# Patient Record
Sex: FEMALE | Race: BLACK OR AFRICAN AMERICAN | URBAN - METROPOLITAN AREA
[De-identification: names, ages, dates, MRNs, and addresses within clinical notes are randomized per-mention and may not be internally consistent; named-entity substitution may affect disease eponyms.]

---

## 2017-01-01 ENCOUNTER — TELEPHONE (OUTPATIENT)
Dept: CARE COORDINATION | Facility: CLINIC | Age: 22
End: 2017-01-01

## 2017-01-01 ENCOUNTER — OFFICE VISIT (OUTPATIENT)
Dept: PEDIATRIC HEMATOLOGY/ONCOLOGY | Facility: CLINIC | Age: 22
End: 2017-01-01

## 2017-01-01 ENCOUNTER — HOSPITAL ENCOUNTER (INPATIENT)
Facility: CLINIC | Age: 22
LOS: 2 days | Discharge: HOME | End: 2017-12-14
Attending: PEDIATRICS | Admitting: PEDIATRICS
Payer: MEDICAID

## 2017-01-01 ENCOUNTER — HOSPITAL ENCOUNTER (OUTPATIENT)
Dept: GENERAL RADIOLOGY | Facility: CLINIC | Age: 22
Discharge: HOME | End: 2017-12-21
Admitting: NURSE PRACTITIONER
Payer: MEDICAID

## 2017-01-01 ENCOUNTER — INFUSION THERAPY VISIT (OUTPATIENT)
Dept: INFUSION THERAPY | Facility: CLINIC | Age: 22
End: 2017-01-01
Attending: PEDIATRICS
Payer: MEDICAID

## 2017-01-01 ENCOUNTER — DOCUMENTATION ONLY (OUTPATIENT)
Dept: CARE COORDINATION | Facility: CLINIC | Age: 22
End: 2017-01-01

## 2017-01-01 ENCOUNTER — OFFICE VISIT (OUTPATIENT)
Dept: PEDIATRIC HEMATOLOGY/ONCOLOGY | Facility: CLINIC | Age: 22
End: 2017-01-01
Attending: PEDIATRICS
Payer: COMMERCIAL

## 2017-01-01 ENCOUNTER — TELEPHONE (OUTPATIENT)
Dept: PEDIATRIC HEMATOLOGY/ONCOLOGY | Facility: CLINIC | Age: 22
End: 2017-01-01

## 2017-01-01 ENCOUNTER — HEALTH MAINTENANCE LETTER (OUTPATIENT)
Age: 22
End: 2017-01-01

## 2017-01-01 ENCOUNTER — HOSPITAL ENCOUNTER (OUTPATIENT)
Dept: GENERAL RADIOLOGY | Facility: CLINIC | Age: 22
End: 2017-12-11
Attending: NURSE PRACTITIONER
Payer: MEDICAID

## 2017-01-01 ENCOUNTER — APPOINTMENT (OUTPATIENT)
Dept: GENERAL RADIOLOGY | Facility: CLINIC | Age: 22
End: 2017-01-01
Attending: PEDIATRICS
Payer: MEDICAID

## 2017-01-01 ENCOUNTER — OFFICE VISIT (OUTPATIENT)
Dept: PEDIATRIC HEMATOLOGY/ONCOLOGY | Facility: CLINIC | Age: 22
End: 2017-01-01
Attending: NURSE PRACTITIONER
Payer: COMMERCIAL

## 2017-01-01 ENCOUNTER — OFFICE VISIT (OUTPATIENT)
Dept: CARE COORDINATION | Facility: CLINIC | Age: 22
End: 2017-01-01

## 2017-01-01 ENCOUNTER — HOSPITAL ENCOUNTER (OUTPATIENT)
Dept: CT IMAGING | Facility: CLINIC | Age: 22
Discharge: HOME | End: 2017-08-14
Attending: PEDIATRICS | Admitting: PEDIATRICS
Payer: COMMERCIAL

## 2017-01-01 ENCOUNTER — OFFICE VISIT (OUTPATIENT)
Dept: PEDIATRIC HEMATOLOGY/ONCOLOGY | Facility: CLINIC | Age: 22
End: 2017-01-01
Attending: PEDIATRICS
Payer: MEDICAID

## 2017-01-01 ENCOUNTER — OFFICE VISIT (OUTPATIENT)
Dept: INTERPRETER SERVICES | Facility: CLINIC | Age: 22
End: 2017-01-01
Payer: MEDICAID

## 2017-01-01 ENCOUNTER — OFFICE VISIT (OUTPATIENT)
Dept: PEDIATRIC HEMATOLOGY/ONCOLOGY | Facility: CLINIC | Age: 22
End: 2017-01-01
Attending: NURSE PRACTITIONER
Payer: MEDICAID

## 2017-01-01 ENCOUNTER — INFUSION THERAPY VISIT (OUTPATIENT)
Dept: INFUSION THERAPY | Facility: CLINIC | Age: 22
End: 2017-01-01
Attending: PEDIATRICS
Payer: COMMERCIAL

## 2017-01-01 ENCOUNTER — HOSPITAL ENCOUNTER (OUTPATIENT)
Dept: GENERAL RADIOLOGY | Facility: CLINIC | Age: 22
Discharge: HOME | End: 2017-08-21
Attending: NURSE PRACTITIONER | Admitting: NURSE PRACTITIONER
Payer: COMMERCIAL

## 2017-01-01 ENCOUNTER — OFFICE VISIT (OUTPATIENT)
Dept: INTERPRETER SERVICES | Facility: CLINIC | Age: 22
End: 2017-01-01

## 2017-01-01 VITALS
HEIGHT: 64 IN | TEMPERATURE: 97.9 F | SYSTOLIC BLOOD PRESSURE: 109 MMHG | OXYGEN SATURATION: 96 % | HEART RATE: 68 BPM | BODY MASS INDEX: 22.51 KG/M2 | RESPIRATION RATE: 20 BRPM | DIASTOLIC BLOOD PRESSURE: 73 MMHG | WEIGHT: 131.84 LBS

## 2017-01-01 VITALS
OXYGEN SATURATION: 100 % | RESPIRATION RATE: 20 BRPM | HEART RATE: 90 BPM | SYSTOLIC BLOOD PRESSURE: 100 MMHG | WEIGHT: 135.8 LBS | TEMPERATURE: 97.5 F | DIASTOLIC BLOOD PRESSURE: 69 MMHG | BODY MASS INDEX: 23.5 KG/M2

## 2017-01-01 VITALS
WEIGHT: 132.72 LBS | RESPIRATION RATE: 18 BRPM | TEMPERATURE: 97.5 F | DIASTOLIC BLOOD PRESSURE: 79 MMHG | BODY MASS INDEX: 23.52 KG/M2 | HEART RATE: 60 BPM | SYSTOLIC BLOOD PRESSURE: 108 MMHG | HEIGHT: 63 IN | OXYGEN SATURATION: 97 %

## 2017-01-01 VITALS
HEIGHT: 64 IN | TEMPERATURE: 97.9 F | SYSTOLIC BLOOD PRESSURE: 103 MMHG | OXYGEN SATURATION: 100 % | RESPIRATION RATE: 12 BRPM | BODY MASS INDEX: 23.03 KG/M2 | DIASTOLIC BLOOD PRESSURE: 73 MMHG | HEART RATE: 62 BPM | WEIGHT: 134.92 LBS

## 2017-01-01 VITALS
TEMPERATURE: 97 F | SYSTOLIC BLOOD PRESSURE: 111 MMHG | OXYGEN SATURATION: 100 % | BODY MASS INDEX: 24.02 KG/M2 | HEIGHT: 63 IN | RESPIRATION RATE: 18 BRPM | WEIGHT: 135.58 LBS | HEART RATE: 64 BPM | DIASTOLIC BLOOD PRESSURE: 84 MMHG

## 2017-01-01 VITALS
BODY MASS INDEX: 24.18 KG/M2 | SYSTOLIC BLOOD PRESSURE: 104 MMHG | RESPIRATION RATE: 18 BRPM | TEMPERATURE: 97.4 F | HEIGHT: 63 IN | OXYGEN SATURATION: 100 % | WEIGHT: 136.47 LBS | HEART RATE: 74 BPM | DIASTOLIC BLOOD PRESSURE: 70 MMHG

## 2017-01-01 VITALS
RESPIRATION RATE: 26 BRPM | HEART RATE: 127 BPM | TEMPERATURE: 98.5 F | OXYGEN SATURATION: 94 % | SYSTOLIC BLOOD PRESSURE: 97 MMHG | DIASTOLIC BLOOD PRESSURE: 72 MMHG

## 2017-01-01 VITALS
OXYGEN SATURATION: 100 % | HEART RATE: 122 BPM | DIASTOLIC BLOOD PRESSURE: 83 MMHG | SYSTOLIC BLOOD PRESSURE: 109 MMHG | TEMPERATURE: 98.4 F | RESPIRATION RATE: 20 BRPM

## 2017-01-01 VITALS
SYSTOLIC BLOOD PRESSURE: 104 MMHG | HEART RATE: 70 BPM | DIASTOLIC BLOOD PRESSURE: 81 MMHG | RESPIRATION RATE: 23 BRPM | TEMPERATURE: 98.6 F | WEIGHT: 141.54 LBS | BODY MASS INDEX: 23.58 KG/M2 | OXYGEN SATURATION: 95 % | HEIGHT: 65 IN

## 2017-01-01 VITALS
HEART RATE: 143 BPM | TEMPERATURE: 99.6 F | OXYGEN SATURATION: 94 % | RESPIRATION RATE: 28 BRPM | DIASTOLIC BLOOD PRESSURE: 78 MMHG | SYSTOLIC BLOOD PRESSURE: 113 MMHG

## 2017-01-01 VITALS
WEIGHT: 134.48 LBS | BODY MASS INDEX: 23.83 KG/M2 | HEIGHT: 63 IN | DIASTOLIC BLOOD PRESSURE: 78 MMHG | TEMPERATURE: 97.5 F | RESPIRATION RATE: 20 BRPM | SYSTOLIC BLOOD PRESSURE: 109 MMHG | OXYGEN SATURATION: 100 % | HEART RATE: 70 BPM

## 2017-01-01 VITALS
TEMPERATURE: 97.9 F | DIASTOLIC BLOOD PRESSURE: 79 MMHG | OXYGEN SATURATION: 99 % | HEIGHT: 63 IN | BODY MASS INDEX: 24.02 KG/M2 | HEART RATE: 77 BPM | SYSTOLIC BLOOD PRESSURE: 104 MMHG | WEIGHT: 135.58 LBS | RESPIRATION RATE: 18 BRPM

## 2017-01-01 VITALS
DIASTOLIC BLOOD PRESSURE: 69 MMHG | SYSTOLIC BLOOD PRESSURE: 104 MMHG | HEIGHT: 63 IN | TEMPERATURE: 98.2 F | WEIGHT: 132.94 LBS | BODY MASS INDEX: 23.55 KG/M2 | HEART RATE: 96 BPM | OXYGEN SATURATION: 97 % | RESPIRATION RATE: 21 BRPM

## 2017-01-01 DIAGNOSIS — C49.9 SYNOVIAL SARCOMA (H): Primary | ICD-10-CM

## 2017-01-01 DIAGNOSIS — C49.9 SYNOVIAL SARCOMA (H): ICD-10-CM

## 2017-01-01 DIAGNOSIS — Z71.9 ENCOUNTER FOR COUNSELING: Primary | ICD-10-CM

## 2017-01-01 DIAGNOSIS — R07.9 ACUTE CHEST PAIN: Primary | ICD-10-CM

## 2017-01-01 DIAGNOSIS — C49.9 SARCOMA (H): ICD-10-CM

## 2017-01-01 DIAGNOSIS — L30.9 DERMATITIS: ICD-10-CM

## 2017-01-01 DIAGNOSIS — C49.9 RHABDOID TUMOR (H): ICD-10-CM

## 2017-01-01 DIAGNOSIS — L27.1 PALMAR PLANTAR DYSESTHESIA: Primary | ICD-10-CM

## 2017-01-01 DIAGNOSIS — J18.9 PNEUMONIA DUE TO INFECTIOUS ORGANISM, UNSPECIFIED LATERALITY, UNSPECIFIED PART OF LUNG: ICD-10-CM

## 2017-01-01 DIAGNOSIS — L27.1 PALMAR PLANTAR DYSESTHESIA: ICD-10-CM

## 2017-01-01 DIAGNOSIS — C51.9 SARCOMA OF VULVA (H): Primary | ICD-10-CM

## 2017-01-01 DIAGNOSIS — J93.9 BILATERAL PNEUMOTHORACES: Primary | ICD-10-CM

## 2017-01-01 DIAGNOSIS — J93.12 SECONDARY SPONTANEOUS PNEUMOTHORAX: Primary | ICD-10-CM

## 2017-01-01 DIAGNOSIS — C49.9 SARCOMA (H): Primary | ICD-10-CM

## 2017-01-01 DIAGNOSIS — C49.9 RHABDOID TUMOR (H): Primary | ICD-10-CM

## 2017-01-01 LAB
ALBUMIN SERPL-MCNC: 2.9 G/DL (ref 3.4–5)
ALBUMIN SERPL-MCNC: 3.1 G/DL (ref 3.4–5)
ALBUMIN SERPL-MCNC: 3.2 G/DL (ref 3.4–5)
ALBUMIN SERPL-MCNC: 3.3 G/DL (ref 3.4–5)
ALBUMIN SERPL-MCNC: 3.4 G/DL (ref 3.4–5)
ALBUMIN SERPL-MCNC: 3.5 G/DL (ref 3.4–5)
ALBUMIN UR-MCNC: 10 MG/DL
ALBUMIN UR-MCNC: 30 MG/DL
ALBUMIN UR-MCNC: NEGATIVE MG/DL
ALP SERPL-CCNC: 54 U/L (ref 40–150)
ALP SERPL-CCNC: 77 U/L (ref 40–150)
ALP SERPL-CCNC: 80 U/L (ref 40–150)
ALP SERPL-CCNC: 83 U/L (ref 40–150)
ALP SERPL-CCNC: 85 U/L (ref 40–150)
ALP SERPL-CCNC: 91 U/L (ref 40–150)
ALT SERPL W P-5'-P-CCNC: 25 U/L (ref 0–50)
ALT SERPL W P-5'-P-CCNC: 26 U/L (ref 0–50)
ALT SERPL W P-5'-P-CCNC: 31 U/L (ref 0–50)
ALT SERPL W P-5'-P-CCNC: 32 U/L (ref 0–50)
ALT SERPL W P-5'-P-CCNC: 36 U/L (ref 0–50)
ALT SERPL W P-5'-P-CCNC: 40 U/L (ref 0–50)
ANION GAP SERPL CALCULATED.3IONS-SCNC: 10 MMOL/L (ref 3–14)
ANION GAP SERPL CALCULATED.3IONS-SCNC: 10 MMOL/L (ref 3–14)
ANION GAP SERPL CALCULATED.3IONS-SCNC: 11 MMOL/L (ref 3–14)
ANION GAP SERPL CALCULATED.3IONS-SCNC: 6 MMOL/L (ref 3–14)
ANION GAP SERPL CALCULATED.3IONS-SCNC: 7 MMOL/L (ref 3–14)
ANION GAP SERPL CALCULATED.3IONS-SCNC: 7 MMOL/L (ref 3–14)
ANION GAP SERPL CALCULATED.3IONS-SCNC: 8 MMOL/L (ref 3–14)
APPEARANCE UR: ABNORMAL
APPEARANCE UR: CLEAR
AST SERPL W P-5'-P-CCNC: 24 U/L (ref 0–45)
AST SERPL W P-5'-P-CCNC: 28 U/L (ref 0–45)
AST SERPL W P-5'-P-CCNC: 28 U/L (ref 0–45)
AST SERPL W P-5'-P-CCNC: 31 U/L (ref 0–45)
AST SERPL W P-5'-P-CCNC: 32 U/L (ref 0–45)
AST SERPL W P-5'-P-CCNC: 33 U/L (ref 0–45)
BACTERIA #/AREA URNS HPF: ABNORMAL /HPF
BACTERIA SPEC CULT: NO GROWTH
BACTERIA SPEC CULT: NORMAL
BASOPHILS # BLD AUTO: 0 10E9/L (ref 0–0.2)
BASOPHILS NFR BLD AUTO: 0 %
BASOPHILS NFR BLD AUTO: 0.1 %
BASOPHILS NFR BLD AUTO: 0.2 %
BASOPHILS NFR BLD AUTO: 0.2 %
BASOPHILS NFR BLD AUTO: 0.3 %
BASOPHILS NFR BLD AUTO: 0.4 %
BILIRUB SERPL-MCNC: 0.4 MG/DL (ref 0.2–1.3)
BILIRUB SERPL-MCNC: 0.4 MG/DL (ref 0.2–1.3)
BILIRUB SERPL-MCNC: 0.5 MG/DL (ref 0.2–1.3)
BILIRUB SERPL-MCNC: 0.6 MG/DL (ref 0.2–1.3)
BILIRUB SERPL-MCNC: 0.6 MG/DL (ref 0.2–1.3)
BILIRUB SERPL-MCNC: 0.7 MG/DL (ref 0.2–1.3)
BILIRUB UR QL STRIP: NEGATIVE
BUN SERPL-MCNC: 10 MG/DL (ref 7–30)
BUN SERPL-MCNC: 11 MG/DL (ref 7–30)
BUN SERPL-MCNC: 12 MG/DL (ref 7–30)
BUN SERPL-MCNC: 12 MG/DL (ref 7–30)
BUN SERPL-MCNC: 16 MG/DL (ref 7–30)
BUN SERPL-MCNC: 8 MG/DL (ref 7–30)
BUN SERPL-MCNC: 9 MG/DL (ref 7–30)
CALCIUM SERPL-MCNC: 7.2 MG/DL (ref 8.5–10.1)
CALCIUM SERPL-MCNC: 8.4 MG/DL (ref 8.5–10.1)
CALCIUM SERPL-MCNC: 8.8 MG/DL (ref 8.5–10.1)
CALCIUM SERPL-MCNC: 8.8 MG/DL (ref 8.5–10.1)
CALCIUM SERPL-MCNC: 8.9 MG/DL (ref 8.5–10.1)
CALCIUM SERPL-MCNC: 9.1 MG/DL (ref 8.5–10.1)
CALCIUM SERPL-MCNC: 9.1 MG/DL (ref 8.5–10.1)
CHLORIDE SERPL-SCNC: 102 MMOL/L (ref 94–109)
CHLORIDE SERPL-SCNC: 103 MMOL/L (ref 94–109)
CHLORIDE SERPL-SCNC: 103 MMOL/L (ref 94–109)
CHLORIDE SERPL-SCNC: 105 MMOL/L (ref 94–109)
CHLORIDE SERPL-SCNC: 106 MMOL/L (ref 94–109)
CHLORIDE SERPL-SCNC: 113 MMOL/L (ref 94–109)
CHLORIDE SERPL-SCNC: 98 MMOL/L (ref 94–109)
CO2 SERPL-SCNC: 24 MMOL/L (ref 20–32)
CO2 SERPL-SCNC: 25 MMOL/L (ref 20–32)
CO2 SERPL-SCNC: 26 MMOL/L (ref 20–32)
CO2 SERPL-SCNC: 29 MMOL/L (ref 20–32)
COLOR UR AUTO: ABNORMAL
COLOR UR AUTO: ABNORMAL
COLOR UR AUTO: YELLOW
CREAT SERPL-MCNC: 0.5 MG/DL (ref 0.52–1.04)
CREAT SERPL-MCNC: 0.5 MG/DL (ref 0.52–1.04)
CREAT SERPL-MCNC: 0.56 MG/DL (ref 0.52–1.04)
CREAT SERPL-MCNC: 0.56 MG/DL (ref 0.52–1.04)
CREAT SERPL-MCNC: 0.57 MG/DL (ref 0.52–1.04)
CREAT SERPL-MCNC: 0.71 MG/DL (ref 0.52–1.04)
CREAT SERPL-MCNC: 0.77 MG/DL (ref 0.52–1.04)
CREAT SERPL-MCNC: 0.8 MG/DL (ref 0.52–1.04)
DIFFERENTIAL METHOD BLD: ABNORMAL
EOSINOPHIL # BLD AUTO: 0 10E9/L (ref 0–0.7)
EOSINOPHIL # BLD AUTO: 0.1 10E9/L (ref 0–0.7)
EOSINOPHIL NFR BLD AUTO: 0 %
EOSINOPHIL NFR BLD AUTO: 0.3 %
EOSINOPHIL NFR BLD AUTO: 0.4 %
EOSINOPHIL NFR BLD AUTO: 0.7 %
EOSINOPHIL NFR BLD AUTO: 0.7 %
EOSINOPHIL NFR BLD AUTO: 1.1 %
EOSINOPHIL NFR BLD AUTO: 1.3 %
EOSINOPHIL NFR BLD AUTO: 1.3 %
EOSINOPHIL NFR BLD AUTO: 1.6 %
ERYTHROCYTE [DISTWIDTH] IN BLOOD BY AUTOMATED COUNT: 10.9 % (ref 10–15)
ERYTHROCYTE [DISTWIDTH] IN BLOOD BY AUTOMATED COUNT: 11.3 % (ref 10–15)
ERYTHROCYTE [DISTWIDTH] IN BLOOD BY AUTOMATED COUNT: 11.9 % (ref 10–15)
ERYTHROCYTE [DISTWIDTH] IN BLOOD BY AUTOMATED COUNT: 11.9 % (ref 10–15)
ERYTHROCYTE [DISTWIDTH] IN BLOOD BY AUTOMATED COUNT: 12 % (ref 10–15)
ERYTHROCYTE [DISTWIDTH] IN BLOOD BY AUTOMATED COUNT: 12.7 % (ref 10–15)
ERYTHROCYTE [DISTWIDTH] IN BLOOD BY AUTOMATED COUNT: 12.7 % (ref 10–15)
ERYTHROCYTE [DISTWIDTH] IN BLOOD BY AUTOMATED COUNT: 12.9 % (ref 10–15)
ERYTHROCYTE [DISTWIDTH] IN BLOOD BY AUTOMATED COUNT: 15.3 % (ref 10–15)
ERYTHROCYTE [DISTWIDTH] IN BLOOD BY AUTOMATED COUNT: 15.4 % (ref 10–15)
ERYTHROCYTE [DISTWIDTH] IN BLOOD BY AUTOMATED COUNT: 16.3 % (ref 10–15)
FLUAV H1 2009 PAND RNA SPEC QL NAA+PROBE: NEGATIVE
FLUAV H1 RNA SPEC QL NAA+PROBE: NEGATIVE
FLUAV H3 RNA SPEC QL NAA+PROBE: NEGATIVE
FLUAV RNA SPEC QL NAA+PROBE: NEGATIVE
FLUBV RNA SPEC QL NAA+PROBE: NEGATIVE
GFR SERPL CREATININE-BSD FRML MDRD: 87 ML/MIN/1.7M2
GFR SERPL CREATININE-BSD FRML MDRD: >90 ML/MIN/1.7M2
GFR SERPL CREATININE-BSD FRML MDRD: ABNORMAL ML/MIN/1.7M2
GFR SERPL CREATININE-BSD FRML MDRD: NORMAL ML/MIN/1.7M2
GLUCOSE SERPL-MCNC: 112 MG/DL (ref 70–99)
GLUCOSE SERPL-MCNC: 113 MG/DL (ref 70–99)
GLUCOSE SERPL-MCNC: 118 MG/DL (ref 70–99)
GLUCOSE SERPL-MCNC: 128 MG/DL (ref 70–99)
GLUCOSE SERPL-MCNC: 159 MG/DL (ref 70–99)
GLUCOSE SERPL-MCNC: 81 MG/DL (ref 70–99)
GLUCOSE SERPL-MCNC: 83 MG/DL (ref 70–99)
GLUCOSE UR STRIP-MCNC: NEGATIVE MG/DL
HADV DNA SPEC QL NAA+PROBE: NEGATIVE
HADV DNA SPEC QL NAA+PROBE: NEGATIVE
HCT VFR BLD AUTO: 32.2 % (ref 35–47)
HCT VFR BLD AUTO: 34.6 % (ref 35–47)
HCT VFR BLD AUTO: 36.5 % (ref 35–47)
HCT VFR BLD AUTO: 37 % (ref 35–47)
HCT VFR BLD AUTO: 37.7 % (ref 35–47)
HCT VFR BLD AUTO: 37.8 % (ref 35–47)
HCT VFR BLD AUTO: 38.8 % (ref 35–47)
HCT VFR BLD AUTO: 39.1 % (ref 35–47)
HCT VFR BLD AUTO: 39.2 % (ref 35–47)
HCT VFR BLD AUTO: 40.3 % (ref 35–47)
HCT VFR BLD AUTO: 41.2 % (ref 35–47)
HGB BLD-MCNC: 11.1 G/DL (ref 11.7–15.7)
HGB BLD-MCNC: 12 G/DL (ref 11.7–15.7)
HGB BLD-MCNC: 12.2 G/DL (ref 11.7–15.7)
HGB BLD-MCNC: 13.2 G/DL (ref 11.7–15.7)
HGB BLD-MCNC: 13.3 G/DL (ref 11.7–15.7)
HGB BLD-MCNC: 13.5 G/DL (ref 11.7–15.7)
HGB BLD-MCNC: 13.7 G/DL (ref 11.7–15.7)
HGB BLD-MCNC: 14 G/DL (ref 11.7–15.7)
HGB BLD-MCNC: 14.1 G/DL (ref 11.7–15.7)
HGB BLD-MCNC: 14.3 G/DL (ref 11.7–15.7)
HGB BLD-MCNC: 14.7 G/DL (ref 11.7–15.7)
HGB UR QL STRIP: NEGATIVE
HMPV RNA SPEC QL NAA+PROBE: NEGATIVE
HPIV1 RNA SPEC QL NAA+PROBE: NEGATIVE
HPIV2 RNA SPEC QL NAA+PROBE: NEGATIVE
HPIV3 RNA SPEC QL NAA+PROBE: NEGATIVE
IMM GRANULOCYTES # BLD: 0 10E9/L (ref 0–0.4)
IMM GRANULOCYTES # BLD: 0.1 10E9/L (ref 0–0.4)
IMM GRANULOCYTES # BLD: 0.1 10E9/L (ref 0–0.4)
IMM GRANULOCYTES NFR BLD: 0 %
IMM GRANULOCYTES NFR BLD: 0.2 %
IMM GRANULOCYTES NFR BLD: 0.3 %
IMM GRANULOCYTES NFR BLD: 0.4 %
IMM GRANULOCYTES NFR BLD: 0.9 %
IMM GRANULOCYTES NFR BLD: 1 %
IMM GRANULOCYTES NFR BLD: 1 %
KETONES UR STRIP-MCNC: NEGATIVE MG/DL
LEUKOCYTE ESTERASE UR QL STRIP: ABNORMAL
LEUKOCYTE ESTERASE UR QL STRIP: NEGATIVE
LEUKOCYTE ESTERASE UR QL STRIP: NEGATIVE
LYMPHOCYTES # BLD AUTO: 0.6 10E9/L (ref 0.8–5.3)
LYMPHOCYTES # BLD AUTO: 0.9 10E9/L (ref 0.8–5.3)
LYMPHOCYTES # BLD AUTO: 0.9 10E9/L (ref 0.8–5.3)
LYMPHOCYTES # BLD AUTO: 1.1 10E9/L (ref 0.8–5.3)
LYMPHOCYTES # BLD AUTO: 1.4 10E9/L (ref 0.8–5.3)
LYMPHOCYTES # BLD AUTO: 1.5 10E9/L (ref 0.8–5.3)
LYMPHOCYTES # BLD AUTO: 1.7 10E9/L (ref 0.8–5.3)
LYMPHOCYTES NFR BLD AUTO: 17.6 %
LYMPHOCYTES NFR BLD AUTO: 27.6 %
LYMPHOCYTES NFR BLD AUTO: 32.7 %
LYMPHOCYTES NFR BLD AUTO: 38.6 %
LYMPHOCYTES NFR BLD AUTO: 39 %
LYMPHOCYTES NFR BLD AUTO: 40 %
LYMPHOCYTES NFR BLD AUTO: 41.7 %
LYMPHOCYTES NFR BLD AUTO: 44 %
LYMPHOCYTES NFR BLD AUTO: 44.3 %
LYMPHOCYTES NFR BLD AUTO: 46.7 %
LYMPHOCYTES NFR BLD AUTO: 7.2 %
MCH RBC QN AUTO: 32.4 PG (ref 26.5–33)
MCH RBC QN AUTO: 32.7 PG (ref 26.5–33)
MCH RBC QN AUTO: 33.4 PG (ref 26.5–33)
MCH RBC QN AUTO: 34.3 PG (ref 26.5–33)
MCH RBC QN AUTO: 35 PG (ref 26.5–33)
MCH RBC QN AUTO: 36.1 PG (ref 26.5–33)
MCH RBC QN AUTO: 36.2 PG (ref 26.5–33)
MCH RBC QN AUTO: 36.4 PG (ref 26.5–33)
MCH RBC QN AUTO: 36.4 PG (ref 26.5–33)
MCH RBC QN AUTO: 36.5 PG (ref 26.5–33)
MCH RBC QN AUTO: 37 PG (ref 26.5–33)
MCHC RBC AUTO-ENTMCNC: 33.4 G/DL (ref 31.5–36.5)
MCHC RBC AUTO-ENTMCNC: 34.5 G/DL (ref 31.5–36.5)
MCHC RBC AUTO-ENTMCNC: 34.7 G/DL (ref 31.5–36.5)
MCHC RBC AUTO-ENTMCNC: 34.8 G/DL (ref 31.5–36.5)
MCHC RBC AUTO-ENTMCNC: 34.9 G/DL (ref 31.5–36.5)
MCHC RBC AUTO-ENTMCNC: 35 G/DL (ref 31.5–36.5)
MCHC RBC AUTO-ENTMCNC: 35.3 G/DL (ref 31.5–36.5)
MCHC RBC AUTO-ENTMCNC: 35.7 G/DL (ref 31.5–36.5)
MCHC RBC AUTO-ENTMCNC: 35.7 G/DL (ref 31.5–36.5)
MCHC RBC AUTO-ENTMCNC: 36.6 G/DL (ref 31.5–36.5)
MCHC RBC AUTO-ENTMCNC: 37 G/DL (ref 31.5–36.5)
MCV RBC AUTO: 100 FL (ref 78–100)
MCV RBC AUTO: 101 FL (ref 78–100)
MCV RBC AUTO: 104 FL (ref 78–100)
MCV RBC AUTO: 105 FL (ref 78–100)
MCV RBC AUTO: 105 FL (ref 78–100)
MCV RBC AUTO: 106 FL (ref 78–100)
MCV RBC AUTO: 108 FL (ref 78–100)
MCV RBC AUTO: 89 FL (ref 78–100)
MCV RBC AUTO: 92 FL (ref 78–100)
MCV RBC AUTO: 93 FL (ref 78–100)
MCV RBC AUTO: 96 FL (ref 78–100)
MICROBIOLOGIST REVIEW: ABNORMAL
MONOCYTES # BLD AUTO: 0.3 10E9/L (ref 0–1.3)
MONOCYTES # BLD AUTO: 0.4 10E9/L (ref 0–1.3)
MONOCYTES # BLD AUTO: 0.5 10E9/L (ref 0–1.3)
MONOCYTES # BLD AUTO: 0.5 10E9/L (ref 0–1.3)
MONOCYTES # BLD AUTO: 0.6 10E9/L (ref 0–1.3)
MONOCYTES NFR BLD AUTO: 10.4 %
MONOCYTES NFR BLD AUTO: 11.7 %
MONOCYTES NFR BLD AUTO: 12 %
MONOCYTES NFR BLD AUTO: 12.3 %
MONOCYTES NFR BLD AUTO: 13.7 %
MONOCYTES NFR BLD AUTO: 14 %
MONOCYTES NFR BLD AUTO: 19.7 %
MONOCYTES NFR BLD AUTO: 6.6 %
MONOCYTES NFR BLD AUTO: 7.6 %
MONOCYTES NFR BLD AUTO: 9.2 %
MONOCYTES NFR BLD AUTO: 9.8 %
MUCOUS THREADS #/AREA URNS LPF: PRESENT /LPF
NEUTROPHILS # BLD AUTO: 0.9 10E9/L (ref 1.6–8.3)
NEUTROPHILS # BLD AUTO: 1 10E9/L (ref 1.6–8.3)
NEUTROPHILS # BLD AUTO: 1.1 10E9/L (ref 1.6–8.3)
NEUTROPHILS # BLD AUTO: 1.1 10E9/L (ref 1.6–8.3)
NEUTROPHILS # BLD AUTO: 1.4 10E9/L (ref 1.6–8.3)
NEUTROPHILS # BLD AUTO: 1.4 10E9/L (ref 1.6–8.3)
NEUTROPHILS # BLD AUTO: 2.1 10E9/L (ref 1.6–8.3)
NEUTROPHILS # BLD AUTO: 2.2 10E9/L (ref 1.6–8.3)
NEUTROPHILS # BLD AUTO: 2.5 10E9/L (ref 1.6–8.3)
NEUTROPHILS # BLD AUTO: 3.8 10E9/L (ref 1.6–8.3)
NEUTROPHILS # BLD AUTO: 7.3 10E9/L (ref 1.6–8.3)
NEUTROPHILS NFR BLD AUTO: 39.2 %
NEUTROPHILS NFR BLD AUTO: 40.9 %
NEUTROPHILS NFR BLD AUTO: 43.1 %
NEUTROPHILS NFR BLD AUTO: 44.7 %
NEUTROPHILS NFR BLD AUTO: 44.8 %
NEUTROPHILS NFR BLD AUTO: 46.1 %
NEUTROPHILS NFR BLD AUTO: 49.7 %
NEUTROPHILS NFR BLD AUTO: 54.8 %
NEUTROPHILS NFR BLD AUTO: 57.1 %
NEUTROPHILS NFR BLD AUTO: 72.3 %
NEUTROPHILS NFR BLD AUTO: 85.1 %
NITRATE UR QL: NEGATIVE
NRBC # BLD AUTO: 0 10*3/UL
NRBC BLD AUTO-RTO: 0 /100
PH UR STRIP: 5.5 PH (ref 5–7)
PH UR STRIP: 5.5 PH (ref 5–7)
PH UR STRIP: 6 PH (ref 5–7)
PH UR STRIP: 6.5 PH (ref 5–7)
PLATELET # BLD AUTO: 105 10E9/L (ref 150–450)
PLATELET # BLD AUTO: 112 10E9/L (ref 150–450)
PLATELET # BLD AUTO: 113 10E9/L (ref 150–450)
PLATELET # BLD AUTO: 120 10E9/L (ref 150–450)
PLATELET # BLD AUTO: 125 10E9/L (ref 150–450)
PLATELET # BLD AUTO: 128 10E9/L (ref 150–450)
PLATELET # BLD AUTO: 133 10E9/L (ref 150–450)
PLATELET # BLD AUTO: 158 10E9/L (ref 150–450)
PLATELET # BLD AUTO: 179 10E9/L (ref 150–450)
PLATELET # BLD AUTO: 57 10E9/L (ref 150–450)
PLATELET # BLD AUTO: 66 10E9/L (ref 150–450)
PLATELET # BLD EST: ABNORMAL 10*3/UL
PLATELET # BLD EST: ABNORMAL 10*3/UL
POTASSIUM SERPL-SCNC: 3.3 MMOL/L (ref 3.4–5.3)
POTASSIUM SERPL-SCNC: 3.5 MMOL/L (ref 3.4–5.3)
POTASSIUM SERPL-SCNC: 3.7 MMOL/L (ref 3.4–5.3)
POTASSIUM SERPL-SCNC: 3.8 MMOL/L (ref 3.4–5.3)
POTASSIUM SERPL-SCNC: 3.9 MMOL/L (ref 3.4–5.3)
POTASSIUM SERPL-SCNC: 4 MMOL/L (ref 3.4–5.3)
POTASSIUM SERPL-SCNC: 4.1 MMOL/L (ref 3.4–5.3)
PROT SERPL-MCNC: 7 G/DL (ref 6.8–8.8)
PROT SERPL-MCNC: 7.2 G/DL (ref 6.8–8.8)
PROT SERPL-MCNC: 7.2 G/DL (ref 6.8–8.8)
PROT SERPL-MCNC: 7.4 G/DL (ref 6.8–8.8)
PROT SERPL-MCNC: 7.5 G/DL (ref 6.8–8.8)
PROT SERPL-MCNC: 7.7 G/DL (ref 6.8–8.8)
RADIOLOGIST FLAGS: ABNORMAL
RBC # BLD AUTO: 3.04 10E12/L (ref 3.8–5.2)
RBC # BLD AUTO: 3.38 10E12/L (ref 3.8–5.2)
RBC # BLD AUTO: 3.59 10E12/L (ref 3.8–5.2)
RBC # BLD AUTO: 3.59 10E12/L (ref 3.8–5.2)
RBC # BLD AUTO: 3.65 10E12/L (ref 3.8–5.2)
RBC # BLD AUTO: 3.71 10E12/L (ref 3.8–5.2)
RBC # BLD AUTO: 3.87 10E12/L (ref 3.8–5.2)
RBC # BLD AUTO: 3.91 10E12/L (ref 3.8–5.2)
RBC # BLD AUTO: 4.08 10E12/L (ref 3.8–5.2)
RBC # BLD AUTO: 4.42 10E12/L (ref 3.8–5.2)
RBC # BLD AUTO: 4.49 10E12/L (ref 3.8–5.2)
RBC #/AREA URNS AUTO: 0 /HPF (ref 0–2)
RBC #/AREA URNS AUTO: 1 /HPF (ref 0–2)
RBC #/AREA URNS AUTO: 1 /HPF (ref 0–2)
RBC #/AREA URNS AUTO: <1 /HPF (ref 0–2)
RBC #/AREA URNS AUTO: <1 /HPF (ref 0–2)
RBC MORPH BLD: NORMAL
RBC MORPH BLD: NORMAL
RENAL EPI CELLS #/AREA URNS HPF: 1 /HPF
RENAL EPI CELLS #/AREA URNS HPF: 1 /HPF
RHINOVIRUS RNA SPEC QL NAA+PROBE: NEGATIVE
RSV RNA SPEC QL NAA+PROBE: NEGATIVE
RSV RNA SPEC QL NAA+PROBE: POSITIVE
SODIUM SERPL-SCNC: 133 MMOL/L (ref 133–144)
SODIUM SERPL-SCNC: 139 MMOL/L (ref 133–144)
SODIUM SERPL-SCNC: 139 MMOL/L (ref 133–144)
SODIUM SERPL-SCNC: 141 MMOL/L (ref 133–144)
SODIUM SERPL-SCNC: 142 MMOL/L (ref 133–144)
SODIUM SERPL-SCNC: 142 MMOL/L (ref 133–144)
SODIUM SERPL-SCNC: 144 MMOL/L (ref 133–144)
SOURCE: ABNORMAL
SP GR UR STRIP: 1 (ref 1–1.03)
SP GR UR STRIP: 1 (ref 1–1.03)
SP GR UR STRIP: 1.01 (ref 1–1.03)
SP GR UR STRIP: 1.02 (ref 1–1.03)
SP GR UR STRIP: 1.02 (ref 1–1.03)
SPECIMEN SOURCE: ABNORMAL
SPECIMEN SOURCE: NORMAL
SPECIMEN SOURCE: NORMAL
SQUAMOUS #/AREA URNS AUTO: 1 /HPF (ref 0–1)
SQUAMOUS #/AREA URNS AUTO: 1 /HPF (ref 0–1)
SQUAMOUS #/AREA URNS AUTO: 2 /HPF (ref 0–1)
SQUAMOUS #/AREA URNS AUTO: 4 /HPF (ref 0–1)
SQUAMOUS #/AREA URNS AUTO: 5 /HPF (ref 0–1)
SQUAMOUS #/AREA URNS AUTO: <1 /HPF (ref 0–1)
SQUAMOUS #/AREA URNS AUTO: <1 /HPF (ref 0–1)
TRANS CELLS #/AREA URNS HPF: 2 /HPF (ref 0–1)
URN SPEC COLLECT METH UR: ABNORMAL
UROBILINOGEN UR STRIP-MCNC: NORMAL MG/DL (ref 0–2)
VANCOMYCIN SERPL-MCNC: 14.5 MG/L
WBC # BLD AUTO: 2.3 10E9/L (ref 4–11)
WBC # BLD AUTO: 2.4 10E9/L (ref 4–11)
WBC # BLD AUTO: 2.5 10E9/L (ref 4–11)
WBC # BLD AUTO: 2.6 10E9/L (ref 4–11)
WBC # BLD AUTO: 2.9 10E9/L (ref 4–11)
WBC # BLD AUTO: 3.1 10E9/L (ref 4–11)
WBC # BLD AUTO: 3.9 10E9/L (ref 4–11)
WBC # BLD AUTO: 4.2 10E9/L (ref 4–11)
WBC # BLD AUTO: 4.5 10E9/L (ref 4–11)
WBC # BLD AUTO: 5.2 10E9/L (ref 4–11)
WBC # BLD AUTO: 8.6 10E9/L (ref 4–11)
WBC #/AREA URNS AUTO: 1 /HPF (ref 0–2)
WBC #/AREA URNS AUTO: 1 /HPF (ref 0–2)
WBC #/AREA URNS AUTO: 10 /HPF (ref 0–2)
WBC #/AREA URNS AUTO: 3 /HPF (ref 0–2)
WBC #/AREA URNS AUTO: 4 /HPF (ref 0–2)
WBC #/AREA URNS AUTO: 63 /HPF (ref 0–2)
WBC #/AREA URNS AUTO: 8 /HPF (ref 0–2)

## 2017-01-01 PROCEDURE — 96523 IRRIG DRUG DELIVERY DEVICE: CPT | Mod: ZF

## 2017-01-01 PROCEDURE — 87040 BLOOD CULTURE FOR BACTERIA: CPT | Performed by: PEDIATRICS

## 2017-01-01 PROCEDURE — 25000128 H RX IP 250 OP 636: Performed by: PEDIATRICS

## 2017-01-01 PROCEDURE — 80053 COMPREHEN METABOLIC PANEL: CPT | Performed by: PEDIATRICS

## 2017-01-01 PROCEDURE — 25000132 ZZH RX MED GY IP 250 OP 250 PS 637: Performed by: STUDENT IN AN ORGANIZED HEALTH CARE EDUCATION/TRAINING PROGRAM

## 2017-01-01 PROCEDURE — 99213 OFFICE O/P EST LOW 20 MIN: CPT | Mod: ZF

## 2017-01-01 PROCEDURE — 80053 COMPREHEN METABOLIC PANEL: CPT | Performed by: NURSE PRACTITIONER

## 2017-01-01 PROCEDURE — T1013 SIGN LANG/ORAL INTERPRETER: HCPCS | Mod: U3,ZF

## 2017-01-01 PROCEDURE — 99255 IP/OBS CONSLTJ NEW/EST HI 80: CPT | Performed by: SURGERY

## 2017-01-01 PROCEDURE — 81001 URINALYSIS AUTO W/SCOPE: CPT | Performed by: NURSE PRACTITIONER

## 2017-01-01 PROCEDURE — 71020 XR CHEST 2 VW: CPT

## 2017-01-01 PROCEDURE — 85025 COMPLETE CBC W/AUTO DIFF WBC: CPT | Performed by: NURSE PRACTITIONER

## 2017-01-01 PROCEDURE — 25000128 H RX IP 250 OP 636: Performed by: STUDENT IN AN ORGANIZED HEALTH CARE EDUCATION/TRAINING PROGRAM

## 2017-01-01 PROCEDURE — 36591 DRAW BLOOD OFF VENOUS DEVICE: CPT | Mod: ZF

## 2017-01-01 PROCEDURE — 85025 COMPLETE CBC W/AUTO DIFF WBC: CPT | Performed by: PEDIATRICS

## 2017-01-01 PROCEDURE — 80202 ASSAY OF VANCOMYCIN: CPT | Performed by: PEDIATRICS

## 2017-01-01 PROCEDURE — 36592 COLLECT BLOOD FROM PICC: CPT | Performed by: PEDIATRICS

## 2017-01-01 PROCEDURE — 71010 XR CHEST PORT 1 VW: CPT

## 2017-01-01 PROCEDURE — 96374 THER/PROPH/DIAG INJ IV PUSH: CPT

## 2017-01-01 PROCEDURE — 80048 BASIC METABOLIC PNL TOTAL CA: CPT | Performed by: PEDIATRICS

## 2017-01-01 PROCEDURE — 81001 URINALYSIS AUTO W/SCOPE: CPT | Performed by: PEDIATRICS

## 2017-01-01 PROCEDURE — 36416 COLLJ CAPILLARY BLOOD SPEC: CPT | Performed by: PEDIATRICS

## 2017-01-01 PROCEDURE — 25000128 H RX IP 250 OP 636

## 2017-01-01 PROCEDURE — 25000125 ZZHC RX 250: Mod: ZF

## 2017-01-01 PROCEDURE — 36415 COLL VENOUS BLD VENIPUNCTURE: CPT | Performed by: PEDIATRICS

## 2017-01-01 PROCEDURE — 71010 XR CHEST PORT 1 VW: CPT | Mod: 77

## 2017-01-01 PROCEDURE — 25000132 ZZH RX MED GY IP 250 OP 250 PS 637: Performed by: PEDIATRICS

## 2017-01-01 PROCEDURE — 25000125 ZZHC RX 250: Performed by: STUDENT IN AN ORGANIZED HEALTH CARE EDUCATION/TRAINING PROGRAM

## 2017-01-01 PROCEDURE — 27210251 ZZH NEEDLE POWER PORT: Mod: ZF

## 2017-01-01 PROCEDURE — 82565 ASSAY OF CREATININE: CPT | Performed by: PEDIATRICS

## 2017-01-01 PROCEDURE — T1013 SIGN LANG/ORAL INTERPRETER: HCPCS | Mod: U3

## 2017-01-01 PROCEDURE — 12000014 ZZH R&B PEDS UMMC

## 2017-01-01 PROCEDURE — 36415 COLL VENOUS BLD VENIPUNCTURE: CPT | Performed by: NURSE PRACTITIONER

## 2017-01-01 PROCEDURE — 71250 CT THORAX DX C-: CPT

## 2017-01-01 PROCEDURE — 25000125 ZZHC RX 250: Mod: ZF | Performed by: NURSE PRACTITIONER

## 2017-01-01 PROCEDURE — 25000128 H RX IP 250 OP 636: Mod: ZF

## 2017-01-01 PROCEDURE — 99212 OFFICE O/P EST SF 10 MIN: CPT | Mod: ZF

## 2017-01-01 PROCEDURE — 87086 URINE CULTURE/COLONY COUNT: CPT | Performed by: PEDIATRICS

## 2017-01-01 PROCEDURE — 25000132 ZZH RX MED GY IP 250 OP 250 PS 637: Mod: ZF | Performed by: PEDIATRICS

## 2017-01-01 PROCEDURE — 99231 SBSQ HOSP IP/OBS SF/LOW 25: CPT | Performed by: SURGERY

## 2017-01-01 PROCEDURE — 36592 COLLECT BLOOD FROM PICC: CPT | Performed by: NURSE PRACTITIONER

## 2017-01-01 PROCEDURE — 87633 RESP VIRUS 12-25 TARGETS: CPT | Performed by: PEDIATRICS

## 2017-01-01 PROCEDURE — 96375 TX/PRO/DX INJ NEW DRUG ADDON: CPT

## 2017-01-01 RX ORDER — ONDANSETRON 2 MG/ML
INJECTION INTRAMUSCULAR; INTRAVENOUS
Status: COMPLETED
Start: 2017-01-01 | End: 2017-01-01

## 2017-01-01 RX ORDER — NALOXONE HYDROCHLORIDE 0.4 MG/ML
.1-.4 INJECTION, SOLUTION INTRAMUSCULAR; INTRAVENOUS; SUBCUTANEOUS
Status: DISCONTINUED | OUTPATIENT
Start: 2017-01-01 | End: 2017-01-01 | Stop reason: HOSPADM

## 2017-01-01 RX ORDER — DIPHENHYDRAMINE HYDROCHLORIDE 50 MG/ML
25 INJECTION INTRAMUSCULAR; INTRAVENOUS
Status: COMPLETED | OUTPATIENT
Start: 2017-01-01 | End: 2017-01-01

## 2017-01-01 RX ORDER — DIPHENHYDRAMINE HCL 25 MG
25-50 CAPSULE ORAL EVERY 6 HOURS PRN
Status: DISCONTINUED | OUTPATIENT
Start: 2017-01-01 | End: 2017-01-01 | Stop reason: HOSPADM

## 2017-01-01 RX ORDER — ACYCLOVIR 50 MG/G
OINTMENT TOPICAL
Status: DISCONTINUED | OUTPATIENT
Start: 2017-01-01 | End: 2017-01-01 | Stop reason: HOSPADM

## 2017-01-01 RX ORDER — MORPHINE SULFATE 2 MG/ML
INJECTION, SOLUTION INTRAMUSCULAR; INTRAVENOUS
Status: COMPLETED
Start: 2017-01-01 | End: 2017-01-01

## 2017-01-01 RX ORDER — CEFTRIAXONE SODIUM 2 G
2000 VIAL (EA) INJECTION EVERY 24 HOURS
Status: DISCONTINUED | OUTPATIENT
Start: 2017-01-01 | End: 2017-01-01

## 2017-01-01 RX ORDER — CEFDINIR 300 MG/1
300 CAPSULE ORAL 2 TIMES DAILY
Qty: 14 CAPSULE | Refills: 0 | Status: SHIPPED | OUTPATIENT
Start: 2017-01-01 | End: 2017-01-01

## 2017-01-01 RX ORDER — SODIUM CHLORIDE 9 MG/ML
INJECTION, SOLUTION INTRAVENOUS
Status: DISPENSED
Start: 2017-01-01 | End: 2017-01-01

## 2017-01-01 RX ORDER — PAZOPANIB 200 MG/1
400 TABLET, FILM COATED ORAL DAILY
Status: DISCONTINUED | OUTPATIENT
Start: 2017-01-01 | End: 2017-01-01

## 2017-01-01 RX ORDER — DEXTROMETHORPHAN POLISTIREX 30 MG/5ML
30 SUSPENSION ORAL EVERY 12 HOURS SCHEDULED
Status: DISCONTINUED | OUTPATIENT
Start: 2017-01-01 | End: 2017-01-01 | Stop reason: HOSPADM

## 2017-01-01 RX ORDER — ACETAMINOPHEN 325 MG/1
650 TABLET ORAL EVERY 4 HOURS PRN
Qty: 100 TABLET | Refills: 0 | Status: SHIPPED | OUTPATIENT
Start: 2017-01-01 | End: 2017-01-01

## 2017-01-01 RX ORDER — AMOXICILLIN 250 MG
2 CAPSULE ORAL 2 TIMES DAILY PRN
Status: DISCONTINUED | OUTPATIENT
Start: 2017-01-01 | End: 2017-01-01 | Stop reason: HOSPADM

## 2017-01-01 RX ORDER — TRIAMCINOLONE ACETONIDE 1 MG/G
OINTMENT TOPICAL
Qty: 30 G | Refills: 0 | Status: SHIPPED | OUTPATIENT
Start: 2017-01-01 | End: 2017-01-01

## 2017-01-01 RX ORDER — POLYETHYLENE GLYCOL 3350 17 G/17G
17 POWDER, FOR SOLUTION ORAL DAILY
Status: DISCONTINUED | OUTPATIENT
Start: 2017-01-01 | End: 2017-01-01 | Stop reason: HOSPADM

## 2017-01-01 RX ORDER — ACETAMINOPHEN 325 MG/1
650 TABLET ORAL ONCE
Status: COMPLETED | OUTPATIENT
Start: 2017-01-01 | End: 2017-01-01

## 2017-01-01 RX ORDER — SODIUM CHLORIDE AND POTASSIUM CHLORIDE 150; 450 MG/100ML; MG/100ML
INJECTION, SOLUTION INTRAVENOUS
Status: DISPENSED
Start: 2017-01-01 | End: 2017-01-01

## 2017-01-01 RX ORDER — LIDOCAINE 40 MG/G
CREAM TOPICAL
Status: DISCONTINUED | OUTPATIENT
Start: 2017-01-01 | End: 2017-01-01 | Stop reason: HOSPADM

## 2017-01-01 RX ORDER — OXYCODONE HYDROCHLORIDE 5 MG/1
5-10 TABLET ORAL EVERY 4 HOURS PRN
Status: DISCONTINUED | OUTPATIENT
Start: 2017-01-01 | End: 2017-01-01

## 2017-01-01 RX ORDER — ONDANSETRON 2 MG/ML
8 INJECTION INTRAMUSCULAR; INTRAVENOUS EVERY 6 HOURS PRN
Status: DISCONTINUED | OUTPATIENT
Start: 2017-01-01 | End: 2017-01-01 | Stop reason: HOSPADM

## 2017-01-01 RX ORDER — ACETAMINOPHEN 325 MG/1
TABLET ORAL
Status: DISPENSED
Start: 2017-01-01 | End: 2017-01-01

## 2017-01-01 RX ORDER — PAZOPANIB 200 MG/1
400 TABLET, FILM COATED ORAL DAILY
Qty: 60 TABLET | Refills: 5 | Status: SHIPPED | OUTPATIENT
Start: 2017-01-01 | End: 2018-01-01

## 2017-01-01 RX ORDER — MORPHINE SULFATE 2 MG/ML
2 INJECTION, SOLUTION INTRAMUSCULAR; INTRAVENOUS
Status: DISCONTINUED | OUTPATIENT
Start: 2017-01-01 | End: 2017-01-01 | Stop reason: HOSPADM

## 2017-01-01 RX ORDER — MORPHINE SULFATE 2 MG/ML
2 INJECTION, SOLUTION INTRAMUSCULAR; INTRAVENOUS EVERY 4 HOURS PRN
Status: DISCONTINUED | OUTPATIENT
Start: 2017-01-01 | End: 2017-01-01

## 2017-01-01 RX ORDER — LIDOCAINE 40 MG/G
CREAM TOPICAL
Status: COMPLETED
Start: 2017-01-01 | End: 2017-01-01

## 2017-01-01 RX ORDER — MORPHINE SULFATE 2 MG/ML
2 INJECTION, SOLUTION INTRAMUSCULAR; INTRAVENOUS
Status: DISCONTINUED | OUTPATIENT
Start: 2017-01-01 | End: 2017-01-01

## 2017-01-01 RX ORDER — TRIAMCINOLONE ACETONIDE 1 MG/G
OINTMENT TOPICAL 3 TIMES DAILY PRN
Status: DISCONTINUED | OUTPATIENT
Start: 2017-01-01 | End: 2017-01-01 | Stop reason: HOSPADM

## 2017-01-01 RX ORDER — DIPHENHYDRAMINE HYDROCHLORIDE 50 MG/ML
INJECTION INTRAMUSCULAR; INTRAVENOUS
Status: COMPLETED
Start: 2017-01-01 | End: 2017-01-01

## 2017-01-01 RX ORDER — ONDANSETRON 2 MG/ML
6 INJECTION INTRAMUSCULAR; INTRAVENOUS EVERY 6 HOURS PRN
Status: DISCONTINUED | OUTPATIENT
Start: 2017-01-01 | End: 2017-01-01 | Stop reason: HOSPADM

## 2017-01-01 RX ORDER — IBUPROFEN 200 MG
600 TABLET ORAL ONCE
Status: COMPLETED | OUTPATIENT
Start: 2017-01-01 | End: 2017-01-01

## 2017-01-01 RX ORDER — LIDOCAINE 40 MG/G
CREAM TOPICAL ONCE
Status: COMPLETED | OUTPATIENT
Start: 2017-01-01 | End: 2017-01-01

## 2017-01-01 RX ORDER — ACETAMINOPHEN 325 MG/1
650 TABLET ORAL EVERY 4 HOURS PRN
Status: DISCONTINUED | OUTPATIENT
Start: 2017-01-01 | End: 2017-01-01 | Stop reason: HOSPADM

## 2017-01-01 RX ORDER — TRIAMCINOLONE ACETONIDE 1 MG/G
OINTMENT TOPICAL
Qty: 30 G | Refills: 0 | Status: ON HOLD | OUTPATIENT
Start: 2017-01-01 | End: 2018-01-01

## 2017-01-01 RX ORDER — HEPARIN SODIUM (PORCINE) LOCK FLUSH IV SOLN 100 UNIT/ML 100 UNIT/ML
SOLUTION INTRAVENOUS
Status: DISCONTINUED
Start: 2017-01-01 | End: 2017-01-01 | Stop reason: WASHOUT

## 2017-01-01 RX ORDER — VANCOMYCIN HYDROCHLORIDE 1 G/200ML
1000 INJECTION, SOLUTION INTRAVENOUS EVERY 8 HOURS
Status: DISCONTINUED | OUTPATIENT
Start: 2017-01-01 | End: 2017-01-01

## 2017-01-01 RX ORDER — ONDANSETRON 8 MG/1
8 TABLET, FILM COATED ORAL EVERY 6 HOURS PRN
Status: DISCONTINUED | OUTPATIENT
Start: 2017-01-01 | End: 2017-01-01

## 2017-01-01 RX ORDER — DEXTROMETHORPHAN POLISTIREX 30 MG/5ML
30 SUSPENSION ORAL EVERY 12 HOURS PRN
Qty: 89 ML | Refills: 0 | Status: SHIPPED | OUTPATIENT
Start: 2017-01-01 | End: 2017-01-01

## 2017-01-01 RX ORDER — VANCOMYCIN HYDROCHLORIDE 1 G/200ML
1000 INJECTION, SOLUTION INTRAVENOUS EVERY 12 HOURS
Status: DISCONTINUED | OUTPATIENT
Start: 2017-01-01 | End: 2017-01-01

## 2017-01-01 RX ADMIN — LIDOCAINE: 40 CREAM TOPICAL at 15:39

## 2017-01-01 RX ADMIN — ACYCLOVIR: 50 OINTMENT TOPICAL at 00:32

## 2017-01-01 RX ADMIN — SODIUM CHLORIDE 1000 ML: 9 INJECTION, SOLUTION INTRAVENOUS at 22:45

## 2017-01-01 RX ADMIN — ACETAMINOPHEN 650 MG: 325 TABLET, FILM COATED ORAL at 16:52

## 2017-01-01 RX ADMIN — LIDOCAINE: 40 CREAM TOPICAL at 13:35

## 2017-01-01 RX ADMIN — MORPHINE SULFATE 2 MG: 2 INJECTION, SOLUTION INTRAMUSCULAR; INTRAVENOUS at 11:18

## 2017-01-01 RX ADMIN — ACYCLOVIR: 50 OINTMENT TOPICAL at 21:42

## 2017-01-01 RX ADMIN — Medication 300 MG: at 14:03

## 2017-01-01 RX ADMIN — ONDANSETRON 8 MG: 2 INJECTION INTRAMUSCULAR; INTRAVENOUS at 10:44

## 2017-01-01 RX ADMIN — ONDANSETRON 6 MG: 2 INJECTION INTRAMUSCULAR; INTRAVENOUS at 12:45

## 2017-01-01 RX ADMIN — SODIUM CHLORIDE 1000 ML: 9 INJECTION, SOLUTION INTRAVENOUS at 07:01

## 2017-01-01 RX ADMIN — ANTICOAGULANT CITRATE DEXTROSE SOLUTION FORMULA A 10 ML: 12.25; 11; 3.65 SOLUTION INTRAVENOUS at 15:06

## 2017-01-01 RX ADMIN — VANCOMYCIN HYDROCHLORIDE 1250 MG: 500 INJECTION, POWDER, LYOPHILIZED, FOR SOLUTION INTRAVENOUS at 00:09

## 2017-01-01 RX ADMIN — Medication 300 MG: at 08:52

## 2017-01-01 RX ADMIN — ANTICOAGULANT CITRATE DEXTROSE SOLUTION FORMULA A 5 ML: 12.25; 11; 3.65 SOLUTION INTRAVENOUS at 13:07

## 2017-01-01 RX ADMIN — ANTICOAGULANT CITRATE DEXTROSE SOLUTION FORMULA A 5 ML: 12.25; 11; 3.65 SOLUTION INTRAVENOUS at 14:45

## 2017-01-01 RX ADMIN — ANTICOAGULANT CITRATE DEXTROSE SOLUTION FORMULA A 5 ML: 12.25; 11; 3.65 SOLUTION INTRAVENOUS at 14:06

## 2017-01-01 RX ADMIN — CALAMINE AND PRAMOXINE HYDROCHLORIDE 1 ML: 80; 10 LOTION TOPICAL at 20:22

## 2017-01-01 RX ADMIN — Medication 2000 MG: at 21:06

## 2017-01-01 RX ADMIN — Medication 2000 MG: at 21:59

## 2017-01-01 RX ADMIN — ACYCLOVIR: 50 OINTMENT TOPICAL at 13:19

## 2017-01-01 RX ADMIN — DEXTROSE AND SODIUM CHLORIDE: 5; 900 INJECTION, SOLUTION INTRAVENOUS at 02:33

## 2017-01-01 RX ADMIN — ONDANSETRON HYDROCHLORIDE 8 MG: 8 TABLET, FILM COATED ORAL at 20:21

## 2017-01-01 RX ADMIN — DEXTROSE AND SODIUM CHLORIDE: 5; 900 INJECTION, SOLUTION INTRAVENOUS at 19:30

## 2017-01-01 RX ADMIN — LIDOCAINE: 40 CREAM TOPICAL at 12:52

## 2017-01-01 RX ADMIN — Medication 300 MG: at 10:11

## 2017-01-01 RX ADMIN — DEXTROSE AND SODIUM CHLORIDE: 5; 900 INJECTION, SOLUTION INTRAVENOUS at 02:02

## 2017-01-01 RX ADMIN — ACETAMINOPHEN 650 MG: 325 TABLET, FILM COATED ORAL at 20:33

## 2017-01-01 RX ADMIN — ACETAMINOPHEN 650 MG: 325 TABLET, FILM COATED ORAL at 11:11

## 2017-01-01 RX ADMIN — DEXTROMETHORPHAN 30 MG: 30 SUSPENSION, EXTENDED RELEASE ORAL at 20:11

## 2017-01-01 RX ADMIN — ACETAMINOPHEN 650 MG: 325 TABLET, FILM COATED ORAL at 04:51

## 2017-01-01 RX ADMIN — DEXTROSE AND SODIUM CHLORIDE: 5; 900 INJECTION, SOLUTION INTRAVENOUS at 12:46

## 2017-01-01 RX ADMIN — ACETAMINOPHEN 650 MG: 325 TABLET, FILM COATED ORAL at 10:20

## 2017-01-01 RX ADMIN — DIPHENHYDRAMINE HYDROCHLORIDE 25 MG: 50 INJECTION INTRAMUSCULAR; INTRAVENOUS at 01:59

## 2017-01-01 RX ADMIN — IBUPROFEN 600 MG: 200 TABLET, FILM COATED ORAL at 22:43

## 2017-01-01 RX ADMIN — ANTICOAGULANT CITRATE DEXTROSE SOLUTION FORMULA A 5 ML: 12.25; 11; 3.65 SOLUTION INTRAVENOUS at 13:54

## 2017-01-01 RX ADMIN — ACETAMINOPHEN 650 MG: 325 TABLET, FILM COATED ORAL at 00:40

## 2017-01-01 RX ADMIN — LIDOCAINE: 40 CREAM TOPICAL at 17:55

## 2017-01-01 RX ADMIN — ANTICOAGULANT CITRATE DEXTROSE SOLUTION FORMULA A 5 ML: 12.25; 11; 3.65 SOLUTION INTRAVENOUS at 17:55

## 2017-01-01 RX ADMIN — Medication 2000 MG: at 21:01

## 2017-01-01 RX ADMIN — DEXTROSE AND SODIUM CHLORIDE: 5; 900 INJECTION, SOLUTION INTRAVENOUS at 14:53

## 2017-01-01 RX ADMIN — VANCOMYCIN HYDROCHLORIDE 1000 MG: 1 INJECTION, SOLUTION INTRAVENOUS at 20:55

## 2017-01-01 RX ADMIN — DEXTROMETHORPHAN 30 MG: 30 SUSPENSION, EXTENDED RELEASE ORAL at 13:17

## 2017-01-01 RX ADMIN — CALAMINE AND PRAMOXINE HYDROCHLORIDE 1 ML: 80; 10 LOTION TOPICAL at 14:05

## 2017-01-01 RX ADMIN — ONDANSETRON 8 MG: 2 INJECTION INTRAMUSCULAR; INTRAVENOUS at 20:22

## 2017-01-01 RX ADMIN — DEXTROMETHORPHAN 30 MG: 30 SUSPENSION, EXTENDED RELEASE ORAL at 14:03

## 2017-01-01 RX ADMIN — DIPHENHYDRAMINE HYDROCHLORIDE 25 MG: 50 INJECTION, SOLUTION INTRAMUSCULAR; INTRAVENOUS at 01:59

## 2017-01-01 RX ADMIN — VANCOMYCIN HYDROCHLORIDE 1000 MG: 1 INJECTION, SOLUTION INTRAVENOUS at 08:07

## 2017-01-01 RX ADMIN — ANTICOAGULANT CITRATE DEXTROSE SOLUTION FORMULA A 10 ML: 12.25; 11; 3.65 SOLUTION INTRAVENOUS at 12:45

## 2017-01-01 RX ADMIN — DEXTROSE AND SODIUM CHLORIDE: 5; 900 INJECTION, SOLUTION INTRAVENOUS at 02:38

## 2017-01-01 RX ADMIN — CALAMINE AND PRAMOXINE HYDROCHLORIDE 1 ML: 80; 10 LOTION TOPICAL at 20:55

## 2017-01-01 RX ADMIN — ACYCLOVIR: 50 OINTMENT TOPICAL at 18:05

## 2017-01-01 RX ADMIN — ACETAMINOPHEN 650 MG: 325 TABLET, FILM COATED ORAL at 00:35

## 2017-01-01 RX ADMIN — POLYETHYLENE GLYCOL 3350 17 G: 17 POWDER, FOR SOLUTION ORAL at 08:51

## 2017-01-01 RX ADMIN — CALAMINE AND PRAMOXINE HYDROCHLORIDE 1 ML: 80; 10 LOTION TOPICAL at 10:10

## 2017-01-01 RX ADMIN — VANCOMYCIN HYDROCHLORIDE 1000 MG: 1 INJECTION, SOLUTION INTRAVENOUS at 08:52

## 2017-01-01 ASSESSMENT — ACTIVITIES OF DAILY LIVING (ADL)
SWALLOWING: 0-->SWALLOWS FOODS/LIQUIDS WITHOUT DIFFICULTY
FALL_HISTORY_WITHIN_LAST_SIX_MONTHS: NO
WHICH_OF_THE_ABOVE_FUNCTIONAL_RISKS_HAD_A_RECENT_ONSET_OR_CHANGE?: AMBULATION
RETIRED_EATING: 0-->INDEPENDENT
RETIRED_COMMUNICATION: 0-->UNDERSTANDS/COMMUNICATES WITHOUT DIFFICULTY
BATHING: 0-->INDEPENDENT
DRESS: 0-->INDEPENDENT
TOILETING: 0-->INDEPENDENT
TRANSFERRING: 0-->INDEPENDENT
AMBULATION: 2-->ASSISTIVE PERSON
COGNITION: 0 - NO COGNITION ISSUES REPORTED

## 2017-01-01 ASSESSMENT — PAIN SCALES - GENERAL
PAINLEVEL: NO PAIN (0)
PAINLEVEL: MODERATE PAIN (4)
PAINLEVEL: NO PAIN (0)
PAINLEVEL: NO PAIN (0)
PAINLEVEL: EXTREME PAIN (8)

## 2017-01-03 ENCOUNTER — OFFICE VISIT (OUTPATIENT)
Dept: PLASTIC SURGERY | Facility: CLINIC | Age: 22
End: 2017-01-03

## 2017-01-03 DIAGNOSIS — K60.2 ANAL FISSURE: Primary | ICD-10-CM

## 2017-01-03 NOTE — Clinical Note
1/3/2017       RE: Olimpia Childress  [address]   [place] [address]     Dear Colleague,    Thank you for referring your patient, Olimpia Childress, to the PLASTIC AND RECONSTRUCTIVE SURGERY at [facility]. Please see a copy of my visit note below.      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    She was at a local SNF until rather recently to help with activity limitations and then some minor wound care.   She is now at home and comes today with her auntie and a [place] citizen .     She is sitting, albeit gingerly, and is afraid to shower due to scabs along her incisions. She is also afraid to look at her scars and her perineal wound.    She has all but healed the previous area of dehiscence along her right vulvar closure, and has just a few remaining scabs along her incisions. The remaining perineal sutures were removed and there is minimal depth to the small areas of separation.    She is not happy with the tightness of the scars, and was not happy with how many scars she has. We had a very long conversation about her original surgical defect, and the various options for closure that we had previously talked about. Unfortunately, due to her age and cultural background I suspect her understanding and expectations were probably limited despite the .    Overall, she actually has healed remarkably well for such a large resection. She may require scar work or even scar revisions in the future if things continue to contract.    For now, she can increase her sitting up to 2-3 hours as tolerated. She should continue her pericares. We will refer her to Asya Melvin at [facility] for scar work, and see her back here PRN.     She is due for a procedure with Dr Pierre in February for a pulmonary  lesion.    Total time=30 minutes, greater than half spent educating patient regarding care plan and future options.    Again, thank you for allowing me to participate in the care of your patient.      Sincerely,    Sidra Corona MD

## 2017-01-16 ENCOUNTER — OFFICE VISIT (OUTPATIENT)
Dept: PEDIATRIC HEMATOLOGY/ONCOLOGY | Facility: CLINIC | Age: 22
End: 2017-01-16
Attending: PEDIATRICS
Payer: COMMERCIAL

## 2017-01-16 VITALS
HEART RATE: 81 BPM | HEIGHT: 63 IN | RESPIRATION RATE: 16 BRPM | OXYGEN SATURATION: 98 % | SYSTOLIC BLOOD PRESSURE: 108 MMHG | TEMPERATURE: 98 F | DIASTOLIC BLOOD PRESSURE: 65 MMHG | WEIGHT: 132.5 LBS | BODY MASS INDEX: 23.48 KG/M2

## 2017-01-16 DIAGNOSIS — C49.9 SYNOVIAL SARCOMA (H): Primary | ICD-10-CM

## 2017-01-16 LAB
BASOPHILS # BLD AUTO: 0 10E9/L (ref 0–0.2)
BASOPHILS NFR BLD AUTO: 0 %
DIFFERENTIAL METHOD BLD: ABNORMAL
EOSINOPHIL # BLD AUTO: 0 10E9/L (ref 0–0.7)
EOSINOPHIL NFR BLD AUTO: 1 %
ERYTHROCYTE [DISTWIDTH] IN BLOOD BY AUTOMATED COUNT: 11.5 % (ref 10–15)
HCT VFR BLD AUTO: 35.2 % (ref 35–47)
HGB BLD-MCNC: 12.1 G/DL (ref 11.7–15.7)
IMM GRANULOCYTES # BLD: 0 10E9/L (ref 0–0.4)
IMM GRANULOCYTES NFR BLD: 0.3 %
LYMPHOCYTES # BLD AUTO: 0.7 10E9/L (ref 0.8–5.3)
LYMPHOCYTES NFR BLD AUTO: 23.9 %
MCH RBC QN AUTO: 33.7 PG (ref 26.5–33)
MCHC RBC AUTO-ENTMCNC: 34.4 G/DL (ref 31.5–36.5)
MCV RBC AUTO: 98 FL (ref 78–100)
MONOCYTES # BLD AUTO: 0.5 10E9/L (ref 0–1.3)
MONOCYTES NFR BLD AUTO: 17.6 %
NEUTROPHILS # BLD AUTO: 1.8 10E9/L (ref 1.6–8.3)
NEUTROPHILS NFR BLD AUTO: 57.2 %
NRBC # BLD AUTO: 0 10*3/UL
NRBC BLD AUTO-RTO: 0 /100
PLATELET # BLD AUTO: 136 10E9/L (ref 150–450)
RBC # BLD AUTO: 3.59 10E12/L (ref 3.8–5.2)
WBC # BLD AUTO: 3.1 10E9/L (ref 4–11)

## 2017-01-16 PROCEDURE — 25000125 ZZHC RX 250: Mod: ZF | Performed by: PEDIATRICS

## 2017-01-16 PROCEDURE — 99213 OFFICE O/P EST LOW 20 MIN: CPT | Mod: ZF

## 2017-01-16 PROCEDURE — T1013 SIGN LANG/ORAL INTERPRETER: HCPCS | Mod: U3,ZF

## 2017-01-16 PROCEDURE — 85025 COMPLETE CBC W/AUTO DIFF WBC: CPT | Performed by: NURSE PRACTITIONER

## 2017-01-16 RX ORDER — LIDOCAINE 40 MG/G
CREAM TOPICAL
Status: DISCONTINUED | OUTPATIENT
Start: 2017-01-16 | End: 2017-01-16 | Stop reason: HOSPADM

## 2017-01-16 RX ORDER — LIDOCAINE 40 MG/G
CREAM TOPICAL
Status: COMPLETED | OUTPATIENT
Start: 2017-01-16 | End: 2017-01-16

## 2017-01-16 RX ADMIN — LIDOCAINE: 40 CREAM TOPICAL at 15:00

## 2017-01-16 RX ADMIN — ANTICOAGULANT CITRATE DEXTROSE SOLUTION FORMULA A 5 ML: 12.25; 11; 3.65 SOLUTION INTRAVENOUS at 15:21

## 2017-01-16 ASSESSMENT — PAIN SCALES - GENERAL: PAINLEVEL: NO PAIN (0)

## 2017-01-16 NOTE — PROGRESS NOTES
Patient came to clinic today and needed a port draw. I accessed port and osorio labs. Blood return noted, 3-5ml blood wasted. Flushed port with 5ml saline and citrate locked. Documented in doc flowsheets.

## 2017-01-16 NOTE — Clinical Note
January 16, 2017      Olimpia Childress  [address]   [place] [address]          To Whom it may concern:    This letter is regarding Olimpia Childress, who has been under my care for the past 14 months.  For health reasons, Olimpia is unable to receive any immunizations aside from the influenza vaccine, until April 2017 at the very earliest.     Please contact me if you have any questions or concerns.    Sincerely,          Coretta Yanez MD    Pediatric Hematology/Oncology   [facility]

## 2017-01-16 NOTE — Clinical Note
1/16/2017      RE: Olimpia Childress  [address]   [place] [address]       Pediatric Hematology/Oncology Clinic Note    HPI- Olimpia initially presented with a growth on her right labia in 2013 when she was living in [place]. 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 She discontinued further treatment b/c her physicians were unsure of the exact diagnosis and she felt uncomfortable proceeding. Olimpia subsequently immigrated to the  in August of 2015 and in October she first noticed a recurrence of the mass in the area of previous excision. She was in [place] at that time, seen by oncology and had a port placed but then moved to [place] where she was seen by Dr. Mckeon at [facility] in November. There she underwent an MRI that showed a solid and cystic subcutaneous mass in the right labia measuring 1.7 x 1.6 x 1cm with question of nodular extension vs a second nodule measuring 0.7 cm. There was no invasion into the vagina. On November 16th 2015, Olimpia had a biopsy of the mass by a gynecologist, Dr. Terry, at [facility]. Cytology was reviewed at [facility] and read as a SMARCB1-deficient genital sarcoma, likely falling within the overall spectrum of malignant rhabdoid tumor. By immunohistochemistry, the cells shows a complete loss of SMARCb1. Wide spectrum cytokeratin, CD34, WT1, Desmin and CD99 were all negative. RT PCR for Wooten Sarcoma associated fusion transcripts were negative as well. Olimipa went on to have a PET/CT as well which showed multiple pulmonary lesions and biopsy confirmed a lesion to be metastatic disease. Olimpia was seen by Tomás White at [facility] who reviewed the diagnosis and potential treatment plans with her, however she prefered to be treated here at [facility]. 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    Olimpia first cycle was complicated by an ileus secondary to vincristine. Her second cycle was complicated by a left pneumothorax. She had a chest tube placed for a short period of time. Her respiratory status improved and she was discharged home.    Olimpia had imaging done following cycles 2 and 4 which showed perhaps improvement in her lung lesions. Her MRI no longer demonstrates a solid mass. She does have more extensive cystic involvement which may be lymphoceles but cannot rule out malignancy.     Olimpia underwent left sided thoracotomy and resection of two pulmonary nodules on 7/20/16. Pathology revealed that one lesion was benign lymphoid tissue and the other was consistent with synovial sarcoma.  This was confirmed with FISH  With 91% of cell examined having a signal pattern indictivate of a rearrangement of the SS18 locus.  Olimpia has now completed 3 cycles of chemotherapy per [id number] and started her radiation on 9/13/16 and completed on 10/17/16.  She then went on to have a resection of her labial mass on 11/16/16 by Jannet Pastrana and Chikis with reconstruction, including skin flaps by Dr. Corona from plastics.  Pathology showed no residual tumor.  She had a f/u chest CT today on 12/5/16 that showed persistance of her pulmonary nodules as well as few additonal pulmonary nodules.  She saw Dr. Pierre who was in agreement with surgical resection, however Olimpia wanted to wait until March to have more time to recover from her last surgery. She continues on voriconazole.      Olimpia has been doing really well.  She is recovering from her surgery and says that it feels tight in that area but denies any pain.  Her energy level and appetite are both improving.  She denies any cough, SOB, fevers, pain or any other concerns.      History was obtained from Olimpia via professional [place] .      Allergies as of 01/16/2017 - reviewed 01/16/2017   Allergen Reaction Noted     Heparin flush Other (See Comments) 01/27/2016     Pork derived products  02/09/2016     Tegaderm transparent dressing (informational only) Other (See Comments) and Rash 04/20/2016       Current Outpatient Prescriptions   Medication Sig Dispense Refill     diltiazem 2% in PLO cream, FV COMPOUNDED, 2% GEL To anal opening three times daily.  Use a pea-sized amount.  Store at room temperature. 60 g 0     HYDROmorphone (DILAUDID) 2 MG tablet Take 1-2 tablets (2-4 mg) by mouth every 3 hours as needed for moderate to severe pain 30 tablet 0     acetaminophen (TYLENOL) 325 MG tablet Take 2 tablets (650 mg) by mouth every 4 hours as needed for mild pain 100 tablet      ibuprofen (ADVIL,MOTRIN) 600 MG tablet Take 1 tablet (600 mg) by mouth every 6 hours as needed for moderate pain 120 tablet      polyethylene glycol (MIRALAX/GLYCOLAX) packet Take 17 g by mouth 2 times daily 7 packet      senna-docusate (SENOKOT-S;PERICOLACE) 8.6-50 MG per tablet Take 2 tablets by mouth 2 times daily 100 tablet      ondansetron (ZOFRAN) 8 MG tablet Take 1 tablet (8 mg) by mouth every 6 hours as needed for nausea 30 tablet 6     diphenhydrAMINE (BENADRYL) 25 MG tablet Take 1-2 tablets (25-50 mg) by mouth every 6 hours as needed for other (Nausea or vomiting) 60 tablet 3     lidocaine-prilocaine (EMLA) cream Apply to port 30 minutes prior to access 30 g 5     bisacodyl (DULCOLAX) 5 MG EC tablet Take 1 tablet (5 mg) by mouth 2 times daily 30 tablet 2     pantoprazole (PROTONIX) 40 MG enteric coated tablet Take 1 tablet (40 mg) by mouth daily 30 tablet 3     leuprolide (LUPRON DEPOT-PED) 11.25 MG KIT Inject 11.25 mg into the muscle every 3 months 1 each 1       Past Medical History   Diagnosis Date     Extrarenal rhabdoid neoplasm (H)      Latent tuberculosis      Constipation      Lung disease      Febrile neutropenia (H) 4/17/2016     Anemia 6/3/2016     On  antineoplastic chemotherapy      History of blood transfusion      Sarcoma of vulva (H)        Social History     Social History     Marital Status:      Spouse Name: N/A     Number of Children: N/A     Years of Education: N/A     Occupational History     Not on file.     Social History Main Topics     Smoking status: Never Smoker      Smokeless tobacco: Never Used     Alcohol Use: No     Drug Use: No     Sexual Activity: No     Other Topics Concern     Not on file     Social History Narrative       Family History   Problem Relation Age of Onset     Other Cancer No family hx of        ROS  See HPI. Complete ROS otherwise negative.    Physical Exam   Vital signs: temperature: 99, heart rate 120, respiratory rate 18, blood pressure 115/69, O2 sats 100% RA    GENERAL: Alert, well appearing young woman in no acute distress.  SKIN: No rashes, lesions, or abnormal pigmentation.  HEAD: Normocephalic, atraumatic     EYES: Normal lids, conjunctivae/cornea normal, mild icterus. PERRL. EOMI.  EARS: Pinna normal b/l, no pain on palpation.  External ears normal appearing.    NOSE: Clear, no discharge or congestion  MOUTH/THROAT: Prominent scattered hyperpigmented macules over tongue. Mucous membranes moist.  Posterior oropharynx normal, tonsils are not visible; no erythema or purulent exudate. Normal dentition for age, no mucosal lesions.  NECK: Supple, full range of motion, thyroid is normal, no masses  LYMPH NODES: No cervical lymphadenopathy.  LUNGS: CTAB, no wheezes, crackles.    HEART: Regular rhythm. S1 and S2 are normal. No murmurs, rubs, gallops. The radial pulses are 2+ bilaterally. Cap refill <3 sec in upper extremities.  ABDOMEN: Normal bowel sounds. Soft, non-tender, non-distended, no masses or hepatosplenomegaly.  NEUROLOGIC: Grossly intact, no focal neurologic deficits.    :  Deferred today.    Results for orders placed or performed in visit on 01/16/17   CBC with platelets differential   Result Value Ref  Range    WBC 3.1 (L) 4.0 - 11.0 10e9/L    RBC Count 3.59 (L) 3.8 - 5.2 10e12/L    Hemoglobin 12.1 11.7 - 15.7 g/dL    Hematocrit 35.2 35.0 - 47.0 %    MCV 98 78 - 100 fl    MCH 33.7 (H) 26.5 - 33.0 pg    MCHC 34.4 31.5 - 36.5 g/dL    RDW 11.5 10.0 - 15.0 %    Platelet Count 136 (L) 150 - 450 10e9/L    Diff Method Automated Method     % Neutrophils 57.2 %    % Lymphocytes 23.9 %    % Monocytes 17.6 %    % Eosinophils 1.0 %    % Basophils 0.0 %    % Immature Granulocytes 0.3 %    Nucleated RBCs 0 0 /100    Absolute Neutrophil 1.8 1.6 - 8.3 10e9/L    Absolute Lymphocytes 0.7 (L) 0.8 - 5.3 10e9/L    Absolute Monocytes 0.5 0.0 - 1.3 10e9/L    Absolute Eosinophils 0.0 0.0 - 0.7 10e9/L    Absolute Basophils 0.0 0.0 - 0.2 10e9/L    Abs Immature Granulocytes 0.0 0 - 0.4 10e9/L    Absolute Nucleated RBC 0.0        Impression:  Olimpia is a 21-year-old female with 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  Biopsy of labial mass reveals synovial sarcoma as well. Her recent PET CT does not show any new sites of disease (areas in buttocks correspond to Depo-Lupron injection sites).  She is s/p cycle 3 of chemotherapy per [id number]  (2nd and 3rd cycle 75% dosing), radiation and resection of the labial mass with reconstruction.  Pathology showed no residual tumor.  CT 12/5/16 shows no change in the right sided pulmonary nodules and a new LLL nodule. The plan is to proceed with thoractomy but Olimpia is reluctant to do that before March given that she is just now recovering from her local control surgery.  We discussed the risk of waiting would be progression of the lesions and the chance that Dr. Pierre would not  being able to resect the nodules.  Asma stated understanding of this.  So, will plan for:    Plan:  1) CT scan in next week or so to understand the status of her nodules, followed by appt to discuss results  2) Continue Voriconazole until thoracotomy  3) Plan for thoractomy in March, unless next CT scan changes that plan      Coretta Yanez MD        Patient came to clinic today and needed a port draw. I accessed port and osorio labs. Blood return noted, 3-5ml blood wasted. Flushed port with 5ml saline and citrate locked. Documented in doc flowsheets.       Coretta Yanez MD

## 2017-01-16 NOTE — PROGRESS NOTES
Pediatric Hematology/Oncology Clinic Note    HPI- Olimpia initially presented with a growth on her right labia in 2013 when she was living in [place]. 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 She discontinued further treatment b/c her physicians were unsure of the exact diagnosis and she felt uncomfortable proceeding. Olimpia subsequently immigrated to the  in August of 2015 and in October she first noticed a recurrence of the mass in the area of previous excision. She was in [place] at that time, seen by oncology and had a port placed but then moved to [place] where she was seen by Dr. Mckeon at [facility] in November. There she underwent an MRI that showed a solid and cystic subcutaneous mass in the right labia measuring 1.7 x 1.6 x 1cm with question of nodular extension vs a second nodule measuring 0.7 cm. There was no invasion into the vagina. On November 16th 2015, Olimpia had a biopsy of the mass by a gynecologist, Dr. Terry, at [facility]. Cytology was reviewed at [facility] and read as a SMARCB1-deficient genital sarcoma, likely falling within the overall spectrum of malignant rhabdoid tumor. By immunohistochemistry, the cells shows a complete loss of SMARCb1. Wide spectrum cytokeratin, CD34, WT1, Desmin and CD99 were all negative. RT PCR for Wooten Sarcoma associated fusion transcripts were negative as well. Olimpia went on to have a PET/CT as well which showed multiple pulmonary lesions and biopsy confirmed a lesion to be metastatic disease. Olimpia was seen by Tomás White at [facility] who reviewed the diagnosis and potential treatment plans with her, however she prefered to be treated here at [facility]. 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    Olimpia first cycle was complicated by an ileus secondary to vincristine. Her second cycle was complicated by a left pneumothorax. She had a chest tube placed for a short period of time. Her respiratory status improved and she was discharged home.    Olimpia had imaging done following cycles 2 and 4 which showed perhaps improvement in her lung lesions. Her MRI no longer demonstrates a solid mass. She does have more extensive cystic involvement which may be lymphoceles but cannot rule out malignancy.     Olimpia underwent left sided thoracotomy and resection of two pulmonary nodules on 7/20/16. Pathology revealed that one lesion was benign lymphoid tissue and the other was consistent with synovial sarcoma.  This was confirmed with FISH  With 91% of cell examined having a signal pattern indictivate of a rearrangement of the SS18 locus.  Olimpia has now completed 3 cycles of chemotherapy per [id number] and started her radiation on 9/13/16 and completed on 10/17/16.  She then went on to have a resection of her labial mass on 11/16/16 by Jannet Pastrana and Chikis with reconstruction, including skin flaps by Dr. Corona from plastics.  Pathology showed no residual tumor.  She had a f/u chest CT today on 12/5/16 that showed persistance of her pulmonary nodules as well as few additonal pulmonary nodules.  She saw Dr. Pierre who was in agreement with surgical resection, however Olimpia wanted to wait until March to have more time to recover from her last surgery. She continues on voriconazole.      Olimpia has been doing really well.  She is recovering from her surgery and says that it feels tight in that area but denies any pain.  Her energy level and appetite are both improving.  She denies any cough, SOB, fevers, pain or any other concerns.      History was obtained from Olimpia via professional [employer] .     Allergies as of 01/16/2017 - reviewed 01/16/2017   Allergen Reaction Noted     Heparin  flush Other (See Comments) 01/27/2016     Pork derived products  02/09/2016     Tegaderm transparent dressing (informational only) Other (See Comments) and Rash 04/20/2016       Current Outpatient Prescriptions   Medication Sig Dispense Refill     diltiazem 2% in PLO cream, FV COMPOUNDED, 2% GEL To anal opening three times daily.  Use a pea-sized amount.  Store at room temperature. 60 g 0     HYDROmorphone (DILAUDID) 2 MG tablet Take 1-2 tablets (2-4 mg) by mouth every 3 hours as needed for moderate to severe pain 30 tablet 0     acetaminophen (TYLENOL) 325 MG tablet Take 2 tablets (650 mg) by mouth every 4 hours as needed for mild pain 100 tablet      ibuprofen (ADVIL,MOTRIN) 600 MG tablet Take 1 tablet (600 mg) by mouth every 6 hours as needed for moderate pain 120 tablet      polyethylene glycol (MIRALAX/GLYCOLAX) packet Take 17 g by mouth 2 times daily 7 packet      senna-docusate (SENOKOT-S;PERICOLACE) 8.6-50 MG per tablet Take 2 tablets by mouth 2 times daily 100 tablet      ondansetron (ZOFRAN) 8 MG tablet Take 1 tablet (8 mg) by mouth every 6 hours as needed for nausea 30 tablet 6     diphenhydrAMINE (BENADRYL) 25 MG tablet Take 1-2 tablets (25-50 mg) by mouth every 6 hours as needed for other (Nausea or vomiting) 60 tablet 3     lidocaine-prilocaine (EMLA) cream Apply to port 30 minutes prior to access 30 g 5     bisacodyl (DULCOLAX) 5 MG EC tablet Take 1 tablet (5 mg) by mouth 2 times daily 30 tablet 2     pantoprazole (PROTONIX) 40 MG enteric coated tablet Take 1 tablet (40 mg) by mouth daily 30 tablet 3     leuprolide (LUPRON DEPOT-PED) 11.25 MG KIT Inject 11.25 mg into the muscle every 3 months 1 each 1       Past Medical History   Diagnosis Date     Extrarenal rhabdoid neoplasm (H)      Latent tuberculosis      Constipation      Lung disease      Febrile neutropenia (H) 4/17/2016     Anemia 6/3/2016     On antineoplastic chemotherapy      History of blood transfusion      Sarcoma of vulva (H)         Social History     Social History     Marital Status:      Spouse Name: N/A     Number of Children: N/A     Years of Education: N/A     Occupational History     Not on file.     Social History Main Topics     Smoking status: Never Smoker      Smokeless tobacco: Never Used     Alcohol Use: No     Drug Use: No     Sexual Activity: No     Other Topics Concern     Not on file     Social History Narrative       Family History   Problem Relation Age of Onset     Other Cancer No family hx of        ROS  See HPI. Complete ROS otherwise negative.    Physical Exam   Vital signs: temperature: 99, heart rate 120, respiratory rate 18, blood pressure 115/69, O2 sats 100% RA    GENERAL: Alert, well appearing young woman in no acute distress.  SKIN: No rashes, lesions, or abnormal pigmentation.  HEAD: Normocephalic, atraumatic     EYES: Normal lids, conjunctivae/cornea normal, mild icterus. PERRL. EOMI.  EARS: Pinna normal b/l, no pain on palpation.  External ears normal appearing.    NOSE: Clear, no discharge or congestion  MOUTH/THROAT: Prominent scattered hyperpigmented macules over tongue. Mucous membranes moist.  Posterior oropharynx normal, tonsils are not visible; no erythema or purulent exudate. Normal dentition for age, no mucosal lesions.  NECK: Supple, full range of motion, thyroid is normal, no masses  LYMPH NODES: No cervical lymphadenopathy.  LUNGS: CTAB, no wheezes, crackles.    HEART: Regular rhythm. S1 and S2 are normal. No murmurs, rubs, gallops. The radial pulses are 2+ bilaterally. Cap refill <3 sec in upper extremities.  ABDOMEN: Normal bowel sounds. Soft, non-tender, non-distended, no masses or hepatosplenomegaly.  NEUROLOGIC: Grossly intact, no focal neurologic deficits.    :  Deferred today.    Results for orders placed or performed in visit on 01/16/17   CBC with platelets differential   Result Value Ref Range    WBC 3.1 (L) 4.0 - 11.0 10e9/L    RBC Count 3.59 (L) 3.8 - 5.2 10e12/L     Hemoglobin 12.1 11.7 - 15.7 g/dL    Hematocrit 35.2 35.0 - 47.0 %    MCV 98 78 - 100 fl    MCH 33.7 (H) 26.5 - 33.0 pg    MCHC 34.4 31.5 - 36.5 g/dL    RDW 11.5 10.0 - 15.0 %    Platelet Count 136 (L) 150 - 450 10e9/L    Diff Method Automated Method     % Neutrophils 57.2 %    % Lymphocytes 23.9 %    % Monocytes 17.6 %    % Eosinophils 1.0 %    % Basophils 0.0 %    % Immature Granulocytes 0.3 %    Nucleated RBCs 0 0 /100    Absolute Neutrophil 1.8 1.6 - 8.3 10e9/L    Absolute Lymphocytes 0.7 (L) 0.8 - 5.3 10e9/L    Absolute Monocytes 0.5 0.0 - 1.3 10e9/L    Absolute Eosinophils 0.0 0.0 - 0.7 10e9/L    Absolute Basophils 0.0 0.0 - 0.2 10e9/L    Abs Immature Granulocytes 0.0 0 - 0.4 10e9/L    Absolute Nucleated RBC 0.0        Impression:  Olimpia is a 21-year-old female with 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  Biopsy of labial mass reveals synovial sarcoma as well. Her recent PET CT does not show any new sites of disease (areas in buttocks correspond to Depo-Lupron injection sites).  She is s/p cycle 3 of chemotherapy per [id number]  (2nd and 3rd cycle 75% dosing), radiation and resection of the labial mass with reconstruction.  Pathology showed no residual tumor.  CT 12/5/16 shows no change in the right sided pulmonary nodules and a new LLL nodule. The plan is to proceed with thoractomy but Olimpia is reluctant to do that before March given that she is just now recovering from her local control surgery.  We discussed the risk of waiting would be progression of the lesions and the chance that Dr. Pierre would not being able to resect the nodules.  Olimpia stated understanding of this.  So, will  plan for:    Plan:  1) CT scan in next week or so to understand the status of her nodules, followed by appt to discuss results  2) Continue Voriconazole until thoracotomy  3) Plan for thoractomy in March, unless next CT scan changes that plan      Coretta Yanez MD

## 2017-01-16 NOTE — NURSING NOTE
"Chief Complaint   Patient presents with     RECHECK     Patient here today for follow up on Synovial sarcoma (H)     /65 mmHg  Pulse 81  Temp(Src) 98  F (36.7  C) (Oral)  Resp 16  Ht 1.603 m (5' 3.11\")  Wt 60.1 kg (132 lb 7.9 oz)  BMI 23.39 kg/m2  SpO2 98%  Debbie Padilla MA    "

## 2017-01-23 ENCOUNTER — HOSPITAL ENCOUNTER (OUTPATIENT)
Dept: CT IMAGING | Facility: CLINIC | Age: 22
Discharge: HOME | End: 2017-01-23
Attending: PEDIATRICS | Admitting: PEDIATRICS
Payer: COMMERCIAL

## 2017-01-23 ENCOUNTER — OFFICE VISIT (OUTPATIENT)
Dept: PEDIATRIC HEMATOLOGY/ONCOLOGY | Facility: CLINIC | Age: 22
End: 2017-01-23
Attending: PEDIATRICS
Payer: COMMERCIAL

## 2017-01-23 VITALS
BODY MASS INDEX: 23.24 KG/M2 | WEIGHT: 131.17 LBS | RESPIRATION RATE: 18 BRPM | HEIGHT: 63 IN | TEMPERATURE: 97.8 F | SYSTOLIC BLOOD PRESSURE: 95 MMHG | OXYGEN SATURATION: 100 % | HEART RATE: 73 BPM | DIASTOLIC BLOOD PRESSURE: 65 MMHG

## 2017-01-23 DIAGNOSIS — C51.9 SARCOMA OF VULVA (H): Primary | ICD-10-CM

## 2017-01-23 DIAGNOSIS — C49.9 SYNOVIAL SARCOMA (H): ICD-10-CM

## 2017-01-23 LAB
BASOPHILS # BLD AUTO: 0 10E9/L (ref 0–0.2)
BASOPHILS NFR BLD AUTO: 0.5 %
DIFFERENTIAL METHOD BLD: ABNORMAL
EOSINOPHIL # BLD AUTO: 0 10E9/L (ref 0–0.7)
EOSINOPHIL NFR BLD AUTO: 1.4 %
ERYTHROCYTE [DISTWIDTH] IN BLOOD BY AUTOMATED COUNT: 11.3 % (ref 10–15)
HCT VFR BLD AUTO: 36.6 % (ref 35–47)
HGB BLD-MCNC: 12.6 G/DL (ref 11.7–15.7)
IMM GRANULOCYTES # BLD: 0 10E9/L (ref 0–0.4)
IMM GRANULOCYTES NFR BLD: 0.5 %
LYMPHOCYTES # BLD AUTO: 0.7 10E9/L (ref 0.8–5.3)
LYMPHOCYTES NFR BLD AUTO: 31.2 %
MCH RBC QN AUTO: 34.1 PG (ref 26.5–33)
MCHC RBC AUTO-ENTMCNC: 34.4 G/DL (ref 31.5–36.5)
MCV RBC AUTO: 99 FL (ref 78–100)
MONOCYTES # BLD AUTO: 0.4 10E9/L (ref 0–1.3)
MONOCYTES NFR BLD AUTO: 17.7 %
NEUTROPHILS # BLD AUTO: 1.1 10E9/L (ref 1.6–8.3)
NEUTROPHILS NFR BLD AUTO: 48.7 %
NRBC # BLD AUTO: 0 10*3/UL
NRBC BLD AUTO-RTO: 0 /100
PLATELET # BLD AUTO: 123 10E9/L (ref 150–450)
RBC # BLD AUTO: 3.7 10E12/L (ref 3.8–5.2)
WBC # BLD AUTO: 2.2 10E9/L (ref 4–11)

## 2017-01-23 PROCEDURE — 71260 CT THORAX DX C+: CPT

## 2017-01-23 PROCEDURE — 36592 COLLECT BLOOD FROM PICC: CPT | Performed by: NURSE PRACTITIONER

## 2017-01-23 PROCEDURE — 25000125 ZZHC RX 250: Performed by: PEDIATRICS

## 2017-01-23 PROCEDURE — 99213 OFFICE O/P EST LOW 20 MIN: CPT | Mod: 25,ZF

## 2017-01-23 PROCEDURE — 85025 COMPLETE CBC W/AUTO DIFF WBC: CPT | Performed by: NURSE PRACTITIONER

## 2017-01-23 PROCEDURE — 25500064 ZZH RX 255 OP 636: Performed by: PEDIATRICS

## 2017-01-23 RX ORDER — IOPAMIDOL 612 MG/ML
100 INJECTION, SOLUTION INTRAVASCULAR ONCE
Status: COMPLETED | OUTPATIENT
Start: 2017-01-23 | End: 2017-01-23

## 2017-01-23 RX ORDER — AMOXICILLIN 250 MG
2 CAPSULE ORAL 2 TIMES DAILY
Qty: 100 TABLET | Refills: 1 | Status: ON HOLD | OUTPATIENT
Start: 2017-01-23 | End: 2017-03-10

## 2017-01-23 RX ADMIN — SODIUM CHLORIDE 47 ML: 9 INJECTION, SOLUTION INTRAVENOUS at 10:16

## 2017-01-23 RX ADMIN — IOPAMIDOL 80 ML: 612 INJECTION, SOLUTION INTRAVENOUS at 10:16

## 2017-01-23 ASSESSMENT — PAIN SCALES - GENERAL: PAINLEVEL: NO PAIN (0)

## 2017-01-23 NOTE — Clinical Note
1/23/2017      RE: Olimpia Childress  [address]   [place] [address]       Pediatric Hematology/Oncology Clinic Note    HPI- Olimpia initially presented with a growth on her right labia in 2013 when she was living in [place]. 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 She discontinued further treatment b/c her physicians were unsure of the exact diagnosis and she felt uncomfortable proceeding. Olimpia subsequently immigrated to the  in August of 2015 and in October she first noticed a recurrence of the mass in the area of previous excision. She was in [place] at that time, seen by oncology and had a port placed but then moved to [place] where she was seen by Dr. Mckeon at [facility] in November. There she underwent an MRI that showed a solid and cystic subcutaneous mass in the right labia measuring 1.7 x 1.6 x 1cm with question of nodular extension vs a second nodule measuring 0.7 cm. There was no invasion into the vagina. On November 16th 2015, Olimpia had a biopsy of the mass by a gynecologist, Dr. Terry, at [facility]. Cytology was reviewed at [facility] and read as a SMARCB1-deficient genital sarcoma, likely falling within the overall spectrum of malignant rhabdoid tumor. By immunohistochemistry, the cells shows a complete loss of SMARCb1. Wide spectrum cytokeratin, CD34, WT1, Desmin and CD99 were all negative. RT PCR for Wooten Sarcoma associated fusion transcripts were negative as well. Olimpia went on to have a PET/CT as well which showed multiple pulmonary lesions and biopsy confirmed a lesion to be metastatic disease. Olimpia was seen by Tomás White at [facility] who reviewed the diagnosis and potential treatment plans with her, however she prefered to be treated here at [facility]. 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    Olimpia first cycle was complicated by an ileus secondary to vincristine. Her second cycle was complicated by a left pneumothorax. She had a chest tube placed for a short period of time. Her respiratory status improved and she was discharged home.    Olimpia had imaging done following cycles 2 and 4 which showed perhaps improvement in her lung lesions. Her MRI no longer demonstrates a solid mass. She does have more extensive cystic involvement which may be lymphoceles but cannot rule out malignancy.     Olimpia underwent left sided thoracotomy and resection of two pulmonary nodules on 7/20/16. Pathology revealed that one lesion was benign lymphoid tissue and the other was consistent with synovial sarcoma.  This was confirmed with FISH  With 91% of cell examined having a signal pattern indictivate of a rearrangement of the SS18 locus.  Olimpia has now completed 3 cycles of chemotherapy per [id number] and started her radiation on 9/13/16 and completed on 10/17/16.  She then went on to have a resection of her labial mass on 11/16/16 by Jannet Pastrana and Chikis with reconstruction, including skin flaps by Dr. Corona from plastics.  Pathology showed no residual tumor.  She had a f/u chest CT today on 12/5/16 that showed persistance of her pulmonary nodules as well as few additonal pulmonary nodules.  She saw Dr. Pierre who was in agreement with surgical resection, however Olimpia wanted to wait until March to have more time to recover from her last surgery. She continues on voriconazole.      Olimpia has been doing really well; her only concern today is that there may a non-dissolvable stitch remaining from her surgery that causes discomfort. She otherwise has been feeling well with no illnesses. She has a good appetite and has been stooling normally. Her energy level and appetite are both improving.  She denies  any cough, SOB, fevers, pain or any other concerns.      History was obtained from [facility] via [facility] .     Allergies as of 01/23/2017 - reviewed 01/16/2017   Allergen Reaction Noted     Heparin flush Other (See Comments) 01/27/2016     Pork derived products  02/09/2016     Tegaderm transparent dressing (informational only) Other (See Comments) and Rash 04/20/2016       Current Outpatient Prescriptions   Medication Sig Dispense Refill     diltiazem 2% in PLO cream, FV COMPOUNDED, 2% GEL To anal opening three times daily.  Use a pea-sized amount.  Store at room temperature. 60 g 0     HYDROmorphone (DILAUDID) 2 MG tablet Take 1-2 tablets (2-4 mg) by mouth every 3 hours as needed for moderate to severe pain 30 tablet 0     acetaminophen (TYLENOL) 325 MG tablet Take 2 tablets (650 mg) by mouth every 4 hours as needed for mild pain 100 tablet      ibuprofen (ADVIL,MOTRIN) 600 MG tablet Take 1 tablet (600 mg) by mouth every 6 hours as needed for moderate pain 120 tablet      polyethylene glycol (MIRALAX/GLYCOLAX) packet Take 17 g by mouth 2 times daily 7 packet      senna-docusate (SENOKOT-S;PERICOLACE) 8.6-50 MG per tablet Take 2 tablets by mouth 2 times daily 100 tablet      ondansetron (ZOFRAN) 8 MG tablet Take 1 tablet (8 mg) by mouth every 6 hours as needed for nausea 30 tablet 6     diphenhydrAMINE (BENADRYL) 25 MG tablet Take 1-2 tablets (25-50 mg) by mouth every 6 hours as needed for other (Nausea or vomiting) 60 tablet 3     lidocaine-prilocaine (EMLA) cream Apply to port 30 minutes prior to access 30 g 5     bisacodyl (DULCOLAX) 5 MG EC tablet Take 1 tablet (5 mg) by mouth 2 times daily 30 tablet 2     pantoprazole (PROTONIX) 40 MG enteric coated tablet Take 1 tablet (40 mg) by mouth daily 30 tablet 3     leuprolide (LUPRON DEPOT-PED) 11.25 MG KIT Inject 11.25 mg into the muscle every 3 months 1 each 1       Past Medical History   Diagnosis Date     Extrarenal rhabdoid neoplasm (H)       "Latent tuberculosis      Constipation      Lung disease      Febrile neutropenia (H) 4/17/2016     Anemia 6/3/2016     On antineoplastic chemotherapy      History of blood transfusion      Sarcoma of vulva (H)        Social History     Social History     Marital Status:      Spouse Name: N/A     Number of Children: N/A     Years of Education: N/A     Occupational History     Not on file.     Social History Main Topics     Smoking status: Never Smoker      Smokeless tobacco: Never Used     Alcohol Use: No     Drug Use: No     Sexual Activity: No     Other Topics Concern     Not on file     Social History Narrative       Family History   Problem Relation Age of Onset     Other Cancer No family hx of      ROS  See HPI. Complete ROS otherwise negative.    Physical Exam   VS: BP 95/65 mmHg  Pulse 73  Temp(Src) 97.8  F (36.6  C) (Oral)  Resp 18  Ht 1.607 m (5' 3.27\")  Wt 59.5 kg (131 lb 2.8 oz)  BMI 23.04 kg/m2  SpO2 100%    GENERAL: Alert, well appearing young woman in no acute distress.  SKIN: No rashes, lesions, or abnormal pigmentation.  HEAD: Normocephalic, atraumatic     EYES: Normal lids, conjunctivae/cornea normal, mild icterus. PERRL. EOMI.  EARS: Pinna normal b/l, no pain on palpation. External ears normal appearing. TMs without erythema or effusion.  NOSE: Clear, no discharge or congestion  MOUTH/THROAT: Prominent scattered hyperpigmented macules over tongue. Mucous membranes moist.  Posterior oropharynx normal, tonsils are not visible; no erythema or purulent exudate. Normal dentition for age, no mucosal lesions.  NECK: Supple, full range of motion, thyroid is normal, no masses  LYMPH NODES: No cervical lymphadenopathy.  LUNGS: CTAB, no wheezes, crackles.   HEART: Regular rhythm. S1 and S2 are normal. No murmurs, rubs, gallops. The radial pulses are 2+ bilaterally. Cap refill <3 sec in upper extremities.  ABDOMEN: Normal bowel sounds. Soft, non-tender, non-distended, no masses or " hepatosplenomegaly.  NEUROLOGIC: Grossly intact, no focal neurologic deficits.    :  Deferred today.    Results for orders placed or performed in visit on 01/16/17   CBC with platelets differential   Result Value Ref Range    WBC 3.1 (L) 4.0 - 11.0 10e9/L    RBC Count 3.59 (L) 3.8 - 5.2 10e12/L    Hemoglobin 12.1 11.7 - 15.7 g/dL    Hematocrit 35.2 35.0 - 47.0 %    MCV 98 78 - 100 fl    MCH 33.7 (H) 26.5 - 33.0 pg    MCHC 34.4 31.5 - 36.5 g/dL    RDW 11.5 10.0 - 15.0 %    Platelet Count 136 (L) 150 - 450 10e9/L    Diff Method Automated Method     % Neutrophils 57.2 %    % Lymphocytes 23.9 %    % Monocytes 17.6 %    % Eosinophils 1.0 %    % Basophils 0.0 %    % Immature Granulocytes 0.3 %    Nucleated RBCs 0 0 /100    Absolute Neutrophil 1.8 1.6 - 8.3 10e9/L    Absolute Lymphocytes 0.7 (L) 0.8 - 5.3 10e9/L    Absolute Monocytes 0.5 0.0 - 1.3 10e9/L    Absolute Eosinophils 0.0 0.0 - 0.7 10e9/L    Absolute Basophils 0.0 0.0 - 0.2 10e9/L    Abs Immature Granulocytes 0.0 0 - 0.4 10e9/L    Absolute Nucleated RBC 0.0      EXAMINATION: Chest CT  1/23/2017 10:32 AM     CLINICAL HISTORY: synovial sarcoma, Malignant neoplasm of connective  and soft tissue, unspecified     COMPARISON: Chest CT 12/5/2016.     TECHNIQUE: CT imaging obtained through the chest without contrast.  Coronal and axial MIP reformatted images obtained.       FINDINGS:  Left chest wall Port-A-Cath tip terminates in the right atrium.  Postsurgical changes of a left lung wedge resection.     Overall, there has been interval growth of most pulmonary nodules. No  new pulmonary nodules. Pulmonary nodule comparisons are made below,  noted on series 3, and compared with 12/5/2016:  Image 65:1 cm pulmonary nodule in the left lower lobe, previously 6  mm.  Image 71:5 mm pulmonary nodule in the lingula, previously 3 mm.  Image 37:6 mm pulmonary nodule in the right upper lobe, previously 6  mm. Less solid on today's exam.  Image 78:1 cm pleural-based nodularity,  previously 5 mm.     Heart size is normal. No pericardial effusion.  Normal thoracic  vasculature. No thoracic lymphadenopathy. Central tracheobronchial  tree is patent. No pleural effusion or pneumothorax.     Bones and soft tissues: No suspicious bone findings. Stable soft  tissue nodule in the upper inner right breast. Stable benign-appearing  sclerotic lesion in the posterior right fifth rib (series 4, image  36), likely enostosis.     Partially imaged upper abdomen: Limited.                                                                         IMPRESSION: Increased size of the pulmonary nodules throughout the  lungs, concerning for worsening metastatic disease. No new pulmonary  nodules.     I have personally reviewed the examination and initial interpretation  and I agree with the findings.     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  Biopsy of labial mass reveals synovial sarcoma as well. Her recent PET CT does not show any new sites of disease (areas in buttocks correspond to Depo-Lupron injection sites).  She is s/p cycle 3 of chemotherapy per [id number]  (2nd and 3rd cycle 75% dosing), radiation and resection of the labial mass with reconstruction.  Pathology showed no residual tumor.  CT 12/5/16 shows no change in the right sided pulmonary nodules and a new LLL nodule. The plan is to proceed with thoractomy but Olimpia is reluctant to do that before March given that she is just now recovering from her local control surgery.  CT today shows increasing size of left sided pulmonary nodules with stable right sided  pulmonary nodule.  This was discussed in detail with Asma.    Plan:  1) Will discuss with Dr. Tran for plan for possible thoracotomy  2) Will inform her surgeon that she feels as though there is a suture remaining that is bothersome  3) Continue Voriconazole until thoracotomy    Asma was evaluated and plan of care was discussed with Dr. Yanez.    Georgette Parkinson MD  [facility] Pediatrics Resident, PL2  Pager: [phone]    Physician Attestation  I, Coretta Yanez MD, saw this patient with the resident and agree with the resident s findings and plan of care as documented in the resident s note.      I personally reviewed vital signs, medications, labs and imaging.      Coretta Yanez MD  Date of Service (when I saw the patient): 01/23/2017

## 2017-01-23 NOTE — NURSING NOTE
"Chief Complaint   Patient presents with     RECHECK     Patient here today for follow up on Synovial sarcoma (H)     BP 95/65 mmHg  Pulse 73  Temp(Src) 97.8  F (36.6  C) (Oral)  Resp 18  Ht 1.607 m (5' 3.27\")  Wt 59.5 kg (131 lb 2.8 oz)  BMI 23.04 kg/m2  SpO2 100%  Debbie Padilla MA    "

## 2017-01-23 NOTE — PROGRESS NOTES
Pediatric Hematology/Oncology Clinic Note    HPI- Olimpia initially presented with a growth on her right labia in 2013 when she was living in [place]. 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 She discontinued further treatment b/c her physicians were unsure of the exact diagnosis and she felt uncomfortable proceeding. Olimpia subsequently immigrated to the  in August of 2015 and in October she first noticed a recurrence of the mass in the area of previous excision. She was in [place] at that time, seen by oncology and had a port placed but then moved to [place] where she was seen by Dr. Mckeon at [facility] in November. There she underwent an MRI that showed a solid and cystic subcutaneous mass in the right labia measuring 1.7 x 1.6 x 1cm with question of nodular extension vs a second nodule measuring 0.7 cm. There was no invasion into the vagina. On November 16th 2015, Olimpia had a biopsy of the mass by a gynecologist, Dr. Terry, at [facility]. 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 By immunohistochemistry, the cells shows a complete loss of SMARCb1. Wide spectrum cytokeratin, CD34, WT1, Desmin and CD99 were all negative. RT PCR for Wooten Sarcoma associated fusion transcripts were negative as well. Olimpia went on to have a PET/CT as well which showed multiple pulmonary lesions and biopsy confirmed a lesion to be metastatic disease. Olimpia was seen by Tomás White at [place] who reviewed the diagnosis and potential treatment plans with her, however she prefered to be treated here at [facility]. 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    Olimpia first cycle was complicated by an ileus secondary to vincristine. Her second cycle was complicated by a left pneumothorax. She had a chest tube placed for a short period of time. Her respiratory status improved and she was discharged home.    Olimpia had imaging done following cycles 2 and 4 which showed perhaps improvement in her lung lesions. Her MRI no longer demonstrates a solid mass. She does have more extensive cystic involvement which may be lymphoceles but cannot rule out malignancy.     Olimpia underwent left sided thoracotomy and resection of two pulmonary nodules on 7/20/16. Pathology revealed that one lesion was benign lymphoid tissue and the other was consistent with synovial sarcoma.  This was confirmed with FISH  With 91% of cell examined having a signal pattern indictivate of a rearrangement of the SS18 locus.  Olimpia has now completed 3 cycles of chemotherapy per [id number] and started her radiation on 9/13/16 and completed on 10/17/16.  She then went on to have a resection of her labial mass on 11/16/16 by Jannet Pastrana and Chikis with reconstruction, including skin flaps by Dr. Corona from plastics.  Pathology showed no residual tumor.  She had a f/u chest CT today on 12/5/16 that showed persistance of her pulmonary nodules as well as few additonal pulmonary nodules.  She saw Dr. Pierre who was in agreement with surgical resection, however Olimpia wanted to wait until March to have more time to recover from her last surgery. She continues on voriconazole.      Olimpia has been doing really well; her only concern today is that there may a non-dissolvable stitch remaining from her surgery that causes discomfort. She otherwise has been feeling well with no illnesses. She has a good appetite and has been stooling normally. Her energy level and appetite are both improving.  She denies any cough, SOB, fevers, pain or any other concerns.      History was obtained from Olimpia  via professional [facility] .     Allergies as of 01/23/2017 - reviewed 01/16/2017   Allergen Reaction Noted     Heparin flush Other (See Comments) 01/27/2016     Pork derived products  02/09/2016     Tegaderm transparent dressing (informational only) Other (See Comments) and Rash 04/20/2016       Current Outpatient Prescriptions   Medication Sig Dispense Refill     diltiazem 2% in PLO cream, FV COMPOUNDED, 2% GEL To anal opening three times daily.  Use a pea-sized amount.  Store at room temperature. 60 g 0     HYDROmorphone (DILAUDID) 2 MG tablet Take 1-2 tablets (2-4 mg) by mouth every 3 hours as needed for moderate to severe pain 30 tablet 0     acetaminophen (TYLENOL) 325 MG tablet Take 2 tablets (650 mg) by mouth every 4 hours as needed for mild pain 100 tablet      ibuprofen (ADVIL,MOTRIN) 600 MG tablet Take 1 tablet (600 mg) by mouth every 6 hours as needed for moderate pain 120 tablet      polyethylene glycol (MIRALAX/GLYCOLAX) packet Take 17 g by mouth 2 times daily 7 packet      senna-docusate (SENOKOT-S;PERICOLACE) 8.6-50 MG per tablet Take 2 tablets by mouth 2 times daily 100 tablet      ondansetron (ZOFRAN) 8 MG tablet Take 1 tablet (8 mg) by mouth every 6 hours as needed for nausea 30 tablet 6     diphenhydrAMINE (BENADRYL) 25 MG tablet Take 1-2 tablets (25-50 mg) by mouth every 6 hours as needed for other (Nausea or vomiting) 60 tablet 3     lidocaine-prilocaine (EMLA) cream Apply to port 30 minutes prior to access 30 g 5     bisacodyl (DULCOLAX) 5 MG EC tablet Take 1 tablet (5 mg) by mouth 2 times daily 30 tablet 2     pantoprazole (PROTONIX) 40 MG enteric coated tablet Take 1 tablet (40 mg) by mouth daily 30 tablet 3     leuprolide (LUPRON DEPOT-PED) 11.25 MG KIT Inject 11.25 mg into the muscle every 3 months 1 each 1       Past Medical History   Diagnosis Date     Extrarenal rhabdoid neoplasm (H)      Latent tuberculosis      Constipation      Lung disease      Febrile neutropenia (H)  "4/17/2016     Anemia 6/3/2016     On antineoplastic chemotherapy      History of blood transfusion      Sarcoma of vulva (H)        Social History     Social History     Marital Status:      Spouse Name: N/A     Number of Children: N/A     Years of Education: N/A     Occupational History     Not on file.     Social History Main Topics     Smoking status: Never Smoker      Smokeless tobacco: Never Used     Alcohol Use: No     Drug Use: No     Sexual Activity: No     Other Topics Concern     Not on file     Social History Narrative       Family History   Problem Relation Age of Onset     Other Cancer No family hx of      ROS  See HPI. Complete ROS otherwise negative.    Physical Exam   VS: BP 95/65 mmHg  Pulse 73  Temp(Src) 97.8  F (36.6  C) (Oral)  Resp 18  Ht 1.607 m (5' 3.27\")  Wt 59.5 kg (131 lb 2.8 oz)  BMI 23.04 kg/m2  SpO2 100%    GENERAL: Alert, well appearing young woman in no acute distress.  SKIN: No rashes, lesions, or abnormal pigmentation.  HEAD: Normocephalic, atraumatic     EYES: Normal lids, conjunctivae/cornea normal, mild icterus. PERRL. EOMI.  EARS: Pinna normal b/l, no pain on palpation. External ears normal appearing. TMs without erythema or effusion.  NOSE: Clear, no discharge or congestion  MOUTH/THROAT: Prominent scattered hyperpigmented macules over tongue. Mucous membranes moist.  Posterior oropharynx normal, tonsils are not visible; no erythema or purulent exudate. Normal dentition for age, no mucosal lesions.  NECK: Supple, full range of motion, thyroid is normal, no masses  LYMPH NODES: No cervical lymphadenopathy.  LUNGS: CTAB, no wheezes, crackles.   HEART: Regular rhythm. S1 and S2 are normal. No murmurs, rubs, gallops. The radial pulses are 2+ bilaterally. Cap refill <3 sec in upper extremities.  ABDOMEN: Normal bowel sounds. Soft, non-tender, non-distended, no masses or hepatosplenomegaly.  NEUROLOGIC: Grossly intact, no focal neurologic deficits.    :  Deferred " today.    Results for orders placed or performed in visit on 01/16/17   CBC with platelets differential   Result Value Ref Range    WBC 3.1 (L) 4.0 - 11.0 10e9/L    RBC Count 3.59 (L) 3.8 - 5.2 10e12/L    Hemoglobin 12.1 11.7 - 15.7 g/dL    Hematocrit 35.2 35.0 - 47.0 %    MCV 98 78 - 100 fl    MCH 33.7 (H) 26.5 - 33.0 pg    MCHC 34.4 31.5 - 36.5 g/dL    RDW 11.5 10.0 - 15.0 %    Platelet Count 136 (L) 150 - 450 10e9/L    Diff Method Automated Method     % Neutrophils 57.2 %    % Lymphocytes 23.9 %    % Monocytes 17.6 %    % Eosinophils 1.0 %    % Basophils 0.0 %    % Immature Granulocytes 0.3 %    Nucleated RBCs 0 0 /100    Absolute Neutrophil 1.8 1.6 - 8.3 10e9/L    Absolute Lymphocytes 0.7 (L) 0.8 - 5.3 10e9/L    Absolute Monocytes 0.5 0.0 - 1.3 10e9/L    Absolute Eosinophils 0.0 0.0 - 0.7 10e9/L    Absolute Basophils 0.0 0.0 - 0.2 10e9/L    Abs Immature Granulocytes 0.0 0 - 0.4 10e9/L    Absolute Nucleated RBC 0.0      EXAMINATION: Chest CT  1/23/2017 10:32 AM     CLINICAL HISTORY: synovial sarcoma, Malignant neoplasm of connective  and soft tissue, unspecified     COMPARISON: Chest CT 12/5/2016.     TECHNIQUE: CT imaging obtained through the chest without contrast.  Coronal and axial MIP reformatted images obtained.       FINDINGS:  Left chest wall Port-A-Cath tip terminates in the right atrium.  Postsurgical changes of a left lung wedge resection.     Overall, there has been interval growth of most pulmonary nodules. No  new pulmonary nodules. Pulmonary nodule comparisons are made below,  noted on series 3, and compared with 12/5/2016:  Image 65:1 cm pulmonary nodule in the left lower lobe, previously 6  mm.  Image 71:5 mm pulmonary nodule in the lingula, previously 3 mm.  Image 37:6 mm pulmonary nodule in the right upper lobe, previously 6  mm. Less solid on today's exam.  Image 78:1 cm pleural-based nodularity, previously 5 mm.     Heart size is normal. No pericardial effusion.  Normal thoracic  vasculature.  No thoracic lymphadenopathy. Central tracheobronchial  tree is patent. No pleural effusion or pneumothorax.     Bones and soft tissues: No suspicious bone findings. Stable soft  tissue nodule in the upper inner right breast. Stable benign-appearing  sclerotic lesion in the posterior right fifth rib (series 4, image  36), likely enostosis.     Partially imaged upper abdomen: Limited.                                                                         IMPRESSION: Increased size of the pulmonary nodules throughout the  lungs, concerning for worsening metastatic disease. No new pulmonary  nodules.     I have personally reviewed the examination and initial interpretation  and I agree with the findings.     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  Biopsy of labial mass reveals synovial sarcoma as well. Her recent PET CT does not show any new sites of disease (areas in buttocks correspond to Depo-Lupron injection sites).  She is s/p cycle 3 of chemotherapy per [id number]  (2nd and 3rd cycle 75% dosing), radiation and resection of the labial mass with reconstruction.  Pathology showed no residual tumor.  CT 12/5/16 shows no change in the right sided pulmonary nodules and a new LLL nodule. The plan is to proceed with thoractomy but Olimpia is reluctant to do that before March given that she is just now recovering from her local control surgery.  CT today shows increasing size of left sided pulmonary nodules with stable right sided pulmonary nodule.  This was discussed in detail with Olimpia.    Plan:  1) Will discuss with Dr. Tran for  plan for possible thoracotomy  2) Will inform her surgeon that she feels as though there is a suture remaining that is bothersome  3) Continue Voriconazole until thoracotomy    Asma was evaluated and plan of care was discussed with Dr. Yanez.    Georgette Parkinson MD  [facility] Pediatrics Resident, PL2  Pager: [phone]    Physician Attestation  I, Coretta Yanez MD, saw this patient with the resident and agree with the resident s findings and plan of care as documented in the resident s note.      I personally reviewed vital signs, medications, labs and imaging.      Coretta Yanez MD  Date of Service (when I saw the patient): 01/23/2017

## 2017-01-24 DIAGNOSIS — C78.02 METASTATIC SARCOMA TO LUNG, LEFT (H): Primary | ICD-10-CM

## 2017-02-07 ENCOUNTER — TELEPHONE (OUTPATIENT)
Dept: PEDIATRIC HEMATOLOGY/ONCOLOGY | Facility: CLINIC | Age: 22
End: 2017-02-07

## 2017-03-02 ENCOUNTER — ANESTHESIA EVENT (OUTPATIENT)
Dept: SURGERY | Facility: CLINIC | Age: 22
DRG: 164 | End: 2017-03-02
Payer: COMMERCIAL

## 2017-03-06 ENCOUNTER — OFFICE VISIT (OUTPATIENT)
Dept: PEDIATRIC HEMATOLOGY/ONCOLOGY | Facility: CLINIC | Age: 22
End: 2017-03-06
Attending: NURSE PRACTITIONER
Payer: COMMERCIAL

## 2017-03-06 VITALS
SYSTOLIC BLOOD PRESSURE: 108 MMHG | HEART RATE: 76 BPM | DIASTOLIC BLOOD PRESSURE: 60 MMHG | OXYGEN SATURATION: 100 % | WEIGHT: 137.57 LBS | TEMPERATURE: 97.5 F | BODY MASS INDEX: 24.38 KG/M2 | HEIGHT: 63 IN | RESPIRATION RATE: 16 BRPM

## 2017-03-06 DIAGNOSIS — C49.9 SYNOVIAL SARCOMA (H): Primary | ICD-10-CM

## 2017-03-06 DIAGNOSIS — C49.9 SYNOVIAL SARCOMA (H): ICD-10-CM

## 2017-03-06 LAB
BASOPHILS # BLD AUTO: 0 10E9/L (ref 0–0.2)
BASOPHILS NFR BLD AUTO: 0.2 %
DIFFERENTIAL METHOD BLD: ABNORMAL
EOSINOPHIL # BLD AUTO: 0.1 10E9/L (ref 0–0.7)
EOSINOPHIL NFR BLD AUTO: 1.1 %
ERYTHROCYTE [DISTWIDTH] IN BLOOD BY AUTOMATED COUNT: 11.4 % (ref 10–15)
HCT VFR BLD AUTO: 35.9 % (ref 35–47)
HGB BLD-MCNC: 12.4 G/DL (ref 11.7–15.7)
IMM GRANULOCYTES # BLD: 0 10E9/L (ref 0–0.4)
IMM GRANULOCYTES NFR BLD: 0.2 %
LYMPHOCYTES # BLD AUTO: 1.3 10E9/L (ref 0.8–5.3)
LYMPHOCYTES NFR BLD AUTO: 27.9 %
MCH RBC QN AUTO: 32.9 PG (ref 26.5–33)
MCHC RBC AUTO-ENTMCNC: 34.5 G/DL (ref 31.5–36.5)
MCV RBC AUTO: 95 FL (ref 78–100)
MONOCYTES # BLD AUTO: 0.6 10E9/L (ref 0–1.3)
MONOCYTES NFR BLD AUTO: 13.2 %
NEUTROPHILS # BLD AUTO: 2.6 10E9/L (ref 1.6–8.3)
NEUTROPHILS NFR BLD AUTO: 57.4 %
NRBC # BLD AUTO: 0 10*3/UL
NRBC BLD AUTO-RTO: 0 /100
PLATELET # BLD AUTO: 184 10E9/L (ref 150–450)
RBC # BLD AUTO: 3.77 10E12/L (ref 3.8–5.2)
WBC # BLD AUTO: 4.6 10E9/L (ref 4–11)

## 2017-03-06 PROCEDURE — 99213 OFFICE O/P EST LOW 20 MIN: CPT | Mod: ZF

## 2017-03-06 PROCEDURE — 85025 COMPLETE CBC W/AUTO DIFF WBC: CPT | Performed by: NURSE PRACTITIONER

## 2017-03-06 PROCEDURE — 36415 COLL VENOUS BLD VENIPUNCTURE: CPT | Performed by: NURSE PRACTITIONER

## 2017-03-06 ASSESSMENT — PAIN SCALES - GENERAL: PAINLEVEL: NO PAIN (0)

## 2017-03-06 NOTE — NURSING NOTE
"Chief Complaint   Patient presents with     RECHECK     Patient is here for Synovial sarcoma (H) follow up     /60 (BP Location: Right arm, Patient Position: Fowlers, Cuff Size: Adult Regular)  Pulse 76  Temp 97.5  F (36.4  C) (Oral)  Resp 16  Ht 1.59 m (5' 2.6\")  Wt 62.4 kg (137 lb 9.1 oz)  SpO2 100%  BMI 24.68 kg/m2   Radha Mason LPN  March 6, 2017    "

## 2017-03-06 NOTE — MR AVS SNAPSHOT
After Visit Summary   3/6/2017    Olimpia Childress    MRN: [id number]           Patient Information     Date Of Birth          1995        Visit Information        Provider Department      3/6/2017 3:15 PM Aliyah Paz APRN CNP; [place] [facility] Peds Hematology Oncology        Today's Diagnoses     Synovial sarcoma (H)              [facility], 9th floor  [address] [place] [address]  Phone: [phone]  Clinic Hours:   Monday-Friday:   7 am to 5:00 pm   closed weekends and major  holidays     If your fever is 100.5  or greater,   call the clinic during business hours.   After hours call [phone] and ask for the pediatric hematology / oncology physician to be paged for you.               Follow-ups after your visit        Your next 10 appointments already scheduled     Mar 07, 2017  3:00 PM CST   CT CHEST W/O CONTRAST with URCT2   [facility], [place], Radiology ([facility])    [address] [place] [id number] [phone]           Please bring any scans or X-rays taken at other hospitals, if similar tests were done. Also bring a list of your medicines, including vitamins, minerals and over-the-counter drugs. It is safest to leave personal items at home.  Be sure to tell your doctor:   If you have any allergies.   If there s any chance you are pregnant.   If you are breastfeeding.   If you have any special needs.  You do not need to do anything special to prepare.  Please wear loose clothing, such as a sweat suit or jogging clothes. Avoid snaps, zippers and other metal. We may ask you to undress and put on a hospital gown.            Mar 08, 2017  5:30 AM CST   [facility]    Services Department ([facility])    [facility] [facility] [address]                Mar 08, 2017   Procedure with Byron Pierre MD   [facility], [place], Same Day Surgery (--)    [address] [facility] [address] [phone]            Mar 08, 2017  9:00 AM CST   Test/Procedure with [place]    Services Department ([facility])    [address] [facility] [address]                 Future tests that were ordered for you today     Open Future Orders        Priority Expected Expires Ordered    CT Chest w/o contrast Routine 3/8/2017 3/6/2018 3/6/2017            Who to contact     Please call your clinic at [phone] to:    Ask questions about your health    Make or cancel appointments    Discuss your medicines    Learn about your test results    Speak to your doctor   If you have compliments or concerns about an experience at your clinic, or if you wish to file a complaint, please contact [facility] Patient Relations at [phone] or email us at [email or web address]         Additional Information About Your Visit        L[employer]ODEL[employer] Information     [employer]DELER is an electronic gateway that provides easy, online access to your medical records. With [employer]IF[employer]ODELER, you can request a clinic appointment, read your test results, renew a prescription or communicate with your care team.     To sign up for [employer] visit the website at [email or web address]   You will be asked to enter the access code listed below, as well as some personal information. Please follow the directions to create your username and password.     Your access code is: [email or web address]  Expires: 2017  9:41 AM     Your access code will  in 90 days. If you need help or a new code, please contact your [facility] or call [phone] for assistance.        Care EveryWhere ID     This is your Care EveryWhere ID. This could be used by other organizations to access your [place]  "medical records  [id number]        Your Vitals Were     Pulse Temperature Respirations Height Pulse Oximetry BMI (Body Mass Index)    76 97.5  F (36.4  C) (Oral) 16 1.59 m (5' 2.6\") 100% 24.68 kg/m2       Blood Pressure from Last 3 Encounters:   03/06/17 108/60   01/23/17 95/65   01/16/17 108/65    Weight from Last 3 Encounters:   03/06/17 62.4 kg (137 lb 9.1 oz)   01/23/17 59.5 kg (131 lb 2.8 oz)   01/16/17 60.1 kg (132 lb 7.9 oz)              We Performed the Following     CBC with platelets differential        Primary Care Provider Office Phone # Fax #    Coretta Yanez [phone]       [address] [address]        Thank you!     Thank you for choosing PEDS HEMATOLOGY ONCOLOGY  for your care. Our goal is always to provide you with excellent care. Hearing back from our patients is one way we can continue to improve our services. Please take a few minutes to complete the written survey that you may receive in the mail after your visit with us. Thank you!             Your Updated Medication List - Protect others around you: Learn how to safely use, store and throw away your medicines at www.disposemymeds.org.          This list is accurate as of: 3/6/17 11:59 PM.  Always use your most recent med list.                   Brand Name Dispense Instructions for use    acetaminophen 325 MG tablet    TYLENOL    100 tablet    Take 2 tablets (650 mg) by mouth every 4 hours as needed for mild pain       bisacodyl 5 MG EC tablet     30 tablet    Take 1 tablet (5 mg) by mouth 2 times daily       diltiazem 2% in PLO cream (FV COMPOUNDED) 2% Gel     60 g    To anal opening three times daily.  Use a pea-sized amount.  Store at room temperature.       diphenhydrAMINE 25 MG tablet    BENADRYL    60 tablet    Take 1-2 tablets (25-50 mg) by mouth every 6 hours as needed for other (Nausea or vomiting)       HYDROmorphone 2 MG tablet    DILAUDID    30 tablet    Take 1-2 " tablets (2-4 mg) by mouth every 3 hours as needed for moderate to severe pain       ibuprofen 600 MG tablet    ADVIL/MOTRIN    120 tablet    Take 1 tablet (600 mg) by mouth every 6 hours as needed for moderate pain       leuprolide 11.25 MG Kit kit    LUPRON DEPOT-PED    1 each    Inject 11.25 mg into the muscle every 3 months       lidocaine-prilocaine cream    EMLA    30 g    Apply to port 30 minutes prior to access       ondansetron 8 MG tablet    ZOFRAN    30 tablet    Take 1 tablet (8 mg) by mouth every 6 hours as needed for nausea       pantoprazole 40 MG EC tablet    PROTONIX    30 tablet    Take 1 tablet (40 mg) by mouth daily       polyethylene glycol Packet    MIRALAX/GLYCOLAX    7 packet    Take 17 g by mouth 2 times daily       senna-docusate 8.6-50 MG per tablet    SENOKOT-S;PERICOLACE    100 tablet    Take 2 tablets by mouth 2 times daily

## 2017-03-06 NOTE — LETTER
3/6/2017      RE: Olimpia Childress  [address]   [place] [address]       Pediatric Hematology/Oncology Clinic Note    Oncology History:   Olimpia initially presented with a growth on her right labia in 2013 when she was living in [place]. 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 She discontinued further treatment b/c her physicians were unsure of the exact diagnosis and she felt uncomfortable proceeding. Olimpia subsequently immigrated to the  in August of 2015 and in October she first noticed a recurrence of the mass in the area of previous excision. She was in [place] at that time, seen by oncology and had a port placed but then moved to [place] where she was seen by Dr. Mckeon at [facility] in November. There she underwent an MRI that showed a solid and cystic subcutaneous mass in the right labia measuring 1.7 x 1.6 x 1cm with question of nodular extension vs a second nodule measuring 0.7 cm. There was no invasion into the vagina. On November 16th 2015, Olimpia had a biopsy of the mass by a gynecologist, Dr. Terry, at [facility]. Cytology was reviewed at [place] and read as a SMARCB1-deficient genital sarcoma, likely falling within the overall spectrum of malignant rhabdoid tumor. By immunohistochemistry, the cells shows a complete loss of SMARCb1. Wide spectrum cytokeratin, CD34, WT1, Desmin and CD99 were all negative. RT PCR for Wooten Sarcoma associated fusion transcripts were negative as well. Olimpia went on to have a PET/CT as well which showed multiple pulmonary lesions and biopsy confirmed a lesion to be metastatic disease. Olimpia was seen by Tomás White at [place] who reviewed the diagnosis and potential treatment plans with her, however she prefered to be treated here at [facility]. 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    Olimpia first cycle was complicated by an ileus secondary to vincristine. Her second cycle was complicated by a left pneumothorax. She had a chest tube placed for a short period of time. Her respiratory status improved and she was discharged home.    Olimpia had imaging done following cycles 2 and 4 which showed perhaps improvement in her lung lesions. Her MRI no longer demonstrates a solid mass. She does have more extensive cystic involvement which may be lymphoceles but cannot rule out malignancy.     Olimpia underwent left sided thoracotomy and resection of two pulmonary nodules on 7/20/16. Pathology revealed that one lesion was benign lymphoid tissue and the other was consistent with synovial sarcoma.  This was confirmed with FISH  With 91% of cell examined having a signal pattern indictivate of a rearrangement of the SS18 locus.  Olimpia has now completed 3 cycles of chemotherapy per [id number] and started her radiation on 9/13/16 and completed on 10/17/16.  She then went on to have a resection of her labial mass on 11/16/16 by Jannet Pastrana and Chikis with reconstruction, including skin flaps by Dr. Corona from plastics.  Pathology showed no residual tumor.  She had a f/u chest CT today on 12/5/16 that showed persistance of her pulmonary nodules as well as few additonal pulmonary nodules.  She saw Dr. Pierre who was in agreement with surgical resection, however Olimpia wanted to wait until March to have more time to recover from her last surgery. She continues on voriconazole.      Interim History:  Olimpia was last in clinic at the end of January. She has really enjoyed her break from coming to the clinic and hospital. Olimpia did state that she came in February to have her port flushed. She has had a good appetite. She's passing stool daily. No complaints of any pain. She is voiding  without difficulty. No rashes, rhinorrhea, cough, shortness of breath, epistaxis, nor any other concerns. She's continuing to take voriconzole. Olimpia has had good energy through the day and sleeping well at night. She is concerned about what her next chest scan might look like. History was obtained from Olimpia via professional [place] .     Allergies as of 03/06/2017 - Sheldon as Reviewed 03/06/2017   Allergen Reaction Noted     Heparin flush Other (See Comments) 01/27/2016     Pork derived products  02/09/2016     Tegaderm transparent dressing (informational only) Other (See Comments) and Rash 04/20/2016       Current Outpatient Prescriptions   Medication Sig Dispense Refill     senna-docusate (SENOKOT-S;PERICOLACE) 8.6-50 MG per tablet Take 2 tablets by mouth 2 times daily 100 tablet 1     diltiazem 2% in PLO cream, FV COMPOUNDED, 2% GEL To anal opening three times daily.  Use a pea-sized amount.  Store at room temperature. 60 g 0     HYDROmorphone (DILAUDID) 2 MG tablet Take 1-2 tablets (2-4 mg) by mouth every 3 hours as needed for moderate to severe pain 30 tablet 0     acetaminophen (TYLENOL) 325 MG tablet Take 2 tablets (650 mg) by mouth every 4 hours as needed for mild pain 100 tablet      ibuprofen (ADVIL,MOTRIN) 600 MG tablet Take 1 tablet (600 mg) by mouth every 6 hours as needed for moderate pain 120 tablet      polyethylene glycol (MIRALAX/GLYCOLAX) packet Take 17 g by mouth 2 times daily 7 packet      ondansetron (ZOFRAN) 8 MG tablet Take 1 tablet (8 mg) by mouth every 6 hours as needed for nausea 30 tablet 6     diphenhydrAMINE (BENADRYL) 25 MG tablet Take 1-2 tablets (25-50 mg) by mouth every 6 hours as needed for other (Nausea or vomiting) 60 tablet 3     lidocaine-prilocaine (EMLA) cream Apply to port 30 minutes prior to access 30 g 5     bisacodyl (DULCOLAX) 5 MG EC tablet Take 1 tablet (5 mg) by mouth 2 times daily 30 tablet 2     pantoprazole (PROTONIX) 40 MG enteric coated tablet Take 1  "tablet (40 mg) by mouth daily 30 tablet 3     leuprolide (LUPRON DEPOT-PED) 11.25 MG KIT Inject 11.25 mg into the muscle every 3 months 1 each 1       Past Medical History   Diagnosis Date     Anemia 6/3/2016     Constipation      Extrarenal rhabdoid neoplasm (H)      Febrile neutropenia (H) 4/17/2016     History of blood transfusion      Latent tuberculosis      Lung disease      On antineoplastic chemotherapy      Sarcoma of vulva (H)        Social History     Social History     Marital status:      Spouse name: N/A     Number of children: N/A     Years of education: N/A     Occupational History     Not on file.     Social History Main Topics     Smoking status: Never Smoker     Smokeless tobacco: Never Used     Alcohol use No     Drug use: No     Sexual activity: No     Other Topics Concern     Not on file     Social History Narrative       Family History   Problem Relation Age of Onset     Other Cancer No family hx of      ROS  See HPI. Complete ROS otherwise negative.    Physical Exam   VS: /60 (BP Location: Right arm, Patient Position: Fowlers, Cuff Size: Adult Regular)  Pulse 76  Temp 97.5  F (36.4  C) (Oral)  Resp 16  Ht 1.59 m (5' 2.6\")  Wt 62.4 kg (137 lb 9.1 oz)  SpO2 100%  BMI 24.68 kg/m2    GENERAL: Alert, well appearing young woman in no acute distress.  SKIN: No rashes, lesions, or abnormal pigmentation.  HEAD: Normocephalic, atraumatic     EYES: Normal lids, conjunctivae/cornea normal, mild icterus. PERRL. EOMI.  EARS: Pinna normal b/l, no pain on palpation. External ears normal appearing. TMs without erythema or effusion.  NOSE: Clear, no discharge or congestion  MOUTH/THROAT: Prominent scattered hyperpigmented macules over tongue. Mucous membranes moist.  Posterior oropharynx normal, tonsils are not visible; no erythema or purulent exudate. Normal dentition for age, no mucosal lesions.  NECK: Supple, full range of motion, thyroid is normal, no masses  LYMPH NODES: No cervical " lymphadenopathy.  LUNGS: CTAB, no wheezes, crackles.   HEART: Regular rhythm. S1 and S2 are normal. No murmurs, rubs, gallops. The radial pulses are 2+ bilaterally. Cap refill <3 sec in upper extremities.  ABDOMEN: Normal bowel sounds. Soft, non-tender, non-distended, no masses or hepatosplenomegaly.  NEUROLOGIC: Grossly intact, no focal neurologic deficits.    :  Deferred today.    Results for orders placed or performed in visit on 03/06/17   CBC with platelets differential   Result Value Ref Range    WBC 4.6 4.0 - 11.0 10e9/L    RBC Count 3.77 (L) 3.8 - 5.2 10e12/L    Hemoglobin 12.4 11.7 - 15.7 g/dL    Hematocrit 35.9 35.0 - 47.0 %    MCV 95 78 - 100 fl    MCH 32.9 26.5 - 33.0 pg    MCHC 34.5 31.5 - 36.5 g/dL    RDW 11.4 10.0 - 15.0 %    Platelet Count 184 150 - 450 10e9/L    Diff Method Automated Method     % Neutrophils 57.4 %    % Lymphocytes 27.9 %    % Monocytes 13.2 %    % Eosinophils 1.1 %    % Basophils 0.2 %    % Immature Granulocytes 0.2 %    Nucleated RBCs 0 0 /100    Absolute Neutrophil 2.6 1.6 - 8.3 10e9/L    Absolute Lymphocytes 1.3 0.8 - 5.3 10e9/L    Absolute Monocytes 0.6 0.0 - 1.3 10e9/L    Absolute Eosinophils 0.1 0.0 - 0.7 10e9/L    Absolute Basophils 0.0 0.0 - 0.2 10e9/L    Abs Immature Granulocytes 0.0 0 - 0.4 10e9/L    Absolute Nucleated RBC 0.0        Impression:  Olimpia is a 21-year-old female with 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  Biopsy of labial mass reveals synovial sarcoma as well. Her recent PET CT does not show any new sites of disease (areas in buttocks correspond to Depo-Lupron injection sites).  She is s/p cycle 3 of chemotherapy per [id number]   (2nd and 3rd cycle 75% dosing), radiation and resection of the labial mass with reconstruction.  Pathology showed no residual tumor.  CT 12/5/16 shows no change in the right sided pulmonary nodules and a new LLL nodule. The plan is to proceed with thoractomy but Olimpia is reluctant to do that before March given that she is just now recovering from her local control surgery.  January 2017 CT demonstrated an increase in the size of left sided pulmonary nodules with stable right sided pulmonary nodule.      Olimpia is well-appearing today and in good health. No recent acute illness. Labs reviewed from today. Olimpia is a good candidate to undergo general anesthesia for her upcoming left-sided thoracotomy with Dr. Pierre.     Plan:  1) Olimpia will undergo a left-sided thoracotomy on Wednesday, March 8th.  2) Chest CT tomorrow afternoon in prep for surgery  3) Continue Voriconazole until thoracotomy.  4) RTC to be determined by primary team.    Aliyah Mckeon, CNP

## 2017-03-07 ENCOUNTER — HOSPITAL ENCOUNTER (OUTPATIENT)
Dept: CT IMAGING | Facility: CLINIC | Age: 22
Discharge: HOME | End: 2017-03-07
Attending: PEDIATRICS | Admitting: PEDIATRICS
Payer: COMMERCIAL

## 2017-03-07 DIAGNOSIS — C49.9 SYNOVIAL SARCOMA (H): ICD-10-CM

## 2017-03-07 PROCEDURE — T1013 SIGN LANG/ORAL INTERPRETER: HCPCS | Mod: U3

## 2017-03-07 PROCEDURE — 71250 CT THORAX DX C-: CPT

## 2017-03-07 NOTE — PROGRESS NOTES
Pediatric Hematology/Oncology Clinic Note    Oncology History:   Olimpia initially presented with a growth on her right labia in 2013 when she was living in [place]. 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 She discontinued further treatment b/c her physicians were unsure of the exact diagnosis and she felt uncomfortable proceeding. Olimpia subsequently immigrated to the  in August of 2015 and in October she first noticed a recurrence of the mass in the area of previous excision. She was in [place] at that time, seen by oncology and had a port placed but then moved to [place] where she was seen by Dr. Mckeon at [facility] in November. There she underwent an MRI that showed a solid and cystic subcutaneous mass in the right labia measuring 1.7 x 1.6 x 1cm with question of nodular extension vs a second nodule measuring 0.7 cm. There was no invasion into the vagina. On November 16th 2015, Olimpia had a biopsy of the mass by a gynecologist, Dr. Terry, at [facility]. Cytology was reviewed at [place] and read as a SMARCB1-deficient genital sarcoma, likely falling within the overall spectrum of malignant rhabdoid tumor. By immunohistochemistry, the cells shows a complete loss of SMARCb1. Wide spectrum cytokeratin, CD34, WT1, Desmin and CD99 were all negative. RT PCR for Wooten Sarcoma associated fusion transcripts were negative as well. Olimpia went on to have a PET/CT as well which showed multiple pulmonary lesions and biopsy confirmed a lesion to be metastatic disease. Olimpia was seen by Tomás White at [place] who reviewed the diagnosis and potential treatment plans with her, however she prefered to be treated here at [facility]. 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    Olimpia first cycle was complicated by an ileus secondary to vincristine. Her second cycle was complicated by a left pneumothorax. She had a chest tube placed for a short period of time. Her respiratory status improved and she was discharged home.    Olimpia had imaging done following cycles 2 and 4 which showed perhaps improvement in her lung lesions. Her MRI no longer demonstrates a solid mass. She does have more extensive cystic involvement which may be lymphoceles but cannot rule out malignancy.     Olimpia underwent left sided thoracotomy and resection of two pulmonary nodules on 7/20/16. Pathology revealed that one lesion was benign lymphoid tissue and the other was consistent with synovial sarcoma.  This was confirmed with FISH  With 91% of cell examined having a signal pattern indictivate of a rearrangement of the SS18 locus.  Olimpia has now completed 3 cycles of chemotherapy per [id number] and started her radiation on 9/13/16 and completed on 10/17/16.  She then went on to have a resection of her labial mass on 11/16/16 by Jannet Pastrana and Chikis with reconstruction, including skin flaps by Dr. Corona from plastics.  Pathology showed no residual tumor.  She had a f/u chest CT today on 12/5/16 that showed persistance of her pulmonary nodules as well as few additonal pulmonary nodules.  She saw Dr. Pierre who was in agreement with surgical resection, however Olimpia wanted to wait until March to have more time to recover from her last surgery. She continues on voriconazole.      Interim History:  Olimpia was last in clinic at the end of January. She has really enjoyed her break from coming to the clinic and hospital. Olimpia did state that she came in February to have her port flushed. She has had a good appetite. She's passing stool daily. No complaints of any pain. She is voiding without difficulty. No rashes, rhinorrhea, cough, shortness of breath, epistaxis, nor any other  concerns. She's continuing to take voriconzole. Olimpia has had good energy through the day and sleeping well at night. She is concerned about what her next chest scan might look like. History was obtained from Olimpia via professional [place] .     Allergies as of 03/06/2017 - Sheldon as Reviewed 03/06/2017   Allergen Reaction Noted     Heparin flush Other (See Comments) 01/27/2016     Pork derived products  02/09/2016     Tegaderm transparent dressing (informational only) Other (See Comments) and Rash 04/20/2016       Current Outpatient Prescriptions   Medication Sig Dispense Refill     senna-docusate (SENOKOT-S;PERICOLACE) 8.6-50 MG per tablet Take 2 tablets by mouth 2 times daily 100 tablet 1     diltiazem 2% in PLO cream, FV COMPOUNDED, 2% GEL To anal opening three times daily.  Use a pea-sized amount.  Store at room temperature. 60 g 0     HYDROmorphone (DILAUDID) 2 MG tablet Take 1-2 tablets (2-4 mg) by mouth every 3 hours as needed for moderate to severe pain 30 tablet 0     acetaminophen (TYLENOL) 325 MG tablet Take 2 tablets (650 mg) by mouth every 4 hours as needed for mild pain 100 tablet      ibuprofen (ADVIL,MOTRIN) 600 MG tablet Take 1 tablet (600 mg) by mouth every 6 hours as needed for moderate pain 120 tablet      polyethylene glycol (MIRALAX/GLYCOLAX) packet Take 17 g by mouth 2 times daily 7 packet      ondansetron (ZOFRAN) 8 MG tablet Take 1 tablet (8 mg) by mouth every 6 hours as needed for nausea 30 tablet 6     diphenhydrAMINE (BENADRYL) 25 MG tablet Take 1-2 tablets (25-50 mg) by mouth every 6 hours as needed for other (Nausea or vomiting) 60 tablet 3     lidocaine-prilocaine (EMLA) cream Apply to port 30 minutes prior to access 30 g 5     bisacodyl (DULCOLAX) 5 MG EC tablet Take 1 tablet (5 mg) by mouth 2 times daily 30 tablet 2     pantoprazole (PROTONIX) 40 MG enteric coated tablet Take 1 tablet (40 mg) by mouth daily 30 tablet 3     leuprolide (LUPRON DEPOT-PED) 11.25 MG KIT Inject  "11.25 mg into the muscle every 3 months 1 each 1       Past Medical History   Diagnosis Date     Anemia 6/3/2016     Constipation      Extrarenal rhabdoid neoplasm (H)      Febrile neutropenia (H) 4/17/2016     History of blood transfusion      Latent tuberculosis      Lung disease      On antineoplastic chemotherapy      Sarcoma of vulva (H)        Social History     Social History     Marital status:      Spouse name: N/A     Number of children: N/A     Years of education: N/A     Occupational History     Not on file.     Social History Main Topics     Smoking status: Never Smoker     Smokeless tobacco: Never Used     Alcohol use No     Drug use: No     Sexual activity: No     Other Topics Concern     Not on file     Social History Narrative       Family History   Problem Relation Age of Onset     Other Cancer No family hx of      ROS  See HPI. Complete ROS otherwise negative.    Physical Exam   VS: /60 (BP Location: Right arm, Patient Position: Fowlers, Cuff Size: Adult Regular)  Pulse 76  Temp 97.5  F (36.4  C) (Oral)  Resp 16  Ht 1.59 m (5' 2.6\")  Wt 62.4 kg (137 lb 9.1 oz)  SpO2 100%  BMI 24.68 kg/m2    GENERAL: Alert, well appearing young woman in no acute distress.  SKIN: No rashes, lesions, or abnormal pigmentation.  HEAD: Normocephalic, atraumatic     EYES: Normal lids, conjunctivae/cornea normal, mild icterus. PERRL. EOMI.  EARS: Pinna normal b/l, no pain on palpation. External ears normal appearing. TMs without erythema or effusion.  NOSE: Clear, no discharge or congestion  MOUTH/THROAT: Prominent scattered hyperpigmented macules over tongue. Mucous membranes moist.  Posterior oropharynx normal, tonsils are not visible; no erythema or purulent exudate. Normal dentition for age, no mucosal lesions.  NECK: Supple, full range of motion, thyroid is normal, no masses  LYMPH NODES: No cervical lymphadenopathy.  LUNGS: CTAB, no wheezes, crackles.   HEART: Regular rhythm. S1 and S2 are normal. No " murmurs, rubs, gallops. The radial pulses are 2+ bilaterally. Cap refill <3 sec in upper extremities.  ABDOMEN: Normal bowel sounds. Soft, non-tender, non-distended, no masses or hepatosplenomegaly.  NEUROLOGIC: Grossly intact, no focal neurologic deficits.    :  Deferred today.    Results for orders placed or performed in visit on 03/06/17   CBC with platelets differential   Result Value Ref Range    WBC 4.6 4.0 - 11.0 10e9/L    RBC Count 3.77 (L) 3.8 - 5.2 10e12/L    Hemoglobin 12.4 11.7 - 15.7 g/dL    Hematocrit 35.9 35.0 - 47.0 %    MCV 95 78 - 100 fl    MCH 32.9 26.5 - 33.0 pg    MCHC 34.5 31.5 - 36.5 g/dL    RDW 11.4 10.0 - 15.0 %    Platelet Count 184 150 - 450 10e9/L    Diff Method Automated Method     % Neutrophils 57.4 %    % Lymphocytes 27.9 %    % Monocytes 13.2 %    % Eosinophils 1.1 %    % Basophils 0.2 %    % Immature Granulocytes 0.2 %    Nucleated RBCs 0 0 /100    Absolute Neutrophil 2.6 1.6 - 8.3 10e9/L    Absolute Lymphocytes 1.3 0.8 - 5.3 10e9/L    Absolute Monocytes 0.6 0.0 - 1.3 10e9/L    Absolute Eosinophils 0.1 0.0 - 0.7 10e9/L    Absolute Basophils 0.0 0.0 - 0.2 10e9/L    Abs Immature Granulocytes 0.0 0 - 0.4 10e9/L    Absolute Nucleated RBC 0.0        Impression:  Olimpia is a 21-year-old female with 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  Biopsy of labial mass reveals synovial sarcoma as well. Her recent PET CT does not show any new sites of disease (areas in buttocks correspond to Depo-Lupron injection sites).  She is s/p cycle 3 of chemotherapy per [id number]  (2nd and 3rd cycle 75% dosing), radiation and resection of the labial mass with reconstruction.  Pathology  showed no residual tumor.  CT 12/5/16 shows no change in the right sided pulmonary nodules and a new LLL nodule. The plan is to proceed with thoractomy but Olimpia is reluctant to do that before March given that she is just now recovering from her local control surgery.  January 2017 CT demonstrated an increase in the size of left sided pulmonary nodules with stable right sided pulmonary nodule.      Olimpia is well-appearing today and in good health. No recent acute illness. Labs reviewed from today. Olimpia is a good candidate to undergo general anesthesia for her upcoming left-sided thoracotomy with Dr. Pierre.     Plan:  1) Olimpia will undergo a left-sided thoracotomy on Wednesday, March 8th.  2) Chest CT tomorrow afternoon in prep for surgery  3) Continue Voriconazole until thoracotomy.  4) RTC to be determined by primary team.    Aliyah Mckeon, CNP

## 2017-03-08 ENCOUNTER — HOSPITAL ENCOUNTER (INPATIENT)
Facility: CLINIC | Age: 22
LOS: 3 days | Discharge: HOME | DRG: 164 | End: 2017-03-11
Attending: SURGERY | Admitting: SURGERY
Payer: COMMERCIAL

## 2017-03-08 ENCOUNTER — APPOINTMENT (OUTPATIENT)
Dept: GENERAL RADIOLOGY | Facility: CLINIC | Age: 22
DRG: 164 | End: 2017-03-08
Attending: SURGERY
Payer: COMMERCIAL

## 2017-03-08 ENCOUNTER — ANESTHESIA (OUTPATIENT)
Dept: SURGERY | Facility: CLINIC | Age: 22
DRG: 164 | End: 2017-03-08
Payer: COMMERCIAL

## 2017-03-08 ENCOUNTER — SURGERY (OUTPATIENT)
Age: 22
End: 2017-03-08
Payer: COMMERCIAL

## 2017-03-08 DIAGNOSIS — G89.18 ACUTE POST-OPERATIVE PAIN: Primary | ICD-10-CM

## 2017-03-08 DIAGNOSIS — C51.9 SARCOMA OF VULVA (H): ICD-10-CM

## 2017-03-08 DIAGNOSIS — K59.01 SLOW TRANSIT CONSTIPATION: ICD-10-CM

## 2017-03-08 PROBLEM — C79.89: Status: ACTIVE | Noted: 2017-03-08

## 2017-03-08 PROBLEM — C80.1: Status: ACTIVE | Noted: 2017-03-08

## 2017-03-08 LAB
ABO + RH BLD: NORMAL
ABO + RH BLD: NORMAL
BASE EXCESS BLDA CALC-SCNC: 2.5 MMOL/L
BASE EXCESS BLDA CALC-SCNC: 2.9 MMOL/L
BLD GP AB SCN SERPL QL: NORMAL
BLOOD BANK CMNT PATIENT-IMP: NORMAL
CA-I BLD-MCNC: 5 MG/DL (ref 4.4–5.2)
CA-I BLD-MCNC: 5 MG/DL (ref 4.4–5.2)
GLUCOSE BLD-MCNC: 81 MG/DL (ref 70–99)
GLUCOSE BLD-MCNC: 84 MG/DL (ref 70–99)
HCG UR QL: NEGATIVE
HCO3 BLD-SCNC: 26 MMOL/L (ref 21–28)
HCO3 BLD-SCNC: 27 MMOL/L (ref 21–28)
HGB BLD-MCNC: 11.4 G/DL (ref 11.7–15.7)
HGB BLD-MCNC: 11.4 G/DL (ref 11.7–15.7)
O2/TOTAL GAS SETTING VFR VENT: 52 %
O2/TOTAL GAS SETTING VFR VENT: 55 %
PCO2 BLD: 35 MM HG (ref 35–45)
PCO2 BLD: 37 MM HG (ref 35–45)
PH BLD: 7.47 PH (ref 7.35–7.45)
PH BLD: 7.48 PH (ref 7.35–7.45)
PO2 BLD: 101 MM HG (ref 80–105)
PO2 BLD: 180 MM HG (ref 80–105)
POTASSIUM BLD-SCNC: 3 MMOL/L (ref 3.4–5.3)
POTASSIUM BLD-SCNC: 3.2 MMOL/L (ref 3.4–5.3)
SODIUM BLD-SCNC: 142 MMOL/L (ref 133–144)
SODIUM BLD-SCNC: 142 MMOL/L (ref 133–144)
SPECIMEN EXP DATE BLD: NORMAL

## 2017-03-08 PROCEDURE — 87176 TISSUE HOMOGENIZATION CULTR: CPT | Performed by: SURGERY

## 2017-03-08 PROCEDURE — 40000275 ZZH STATISTIC RCP TIME EA 10 MIN

## 2017-03-08 PROCEDURE — 12000014 ZZH R&B PEDS UMMC

## 2017-03-08 PROCEDURE — 36000064 ZZH SURGERY LEVEL 4 EA 15 ADDTL MIN - UMMC: Performed by: SURGERY

## 2017-03-08 PROCEDURE — 37000009 ZZH ANESTHESIA TECHNICAL FEE, EACH ADDTL 15 MIN: Performed by: SURGERY

## 2017-03-08 PROCEDURE — 25000125 ZZHC RX 250: Performed by: ANESTHESIOLOGY

## 2017-03-08 PROCEDURE — 25000128 H RX IP 250 OP 636: Performed by: NURSE PRACTITIONER

## 2017-03-08 PROCEDURE — 25000125 ZZHC RX 250: Performed by: NURSE ANESTHETIST, CERTIFIED REGISTERED

## 2017-03-08 PROCEDURE — 82330 ASSAY OF CALCIUM: CPT | Performed by: SURGERY

## 2017-03-08 PROCEDURE — 71000015 ZZH RECOVERY PHASE 1 LEVEL 2 EA ADDTL HR: Performed by: SURGERY

## 2017-03-08 PROCEDURE — 40000014 ZZH STATISTIC ARTERIAL MONITORING DAILY

## 2017-03-08 PROCEDURE — 81025 URINE PREGNANCY TEST: CPT | Performed by: ANESTHESIOLOGY

## 2017-03-08 PROCEDURE — 27110038 ZZH RX 271

## 2017-03-08 PROCEDURE — 40000940 XR CHEST PORT 1 VW

## 2017-03-08 PROCEDURE — 25000128 H RX IP 250 OP 636: Performed by: STUDENT IN AN ORGANIZED HEALTH CARE EDUCATION/TRAINING PROGRAM

## 2017-03-08 PROCEDURE — 25000128 H RX IP 250 OP 636: Performed by: NURSE ANESTHETIST, CERTIFIED REGISTERED

## 2017-03-08 PROCEDURE — 32505 WEDGE RESECT OF LUNG INITIAL: CPT | Mod: 51 | Performed by: SURGERY

## 2017-03-08 PROCEDURE — 87206 SMEAR FLUORESCENT/ACID STAI: CPT | Performed by: SURGERY

## 2017-03-08 PROCEDURE — 86850 RBC ANTIBODY SCREEN: CPT | Performed by: ANESTHESIOLOGY

## 2017-03-08 PROCEDURE — 0WB80ZZ EXCISION OF CHEST WALL, OPEN APPROACH: ICD-10-PCS | Performed by: SURGERY

## 2017-03-08 PROCEDURE — 32506 WEDGE RESECT OF LUNG ADD-ON: CPT | Performed by: SURGERY

## 2017-03-08 PROCEDURE — 19260: CPT | Mod: 52 | Performed by: SURGERY

## 2017-03-08 PROCEDURE — 25800025 ZZH RX 258: Performed by: ANESTHESIOLOGY

## 2017-03-08 PROCEDURE — 87015 SPECIMEN INFECT AGNT CONCNTJ: CPT | Performed by: SURGERY

## 2017-03-08 PROCEDURE — 82803 BLOOD GASES ANY COMBINATION: CPT | Performed by: SURGERY

## 2017-03-08 PROCEDURE — 21556 EXC NECK TUM DEEP < 5 CM: CPT | Mod: 51 | Performed by: SURGERY

## 2017-03-08 PROCEDURE — 27210794 ZZH OR GENERAL SUPPLY STERILE: Performed by: SURGERY

## 2017-03-08 PROCEDURE — 25000132 ZZH RX MED GY IP 250 OP 250 PS 637: Performed by: STUDENT IN AN ORGANIZED HEALTH CARE EDUCATION/TRAINING PROGRAM

## 2017-03-08 PROCEDURE — 84132 ASSAY OF SERUM POTASSIUM: CPT | Performed by: SURGERY

## 2017-03-08 PROCEDURE — 87102 FUNGUS ISOLATION CULTURE: CPT | Performed by: SURGERY

## 2017-03-08 PROCEDURE — 0BBJ0ZZ EXCISION OF LEFT LOWER LUNG LOBE, OPEN APPROACH: ICD-10-PCS | Performed by: SURGERY

## 2017-03-08 PROCEDURE — 88305 TISSUE EXAM BY PATHOLOGIST: CPT | Mod: 26 | Performed by: SURGERY

## 2017-03-08 PROCEDURE — 36000062 ZZH SURGERY LEVEL 4 1ST 30 MIN - UMMC: Performed by: SURGERY

## 2017-03-08 PROCEDURE — 40000171 ZZH STATISTIC PRE-PROCEDURE ASSESSMENT III: Performed by: SURGERY

## 2017-03-08 PROCEDURE — 02BN0ZZ EXCISION OF PERICARDIUM, OPEN APPROACH: ICD-10-PCS | Performed by: SURGERY

## 2017-03-08 PROCEDURE — C9399 UNCLASSIFIED DRUGS OR BIOLOG: HCPCS | Performed by: NURSE ANESTHETIST, CERTIFIED REGISTERED

## 2017-03-08 PROCEDURE — 87070 CULTURE OTHR SPECIMN AEROBIC: CPT | Performed by: SURGERY

## 2017-03-08 PROCEDURE — 88305 TISSUE EXAM BY PATHOLOGIST: CPT | Performed by: SURGERY

## 2017-03-08 PROCEDURE — 71000014 ZZH RECOVERY PHASE 1 LEVEL 2 FIRST HR: Performed by: SURGERY

## 2017-03-08 PROCEDURE — 86901 BLOOD TYPING SEROLOGIC RH(D): CPT | Performed by: ANESTHESIOLOGY

## 2017-03-08 PROCEDURE — 25000125 ZZHC RX 250

## 2017-03-08 PROCEDURE — 82947 ASSAY GLUCOSE BLOOD QUANT: CPT | Performed by: SURGERY

## 2017-03-08 PROCEDURE — 25000128 H RX IP 250 OP 636: Performed by: ANESTHESIOLOGY

## 2017-03-08 PROCEDURE — 86900 BLOOD TYPING SEROLOGIC ABO: CPT | Performed by: ANESTHESIOLOGY

## 2017-03-08 PROCEDURE — 37000008 ZZH ANESTHESIA TECHNICAL FEE, 1ST 30 MIN: Performed by: SURGERY

## 2017-03-08 PROCEDURE — 84295 ASSAY OF SERUM SODIUM: CPT | Performed by: SURGERY

## 2017-03-08 PROCEDURE — 25000566 ZZH SEVOFLURANE, EA 15 MIN: Performed by: SURGERY

## 2017-03-08 PROCEDURE — 87116 MYCOBACTERIA CULTURE: CPT | Performed by: SURGERY

## 2017-03-08 RX ORDER — PANTOPRAZOLE SODIUM 40 MG/1
40 TABLET, DELAYED RELEASE ORAL DAILY
Status: DISCONTINUED | OUTPATIENT
Start: 2017-03-08 | End: 2017-03-11 | Stop reason: HOSPADM

## 2017-03-08 RX ORDER — LIDOCAINE 40 MG/G
CREAM TOPICAL
Status: DISCONTINUED | OUTPATIENT
Start: 2017-03-08 | End: 2017-03-11 | Stop reason: HOSPADM

## 2017-03-08 RX ORDER — LIDOCAINE HYDROCHLORIDE AND EPINEPHRINE 15; 5 MG/ML; UG/ML
INJECTION, SOLUTION EPIDURAL PRN
Status: DISCONTINUED | OUTPATIENT
Start: 2017-03-08 | End: 2017-03-08

## 2017-03-08 RX ORDER — PROPOFOL 10 MG/ML
INJECTION, EMULSION INTRAVENOUS CONTINUOUS PRN
Status: DISCONTINUED | OUTPATIENT
Start: 2017-03-08 | End: 2017-03-08

## 2017-03-08 RX ORDER — ONDANSETRON 4 MG/1
4 TABLET, ORALLY DISINTEGRATING ORAL EVERY 30 MIN PRN
Status: DISCONTINUED | OUTPATIENT
Start: 2017-03-08 | End: 2017-03-10

## 2017-03-08 RX ORDER — DEXTROSE, SODIUM CHLORIDE, AND POTASSIUM CHLORIDE 5; .2; .15 G/100ML; G/100ML; G/100ML
INJECTION INTRAVENOUS CONTINUOUS
Status: DISCONTINUED | OUTPATIENT
Start: 2017-03-08 | End: 2017-03-08

## 2017-03-08 RX ORDER — NALOXONE HYDROCHLORIDE 0.4 MG/ML
.1-.4 INJECTION, SOLUTION INTRAMUSCULAR; INTRAVENOUS; SUBCUTANEOUS
Status: DISCONTINUED | OUTPATIENT
Start: 2017-03-08 | End: 2017-03-11 | Stop reason: HOSPADM

## 2017-03-08 RX ORDER — NALOXONE HYDROCHLORIDE 0.4 MG/ML
.1-.4 INJECTION, SOLUTION INTRAMUSCULAR; INTRAVENOUS; SUBCUTANEOUS
Status: DISCONTINUED | OUTPATIENT
Start: 2017-03-08 | End: 2017-03-08 | Stop reason: HOSPADM

## 2017-03-08 RX ORDER — SODIUM CHLORIDE 9 MG/ML
INJECTION, SOLUTION INTRAVENOUS
Status: DISPENSED
Start: 2017-03-08 | End: 2017-03-09

## 2017-03-08 RX ORDER — POLYETHYLENE GLYCOL 3350 17 G/17G
17 POWDER, FOR SOLUTION ORAL 2 TIMES DAILY
Status: DISCONTINUED | OUTPATIENT
Start: 2017-03-08 | End: 2017-03-11 | Stop reason: HOSPADM

## 2017-03-08 RX ORDER — LIDOCAINE 40 MG/G
CREAM TOPICAL
Status: COMPLETED | OUTPATIENT
Start: 2017-03-08 | End: 2017-03-08

## 2017-03-08 RX ORDER — CEFAZOLIN SODIUM 1 G/3ML
16.8 INJECTION, POWDER, FOR SOLUTION INTRAMUSCULAR; INTRAVENOUS SEE ADMIN INSTRUCTIONS
Status: DISCONTINUED | OUTPATIENT
Start: 2017-03-08 | End: 2017-03-08 | Stop reason: HOSPADM

## 2017-03-08 RX ORDER — NALOXONE HYDROCHLORIDE 0.4 MG/ML
.1-.4 INJECTION, SOLUTION INTRAMUSCULAR; INTRAVENOUS; SUBCUTANEOUS
Status: DISCONTINUED | OUTPATIENT
Start: 2017-03-08 | End: 2017-03-08

## 2017-03-08 RX ORDER — FENTANYL CITRATE 50 UG/ML
25-50 INJECTION, SOLUTION INTRAMUSCULAR; INTRAVENOUS
Status: DISCONTINUED | OUTPATIENT
Start: 2017-03-08 | End: 2017-03-08 | Stop reason: HOSPADM

## 2017-03-08 RX ORDER — AMOXICILLIN 250 MG
2 CAPSULE ORAL 2 TIMES DAILY
Status: DISCONTINUED | OUTPATIENT
Start: 2017-03-08 | End: 2017-03-11 | Stop reason: HOSPADM

## 2017-03-08 RX ORDER — FENTANYL CITRATE 50 UG/ML
25-50 INJECTION, SOLUTION INTRAMUSCULAR; INTRAVENOUS
Status: DISCONTINUED | OUTPATIENT
Start: 2017-03-08 | End: 2017-03-10

## 2017-03-08 RX ORDER — ONDANSETRON 4 MG/1
4 TABLET, ORALLY DISINTEGRATING ORAL EVERY 6 HOURS PRN
Status: DISCONTINUED | OUTPATIENT
Start: 2017-03-08 | End: 2017-03-11 | Stop reason: HOSPADM

## 2017-03-08 RX ORDER — SODIUM CHLORIDE, SODIUM LACTATE, POTASSIUM CHLORIDE, CALCIUM CHLORIDE 600; 310; 30; 20 MG/100ML; MG/100ML; MG/100ML; MG/100ML
INJECTION, SOLUTION INTRAVENOUS CONTINUOUS
Status: DISCONTINUED | OUTPATIENT
Start: 2017-03-08 | End: 2017-03-08 | Stop reason: HOSPADM

## 2017-03-08 RX ORDER — ONDANSETRON 2 MG/ML
4 INJECTION INTRAMUSCULAR; INTRAVENOUS EVERY 30 MIN PRN
Status: DISCONTINUED | OUTPATIENT
Start: 2017-03-08 | End: 2017-03-10

## 2017-03-08 RX ORDER — PROCHLORPERAZINE 25 MG
25 SUPPOSITORY, RECTAL RECTAL EVERY 12 HOURS PRN
Status: DISCONTINUED | OUTPATIENT
Start: 2017-03-08 | End: 2017-03-08

## 2017-03-08 RX ORDER — DIMENHYDRINATE 50 MG/ML
6.25 INJECTION, SOLUTION INTRAMUSCULAR; INTRAVENOUS
Status: DISCONTINUED | OUTPATIENT
Start: 2017-03-08 | End: 2017-03-11 | Stop reason: HOSPADM

## 2017-03-08 RX ORDER — HYDROMORPHONE HYDROCHLORIDE 1 MG/ML
.3-.5 INJECTION, SOLUTION INTRAMUSCULAR; INTRAVENOUS; SUBCUTANEOUS EVERY 5 MIN PRN
Status: DISCONTINUED | OUTPATIENT
Start: 2017-03-08 | End: 2017-03-10

## 2017-03-08 RX ORDER — PROPOFOL 10 MG/ML
INJECTION, EMULSION INTRAVENOUS PRN
Status: DISCONTINUED | OUTPATIENT
Start: 2017-03-08 | End: 2017-03-08

## 2017-03-08 RX ORDER — ONDANSETRON 2 MG/ML
4 INJECTION INTRAMUSCULAR; INTRAVENOUS EVERY 6 HOURS PRN
Status: DISCONTINUED | OUTPATIENT
Start: 2017-03-08 | End: 2017-03-11 | Stop reason: HOSPADM

## 2017-03-08 RX ORDER — CYCLOBENZAPRINE HCL 10 MG
10 TABLET ORAL 3 TIMES DAILY PRN
Status: DISCONTINUED | OUTPATIENT
Start: 2017-03-08 | End: 2017-03-10

## 2017-03-08 RX ORDER — ACETAMINOPHEN 325 MG/1
650 TABLET ORAL EVERY 8 HOURS
Status: DISCONTINUED | OUTPATIENT
Start: 2017-03-08 | End: 2017-03-09

## 2017-03-08 RX ORDER — ROPIVACAINE HYDROCHLORIDE 5 MG/ML
INJECTION, SOLUTION EPIDURAL; INFILTRATION; PERINEURAL PRN
Status: DISCONTINUED | OUTPATIENT
Start: 2017-03-08 | End: 2017-03-08

## 2017-03-08 RX ORDER — LIDOCAINE HYDROCHLORIDE 20 MG/ML
INJECTION, SOLUTION INFILTRATION; PERINEURAL PRN
Status: DISCONTINUED | OUTPATIENT
Start: 2017-03-08 | End: 2017-03-08

## 2017-03-08 RX ORDER — DIPHENHYDRAMINE HCL 25 MG
25-50 TABLET ORAL EVERY 6 HOURS PRN
Status: DISCONTINUED | OUTPATIENT
Start: 2017-03-08 | End: 2017-03-11 | Stop reason: HOSPADM

## 2017-03-08 RX ORDER — KETOROLAC TROMETHAMINE 15 MG/ML
15 INJECTION, SOLUTION INTRAMUSCULAR; INTRAVENOUS EVERY 6 HOURS
Status: COMPLETED | OUTPATIENT
Start: 2017-03-08 | End: 2017-03-10

## 2017-03-08 RX ORDER — ACETAMINOPHEN 325 MG/1
650 TABLET ORAL EVERY 4 HOURS PRN
Status: DISCONTINUED | OUTPATIENT
Start: 2017-03-08 | End: 2017-03-09

## 2017-03-08 RX ORDER — BISACODYL 5 MG
5 TABLET, DELAYED RELEASE (ENTERIC COATED) ORAL 2 TIMES DAILY
Status: DISCONTINUED | OUTPATIENT
Start: 2017-03-08 | End: 2017-03-11 | Stop reason: HOSPADM

## 2017-03-08 RX ORDER — MEPERIDINE HYDROCHLORIDE 25 MG/ML
12.5 INJECTION INTRAMUSCULAR; INTRAVENOUS; SUBCUTANEOUS EVERY 5 MIN PRN
Status: DISCONTINUED | OUTPATIENT
Start: 2017-03-08 | End: 2017-03-08 | Stop reason: HOSPADM

## 2017-03-08 RX ORDER — PROCHLORPERAZINE MALEATE 5 MG
5-10 TABLET ORAL EVERY 6 HOURS PRN
Status: DISCONTINUED | OUTPATIENT
Start: 2017-03-08 | End: 2017-03-11 | Stop reason: HOSPADM

## 2017-03-08 RX ORDER — KETAMINE HYDROCHLORIDE 100 MG/ML
INJECTION, SOLUTION INTRAMUSCULAR; INTRAVENOUS PRN
Status: DISCONTINUED | OUTPATIENT
Start: 2017-03-08 | End: 2017-03-08

## 2017-03-08 RX ORDER — KETOROLAC TROMETHAMINE 30 MG/ML
INJECTION, SOLUTION INTRAMUSCULAR; INTRAVENOUS PRN
Status: DISCONTINUED | OUTPATIENT
Start: 2017-03-08 | End: 2017-03-08

## 2017-03-08 RX ORDER — IBUPROFEN 600 MG/1
600 TABLET, FILM COATED ORAL EVERY 6 HOURS PRN
Status: DISCONTINUED | OUTPATIENT
Start: 2017-03-08 | End: 2017-03-08

## 2017-03-08 RX ORDER — FLUMAZENIL 0.1 MG/ML
0.2 INJECTION, SOLUTION INTRAVENOUS
Status: DISCONTINUED | OUTPATIENT
Start: 2017-03-08 | End: 2017-03-08 | Stop reason: HOSPADM

## 2017-03-08 RX ADMIN — FENTANYL CITRATE 50 MCG: 50 INJECTION, SOLUTION INTRAMUSCULAR; INTRAVENOUS at 07:26

## 2017-03-08 RX ADMIN — PROPOFOL 200 MG: 10 INJECTION, EMULSION INTRAVENOUS at 07:57

## 2017-03-08 RX ADMIN — PROPOFOL 125 MCG/KG/MIN: 10 INJECTION, EMULSION INTRAVENOUS at 08:02

## 2017-03-08 RX ADMIN — SUGAMMADEX 120 MG: 100 INJECTION, SOLUTION INTRAVENOUS at 10:40

## 2017-03-08 RX ADMIN — KETOROLAC TROMETHAMINE 15 MG: 15 INJECTION, SOLUTION INTRAMUSCULAR; INTRAVENOUS at 16:43

## 2017-03-08 RX ADMIN — ONDANSETRON 4 MG: 2 INJECTION INTRAMUSCULAR; INTRAVENOUS at 10:40

## 2017-03-08 RX ADMIN — HYDROMORPHONE HYDROCHLORIDE: 10 INJECTION, SOLUTION INTRAMUSCULAR; INTRAVENOUS; SUBCUTANEOUS at 11:54

## 2017-03-08 RX ADMIN — FENTANYL CITRATE 150 MCG: 50 INJECTION, SOLUTION INTRAMUSCULAR; INTRAVENOUS at 07:57

## 2017-03-08 RX ADMIN — LIDOCAINE: 40 CREAM TOPICAL at 06:30

## 2017-03-08 RX ADMIN — Medication 50 MG: at 07:57

## 2017-03-08 RX ADMIN — POLYETHYLENE GLYCOL 3350 17 G: 17 POWDER, FOR SOLUTION ORAL at 20:49

## 2017-03-08 RX ADMIN — KETOROLAC TROMETHAMINE 15 MG: 15 INJECTION, SOLUTION INTRAMUSCULAR; INTRAVENOUS at 22:33

## 2017-03-08 RX ADMIN — LIDOCAINE HYDROCHLORIDE 60 MG: 20 INJECTION, SOLUTION INFILTRATION; PERINEURAL at 07:57

## 2017-03-08 RX ADMIN — MIDAZOLAM 1 MG: 1 INJECTION INTRAMUSCULAR; INTRAVENOUS at 07:45

## 2017-03-08 RX ADMIN — KETAMINE HYDROCHLORIDE 600 MG/HR: 100 INJECTION, SOLUTION, CONCENTRATE INTRAMUSCULAR; INTRAVENOUS at 08:02

## 2017-03-08 RX ADMIN — LIDOCAINE HYDROCHLORIDE,EPINEPHRINE BITARTRATE 5 ML: 15; .005 INJECTION, SOLUTION EPIDURAL; INFILTRATION; INTRACAUDAL; PERINEURAL at 07:39

## 2017-03-08 RX ADMIN — SODIUM CHLORIDE 1500 MG: 9 INJECTION, SOLUTION INTRAVENOUS at 08:20

## 2017-03-08 RX ADMIN — Medication 10 MG: at 09:00

## 2017-03-08 RX ADMIN — KETOROLAC TROMETHAMINE 30 MG: 30 INJECTION, SOLUTION INTRAMUSCULAR at 10:30

## 2017-03-08 RX ADMIN — ROPIVACAINE HYDROCHLORIDE 5 ML: 5 INJECTION, SOLUTION EPIDURAL; INFILTRATION; PERINEURAL at 09:26

## 2017-03-08 RX ADMIN — CEFAZOLIN 1000 MG: 1 INJECTION, POWDER, FOR SOLUTION INTRAMUSCULAR; INTRAVENOUS at 10:19

## 2017-03-08 RX ADMIN — PHENYLEPHRINE HYDROCHLORIDE 100 MCG: 10 INJECTION, SOLUTION INTRAMUSCULAR; INTRAVENOUS; SUBCUTANEOUS at 09:09

## 2017-03-08 RX ADMIN — Medication 10 MG: at 09:10

## 2017-03-08 RX ADMIN — SODIUM CHLORIDE: 9 INJECTION, SOLUTION INTRAVENOUS at 15:35

## 2017-03-08 RX ADMIN — MIDAZOLAM 1 MG: 1 INJECTION INTRAMUSCULAR; INTRAVENOUS at 07:26

## 2017-03-08 RX ADMIN — SODIUM CHLORIDE, POTASSIUM CHLORIDE, SODIUM LACTATE AND CALCIUM CHLORIDE: 600; 310; 30; 20 INJECTION, SOLUTION INTRAVENOUS at 07:45

## 2017-03-08 NOTE — ANESTHESIA POSTPROCEDURE EVALUATION
Patient: Olimpia Childress    Procedure(s):  Redo Left Thoracotomy with Excision of Multiple Pleural Metasticies, excision of two chest wall masses  (General with Paravertebral Block) - Wound Class: I-Clean    Diagnosis:Pulmonary Nodules  Diagnosis Additional Information: No value filed.    Anesthesia Type:  General, Periph. Nerve Block for postop pain    Note:  Anesthesia Post Evaluation    Patient location during evaluation: PACU  Patient participation: Able to fully participate in evaluation  Level of consciousness: awake and alert  Pain management: adequate  Airway patency: patent  Cardiovascular status: hemodynamically stable  Respiratory status: spontaneous ventilation  Hydration status: euvolemic  PONV: none             Last vitals:  Vitals:    03/08/17 1245 03/08/17 1300 03/08/17 1315   BP: 102/67 108/69 102/72   Resp: (!) 31 29 27   Temp:  36  C (96.8  F)    SpO2: 100% 100% 100%         Electronically Signed By: Emilee Mitchell MD  March 8, 2017  1:27 PM

## 2017-03-08 NOTE — BRIEF OP NOTE
[facility], [place]    Brief Operative Note    Pre-operative diagnosis: Pulmonary Nodules  Post-operative diagnosis * No post-op diagnosis entered *  Procedure: Procedure(s):  Redo Left Thoracotomy with Excision of Multiple Pleural Metasticies, excision of two chest wall masses  (General with Paravertebral Block) - Wound Class: I-Clean  Surgeon: Surgeon(s) and Role:     * yBron Pierre MD - Primary     * Richard Ambriz MD - Resident - Assisting  Anesthesia: Combined General with Block   Estimated blood loss: 200 ml  Drains: 24fr chest tube to suction.    Specimens:   ID Type Source Tests Collected by Time Destination   A : LEFT CHEST WALL Tissue Chest SURGICAL PATHOLOGY EXAM Byron Pierre MD 3/8/2017  9:24 AM    B : Pericardial Mass Tissue Pericardium SURGICAL PATHOLOGY EXAM Byron Pierre MD 3/8/2017  9:30 AM    C : Left lower lobe wedge resection Tissue Lung, Left Lower Lobe SURGICAL PATHOLOGY EXAM Byron Pierre MD 3/8/2017  9:45 AM    D : Left chest wall (#2) Tissue Chest SURGICAL PATHOLOGY EXAM Byron Pierre MD 3/8/2017  9:52 AM    E : left lower lobe wedge (#2) Tissue Lung, Left Lower Lobe SURGICAL PATHOLOGY EXAM Byron Pierre MD 3/8/2017 10:10 AM      Findings:   Two larger chest wall mets and other smaller mets. Please see operative note for details.   Complications: None  Implants:  None    Richard Ambriz, PGY2  [phone]

## 2017-03-08 NOTE — IP AVS SNAPSHOT
"    [facility] PEDIATRIC MEDICAL SURGICAL UNIT 5: [phone]                                              INTERAGENCY TRANSFER FORM - PHYSICIAN ORDERS   3/8/2017                    Hospital Admission Date: 3/8/2017  DAGOBERTO RIOJAS   : 1995  Sex: Female        Attending Provider: Byron Pierre MD     Allergies:  Heparin Flush, Pork Derived Products, Tegaderm Transparent Dressing (Informational Only)    Infection:  None   Service:  SURGERY    Ht:  1.6 m (5' 3\")   Wt:  61.7 kg (136 lb 0.4 oz)   Admission Wt:  61.7 kg (136 lb 0.4 oz)    BMI:  24.1 kg/m 2   BSA:  1.66 m 2            Patient PCP Information     Provider PCP Type    Coretta Yanez MD, MD General      ED Clinical Impression     Diagnosis Description Comment Added By Time Added    Acute post-operative pain [G89.18] Acute post-operative pain [G89.18]  Marilynn Cat APRN CNP 3/10/2017  3:53 PM    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]  Marilynn Cat APRN CNP 3/10/2017  3:53 PM    Slow transit constipation [K59.01] Slow transit constipation [K59.01]  Marilynn Cat APRN CNP 3/10/2017  3:54 PM      Hospital Problems as of 3/11/2017              Priority Class Noted POA    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) Medium  3/8/2017 Yes      Non-Hospital Problems as of 3/11/2017              Priority Class Noted    Malignant tumor cells (H)   2016    Abdominal pain Medium  2016    Ileus second to Vincristine Medium  2016    Constipation Medium  2016    Pneumothorax on left Medium  2016    Pneumothorax, left Medium  2016    Sarcoma of vulva (H) Medium  3/6/2016    Febrile neutropenia (H) Medium  2016    Anemia Medium  6/3/2016    Chemotherapy-induced neutropenia (H) Medium  2016    Rhabdoid tumor (H) Medium  2016    Synovial sarcoma (H)   2016    Encounter for " antineoplastic chemotherapy Medium  8/9/2016    Emesis, persistent Medium  8/14/2016    Vulvar cancer (H) Medium  11/16/2016    Port-a-cath in place   11/28/2016      Code Status History     Date Active Date Inactive Code Status Order ID Comments User Context    11/22/2016 11:32 AM  Full Code [id number]  Axel Marianne RoseJULIO C serrano CNP Outpatient    11/16/2016  4:40 PM 11/22/2016 11:32 AM Full Code [id number]  Judie Henley MD Inpatient    9/16/2016 11:26 AM 11/16/2016  4:40 PM Full Code [id number]  Lars Manning MD Outpatient    8/27/2016  2:47 PM 9/16/2016 11:26 AM Full Code [id number]  Brandan Kinney MD Outpatient    7/22/2016  7:48 AM 8/27/2016  2:47 PM Full Code [id number]  Mauricio Nichols MD Outpatient    6/30/2016 11:54 AM 7/22/2016  7:48 AM Full Code [id number]  Carine Millan MD Outpatient    6/29/2016  9:28 PM 6/30/2016 11:54 AM Full Code [id number]  Jagdeep Mccloud MD Inpatient    6/6/2016  8:55 AM 6/29/2016  9:28 PM Full Code [id number]  Georgette Parkinson MD Outpatient    4/20/2016 11:29 AM 6/6/2016  8:55 AM Full Code [id number]  Marsha Bonds MD Outpatient    4/17/2016  5:56 PM 4/20/2016 11:29 AM Full Code [id number]  Marsha Bonds MD ED    2/27/2016 12:06 PM 4/17/2016  5:56 PM Full Code [id number]  Tory Hernandez MD Outpatient    2/12/2016 11:39 AM 2/27/2016 12:06 PM Full Code [id number]  Melida Albarado MD Outpatient         Medication Review      START taking        Dose / Directions Comments    oxyCODONE 5 MG IR tablet   Commonly known as:  ROXICODONE   Used for:  Acute post-operative pain        Dose:  5-10 mg   Take 1-2 tablets (5-10 mg) by mouth every 4 hours as needed for moderate to severe pain   Quantity:  20 tablet   Refills:  0          CONTINUE these medications which may have CHANGED, or have new prescriptions. If we are uncertain of the size of tablets/capsules you have at home, strength may be listed as something that might have changed.        Dose / Directions  Comments    * polyethylene glycol Packet   Commonly known as:  MIRALAX/GLYCOLAX   This may have changed:  Another medication with the same name was added. Make sure you understand how and when to take each.   Used for:  Sarcoma of vulva (H)        Dose:  17 g   Take 17 g by mouth 2 times daily   Quantity:  510 g   Refills:  3        * polyethylene glycol Packet   Commonly known as:  MIRALAX/GLYCOLAX   This may have changed:  You were already taking a medication with the same name, and this prescription was added. Make sure you understand how and when to take each.   Used for:  Acute post-operative pain        Dose:  17 g   Take 17 g by mouth daily   Quantity:  7 packet   Refills:  1        * senna-docusate 8.6-50 MG per tablet   Commonly known as:  SENOKOT-S;PERICOLACE   This may have changed:  Another medication with the same name was added. Make sure you understand how and when to take each.   Used for:  Sarcoma of vulva (H)        Dose:  2 tablet   Take 2 tablets by mouth 2 times daily   Quantity:  100 tablet   Refills:  1        * senna-docusate 8.6-50 MG per tablet   Commonly known as:  SENOKOT-S;PERICOLACE   This may have changed:  You were already taking a medication with the same name, and this prescription was added. Make sure you understand how and when to take each.   Used for:  Acute post-operative pain        Dose:  2 tablet   Take 2 tablets by mouth 2 times daily as needed for constipation   Quantity:  30 tablet   Refills:  1        * Notice:  This list has 4 medication(s) that are the same as other medications prescribed for you. Read the directions carefully, and ask your doctor or other care provider to review them with you.      CONTINUE these medications which have NOT CHANGED        Dose / Directions Comments    acetaminophen 325 MG tablet   Commonly known as:  TYLENOL   Used for:  Sarcoma of vulva (H)        Dose:  650 mg   Take 2 tablets (650 mg) by mouth every 4 hours as needed for mild pain    Quantity:  100 tablet   Refills:  1        bisacodyl 5 MG EC tablet   Used for:  Slow transit constipation        Dose:  5 mg   Take 1 tablet (5 mg) by mouth 2 times daily   Quantity:  30 tablet   Refills:  2        diltiazem 2% in PLO cream (FV COMPOUNDED) 2% Gel   Used for:  Anal fissure        To anal opening three times daily.  Use a pea-sized amount.  Store at room temperature.   Quantity:  60 g   Refills:  0        diphenhydrAMINE 25 MG tablet   Commonly known as:  BENADRYL   Used for:  Chemotherapy induced nausea and vomiting        Dose:  25-50 mg   Take 1-2 tablets (25-50 mg) by mouth every 6 hours as needed for other (Nausea or vomiting)   Quantity:  60 tablet   Refills:  3        ibuprofen 600 MG tablet   Commonly known as:  ADVIL/MOTRIN   Used for:  Sarcoma of vulva (H)        Dose:  600 mg   Take 1 tablet (600 mg) by mouth every 6 hours as needed for moderate pain   Quantity:  60 tablet   Refills:  1        leuprolide 11.25 MG Kit kit   Commonly known as:  LUPRON DEPOT-PED   Used for:  Visit for fertility preservation counseling prior to cancer therapy, Malignant tumor cells (H)        Dose:  11.25 mg   Inject 11.25 mg into the muscle every 3 months   Quantity:  1 each   Refills:  1        lidocaine-prilocaine cream   Commonly known as:  EMLA   Used for:  Malignant tumor cells (H)        Apply to port 30 minutes prior to access   Quantity:  30 g   Refills:  5        ondansetron 8 MG tablet   Commonly known as:  ZOFRAN   Used for:  Encounter for antineoplastic chemotherapy        Dose:  8 mg   Take 1 tablet (8 mg) by mouth every 6 hours as needed for nausea   Quantity:  30 tablet   Refills:  6        pantoprazole 40 MG EC tablet   Commonly known as:  PROTONIX   Used for:  Extrarenal rhabdoid neoplasm (H)        Dose:  40 mg   Take 1 tablet (40 mg) by mouth daily   Quantity:  30 tablet   Refills:  3          STOP taking     HYDROmorphone 2 MG tablet   Commonly known as:  DILAUDID                    Summary of Visit     Reason for your hospital stay       Olimpia was admitted for Redo Left Thoracotomy with Excision of Multiple Pleural Metasticies, excision of two chest wall masses.             After Care     Activity       Your activity upon discharge: return to activity gradually, no contact sports, heavy lifting, or strenuous exercise for 4 weeks or as directed at clinic follow up.       Diet       Follow this diet upon discharge: Regular       Wound care and dressings       Instructions to care for your wound at home: Your incision was closed with dissolvable sutures underneath the skin and dermabond (fibrin glue) over the surface. It is ok to shower, sponge bathe or take a shallow bath. Water may run over incision, but no scrubbing. If you would like to keep covered, change bandages every day and keep wound clean and dry.  Do not soak wound in water (pool, spa, sauna, lake, bathtub, etc.) for at least two weeks.  The dressing over the old chest tube site may be removed 48 hrs after the tube was removed. The site may be left open to air, or place a band aid if there is any continued drainage.             Your next 10 appointments already scheduled     Mar 11, 2017  6:00 PM [facility] Inpatient with Ronen Magallanes    Services Department ([facility])    [address] [place] [address]   [phone]            Mar 12, 2017  9:30 AM CDT   [facility] Inpatient with Bandar Montana    Services Department ([facility])    [address] [place] [address]   [phone]            Mar 12, 2017  6:00 PM CDT   [facility] Inpatient with Amrita Yousif    Services Department ([facility])    [address] [place] [address]   [phone]            Mar 13, 2017  9:30 AM CDT    [facility] Inpatient with Kerline Dubon    Services Department ([facility])    [address] [place] [address]   [phone]            Mar 14, 2017  9:30 AM CDT   [facility] Inpatient with Deep Montana    Services Department ([facility])    [address] [place] [address]   [phone]            Apr 04, 2017  1:15 PM CDT   Return Visit with Byron Pierre MD   Peds Surgery ([facility])    [facility]  [address]  [address] [facility] [address]   [phone]              Follow-Up Appointment Instructions     Future Labs/Procedures    Follow Up and recommended labs and tests     Comments:    Follow up with Dr. Pierre, within 2 weeks  to evaluate after surgery and for hospital follow- up.      Follow-Up Appointment Instructions     Follow Up and recommended labs and tests       Follow up with Dr. Pierre, within 2 weeks  to evaluate after surgery and for hospital follow- up.

## 2017-03-08 NOTE — IP AVS SNAPSHOT
[facility] 5    [address]    [place] [address]    Phone:  [phone]                                       After Visit Summary   3/8/2017    Olimpia Childress    MRN: [id number]           After Visit Summary Signature Page     I have received my discharge instructions, and my questions have been answered. I have discussed any challenges I see with this plan with the nurse or doctor.    ..........................................................................................................................................  Patient/Patient Representative Signature      ..........................................................................................................................................  Patient Representative Print Name and Relationship to Patient    ..................................................               ................................................  Date                                            Time    ..........................................................................................................................................  Reviewed by Signature/Title    ...................................................              ..............................................  Date                                                            Time

## 2017-03-08 NOTE — OR NURSING
Dr Mitchell here saw pt ordered to place IV rate to 75 cc hr   Pt remains very sedated, will open eyes on verbal stimulus   VSS.   DR Pineda also here ,  On -Q pump decrease to 10 ml /Hr as per Dr Pineda

## 2017-03-08 NOTE — ANESTHESIA PREPROCEDURE EVALUATION
Anesthesia Evaluation     .        ROS/MED HX    ENT/Pulmonary:     (+), . Other pulmonary disease Multiple metastatic neoplasm, hx/o lt ptx.    Neurologic:  - neg neurologic ROS     Cardiovascular:  - neg cardiovascular ROS       METS/Exercise Tolerance:  4 - Raking leaves, gardening   Hematologic:     (+) Anemia, History of Transfusion no previous transfusion reaction -      Musculoskeletal:  - neg musculoskeletal ROS       GI/Hepatic:         Renal/Genitourinary:  - ROS Renal section negative       Endo:  - neg endo ROS       Psychiatric:  - neg psychiatric ROS       Infectious Disease:  - neg infectious disease ROS       Malignancy:   (+) Malignancy History of Lung and Other  Lung CA Active status post Chemo. Other CA status post Chemo         Other:    (+) No chance of pregnancy              Physical Exam  Normal systems: cardiovascular and dental    Airway   Mallampati: I  TM distance: >3 FB  Neck ROM: full    Dental     Cardiovascular   Rhythm and rate: regular and normal      Pulmonary    breath sounds clear to auscultation               PAC Discussion and Assessment    ASA Classification:   Case is suitable for:   Anesthetic techniques and relevant risks discussed: GA with regional block for post-op pain control  Invasive monitoring and risk discussed:   Types:   Possibility and Risk of blood transfusion discussed:   NPO instructions given:   Additional anesthetic preparation and risks discussed:   Needs early admission to pre-op area:   Other:     PAC Resident/NP Anesthesia Assessment:        Mid-Level Provider/Resident:   Date:   Time:     Attending Anesthesiologist Anesthesia Assessment:        Anesthesiologist:   Date:   Time:   Pass/Fail:   Disposition:     PAC Pharmacist Assessment:        Pharmacist:   Date:   Time:      Anesthesia Plan      History & Physical Review  History and physical reviewed and following examination; no interval change.    ASA Status:  3 .    NPO Status:  > 8 hours    Plan  for General and Periph. Nerve Block for postop pain with Intravenous induction. Maintenance will be Balanced.    PONV prophylaxis:  Ondansetron (or other 5HT-3) and Dexamethasone or Solumedrol  Additional equipment: Double Lumen ETT, Arterial Line, 2nd IV and Fiberoptic bronchoscope GETA +paravertebral catheter per regional anesthesia team  Standard ASA monitors, induction via port, +A.line after induction, large bore PIV after induction  DOT, fiberoptic bronchoscope to verify and reassess appropriate tube position as needed  Possible need for blood product transfusion, postop ICU observation and monitoring d/w patient  Postoperative pain management per periopertative pain service  Potential risks, including, but not limited to airway damage, difficult vascular access, difficult ventilation and impaired oxygenations were discussed with patient with help of . All questions were answered.        Postoperative Care  Postoperative pain management:  Peripheral nerve block (Continuous) and Multi-modal analgesia.      Consents  Anesthetic plan, risks, benefits and alternatives discussed with:  Patient.  Use of blood products discussed: Yes.   Use of blood products discussed with Patient.  Consented to blood products.  .                          .

## 2017-03-08 NOTE — OP NOTE
DATE OF OPERATION:  03/08/2017       PREOPERATIVE DIAGNOSIS:  Metastatic synovial cell sarcoma.      POSTOPERATIVE DIAGNOSIS:  Metastatic synovial cell sarcoma.      PROCEDURE:  Redo left thoracotomy excision of multiple pulmonary metastases, left lower lobe via wedge resection and excision of large chest wall mass x2, left upper chest wall and left lower chest wall and excision of pericardial metastases.      STAFF SURGEON:  Byron Pierre MD.      ANESTHESIA:  General.      PREOPERATIVE NOTE:  Olimpia Childress is a 22-year-old female with metastatic synovial cell sarcoma who has been followed on chemotherapy and has had progression of disease in the left chest, who now presents for surgical excision.  She was apprised of risks, benefits and alternatives to the procedure.  She has appeared to understand and agreed to proceed.      DESCRIPTION OF PROCEDURE:  With the patient in the decubitus position with her left side up, she was prepped and draped in the usual sterile fashion.  Her previous left thoracotomy incision was opened with a scalpel, fascia and muscle layers divided with electrocautery and the chest was entered sharply.  Upon entering the chest, extremely dense adhesions were encountered and these were then taken down sequentially, exposing the entire left thoracic cavity.  There was a large pleural based met in the left upper chest wall that was contiguous with the pericardium and contiguous with the pericardium.  This was excised using blunt and sharp dissection with a reasonable tissue plane.  The communication with the left upper lobe was disinfected with an Endo-PARTH stapler with a vascular load to provide an adequate pulmonary margin.  Then the met on the chest wall was then taken off with blunt and sharp dissection down to the ribs themselves.  In addition, there was an approximately 1.5 cm met along the pericardium and this was excised in toto.  Then the attention was now turned to the left lower  chest wall where there was another large metastasis found that was contiguous with the lung and invading into the chest wall itself.  This had to be at least 5-6 cm in greatest dimension.  A margin again was taken with the Endo-PARTH stapler on the lower lobe lung and it was resected en bloc with electrocautery, again going down to the chest wall including the ribs.  There were obvious positive tumor margins and tumor spillage at the time that this was done.  The tumor was found to be extremely friable as were these metastases.  In addition in the lower lobe, there was another area of pulmonary mets that was identified that was excised with the Endo-PARTH stapler with a vascular load in a wedge fashion.  The thoracic cavity was then irrigated with sterile water to osmotically lyse any spilled tumor cells.  Through a separate stab incision, a 20-Guamanian chest tube was placed and this was secured to the chest wall with 3-0 Ethibond in interrupted fashion.  The chest wall was then closed in layers.  Ribs were then reapproximated with #1 PDS in a figure-of-eight fashion.  Muscle layers were reapproximated with 2-0 PDS in a running fashion.  Subcutaneous tissue was reapproximated with 3-0 PDS in a running fashion.  Skin closed with 4-0 Monocryl in subcuticular fashion and Dermabond as well as an occlusive dressing on the chest tube were then applied.  All sponge and needle counts were correct at the termination of the operative procedure.  Estimated blood loss was approximately 200 mL.  The patient appeared to tolerate the procedure well.         NUVIA STARK MD             D: 2017 12:22   T: 2017 12:44   MT: COOPER      Name:     DAGOBERTO RIOJAS   MRN:      [id number]        Account:        [id number]   :      1995           Procedure Date: 2017      Document: [id number]       cc: Coretta Yanez MD       [facility] Surgery Billing

## 2017-03-08 NOTE — ANESTHESIA PROCEDURE NOTES
Arterial Line Procedure Note  Staff:     Anesthesiologist:  JAYSON JOEL  Location: In OR After Induction  Line Placement:     Procedure:  Arterial Line    Insertion Site:  Radial    Insertion laterality:  Right    Skin Prep: Chloraprep      Patient Prep: patient draped, mask, sterile gloves, hat and hand hygiene      Ultrasound Guided?: No      Catheter size:  20 gauge, 12 cm    Cath secured with: suture      Dressing:  Tegaderm (Surgery to change dressing due to possible reaction.)    Complications:  None obvious    Arterial waveform: Yes      IBP within 10% of NIBP: Yes

## 2017-03-08 NOTE — ANESTHESIA CARE TRANSFER NOTE
"Patient: Olimpia Childress    Procedure(s):  Redo Left Thoracotomy with Excision of Multiple Pleural Metasticies, excision of two chest wall masses  (General with Paravertebral Block) - Wound Class: I-Clean    Diagnosis: Pulmonary Nodules  Diagnosis Additional Information: No value filed.    Anesthesia Type:   General, Periph. Nerve Block for postop pain     Note:  Airway :Face Mask  Patient transferred to:PACU  Comments: /69  Temp 36.4  C (97.5  F) (Oral)  Resp 22  Ht 1.6 m (5' 3\")  Wt 61.7 kg (136 lb 0.4 oz)  SpO2 100%  BMI 24.1 kg/m2    Report given to RN, all questions answered. VSS      Vitals: (Last set prior to Anesthesia Care Transfer)    CRNA VITALS  3/8/2017 1057 - 3/8/2017 1145      3/8/2017             Pulse: 85    SpO2: 100 %    Resp Rate (set): 10                Electronically Signed By: JULIO C Spence CRNA  March 8, 2017  11:45 AM  "

## 2017-03-08 NOTE — PLAN OF CARE
Problem: Goal Outcome Summary  Goal: Goal Outcome Summary  Outcome: No Change  Afebrile post-op vital signs stable.  Patient denied of pain and had small emesis after transferred her the bed.  Chest tube and hernandez are patent.   Paravertebral ( nerve block ) site dressing C/D/I.   and family at bedside.  Continue to monitor and notify MD of any change or concerns.

## 2017-03-08 NOTE — IP AVS SNAPSHOT
MRN:[id number]                      After Visit Summary   3/8/2017    Olimpia Childress    MRN: [id number]           Thank you!     Thank you for choosing [facility] for your care. Our goal is always to provide you with excellent care. Hearing back from our patients is one way we can continue to improve our services. Please take a few minutes to complete the written survey that you may receive in the mail after you visit with us. Thank you!        Patient Information     Date Of Birth          1995        About your hospital stay     You were admitted on:  March 8, 2017 You last received care in the:  [facility] Pediatric Medical Surgical Unit 5    You were discharged on:  March 11, 2017        Reason for your hospital stay       Olimpia was admitted for Redo Left Thoracotomy with Excision of Multiple Pleural Metasticies, excision of two chest wall masses.                  Who to Call     For medical emergencies, please call [phone].  For non-urgent questions about your medical care, please call your primary care provider or clinic, [phone]  For questions related to your surgery, please call your surgery clinic        Attending Provider     Provider Specialty    Byron Pierre MD Pediatric Surgery       Primary Care Provider Office Phone # Fax #    Coretta Yanez [phone]       [address] [address]         When to contact your care team       Call Pediatric Surgery if you have any of the following: temperature greater than 101, increased drainage, redness, swelling or increased pain at your incision.   Pediatric Surgery contact information:    [facility] Appointment scheduling: [place] [phone], [place] [phone], [place] [phone]  Urgent after hours: [phone] ask for pediatric surgeon on call  [facility] ER: [phone]   Pediatric  surgery office: [phone]  Pediatric surgery nurse line: [phone]  ______________________________________________________________________                  After Care Instructions     Activity       Your activity upon discharge: return to activity gradually, no contact sports, heavy lifting, or strenuous exercise for 4 weeks or as directed at clinic follow up.            Diet       Follow this diet upon discharge: Regular            Wound care and dressings       Instructions to care for your wound at home: Your incision was closed with dissolvable sutures underneath the skin and dermabond (fibrin glue) over the surface. It is ok to shower, sponge bathe or take a shallow bath. Water may run over incision, but no scrubbing. If you would like to keep covered, change bandages every day and keep wound clean and dry.  Do not soak wound in water (pool, spa, sauna, lake, bathtub, etc.) for at least two weeks.  The dressing over the old chest tube site may be removed 48 hrs after the tube was removed. The site may be left open to air, or place a band aid if there is any continued drainage.                  Follow-up Appointments     Follow Up and recommended labs and tests       Follow up with Dr. Pierre, within 2 weeks  to evaluate after surgery and for hospital follow- up.                  Your next 10 appointments already scheduled     Mar 11, 2017  6:00 PM [facility] Inpatient with Ronen Magallanes    Services Department ([facility])    [address] [place] [id number] [phone]            Mar 12, 2017  9:30 AM [facility] Inpatient with Bandar Montana    Services Department ([facility])    [address] [place] [id number] [phone]            Mar 12, 2017  6:00 PM [facility] Inpatient with Amrita Yousif     Services Department ([facility])    [address] [place] [id number]   [phone]            Mar 13, 2017  9:30 AM CDT   [facility] Inpatient with Kerline Dubon    Services Department ([facility])    [address] [place] [address]   [phone]            Mar 14, 2017  9:30 AM CDT   [facility] Inpatient with Deep Montana    Services Department ([facility])    [address] [place] [address]   [phone]            Apr 04, 2017  1:15 PM CDT   Return Visit with Byron Pierre MD   Peds Surgery ([facility])    [facility]  [address] [facility]  [address] [facility] [address]   [phone]              Additional Information     If you use hormonal birth control (such as the pill, patch, ring or implants): You'll need a second form of birth control for 7 days (condoms, a diaphragm or contraceptive foam). While in the hospital, you received a medicine called Bridion. Your normal birth control will not work as well for a week after taking this medicine.          Pending Results     Date and Time Order Name Status Description    3/8/2017 1010 Tissue Culture Aerobic Bacterial Preliminary     3/8/2017 1010 Fungus Culture, non-blood Preliminary     3/8/2017 1010 AFB Culture Non Blood Preliminary     3/8/2017 0952 Tissue Culture Aerobic Bacterial Preliminary     3/8/2017 0952 Fungus Culture, non-blood Preliminary     3/8/2017 0952 AFB Culture Non Blood Preliminary     3/8/2017 0945 Tissue Culture Aerobic Bacterial Preliminary     3/8/2017 0945 Fungus Culture, non-blood Preliminary     3/8/2017 0945 AFB Culture Non Blood Preliminary     3/8/2017 0930 Tissue Culture Aerobic Bacterial Preliminary     3/8/2017 0930 Fungus Culture, non-blood Preliminary      "3/8/2017 0930 AFB Culture Non Blood Preliminary     3/8/2017 0924 Tissue Culture Aerobic Bacterial Preliminary     3/8/2017 0924 Fungus Culture, non-blood Preliminary     3/8/2017 0924 AFB Culture Non Blood Preliminary             Statement of Approval     Ordered          17 1140  I have reviewed and agree with all the recommendations and orders detailed in this document.  EFFECTIVE NOW     Approved and electronically signed by:  Tdod Wakefield MD             Admission Information     Date & Time Provider Department Dept. Phone    3/8/2017 Byron Pierre MD [facility] Pediatric Medical Surgical Unit 5 [phone]      Your Vitals Were     Blood Pressure Pulse Temperature Respirations Height Weight    95/59 98 98.5  F (36.9  C) (Oral) 22 1.6 m (5' 3\") 61.7 kg (136 lb 0.4 oz)    Pulse Oximetry BMI (Body Mass Index)                100% 24.1 kg/m2          [email or web address] [email or web address] Information     [employer] lets you send messages to your doctor, view your test results, renew your prescriptions, schedule appointments and more. To sign up, go to [email or web address] . Click on \"Log in\" on the left side of the screen, which will take you to the Welcome page. Then click on \"Sign up Now\" on the right side of the page.     You will be asked to enter the access code listed below, as well as some personal information. Please follow the directions to create your username and password.     Your access code is: [email or web address]  Expires: 2017  9:41 AM     Your access code will  in 90 days. If you need help or a new code, please call your [facility] clinic or [phone].        Care EveryWhere ID     This is your Care EveryWhere ID. This could be used by other organizations to access your [facility] medical records  [id number]           Review of your medicines      START taking        Dose / Directions    oxyCODONE 5 MG IR tablet   Commonly known as:  ROXICODONE   Used for:  Acute " post-operative pain        Dose:  5-10 mg   Take 1-2 tablets (5-10 mg) by mouth every 4 hours as needed for moderate to severe pain   Quantity:  20 tablet   Refills:  0         CONTINUE these medicines which may have CHANGED, or have new prescriptions. If we are uncertain of the size of tablets/capsules you have at home, strength may be listed as something that might have changed.        Dose / Directions    * polyethylene glycol Packet   Commonly known as:  MIRALAX/GLYCOLAX   This may have changed:  Another medication with the same name was added. Make sure you understand how and when to take each.   Used for:  Sarcoma of vulva (H)        Dose:  17 g   Take 17 g by mouth 2 times daily   Quantity:  510 g   Refills:  3       * polyethylene glycol Packet   Commonly known as:  MIRALAX/GLYCOLAX   This may have changed:  You were already taking a medication with the same name, and this prescription was added. Make sure you understand how and when to take each.   Used for:  Acute post-operative pain        Dose:  17 g   Take 17 g by mouth daily   Quantity:  7 packet   Refills:  1       * senna-docusate 8.6-50 MG per tablet   Commonly known as:  SENOKOT-S;PERICOLACE   This may have changed:  Another medication with the same name was added. Make sure you understand how and when to take each.   Used for:  Sarcoma of vulva (H)        Dose:  2 tablet   Take 2 tablets by mouth 2 times daily   Quantity:  100 tablet   Refills:  1       * senna-docusate 8.6-50 MG per tablet   Commonly known as:  SENOKOT-S;PERICOLACE   This may have changed:  You were already taking a medication with the same name, and this prescription was added. Make sure you understand how and when to take each.   Used for:  Acute post-operative pain        Dose:  2 tablet   Take 2 tablets by mouth 2 times daily as needed for constipation   Quantity:  30 tablet   Refills:  1       * Notice:  This list has 4 medication(s) that are the same as other medications  prescribed for you. Read the directions carefully, and ask your doctor or other care provider to review them with you.      CONTINUE these medicines which have NOT CHANGED        Dose / Directions    acetaminophen 325 MG tablet   Commonly known as:  TYLENOL   Used for:  Sarcoma of vulva (H)        Dose:  650 mg   Take 2 tablets (650 mg) by mouth every 4 hours as needed for mild pain   Quantity:  100 tablet   Refills:  1       bisacodyl 5 MG EC tablet   Used for:  Slow transit constipation        Dose:  5 mg   Take 1 tablet (5 mg) by mouth 2 times daily   Quantity:  30 tablet   Refills:  2       diltiazem 2% in PLO cream (FV COMPOUNDED) 2% Gel   Used for:  Anal fissure        To anal opening three times daily.  Use a pea-sized amount.  Store at room temperature.   Quantity:  60 g   Refills:  0       diphenhydrAMINE 25 MG tablet   Commonly known as:  BENADRYL   Used for:  Chemotherapy induced nausea and vomiting        Dose:  25-50 mg   Take 1-2 tablets (25-50 mg) by mouth every 6 hours as needed for other (Nausea or vomiting)   Quantity:  60 tablet   Refills:  3       ibuprofen 600 MG tablet   Commonly known as:  ADVIL/MOTRIN   Used for:  Sarcoma of vulva (H)        Dose:  600 mg   Take 1 tablet (600 mg) by mouth every 6 hours as needed for moderate pain   Quantity:  60 tablet   Refills:  1       leuprolide 11.25 MG Kit kit   Commonly known as:  LUPRON DEPOT-PED   Used for:  Visit for fertility preservation counseling prior to cancer therapy, Malignant tumor cells (H)        Dose:  11.25 mg   Inject 11.25 mg into the muscle every 3 months   Quantity:  1 each   Refills:  1       lidocaine-prilocaine cream   Commonly known as:  EMLA   Used for:  Malignant tumor cells (H)        Apply to port 30 minutes prior to access   Quantity:  30 g   Refills:  5       ondansetron 8 MG tablet   Commonly known as:  ZOFRAN   Used for:  Encounter for antineoplastic chemotherapy        Dose:  8 mg   Take 1 tablet (8 mg) by mouth every 6  hours as needed for nausea   Quantity:  30 tablet   Refills:  6       pantoprazole 40 MG EC tablet   Commonly known as:  PROTONIX   Used for:  Extrarenal rhabdoid neoplasm (H)        Dose:  40 mg   Take 1 tablet (40 mg) by mouth daily   Quantity:  30 tablet   Refills:  3         STOP taking     HYDROmorphone 2 MG tablet   Commonly known as:  DILAUDID                Where to get your medicines      These medications were sent to [facility] [place], [place] - [address]  [address] [address]     Phone:  [phone]     acetaminophen 325 MG tablet    bisacodyl 5 MG EC tablet    ibuprofen 600 MG tablet    polyethylene glycol Packet    polyethylene glycol Packet    senna-docusate 8.6-50 MG per tablet    senna-docusate 8.6-50 MG per tablet         Some of these will need a paper prescription and others can be bought over the counter. Ask your nurse if you have questions.     Bring a paper prescription for each of these medications     oxyCODONE 5 MG IR tablet                Protect others around you: Learn how to safely use, store and throw away your medicines at www.disposemymeds.org.             Medication List: This is a list of all your medications and when to take them. Check marks below indicate your daily home schedule. Keep this list as a reference.      Medications           Morning Afternoon Evening Bedtime As Needed    acetaminophen 325 MG tablet   Commonly known as:  TYLENOL   Take 2 tablets (650 mg) by mouth every 4 hours as needed for mild pain   Last time this was given:  650 mg on 3/11/2017  4:02 AM                                bisacodyl 5 MG EC tablet   Take 1 tablet (5 mg) by mouth 2 times daily   Last time this was given:  5 mg on 3/11/2017  8:25 AM                                diltiazem 2% in PLO cream (FV COMPOUNDED) 2% Gel   To anal opening three times daily.  Use a pea-sized amount.  Store at room temperature.                                 diphenhydrAMINE 25 MG tablet   Commonly known as:  BENADRYL   Take 1-2 tablets (25-50 mg) by mouth every 6 hours as needed for other (Nausea or vomiting)                                ibuprofen 600 MG tablet   Commonly known as:  ADVIL/MOTRIN   Take 1 tablet (600 mg) by mouth every 6 hours as needed for moderate pain                                leuprolide 11.25 MG Kit kit   Commonly known as:  LUPRON DEPOT-PED   Inject 11.25 mg into the muscle every 3 months                                lidocaine-prilocaine cream   Commonly known as:  EMLA   Apply to port 30 minutes prior to access                                ondansetron 8 MG tablet   Commonly known as:  ZOFRAN   Take 1 tablet (8 mg) by mouth every 6 hours as needed for nausea                                oxyCODONE 5 MG IR tablet   Commonly known as:  ROXICODONE   Take 1-2 tablets (5-10 mg) by mouth every 4 hours as needed for moderate to severe pain   Last time this was given:  5 mg on 3/11/2017  8:25 AM                                pantoprazole 40 MG EC tablet   Commonly known as:  PROTONIX   Take 1 tablet (40 mg) by mouth daily   Last time this was given:  40 mg on 3/11/2017  8:25 AM                                * polyethylene glycol Packet   Commonly known as:  MIRALAX/GLYCOLAX   Take 17 g by mouth 2 times daily   Last time this was given:  17 g on 3/11/2017  8:25 AM                                * polyethylene glycol Packet   Commonly known as:  MIRALAX/GLYCOLAX   Take 17 g by mouth daily   Last time this was given:  17 g on 3/11/2017  8:25 AM                                * senna-docusate 8.6-50 MG per tablet   Commonly known as:  SENOKOT-S;PERICOLACE   Take 2 tablets by mouth 2 times daily   Last time this was given:  2 tablets on 3/11/2017  8:25 AM                                * senna-docusate 8.6-50 MG per tablet   Commonly known as:  SENOKOT-S;PERICOLACE   Take 2 tablets by mouth 2 times daily as needed for constipation   Last time this  was given:  2 tablets on 3/11/2017  8:25 AM                                * Notice:  This list has 4 medication(s) that are the same as other medications prescribed for you. Read the directions carefully, and ask your doctor or other care provider to review them with you.

## 2017-03-09 ENCOUNTER — APPOINTMENT (OUTPATIENT)
Dept: PHYSICAL THERAPY | Facility: CLINIC | Age: 22
DRG: 164 | End: 2017-03-09
Attending: SURGERY
Payer: COMMERCIAL

## 2017-03-09 LAB
ANION GAP SERPL CALCULATED.3IONS-SCNC: 7 MMOL/L (ref 3–14)
BASOPHILS # BLD AUTO: 0 10E9/L (ref 0–0.2)
BASOPHILS NFR BLD AUTO: 0.1 %
BUN SERPL-MCNC: 7 MG/DL (ref 7–30)
CALCIUM SERPL-MCNC: 8.1 MG/DL (ref 8.5–10.1)
CHLORIDE SERPL-SCNC: 107 MMOL/L (ref 94–109)
CO2 SERPL-SCNC: 28 MMOL/L (ref 20–32)
COPATH REPORT: NORMAL
CREAT SERPL-MCNC: 0.53 MG/DL (ref 0.52–1.04)
DIFFERENTIAL METHOD BLD: ABNORMAL
EOSINOPHIL # BLD AUTO: 0 10E9/L (ref 0–0.7)
EOSINOPHIL NFR BLD AUTO: 0.1 %
ERYTHROCYTE [DISTWIDTH] IN BLOOD BY AUTOMATED COUNT: 12 % (ref 10–15)
GFR SERPL CREATININE-BSD FRML MDRD: ABNORMAL ML/MIN/1.7M2
GLUCOSE SERPL-MCNC: 123 MG/DL (ref 70–99)
HCT VFR BLD AUTO: 30.4 % (ref 35–47)
HGB BLD-MCNC: 10.4 G/DL (ref 11.7–15.7)
IMM GRANULOCYTES # BLD: 0 10E9/L (ref 0–0.4)
IMM GRANULOCYTES NFR BLD: 0.2 %
LYMPHOCYTES # BLD AUTO: 0.9 10E9/L (ref 0.8–5.3)
LYMPHOCYTES NFR BLD AUTO: 9.7 %
MCH RBC QN AUTO: 33.1 PG (ref 26.5–33)
MCHC RBC AUTO-ENTMCNC: 34.2 G/DL (ref 31.5–36.5)
MCV RBC AUTO: 97 FL (ref 78–100)
MONOCYTES # BLD AUTO: 1.2 10E9/L (ref 0–1.3)
MONOCYTES NFR BLD AUTO: 12.5 %
NEUTROPHILS # BLD AUTO: 7.2 10E9/L (ref 1.6–8.3)
NEUTROPHILS NFR BLD AUTO: 77.4 %
NRBC # BLD AUTO: 0 10*3/UL
NRBC BLD AUTO-RTO: 0 /100
PLATELET # BLD AUTO: 147 10E9/L (ref 150–450)
POTASSIUM SERPL-SCNC: 3.5 MMOL/L (ref 3.4–5.3)
RBC # BLD AUTO: 3.14 10E12/L (ref 3.8–5.2)
SODIUM SERPL-SCNC: 142 MMOL/L (ref 133–144)
WBC # BLD AUTO: 9.3 10E9/L (ref 4–11)

## 2017-03-09 PROCEDURE — 12000014 ZZH R&B PEDS UMMC

## 2017-03-09 PROCEDURE — 40000918 ZZH STATISTIC PT IP PEDS VISIT: Performed by: PHYSICAL THERAPIST

## 2017-03-09 PROCEDURE — 85025 COMPLETE CBC W/AUTO DIFF WBC: CPT | Performed by: STUDENT IN AN ORGANIZED HEALTH CARE EDUCATION/TRAINING PROGRAM

## 2017-03-09 PROCEDURE — 25000132 ZZH RX MED GY IP 250 OP 250 PS 637: Performed by: SURGERY

## 2017-03-09 PROCEDURE — 25000128 H RX IP 250 OP 636: Performed by: STUDENT IN AN ORGANIZED HEALTH CARE EDUCATION/TRAINING PROGRAM

## 2017-03-09 PROCEDURE — 97530 THERAPEUTIC ACTIVITIES: CPT | Mod: GP | Performed by: PHYSICAL THERAPIST

## 2017-03-09 PROCEDURE — 25000132 ZZH RX MED GY IP 250 OP 250 PS 637: Performed by: STUDENT IN AN ORGANIZED HEALTH CARE EDUCATION/TRAINING PROGRAM

## 2017-03-09 PROCEDURE — 97161 PT EVAL LOW COMPLEX 20 MIN: CPT | Mod: GP | Performed by: PHYSICAL THERAPIST

## 2017-03-09 PROCEDURE — 40000894 ZZH STATISTIC OT IP EVAL DEFER: Performed by: OCCUPATIONAL THERAPIST

## 2017-03-09 PROCEDURE — 25000125 ZZHC RX 250: Performed by: SURGERY

## 2017-03-09 PROCEDURE — 25000125 ZZHC RX 250: Performed by: STUDENT IN AN ORGANIZED HEALTH CARE EDUCATION/TRAINING PROGRAM

## 2017-03-09 PROCEDURE — 80048 BASIC METABOLIC PNL TOTAL CA: CPT | Performed by: STUDENT IN AN ORGANIZED HEALTH CARE EDUCATION/TRAINING PROGRAM

## 2017-03-09 PROCEDURE — 36592 COLLECT BLOOD FROM PICC: CPT | Performed by: STUDENT IN AN ORGANIZED HEALTH CARE EDUCATION/TRAINING PROGRAM

## 2017-03-09 RX ORDER — SCOLOPAMINE TRANSDERMAL SYSTEM 1 MG/1
1 PATCH, EXTENDED RELEASE TRANSDERMAL
Status: DISCONTINUED | OUTPATIENT
Start: 2017-03-09 | End: 2017-03-10

## 2017-03-09 RX ORDER — ACETAMINOPHEN 160 MG/5ML
650 LIQUID ORAL EVERY 8 HOURS
Status: DISCONTINUED | OUTPATIENT
Start: 2017-03-09 | End: 2017-03-10 | Stop reason: CLARIF

## 2017-03-09 RX ORDER — ACETAMINOPHEN 160 MG/5ML
650 LIQUID ORAL EVERY 4 HOURS PRN
Status: DISCONTINUED | OUTPATIENT
Start: 2017-03-09 | End: 2017-03-10 | Stop reason: CLARIF

## 2017-03-09 RX ADMIN — SENNOSIDES AND DOCUSATE SODIUM 2 TABLET: 8.6; 5 TABLET ORAL at 08:46

## 2017-03-09 RX ADMIN — POTASSIUM CHLORIDE: 2 INJECTION, SOLUTION, CONCENTRATE INTRAVENOUS at 10:30

## 2017-03-09 RX ADMIN — PANTOPRAZOLE SODIUM 40 MG: 40 TABLET, DELAYED RELEASE ORAL at 08:45

## 2017-03-09 RX ADMIN — RANITIDINE 75 MG: 75 TABLET, FILM COATED ORAL at 19:43

## 2017-03-09 RX ADMIN — BISACODYL 5 MG: 5 TABLET, COATED ORAL at 19:43

## 2017-03-09 RX ADMIN — KETOROLAC TROMETHAMINE 15 MG: 15 INJECTION, SOLUTION INTRAMUSCULAR; INTRAVENOUS at 21:48

## 2017-03-09 RX ADMIN — ONDANSETRON 4 MG: 2 INJECTION INTRAMUSCULAR; INTRAVENOUS at 20:16

## 2017-03-09 RX ADMIN — KETOROLAC TROMETHAMINE 15 MG: 15 INJECTION, SOLUTION INTRAMUSCULAR; INTRAVENOUS at 15:52

## 2017-03-09 RX ADMIN — RANITIDINE 75 MG: 75 TABLET, FILM COATED ORAL at 08:45

## 2017-03-09 RX ADMIN — SCOPALAMINE 1 PATCH: 1 PATCH, EXTENDED RELEASE TRANSDERMAL at 12:31

## 2017-03-09 RX ADMIN — SENNOSIDES AND DOCUSATE SODIUM 2 TABLET: 8.6; 5 TABLET ORAL at 19:43

## 2017-03-09 RX ADMIN — ONDANSETRON 4 MG: 2 INJECTION INTRAMUSCULAR; INTRAVENOUS at 07:09

## 2017-03-09 RX ADMIN — ACETAMINOPHEN 650 MG: 160 SUSPENSION ORAL at 01:32

## 2017-03-09 RX ADMIN — POLYETHYLENE GLYCOL 3350 17 G: 17 POWDER, FOR SOLUTION ORAL at 08:46

## 2017-03-09 RX ADMIN — SODIUM CHLORIDE: 9 INJECTION, SOLUTION INTRAVENOUS at 05:36

## 2017-03-09 RX ADMIN — POTASSIUM CHLORIDE: 2 INJECTION, SOLUTION, CONCENTRATE INTRAVENOUS at 00:10

## 2017-03-09 RX ADMIN — KETOROLAC TROMETHAMINE 15 MG: 15 INJECTION, SOLUTION INTRAMUSCULAR; INTRAVENOUS at 04:36

## 2017-03-09 RX ADMIN — ACETAMINOPHEN 650 MG: 160 SUSPENSION ORAL at 12:33

## 2017-03-09 RX ADMIN — ACETAMINOPHEN 650 MG: 160 SUSPENSION ORAL at 19:43

## 2017-03-09 RX ADMIN — SODIUM CHLORIDE 500 ML: 9 INJECTION, SOLUTION INTRAVENOUS at 07:01

## 2017-03-09 RX ADMIN — POTASSIUM CHLORIDE: 2 INJECTION, SOLUTION, CONCENTRATE INTRAVENOUS at 19:41

## 2017-03-09 RX ADMIN — BISACODYL 5 MG: 5 TABLET, COATED ORAL at 08:54

## 2017-03-09 RX ADMIN — PROCHLORPERAZINE EDISYLATE 5 MG: 5 INJECTION INTRAMUSCULAR; INTRAVENOUS at 10:43

## 2017-03-09 RX ADMIN — KETOROLAC TROMETHAMINE 15 MG: 15 INJECTION, SOLUTION INTRAMUSCULAR; INTRAVENOUS at 10:25

## 2017-03-09 RX ADMIN — POLYETHYLENE GLYCOL 3350 17 G: 17 POWDER, FOR SOLUTION ORAL at 19:42

## 2017-03-09 NOTE — PLAN OF CARE
Problem: Individualization  Goal: Patient Preferences  Outcome: No Change  D/I:  Sleeping between cares.  Diminished lung sounds on left side.  CT and hernandez draining without problems.  Emesis x 2.  Declined antiemetic mid shift but did take at the end of the shift after 2nd emesis.  Giving thumbs up when asked how pain control was.  Using PCA bumps.  Took tylenol when temp 100.9.  Tachycardic first part of the shift.  Down into the 110's after fever down. Pressure slightly low mid shift. MD contacted and NS bolus given and pressure slightly better afterwards.  295 urine out for the night shift. Turned q 2 hours. No other complaints at this time. Drinking clear liquids.   A:  Good pain control.    P:  Encourage more activity this morning and start using the IS during the day.  Encourage up to the chair today per MD recommendation.

## 2017-03-09 NOTE — PLAN OF CARE
Problem: Goal Outcome Summary  Goal: Goal Outcome Summary  Outcome: No Change  Afebrile vital signs stable and neuro intact.  Patient used 6 bumps of her dilaudid PCA.  Patient is also on nerve blocked, scheduled tylenol, and IV Toradol.  Patient resting comfortably. Pain is well controlled per patient.  Lung sounds diminished on her left side.  Chest tube side patent and had 20 cc out put for day shift.  Patient C/O of nausea no emesis noted.  Patient received IV PRN compazine  X 1 with some relief.  Good urine out put and her hernandez and hernandez removed today at 1400.  PT is walking with her right now. Continue to monitor and notify MD of any changes or concerns.

## 2017-03-09 NOTE — PLAN OF CARE
Problem: Goal Outcome Summary  Goal: Goal Outcome Summary  PT Unit 5: Evaluation complete and treatment initiated. Pt currently requiring B UE support for transfers and ambulation to/from bathroom and second person to assist with equipment. Pt reporting pain 0/10 with mobility, but sleepy. PT will follow daily to progress pt's independence with functional mobility to prepare for safe discharge to home.  Rupali Lopez, PT, [phone]

## 2017-03-09 NOTE — PROGRESS NOTES
Surgery Progress Note    Subjective/Interval History:  No respiratory distress. On room air. Some episodes of tachycardia with associated low BPs down to high 80s/40s. Received half a liter bolus of crystalloids in addition to mIVF and responded well. Pain is under control. Using PCA. Adequate UOP.      Objective:  Temp:  [96.8  F (36  C)-100.9  F (38.3  C)] 99.6  F (37.6  C)  Heart Rate:  [] 120  Resp:  [16-31] 23  BP: ()/(44-79) 82/44  MAP:  [85 mmHg-102 mmHg] 91 mmHg  Arterial Line BP: ()/(65-86) 97/86  SpO2:  [96 %-100 %] 99 %    CT output: 365ml serosanguinous post op.     General appearance: in NAD  Pulm: Non-labored breathing; saturating well on RA  Chest: Left CT in place to suction, no air leak, incision c/d/i.  Abd: Soft, NT, ND.  Skin: warm and well-perfused.     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 Now she is s/p repeat left thoracotomy and resection of lung/chestwall nodules 3/9/17.     - Hydromorphone PCA, scheduled acetaminophen and PRN Flexeril.   - No respiratory issues so far. Satting well on RA. Encourage IS use and sitting in chair.   - Left CT to suction. No air leaks. 365ml serosanguinous output.  - Regular diet and PRN antiemetics.   - Cartwright in place   - Path pending.    Staff: Dr. Pierre.    Richard Ambriz, PGY2  Pediatric Surgery  [phone]      Pt seen and examined - Plan as above

## 2017-03-09 NOTE — ANESTHESIA POSTPROCEDURE EVALUATION
Patient: Olimpia Childress    Procedure(s):  Redo Left Thoracotomy with Excision of Multiple Pleural Metasticies, excision of two chest wall masses  (General with Paravertebral Block) - Wound Class: I-Clean    Diagnosis:Pulmonary Nodules  Diagnosis Additional Information: No value filed.    Anesthesia Type:  General, Periph. Nerve Block for postop pain    Note:  Anesthesia Post Evaluation    Last vitals:  Vitals:    03/09/17 0638 03/09/17 0839 03/09/17 1242   BP: 92/47 96/53 105/69   Resp: 17 14 18   Temp:  37  C (98.6  F) 36.9  C (98.5  F)   SpO2: 98% 98% 98%       I visited patient just now in [place]. She has alber doing very well, stable cardiorespiratory status, on RA, OOB, walking. Pain well controlled, no N/V, denies surgical recall.    Electronically Signed By: Emilee Mitchell MD  March 9, 2017  3:30 PM

## 2017-03-09 NOTE — PROGRESS NOTES
Paged by RN to assess patient for blurred vision and feeling dizzy. Patient reports blurred vision unchanged from surgery but feels weak. Remained tachycardic during entire encounter. Denies pain. Denied oral numbness, ringing in ears, feeling lethargic. Did not appear to have other signs of local anesthesia toxicity. Discussed findings with RN with suggestion for fluid bolus.   Leroy Goldberg MD CA1  March 9, 2017 12:38 AM

## 2017-03-09 NOTE — PROGRESS NOTES
03/09/17 1500       Present Yes   Language Mongolian   Living Environment   Number of Stairs to Enter Home 0   Number of Stairs Within Home 0   Functional Level Prior   Usual Activity Tolerance good   Current Activity Tolerance moderate   Age appropriate Yes   Ambulation 0-->independent   Transferring 0-->independent   Toileting 0-->independent   Bathing 0-->independent   Dressing 0-->independent   Cognition 0 - no cognition issues reported   Fall history within last six months no   Which of the above functional risks had a recent onset or change? 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 Now she is s/p repeat left thoracotomy and resection of lung/chestwall nodules 3/9/17.    Parent/Caregiver Involvement Attentive to pt needs   Precautions/Limitations fall precautions  (thoracotomy, chest tube)   Pain Assessment   Patient Currently in Pain No  (reports 0/10 with current pain control)   Cognitive Status Examination   Orientation orientation to person, place and time   Level of Consciousness alert   Follows Commands and Answers Questions 100% of the time;able to follow multistep instructions   Personal Safety and Judgment intact   Behavior   Behavior cooperative   Posture    Posture Comments Gaurded secondary to pain   Range of Motion (ROM)   ROM Comment Pt self limiting trunk ROM s/p surgical intervention   Strength   Strength Comments Observed to be WFLs    Transfer Skills and Mobility   Transfer Sit to Stand/Stand to Sit Transfers   Sit to Stand/Stand to Sit Transfers B UE support and VCs to complete   Bed Mobility Comments VCs for log roll and transition, see treatment note for further details   Gait   Gait Comments B UE support required, slow, small steps.    Balance   Balance Comments SBA for sitting and standing balance secondary to pt's sleepiness and pain medication   General Therapy Interventions   Planned Therapy Interventions Therapeutic Activities   Clinical Impression   Criteria for Skilled Therapeutic Interventions Met yes;treatment indicated   PT Diagnosis Decreased independence with functional mobility   Functional limitations due to impairments impaired mobility   Clinical Presentation Stable/Uncomplicated   Clinical Presentation Rationale Pt presents with impaired mobiltiy s/p surgical intervention with L chest tube in place. Pt reports 0/10 pain with mobility with current pain regimine, but pt is sleepy.   Clinical Decision Making (Complexity) Low complexity   Therapy Frequency daily   Predicted Duration of Therapy Intervention (days/wks) 3 days   Anticipated Discharge Disposition home w/ assist   Risk & Benefits of therapy have been explained Yes   Patient, Family & other staff in agreement with plan of care Yes   Total Evaluation Time   Total Evaluation Time (Minutes) 5

## 2017-03-09 NOTE — PLAN OF CARE
Problem: Goal Outcome Summary  Goal: Goal Outcome Summary  OT_5: Orders for OT eval received. PT following pt to progress activity tolerance and mobility. No acute OT needs at this time, will defer to PT. Thank you for the consult.      Heather Blanca OTR/L  Pager: [phone]

## 2017-03-09 NOTE — ADDENDUM NOTE
Addendum  created 03/09/17 1532 by Emilee Mitchell MD    Procedure Event Log accessed, Sign clinical note

## 2017-03-09 NOTE — PROVIDER NOTIFICATION
Contacted on call surgery MD Richard Ambriz.  Asma remained -130 until after fever of 100.9 went down to 99.6 at 0430. Then was in the 110's.  Blood pressure dropped to 87/46 and 82/44 with the 0430 vitals. Urine out 220 this shift and 400 last shift.  Is drinking fluids in addition to IVF at 100/hour. 500 ml NS bolus ordered.

## 2017-03-09 NOTE — PLAN OF CARE
Problem: Goal Outcome Summary  Goal: Goal Outcome Summary  Outcome: No Change  Pt has been sleepy this shift, neuros intact. Tmax 99.4. HRs elevated 120-130s, surgery team notified, will increase fluids and continue to monitor. OVSS. Complained of 8/10 at beginning of shift, educated on PCA use, has since been denying pain. Six bumps of Diluadid PCA taken, 4 denied. C/o of blurred and double vision, RAPS team notified, came and assessed patient. Cartwright in place, good urine output. Chest tube draining serosanguinous fluid. Refusing to take pill medications, surgery resident aware. Taking sips of water. Port site CDI. Hourly rounding completed. Will continue to monitor and update with changes.

## 2017-03-10 ENCOUNTER — APPOINTMENT (OUTPATIENT)
Dept: PHYSICAL THERAPY | Facility: CLINIC | Age: 22
DRG: 164 | End: 2017-03-10
Attending: SURGERY
Payer: COMMERCIAL

## 2017-03-10 LAB
ACID FAST STN SPEC QL: NORMAL
MICRO REPORT STATUS: NORMAL
SPECIMEN SOURCE: NORMAL

## 2017-03-10 PROCEDURE — 97530 THERAPEUTIC ACTIVITIES: CPT | Mod: GP

## 2017-03-10 PROCEDURE — 25000128 H RX IP 250 OP 636: Performed by: STUDENT IN AN ORGANIZED HEALTH CARE EDUCATION/TRAINING PROGRAM

## 2017-03-10 PROCEDURE — 25800025 ZZH RX 258: Performed by: SURGERY

## 2017-03-10 PROCEDURE — 25000132 ZZH RX MED GY IP 250 OP 250 PS 637: Performed by: STUDENT IN AN ORGANIZED HEALTH CARE EDUCATION/TRAINING PROGRAM

## 2017-03-10 PROCEDURE — 25000132 ZZH RX MED GY IP 250 OP 250 PS 637: Performed by: SURGERY

## 2017-03-10 PROCEDURE — 25000125 ZZHC RX 250: Performed by: SURGERY

## 2017-03-10 PROCEDURE — 40000918 ZZH STATISTIC PT IP PEDS VISIT

## 2017-03-10 PROCEDURE — 25000128 H RX IP 250 OP 636: Performed by: SURGERY

## 2017-03-10 PROCEDURE — 12000014 ZZH R&B PEDS UMMC

## 2017-03-10 RX ORDER — AMOXICILLIN 250 MG
2 CAPSULE ORAL 2 TIMES DAILY
Qty: 100 TABLET | Refills: 1 | Status: SHIPPED | OUTPATIENT
Start: 2017-03-10 | End: 2017-01-01

## 2017-03-10 RX ORDER — HYDROMORPHONE HYDROCHLORIDE 1 MG/ML
.3-.5 INJECTION, SOLUTION INTRAMUSCULAR; INTRAVENOUS; SUBCUTANEOUS
Status: DISCONTINUED | OUTPATIENT
Start: 2017-03-10 | End: 2017-03-10

## 2017-03-10 RX ORDER — ACETAMINOPHEN 325 MG/1
650 TABLET ORAL EVERY 4 HOURS PRN
Qty: 100 TABLET | Refills: 1 | Status: SHIPPED | OUTPATIENT
Start: 2017-03-10 | End: 2018-01-01

## 2017-03-10 RX ORDER — OXYCODONE HYDROCHLORIDE 5 MG/1
5-10 TABLET ORAL EVERY 4 HOURS PRN
Status: DISCONTINUED | OUTPATIENT
Start: 2017-03-10 | End: 2017-03-11 | Stop reason: HOSPADM

## 2017-03-10 RX ORDER — ACETAMINOPHEN 325 MG/1
650 TABLET ORAL EVERY 8 HOURS
Status: DISCONTINUED | OUTPATIENT
Start: 2017-03-10 | End: 2017-03-11 | Stop reason: HOSPADM

## 2017-03-10 RX ORDER — CYCLOBENZAPRINE HCL 5 MG
5-10 TABLET ORAL 3 TIMES DAILY PRN
Status: DISCONTINUED | OUTPATIENT
Start: 2017-03-10 | End: 2017-03-11 | Stop reason: HOSPADM

## 2017-03-10 RX ORDER — BISACODYL 5 MG
5 TABLET, DELAYED RELEASE (ENTERIC COATED) ORAL 2 TIMES DAILY
Qty: 30 TABLET | Refills: 2 | Status: SHIPPED | OUTPATIENT
Start: 2017-03-10 | End: 2017-03-27

## 2017-03-10 RX ORDER — POLYETHYLENE GLYCOL 3350 17 G/17G
17 POWDER, FOR SOLUTION ORAL 2 TIMES DAILY
Qty: 510 G | Refills: 3 | Status: SHIPPED | OUTPATIENT
Start: 2017-03-10 | End: 2017-01-01

## 2017-03-10 RX ORDER — DEXTROSE, SODIUM CHLORIDE, AND POTASSIUM CHLORIDE 5; .2; .15 G/100ML; G/100ML; G/100ML
INJECTION INTRAVENOUS CONTINUOUS
Status: DISCONTINUED | OUTPATIENT
Start: 2017-03-10 | End: 2017-03-11 | Stop reason: HOSPADM

## 2017-03-10 RX ORDER — HYDROMORPHONE HYDROCHLORIDE 1 MG/ML
.3-.5 INJECTION, SOLUTION INTRAMUSCULAR; INTRAVENOUS; SUBCUTANEOUS
Status: DISCONTINUED | OUTPATIENT
Start: 2017-03-10 | End: 2017-03-11

## 2017-03-10 RX ORDER — OXYCODONE HYDROCHLORIDE 5 MG/1
5-10 TABLET ORAL EVERY 4 HOURS PRN
Qty: 20 TABLET | Refills: 0 | Status: SHIPPED | OUTPATIENT
Start: 2017-03-10 | End: 2017-03-17

## 2017-03-10 RX ORDER — IBUPROFEN 600 MG/1
600 TABLET, FILM COATED ORAL EVERY 6 HOURS PRN
Qty: 60 TABLET | Refills: 1 | Status: SHIPPED | OUTPATIENT
Start: 2017-03-10 | End: 2018-01-01

## 2017-03-10 RX ORDER — ACETAMINOPHEN 325 MG/1
650 TABLET ORAL EVERY 4 HOURS PRN
Status: DISCONTINUED | OUTPATIENT
Start: 2017-03-10 | End: 2017-03-10

## 2017-03-10 RX ORDER — IBUPROFEN 200 MG
600 TABLET ORAL EVERY 6 HOURS PRN
Status: DISCONTINUED | OUTPATIENT
Start: 2017-03-10 | End: 2017-03-11 | Stop reason: HOSPADM

## 2017-03-10 RX ADMIN — POLYETHYLENE GLYCOL 3350 17 G: 17 POWDER, FOR SOLUTION ORAL at 08:14

## 2017-03-10 RX ADMIN — SENNOSIDES AND DOCUSATE SODIUM 2 TABLET: 8.6; 5 TABLET ORAL at 20:15

## 2017-03-10 RX ADMIN — KETOROLAC TROMETHAMINE 15 MG: 15 INJECTION, SOLUTION INTRAMUSCULAR; INTRAVENOUS at 04:06

## 2017-03-10 RX ADMIN — BISACODYL 5 MG: 5 TABLET, COATED ORAL at 20:15

## 2017-03-10 RX ADMIN — POTASSIUM CHLORIDE, DEXTROSE MONOHYDRATE AND SODIUM CHLORIDE: 150; 5; 200 INJECTION, SOLUTION INTRAVENOUS at 04:34

## 2017-03-10 RX ADMIN — HYDROMORPHONE HYDROCHLORIDE: 10 INJECTION, SOLUTION INTRAMUSCULAR; INTRAVENOUS; SUBCUTANEOUS at 06:06

## 2017-03-10 RX ADMIN — ACETAMINOPHEN 650 MG: 325 TABLET, FILM COATED ORAL at 12:51

## 2017-03-10 RX ADMIN — ANTICOAGULANT CITRATE DEXTROSE SOLUTION FORMULA A 3 ML: 12.25; 11; 3.65 SOLUTION INTRAVENOUS at 20:20

## 2017-03-10 RX ADMIN — ACETAMINOPHEN 650 MG: 325 TABLET, FILM COATED ORAL at 04:39

## 2017-03-10 RX ADMIN — HYDROMORPHONE HYDROCHLORIDE 0.5 MG: 10 INJECTION, SOLUTION INTRAMUSCULAR; INTRAVENOUS; SUBCUTANEOUS at 17:51

## 2017-03-10 RX ADMIN — RANITIDINE 75 MG: 75 TABLET, FILM COATED ORAL at 08:14

## 2017-03-10 RX ADMIN — KETOROLAC TROMETHAMINE 15 MG: 15 INJECTION, SOLUTION INTRAMUSCULAR; INTRAVENOUS at 10:26

## 2017-03-10 RX ADMIN — PANTOPRAZOLE SODIUM 40 MG: 40 TABLET, DELAYED RELEASE ORAL at 08:14

## 2017-03-10 RX ADMIN — SENNOSIDES AND DOCUSATE SODIUM 2 TABLET: 8.6; 5 TABLET ORAL at 08:14

## 2017-03-10 RX ADMIN — BISACODYL 5 MG: 5 TABLET, COATED ORAL at 08:14

## 2017-03-10 RX ADMIN — ACETAMINOPHEN 650 MG: 325 TABLET, FILM COATED ORAL at 20:14

## 2017-03-10 NOTE — PROGRESS NOTES
Doing better  CT output has decreased significantly    Wound: Dressing dry    Will remove CT  Anticipate discharge this weekend  Wean PCA - Oral pain meds

## 2017-03-10 NOTE — PROVIDER NOTIFICATION
03/09/17 2029   Vitals   Temp 101.6  F (38.7  C)   MD Ramos notified about elevated temperature and tachycardic to 120-130s. Scheduled tylenol given. Plan to continue to monitor urine output, chest tube output, and any status changes. Notified MD with any changes or concerns.

## 2017-03-10 NOTE — PLAN OF CARE
Problem: Individualization  Goal: Patient Preferences  Outcome: Improving  D/I:  Sleeping between cares. Up to the bathroom. Complaining of pain mid shift and received block medication continuing, Tylenol, toradol and took bump on PCA. Sleeping afterwards.  Chest tube serous drainage. Cooperative with movement. Awakens easily.  Tolerating fluids.    A:  Pain controlled.  Not liking some of the sedating effects of the pain medication.   P:  Encourage activity and independence.  Encourage nutrition. Ask about starting oral medications for pain.

## 2017-03-10 NOTE — PLAN OF CARE
Problem: Goal Outcome Summary  Goal: Goal Outcome Summary  Outcome: No Change  Febrile. Tmax= 101.6. Tachycardic 120-130s (see MD note). Lungs clear on right; diminished on left. Pain well controlled with PCA and tylenol. No bumps taken this evening. Voided x 3 post hernandez removal. Ambulating to bathroom without issues. Using IS with encouragement. Nerve block infusing at 10 ml/hr. Zofran x 1 for nausea; no emesis. Able to eat a few bites of oatmeal. No stool. Chest tube had 30 mls of output. Continue to monitor and notify MD of any changes or concerns.      Hourly Rounding Completed.

## 2017-03-10 NOTE — PROGRESS NOTES
Brief supportive check in with Olimpia's cousin Dunia today, as Olimpia was sleeping. Per cousin, Olimpia is happy with the surgery and is hopeful to be discharged this weekend. No immediate SW needs identified at this time. Requested Dunia notify Olimpia that SW stopped by and to contact SW if any needs arise. Social work will continue to assess needs and provide ongoing psychosocial support and access to resources.       BELKIS Salinas, [facility]  Pediatric Hem/Onc   Phone: [phone]  Pager: [phone]

## 2017-03-10 NOTE — ANESTHESIA POST-OP FOLLOW-UP NOTE
PERIOPERATIVE AND INTERVENTIONAL PAIN SERVICE CONTINUOUS NERVE INFUSION NOTE     SUBJECTIVE:  Interval History: Pt reports good pain control via continuous peripheral nerve block (CPNB) infusion. Denies any weakness, paresthesias, circumoral numbness, metallic taste or tinnitus. Pt is ambulating with assistance. Patient is currently without nausea or vomiting. Patient is tolerating a regular diet. Chest tube to be likely removed today. Potential discharge this weekend.        Clinically Aligned Pain Assessment (CAPA):   Comfort (How is your pain?): No pain  Change in Pain (Since your last medication/intervention?): Getting better  Pain Control (How are your pain treatments working?): Fully effective pain control  Functioning (Are you able to do activities to get better?) : Can do everythingI need to   Sleep (Does your pain management allow you to sleep or rest?): Normal sleep        Medications     Anticoagulation: None        OBJECTIVE:     Diagnostic:        Lab Results   Component Value Date     WBC 9.3 03/09/2017            Lab Results   Component Value Date     RBC 3.14 03/09/2017            Lab Results   Component Value Date     HGB 10.4 03/09/2017            Lab Results   Component Value Date     HCT 30.4 03/09/2017            Lab Results   Component Value Date      03/09/2017         Vitals:    Temp: [36.8  C (98.3  F)-38.7  C (101.6  F)] 36.8  C (98.3  F)  Heart Rate: [] 99  Resp: [16-18] 18  BP: ()/(60-72) 97/66  SpO2: [98 %-100 %] 100 %     Exam:   Strength 5/5 and symmetric grossly in bilateral LE  L paravertebral (PV) catheter site with dressing c/d/i, no tenderness, erythema, heme, edema        ASSESSMENT/PLAN:   Olimpia Childress is a 22 year old female POD #1 s/p THORACOTOMY and placement of Left PV catheter for analgesia. Pt is receiving adequate analgesia with ropivacaine 0.2%, total infusion 10mL/hour. Pt is   ambulating without difficulty. No weakness or paresthesias, adequate sensory  block. No evidence of adverse side effects associated with local anesthetic.      - continue current total infusion of 10mL/hour  - will continue to follow and adjust as needed  - expected change of next On-Q pump is this evening/overnight. Patients RN contacted.     Giovani Reid MD  Perioperative and Interventional Pain Service  3/9/2017 3:07 PM  PIPS 24-hour Job Code Pagers (for in-house use only, may text page using [employer])  [place]:  [phone]  [place]:  [phone]  Peds PIPS: [phone]

## 2017-03-10 NOTE — PLAN OF CARE
Problem: Goal Outcome Summary  Goal: Goal Outcome Summary  PT Unit 5: Asma was seen by PT with session focused on progressing general mobility through ambulation 1 lap around the unit, pt tolerating well with CGA graded down to SBA and assist at lines toward end of lap. Pt tolerated all activity well reporting no pain and seated in bedside chair at end of session with board updated to ambulate 3x and get up to sitting in bedside chair. Will continue to follow pt daily.     Jessica Salgado, PT, [phone]

## 2017-03-10 NOTE — PLAN OF CARE
Problem: Goal Outcome Summary  Goal: Goal Outcome Summary  Outcome: Improving  Afebrile, VSS. Rating pain 5/10 after ambulating, controlled with scheduled tordol and tylenol. Nerve block infusing at 10 ml/hr. PCA discontinued. Having small amounts of blood streaked sputum, surgery team aware. No chest tube output this shift. Lung sounds diminished on left side. Using IS with encouragement. Up and walking in halls this shift. Tolerating PO intake. Voiding well, loose stool x1. Hourly rounding completed. Will continue to monitor and update with changes.

## 2017-03-10 NOTE — ANESTHESIA POST-OP FOLLOW-UP NOTE
PERIOPERATIVE AND INTERVENTIONAL PAIN SERVICE CONTINUOUS NERVE INFUSION NOTE    SUBJECTIVE:  Interval History: Pt reports good pain control via continuous peripheral nerve block (CPNB) infusion.  Denies any weakness, paresthesias, circumoral numbness, metallic taste or tinnitus.  Pt is ambulating with assistance.  Patient is currently without nausea or vomiting. Patient is tolerating a regular diet. Chest tube to be likely removed today.  Potential discharge this weekend.       Clinically Aligned Pain Assessment (CAPA):   Comfort (How is your pain?): Negligible pain  Change in Pain (Since your last medication/intervention?): Getting better  Pain Control (How are your pain treatments working?):  Fully effective pain control  Functioning (Are you able to do activities to get better?) : Can do everythingI need to   Sleep (Does your pain management allow you to sleep or rest?): Normal sleep      Medications    Anticoagulation:  None      OBJECTIVE:    Diagnostic:  Lab Results   Component Value Date    WBC 9.3 03/09/2017     Lab Results   Component Value Date    RBC 3.14 03/09/2017     Lab Results   Component Value Date    HGB 10.4 03/09/2017     Lab Results   Component Value Date    HCT 30.4 03/09/2017     Lab Results   Component Value Date     03/09/2017       Vitals:    Temp:  [36.8  C (98.3  F)-38.7  C (101.6  F)] 36.8  C (98.3  F)  Heart Rate:  [] 99  Resp:  [16-18] 18  BP: ()/(60-72) 97/66  SpO2:  [98 %-100 %] 100 %    Exam:    Strength 5/5 and symmetric grossly in bilateral LE   L paravertebral (PV) catheter site with dressing c/d/i, no tenderness, erythema, heme, edema      ASSESSMENT/PLAN:    Olimpia Childress is a 22 year old female POD #2 s/p THORACOTOMY and placement of Left PV catheter for analgesia.  Pt is receiving adequate analgesia with ropivacaine 0.2%, total infusion 10mL/hour.  Pt is   ambulating without difficulty.  No weakness or paresthesias, adequate sensory block.  No evidence of  adverse side effects associated with local anesthetic.     - continue current total infusion of 10mL/hour  - will continue to follow and adjust as needed  - expected change of next On-Q pump is this evening/overnight. Patients RN contacted.       Galen Fields MD  3/10/2017 1:41 PM  24-hour Job Code Pager: [phone] (for in-house use only, do not text page)

## 2017-03-11 ENCOUNTER — APPOINTMENT (OUTPATIENT)
Dept: PHYSICAL THERAPY | Facility: CLINIC | Age: 22
DRG: 164 | End: 2017-03-11
Attending: SURGERY
Payer: COMMERCIAL

## 2017-03-11 VITALS
DIASTOLIC BLOOD PRESSURE: 59 MMHG | HEIGHT: 63 IN | SYSTOLIC BLOOD PRESSURE: 95 MMHG | WEIGHT: 136.02 LBS | BODY MASS INDEX: 24.1 KG/M2 | RESPIRATION RATE: 22 BRPM | OXYGEN SATURATION: 100 % | TEMPERATURE: 98.5 F | HEART RATE: 98 BPM

## 2017-03-11 LAB
ACID FAST STN SPEC QL: NORMAL
MICRO REPORT STATUS: NORMAL
SPECIMEN SOURCE: NORMAL

## 2017-03-11 PROCEDURE — 25000125 ZZHC RX 250: Performed by: STUDENT IN AN ORGANIZED HEALTH CARE EDUCATION/TRAINING PROGRAM

## 2017-03-11 PROCEDURE — 25000132 ZZH RX MED GY IP 250 OP 250 PS 637: Performed by: NURSE PRACTITIONER

## 2017-03-11 PROCEDURE — 40000257 ZZH STATISTIC CONSULT NO CHARGE VASC ACCESS

## 2017-03-11 PROCEDURE — 25000132 ZZH RX MED GY IP 250 OP 250 PS 637: Performed by: STUDENT IN AN ORGANIZED HEALTH CARE EDUCATION/TRAINING PROGRAM

## 2017-03-11 PROCEDURE — 97530 THERAPEUTIC ACTIVITIES: CPT | Mod: GP | Performed by: PHYSICAL THERAPIST

## 2017-03-11 PROCEDURE — 25000132 ZZH RX MED GY IP 250 OP 250 PS 637: Performed by: SURGERY

## 2017-03-11 PROCEDURE — 40000918 ZZH STATISTIC PT IP PEDS VISIT: Performed by: PHYSICAL THERAPIST

## 2017-03-11 PROCEDURE — 40000141 ZZH STATISTIC PERIPHERAL IV START W/O US GUIDANCE

## 2017-03-11 RX ORDER — POLYETHYLENE GLYCOL 3350 17 G/17G
17 POWDER, FOR SOLUTION ORAL DAILY
Qty: 7 PACKET | Refills: 1 | Status: ON HOLD | OUTPATIENT
Start: 2017-03-11 | End: 2018-01-01

## 2017-03-11 RX ORDER — AMOXICILLIN 250 MG
2 CAPSULE ORAL 2 TIMES DAILY PRN
Qty: 30 TABLET | Refills: 1 | Status: ON HOLD | OUTPATIENT
Start: 2017-03-11 | End: 2018-01-01

## 2017-03-11 RX ADMIN — ONDANSETRON 4 MG: 4 TABLET, ORALLY DISINTEGRATING ORAL at 11:21

## 2017-03-11 RX ADMIN — OXYCODONE HYDROCHLORIDE 5 MG: 5 TABLET ORAL at 08:25

## 2017-03-11 RX ADMIN — OXYCODONE HYDROCHLORIDE 5 MG: 5 TABLET ORAL at 00:47

## 2017-03-11 RX ADMIN — BISACODYL 5 MG: 5 TABLET, COATED ORAL at 08:25

## 2017-03-11 RX ADMIN — ACETAMINOPHEN 650 MG: 325 TABLET, FILM COATED ORAL at 12:42

## 2017-03-11 RX ADMIN — ACETAMINOPHEN 650 MG: 325 TABLET, FILM COATED ORAL at 04:02

## 2017-03-11 RX ADMIN — OXYCODONE HYDROCHLORIDE 10 MG: 5 TABLET ORAL at 04:03

## 2017-03-11 RX ADMIN — PANTOPRAZOLE SODIUM 40 MG: 40 TABLET, DELAYED RELEASE ORAL at 08:25

## 2017-03-11 RX ADMIN — POLYETHYLENE GLYCOL 3350 17 G: 17 POWDER, FOR SOLUTION ORAL at 08:25

## 2017-03-11 RX ADMIN — SENNOSIDES AND DOCUSATE SODIUM 2 TABLET: 8.6; 5 TABLET ORAL at 08:25

## 2017-03-11 NOTE — PLAN OF CARE
Problem: Goal Outcome Summary  Goal: Goal Outcome Summary  Outcome: Improving  VS stable.  PRN dilaudid given x1 for pain.  Continues on scheduled tylenol.  Chest tube removed, dressing clean, dry, and intact.  Voiding and stooling well, stool continues to be loose.  Nerve block in place, running at 10 mL/hr.  Port citrate locked.  Good PO intake.  Ambulated around unit x2.  Sister at bedside, attentive to patient.  Continue with plan of care.

## 2017-03-11 NOTE — DISCHARGE SUMMARY
[facility], [place]    Discharge Summary  Surgery    Date of Admission:  3/8/2017  Date of Discharge:  3/11/2017  2:24 PM  Discharging Provider: Richard Ambriz    Discharge Diagnoses   Patient Active Problem List   Diagnosis     Malignant tumor cells (H)     Abdominal pain     Ileus second to Vincristine     Constipation     Pneumothorax on left     Pneumothorax, left     Sarcoma of vulva (H)     Febrile neutropenia (H)     Anemia     Chemotherapy-induced neutropenia (H)     Rhabdoid tumor (H)     Synovial sarcoma (H)     Encounter for antineoplastic chemotherapy     Emesis, persistent     Vulvar cancer (H)     Port-a-cath in place     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    Procedure:  Brief Operative Note     Pre-operative diagnosis: Pulmonary Nodules  Post-operative diagnosis * No post-op diagnosis entered *  Procedure: Procedure(s):  Redo Left Thoracotomy with Excision of Multiple Pleural Metasticies, excision of two chest wall masses  (General with Paravertebral Block) - Wound Class: I-Clean  Surgeon: Surgeon(s) and Role:  * Byron Pierre MD - Primary  * Richard Ambriz MD - Resident - Assisting  Anesthesia: Combined General with Block   Estimated blood loss: 200 ml  Drains: 24fr chest tube to suction.     Specimens:   ID Type Source Tests Collected by Time Destination   A : LEFT CHEST WALL Tissue Chest SURGICAL PATHOLOGY EXAM Byron Pierre MD 3/8/2017 9:24 AM     B : Pericardial Mass Tissue Pericardium SURGICAL PATHOLOGY EXAM Byron Pierre MD 3/8/2017 9:30 AM     C : Left lower lobe wedge resection Tissue Lung, Left  Lower Lobe SURGICAL PATHOLOGY EXAM Byron Pierre MD 3/8/2017 9:45 AM     D : Left chest wall (#2) Tissue Chest SURGICAL PATHOLOGY EXAM Byron Pierre MD 3/8/2017 9:52 AM     E : left lower lobe wedge (#2) Tissue Lung, Left Lower Lobe SURGICAL PATHOLOGY EXAM Byron Pierre MD 3/8/2017 10:10 AM        Findings:   Two larger chest wall mets and other smaller mets. Please see operative note for details.   Complications: None  Implants:  None    Hospital Course   Olimpia Childress underwent the above operation and was admitted post operatively. Her chest tube was kept to suction, and she did not have any respiratory issues post op. Cartwright was removed on POD1. Her pain was managed with a PCA and an OnQ pump. PCA was weaned off and her pain was under control on PO pain meds. The OnQ pump had migrated, so it was removed by anesthesia on the day of discharge. She tolerated a regular diet, and was able to ambulated independently. She was discharged home in a stable condition with plans for return to clinic in 2-4 weeks to see Dr. Pierre.     Primary Care Physician   Coretta Yanez MD    Physical Exam   Vital Signs with Ranges  Temp:  [98.5  F (36.9  C)-99  F (37.2  C)] 98.5  F (36.9  C)  Pulse:  [98] 98  Heart Rate:  [102] 102  Resp:  [20-22] 22  BP: ()/(59-67) 95/59  SpO2:  [100 %] 100 %  I/O last 3 completed shifts:  In: 893.5 [P.O.:840; I.V.:53.5]  Out: 1410 [Urine:1400; Chest Tube:10]    General appearance: in NAD  Pulm: Non-labored breathing; saturating well on RA  Chest: incision c/d/i.  Abd: Soft, NT, ND.  Skin: warm and well-perfused.   Consultations This Hospital Stay   PHYSICAL THERAPY PEDS IP CONSULT  OCCUPATIONAL THERAPY PEDS IP CONSULT    Discharge Orders     Reason for your hospital stay   Olimpia was admitted for Redo Left Thoracotomy with Excision of Multiple Pleural Metasticies, excision of two chest wall masses.     Follow Up and recommended labs and tests   Follow up with   Gabby within 2 weeks  to evaluate after surgery and for hospital follow- up.     Activity   Your activity upon discharge: return to activity gradually, no contact sports, heavy lifting, or strenuous exercise for 4 weeks or as directed at clinic follow up.     When to contact your care team   Call Pediatric Surgery if you have any of the following: temperature greater than 101, increased drainage, redness, swelling or increased pain at your incision.   Pediatric Surgery contact information:    [facility] Appointment scheduling: [place] [phone], [place] [phone], [place] [phone]  Urgent after hours: [phone] ask for pediatric surgeon on call  [facility] ER: [phone]   Pediatric surgery office: [phone]  Pediatric surgery nurse line: [phone]  ______________________________________________________________________     Wound care and dressings   Instructions to care for your wound at home: Your incision was closed with dissolvable sutures underneath the skin and dermabond (fibrin glue) over the surface. It is ok to shower, sponge bathe or take a shallow bath. Water may run over incision, but no scrubbing. If you would like to keep covered, change bandages every day and keep wound clean and dry.  Do not soak wound in water (pool, spa, sauna, lake, bathtub, etc.) for at least two weeks.  The dressing over the old chest tube site may be removed 48 hrs after the tube was removed. The site may be left open to air, or place a band aid if there is any continued drainage.     Diet   Follow this diet upon discharge: Regular       Discharge Medications   Discharge Medication List as of 3/11/2017 11:56 AM      START taking these medications    Details   oxyCODONE (ROXICODONE) 5 MG IR tablet Take 1-2 tablets (5-10 mg) by mouth every 4 hours as needed for moderate to severe pain, Disp-20 tablet, R-0, Local Print         CONTINUE these  medications which have CHANGED    Details   !! polyethylene glycol (MIRALAX/GLYCOLAX) Packet Take 17 g by mouth daily, Disp-7 packet, R-1, E-Prescribe      !! senna-docusate (SENOKOT-S;PERICOLACE) 8.6-50 MG per tablet Take 2 tablets by mouth 2 times daily as needed for constipation, Disp-30 tablet, R-1, E-Prescribe      acetaminophen (TYLENOL) 325 MG tablet Take 2 tablets (650 mg) by mouth every 4 hours as needed for mild pain, Disp-100 tablet, R-1, E-Prescribe      ibuprofen (ADVIL/MOTRIN) 600 MG tablet Take 1 tablet (600 mg) by mouth every 6 hours as needed for moderate pain, Disp-60 tablet, R-1, E-Prescribe      bisacodyl (DULCOLAX) 5 MG EC tablet Take 1 tablet (5 mg) by mouth 2 times daily, Disp-30 tablet, R-2, E-Prescribe      !! polyethylene glycol (MIRALAX/GLYCOLAX) Packet Take 17 g by mouth 2 times daily, Disp-510 g, R-3, E-Prescribe      !! senna-docusate (SENOKOT-S;PERICOLACE) 8.6-50 MG per tablet Take 2 tablets by mouth 2 times daily, Disp-100 tablet, R-1, E-Prescribe       !! - Potential duplicate medications found. Please discuss with provider.      CONTINUE these medications which have NOT CHANGED    Details   diltiazem 2% in PLO cream, FV COMPOUNDED, 2% GEL To anal opening three times daily.  Use a pea-sized amount.  Store at room temperature., Disp-60 g, R-0, Local Print      lidocaine-prilocaine (EMLA) cream Apply to port 30 minutes prior to accessDisp-30 g, [id number]      pantoprazole (PROTONIX) 40 MG enteric coated tablet Take 1 tablet (40 mg) by mouth daily, Disp-30 tablet, R-3, E-Prescribe      ondansetron (ZOFRAN) 8 MG tablet Take 1 tablet (8 mg) by mouth every 6 hours as needed for nausea, Disp-30 tablet, R-6, Fax      diphenhydrAMINE (BENADRYL) 25 MG tablet Take 1-2 tablets (25-50 mg) by mouth every 6 hours as needed for other (Nausea or vomiting), Disp-60 tablet, R-3, Fax      leuprolide (LUPRON DEPOT-PED) 11.25 MG KIT Inject 11.25 mg into the muscle every 3 months, Disp-1 each, R-1, No  Print Out         STOP taking these medications       HYDROmorphone (DILAUDID) 2 MG tablet Comments:   Reason for Stopping:             Allergies   Allergies   Allergen Reactions     Heparin Flush Other (See Comments)     Due to Muslim reasons     Pork Derived Products      Gnosticist reasons     Tegaderm Transparent Dressing (Informational Only) Other (See Comments) and Rash     PLEASE USE IV 3000 FOR DRESSING(S)     Richard Dalalsusanna, PGY2  [phone]     -----    Attending Attestation:  March 11, 2017    Olimpia Childress was seen and examined with team. I agree with note and plan as discussed.    Doing well, making steady progress, eager for discharge; family updated and comfortable with plan; will follow with Dr. Pierre and Oncology as arranged.      Todd Wakefield MD, PhD  Division of Pediatric Surgery, [facility] [phone]

## 2017-03-11 NOTE — PHARMACY-MEDICATION TEACHING
Pharmacy discharge medication teaching offered but the nurse had already completed teaching.      The following medications were reviewed for discharge:  Current Discharge Medication List      START taking these medications    Details   oxyCODONE (ROXICODONE) 5 MG IR tablet Take 1-2 tablets (5-10 mg) by mouth every 4 hours as needed for moderate to severe pain  Qty: 20 tablet, Refills: 0    Associated Diagnoses: Acute post-operative pain         CONTINUE these medications which have CHANGED    Details   !! polyethylene glycol (MIRALAX/GLYCOLAX) Packet Take 17 g by mouth daily  Qty: 7 packet, Refills: 1    Associated Diagnoses: Acute post-operative pain      !! senna-docusate (SENOKOT-S;PERICOLACE) 8.6-50 MG per tablet Take 2 tablets by mouth 2 times daily as needed for constipation  Qty: 30 tablet, Refills: 1    Associated Diagnoses: Acute post-operative pain      acetaminophen (TYLENOL) 325 MG tablet Take 2 tablets (650 mg) by mouth every 4 hours as needed for mild pain  Qty: 100 tablet, Refills: 1    Associated Diagnoses: Sarcoma of vulva (H)      ibuprofen (ADVIL/MOTRIN) 600 MG tablet Take 1 tablet (600 mg) by mouth every 6 hours as needed for moderate pain  Qty: 60 tablet, Refills: 1    Associated Diagnoses: Sarcoma of vulva (H)      bisacodyl (DULCOLAX) 5 MG EC tablet Take 1 tablet (5 mg) by mouth 2 times daily  Qty: 30 tablet, Refills: 2    Associated Diagnoses: Slow transit constipation      !! polyethylene glycol (MIRALAX/GLYCOLAX) Packet Take 17 g by mouth 2 times daily  Qty: 510 g, Refills: 3    Associated Diagnoses: Sarcoma of vulva (H)      !! senna-docusate (SENOKOT-S;PERICOLACE) 8.6-50 MG per tablet Take 2 tablets by mouth 2 times daily  Qty: 100 tablet, Refills: 1    Associated Diagnoses: Sarcoma of vulva (H)       !! - Potential duplicate medications found. Please discuss with provider.      CONTINUE these medications which have NOT CHANGED    Details   diltiazem 2% in PLO cream, FV COMPOUNDED, 2%  GEL To anal opening three times daily.  Use a pea-sized amount.  Store at room temperature.  Qty: 60 g, Refills: 0    Associated Diagnoses: Anal fissure      lidocaine-prilocaine (EMLA) cream Apply to port 30 minutes prior to access  Qty: 30 g, Refills: 5    Associated Diagnoses: Malignant tumor cells (H)      pantoprazole (PROTONIX) 40 MG enteric coated tablet Take 1 tablet (40 mg) by mouth daily  Qty: 30 tablet, Refills: 3    Associated Diagnoses: Extrarenal rhabdoid neoplasm (H)      ondansetron (ZOFRAN) 8 MG tablet Take 1 tablet (8 mg) by mouth every 6 hours as needed for nausea  Qty: 30 tablet, Refills: 6    Associated Diagnoses: Encounter for antineoplastic chemotherapy      diphenhydrAMINE (BENADRYL) 25 MG tablet Take 1-2 tablets (25-50 mg) by mouth every 6 hours as needed for other (Nausea or vomiting)  Qty: 60 tablet, Refills: 3    Associated Diagnoses: Chemotherapy induced nausea and vomiting      leuprolide (LUPRON DEPOT-PED) 11.25 MG KIT Inject 11.25 mg into the muscle every 3 months  Qty: 1 each, Refills: 1    Associated Diagnoses: Visit for fertility preservation counseling prior to cancer therapy; Malignant tumor cells (H)         STOP taking these medications       HYDROmorphone (DILAUDID) 2 MG tablet Comments:   Reason for Stopping:             Jessy Quiros, Pharm.D.  Medication Discharge Teaching Pharmacist  [facility]  Pager:  [phone]  Phone: [phone]

## 2017-03-11 NOTE — PLAN OF CARE
Problem: Goal Outcome Summary  Goal: Goal Outcome Summary  Outcome: Adequate for Discharge Date Met:  03/11/17  Pt OK to D/C. VSS. Pt's pain managed with PRN Oxycodone and scheduled Tylenol. Pt ambulated in hallway x1 with PT. Zofran x1 for nausea. Otherwise tolerating PO intake. Discharge instructions reviewed with patient and sister. Questions were answered at this time. Patient and sister verbalized understanding. Discharge medications and follow-up appointment reviewed. Patient accompanied by sister in wheelchair. Port citrate locked per order and de-accessed without issues.

## 2017-03-11 NOTE — PLAN OF CARE
Problem: Goal Outcome Summary  Goal: Goal Outcome Summary  PT Unit 5: Patient seen for PT session to progress functional mobility.  Required significant encouragement to participate in session.  Able to ambulate x 2 laps in hallway with SBA progressing to independent.  Required direction to carry own catheter, and to lift own legs into bed (wanted PT to do these things).  Patient safe to dc home from mobility standpoint when medically ready.  Will have 24 hour assist and is safe with ambulation.

## 2017-03-11 NOTE — PLAN OF CARE
Problem: Goal Outcome Summary  Goal: Goal Outcome Summary  Outcome: Improving  VSS. Pt appeared to sleep between cares. Pt c/o pain 1-8/10. Oxycodone X2 with good result. Pt everardo PO well and had good u/o. Continue to monitor.

## 2017-03-11 NOTE — ANESTHESIA POST-OP FOLLOW-UP NOTE
PERIOPERATIVE AND INTERVENTIONAL PAIN SERVICE CONTINUOUS NERVE INFUSION NOTE     SUBJECTIVE:  Interval History: Pt reports good pain control via continuous peripheral nerve block (CPNB) infusion. Denies any weakness, paresthesias, circumoral numbness, metallic taste or tinnitus. Pt is ambulating with assistance. Patient is currently without nausea or vomiting. Patient is tolerating a regular diet. Chest tube to be likely removed today. Potential discharge this weekend.        Clinically Aligned Pain Assessment (CAPA):   Comfort (How is your pain?): Negligible pain  Change in Pain (Since your last medication/intervention?): Getting better  Pain Control (How are your pain treatments working?): Fully effective pain control  Functioning (Are you able to do activities to get better?) : Can do everythingI need to   Sleep (Does your pain management allow you to sleep or rest?): Normal sleep     Anticoagulation: None       Vitals:    Temp: [36.8  C (98.3  F)-38.7  C (101.6  F)] 36.8  C (98.3  F)  Heart Rate: [] 99  Resp: [16-18] 18  BP: ()/(60-72) 97/66  SpO2: [98 %-100 %] 100 %     Exam:   Strength 5/5 and symmetric grossly in bilateral LE  L paravertebral (PV) catheter site with dressing c/d/i, no tenderness, erythema, heme, edema        ASSESSMENT/PLAN:   Olimpia Childress is a 22 year old female POD #3 s/p THORACOTOMY and placement of Left PV catheter for analgesia. Pt is receiving adequate analgesia with ropivacaine 0.2%, total infusion 10mL/hour. Pt is   ambulating without difficulty. No weakness or paresthesias, adequate sensory block. No evidence of adverse side effects associated with local anesthetic.      - continue current total infusion of 10mL/hour  - [facility]'ed the [facility].  - Primary can control her pain with pain medications.    Luis Martinez MD  [facility] pain service

## 2017-03-11 NOTE — ADDENDUM NOTE
Addendum  created 03/11/17 1134 by Luis Martinez MD    Anesthesia Event edited, Pend clinical note, Procedure Event Log accessed, Sign clinical note

## 2017-03-11 NOTE — PROGRESS NOTES
03/11/17 1406   Visit Information   Visit Made By Staff    Type of Visit Initial   Visited Patient;Family   Interventions   Basic Spiritual Interventions    introduction/orientation to Spiritual Health Services;Assessment of spiritual needs/resources;Reflective conversation;Life Review;Prayer;Devotional reading   Provision of Spiritual Resources  Hoahaoism article(s)/object(s) of devotion   Advanced Assessments/Interventions   Presenting Concerns/Issues Spiritual/Sabianism/emotional support   SPIRITUAL HEALTH SERVICES Progress Note  [facility] ( [facility] UR5         DATA:    Visited with pt/family on the basis of  requested for spiritual support of pt/family.  Reflected with pt/family around their hospital experience, sources of spiritual and emotional support and current spiritual health needs. Pt talked about her current illness experience and what it means for her. Pt was in her room, sitting down on her bed. One of her relative was with her in the room. She expressed important of her anant and she said  Allah is with me and support me my journey. We discussed her illness circumstances and what she hopes for. Pt expressed that she is in God s hands. She understands the serious nature of her illness but is very hopeful.      INTERVENTION:    Emotional support. Reflective conversation integrating illness elements and family spiritual narratives. Provision of Qu'ran. Pt was encouraged to remain active in her relationship to God, prayer and gratitude of Gods will.       OUTCOME:    Pt/family received spiritual support and reflective conversation in the context of this hospitalization. Pt expressed for an appreciation for the visit and the encouragement she felt that she has God s support in her struggle.      PLAN:    Will continue to provide support to pt/family during their hospitalization at least 1x/wk.                                                                                                                                              Edi Laird  Staff   Pager [phone]

## 2017-03-13 LAB
BACTERIA SPEC CULT: NO GROWTH
MICRO REPORT STATUS: NORMAL
SPECIMEN SOURCE: NORMAL

## 2017-03-15 ENCOUNTER — TELEPHONE (OUTPATIENT)
Dept: PEDIATRIC HEMATOLOGY/ONCOLOGY | Facility: CLINIC | Age: 22
End: 2017-03-15

## 2017-03-15 NOTE — TELEPHONE ENCOUNTER
SW received call from [facility]  stating that Olimpia called to cancel her appointment tomorrow, though sounded tearful and distressed on the phone. SW attempted to contact Olimpia (phone  not available at the time) to discuss this further and offer her support. No answer, left VM. Will f/u again in AM. Notified Dr. Yanez of potential cancellation. Social work will continue to assess needs and provide ongoing psychosocial support and access to resources.       BELKIS Salinas, [facility]  Pediatric Hem/Onc   Phone: [phone]  Pager: [phone]

## 2017-03-17 DIAGNOSIS — G89.18 ACUTE POST-OPERATIVE PAIN: ICD-10-CM

## 2017-03-17 RX ORDER — OXYCODONE HYDROCHLORIDE 5 MG/1
5-10 TABLET ORAL EVERY 4 HOURS PRN
Qty: 20 TABLET | Refills: 0 | Status: SHIPPED | OUTPATIENT
Start: 2017-03-17 | End: 2017-03-23

## 2017-03-23 ENCOUNTER — OFFICE VISIT (OUTPATIENT)
Dept: PEDIATRIC HEMATOLOGY/ONCOLOGY | Facility: CLINIC | Age: 22
End: 2017-03-23
Attending: NURSE PRACTITIONER
Payer: COMMERCIAL

## 2017-03-23 ENCOUNTER — HOSPITAL ENCOUNTER (OUTPATIENT)
Dept: GENERAL RADIOLOGY | Facility: CLINIC | Age: 22
Discharge: HOME | End: 2017-03-23
Attending: NURSE PRACTITIONER | Admitting: NURSE PRACTITIONER
Payer: COMMERCIAL

## 2017-03-23 VITALS
WEIGHT: 130.73 LBS | SYSTOLIC BLOOD PRESSURE: 112 MMHG | RESPIRATION RATE: 22 BRPM | OXYGEN SATURATION: 99 % | DIASTOLIC BLOOD PRESSURE: 72 MMHG | HEIGHT: 63 IN | HEART RATE: 95 BPM | BODY MASS INDEX: 23.16 KG/M2 | TEMPERATURE: 97.7 F

## 2017-03-23 DIAGNOSIS — C49.9 SYNOVIAL SARCOMA (H): ICD-10-CM

## 2017-03-23 DIAGNOSIS — G89.18 ACUTE POST-OPERATIVE PAIN: ICD-10-CM

## 2017-03-23 LAB
BASOPHILS # BLD AUTO: 0 10E9/L (ref 0–0.2)
BASOPHILS NFR BLD AUTO: 0.4 %
DIFFERENTIAL METHOD BLD: ABNORMAL
EOSINOPHIL # BLD AUTO: 0.1 10E9/L (ref 0–0.7)
EOSINOPHIL NFR BLD AUTO: 1.5 %
ERYTHROCYTE [DISTWIDTH] IN BLOOD BY AUTOMATED COUNT: 12.2 % (ref 10–15)
HCT VFR BLD AUTO: 32.8 % (ref 35–47)
HGB BLD-MCNC: 11.6 G/DL (ref 11.7–15.7)
IMM GRANULOCYTES # BLD: 0.1 10E9/L (ref 0–0.4)
IMM GRANULOCYTES NFR BLD: 1.3 %
LYMPHOCYTES # BLD AUTO: 0.9 10E9/L (ref 0.8–5.3)
LYMPHOCYTES NFR BLD AUTO: 19 %
MCH RBC QN AUTO: 33.2 PG (ref 26.5–33)
MCHC RBC AUTO-ENTMCNC: 35.4 G/DL (ref 31.5–36.5)
MCV RBC AUTO: 94 FL (ref 78–100)
MONOCYTES # BLD AUTO: 0.6 10E9/L (ref 0–1.3)
MONOCYTES NFR BLD AUTO: 12.6 %
NEUTROPHILS # BLD AUTO: 3 10E9/L (ref 1.6–8.3)
NEUTROPHILS NFR BLD AUTO: 65.2 %
NRBC # BLD AUTO: 0 10*3/UL
NRBC BLD AUTO-RTO: 0 /100
PLATELET # BLD AUTO: 252 10E9/L (ref 150–450)
RADIOLOGIST FLAGS: ABNORMAL
RBC # BLD AUTO: 3.49 10E12/L (ref 3.8–5.2)
WBC # BLD AUTO: 4.5 10E9/L (ref 4–11)

## 2017-03-23 PROCEDURE — 85025 COMPLETE CBC W/AUTO DIFF WBC: CPT | Performed by: NURSE PRACTITIONER

## 2017-03-23 PROCEDURE — 99213 OFFICE O/P EST LOW 20 MIN: CPT | Mod: ZF

## 2017-03-23 PROCEDURE — 71020 XR CHEST 2 VW: CPT

## 2017-03-23 PROCEDURE — 36416 COLLJ CAPILLARY BLOOD SPEC: CPT | Performed by: NURSE PRACTITIONER

## 2017-03-23 RX ORDER — OXYCODONE HYDROCHLORIDE 5 MG/1
5-10 TABLET ORAL EVERY 4 HOURS PRN
Qty: 20 TABLET | Refills: 0 | Status: ON HOLD | OUTPATIENT
Start: 2017-03-23 | End: 2018-01-01

## 2017-03-23 ASSESSMENT — PAIN SCALES - GENERAL: PAINLEVEL: MODERATE PAIN (4)

## 2017-03-23 NOTE — MR AVS SNAPSHOT
After Visit Summary   3/23/2017    Olimpia Childress    MRN: [id number]           Patient Information     Date Of Birth          1995        Visit Information        Provider Department      3/23/2017 10:45 AM Arie Dubon Alexis Rupp, JULIO C POSADA Peds Hematology Oncology        Today's Diagnoses     Synovial sarcoma (H)        Acute post-operative pain              [facility]   [facility], 9th floor  [address] [place] [address]  Phone: [phone]  Clinic Hours:   Monday-Friday:   7 am to 5:00 pm   closed weekends and major  holidays     If your fever is 100.5  or greater,   call the clinic during business hours.   After hours call [phone] and ask for the pediatric hematology / oncology physician to be paged for you.               Follow-ups after your visit        Your next 10 appointments already scheduled     Apr 04, 2017  1:15 PM CDT   Return Visit with Byron Pierre MD   Peds Surgery ([facility])    [address], 3rd [address]  [facility] [address] [id number] [phone]              Future tests that were ordered for you today     Open Future Orders        Priority Expected Expires Ordered    X-ray Chest 2 vws* Routine 3/27/2017 3/23/2018 3/23/2017    X-ray Chest 2 vws* Routine 3/23/2017 3/23/2018 3/23/2017            Who to contact     Please call your clinic at [phone] to:    Ask questions about your health    Make or cancel appointments    Discuss your medicines    Learn about your test results    Speak to your doctor   If you have compliments or concerns about an experience at your clinic, or if you wish to file a complaint, please contact [facility] Patient Relations at [phone] or email us at [email or web address]         Additional Information About Your Visit        MyChart Information     [employer] is an electronic gateway that provides easy, online  "access to your medical records. With [employer], you can request a clinic appointment, read your test results, renew a prescription or communicate with your care team.     To sign up for [employer] visit the website at [email or web address]   You will be asked to enter the access code listed below, as well as some personal information. Please follow the directions to create your username and password.     Your access code is: [email or web address]  Expires: 2017 10:41 AM     Your access code will  in 90 days. If you need help or a new code, please contact your [facility] or call [phone] for assistance.        Care EveryWhere ID     This is your Care EveryWhere ID. This could be used by other organizations to access your [facility] medical records  [id number]        Your Vitals Were     Pulse Temperature Respirations Height Pulse Oximetry BMI (Body Mass Index)    95 97.7  F (36.5  C) (Oral) 22 1.607 m (5' 3.27\") 99% 22.96 kg/m2       Blood Pressure from Last 3 Encounters:   17 112/72   17 95/59   17 108/60    Weight from Last 3 Encounters:   17 59.3 kg (130 lb 11.7 oz)   17 61.7 kg (136 lb 0.4 oz)   17 62.4 kg (137 lb 9.1 oz)              We Performed the Following     CBC with platelets differential          Where to get your medicines      Some of these will need a paper prescription and others can be bought over the counter.  Ask your nurse if you have questions.     Bring a paper prescription for each of these medications     oxyCODONE 5 MG IR tablet          Primary Care Provider Office Phone # Fax #    Coretta Yanez [phone]       [address] [address]        Thank you!     Thank you for choosing PEDS HEMATOLOGY ONCOLOGY  for your care. Our goal is always to provide you with excellent care. Hearing back from our patients is one way we can continue to improve our services. " Please take a few minutes to complete the written survey that you may receive in the mail after your visit with us. Thank you!             Your Updated Medication List - Protect others around you: Learn how to safely use, store and throw away your medicines at www.disposemymeds.org.          This list is accurate as of: 3/23/17  3:12 PM.  Always use your most recent med list.                   Brand Name Dispense Instructions for use    acetaminophen 325 MG tablet    TYLENOL    100 tablet    Take 2 tablets (650 mg) by mouth every 4 hours as needed for mild pain       bisacodyl 5 MG EC tablet     30 tablet    Take 1 tablet (5 mg) by mouth 2 times daily       diltiazem 2% in PLO cream (FV COMPOUNDED) 2% Gel     60 g    To anal opening three times daily.  Use a pea-sized amount.  Store at room temperature.       diphenhydrAMINE 25 MG tablet    BENADRYL    60 tablet    Take 1-2 tablets (25-50 mg) by mouth every 6 hours as needed for other (Nausea or vomiting)       ibuprofen 600 MG tablet    ADVIL/MOTRIN    60 tablet    Take 1 tablet (600 mg) by mouth every 6 hours as needed for moderate pain       leuprolide 11.25 MG Kit kit    LUPRON DEPOT-PED    1 each    Inject 11.25 mg into the muscle every 3 months       lidocaine-prilocaine cream    EMLA    30 g    Apply to port 30 minutes prior to access       ondansetron 8 MG tablet    ZOFRAN    30 tablet    Take 1 tablet (8 mg) by mouth every 6 hours as needed for nausea       oxyCODONE 5 MG IR tablet    ROXICODONE    20 tablet    Take 1-2 tablets (5-10 mg) by mouth every 4 hours as needed for moderate to severe pain       pantoprazole 40 MG EC tablet    PROTONIX    30 tablet    Take 1 tablet (40 mg) by mouth daily       * polyethylene glycol Packet    MIRALAX/GLYCOLAX    510 g    Take 17 g by mouth 2 times daily       * polyethylene glycol Packet    MIRALAX/GLYCOLAX    7 packet    Take 17 g by mouth daily       * senna-docusate 8.6-50 MG per tablet    SENOKOT-S;PERICOLACE     100 tablet    Take 2 tablets by mouth 2 times daily       * senna-docusate 8.6-50 MG per tablet    SENOKOT-S;PERICOLACE    30 tablet    Take 2 tablets by mouth 2 times daily as needed for constipation       * Notice:  This list has 4 medication(s) that are the same as other medications prescribed for you. Read the directions carefully, and ask your doctor or other care provider to review them with you.

## 2017-03-23 NOTE — NURSING NOTE
"Chief Complaint   Patient presents with     RECHECK     Patient here today for follow up on Synovial sarcoma (H)     /72 (BP Location: Right arm, Patient Position: Fowlers, Cuff Size: Adult Regular)  Pulse 95  Temp 97.7  F (36.5  C) (Oral)  Resp 22  Ht 1.607 m (5' 3.27\")  Wt 59.3 kg (130 lb 11.7 oz)  SpO2 99%  BMI 22.96 kg/m2  Debbie Padilla MA    "

## 2017-03-23 NOTE — PROGRESS NOTES
Pediatric Hematology/Oncology Clinic Note    HPI- Olimpia initially presented with a growth on her right labia in 2013 when she was living in [place]. 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 She discontinued further treatment b/c her physicians were unsure of the exact diagnosis and she felt uncomfortable proceeding. Olimpia subsequently immigrated to the  in August of 2015 and in October she first noticed a recurrence of the mass in the area of previous excision. She was in [place] at that time, seen by oncology and had a port placed but then moved to [place] where she was seen by Dr. Mckeon at [facility] in November. There she underwent an MRI that showed a solid and cystic subcutaneous mass in the right labia measuring 1.7 x 1.6 x 1cm with question of nodular extension vs a second nodule measuring 0.7 cm. There was no invasion into the vagina. On November 16th 2015, Olimpia had a biopsy of the mass by a gynecologist, Dr. Teryr, at [facility]. 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 By immunohistochemistry, the cells shows a complete loss of SMARCb1. Wide spectrum cytokeratin, CD34, WT1, Desmin and CD99 were all negative. RT PCR for Wooten Sarcoma associated fusion transcripts were negative as well. Olimpia went on to have a PET/CT as well which showed multiple pulmonary lesions and biopsy confirmed a lesion to be metastatic disease. Olimpia was seen by Tomás White at [facility] who reviewed the diagnosis and potential treatment plans with her, however she prefered to be treated here at [facility]. 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    Olimpia underwent left sided thoracotomy and resection of two pulmonary nodules on 7/20/16. Pathology revealed that one lesion was benign lymphoid tissue and the other was consistent with synovial sarcoma.  This was confirmed with FISH  With 91% of cell examined having a signal pattern indictivate of a rearrangement of the SS18 locus.  Olimpia has now completed 3 cycles of chemotherapy per [id number] and started her radiation on 9/13/16 and completed on 10/17/16.  She then went on to have a resection of her labial mass on 11/16/16 by Jannet Pastrana and Chikis with reconstruction, including skin flaps by Dr. Corona from plastics.  Pathology showed no residual tumor.  She had a f/u chest CT today on 12/5/16 that showed persistance of her pulmonary nodules as well as few additonal pulmonary nodules.  She saw Dr. Pierre who was in agreement with surgical resection, however Olimpia wanted to wait until March to have more time to recover from her last surgery. She underwent left sided thoracotomy on 3/8/17.    Olimpia comes to clinic today for follow up.  She report this surgery has been much more difficult to recover from compared to her previous lung surgeries.  Her pain is finally starting to improve but she continues to need oxycodone.  She reports feeling of a racing heart when she gets up or is active, also has significant fatigue.  She had some difficulty breathing especially when she lays down at night.  Olimpia is eating OK, denies constipation.    Olimpia wonders what her pathology showed, she does not wish to receive any more chemotherapy, she states she believes God will take care of her and can accept her fate.    History was obtained from Olimpia via professional [employer] .     Allergies as of 03/23/2017 - [facility] as Reviewed 03/23/2017   Allergen Reaction Noted     Heparin flush Other (See Comments) 01/27/2016     Pork derived products  02/09/2016     Tegaderm transparent dressing  (informational only) Other (See Comments) and Rash 04/20/2016       Current Outpatient Prescriptions   Medication Sig Dispense Refill     oxyCODONE (ROXICODONE) 5 MG IR tablet Take 1-2 tablets (5-10 mg) by mouth every 4 hours as needed for moderate to severe pain 20 tablet 0     polyethylene glycol (MIRALAX/GLYCOLAX) Packet Take 17 g by mouth daily 7 packet 1     senna-docusate (SENOKOT-S;PERICOLACE) 8.6-50 MG per tablet Take 2 tablets by mouth 2 times daily as needed for constipation 30 tablet 1     acetaminophen (TYLENOL) 325 MG tablet Take 2 tablets (650 mg) by mouth every 4 hours as needed for mild pain 100 tablet 1     ibuprofen (ADVIL/MOTRIN) 600 MG tablet Take 1 tablet (600 mg) by mouth every 6 hours as needed for moderate pain 60 tablet 1     bisacodyl (DULCOLAX) 5 MG EC tablet Take 1 tablet (5 mg) by mouth 2 times daily 30 tablet 2     polyethylene glycol (MIRALAX/GLYCOLAX) Packet Take 17 g by mouth 2 times daily 510 g 3     senna-docusate (SENOKOT-S;PERICOLACE) 8.6-50 MG per tablet Take 2 tablets by mouth 2 times daily 100 tablet 1     diltiazem 2% in PLO cream, FV COMPOUNDED, 2% GEL To anal opening three times daily.  Use a pea-sized amount.  Store at room temperature. 60 g 0     ondansetron (ZOFRAN) 8 MG tablet Take 1 tablet (8 mg) by mouth every 6 hours as needed for nausea 30 tablet 6     diphenhydrAMINE (BENADRYL) 25 MG tablet Take 1-2 tablets (25-50 mg) by mouth every 6 hours as needed for other (Nausea or vomiting) 60 tablet 3     lidocaine-prilocaine (EMLA) cream Apply to port 30 minutes prior to access 30 g 5     pantoprazole (PROTONIX) 40 MG enteric coated tablet Take 1 tablet (40 mg) by mouth daily 30 tablet 3     leuprolide (LUPRON DEPOT-PED) 11.25 MG KIT Inject 11.25 mg into the muscle every 3 months 1 each 1       Past Medical History:   Diagnosis Date     Anemia 6/3/2016     Constipation      Extrarenal rhabdoid neoplasm (H)      Febrile neutropenia (H) 4/17/2016     History of blood  "transfusion      Latent tuberculosis      Lung disease      On antineoplastic chemotherapy      Sarcoma of vulva (H)        Social History     Social History     Marital status:      Spouse name: N/A     Number of children: N/A     Years of education: N/A     Occupational History     Not on file.     Social History Main Topics     Smoking status: Never Smoker     Smokeless tobacco: Never Used     Alcohol use No     Drug use: No     Sexual activity: No     Other Topics Concern     Not on file     Social History Narrative       Family History   Problem Relation Age of Onset     Other Cancer No family hx of      ROS  See HPI. Complete ROS otherwise negative.    Physical Exam   VS: /72 (BP Location: Right arm, Patient Position: Fowlers, Cuff Size: Adult Regular)  Pulse 95  Temp 97.7  F (36.5  C) (Oral)  Resp 22  Ht 1.607 m (5' 3.27\")  Wt 59.3 kg (130 lb 11.7 oz)  SpO2 99%  BMI 22.96 kg/m2    GENERAL: Alert, well appearing young woman in no acute distress.  SKIN: No rashes, lesions, or abnormal pigmentation.  HEAD: Normocephalic, atraumatic     EYES: Normal lids, conjunctivae/cornea normal, mild icterus. PERRL. EOMI.  EARS: Pinna normal b/l, no pain on palpation. External ears normal appearing.   NOSE: Clear, no discharge or congestion  MOUTH/THROAT: Oropharynx is clear. Normal dentition for age, no mucosal lesions.  NECK: Supple, full range of motion, no masses  LYMPH NODES: No cervical lymphadenopathy.  LUNGS: No increased WOB.  Lungs clear to auscultation through out however air movement diminished in LLL.  No crackles or wheezes.  Left chest with large incision, healing well.  Old chest tube site is C/D/I.  HEART: HR mildly tachycardic. S1 and S2 are normal. No murmurs, rubs, gallops. The radial pulses are 2+ bilaterally. Cap refill <3 sec in upper extremities.  ABDOMEN: Normal bowel sounds. Soft, non-tender, non-distended, no masses or hepatosplenomegaly.  NEUROLOGIC: Grossly intact, no focal " "neurologic deficits.    :  Deferred today.    Results for orders placed or performed in visit on 03/23/17   CBC with platelets differential   Result Value Ref Range    WBC 4.5 4.0 - 11.0 10e9/L    RBC Count 3.49 (L) 3.8 - 5.2 10e12/L    Hemoglobin 11.6 (L) 11.7 - 15.7 g/dL    Hematocrit 32.8 (L) 35.0 - 47.0 %    MCV 94 78 - 100 fl    MCH 33.2 (H) 26.5 - 33.0 pg    MCHC 35.4 31.5 - 36.5 g/dL    RDW 12.2 10.0 - 15.0 %    Platelet Count 252 150 - 450 10e9/L    Diff Method Automated Method     % Neutrophils 65.2 %    % Lymphocytes 19.0 %    % Monocytes 12.6 %    % Eosinophils 1.5 %    % Basophils 0.4 %    % Immature Granulocytes 1.3 %    Nucleated RBCs 0 0 /100    Absolute Neutrophil 3.0 1.6 - 8.3 10e9/L    Absolute Lymphocytes 0.9 0.8 - 5.3 10e9/L    Absolute Monocytes 0.6 0.0 - 1.3 10e9/L    Absolute Eosinophils 0.1 0.0 - 0.7 10e9/L    Absolute Basophils 0.0 0.0 - 0.2 10e9/L    Abs Immature Granulocytes 0.1 0 - 0.4 10e9/L    Absolute Nucleated RBC 0.0      SPECIMEN(S):   A: Tissue, left chest wall   B: Pericardium mass   C: Left lower lobe wedge resection   D: Tissue, left chest wall #2   E: Left lower lobe #2 wedge     FINAL DIAGNOSIS:        A.     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     Soft tissue and lung, \"left chest wall #2,\" excision:        - metastatic synovial sarcoma with treatment effects and   approximately 20% tumor necrosis        - no tumor identified at inked margins     E.     Lung, \"left lower lobe #2,\" wedge resection:        - metastatic viable synovial sarcoma with treatment " effects        - no tumor identified at inked margins   All cultures are negative with the exception of AFB and fungal studies which are negative but still preliminary.  Recent Results (from the past 24 hour(s))   X-ray Chest 2 vws*   Result Value    Radiologist flags Pneumothorax (Urgent)    Narrative    XR CHEST 2 VW  3/23/2017 12:32 PM      HISTORY: post thoracotomy, Malignant neoplasm of connective and soft  tissue, unspecified    COMPARISON: 3/8/2017    FINDINGS: PA and lateral views of the chest. Central venous catheter  tip projects over the high right atrium. The cardiac silhouette size  is normal. There is a stable small left pleural effusion. Slight  increase in opacities at the left lung base. There is a small right  pneumothorax, new from the comparison examination. There is a small  amount of pleural fluid on the right. There are no new focal pulmonary  opacities on the right.      Impression    IMPRESSION:   1. New small right pneumothorax.  2. Stable small left pleural effusion with slightly increased left  basilar opacities.    [Urgent Result: Pneumothorax]    Finding was identified on 3/23/2017 1:05 PM.     Omar Trammell CNP was contacted by Dr. Gamez at 3/23/2017 1:10 PM and  verbalized understanding of the urgent finding.      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  Biopsy of labial mass reveals synovial sarcoma as well. Her recent PET CT does not show any new sites of disease (areas in buttocks correspond to Depo-Lupron injection sites).  She is s/p 3 cycles of chemotherapy per [id number]  (2nd and 3rd  cycle 75% dosing), radiation and resection of the labial mass with reconstruction.  Pathology showed no residual tumor.  She delayed her thoracotomy and unfortunately had an increased tumor burden at time of her lung surgery, she had numerous masses removed from her left chest, all consistent with synovial sarcoma, once with positive margins.  She has had a difficult recovery.  Pain is now improving.  She reports feelings of a racing heart with any activity and some difficulty breathing when lying flat.  She states she is not interested in any additional therapy.    Plan:  1) Discussed pathology at length with Olimpia.  With positive margins her cancer will likely not be cured.  However, they are options for palliative therapy that could potentially provide some benefit while minimizing toxicity and side effects.  Discussed oral targeted agents, specifically Pazopanib, and provided written information for Olimpia.  She stated she may be open to this but wanted to read about it.  She wants to remain hopeful and feels it is in God's hands.  2) Refilled oxycodone, would expect her pain will continue to improve.    3) Follow up with Dr Pierre 4/4.  4) Chest xray obtained to rule out any complications contributing to her current symptoms. New right pneumothorax.  Discussed with Dr Robbie Hauser and he reviewed with Dr Elena and came to see Olimpia.  They initially recommended admission but Olimpia declined, she is aware to seek care emergently with any increase in symptoms.  She will follow up in our clinic Monday for a repeat chest xray and visit with Aliyah Mckeon NP.  Pediatric surgery would like us to call the on call resident with results to discuss plan.  5) Will then RTC on 4/6 for follow up visit with Dr Yanez to discuss potential treatment options.

## 2017-03-27 ENCOUNTER — OFFICE VISIT (OUTPATIENT)
Dept: PEDIATRIC HEMATOLOGY/ONCOLOGY | Facility: CLINIC | Age: 22
End: 2017-03-27
Attending: NURSE PRACTITIONER
Payer: COMMERCIAL

## 2017-03-27 ENCOUNTER — HOSPITAL ENCOUNTER (OUTPATIENT)
Dept: GENERAL RADIOLOGY | Facility: CLINIC | Age: 22
Discharge: HOME | End: 2017-03-27
Attending: NURSE PRACTITIONER | Admitting: NURSE PRACTITIONER
Payer: COMMERCIAL

## 2017-03-27 VITALS
HEART RATE: 102 BPM | WEIGHT: 130.51 LBS | SYSTOLIC BLOOD PRESSURE: 99 MMHG | DIASTOLIC BLOOD PRESSURE: 60 MMHG | OXYGEN SATURATION: 100 % | TEMPERATURE: 98.2 F | HEIGHT: 63 IN | BODY MASS INDEX: 23.12 KG/M2 | RESPIRATION RATE: 16 BRPM

## 2017-03-27 DIAGNOSIS — C49.9 SYNOVIAL SARCOMA (H): ICD-10-CM

## 2017-03-27 DIAGNOSIS — K59.01 SLOW TRANSIT CONSTIPATION: ICD-10-CM

## 2017-03-27 LAB
BASOPHILS # BLD AUTO: 0 10E9/L (ref 0–0.2)
BASOPHILS NFR BLD AUTO: 0.5 %
DIFFERENTIAL METHOD BLD: ABNORMAL
EOSINOPHIL # BLD AUTO: 0.1 10E9/L (ref 0–0.7)
EOSINOPHIL NFR BLD AUTO: 2.2 %
ERYTHROCYTE [DISTWIDTH] IN BLOOD BY AUTOMATED COUNT: 12.3 % (ref 10–15)
HCT VFR BLD AUTO: 34.8 % (ref 35–47)
HGB BLD-MCNC: 11.8 G/DL (ref 11.7–15.7)
IMM GRANULOCYTES # BLD: 0 10E9/L (ref 0–0.4)
IMM GRANULOCYTES NFR BLD: 0.3 %
LYMPHOCYTES # BLD AUTO: 0.9 10E9/L (ref 0.8–5.3)
LYMPHOCYTES NFR BLD AUTO: 23.1 %
MCH RBC QN AUTO: 32.5 PG (ref 26.5–33)
MCHC RBC AUTO-ENTMCNC: 33.9 G/DL (ref 31.5–36.5)
MCV RBC AUTO: 96 FL (ref 78–100)
MONOCYTES # BLD AUTO: 0.4 10E9/L (ref 0–1.3)
MONOCYTES NFR BLD AUTO: 11.8 %
NEUTROPHILS # BLD AUTO: 2.3 10E9/L (ref 1.6–8.3)
NEUTROPHILS NFR BLD AUTO: 62.1 %
NRBC # BLD AUTO: 0 10*3/UL
NRBC BLD AUTO-RTO: 0 /100
PLATELET # BLD AUTO: 249 10E9/L (ref 150–450)
RBC # BLD AUTO: 3.63 10E12/L (ref 3.8–5.2)
WBC # BLD AUTO: 3.7 10E9/L (ref 4–11)

## 2017-03-27 PROCEDURE — 71020 XR CHEST 2 VW: CPT

## 2017-03-27 PROCEDURE — 85025 COMPLETE CBC W/AUTO DIFF WBC: CPT | Performed by: NURSE PRACTITIONER

## 2017-03-27 PROCEDURE — 36415 COLL VENOUS BLD VENIPUNCTURE: CPT | Performed by: NURSE PRACTITIONER

## 2017-03-27 PROCEDURE — 99213 OFFICE O/P EST LOW 20 MIN: CPT | Mod: ZF

## 2017-03-27 RX ORDER — BISACODYL 5 MG
5 TABLET, DELAYED RELEASE (ENTERIC COATED) ORAL 2 TIMES DAILY
Qty: 30 TABLET | Refills: 2 | Status: SHIPPED | OUTPATIENT
Start: 2017-03-27 | End: 2017-01-01

## 2017-03-27 ASSESSMENT — PAIN SCALES - GENERAL: PAINLEVEL: NO PAIN (0)

## 2017-03-27 NOTE — PROGRESS NOTES
Pediatric Hematology/Oncology Clinic Note    History- Olimpia initially presented with a growth on her right labia in 2013 when she was living in [place]. 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 She discontinued further treatment b/c her physicians were unsure of the exact diagnosis and she felt uncomfortable proceeding. Olimpia subsequently immigrated to the  in August of 2015 and in October she first noticed a recurrence of the mass in the area of previous excision. She was in [place] at that time, seen by oncology and had a port placed but then moved to [place] where she was seen by Dr. Mckeon at [facility] in November. There she underwent an MRI that showed a solid and cystic subcutaneous mass in the right labia measuring 1.7 x 1.6 x 1cm with question of nodular extension vs a second nodule measuring 0.7 cm. There was no invasion into the vagina. On November 16th 2015, Olimpia had a biopsy of the mass by a gynecologist, Dr. Terry, at [facility]. Cytology was reviewed at [place] and read as a SMARCB1-deficient genital sarcoma, likely falling within the overall spectrum of malignant rhabdoid tumor. By immunohistochemistry, the cells shows a complete loss of SMARCb1. Wide spectrum cytokeratin, CD34, WT1, Desmin and CD99 were all negative. RT PCR for Wooten Sarcoma associated fusion transcripts were negative as well. Olimpia went on to have a PET/CT as well which showed multiple pulmonary lesions and biopsy confirmed a lesion to be metastatic disease. Olimpia was seen by Tomás White at [facility] who reviewed the diagnosis and potential treatment plans with her, however she prefered to be treated here at [facility]. 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    Olimpia underwent left sided thoracotomy and resection of two pulmonary nodules on 7/20/16. Pathology revealed that one lesion was benign lymphoid tissue and the other was consistent with synovial sarcoma.  This was confirmed with FISH  With 91% of cell examined having a signal pattern indictivate of a rearrangement of the SS18 locus.  Olimpia has now completed 3 cycles of chemotherapy per [id number] and started her radiation on 9/13/16 and completed on 10/17/16.  She then went on to have a resection of her labial mass on 11/16/16 by Jannet Pastrana and [facility] with reconstruction, including skin flaps by Dr. Corona from plastics.  Pathology showed no residual tumor.  She had a f/u chest CT today on 12/5/16 that showed persistance of her pulmonary nodules as well as few additonal pulmonary nodules.  She saw Dr. Pierre who was in agreement with surgical resection, however Olimpia wanted to wait until March to have more time to recover from her last surgery. She underwent left sided thoracotomy on 3/8/17.    HPI:  Olimpia had a good weekend and describes feeling really good. After being diagnosed in clinic last week with a pneumothorax, she says that she paid close attention to her breathing but never felt anything different. Olimpia has not had shortness of breath nor difficulty breathing. Denies chest pain and palpitations. She has had a good appetite. No nausea/vomiting. She has some intermittent discomfort at her incision site, but isn't needing to take any medication for it. She does still feel fatigued. No changes in stooling pattern. Voiding without difficulty. Olimpia is requesting a prescription refill on Dulcolax.       History was obtained from [facility] via professional [employer] .     Allergies as of 03/27/2017 - [facility] as Reviewed 03/27/2017   Allergen Reaction Noted     Heparin flush Other (See Comments) 01/27/2016     Pork derived products  02/09/2016     Tegaderm transparent dressing  (informational only) Other (See Comments) and Rash 04/20/2016       Current Outpatient Prescriptions   Medication Sig Dispense Refill     bisacodyl (DULCOLAX) 5 MG EC tablet Take 1 tablet (5 mg) by mouth 2 times daily 30 tablet 2     oxyCODONE (ROXICODONE) 5 MG IR tablet Take 1-2 tablets (5-10 mg) by mouth every 4 hours as needed for moderate to severe pain 20 tablet 0     polyethylene glycol (MIRALAX/GLYCOLAX) Packet Take 17 g by mouth daily 7 packet 1     senna-docusate (SENOKOT-S;PERICOLACE) 8.6-50 MG per tablet Take 2 tablets by mouth 2 times daily as needed for constipation 30 tablet 1     acetaminophen (TYLENOL) 325 MG tablet Take 2 tablets (650 mg) by mouth every 4 hours as needed for mild pain 100 tablet 1     ibuprofen (ADVIL/MOTRIN) 600 MG tablet Take 1 tablet (600 mg) by mouth every 6 hours as needed for moderate pain 60 tablet 1     polyethylene glycol (MIRALAX/GLYCOLAX) Packet Take 17 g by mouth 2 times daily 510 g 3     senna-docusate (SENOKOT-S;PERICOLACE) 8.6-50 MG per tablet Take 2 tablets by mouth 2 times daily 100 tablet 1     diltiazem 2% in PLO cream, FV COMPOUNDED, 2% GEL To anal opening three times daily.  Use a pea-sized amount.  Store at room temperature. 60 g 0     ondansetron (ZOFRAN) 8 MG tablet Take 1 tablet (8 mg) by mouth every 6 hours as needed for nausea 30 tablet 6     diphenhydrAMINE (BENADRYL) 25 MG tablet Take 1-2 tablets (25-50 mg) by mouth every 6 hours as needed for other (Nausea or vomiting) 60 tablet 3     lidocaine-prilocaine (EMLA) cream Apply to port 30 minutes prior to access 30 g 5     pantoprazole (PROTONIX) 40 MG enteric coated tablet Take 1 tablet (40 mg) by mouth daily 30 tablet 3     leuprolide (LUPRON DEPOT-PED) 11.25 MG KIT Inject 11.25 mg into the muscle every 3 months 1 each 1       Past Medical History:   Diagnosis Date     Anemia 6/3/2016     Constipation      Extrarenal rhabdoid neoplasm (H)      Febrile neutropenia (H) 4/17/2016     History of blood  transfusion      Latent tuberculosis      Lung disease      On antineoplastic chemotherapy      Sarcoma of vulva (H)        Social History     Social History     Marital status:      Spouse name: N/A     Number of children: N/A     Years of education: N/A     Occupational History     Not on file.     Social History Main Topics     Smoking status: Never Smoker     Smokeless tobacco: Never Used     Alcohol use No     Drug use: No     Sexual activity: No     Other Topics Concern     Not on file     Social History Narrative       Family History   Problem Relation Age of Onset     Other Cancer No family hx of      ROS  See HPI. Complete ROS otherwise negative.    Physical Exam   Wt Readings from Last 4 Encounters:   03/27/17 59.2 kg (130 lb 8.2 oz)   03/23/17 59.3 kg (130 lb 11.7 oz)   03/08/17 61.7 kg (136 lb 0.4 oz)   03/06/17 62.4 kg (137 lb 9.1 oz)       Temp:  [98.2  F (36.8  C)] 98.2  F (36.8  C)  Pulse:  [102] 102  Resp:  [16] 16  BP: (99)/(60) 99/60  SpO2:  [100 %] 100 %    GENERAL: Alert, well appearing young woman in no acute distress.  SKIN: No rashes, lesions, or abnormal pigmentation.  HEAD: Normocephalic, atraumatic     EYES: Normal lids, conjunctivae/cornea normal, mild icterus. PERRL. EOMI.  EARS: Pinna normal b/l, no pain on palpation. External ears normal appearing.   NOSE: Clear, no discharge or congestion  MOUTH/THROAT: Oropharynx is clear. Normal dentition for age, no mucosal lesions.  NECK: Supple, full range of motion, no masses  LYMPH NODES: No cervical lymphadenopathy.  LUNGS: No increased WOB.  Lungs clear to auscultation throughout; slightly diminished in LLL.  No crackles or wheezes.  Left chest with large incision, healing well. Edges nicely approximated and without erythema nor drainage. Old chest tube site is C/D/I.  HEART: HR mildly tachycardic. S1 and S2 are normal. No murmurs, rubs, gallops. The radial pulses are 2+ bilaterally. Cap refill <3 sec in upper extremities.  ABDOMEN:  Normal bowel sounds. Soft, non-tender, non-distended, no masses or hepatosplenomegaly.  NEUROLOGIC: Grossly intact, no focal neurologic deficits.    :  Deferred today.    Labs:  Results for orders placed or performed in visit on 03/27/17   CBC with platelets differential   Result Value Ref Range    WBC 3.7 (L) 4.0 - 11.0 10e9/L    RBC Count 3.63 (L) 3.8 - 5.2 10e12/L    Hemoglobin 11.8 11.7 - 15.7 g/dL    Hematocrit 34.8 (L) 35.0 - 47.0 %    MCV 96 78 - 100 fl    MCH 32.5 26.5 - 33.0 pg    MCHC 33.9 31.5 - 36.5 g/dL    RDW 12.3 10.0 - 15.0 %    Platelet Count 249 150 - 450 10e9/L    Diff Method Automated Method     % Neutrophils 62.1 %    % Lymphocytes 23.1 %    % Monocytes 11.8 %    % Eosinophils 2.2 %    % Basophils 0.5 %    % Immature Granulocytes 0.3 %    Nucleated RBCs 0 0 /100    Absolute Neutrophil 2.3 1.6 - 8.3 10e9/L    Absolute Lymphocytes 0.9 0.8 - 5.3 10e9/L    Absolute Monocytes 0.4 0.0 - 1.3 10e9/L    Absolute Eosinophils 0.1 0.0 - 0.7 10e9/L    Absolute Basophils 0.0 0.0 - 0.2 10e9/L    Abs Immature Granulocytes 0.0 0 - 0.4 10e9/L    Absolute Nucleated RBC 0.0        Radiology:  Recent Results (from the past 24 hour(s))   X-ray Chest 2 vws*    Narrative    XR CHEST 2 VW  3/27/2017 11:20 AM      HISTORY: follow up pneumothorax, Malignant neoplasm of connective and  soft tissue, unspecified    COMPARISON: 3/23/2017    FINDINGS: PA and lateral views of the chest. Central venous catheter  tip projecting over the right atrium is stable in position. Slight  decrease in small right pneumothorax. Small left pleural effusion is  stable, with unchanged pulmonary opacities at the left lung base. The  cardiac silhouette size is normal. The visualized upper abdomen is  unremarkable.      Impression    IMPRESSION:   1. Slight decrease in small right pneumothorax.  2. Stable small left pleural effusion with unchanged left basilar  opacities.    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  Biopsy of labial mass reveals synovial sarcoma as well. Her recent PET CT does not show any new sites of disease (areas in buttocks correspond to Depo-Lupron injection sites).  She is s/p 3 cycles of chemotherapy per [id number]  (2nd and 3rd cycle 75% dosing), radiation and resection of the labial mass with reconstruction.  Pathology showed no residual tumor.  She delayed her thoracotomy and unfortunately had an increased tumor burden at time of her lung surgery, she had numerous masses removed from her left chest, all consistent with synovial sarcoma, once with positive margins.  She has had a difficult recovery.      Olimpia is still having incisional pain with little use of pain medication. Wound appears to be healing well. Bowel regimen is well-managed with current meds; requesting dulcolax refill. Continued fatigue. Chest X-ray was obtained today as part of follow up for pneumothorax discovered last week. X-ray was reviewed by surgical resident, Dr. Pierre, and myself - pneumothorax is smaller but still present. No new concerns. Reassuring respiratory assessment today. Labs look good.       Plan:  1) Advised to take oxycodone as previously prescribed if in pain.  2) Refilled Dulcolax per request.   3) Advised to report any changes in breathing, including shortness of breath, labored breathing, chest pain, or any other concerns.   4) [facility] is scheduled to f/u with Dr. Pierre in clinic on 4/4; will get another CXR prior to that appt.   5) RTC on 4/6 with Dr. Yanez; [facility] is requesting and earlier appt time.    Aliyah Mckeon, CNP

## 2017-03-27 NOTE — MR AVS SNAPSHOT
After Visit Summary   3/27/2017    Olimpia Childress    MRN: [id number]           Patient Information     Date Of Birth          1995        Visit Information        Provider Department      3/27/2017 8:45 AM Aliyah Paz APRN CNP Peds Hematology Oncology        Today's Diagnoses     Synovial sarcoma (H)        Slow transit constipation              [facility], 9th floor  [address] [place] [address]  Phone: [phone]  Clinic Hours:   Monday-Friday:   7 am to 5:00 pm   closed weekends and major  holidays     If your fever is 100.5  or greater,   call the clinic during business hours.   After hours call [phone] and ask for the pediatric hematology / oncology physician to be paged for you.               Follow-ups after your visit        Follow-up notes from your care team     Return in about 8 days (around 4/4/2017).      Your next 10 appointments already scheduled     Apr 04, 2017  1:15 PM CDT   Return Visit with Byron Pierre MD   Peds Surgery ([facility])    [address], [facility] [place]  [address] [id number] [phone]            Apr 06, 2017  3:00 PM CDT   Return Visit with Coretta Yanez MD   Peds Hematology Oncology ([facility])    [facility]  9th Floor  [address] [place] [id number] [phone]              Future tests that were ordered for you today     Open Future Orders        Priority Expected Expires Ordered    X-ray Chest 2 vws* Routine 4/4/2017 3/27/2018 3/27/2017            Who to contact     Please call your clinic at [phone] to:    Ask questions about your health    Make or cancel appointments    Discuss your medicines    Learn about your test results    Speak to your doctor   If you have compliments or concerns about an experience at your clinic, or if you wish to file a complaint, please contact  "[facility] Patient Relations at [phone] or email us at [email or web address]         Additional Information About Your Visit        MyChart Information     [facility] is an electronic gateway that provides easy, online access to your medical records. With [facility], you can request a clinic appointment, read your test results, renew a prescription or communicate with your care team.     To sign up for [facility] visit the website at [email or web address]   You will be asked to enter the access code listed below, as well as some personal information. Please follow the directions to create your username and password.     Your access code is: [email or web address]  Expires: 2017 10:41 AM     Your access code will  in 90 days. If you need help or a new code, please contact your [facility] or call [phone] for assistance.        Care EveryWhere ID     This is your Care EveryWhere ID. This could be used by other organizations to access your [place] medical records  [id number]        Your Vitals Were     Pulse Temperature Respirations Height Pulse Oximetry BMI (Body Mass Index)    102 98.2  F (36.8  C) (Oral) 16 1.599 m (5' 2.95\") 100% 23.15 kg/m2       Blood Pressure from Last 3 Encounters:   17 99/60   17 112/72   17 95/59    Weight from Last 3 Encounters:   17 59.2 kg (130 lb 8.2 oz)   17 59.3 kg (130 lb 11.7 oz)   17 61.7 kg (136 lb 0.4 oz)              We Performed the Following     CBC with platelets differential          Where to get your medicines      These medications were sent to [facility] #[id number] - [place], [place] - [address], [place] [address]     Phone:  [phone]     bisacodyl 5 MG EC tablet          Primary Care Provider Office Phone # Fax #    Coretta Yanez [phone]       [address] " [address]        Thank you!     Thank you for choosing PEDS HEMATOLOGY ONCOLOGY  for your care. Our goal is always to provide you with excellent care. Hearing back from our patients is one way we can continue to improve our services. Please take a few minutes to complete the written survey that you may receive in the mail after your visit with us. Thank you!             Your Updated Medication List - Protect others around you: Learn how to safely use, store and throw away your medicines at www.disposemymeds.org.          This list is accurate as of: 3/27/17  2:21 PM.  Always use your most recent med list.                   Brand Name Dispense Instructions for use    acetaminophen 325 MG tablet    TYLENOL    100 tablet    Take 2 tablets (650 mg) by mouth every 4 hours as needed for mild pain       bisacodyl 5 MG EC tablet     30 tablet    Take 1 tablet (5 mg) by mouth 2 times daily       diltiazem 2% in PLO cream (FV COMPOUNDED) 2% Gel     60 g    To anal opening three times daily.  Use a pea-sized amount.  Store at room temperature.       diphenhydrAMINE 25 MG tablet    BENADRYL    60 tablet    Take 1-2 tablets (25-50 mg) by mouth every 6 hours as needed for other (Nausea or vomiting)       ibuprofen 600 MG tablet    ADVIL/MOTRIN    60 tablet    Take 1 tablet (600 mg) by mouth every 6 hours as needed for moderate pain       leuprolide 11.25 MG Kit kit    LUPRON DEPOT-PED    1 each    Inject 11.25 mg into the muscle every 3 months       lidocaine-prilocaine cream    EMLA    30 g    Apply to port 30 minutes prior to access       ondansetron 8 MG tablet    ZOFRAN    30 tablet    Take 1 tablet (8 mg) by mouth every 6 hours as needed for nausea       oxyCODONE 5 MG IR tablet    ROXICODONE    20 tablet    Take 1-2 tablets (5-10 mg) by mouth every 4 hours as needed for moderate to severe pain       pantoprazole 40 MG EC tablet    PROTONIX    30 tablet    Take 1 tablet (40 mg) by mouth daily       * polyethylene glycol Packet     MIRALAX/GLYCOLAX    510 g    Take 17 g by mouth 2 times daily       * polyethylene glycol Packet    MIRALAX/GLYCOLAX    7 packet    Take 17 g by mouth daily       * senna-docusate 8.6-50 MG per tablet    SENOKOT-S;PERICOLACE    100 tablet    Take 2 tablets by mouth 2 times daily       * senna-docusate 8.6-50 MG per tablet    SENOKOT-S;PERICOLACE    30 tablet    Take 2 tablets by mouth 2 times daily as needed for constipation       * Notice:  This list has 4 medication(s) that are the same as other medications prescribed for you. Read the directions carefully, and ask your doctor or other care provider to review them with you.

## 2017-03-27 NOTE — LETTER
3/27/2017    RE: Olimpia Childress  [address]   [facility] [address]     Pediatric Hematology/Oncology Clinic Note    History- Olimpia initially presented with a growth on her right labia in 2013 when she was living in [place]. 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 She discontinued further treatment b/c her physicians were unsure of the exact diagnosis and she felt uncomfortable proceeding. Olimpia subsequently immigrated to the  in August of 2015 and in October she first noticed a recurrence of the mass in the area of previous excision. She was in [place] at that time, seen by oncology and had a port placed but then moved to [place] where she was seen by Dr. Mckeon at [facility] in November. There she underwent an MRI that showed a solid and cystic subcutaneous mass in the right labia measuring 1.7 x 1.6 x 1cm with question of nodular extension vs a second nodule measuring 0.7 cm. There was no invasion into the vagina. On November 16th 2015, Olimpia had a biopsy of the mass by a gynecologist, Dr. Terry, at [facility]. Cytology was reviewed at [place] and read as a SMARCB1-deficient genital sarcoma, likely falling within the overall spectrum of malignant rhabdoid tumor. By immunohistochemistry, the cells shows a complete loss of SMARCb1. Wide spectrum cytokeratin, CD34, WT1, Desmin and CD99 were all negative. RT PCR for Wooten Sarcoma associated fusion transcripts were negative as well. Olimpia went on to have a PET/CT as well which showed multiple pulmonary lesions and biopsy confirmed a lesion to be metastatic disease. Olimpia was seen by Tomás White at [facility] who reviewed the diagnosis and potential treatment plans with her, however she prefered to be treated here at [facility]. 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    Olimpia underwent left sided thoracotomy and resection of two pulmonary nodules on 7/20/16. Pathology revealed that one lesion was benign lymphoid tissue and the other was consistent with synovial sarcoma.  This was confirmed with FISH  With 91% of cell examined having a signal pattern indictivate of a rearrangement of the SS18 locus.  Olimpia has now completed 3 cycles of chemotherapy per [id number] and started her radiation on 9/13/16 and completed on 10/17/16.  She then went on to have a resection of her labial mass on 11/16/16 by Jannet Pastrana and Chikis with reconstruction, including skin flaps by Dr. Corona from plastics.  Pathology showed no residual tumor.  She had a f/u chest CT today on 12/5/16 that showed persistance of her pulmonary nodules as well as few additonal pulmonary nodules.  She saw Dr. Pierre who was in agreement with surgical resection, however Olimpia wanted to wait until March to have more time to recover from her last surgery. She underwent left sided thoracotomy on 3/8/17.    HPI:  Olimpia had a good weekend and describes feeling really good. After being diagnosed in clinic last week with a pneumothorax, she says that she paid close attention to her breathing but never felt anything different. Olimpia has not had shortness of breath nor difficulty breathing. Denies chest pain and palpitations. She has had a good appetite. No nausea/vomiting. She has some intermittent discomfort at her incision site, but isn't needing to take any medication for it. She does still feel fatigued. No changes in stooling pattern. Voiding without difficulty. Olimpia is requesting a prescription refill on Dulcolax.       History was obtained from [facility] via professional [employer] .     Allergies as of 03/27/2017 - Sheldon as Reviewed 03/27/2017   Allergen Reaction Noted     Heparin flush Other (See Comments)  01/27/2016     Pork derived products  02/09/2016     Tegaderm transparent dressing (informational only) Other (See Comments) and Rash 04/20/2016       Current Outpatient Prescriptions   Medication Sig Dispense Refill     bisacodyl (DULCOLAX) 5 MG EC tablet Take 1 tablet (5 mg) by mouth 2 times daily 30 tablet 2     oxyCODONE (ROXICODONE) 5 MG IR tablet Take 1-2 tablets (5-10 mg) by mouth every 4 hours as needed for moderate to severe pain 20 tablet 0     polyethylene glycol (MIRALAX/GLYCOLAX) Packet Take 17 g by mouth daily 7 packet 1     senna-docusate (SENOKOT-S;PERICOLACE) 8.6-50 MG per tablet Take 2 tablets by mouth 2 times daily as needed for constipation 30 tablet 1     acetaminophen (TYLENOL) 325 MG tablet Take 2 tablets (650 mg) by mouth every 4 hours as needed for mild pain 100 tablet 1     ibuprofen (ADVIL/MOTRIN) 600 MG tablet Take 1 tablet (600 mg) by mouth every 6 hours as needed for moderate pain 60 tablet 1     polyethylene glycol (MIRALAX/GLYCOLAX) Packet Take 17 g by mouth 2 times daily 510 g 3     senna-docusate (SENOKOT-S;PERICOLACE) 8.6-50 MG per tablet Take 2 tablets by mouth 2 times daily 100 tablet 1     diltiazem 2% in PLO cream, FV COMPOUNDED, 2% GEL To anal opening three times daily.  Use a pea-sized amount.  Store at room temperature. 60 g 0     ondansetron (ZOFRAN) 8 MG tablet Take 1 tablet (8 mg) by mouth every 6 hours as needed for nausea 30 tablet 6     diphenhydrAMINE (BENADRYL) 25 MG tablet Take 1-2 tablets (25-50 mg) by mouth every 6 hours as needed for other (Nausea or vomiting) 60 tablet 3     lidocaine-prilocaine (EMLA) cream Apply to port 30 minutes prior to access 30 g 5     pantoprazole (PROTONIX) 40 MG enteric coated tablet Take 1 tablet (40 mg) by mouth daily 30 tablet 3     leuprolide (LUPRON DEPOT-PED) 11.25 MG KIT Inject 11.25 mg into the muscle every 3 months 1 each 1       Past Medical History:   Diagnosis Date     Anemia 6/3/2016     Constipation      Extrarenal  rhabdoid neoplasm (H)      Febrile neutropenia (H) 4/17/2016     History of blood transfusion      Latent tuberculosis      Lung disease      On antineoplastic chemotherapy      Sarcoma of vulva (H)        Social History     Social History     Marital status:      Spouse name: N/A     Number of children: N/A     Years of education: N/A     Occupational History     Not on file.     Social History Main Topics     Smoking status: Never Smoker     Smokeless tobacco: Never Used     Alcohol use No     Drug use: No     Sexual activity: No     Other Topics Concern     Not on file     Social History Narrative       Family History   Problem Relation Age of Onset     Other Cancer No family hx of      ROS  See HPI. Complete ROS otherwise negative.    Physical Exam   Wt Readings from Last 4 Encounters:   03/27/17 59.2 kg (130 lb 8.2 oz)   03/23/17 59.3 kg (130 lb 11.7 oz)   03/08/17 61.7 kg (136 lb 0.4 oz)   03/06/17 62.4 kg (137 lb 9.1 oz)       Temp:  [98.2  F (36.8  C)] 98.2  F (36.8  C)  Pulse:  [102] 102  Resp:  [16] 16  BP: (99)/(60) 99/60  SpO2:  [100 %] 100 %    GENERAL: Alert, well appearing young woman in no acute distress.  SKIN: No rashes, lesions, or abnormal pigmentation.  HEAD: Normocephalic, atraumatic     EYES: Normal lids, conjunctivae/cornea normal, mild icterus. PERRL. EOMI.  EARS: Pinna normal b/l, no pain on palpation. External ears normal appearing.   NOSE: Clear, no discharge or congestion  MOUTH/THROAT: Oropharynx is clear. Normal dentition for age, no mucosal lesions.  NECK: Supple, full range of motion, no masses  LYMPH NODES: No cervical lymphadenopathy.  LUNGS: No increased WOB.  Lungs clear to auscultation throughout; slightly diminished in LLL.  No crackles or wheezes.  Left chest with large incision, healing well. Edges nicely approximated and without erythema nor drainage. Old chest tube site is C/D/I.  HEART: HR mildly tachycardic. S1 and S2 are normal. No murmurs, rubs, gallops. The  radial pulses are 2+ bilaterally. Cap refill <3 sec in upper extremities.  ABDOMEN: Normal bowel sounds. Soft, non-tender, non-distended, no masses or hepatosplenomegaly.  NEUROLOGIC: Grossly intact, no focal neurologic deficits.    :  Deferred today.    Labs:  Results for orders placed or performed in visit on 03/27/17   CBC with platelets differential   Result Value Ref Range    WBC 3.7 (L) 4.0 - 11.0 10e9/L    RBC Count 3.63 (L) 3.8 - 5.2 10e12/L    Hemoglobin 11.8 11.7 - 15.7 g/dL    Hematocrit 34.8 (L) 35.0 - 47.0 %    MCV 96 78 - 100 fl    MCH 32.5 26.5 - 33.0 pg    MCHC 33.9 31.5 - 36.5 g/dL    RDW 12.3 10.0 - 15.0 %    Platelet Count 249 150 - 450 10e9/L    Diff Method Automated Method     % Neutrophils 62.1 %    % Lymphocytes 23.1 %    % Monocytes 11.8 %    % Eosinophils 2.2 %    % Basophils 0.5 %    % Immature Granulocytes 0.3 %    Nucleated RBCs 0 0 /100    Absolute Neutrophil 2.3 1.6 - 8.3 10e9/L    Absolute Lymphocytes 0.9 0.8 - 5.3 10e9/L    Absolute Monocytes 0.4 0.0 - 1.3 10e9/L    Absolute Eosinophils 0.1 0.0 - 0.7 10e9/L    Absolute Basophils 0.0 0.0 - 0.2 10e9/L    Abs Immature Granulocytes 0.0 0 - 0.4 10e9/L    Absolute Nucleated RBC 0.0        Radiology:  Recent Results (from the past 24 hour(s))   X-ray Chest 2 vws*    Narrative    XR CHEST 2 VW  3/27/2017 11:20 AM      HISTORY: follow up pneumothorax, Malignant neoplasm of connective and  soft tissue, unspecified    COMPARISON: 3/23/2017    FINDINGS: PA and lateral views of the chest. Central venous catheter  tip projecting over the right atrium is stable in position. Slight  decrease in small right pneumothorax. Small left pleural effusion is  stable, with unchanged pulmonary opacities at the left lung base. The  cardiac silhouette size is normal. The visualized upper abdomen is  unremarkable.      Impression    IMPRESSION:   1. Slight decrease in small right pneumothorax.  2. Stable small left pleural effusion with unchanged left  basilar  opacities.    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  Biopsy of labial mass reveals synovial sarcoma as well. Her recent PET CT does not show any new sites of disease (areas in buttocks correspond to Depo-Lupron injection sites).  She is s/p 3 cycles of chemotherapy per [id number]  (2nd and 3rd cycle 75% dosing), radiation and resection of the labial mass with reconstruction.  Pathology showed no residual tumor.  She delayed her thoracotomy and unfortunately had an increased tumor burden at time of her lung surgery, she had numerous masses removed from her left chest, all consistent with synovial sarcoma, once with positive margins.  She has had a difficult recovery.      Olimpia is still having incisional pain with little use of pain medication. Wound appears to be healing well. Bowel regimen is well-managed with current meds; requesting dulcolax refill. Continued fatigue. Chest X-ray was obtained today as part of follow up for pneumothorax discovered last week. X-ray was reviewed by surgical resident, Dr. Pierre, and myself - pneumothorax is smaller but still present. No new concerns. Reassuring respiratory assessment today. Labs look good.       Plan:  1) Advised to take oxycodone as previously prescribed if in pain.  2) Refilled Dulcolax per request.   3) Advised to report any changes in breathing, including shortness of breath, labored breathing, chest pain, or any other concerns.   4) Olimpia is scheduled to f/u with Dr. Pierre in clinic on 4/4; will get another CXR prior to that appt.   5) RTC on 4/6 with  Dr. Yanez; [facility] is requesting and earlier appt time.    Aliyah Mckeon, CNP

## 2017-03-27 NOTE — NURSING NOTE
"Chief Complaint   Patient presents with     RECHECK     Patient is here for Synovial sarcoma (H) follow up     BP 99/60 (BP Location: Right arm, Patient Position: Fowlers, Cuff Size: Adult Regular)  Pulse 102  Temp 98.2  F (36.8  C) (Oral)  Resp 16  Ht 1.599 m (5' 2.95\")  Wt 59.2 kg (130 lb 8.2 oz)  SpO2 100%  BMI 23.15 kg/m2  Radha Mason LPN  March 27, 2017    "

## 2017-04-05 LAB
FUNGUS SPEC CULT: NORMAL
MICRO REPORT STATUS: NORMAL
SPECIMEN SOURCE: NORMAL

## 2017-04-10 ENCOUNTER — OFFICE VISIT (OUTPATIENT)
Dept: PEDIATRIC HEMATOLOGY/ONCOLOGY | Facility: CLINIC | Age: 22
End: 2017-04-10
Attending: PEDIATRICS
Payer: COMMERCIAL

## 2017-04-10 VITALS
OXYGEN SATURATION: 95 % | BODY MASS INDEX: 23.52 KG/M2 | HEIGHT: 63 IN | SYSTOLIC BLOOD PRESSURE: 106 MMHG | TEMPERATURE: 97.6 F | RESPIRATION RATE: 24 BRPM | DIASTOLIC BLOOD PRESSURE: 67 MMHG | HEART RATE: 79 BPM | WEIGHT: 132.72 LBS

## 2017-04-10 DIAGNOSIS — C49.9 SYNOVIAL SARCOMA (H): ICD-10-CM

## 2017-04-10 LAB
BASOPHILS # BLD AUTO: 0 10E9/L (ref 0–0.2)
BASOPHILS NFR BLD AUTO: 0.4 %
DIFFERENTIAL METHOD BLD: ABNORMAL
EOSINOPHIL # BLD AUTO: 0 10E9/L (ref 0–0.7)
EOSINOPHIL NFR BLD AUTO: 1.5 %
ERYTHROCYTE [DISTWIDTH] IN BLOOD BY AUTOMATED COUNT: 11.8 % (ref 10–15)
HCT VFR BLD AUTO: 37.5 % (ref 35–47)
HGB BLD-MCNC: 12.7 G/DL (ref 11.7–15.7)
IMM GRANULOCYTES # BLD: 0 10E9/L (ref 0–0.4)
IMM GRANULOCYTES NFR BLD: 0.8 %
LYMPHOCYTES # BLD AUTO: 1 10E9/L (ref 0.8–5.3)
LYMPHOCYTES NFR BLD AUTO: 38.3 %
MCH RBC QN AUTO: 32.8 PG (ref 26.5–33)
MCHC RBC AUTO-ENTMCNC: 33.9 G/DL (ref 31.5–36.5)
MCV RBC AUTO: 97 FL (ref 78–100)
MONOCYTES # BLD AUTO: 0.4 10E9/L (ref 0–1.3)
MONOCYTES NFR BLD AUTO: 16.9 %
NEUTROPHILS # BLD AUTO: 1.1 10E9/L (ref 1.6–8.3)
NEUTROPHILS NFR BLD AUTO: 42.1 %
NRBC # BLD AUTO: 0 10*3/UL
NRBC BLD AUTO-RTO: 0 /100
PLATELET # BLD AUTO: 180 10E9/L (ref 150–450)
RBC # BLD AUTO: 3.87 10E12/L (ref 3.8–5.2)
WBC # BLD AUTO: 2.6 10E9/L (ref 4–11)

## 2017-04-10 PROCEDURE — 36415 COLL VENOUS BLD VENIPUNCTURE: CPT | Performed by: NURSE PRACTITIONER

## 2017-04-10 PROCEDURE — T1013 SIGN LANG/ORAL INTERPRETER: HCPCS | Mod: U3,ZF

## 2017-04-10 PROCEDURE — 99212 OFFICE O/P EST SF 10 MIN: CPT | Mod: ZF

## 2017-04-10 PROCEDURE — 85025 COMPLETE CBC W/AUTO DIFF WBC: CPT | Performed by: NURSE PRACTITIONER

## 2017-04-10 RX ORDER — PAZOPANIB 200 MG/1
800 TABLET, FILM COATED ORAL DAILY
Qty: 120 TABLET | Refills: 5 | Status: SHIPPED | OUTPATIENT
Start: 2017-04-10 | End: 2017-01-01

## 2017-04-10 ASSESSMENT — PAIN SCALES - GENERAL: PAINLEVEL: NO PAIN (0)

## 2017-04-10 NOTE — LETTER
4/10/2017      RE: Olimpia Childress  [address]   [facility] [address]       No notes on file    Coretta Yanez MD, MD

## 2017-04-10 NOTE — LETTER
4/10/2017       RE: Olimpia Childress  [address]   [facility] [address]     Dear Colleague,    Thank you for referring your patient, Olimpia Childress, to the PEDS HEMATOLOGY ONCOLOGY at [facility]. Please see a copy of my visit note below.    Pediatric Hematology/Oncology Clinic Note    History- Olimpia initially presented with a growth on her right labia in 2013 when she was living in [facility]. 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 She discontinued further treatment b/c her physicians were unsure of the exact diagnosis and she felt uncomfortable proceeding. Olimpia subsequently immigrated to the  in August of 2015 and in October she first noticed a recurrence of the mass in the area of previous excision. She was in [place] at that time, seen by oncology and had a port placed but then moved to [place] where she was seen by Dr. Mckeon at [facility] in November. There she underwent an MRI that showed a solid and cystic subcutaneous mass in the right labia measuring 1.7 x 1.6 x 1cm with question of nodular extension vs a second nodule measuring 0.7 cm. There was no invasion into the vagina. On November 16th 2015, Olimpia had a biopsy of the mass by a gynecologist, Dr. Terry, at [facility]. Cytology was reviewed at [place] and read as a SMARCB1-deficient genital sarcoma, likely falling within the overall spectrum of malignant rhabdoid tumor. By immunohistochemistry, the cells shows a complete loss of SMARCb1. Wide spectrum cytokeratin, CD34, WT1, Desmin and CD99 were all negative. RT PCR for Wooten Sarcoma associated fusion transcripts were negative as well. Olimpia went on to have a PET/CT as well which showed multiple pulmonary lesions and biopsy confirmed a lesion to be metastatic disease. Olimpia was seen by Tomás White at [facility] who reviewed the  diagnosis and potential treatment plans with her, however she prefered to be treated here at [facility]. 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    Olimpia underwent left sided thoracotomy and resection of two pulmonary nodules on 7/20/16. Pathology revealed that one lesion was benign lymphoid tissue and the other was consistent with synovial sarcoma.  This was confirmed with FISH  With 91% of cell examined having a signal pattern indictivate of a rearrangement of the SS18 locus.  Olimpia has now completed 3 cycles of chemotherapy per [id number] and started her radiation on 9/13/16 and completed on 10/17/16.  She then went on to have a resection of her labial mass on 11/16/16 by Jannet Pastrana and Chikis with reconstruction, including skin flaps by Dr. Corona from plastics.  Pathology showed no residual tumor.  She had a f/u chest CT today on 12/5/16 that showed persistance of her pulmonary nodules as well as few additonal pulmonary nodules.  She saw Dr. Pierre who was in agreement with surgical resection, however Olimpia wanted to wait until March to have more time to recover from her last surgery. She underwent left sided thoracotomy on 3/8/17.    HPI:  Olimpia reports that she has been doing really well.  Her appetite and energy level are good.  She denies any pain.  No cough or SOB.  She does still feel at times as though her heart is racing.  No new lumps or bumps.         History was obtained from Olimpia via professional "[employer]" .     Allergies as of 04/10/2017 - Sheldon as Reviewed 04/10/2017   Allergen Reaction Noted     Heparin flush Other (See Comments) 01/27/2016     Pork derived products  02/09/2016     Tegaderm transparent dressing (informational only) Other (See Comments) and Rash 04/20/2016       Current Outpatient Prescriptions    Medication Sig Dispense Refill     pazopanib (VOTRIENT) 200 MG tablet CHEMOTHERAPY Take 4 tablets (800 mg) by mouth daily (Patient not taking: Reported on 5/1/2017) 120 tablet 5     bisacodyl (DULCOLAX) 5 MG EC tablet Take 1 tablet (5 mg) by mouth 2 times daily 30 tablet 2     oxyCODONE (ROXICODONE) 5 MG IR tablet Take 1-2 tablets (5-10 mg) by mouth every 4 hours as needed for moderate to severe pain 20 tablet 0     polyethylene glycol (MIRALAX/GLYCOLAX) Packet Take 17 g by mouth daily 7 packet 1     senna-docusate (SENOKOT-S;PERICOLACE) 8.6-50 MG per tablet Take 2 tablets by mouth 2 times daily as needed for constipation 30 tablet 1     acetaminophen (TYLENOL) 325 MG tablet Take 2 tablets (650 mg) by mouth every 4 hours as needed for mild pain 100 tablet 1     ibuprofen (ADVIL/MOTRIN) 600 MG tablet Take 1 tablet (600 mg) by mouth every 6 hours as needed for moderate pain 60 tablet 1     polyethylene glycol (MIRALAX/GLYCOLAX) Packet Take 17 g by mouth 2 times daily 510 g 3     senna-docusate (SENOKOT-S;PERICOLACE) 8.6-50 MG per tablet Take 2 tablets by mouth 2 times daily 100 tablet 1     diltiazem 2% in PLO cream, FV COMPOUNDED, 2% GEL To anal opening three times daily.  Use a pea-sized amount.  Store at room temperature. 60 g 0     ondansetron (ZOFRAN) 8 MG tablet Take 1 tablet (8 mg) by mouth every 6 hours as needed for nausea 30 tablet 6     diphenhydrAMINE (BENADRYL) 25 MG tablet Take 1-2 tablets (25-50 mg) by mouth every 6 hours as needed for other (Nausea or vomiting) 60 tablet 3     lidocaine-prilocaine (EMLA) cream Apply to port 30 minutes prior to access 30 g 5     pantoprazole (PROTONIX) 40 MG enteric coated tablet Take 1 tablet (40 mg) by mouth daily 30 tablet 3     leuprolide (LUPRON DEPOT-PED) 11.25 MG KIT Inject 11.25 mg into the muscle every 3 months 1 each 1       Past Medical History:   Diagnosis Date     Anemia 6/3/2016     Constipation      Extrarenal rhabdoid neoplasm (H)      Febrile  neutropenia (H) 4/17/2016     History of blood transfusion      Latent tuberculosis      Lung disease      On antineoplastic chemotherapy      Sarcoma of vulva (H)        Social History     Social History     Marital status:      Spouse name: N/A     Number of children: N/A     Years of education: N/A     Occupational History     Not on file.     Social History Main Topics     Smoking status: Never Smoker     Smokeless tobacco: Never Used     Alcohol use No     Drug use: No     Sexual activity: No     Other Topics Concern     Not on file     Social History Narrative       Family History   Problem Relation Age of Onset     Other Cancer No family hx of      ROS  See HPI. Complete ROS otherwise negative.    Physical Exam   Wt Readings from Last 4 Encounters:   05/01/17 61 kg (134 lb 7.7 oz)   04/18/17 61.1 kg (134 lb 11.2 oz)   04/10/17 60.2 kg (132 lb 11.5 oz)   03/27/17 59.2 kg (130 lb 8.2 oz)            GENERAL: Alert, well appearing young woman in no acute distress.  SKIN: No rashes, lesions, or abnormal pigmentation.  HEAD: Normocephalic, atraumatic     EYES: Normal lids, conjunctivae/cornea normal, mild icterus. PERRL. EOMI.  EARS: Pinna normal b/l, no pain on palpation. External ears normal appearing.   NOSE: Clear, no discharge or congestion  MOUTH/THROAT: Oropharynx is clear. Normal dentition for age, no mucosal lesions.  NECK: Supple, full range of motion, no masses  LYMPH NODES: No cervical lymphadenopathy.  LUNGS: No increased WOB.  Lungs clear to auscultation throughout; slightly diminished in LLL.  No crackles or wheezes.  Left chest with large incision, healing well. Edges nicely approximated and without erythema nor drainage. Old chest tube site is C/D/I.  HEART: HR mildly tachycardic. S1 and S2 are normal. No murmurs, rubs, gallops. The radial pulses are 2+ bilaterally. Cap refill <3 sec in upper extremities.  ABDOMEN: Normal bowel sounds. Soft, non-tender, non-distended, no masses or  hepatosplenomegaly.  NEUROLOGIC: Grossly intact, no focal neurologic deficits.    :  Deferred today.    Labs:  Results for orders placed or performed in visit on 04/10/17   CBC with platelets differential   Result Value Ref Range    WBC 2.6 (L) 4.0 - 11.0 10e9/L    RBC Count 3.87 3.8 - 5.2 10e12/L    Hemoglobin 12.7 11.7 - 15.7 g/dL    Hematocrit 37.5 35.0 - 47.0 %    MCV 97 78 - 100 fl    MCH 32.8 26.5 - 33.0 pg    MCHC 33.9 31.5 - 36.5 g/dL    RDW 11.8 10.0 - 15.0 %    Platelet Count 180 150 - 450 10e9/L    Diff Method Automated Method     % Neutrophils 42.1 %    % Lymphocytes 38.3 %    % Monocytes 16.9 %    % Eosinophils 1.5 %    % Basophils 0.4 %    % Immature Granulocytes 0.8 %    Nucleated RBCs 0 0 /100    Absolute Neutrophil 1.1 (L) 1.6 - 8.3 10e9/L    Absolute Lymphocytes 1.0 0.8 - 5.3 10e9/L    Absolute Monocytes 0.4 0.0 - 1.3 10e9/L    Absolute Eosinophils 0.0 0.0 - 0.7 10e9/L    Absolute Basophils 0.0 0.0 - 0.2 10e9/L    Abs Immature Granulocytes 0.0 0 - 0.4 10e9/L    Absolute Nucleated RBC 0.0        Radiology:  Recent Results (from the past 24 hour(s))   X-ray Chest 2 vws*    Narrative    XR CHEST 2 VW  3/27/2017 11:20 AM      HISTORY: follow up pneumothorax, Malignant neoplasm of connective and  soft tissue, unspecified    COMPARISON: 3/23/2017    FINDINGS: PA and lateral views of the chest. Central venous catheter  tip projecting over the right atrium is stable in position. Slight  decrease in small right pneumothorax. Small left pleural effusion is  stable, with unchanged pulmonary opacities at the left lung base. The  cardiac silhouette size is normal. The visualized upper abdomen is  unremarkable.      Impression    IMPRESSION:   1. Slight decrease in small right pneumothorax.  2. Stable small left pleural effusion with unchanged left basilar  opacities.    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  Biopsy of labial mass reveals synovial sarcoma as well. Her recent PET CT does not show any new sites of disease (areas in buttocks correspond to Depo-Lupron injection sites).  She is s/p 3 cycles of chemotherapy per [id number]  (2nd and 3rd cycle 75% dosing), radiation and resection of the labial mass with reconstruction.  Pathology showed no residual tumor.  She delayed her thoracotomy and unfortunately had an increased tumor burden at time of her lung surgery, she had numerous masses removed from her left chest, all consistent with synovial sarcoma, once with positive margins.  She has had a difficult recovery but now is doing very well.        Plan:  1) Will plan for PET/CT to assess current disease burden to help Olimpia with treatment decisions  2) Discussed my recommendation to proceed with systemic therapy;  Olimpia does not want to pursue cytotoxic chemotherapy;  We discussed Pazopanib risks/potential benenfits;  She may be interested in starting this after the PET/CT  3)  F/U after PET/CT      I spent 45 minutes face to face with Olimpia, >50% of which was spent on counseling.    Again, thank you for allowing me to participate in the care of your patient.      Sincerely,    Coretta Yanez MD

## 2017-04-10 NOTE — MR AVS SNAPSHOT
After Visit Summary   4/10/2017    Olimpia Childress    MRN: [id number]           Patient Information     Date Of Birth          1995        Visit Information        Provider Department      4/10/2017 11:30 AM Yue Nichols Emily Gustava, MD Peds Hematology Oncology        Today's Diagnoses     Synovial sarcoma (H)              [facility], 9th floor  [address] [place] [address]  Phone: [phone]  Clinic Hours:   Monday-Friday:   7 am to 5:00 pm   closed weekends and major  holidays     If your fever is 100.5  or greater,   call the clinic during business hours.   After hours call [phone] and ask for the pediatric hematology / oncology physician to be paged for you.               Follow-ups after your visit        Your next 10 appointments already scheduled     Apr 17, 2017  8:00 AM CDT   PET ONCOLOGY WHOLE BODY with ADAIR MorrisonET1   [facility] [place] PET CT ([facility])    [address] [facility] [address] [phone]           Tell your doctor:   If there is any chance you may be pregnant or if you are breastfeeding.   If you have problems lying in small spaces (claustrophobia). If you do, your doctor may give you medicine to help you relax. If you have diabetes:   Have your exam early in the morning. Your blood glucose will go up as the day goes by.   Your glucose level must be 180 or less at the start of the exam. Please take any medicines you need to ensure this blood glucose level. 24 hours before your scan: Don t do any heavy exercise. (No jogging, aerobics or other workouts.) Exercise will make your pictures less accurate. 6 hours before your scan:   Stop all food and liquids (except water).   Do not chew gum or suck on mints.   If you need to take medicine with food, you may take it with a few crackers.  Please call your Imaging Department at your exam  site with any questions.            2017 11:15 AM CDT   Return Visit with Coretta Yanez MD, Mirna Espinoza Hematology Oncology ([facility])    [facility]  9th Floor  [address] [facility] [id number] [phone]            2017  1:15 PM CDT   Return Visit with MD Casi Owenss Surgery ([facility])    [facility]  [address]  [address] [facility] [id number] [phone]              Who to contact     Please call your clinic at [phone] to:    Ask questions about your health    Make or cancel appointments    Discuss your medicines    Learn about your test results    Speak to your doctor   If you have compliments or concerns about an experience at your clinic, or if you wish to file a complaint, please contact [facility] Patient Relations at [phone] or email us at [email or web address]         Additional Information About Your Visit        [employer] Information     [employer] is an electronic gateway that provides easy, online access to your medical records. With [employer] you can request a clinic appointment, read your test results, renew a prescription or communicate with your care team.     To sign up for [employer] visit the website at [email or web address]   You will be asked to enter the access code listed below, as well as some personal information. Please follow the directions to create your username and password.     Your access code is: [email or web address]  Expires: 2017 10:41 AM     Your access code will  in 90 days. If you need help or a new code, please contact your [facility] or call [phone] for assistance.        Care EveryWhere ID     This is your Care EveryWhere ID. This could be used by other organizations to access your [place] medical records  [id number]        Your Vitals Were     Pulse Temperature  "Respirations Height Pulse Oximetry BMI (Body Mass Index)    79 97.6  F (36.4  C) (Oral) 24 5' 3.19\" (1.605 m) 95% 23.37 kg/m2       Blood Pressure from Last 3 Encounters:   04/10/17 106/67   03/27/17 99/60   03/23/17 112/72    Weight from Last 3 Encounters:   04/10/17 132 lb 11.5 oz (60.2 kg)   03/27/17 130 lb 8.2 oz (59.2 kg)   03/23/17 130 lb 11.7 oz (59.3 kg)              We Performed the Following     CBC with platelets differential          Today's Medication Changes          These changes are accurate as of: 4/10/17 11:59 PM.  If you have any questions, ask your nurse or doctor.               Start taking these medicines.        Dose/Directions    pazopanib 200 MG tablet CHEMOTHERAPY   Commonly known as:  VOTRIENT   Used for:  Synovial sarcoma (H)   Started by:  Coretta Yanez MD        Dose:  800 mg   Take 4 tablets (800 mg) by mouth daily   Quantity:  120 tablet   Refills:  5            Where to get your medicines      These medications were sent to [facility] [place], [place] - [address]  [address] [address]     Phone:  [phone]     pazopanib 200 MG tablet CHEMOTHERAPY                Primary Care Provider Office Phone # Fax #    Coretta Yanez [phone]       [place] [address] [facility] [address]        Thank you!     Thank you for choosing PEDS HEMATOLOGY ONCOLOGY  for your care. Our goal is always to provide you with excellent care. Hearing back from our patients is one way we can continue to improve our services. Please take a few minutes to complete the written survey that you may receive in the mail after your visit with us. Thank you!             Your Updated Medication List - Protect others around you: Learn how to safely use, store and throw away your medicines at www.disposemymeds.org.          This list is accurate as of: 4/10/17 11:59 PM.  Always use your most recent med list.             "       Brand Name Dispense Instructions for use    acetaminophen 325 MG tablet    TYLENOL    100 tablet    Take 2 tablets (650 mg) by mouth every 4 hours as needed for mild pain       bisacodyl 5 MG EC tablet     30 tablet    Take 1 tablet (5 mg) by mouth 2 times daily       diltiazem 2% in PLO cream (FV COMPOUNDED) 2% Gel     60 g    To anal opening three times daily.  Use a pea-sized amount.  Store at room temperature.       diphenhydrAMINE 25 MG tablet    BENADRYL    60 tablet    Take 1-2 tablets (25-50 mg) by mouth every 6 hours as needed for other (Nausea or vomiting)       ibuprofen 600 MG tablet    ADVIL/MOTRIN    60 tablet    Take 1 tablet (600 mg) by mouth every 6 hours as needed for moderate pain       leuprolide 11.25 MG Kit kit    LUPRON DEPOT-PED    1 each    Inject 11.25 mg into the muscle every 3 months       lidocaine-prilocaine cream    EMLA    30 g    Apply to port 30 minutes prior to access       ondansetron 8 MG tablet    ZOFRAN    30 tablet    Take 1 tablet (8 mg) by mouth every 6 hours as needed for nausea       oxyCODONE 5 MG IR tablet    ROXICODONE    20 tablet    Take 1-2 tablets (5-10 mg) by mouth every 4 hours as needed for moderate to severe pain       pantoprazole 40 MG EC tablet    PROTONIX    30 tablet    Take 1 tablet (40 mg) by mouth daily       pazopanib 200 MG tablet CHEMOTHERAPY    VOTRIENT    120 tablet    Take 4 tablets (800 mg) by mouth daily       * polyethylene glycol Packet    MIRALAX/GLYCOLAX    510 g    Take 17 g by mouth 2 times daily       * polyethylene glycol Packet    MIRALAX/GLYCOLAX    7 packet    Take 17 g by mouth daily       * senna-docusate 8.6-50 MG per tablet    SENOKOT-S;PERICOLACE    100 tablet    Take 2 tablets by mouth 2 times daily       * senna-docusate 8.6-50 MG per tablet    SENOKOT-S;PERICOLACE    30 tablet    Take 2 tablets by mouth 2 times daily as needed for constipation       * Notice:  This list has 4 medication(s) that are the same as other  medications prescribed for you. Read the directions carefully, and ask your doctor or other care provider to review them with you.

## 2017-04-10 NOTE — NURSING NOTE
"Chief Complaint   Patient presents with     RECHECK     labs and exam        Initial /67 (BP Location: Right arm, Patient Position: Chair, Cuff Size: Adult Regular)  Pulse 79  Temp 97.6  F (36.4  C) (Oral)  Resp 24  Ht 5' 3.19\" (160.5 cm)  Wt 132 lb 11.5 oz (60.2 kg)  SpO2 95%  BMI 23.37 kg/m2 Estimated body mass index is 23.37 kg/(m^2) as calculated from the following:    Height as of this encounter: 5' 3.19\" (160.5 cm).    Weight as of this encounter: 132 lb 11.5 oz (60.2 kg).  Medication Reconciliation: complete    "

## 2017-04-10 NOTE — LETTER
4/10/2017      RE: Olimpia Childress  [address]   [facility] [address]       No notes on file    Coretta Yanez MD, MD

## 2017-04-10 NOTE — LETTER
4/10/2017      RE: Olimpia Childress  [address]   [facility] [address]       No notes on file    Coretta Yanez MD, MD

## 2017-04-17 ENCOUNTER — TELEPHONE (OUTPATIENT)
Dept: CARE COORDINATION | Facility: CLINIC | Age: 22
End: 2017-04-17

## 2017-04-17 NOTE — TELEPHONE ENCOUNTER
Received page from [facility] stating Olimpia didn't have transportation to appointments today. Called Olimpia with [facility]  to see if she needed SW assistance getting transportation for today. Olimpia reported that they weren't able to reschedule her for later today, so her appointments will need to be rescheduled for another day and she is waiting to hear back from the clinic as to when she can get in again. Discussed that THI Burnett was out and that this writer is covering in her absence. Provided this writer's direct number but also explained that I can be reached at [employer]'s number, as well. Olimpia expressed understand and thanked THI for the info; denied any additional questions or concerns for SW at this time.     Jessie Looney, BELKIS, [facility]  [place]  - Pediatric Hematology/Oncology  Phone: [phone]  Pager: [phone]  [email or web address]     NO LETTER

## 2017-04-18 ENCOUNTER — OFFICE VISIT (OUTPATIENT)
Dept: SURGERY | Facility: CLINIC | Age: 22
End: 2017-04-18
Attending: SURGERY
Payer: COMMERCIAL

## 2017-04-18 VITALS
BODY MASS INDEX: 23.87 KG/M2 | HEIGHT: 63 IN | HEART RATE: 89 BPM | DIASTOLIC BLOOD PRESSURE: 65 MMHG | SYSTOLIC BLOOD PRESSURE: 103 MMHG | WEIGHT: 134.7 LBS

## 2017-04-18 DIAGNOSIS — C78.02 METASTATIC SARCOMA TO LUNG, LEFT (H): Primary | ICD-10-CM

## 2017-04-18 PROCEDURE — 99024 POSTOP FOLLOW-UP VISIT: CPT | Mod: ZP | Performed by: SURGERY

## 2017-04-18 PROCEDURE — 99212 OFFICE O/P EST SF 10 MIN: CPT | Mod: ZF

## 2017-04-18 NOTE — PROGRESS NOTES
2017         Coretta Yanez MD    [facility] Pediatric Oncology      RE: Olimpia Childress   MRN: [id number]   : 1995      Dear Dr. Yanez:      It was a pleasure to see your patient, Olimpia Childress, here at the Pediatric Surgery Clinic.  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  Currently she is doing well and recovering nicely.      On exam, her incisions have healed up very nicely and I would like to follow her now on an as-needed basis.      I appreciate the opportunity to participate in the care of your patient.  If there are any questions or concerns, please do not hesitate to contact me.      Sincerely,         Byron Pierre MD   Pediatric Surgery

## 2017-04-18 NOTE — LETTER
2017      RE: Olimpia Childress  [address]   [facility] [address]       2017         Coretta Yanez MD    [facility] Pediatric Oncology      RE: Olimpia Childress   MRN: [id number]   : 1995      Dear Dr. Yanez:      It was a pleasure to see your patient, Olimpia Childress, here at the Pediatric Surgery Clinic.  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  Currently she is doing well and recovering nicely.      On exam, her incisions have healed up very nicely and I would like to follow her now on an as-needed basis.      I appreciate the opportunity to participate in the care of your patient.  If there are any questions or concerns, please do not hesitate to contact me.      Sincerely,      Byron Pierre MD   Pediatric Surgery

## 2017-04-18 NOTE — MR AVS SNAPSHOT
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      Your next 10 appointments already scheduled     May 01, 2017 12:00 PM CDT   PET ONCOLOGY WHOLE BODY with UUPET1   [facility] [place] PET CT ([facility])    [address] [facility] [address] [phone]           Tell your doctor:   If there is any chance you may be pregnant or if you are breastfeeding.   If you have problems lying in small spaces (claustrophobia). If you do, your doctor may give you medicine to help you relax. If you have diabetes:   Have your exam early in the morning. Your blood glucose will go up as the day goes by.   Your glucose level must be 180 or less at the start of the exam. Please take any medicines you need to ensure this blood glucose level. 24 hours before your scan: Don t do any heavy exercise. (No jogging, aerobics or other workouts.) Exercise will make your pictures less accurate. 6 hours before your scan:   Stop all food and liquids (except water).   Do not chew gum or suck on mints.   If you need to take medicine with food, you may take it with a few crackers.  Please call your Imaging Department at your exam site with any questions.            May 01, 2017  2:30 PM CDT   Return Visit with Coretta Yanez MD   Peds Hematology Oncology ([facility])    [facility]  9th Floor  [address] [facility] [address] [phone]              Who to contact     Please call your clinic at  "[phone] to:    Ask questions about your health    Make or cancel appointments    Discuss your medicines    Learn about your test results    Speak to your doctor   If you have compliments or concerns about an experience at your clinic, or if you wish to file a complaint, please contact [facility] Patient Relations at [phone] or email us at [email or web address]         Additional Information About Your Visit        [employer] Information     [employer] is an electronic gateway that provides easy, online access to your medical records. With [employer], you can request a clinic appointment, read your test results, renew a prescription or communicate with your care team.     To sign up for [employer] visit the website at [email or web address]   You will be asked to enter the access code listed below, as well as some personal information. Please follow the directions to create your username and password.     Your access code is: [email or web address]  Expires: 2017 10:41 AM     Your access code will  in 90 days. If you need help or a new code, please contact your [facility] or call [phone] for assistance.        Care EveryWhere ID     This is your Care EveryWhere ID. This could be used by other organizations to access your [place] medical records  [id number]        Your Vitals Were     Pulse Height BMI (Body Mass Index)             89 1.605 m (5' 3.19\") 23.72 kg/m2          Blood Pressure from Last 3 Encounters:   17 103/65   04/10/17 106/67   17 99/60    Weight from Last 3 Encounters:   17 61.1 kg (134 lb 11.2 oz)   04/10/17 60.2 kg (132 lb 11.5 oz)   17 59.2 kg (130 lb 8.2 oz)              Today, you had the following     No orders found for display       Primary Care Provider Office Phone # Fax #    Coretta Yanez [phone]       [address] [address]   "      Thank you!     Thank you for choosing PEDS SURGERY  for your care. Our goal is always to provide you with excellent care. Hearing back from our patients is one way we can continue to improve our services. Please take a few minutes to complete the written survey that you may receive in the mail after your visit with us. Thank you!             Your Updated Medication List - Protect others around you: Learn how to safely use, store and throw away your medicines at www.disposemymeds.org.          This list is accurate as of: 4/18/17 11:59 PM.  Always use your most recent med list.                   Brand Name Dispense Instructions for use    acetaminophen 325 MG tablet    TYLENOL    100 tablet    Take 2 tablets (650 mg) by mouth every 4 hours as needed for mild pain       bisacodyl 5 MG EC tablet     30 tablet    Take 1 tablet (5 mg) by mouth 2 times daily       diltiazem 2% in PLO cream (FV COMPOUNDED) 2% Gel     60 g    To anal opening three times daily.  Use a pea-sized amount.  Store at room temperature.       diphenhydrAMINE 25 MG tablet    BENADRYL    60 tablet    Take 1-2 tablets (25-50 mg) by mouth every 6 hours as needed for other (Nausea or vomiting)       ibuprofen 600 MG tablet    ADVIL/MOTRIN    60 tablet    Take 1 tablet (600 mg) by mouth every 6 hours as needed for moderate pain       leuprolide 11.25 MG Kit kit    LUPRON DEPOT-PED    1 each    Inject 11.25 mg into the muscle every 3 months       lidocaine-prilocaine cream    EMLA    30 g    Apply to port 30 minutes prior to access       ondansetron 8 MG tablet    ZOFRAN    30 tablet    Take 1 tablet (8 mg) by mouth every 6 hours as needed for nausea       oxyCODONE 5 MG IR tablet    ROXICODONE    20 tablet    Take 1-2 tablets (5-10 mg) by mouth every 4 hours as needed for moderate to severe pain       pantoprazole 40 MG EC tablet    PROTONIX    30 tablet    Take 1 tablet (40 mg) by mouth daily       pazopanib 200 MG tablet CHEMOTHERAPY    VOTRIENT     120 tablet    Take 4 tablets (800 mg) by mouth daily       * polyethylene glycol Packet    MIRALAX/GLYCOLAX    510 g    Take 17 g by mouth 2 times daily       * polyethylene glycol Packet    MIRALAX/GLYCOLAX    7 packet    Take 17 g by mouth daily       * senna-docusate 8.6-50 MG per tablet    SENOKOT-S;PERICOLACE    100 tablet    Take 2 tablets by mouth 2 times daily       * senna-docusate 8.6-50 MG per tablet    SENOKOT-S;PERICOLACE    30 tablet    Take 2 tablets by mouth 2 times daily as needed for constipation       * Notice:  This list has 4 medication(s) that are the same as other medications prescribed for you. Read the directions carefully, and ask your doctor or other care provider to review them with you.

## 2017-05-01 ENCOUNTER — HOSPITAL ENCOUNTER (OUTPATIENT)
Dept: PET IMAGING | Facility: CLINIC | Age: 22
Discharge: HOME | End: 2017-05-01
Attending: PEDIATRICS | Admitting: PEDIATRICS
Payer: COMMERCIAL

## 2017-05-01 ENCOUNTER — OFFICE VISIT (OUTPATIENT)
Dept: PEDIATRIC HEMATOLOGY/ONCOLOGY | Facility: CLINIC | Age: 22
End: 2017-05-01
Attending: PEDIATRICS
Payer: COMMERCIAL

## 2017-05-01 VITALS
RESPIRATION RATE: 16 BRPM | WEIGHT: 134.48 LBS | HEART RATE: 83 BPM | TEMPERATURE: 97.9 F | HEIGHT: 63 IN | SYSTOLIC BLOOD PRESSURE: 113 MMHG | BODY MASS INDEX: 23.83 KG/M2 | DIASTOLIC BLOOD PRESSURE: 64 MMHG | OXYGEN SATURATION: 97 %

## 2017-05-01 DIAGNOSIS — C49.9 SYNOVIAL SARCOMA (H): Primary | ICD-10-CM

## 2017-05-01 DIAGNOSIS — C49.9 SYNOVIAL SARCOMA (H): ICD-10-CM

## 2017-05-01 LAB
BASOPHILS # BLD AUTO: 0 10E9/L (ref 0–0.2)
BASOPHILS NFR BLD AUTO: 0.2 %
DIFFERENTIAL METHOD BLD: NORMAL
EOSINOPHIL # BLD AUTO: 0 10E9/L (ref 0–0.7)
EOSINOPHIL NFR BLD AUTO: 0.6 %
ERYTHROCYTE [DISTWIDTH] IN BLOOD BY AUTOMATED COUNT: 11.1 % (ref 10–15)
GLUCOSE BLDC GLUCOMTR-MCNC: 88 MG/DL (ref 70–99)
HCT VFR BLD AUTO: 37.7 % (ref 35–47)
HGB BLD-MCNC: 13.2 G/DL (ref 11.7–15.7)
IMM GRANULOCYTES # BLD: 0 10E9/L (ref 0–0.4)
IMM GRANULOCYTES NFR BLD: 0.2 %
LYMPHOCYTES # BLD AUTO: 1.5 10E9/L (ref 0.8–5.3)
LYMPHOCYTES NFR BLD AUTO: 30.4 %
MCH RBC QN AUTO: 32.9 PG (ref 26.5–33)
MCHC RBC AUTO-ENTMCNC: 35 G/DL (ref 31.5–36.5)
MCV RBC AUTO: 94 FL (ref 78–100)
MONOCYTES # BLD AUTO: 0.7 10E9/L (ref 0–1.3)
MONOCYTES NFR BLD AUTO: 13.4 %
NEUTROPHILS # BLD AUTO: 2.7 10E9/L (ref 1.6–8.3)
NEUTROPHILS NFR BLD AUTO: 55.2 %
NRBC # BLD AUTO: 0 10*3/UL
NRBC BLD AUTO-RTO: 0 /100
PLATELET # BLD AUTO: 159 10E9/L (ref 150–450)
RBC # BLD AUTO: 4.01 10E12/L (ref 3.8–5.2)
WBC # BLD AUTO: 4.9 10E9/L (ref 4–11)

## 2017-05-01 PROCEDURE — 85025 COMPLETE CBC W/AUTO DIFF WBC: CPT | Performed by: NURSE PRACTITIONER

## 2017-05-01 PROCEDURE — 71260 CT THORAX DX C+: CPT

## 2017-05-01 PROCEDURE — 25500064 ZZH RX 255 OP 636: Performed by: PEDIATRICS

## 2017-05-01 PROCEDURE — T1013 SIGN LANG/ORAL INTERPRETER: HCPCS | Mod: U3

## 2017-05-01 PROCEDURE — A9552 F18 FDG: HCPCS | Performed by: PEDIATRICS

## 2017-05-01 PROCEDURE — 99213 OFFICE O/P EST LOW 20 MIN: CPT | Mod: 25,ZF

## 2017-05-01 PROCEDURE — 82962 GLUCOSE BLOOD TEST: CPT

## 2017-05-01 PROCEDURE — T1013 SIGN LANG/ORAL INTERPRETER: HCPCS | Mod: U3,ZF

## 2017-05-01 PROCEDURE — 74177 CT ABD & PELVIS W/CONTRAST: CPT

## 2017-05-01 PROCEDURE — 36415 COLL VENOUS BLD VENIPUNCTURE: CPT | Performed by: NURSE PRACTITIONER

## 2017-05-01 PROCEDURE — 34300033 ZZH RX 343: Performed by: PEDIATRICS

## 2017-05-01 RX ORDER — IOPAMIDOL 755 MG/ML
69 INJECTION, SOLUTION INTRAVASCULAR ONCE
Status: COMPLETED | OUTPATIENT
Start: 2017-05-01 | End: 2017-05-01

## 2017-05-01 RX ADMIN — IOPAMIDOL 69 ML: 755 INJECTION, SOLUTION INTRAVENOUS at 12:46

## 2017-05-01 RX ADMIN — FLUDEOXYGLUCOSE F-18 10.12 MCI.: 500 INJECTION, SOLUTION INTRAVENOUS at 12:00

## 2017-05-01 ASSESSMENT — PAIN SCALES - GENERAL: PAINLEVEL: MODERATE PAIN (5)

## 2017-05-01 NOTE — LETTER
5/1/2017      RE: Olimpia Childress  [address]   [facility] [address]       Pediatric Hematology/Oncology Clinic Note    History- Olimpia initially presented with a growth on her right labia in 2013 when she was living in [place]. 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 She discontinued further treatment b/c her physicians were unsure of the exact diagnosis and she felt uncomfortable proceeding. Olimpia subsequently immigrated to the  in August of 2015 and in October she first noticed a recurrence of the mass in the area of previous excision. She was in [place] at that time, seen by oncology and had a port placed but then moved to [place] where she was seen by Dr. Mckeon at [facility] in November. There she underwent an MRI that showed a solid and cystic subcutaneous mass in the right labia measuring 1.7 x 1.6 x 1cm with question of nodular extension vs a second nodule measuring 0.7 cm. There was no invasion into the vagina. On November 16th 2015, Olimpia had a biopsy of the mass by a gynecologist, Dr. Terry, at [facility]. 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 By immunohistochemistry, the cells shows a complete loss of SMARCb1. Wide spectrum cytokeratin, CD34, WT1, Desmin and CD99 were all negative. RT PCR for Wooten Sarcoma associated fusion transcripts were negative as well. Olimpia went on to have a PET/CT as well which showed multiple pulmonary lesions and biopsy confirmed a lesion to be metastatic disease. Olimpia was seen by Tomás White at [place] who reviewed the diagnosis and potential treatment plans with her, however she prefered to be treated here at [facility]. 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    Olimpia underwent left sided thoracotomy and resection of two pulmonary nodules on 7/20/16. Pathology revealed that one lesion was benign lymphoid tissue and the other was consistent with synovial sarcoma.  This was confirmed with FISH  With 91% of cell examined having a signal pattern indictivate of a rearrangement of the SS18 locus.  Olimpia has now completed 3 cycles of chemotherapy per [id number] and started her radiation on 9/13/16 and completed on 10/17/16.  She then went on to have a resection of her labial mass on 11/16/16 by Jannet Pastrana and [facility] with reconstruction, including skin flaps by Dr. Corona from plastics.  Pathology showed no residual tumor.  She had a f/u chest CT today on 12/5/16 that showed persistance of her pulmonary nodules as well as few additonal pulmonary nodules.  She saw Dr. Pierre who was in agreement with surgical resection, however Olimpia wanted to wait until March to have more time to recover from her last surgery. She underwent left sided thoracotomy on 3/8/17.    HPI:  Olimpia reports that she is feeling well.  She denies pain, cough, SOB, palpitations or any other concerns.  She reports that her appetite and energy level as good.  No physical complaints today.      History was obtained from Olimpia via professional [employer] .     Allergies as of 05/01/2017 - [facility] as Reviewed 05/01/2017   Allergen Reaction Noted     Heparin flush Other (See Comments) 01/27/2016     Pork derived products  02/09/2016     Tegaderm transparent dressing (informational only) Other (See Comments) and Rash 04/20/2016       Current Outpatient Prescriptions   Medication Sig Dispense Refill     bisacodyl (DULCOLAX) 5 MG EC tablet Take 1 tablet (5 mg) by mouth 2 times daily 30 tablet 2     oxyCODONE (ROXICODONE) 5 MG IR tablet Take 1-2 tablets (5-10 mg) by mouth every 4 hours as needed for  moderate to severe pain 20 tablet 0     polyethylene glycol (MIRALAX/GLYCOLAX) Packet Take 17 g by mouth daily 7 packet 1     senna-docusate (SENOKOT-S;PERICOLACE) 8.6-50 MG per tablet Take 2 tablets by mouth 2 times daily as needed for constipation 30 tablet 1     acetaminophen (TYLENOL) 325 MG tablet Take 2 tablets (650 mg) by mouth every 4 hours as needed for mild pain 100 tablet 1     ibuprofen (ADVIL/MOTRIN) 600 MG tablet Take 1 tablet (600 mg) by mouth every 6 hours as needed for moderate pain 60 tablet 1     polyethylene glycol (MIRALAX/GLYCOLAX) Packet Take 17 g by mouth 2 times daily 510 g 3     senna-docusate (SENOKOT-S;PERICOLACE) 8.6-50 MG per tablet Take 2 tablets by mouth 2 times daily 100 tablet 1     diltiazem 2% in PLO cream, FV COMPOUNDED, 2% GEL To anal opening three times daily.  Use a pea-sized amount.  Store at room temperature. 60 g 0     ondansetron (ZOFRAN) 8 MG tablet Take 1 tablet (8 mg) by mouth every 6 hours as needed for nausea 30 tablet 6     diphenhydrAMINE (BENADRYL) 25 MG tablet Take 1-2 tablets (25-50 mg) by mouth every 6 hours as needed for other (Nausea or vomiting) 60 tablet 3     lidocaine-prilocaine (EMLA) cream Apply to port 30 minutes prior to access 30 g 5     pantoprazole (PROTONIX) 40 MG enteric coated tablet Take 1 tablet (40 mg) by mouth daily 30 tablet 3     pazopanib (VOTRIENT) 200 MG tablet CHEMOTHERAPY Take 4 tablets (800 mg) by mouth daily (Patient not taking: Reported on 5/1/2017) 120 tablet 5     leuprolide (LUPRON DEPOT-PED) 11.25 MG KIT Inject 11.25 mg into the muscle every 3 months 1 each 1       Past Medical History:   Diagnosis Date     Anemia 6/3/2016     Constipation      Extrarenal rhabdoid neoplasm (H)      Febrile neutropenia (H) 4/17/2016     History of blood transfusion      Latent tuberculosis      Lung disease      On antineoplastic chemotherapy      Sarcoma of vulva (H)        Social History     Social History     Marital status:      Spouse  name: N/A     Number of children: N/A     Years of education: N/A     Occupational History     Not on file.     Social History Main Topics     Smoking status: Never Smoker     Smokeless tobacco: Never Used     Alcohol use No     Drug use: No     Sexual activity: No     Other Topics Concern     Not on file     Social History Narrative       Family History   Problem Relation Age of Onset     Other Cancer No family hx of      ROS  See HPI. Complete ROS otherwise negative.    Physical Exam   Wt Readings from Last 4 Encounters:   05/01/17 61 kg (134 lb 7.7 oz)   04/18/17 61.1 kg (134 lb 11.2 oz)   04/10/17 60.2 kg (132 lb 11.5 oz)   03/27/17 59.2 kg (130 lb 8.2 oz)       Temp:  [97.9  F (36.6  C)] 97.9  F (36.6  C)  Pulse:  [83] 83  Resp:  [16] 16  BP: (113)/(64) 113/64  SpO2:  [97 %] 97 %    GENERAL: Alert, well appearing young woman in no acute distress.  SKIN: No rashes, lesions, or abnormal pigmentation.  HEAD: Normocephalic, atraumatic     EYES: Normal lids, conjunctivae/cornea normal, mild icterus. PERRL. EOMI.  EARS: Pinna normal b/l, no pain on palpation. External ears normal appearing.   NOSE: Clear, no discharge or congestion  MOUTH/THROAT: Oropharynx is clear. Normal dentition for age, no mucosal lesions.  NECK: Supple, full range of motion, no masses  LYMPH NODES: No cervical lymphadenopathy.  LUNGS: No increased WOB.  Lungs clear to auscultation throughout; slightly diminished in LLL.  No crackles or wheezes.  Left chest with large incision, healing well. Edges nicely approximated and without erythema nor drainage. Old chest tube site is C/D/I.  HEART: HR mildly tachycardic. S1 and S2 are normal. No murmurs, rubs, gallops. The radial pulses are 2+ bilaterally. Cap refill <3 sec in upper extremities.  ABDOMEN: Normal bowel sounds. Soft, non-tender, non-distended, no masses or hepatosplenomegaly.  NEUROLOGIC: Grossly intact, no focal neurologic deficits.    :  Deferred today.    Labs:  Results for orders  placed or performed in visit on 05/01/17   CBC with platelets differential   Result Value Ref Range    WBC 4.9 4.0 - 11.0 10e9/L    RBC Count 4.01 3.8 - 5.2 10e12/L    Hemoglobin 13.2 11.7 - 15.7 g/dL    Hematocrit 37.7 35.0 - 47.0 %    MCV 94 78 - 100 fl    MCH 32.9 26.5 - 33.0 pg    MCHC 35.0 31.5 - 36.5 g/dL    RDW 11.1 10.0 - 15.0 %    Platelet Count 159 150 - 450 10e9/L    Diff Method Automated Method     % Neutrophils 55.2 %    % Lymphocytes 30.4 %    % Monocytes 13.4 %    % Eosinophils 0.6 %    % Basophils 0.2 %    % Immature Granulocytes 0.2 %    Nucleated RBCs 0 0 /100    Absolute Neutrophil 2.7 1.6 - 8.3 10e9/L    Absolute Lymphocytes 1.5 0.8 - 5.3 10e9/L    Absolute Monocytes 0.7 0.0 - 1.3 10e9/L    Absolute Eosinophils 0.0 0.0 - 0.7 10e9/L    Absolute Basophils 0.0 0.0 - 0.2 10e9/L    Abs Immature Granulocytes 0.0 0 - 0.4 10e9/L    Absolute Nucleated RBC 0.0        Radiology:  PET/CT reviewed by myself with radiology      Impression:  Olimpia is a 22-year-old female with 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  Biopsy of labial mass reveals synovial sarcoma as well. Her recent PET CT does not show any new sites of disease (areas in buttocks correspond to Depo-Lupron injection sites).  She is s/p 3 cycles of chemotherapy per [id number]  (2nd and 3rd cycle 75% dosing), radiation and resection of the labial mass with reconstruction.  Pathology showed no residual tumor.  She delayed her thoracotomy and unfortunately had an increased tumor burden at time of her lung surgery, she had numerous masses removed from her left chest, all consistent with synovial sarcoma, once with positive  margins.  She has had a difficult recovery.      Olimpia had a PET/CT today that demonstrates significant progression of her pulmonary metastatic disease, with an anterior mediastinal mass compression her right ventricle and potentially right outflow tract.  The results were discussed in length with Olimpia as well as my concern that there are no therapies available that will cure Olimpia of this disease.  I stated that the goal is to keep her feeling as good as possible for as long as possible.  I also stated my concern regarding the compression of her right ventricle and rightsided outflow tract.    I discussed options with Olimpia including outpatient chemotherapy, more targeted therapy with Pazopanib or potentially immunotherapy.  I also discussed with her that I would like to talk to Dr. Man regarding the role of palliative radiotherapy to the mediastinal mass.       Plan:  1) Will start Pazopanib once medication arrives in pharmacy;  Will need to get a UA and CMP as well  2) Will discuss the role of palliative radiotherapy with Dr. Man  3)  Will do repeat chest CT 4-8 weeks after initiaiting pazopanib.  4) I will f/u over the phone with Olimpia after I speak with Dr. Man and we will determine her next f/u appt at that time.    I spent 45 minutes face to face with Olimpia, >50% of which was spent on counseling.      May 1, 2017      Re:  Olimpia Childress  : [birth year]    To Whom it may concern:    Due to medical reasons, Olimpia is unable to receive any live vaccines until further notice.    Sincerely,    Coretta Yanez M.D

## 2017-05-01 NOTE — MR AVS SNAPSHOT
After Visit Summary   5/1/2017    Olimpia Childress    MRN: [id number]           Patient Information     Date Of Birth          1995        Visit Information        Provider Department      5/1/2017 2:30 PM Coretta Yanez MD Peds Hematology Oncology            [facility], 9th floor  [address] [place] [address]  Phone: [phone]  Clinic Hours:   Monday-Friday:   7 am to 5:00 pm   closed weekends and major  holidays     If your fever is 100.5  or greater,   call the clinic during business hours.   After hours call [phone] and ask for the pediatric hematology / oncology physician to be paged for you.               Follow-ups after your visit        Your next 10 appointments already scheduled     May 01, 2017 12:00 PM CDT   PET ONCOLOGY WHOLE BODY with UUPET1   [facility] [place] PET CT ([facility] [place])    [address] [address] [phone]           Tell your doctor:   If there is any chance you may be pregnant or if you are breastfeeding.   If you have problems lying in small spaces (claustrophobia). If you do, your doctor may give you medicine to help you relax. If you have diabetes:   Have your exam early in the morning. Your blood glucose will go up as the day goes by.   Your glucose level must be 180 or less at the start of the exam. Please take any medicines you need to ensure this blood glucose level. 24 hours before your scan: Don t do any heavy exercise. (No jogging, aerobics or other workouts.) Exercise will make your pictures less accurate. 6 hours before your scan:   Stop all food and liquids (except water).   Do not chew gum or suck on mints.   If you need to take medicine with food, you may take it with a few crackers.  Please call your Imaging Department at your exam site with any questions.            May 01, 2017  2:30 PM CDT   Return Visit with Coretta  MD Alexis Keating Hematology Oncology ([facility])    [facility]  9th Floor  [address] [address] [phone]              Who to contact     Please call your clinic at [phone] to:    Ask questions about your health    Make or cancel appointments    Discuss your medicines    Learn about your test results    Speak to your doctor   If you have compliments or concerns about an experience at your clinic, or if you wish to file a complaint, please contact [facility] Patient Relations at [phone] or email us at [email or web address]         Additional Information About Your Visit        [employer] Information     [employer] is an electronic gateway that provides easy, online access to your medical records. With [employer], you can request a clinic appointment, read your test results, renew a prescription or communicate with your care team.     To sign up for [employer] visit the website at [email or web address]   You will be asked to enter the access code listed below, as well as some personal information. Please follow the directions to create your username and password.     Your access code is: [email or web address]  Expires: 2017 10:41 AM     Your access code will  in 90 days. If you need help or a new code, please contact your [facility] or call [phone] for assistance.        Care EveryWhere ID     This is your Care EveryWhere ID. This could be used by other organizations to access your [place] medical records  [id number]         Blood Pressure from Last 3 Encounters:   17 103/65   04/10/17 106/67   17 99/60    Weight from Last 3 Encounters:   17 61.1 kg (134 lb 11.2 oz)   04/10/17 60.2 kg (132 lb 11.5 oz)   17 59.2 kg (130 lb 8.2 oz)              Today, you had the following     No orders found for display       Primary Care Provider Office Phone # Pln  #    Coretta Yanez [phone]       [address] [address]        Thank you!     Thank you for choosing PEDS HEMATOLOGY ONCOLOGY  for your care. Our goal is always to provide you with excellent care. Hearing back from our patients is one way we can continue to improve our services. Please take a few minutes to complete the written survey that you may receive in the mail after your visit with us. Thank you!             Your Updated Medication List - Protect others around you: Learn how to safely use, store and throw away your medicines at www.disposemymeds.org.          This list is accurate as of: 4/19/17  8:23 AM.  Always use your most recent med list.                   Brand Name Dispense Instructions for use    acetaminophen 325 MG tablet    TYLENOL    100 tablet    Take 2 tablets (650 mg) by mouth every 4 hours as needed for mild pain       bisacodyl 5 MG EC tablet     30 tablet    Take 1 tablet (5 mg) by mouth 2 times daily       diltiazem 2% in PLO cream (FV COMPOUNDED) 2% Gel     60 g    To anal opening three times daily.  Use a pea-sized amount.  Store at room temperature.       diphenhydrAMINE 25 MG tablet    BENADRYL    60 tablet    Take 1-2 tablets (25-50 mg) by mouth every 6 hours as needed for other (Nausea or vomiting)       ibuprofen 600 MG tablet    ADVIL/MOTRIN    60 tablet    Take 1 tablet (600 mg) by mouth every 6 hours as needed for moderate pain       leuprolide 11.25 MG Kit kit    LUPRON DEPOT-PED    1 each    Inject 11.25 mg into the muscle every 3 months       lidocaine-prilocaine cream    EMLA    30 g    Apply to port 30 minutes prior to access       ondansetron 8 MG tablet    ZOFRAN    30 tablet    Take 1 tablet (8 mg) by mouth every 6 hours as needed for nausea       oxyCODONE 5 MG IR tablet    ROXICODONE    20 tablet    Take 1-2 tablets (5-10 mg) by mouth every 4 hours as needed for moderate to severe pain       pantoprazole 40  MG EC tablet    PROTONIX    30 tablet    Take 1 tablet (40 mg) by mouth daily       pazopanib 200 MG tablet CHEMOTHERAPY    VOTRIENT    120 tablet    Take 4 tablets (800 mg) by mouth daily       * polyethylene glycol Packet    MIRALAX/GLYCOLAX    510 g    Take 17 g by mouth 2 times daily       * polyethylene glycol Packet    MIRALAX/GLYCOLAX    7 packet    Take 17 g by mouth daily       * senna-docusate 8.6-50 MG per tablet    SENOKOT-S;PERICOLACE    100 tablet    Take 2 tablets by mouth 2 times daily       * senna-docusate 8.6-50 MG per tablet    SENOKOT-S;PERICOLACE    30 tablet    Take 2 tablets by mouth 2 times daily as needed for constipation       * Notice:  This list has 4 medication(s) that are the same as other medications prescribed for you. Read the directions carefully, and ask your doctor or other care provider to review them with you.

## 2017-05-01 NOTE — PROGRESS NOTES
Pediatric Hematology/Oncology Clinic Note    History- Olimpia initially presented with a growth on her right labia in 2013 when she was living in [place]. 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 She discontinued further treatment b/c her physicians were unsure of the exact diagnosis and she felt uncomfortable proceeding. Olimpia subsequently immigrated to the  in August of 2015 and in October she first noticed a recurrence of the mass in the area of previous excision. She was in [place] at that time, seen by oncology and had a port placed but then moved to [place] where she was seen by Dr. Mckeon at [facility] in November. There she underwent an MRI that showed a solid and cystic subcutaneous mass in the right labia measuring 1.7 x 1.6 x 1cm with question of nodular extension vs a second nodule measuring 0.7 cm. There was no invasion into the vagina. On November 16th 2015, Olimpia had a biopsy of the mass by a gynecologist, Dr. Terry, at [facility]. Cytology was reviewed at [place] and read as a SMARCB1-deficient genital sarcoma, likely falling within the overall spectrum of malignant rhabdoid tumor. By immunohistochemistry, the cells shows a complete loss of SMARCb1. Wide spectrum cytokeratin, CD34, WT1, Desmin and CD99 were all negative. RT PCR for Wooten Sarcoma associated fusion transcripts were negative as well. Olimpia went on to have a PET/CT as well which showed multiple pulmonary lesions and biopsy confirmed a lesion to be metastatic disease. Olimpia was seen by Tomás White at [place] who reviewed the diagnosis and potential treatment plans with her, however she prefered to be treated here at [facility]. 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    Olimpia underwent left sided thoracotomy and resection of two pulmonary nodules on 7/20/16. Pathology revealed that one lesion was benign lymphoid tissue and the other was consistent with synovial sarcoma.  This was confirmed with FISH  With 91% of cell examined having a signal pattern indictivate of a rearrangement of the SS18 locus.  Olimpia has now completed 3 cycles of chemotherapy per [id number] and started her radiation on 9/13/16 and completed on 10/17/16.  She then went on to have a resection of her labial mass on 11/16/16 by Jannet Pastrana and Chikis with reconstruction, including skin flaps by Dr. Corona from plastics.  Pathology showed no residual tumor.  She had a f/u chest CT today on 12/5/16 that showed persistance of her pulmonary nodules as well as few additonal pulmonary nodules.  She saw Dr. Pierre who was in agreement with surgical resection, however Olimpia wanted to wait until March to have more time to recover from her last surgery. She underwent left sided thoracotomy on 3/8/17.    HPI:  Olimpia reports that she is feeling well.  She denies pain, cough, SOB, palpitations or any other concerns.  She reports that her appetite and energy level as good.  No physical complaints today.      History was obtained from Olimpia via professional [employer] .     Allergies as of 05/01/2017 - Sheldon as Reviewed 05/01/2017   Allergen Reaction Noted     Heparin flush Other (See Comments) 01/27/2016     Pork derived products  02/09/2016     Tegaderm transparent dressing (informational only) Other (See Comments) and Rash 04/20/2016       Current Outpatient Prescriptions   Medication Sig Dispense Refill     bisacodyl (DULCOLAX) 5 MG EC tablet Take 1 tablet (5 mg) by mouth 2 times daily 30 tablet 2     oxyCODONE (ROXICODONE) 5 MG IR tablet Take 1-2 tablets (5-10 mg) by mouth every 4 hours as needed for moderate to severe pain 20 tablet 0     polyethylene glycol (MIRALAX/GLYCOLAX)  Packet Take 17 g by mouth daily 7 packet 1     senna-docusate (SENOKOT-S;PERICOLACE) 8.6-50 MG per tablet Take 2 tablets by mouth 2 times daily as needed for constipation 30 tablet 1     acetaminophen (TYLENOL) 325 MG tablet Take 2 tablets (650 mg) by mouth every 4 hours as needed for mild pain 100 tablet 1     ibuprofen (ADVIL/MOTRIN) 600 MG tablet Take 1 tablet (600 mg) by mouth every 6 hours as needed for moderate pain 60 tablet 1     polyethylene glycol (MIRALAX/GLYCOLAX) Packet Take 17 g by mouth 2 times daily 510 g 3     senna-docusate (SENOKOT-S;PERICOLACE) 8.6-50 MG per tablet Take 2 tablets by mouth 2 times daily 100 tablet 1     diltiazem 2% in PLO cream, FV COMPOUNDED, 2% GEL To anal opening three times daily.  Use a pea-sized amount.  Store at room temperature. 60 g 0     ondansetron (ZOFRAN) 8 MG tablet Take 1 tablet (8 mg) by mouth every 6 hours as needed for nausea 30 tablet 6     diphenhydrAMINE (BENADRYL) 25 MG tablet Take 1-2 tablets (25-50 mg) by mouth every 6 hours as needed for other (Nausea or vomiting) 60 tablet 3     lidocaine-prilocaine (EMLA) cream Apply to port 30 minutes prior to access 30 g 5     pantoprazole (PROTONIX) 40 MG enteric coated tablet Take 1 tablet (40 mg) by mouth daily 30 tablet 3     pazopanib (VOTRIENT) 200 MG tablet CHEMOTHERAPY Take 4 tablets (800 mg) by mouth daily (Patient not taking: Reported on 5/1/2017) 120 tablet 5     leuprolide (LUPRON DEPOT-PED) 11.25 MG KIT Inject 11.25 mg into the muscle every 3 months 1 each 1       Past Medical History:   Diagnosis Date     Anemia 6/3/2016     Constipation      Extrarenal rhabdoid neoplasm (H)      Febrile neutropenia (H) 4/17/2016     History of blood transfusion      Latent tuberculosis      Lung disease      On antineoplastic chemotherapy      Sarcoma of vulva (H)        Social History     Social History     Marital status:      Spouse name: N/A     Number of children: N/A     Years of education: N/A      Occupational History     Not on file.     Social History Main Topics     Smoking status: Never Smoker     Smokeless tobacco: Never Used     Alcohol use No     Drug use: No     Sexual activity: No     Other Topics Concern     Not on file     Social History Narrative       Family History   Problem Relation Age of Onset     Other Cancer No family hx of      ROS  See HPI. Complete ROS otherwise negative.    Physical Exam   Wt Readings from Last 4 Encounters:   05/01/17 61 kg (134 lb 7.7 oz)   04/18/17 61.1 kg (134 lb 11.2 oz)   04/10/17 60.2 kg (132 lb 11.5 oz)   03/27/17 59.2 kg (130 lb 8.2 oz)       Temp:  [97.9  F (36.6  C)] 97.9  F (36.6  C)  Pulse:  [83] 83  Resp:  [16] 16  BP: (113)/(64) 113/64  SpO2:  [97 %] 97 %    GENERAL: Alert, well appearing young woman in no acute distress.  SKIN: No rashes, lesions, or abnormal pigmentation.  HEAD: Normocephalic, atraumatic     EYES: Normal lids, conjunctivae/cornea normal, mild icterus. PERRL. EOMI.  EARS: Pinna normal b/l, no pain on palpation. External ears normal appearing.   NOSE: Clear, no discharge or congestion  MOUTH/THROAT: Oropharynx is clear. Normal dentition for age, no mucosal lesions.  NECK: Supple, full range of motion, no masses  LYMPH NODES: No cervical lymphadenopathy.  LUNGS: No increased WOB.  Lungs clear to auscultation throughout; slightly diminished in LLL.  No crackles or wheezes.  Left chest with large incision, healing well. Edges nicely approximated and without erythema nor drainage. Old chest tube site is C/D/I.  HEART: HR mildly tachycardic. S1 and S2 are normal. No murmurs, rubs, gallops. The radial pulses are 2+ bilaterally. Cap refill <3 sec in upper extremities.  ABDOMEN: Normal bowel sounds. Soft, non-tender, non-distended, no masses or hepatosplenomegaly.  NEUROLOGIC: Grossly intact, no focal neurologic deficits.    :  Deferred today.    Labs:  Results for orders placed or performed in visit on 05/01/17   CBC with platelets  differential   Result Value Ref Range    WBC 4.9 4.0 - 11.0 10e9/L    RBC Count 4.01 3.8 - 5.2 10e12/L    Hemoglobin 13.2 11.7 - 15.7 g/dL    Hematocrit 37.7 35.0 - 47.0 %    MCV 94 78 - 100 fl    MCH 32.9 26.5 - 33.0 pg    MCHC 35.0 31.5 - 36.5 g/dL    RDW 11.1 10.0 - 15.0 %    Platelet Count 159 150 - 450 10e9/L    Diff Method Automated Method     % Neutrophils 55.2 %    % Lymphocytes 30.4 %    % Monocytes 13.4 %    % Eosinophils 0.6 %    % Basophils 0.2 %    % Immature Granulocytes 0.2 %    Nucleated RBCs 0 0 /100    Absolute Neutrophil 2.7 1.6 - 8.3 10e9/L    Absolute Lymphocytes 1.5 0.8 - 5.3 10e9/L    Absolute Monocytes 0.7 0.0 - 1.3 10e9/L    Absolute Eosinophils 0.0 0.0 - 0.7 10e9/L    Absolute Basophils 0.0 0.0 - 0.2 10e9/L    Abs Immature Granulocytes 0.0 0 - 0.4 10e9/L    Absolute Nucleated RBC 0.0        Radiology:  PET/CT reviewed by myself with radiology      Impression:  Olimpia is a 22-year-old female with 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  Biopsy of labial mass reveals synovial sarcoma as well. Her recent PET CT does not show any new sites of disease (areas in buttocks correspond to Depo-Lupron injection sites).  She is s/p 3 cycles of chemotherapy per [id number]  (2nd and 3rd cycle 75% dosing), radiation and resection of the labial mass with reconstruction.  Pathology showed no residual tumor.  She delayed her thoracotomy and unfortunately had an increased tumor burden at time of her lung surgery, she had numerous masses removed from her left chest, all consistent with synovial sarcoma, once with positive margins.  She has had a difficult recovery.      Olimpia had a PET/CT  today that demonstrates significant progression of her pulmonary metastatic disease, with an anterior mediastinal mass compression her right ventricle and potentially right outflow tract.  The results were discussed in length with Olimpia as well as my concern that there are no therapies available that will cure Asma of this disease.  I stated that the goal is to keep her feeling as good as possible for as long as possible.  I also stated my concern regarding the compression of her right ventricle and rightsided outflow tract.    I discussed options with Olimpia including outpatient chemotherapy, more targeted therapy with Pazopanib or potentially immunotherapy.  I also discussed with her that I would like to talk to Dr. Man regarding the role of palliative radiotherapy to the mediastinal mass.       Plan:  1) Will start Pazopanib once medication arrives in pharmacy;  Will need to get a UA and CMP as well  2) Will discuss the role of palliative radiotherapy with Dr. Man  3)  Will do repeat chest CT 4-8 weeks after initiaiting pazopanib.  4) I will f/u over the phone with Olimpia after I speak with Dr. Man and we will determine her next f/u appt at that time.    I spent 45 minutes face to face with Olimpia, >50% of which was spent on counseling.

## 2017-05-01 NOTE — NURSING NOTE
"Chief Complaint   Patient presents with     RECHECK     Patient here today for follow up on Synovial sarcoma (H)     /64 (BP Location: Right arm, Patient Position: Fowlers, Cuff Size: Adult Regular)  Pulse 83  Temp 97.9  F (36.6  C) (Oral)  Resp 16  Ht 1.608 m (5' 3.31\")  Wt 61 kg (134 lb 7.7 oz)  SpO2 97%  BMI 23.59 kg/m2  Debbie Padilla MA  May 1, 2017    "

## 2017-05-01 NOTE — PROGRESS NOTES
May 1, 2017      Re:  Olimpia Childress  : 89    To Whom it may concern:    Due to medical reasons, Olimpia is unable to receive any live vaccines until further notice.    Sincerely,        Coretta Yanez M.D

## 2017-05-02 DIAGNOSIS — C49.9 SYNOVIAL SARCOMA (H): Primary | ICD-10-CM

## 2017-05-03 LAB
MICRO REPORT STATUS: NORMAL
MYCOBACTERIUM SPEC CULT: NORMAL
SPECIMEN SOURCE: NORMAL

## 2017-05-04 ENCOUNTER — HOSPITAL ENCOUNTER (OUTPATIENT)
Dept: CARDIOLOGY | Facility: CLINIC | Age: 22
Discharge: HOME | End: 2017-05-04
Attending: PEDIATRICS | Admitting: PEDIATRICS
Payer: COMMERCIAL

## 2017-05-04 DIAGNOSIS — C49.9 SYNOVIAL SARCOMA (H): ICD-10-CM

## 2017-05-04 PROCEDURE — 93306 TTE W/DOPPLER COMPLETE: CPT

## 2017-05-19 NOTE — PROGRESS NOTES
Pediatric Hematology/Oncology Clinic Note    History- Olimpia initially presented with a growth on her right labia in 2013 when she was living in [place]. 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 She discontinued further treatment b/c her physicians were unsure of the exact diagnosis and she felt uncomfortable proceeding. Olimpia subsequently immigrated to the  in August of 2015 and in October she first noticed a recurrence of the mass in the area of previous excision. She was in [place] at that time, seen by oncology and had a port placed but then moved to [place] where she was seen by Dr. Mckeon at [facility] in November. There she underwent an MRI that showed a solid and cystic subcutaneous mass in the right labia measuring 1.7 x 1.6 x 1cm with question of nodular extension vs a second nodule measuring 0.7 cm. There was no invasion into the vagina. On November 16th 2015, Olimpia had a biopsy of the mass by a gynecologist, Dr. Terry, at [facility]. Cytology was reviewed at [place] and read as a SMARCB1-deficient genital sarcoma, likely falling within the overall spectrum of malignant rhabdoid tumor. By immunohistochemistry, the cells shows a complete loss of SMARCb1. Wide spectrum cytokeratin, CD34, WT1, Desmin and CD99 were all negative. RT PCR for Wooten Sarcoma associated fusion transcripts were negative as well. Olimpia went on to have a PET/CT as well which showed multiple pulmonary lesions and biopsy confirmed a lesion to be metastatic disease. Olimpia was seen by Tomás White at [facility] who reviewed the diagnosis and potential treatment plans with her, however she prefered to be treated here at [facility]. 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    Olimpia underwent left sided thoracotomy and resection of two pulmonary nodules on 7/20/16. Pathology revealed that one lesion was benign lymphoid tissue and the other was consistent with synovial sarcoma.  This was confirmed with FISH  With 91% of cell examined having a signal pattern indictivate of a rearrangement of the SS18 locus.  Olimpia has now completed 3 cycles of chemotherapy per [id number] and started her radiation on 9/13/16 and completed on 10/17/16.  She then went on to have a resection of her labial mass on 11/16/16 by Jannet Pastrana and Chikis with reconstruction, including skin flaps by Dr. Corona from plastics.  Pathology showed no residual tumor.  She had a f/u chest CT today on 12/5/16 that showed persistance of her pulmonary nodules as well as few additonal pulmonary nodules.  She saw Dr. Pierre who was in agreement with surgical resection, however Olimpia wanted to wait until March to have more time to recover from her last surgery. She underwent left sided thoracotomy on 3/8/17.    In May, Olimpia's follow up PET/CT demonstrated significant progression of her pulmonary metastatic disease, with an anterior mediastinal mass compression her right ventricle and potentially right outflow tract.  Subsequent echocardiogram did not show altered cardiac function, Dr Man felt radiation therapy was not in her best interest at this time but would consider if she developed cardiac dysfunction.  Olimpia was started on oral Pazopanib two weeks ago for palliative therapy.    HPI:  Olimpia reports that she is feeling well.  Initially had some nausea with one episdoe of vomiting after starting Pazopanib.  Now feels mildly nauseous about one hour after taking it.  Feels a mild decrease in energy but overall is quite well.  Pain in left chest is improving.  Denies any new pain, no SOB, cough or palpitations.  No fevers.    Olimpia reports her sleep schedule is disrupted.  She tries to fall  asleep at 11am but often can't sleep until 2-3am and then will sleep the entire next day.  Feels rested when she is awake.  Olimpia wonders if she can keep her port in or if it has to be removed.    History was obtained from Olimpia via professional [employer] .     Allergies as of 05/19/2017 - Sheldon as Reviewed 05/19/2017   Allergen Reaction Noted     Heparin flush Other (See Comments) 01/27/2016     Pork derived products  02/09/2016     Tegaderm transparent dressing (informational only) Other (See Comments) and Rash 04/20/2016       Current Outpatient Prescriptions   Medication Sig Dispense Refill     pazopanib (VOTRIENT) 200 MG tablet CHEMOTHERAPY Take 4 tablets (800 mg) by mouth daily 120 tablet 5     bisacodyl (DULCOLAX) 5 MG EC tablet Take 1 tablet (5 mg) by mouth 2 times daily 30 tablet 2     oxyCODONE (ROXICODONE) 5 MG IR tablet Take 1-2 tablets (5-10 mg) by mouth every 4 hours as needed for moderate to severe pain 20 tablet 0     polyethylene glycol (MIRALAX/GLYCOLAX) Packet Take 17 g by mouth daily 7 packet 1     senna-docusate (SENOKOT-S;PERICOLACE) 8.6-50 MG per tablet Take 2 tablets by mouth 2 times daily as needed for constipation 30 tablet 1     acetaminophen (TYLENOL) 325 MG tablet Take 2 tablets (650 mg) by mouth every 4 hours as needed for mild pain 100 tablet 1     ibuprofen (ADVIL/MOTRIN) 600 MG tablet Take 1 tablet (600 mg) by mouth every 6 hours as needed for moderate pain 60 tablet 1     diltiazem 2% in PLO cream, FV COMPOUNDED, 2% GEL To anal opening three times daily.  Use a pea-sized amount.  Store at room temperature. 60 g 0     ondansetron (ZOFRAN) 8 MG tablet Take 1 tablet (8 mg) by mouth every 6 hours as needed for nausea 30 tablet 6     diphenhydrAMINE (BENADRYL) 25 MG tablet Take 1-2 tablets (25-50 mg) by mouth every 6 hours as needed for other (Nausea or vomiting) 60 tablet 3     lidocaine-prilocaine (EMLA) cream Apply to port 30 minutes prior to access 30 g 5     pantoprazole  (PROTONIX) 40 MG enteric coated tablet Take 1 tablet (40 mg) by mouth daily 30 tablet 3     leuprolide (LUPRON DEPOT-PED) 11.25 MG KIT Inject 11.25 mg into the muscle every 3 months 1 each 1       Past Medical History:   Diagnosis Date     Anemia 6/3/2016     Constipation      Extrarenal rhabdoid neoplasm (H)      Febrile neutropenia (H) 4/17/2016     History of blood transfusion      Latent tuberculosis      Lung disease      On antineoplastic chemotherapy      Sarcoma of vulva (H)        Social History     Social History     Marital status:      Spouse name: N/A     Number of children: N/A     Years of education: N/A     Occupational History     Not on file.     Social History Main Topics     Smoking status: Never Smoker     Smokeless tobacco: Never Used     Alcohol use No     Drug use: No     Sexual activity: No     Other Topics Concern     Not on file     Social History Narrative       Family History   Problem Relation Age of Onset     Other Cancer No family hx of      ROS  See HPI. Complete ROS otherwise negative.    Physical Exam   Wt Readings from Last 4 Encounters:   05/19/17 60.2 kg (132 lb 11.5 oz)   05/01/17 61 kg (134 lb 7.7 oz)   04/18/17 61.1 kg (134 lb 11.2 oz)   04/10/17 60.2 kg (132 lb 11.5 oz)       Temp:  [97.5  F (36.4  C)] 97.5  F (36.4  C)  Pulse:  [60] 60  Resp:  [18] 18  BP: (108)/(79) 108/79  SpO2:  [97 %] 97 %    GENERAL: Alert, well appearing young woman in no acute distress.  SKIN: No rashes, lesions, or abnormal pigmentation.  HEAD: Normocephalic, atraumatic     EYES: Normal lids, conjunctivae/cornea normal, mild icterus. PERRL. EOMI.  EARS: Pinna normal b/l, no pain on palpation. External ears normal appearing.   NOSE: Clear, no discharge or congestion  MOUTH/THROAT: Oropharynx is clear. Normal dentition for age, no mucosal lesions.  NECK: Supple, full range of motion, no masses  LYMPH NODES: No cervical lymphadenopathy.  LUNGS: No increased WOB.  Lungs clear to auscultation  throughout; slightly diminished in LLL.  No crackles or wheezes.  HEART: HRR. S1 and S2 are normal. No murmurs, rubs, gallops. The radial pulses are 2+ bilaterally. Cap refill <3 sec in upper extremities.  ABDOMEN: Normal bowel sounds. Soft, non-tender, non-distended, no masses or hepatosplenomegaly.  NEUROLOGIC: Grossly intact, no focal neurologic deficits.    :  Deferred today.    Labs:  Results for orders placed or performed in visit on 05/19/17   CBC with platelets differential   Result Value Ref Range    WBC 2.9 (L) 4.0 - 11.0 10e9/L    RBC Count 4.42 3.8 - 5.2 10e12/L    Hemoglobin 14.3 11.7 - 15.7 g/dL    Hematocrit 39.1 35.0 - 47.0 %    MCV 89 78 - 100 fl    MCH 32.4 26.5 - 33.0 pg    MCHC 36.6 (H) 31.5 - 36.5 g/dL    RDW 10.9 10.0 - 15.0 %    Platelet Count 158 150 - 450 10e9/L    Diff Method Automated Method     % Neutrophils 46.1 %    % Lymphocytes 38.6 %    % Monocytes 14.0 %    % Eosinophils 0.7 %    % Basophils 0.3 %    % Immature Granulocytes 0.3 %    Nucleated RBCs 0 0 /100    Absolute Neutrophil 1.4 (L) 1.6 - 8.3 10e9/L    Absolute Lymphocytes 1.1 0.8 - 5.3 10e9/L    Absolute Monocytes 0.4 0.0 - 1.3 10e9/L    Absolute Eosinophils 0.0 0.0 - 0.7 10e9/L    Absolute Basophils 0.0 0.0 - 0.2 10e9/L    Abs Immature Granulocytes 0.0 0 - 0.4 10e9/L    Absolute Nucleated RBC 0.0    Comprehensive metabolic panel   Result Value Ref Range    Sodium 142 133 - 144 mmol/L    Potassium 3.8 3.4 - 5.3 mmol/L    Chloride 103 94 - 109 mmol/L    Carbon Dioxide 29 20 - 32 mmol/L    Anion Gap 10 3 - 14 mmol/L    Glucose 128 (H) 70 - 99 mg/dL    Urea Nitrogen 11 7 - 30 mg/dL    Creatinine 0.50 (L) 0.52 - 1.04 mg/dL    GFR Estimate >90  Non  GFR Calc   >60 mL/min/1.7m2    GFR Estimate If Black >90   GFR Calc   >60 mL/min/1.7m2    Calcium 9.1 8.5 - 10.1 mg/dL    Bilirubin Total 0.5 0.2 - 1.3 mg/dL    Albumin 3.2 (L) 3.4 - 5.0 g/dL    Protein Total 7.5 6.8 - 8.8 g/dL    Alkaline Phosphatase 85  40 - 150 U/L    ALT 26 0 - 50 U/L    AST 28 0 - 45 U/L     Impression:  Olimpia is a 22-year-old female with recurrent metastatic synovial sarcoma, recent scans show progression of her pulmonary disease.  Clinically she is doing very well.  She is tolerating oral Pazopanib well, some mild nausea.  Labs look good, urine pending.  Her port remains in place.  Her sleep pattern is disrupted.    Plan:  1) Continue Pazopanib.  Recommend she change her schedule to take it before bedtime as her nausea is isolated to about one hour after taking her medication and hopefully she could sleep through that time.  2) Discussed sleep habits, recommend she set an alarm to wake at a certain time and slowly move that time up.  If sleep onset remains a problem despite slowly moving up her schedule will consider trial of melatonin.  3) Will keep Olimpia's port in place at this time, she is comfortable with this plan.  Port flush today and continue monthly.  4) RTC in 2 weeks for repeat labs (CBC, CMP, UA).  5) Plan to repeat chest CT 2-3 months from initiation of pazopanib (started 5/1/17), sooner with progressive symptoms.

## 2017-05-19 NOTE — MR AVS SNAPSHOT
After Visit Summary   2017    Olimpia Childress    MRN: [id number]           Patient Information     Date Of Birth          1995        Visit Information        Provider Department      2017 12:15 PM Sue Yousif; Omar Trammell, JULIO C CNP Peds Hematology Oncology        Today's Diagnoses     Synovial sarcoma (H)              [facility]note 3[facility] 9th floor  [address] [place] [address]  Phone: [phone]  Clinic Hours:   Monday-Friday:   7 am to 5:00 pm   closed weekends and major  holidays     If your fever is 100.5  or greater,   call the clinic during business hours.   After hours call [phone] and ask for the pediatric hematology / oncology physician to be paged for you.               Follow-ups after your visit        Who to contact     Please call your clinic at [phone] to:    Ask questions about your health    Make or cancel appointments    Discuss your medicines    Learn about your test results    Speak to your doctor   If you have compliments or concerns about an experience at your clinic, or if you wish to file a complaint, please contact [facility] Patient Relations at [phone] or email us at [email or web address]         Additional Information About Your Visit        MyChart Information     Group[employer] is an electronic gateway that provides easy, online access to your medical records. With [employer], you can request a clinic appointment, read your test results, renew a prescription or communicate with your care team.     To sign up for Group[employer]ac[employer] visit the website at [email or web address]   You will be asked to enter the access code listed below, as well as some personal information. Please follow the directions to create your username and password.     Your access code is: [email or web address]  Expires: 2017 10:41 AM     Your access code will  in 90 days. If you need help or a  "new code, please contact your [facility] or call [phone] for assistance.        Care EveryWhere ID     This is your Care EveryWhere ID. This could be used by other organizations to access your [place] medical records  [id number]        Your Vitals Were     Pulse Temperature Respirations Height Pulse Oximetry BMI (Body Mass Index)    60 97.5  F (36.4  C) (Axillary) 18 1.599 m (5' 2.95\") 97% 23.54 kg/m2       Blood Pressure from Last 3 Encounters:   05/19/17 108/79   05/01/17 113/64   04/18/17 103/65    Weight from Last 3 Encounters:   05/19/17 60.2 kg (132 lb 11.5 oz)   05/01/17 61 kg (134 lb 7.7 oz)   04/18/17 61.1 kg (134 lb 11.2 oz)              We Performed the Following     CBC with platelets differential     Comprehensive metabolic panel     Routine UA with microscopic - No culture          Today's Medication Changes          These changes are accurate as of: 5/19/17 11:59 PM.  If you have any questions, ask your nurse or doctor.               These medicines have changed or have updated prescriptions.        Dose/Directions    polyethylene glycol Packet   Commonly known as:  MIRALAX/GLYCOLAX   This may have changed:  Another medication with the same name was removed. Continue taking this medication, and follow the directions you see here.   Used for:  Acute post-operative pain        Dose:  17 g   Take 17 g by mouth daily   Quantity:  7 packet   Refills:  1       senna-docusate 8.6-50 MG per tablet   Commonly known as:  SENOKOT-S;PERICOLACE   This may have changed:  Another medication with the same name was removed. Continue taking this medication, and follow the directions you see here.   Used for:  Acute post-operative pain        Dose:  2 tablet   Take 2 tablets by mouth 2 times daily as needed for constipation   Quantity:  30 tablet   Refills:  1                Primary Care Provider Office Phone # Fax #    Coretta Yanez [phone]       " [facility] [address] [address]        Thank you!     Thank you for choosing PEDS HEMATOLOGY ONCOLOGY  for your care. Our goal is always to provide you with excellent care. Hearing back from our patients is one way we can continue to improve our services. Please take a few minutes to complete the written survey that you may receive in the mail after your visit with us. Thank you!             Your Updated Medication List - Protect others around you: Learn how to safely use, store and throw away your medicines at www.disposemymeds.org.          This list is accurate as of: 5/19/17 11:59 PM.  Always use your most recent med list.                   Brand Name Dispense Instructions for use    acetaminophen 325 MG tablet    TYLENOL    100 tablet    Take 2 tablets (650 mg) by mouth every 4 hours as needed for mild pain       bisacodyl 5 MG EC tablet     30 tablet    Take 1 tablet (5 mg) by mouth 2 times daily       diltiazem 2% in PLO cream (FV COMPOUNDED) 2% Gel     60 g    To anal opening three times daily.  Use a pea-sized amount.  Store at room temperature.       diphenhydrAMINE 25 MG tablet    BENADRYL    60 tablet    Take 1-2 tablets (25-50 mg) by mouth every 6 hours as needed for other (Nausea or vomiting)       ibuprofen 600 MG tablet    ADVIL/MOTRIN    60 tablet    Take 1 tablet (600 mg) by mouth every 6 hours as needed for moderate pain       leuprolide 11.25 MG Kit kit    LUPRON DEPOT-PED    1 each    Inject 11.25 mg into the muscle every 3 months       lidocaine-prilocaine cream    EMLA    30 g    Apply to port 30 minutes prior to access       ondansetron 8 MG tablet    ZOFRAN    30 tablet    Take 1 tablet (8 mg) by mouth every 6 hours as needed for nausea       oxyCODONE 5 MG IR tablet    ROXICODONE    20 tablet    Take 1-2 tablets (5-10 mg) by mouth every 4 hours as needed for moderate to severe pain       pantoprazole 40 MG EC tablet    PROTONIX    30 tablet    Take 1 tablet (40 mg)  by mouth daily       pazopanib 200 MG tablet CHEMOTHERAPY    VOTRIENT    120 tablet    Take 4 tablets (800 mg) by mouth daily       polyethylene glycol Packet    MIRALAX/GLYCOLAX    7 packet    Take 17 g by mouth daily       senna-docusate 8.6-50 MG per tablet    SENOKOT-S;PERICOLACE    30 tablet    Take 2 tablets by mouth 2 times daily as needed for constipation

## 2017-05-19 NOTE — PROGRESS NOTES
[facility]  PEDIATRIC SOCIAL WORK PROGRESS NOTE      DATA:     Met with Olimpia and Shady  during clinic visit to check in and provide support. Introduced self, as we'd only spoken over the phone, and role as covering SW for THI Burnett. Discussed email THI had received from the  she's working with (Ximena) to get her mom to the [place]. Olimpia stated that she wants TIH to give any information needed to support her case for bringing her mom here to her , especially given the results of her recent scan, and gave verbal permission to do so. Olimpia stated that she had been working towards getting her mom to be able to come to the [place] for 6 months, but now thinks it would be more helpful if she could stay permanently. She wondered if THI could assist with this. Encouraged her to continue working with her  and that THI would be happy to help her in providing medical documentation/letters of support. THI agreed to respond to Ximena to provide an update and relay Olimpia's request, as she has only met with Ximena once. Olimpia was thankful for any support for getting her mom here. Inquired about how she has been feeling and Olimpia reported that she has been feeling very well. Inquired about work/school. 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 At this time, though, her medical conditions have taken precedent over school and she's not sure when she will return. Confirmed with Olimpia that the [place] address that is listed in her chart should remain and that she is temporarily staying with her cousin in [place] who is supporting her right now. Olimpia denied any other needs and thanked THI for stopping by.     Responded to her 's email to relay Olimpia's request to bring her mom to the [place] permanently.     INTERVENTION:     1. Provided ongoing assessment of patient and family's level of coping.   2. Provided psychosocial  supportive counseling and crisis intervention as needed.   3. Facilitate service linkage with hospital and community resources as needed.   4. Collaborate with healthcare team and professional in community to meet patient and family's needs as needed.    ASSESSMENT:     Olimpia was pleasant and receptive to SW. Though it seems that her disease has progressed per chart review, she seems to be feeling well and remains focused on bringing her mother to the [place].    PLAN:     SW will continue to monitor, support and assist with ongoing social service needs.     Jessie Looney, BELKIS, THI  [facility]  - Pediatric Hematology/Oncology  Phone: [phone]  Pager: [phone]  [email or web address]     NO LETTER

## 2017-05-19 NOTE — LETTER
5/19/2017      RE: Olimpia Childress  [address]   [facility] [address]       Pediatric Hematology/Oncology Clinic Note    History- Olimpia initially presented with a growth on her right labia in 2013 when she was living in [place]. 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 She discontinued further treatment b/c her physicians were unsure of the exact diagnosis and she felt uncomfortable proceeding. Olimpia subsequently immigrated to the  in August of 2015 and in October she first noticed a recurrence of the mass in the area of previous excision. She was in [place] at that time, seen by oncology and had a port placed but then moved to [place] where she was seen by Dr. Mckeon at [facility] in November. There she underwent an MRI that showed a solid and cystic subcutaneous mass in the right labia measuring 1.7 x 1.6 x 1cm with question of nodular extension vs a second nodule measuring 0.7 cm. There was no invasion into the vagina. On November 16th 2015, Olimpia had a biopsy of the mass by a gynecologist, Dr. Terry, at [facility]. Cytology was reviewed at [place] and read as a SMARCB1-deficient genital sarcoma, likely falling within the overall spectrum of malignant rhabdoid tumor. By immunohistochemistry, the cells shows a complete loss of SMARCb1. Wide spectrum cytokeratin, CD34, WT1, Desmin and CD99 were all negative. RT PCR for Wooten Sarcoma associated fusion transcripts were negative as well. Olimpia went on to have a PET/CT as well which showed multiple pulmonary lesions and biopsy confirmed a lesion to be metastatic disease. Olimpia was seen by Tomás White at [facility] who reviewed the diagnosis and potential treatment plans with her, however she prefered to be treated here at [facility]. 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    Olimpia underwent left sided thoracotomy and resection of two pulmonary nodules on 7/20/16. Pathology revealed that one lesion was benign lymphoid tissue and the other was consistent with synovial sarcoma.  This was confirmed with FISH  With 91% of cell examined having a signal pattern indictivate of a rearrangement of the SS18 locus.  Olimpia has now completed 3 cycles of chemotherapy per [id number] and started her radiation on 9/13/16 and completed on 10/17/16.  She then went on to have a resection of her labial mass on 11/16/16 by Jannet Pastrana and Chikis with reconstruction, including skin flaps by Dr. Corona from plastics.  Pathology showed no residual tumor.  She had a f/u chest CT today on 12/5/16 that showed persistance of her pulmonary nodules as well as few additonal pulmonary nodules.  She saw Dr. Pierre who was in agreement with surgical resection, however Olimpia wanted to wait until March to have more time to recover from her last surgery. She underwent left sided thoracotomy on 3/8/17.    In May, Olimpia's follow up PET/CT demonstrated significant progression of her pulmonary metastatic disease, with an anterior mediastinal mass compression her right ventricle and potentially right outflow tract.  Subsequent echocardiogram did not show altered cardiac function, Dr Man felt radiation therapy was not in her best interest at this time but would consider if she developed cardiac dysfunction.  Olimpia was started on oral Pazopanib two weeks ago for palliative therapy.    HPI:  Olimpia reports that she is feeling well.  Initially had some nausea with one episdoe of vomiting after starting Pazopanib.  Now feels mildly nauseous about one hour after taking it.  Feels a mild decrease in energy but overall is quite well.  Pain in left chest is improving.  Denies any new pain, no SOB, cough or  palpitations.  No fevers.    Olimpia reports her sleep schedule is disrupted.  She tries to fall asleep at 11am but often can't sleep until 2-3am and then will sleep the entire next day.  Feels rested when she is awake.  Olimpia wonders if she can keep her port in or if it has to be removed.    History was obtained from Olimpia via professional [place] .     Allergies as of 05/19/2017 - [place] as Reviewed 05/19/2017   Allergen Reaction Noted     Heparin flush Other (See Comments) 01/27/2016     Pork derived products  02/09/2016     Tegaderm transparent dressing (informational only) Other (See Comments) and Rash 04/20/2016       Current Outpatient Prescriptions   Medication Sig Dispense Refill     pazopanib (VOTRIENT) 200 MG tablet CHEMOTHERAPY Take 4 tablets (800 mg) by mouth daily 120 tablet 5     bisacodyl (DULCOLAX) 5 MG EC tablet Take 1 tablet (5 mg) by mouth 2 times daily 30 tablet 2     oxyCODONE (ROXICODONE) 5 MG IR tablet Take 1-2 tablets (5-10 mg) by mouth every 4 hours as needed for moderate to severe pain 20 tablet 0     polyethylene glycol (MIRALAX/GLYCOLAX) Packet Take 17 g by mouth daily 7 packet 1     senna-docusate (SENOKOT-S;PERICOLACE) 8.6-50 MG per tablet Take 2 tablets by mouth 2 times daily as needed for constipation 30 tablet 1     acetaminophen (TYLENOL) 325 MG tablet Take 2 tablets (650 mg) by mouth every 4 hours as needed for mild pain 100 tablet 1     ibuprofen (ADVIL/MOTRIN) 600 MG tablet Take 1 tablet (600 mg) by mouth every 6 hours as needed for moderate pain 60 tablet 1     diltiazem 2% in PLO cream, FV COMPOUNDED, 2% GEL To anal opening three times daily.  Use a pea-sized amount.  Store at room temperature. 60 g 0     ondansetron (ZOFRAN) 8 MG tablet Take 1 tablet (8 mg) by mouth every 6 hours as needed for nausea 30 tablet 6     diphenhydrAMINE (BENADRYL) 25 MG tablet Take 1-2 tablets (25-50 mg) by mouth every 6 hours as needed for other (Nausea or vomiting) 60 tablet 3      lidocaine-prilocaine (EMLA) cream Apply to port 30 minutes prior to access 30 g 5     pantoprazole (PROTONIX) 40 MG enteric coated tablet Take 1 tablet (40 mg) by mouth daily 30 tablet 3     leuprolide (LUPRON DEPOT-PED) 11.25 MG KIT Inject 11.25 mg into the muscle every 3 months 1 each 1       Past Medical History:   Diagnosis Date     Anemia 6/3/2016     Constipation      Extrarenal rhabdoid neoplasm (H)      Febrile neutropenia (H) 4/17/2016     History of blood transfusion      Latent tuberculosis      Lung disease      On antineoplastic chemotherapy      Sarcoma of vulva (H)        Social History     Social History     Marital status:      Spouse name: N/A     Number of children: N/A     Years of education: N/A     Occupational History     Not on file.     Social History Main Topics     Smoking status: Never Smoker     Smokeless tobacco: Never Used     Alcohol use No     Drug use: No     Sexual activity: No     Other Topics Concern     Not on file     Social History Narrative       Family History   Problem Relation Age of Onset     Other Cancer No family hx of      ROS  See HPI. Complete ROS otherwise negative.    Physical Exam   Wt Readings from Last 4 Encounters:   05/19/17 60.2 kg (132 lb 11.5 oz)   05/01/17 61 kg (134 lb 7.7 oz)   04/18/17 61.1 kg (134 lb 11.2 oz)   04/10/17 60.2 kg (132 lb 11.5 oz)       Temp:  [97.5  F (36.4  C)] 97.5  F (36.4  C)  Pulse:  [60] 60  Resp:  [18] 18  BP: (108)/(79) 108/79  SpO2:  [97 %] 97 %    GENERAL: Alert, well appearing young woman in no acute distress.  SKIN: No rashes, lesions, or abnormal pigmentation.  HEAD: Normocephalic, atraumatic     EYES: Normal lids, conjunctivae/cornea normal, mild icterus. PERRL. EOMI.  EARS: Pinna normal b/l, no pain on palpation. External ears normal appearing.   NOSE: Clear, no discharge or congestion  MOUTH/THROAT: Oropharynx is clear. Normal dentition for age, no mucosal lesions.  NECK: Supple, full range of motion, no  masses  LYMPH NODES: No cervical lymphadenopathy.  LUNGS: No increased WOB.  Lungs clear to auscultation throughout; slightly diminished in LLL.  No crackles or wheezes.  HEART: HRR. S1 and S2 are normal. No murmurs, rubs, gallops. The radial pulses are 2+ bilaterally. Cap refill <3 sec in upper extremities.  ABDOMEN: Normal bowel sounds. Soft, non-tender, non-distended, no masses or hepatosplenomegaly.  NEUROLOGIC: Grossly intact, no focal neurologic deficits.    :  Deferred today.    Labs:  Results for orders placed or performed in visit on 05/19/17   CBC with platelets differential   Result Value Ref Range    WBC 2.9 (L) 4.0 - 11.0 10e9/L    RBC Count 4.42 3.8 - 5.2 10e12/L    Hemoglobin 14.3 11.7 - 15.7 g/dL    Hematocrit 39.1 35.0 - 47.0 %    MCV 89 78 - 100 fl    MCH 32.4 26.5 - 33.0 pg    MCHC 36.6 (H) 31.5 - 36.5 g/dL    RDW 10.9 10.0 - 15.0 %    Platelet Count 158 150 - 450 10e9/L    Diff Method Automated Method     % Neutrophils 46.1 %    % Lymphocytes 38.6 %    % Monocytes 14.0 %    % Eosinophils 0.7 %    % Basophils 0.3 %    % Immature Granulocytes 0.3 %    Nucleated RBCs 0 0 /100    Absolute Neutrophil 1.4 (L) 1.6 - 8.3 10e9/L    Absolute Lymphocytes 1.1 0.8 - 5.3 10e9/L    Absolute Monocytes 0.4 0.0 - 1.3 10e9/L    Absolute Eosinophils 0.0 0.0 - 0.7 10e9/L    Absolute Basophils 0.0 0.0 - 0.2 10e9/L    Abs Immature Granulocytes 0.0 0 - 0.4 10e9/L    Absolute Nucleated RBC 0.0    Comprehensive metabolic panel   Result Value Ref Range    Sodium 142 133 - 144 mmol/L    Potassium 3.8 3.4 - 5.3 mmol/L    Chloride 103 94 - 109 mmol/L    Carbon Dioxide 29 20 - 32 mmol/L    Anion Gap 10 3 - 14 mmol/L    Glucose 128 (H) 70 - 99 mg/dL    Urea Nitrogen 11 7 - 30 mg/dL    Creatinine 0.50 (L) 0.52 - 1.04 mg/dL    GFR Estimate >90  Non  GFR Calc   >60 mL/min/1.7m2    GFR Estimate If Black >90   GFR Calc   >60 mL/min/1.7m2    Calcium 9.1 8.5 - 10.1 mg/dL    Bilirubin Total 0.5 0.2 -  1.3 mg/dL    Albumin 3.2 (L) 3.4 - 5.0 g/dL    Protein Total 7.5 6.8 - 8.8 g/dL    Alkaline Phosphatase 85 40 - 150 U/L    ALT 26 0 - 50 U/L    AST 28 0 - 45 U/L     Impression:  Olimpia is a 22-year-old female with recurrent metastatic synovial sarcoma, recent scans show progression of her pulmonary disease.  Clinically she is doing very well.  She is tolerating oral Pazopanib well, some mild nausea.  Labs look good, urine pending.  Her port remains in place.  Her sleep pattern is disrupted.    Plan:  1) Continue Pazopanib.  Recommend she change her schedule to take it before bedtime as her nausea is isolated to about one hour after taking her medication and hopefully she could sleep through that time.  2) Discussed sleep habits, recommend she set an alarm to wake at a certain time and slowly move that time up.  If sleep onset remains a problem despite slowly moving up her schedule will consider trial of melatonin.  3) Will keep Olimpia's port in place at this time, she is comfortable with this plan.  Port flush today and continue monthly.  4) RTC in 2 weeks for repeat labs (CBC, CMP, UA).  5) Plan to repeat chest CT 2-3 months from initiation of pazopanib (started 5/1/17), sooner with progressive symptoms.      JULIO C Pittman CNP

## 2017-05-19 NOTE — TELEPHONE ENCOUNTER
"Received call from Olimpia stating she does not have transportation to her appt today. She thought her cousin was going to be able to transport her but she is not able to today. Requested assistance from THI. Agreed to call her insurance transportation ([employer]) to see if I could get last minute transportation for her, if not will use a cab voucher. Noted I would call her back once determined. Olimpia was agreeable and thanked THI for the asistance.     Called [employer] (Ph. [phone], F. [phone]) to request ride but was told by staff that they cannot schedule rides with less than 4 hours notice unless it was to the emergency room/urgent care, behavioral issues or cancer treatment (not labs/exam).     Called [employer] (Ph. [phone], F. [phone]) and arranged round trip cab ride via voucher instead. Faxed vouchers to [employer]. Called Olimpia back to update and confirmed pick-up address ([address] [place] [address]--which was different than what's listed in her chart). Called [employer] to update on address.     Jessie Looney, BELKIS, [facility]  - Pediatric Hematology/Oncology  Phone: [phone]  Pager: [phone]  [email or web address]     NO LETTER        "

## 2017-05-19 NOTE — NURSING NOTE
Port accessed without difficulty. Port flushed with 10 ml of NS then citrate locked per protocol. Port de-accessed and site covered with band-aid.

## 2017-05-19 NOTE — MR AVS SNAPSHOT
"              After Visit Summary   5/19/2017    Olimpia Childress    MRN: [id number]           Patient Information     Date Of Birth          1995        Visit Information        Provider Department      5/19/2017 1:57 PM Jessie Looney MSW [facility]        Today's Diagnoses     Encounter for counseling    -  1       Follow-ups after your visit        Future tests that were ordered for you today     Open Future Orders        Priority Expected Expires Ordered    CBC with platelets differential Routine 6/6/2017 6/27/2017 5/19/2017    Comprehensive metabolic panel Routine 6/6/2017 6/27/2017 5/19/2017    Routine UA with microscopic - No culture Routine 6/6/2017 6/27/2017 5/19/2017            Who to contact     If you have questions or need follow up information about today's clinic visit or your schedule please contact [facility] directly at No information on file..  Normal or non-critical lab and imaging results will be communicated to you by Last [employer], letter or phone within 4 business days after the clinic has received the results. If you do not hear from us within 7 days, please contact the clinic through Last [employer] or phone. If you have a critical or abnormal lab result, we will notify you by phone as soon as possible.  Submit refill requests through [employer] or call your pharmacy and they will forward the refill request to us. Please allow 3 business days for your refill to be completed.          Additional Information About Your Visit        MyChart Information     [employer] lets you send messages to your doctor, view your test results, renew your prescriptions, schedule appointments and more. To sign up, go to [email or web address] . Click on \"Log in\" on the left side of the screen, which will take you to the Welcome page. Then click on \"Sign up Now\" on the right side of the page.     You will be asked to enter the access code listed below, as well as some personal information. Please follow the " directions to create your username and password.     Your access code is: [email or web address]  Expires: 2017 10:41 AM     Your access code will  in 90 days. If you need help or a new code, please call your [facility] or [phone].        Care EveryWhere ID     This is your Care EveryWhere ID. This could be used by other organizations to access your [facility] medical records  [id number]         Blood Pressure from Last 3 Encounters:   17 108/79   17 113/64   17 103/65    Weight from Last 3 Encounters:   17 60.2 kg (132 lb 11.5 oz)   17 61 kg (134 lb 7.7 oz)   17 61.1 kg (134 lb 11.2 oz)              Today, you had the following     No orders found for display         Today's Medication Changes          These changes are accurate as of: 17  3:39 PM.  If you have any questions, ask your nurse or doctor.               These medicines have changed or have updated prescriptions.        Dose/Directions    polyethylene glycol Packet   Commonly known as:  MIRALAX/GLYCOLAX   This may have changed:  Another medication with the same name was removed. Continue taking this medication, and follow the directions you see here.   Used for:  Acute post-operative pain        Dose:  17 g   Take 17 g by mouth daily   Quantity:  7 packet   Refills:  1       senna-docusate 8.6-50 MG per tablet   Commonly known as:  SENOKOT-S;PERICOLACE   This may have changed:  Another medication with the same name was removed. Continue taking this medication, and follow the directions you see here.   Used for:  Acute post-operative pain        Dose:  2 tablet   Take 2 tablets by mouth 2 times daily as needed for constipation   Quantity:  30 tablet   Refills:  1                Primary Care Provider Office Phone # Fax #    Coretta Yanez [phone]       [address] [address]        Thank you!     Thank you for choosing [facility]  for  your care. Our goal is always to provide you with excellent care. Hearing back from our patients is one way we can continue to improve our services. Please take a few minutes to complete the written survey that you may receive in the mail after your visit with us. Thank you!             Your Updated Medication List - Protect others around you: Learn how to safely use, store and throw away your medicines at www.disposemymeds.org.          This list is accurate as of: 5/19/17  3:39 PM.  Always use your most recent med list.                   Brand Name Dispense Instructions for use    acetaminophen 325 MG tablet    TYLENOL    100 tablet    Take 2 tablets (650 mg) by mouth every 4 hours as needed for mild pain       bisacodyl 5 MG EC tablet     30 tablet    Take 1 tablet (5 mg) by mouth 2 times daily       diltiazem 2% in PLO cream (FV COMPOUNDED) 2% Gel     60 g    To anal opening three times daily.  Use a pea-sized amount.  Store at room temperature.       diphenhydrAMINE 25 MG tablet    BENADRYL    60 tablet    Take 1-2 tablets (25-50 mg) by mouth every 6 hours as needed for other (Nausea or vomiting)       ibuprofen 600 MG tablet    ADVIL/MOTRIN    60 tablet    Take 1 tablet (600 mg) by mouth every 6 hours as needed for moderate pain       leuprolide 11.25 MG Kit kit    LUPRON DEPOT-PED    1 each    Inject 11.25 mg into the muscle every 3 months       lidocaine-prilocaine cream    EMLA    30 g    Apply to port 30 minutes prior to access       ondansetron 8 MG tablet    ZOFRAN    30 tablet    Take 1 tablet (8 mg) by mouth every 6 hours as needed for nausea       oxyCODONE 5 MG IR tablet    ROXICODONE    20 tablet    Take 1-2 tablets (5-10 mg) by mouth every 4 hours as needed for moderate to severe pain       pantoprazole 40 MG EC tablet    PROTONIX    30 tablet    Take 1 tablet (40 mg) by mouth daily       pazopanib 200 MG tablet CHEMOTHERAPY    VOTRIENT    120 tablet    Take 4 tablets (800 mg) by mouth daily        polyethylene glycol Packet    MIRALAX/GLYCOLAX    7 packet    Take 17 g by mouth daily       senna-docusate 8.6-50 MG per tablet    SENOKOT-S;PERICOLACE    30 tablet    Take 2 tablets by mouth 2 times daily as needed for constipation

## 2017-05-19 NOTE — NURSING NOTE
"Chief Complaint   Patient presents with     RECHECK     Patient here today for follow up with Rhabdoid tumor (H)     /79 (BP Location: Left arm, Patient Position: Fowlers, Cuff Size: Adult Regular)  Pulse 60  Temp 97.5  F (36.4  C) (Axillary)  Resp 18  Ht 1.599 m (5' 2.95\")  Wt 60.2 kg (132 lb 11.5 oz)  SpO2 97%  BMI 23.54 kg/m2  Tasia Narvaez M.A  May 19, 2017    "

## 2017-05-19 NOTE — PROGRESS NOTES
Pediatric Hematology/Oncology Clinic Note    History- Olimpia initially presented with a growth on her right labia in 2013 when she was living in [place]. 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 She discontinued further treatment b/c her physicians were unsure of the exact diagnosis and she felt uncomfortable proceeding. Olimpia subsequently immigrated to the  in August of 2015 and in October she first noticed a recurrence of the mass in the area of previous excision. She was in [place] at that time, seen by oncology and had a port placed but then moved to [place] where she was seen by Dr. Mckeon at [facility] in November. There she underwent an MRI that showed a solid and cystic subcutaneous mass in the right labia measuring 1.7 x 1.6 x 1cm with question of nodular extension vs a second nodule measuring 0.7 cm. There was no invasion into the vagina. On November 16th 2015, Olimpia had a biopsy of the mass by a gynecologist, Dr. Terry, at [facility]. Cytology was reviewed at [facility] and read as a SMARCB1-deficient genital sarcoma, likely falling within the overall spectrum of malignant rhabdoid tumor. By immunohistochemistry, the cells shows a complete loss of SMARCb1. Wide spectrum cytokeratin, CD34, WT1, Desmin and CD99 were all negative. RT PCR for Wooten Sarcoma associated fusion transcripts were negative as well. Olimpia went on to have a PET/CT as well which showed multiple pulmonary lesions and biopsy confirmed a lesion to be metastatic disease. Olimpia was seen by Tomás White at [facility] who reviewed the diagnosis and potential treatment plans with her, however she prefered to be treated here at [facility]. 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    Olimpia underwent left sided thoracotomy and resection of two pulmonary nodules on 7/20/16. Pathology revealed that one lesion was benign lymphoid tissue and the other was consistent with synovial sarcoma.  This was confirmed with FISH  With 91% of cell examined having a signal pattern indictivate of a rearrangement of the SS18 locus.  Olimpia has now completed 3 cycles of chemotherapy per [id number] and started her radiation on 9/13/16 and completed on 10/17/16.  She then went on to have a resection of her labial mass on 11/16/16 by Jannet Pastrana and [facility] with reconstruction, including skin flaps by Dr. Corona from plastics.  Pathology showed no residual tumor.  She had a f/u chest CT today on 12/5/16 that showed persistance of her pulmonary nodules as well as few additonal pulmonary nodules.  She saw Dr. Peirre who was in agreement with surgical resection, however Olimpia wanted to wait until March to have more time to recover from her last surgery. She underwent left sided thoracotomy on 3/8/17.    HPI:  Olimpia reports that she has been doing really well.  Her appetite and energy level are good.  She denies any pain.  No cough or SOB.  She does still feel at times as though her heart is racing.  No new lumps or bumps.         History was obtained from Olimpia via professional [employer] .     Allergies as of 04/10/2017 - [facility] as Reviewed 04/10/2017   Allergen Reaction Noted     Heparin flush Other (See Comments) 01/27/2016     Pork derived products  02/09/2016     Tegaderm transparent dressing (informational only) Other (See Comments) and Rash 04/20/2016       Current Outpatient Prescriptions   Medication Sig Dispense Refill     pazopanib (VOTRIENT) 200 MG tablet CHEMOTHERAPY Take 4 tablets (800 mg) by mouth daily (Patient not taking: Reported on 5/1/2017) 120 tablet 5     bisacodyl (DULCOLAX) 5 MG EC tablet Take 1 tablet (5 mg) by mouth 2 times daily 30 tablet 2     oxyCODONE  (ROXICODONE) 5 MG IR tablet Take 1-2 tablets (5-10 mg) by mouth every 4 hours as needed for moderate to severe pain 20 tablet 0     polyethylene glycol (MIRALAX/GLYCOLAX) Packet Take 17 g by mouth daily 7 packet 1     senna-docusate (SENOKOT-S;PERICOLACE) 8.6-50 MG per tablet Take 2 tablets by mouth 2 times daily as needed for constipation 30 tablet 1     acetaminophen (TYLENOL) 325 MG tablet Take 2 tablets (650 mg) by mouth every 4 hours as needed for mild pain 100 tablet 1     ibuprofen (ADVIL/MOTRIN) 600 MG tablet Take 1 tablet (600 mg) by mouth every 6 hours as needed for moderate pain 60 tablet 1     polyethylene glycol (MIRALAX/GLYCOLAX) Packet Take 17 g by mouth 2 times daily 510 g 3     senna-docusate (SENOKOT-S;PERICOLACE) 8.6-50 MG per tablet Take 2 tablets by mouth 2 times daily 100 tablet 1     diltiazem 2% in PLO cream, FV COMPOUNDED, 2% GEL To anal opening three times daily.  Use a pea-sized amount.  Store at room temperature. 60 g 0     ondansetron (ZOFRAN) 8 MG tablet Take 1 tablet (8 mg) by mouth every 6 hours as needed for nausea 30 tablet 6     diphenhydrAMINE (BENADRYL) 25 MG tablet Take 1-2 tablets (25-50 mg) by mouth every 6 hours as needed for other (Nausea or vomiting) 60 tablet 3     lidocaine-prilocaine (EMLA) cream Apply to port 30 minutes prior to access 30 g 5     pantoprazole (PROTONIX) 40 MG enteric coated tablet Take 1 tablet (40 mg) by mouth daily 30 tablet 3     leuprolide (LUPRON DEPOT-PED) 11.25 MG KIT Inject 11.25 mg into the muscle every 3 months 1 each 1       Past Medical History:   Diagnosis Date     Anemia 6/3/2016     Constipation      Extrarenal rhabdoid neoplasm (H)      Febrile neutropenia (H) 4/17/2016     History of blood transfusion      Latent tuberculosis      Lung disease      On antineoplastic chemotherapy      Sarcoma of vulva (H)        Social History     Social History     Marital status:      Spouse name: N/A     Number of children: N/A     Years of  education: N/A     Occupational History     Not on file.     Social History Main Topics     Smoking status: Never Smoker     Smokeless tobacco: Never Used     Alcohol use No     Drug use: No     Sexual activity: No     Other Topics Concern     Not on file     Social History Narrative       Family History   Problem Relation Age of Onset     Other Cancer No family hx of      ROS  See HPI. Complete ROS otherwise negative.    Physical Exam   Wt Readings from Last 4 Encounters:   05/01/17 61 kg (134 lb 7.7 oz)   04/18/17 61.1 kg (134 lb 11.2 oz)   04/10/17 60.2 kg (132 lb 11.5 oz)   03/27/17 59.2 kg (130 lb 8.2 oz)            GENERAL: Alert, well appearing young woman in no acute distress.  SKIN: No rashes, lesions, or abnormal pigmentation.  HEAD: Normocephalic, atraumatic     EYES: Normal lids, conjunctivae/cornea normal, mild icterus. PERRL. EOMI.  EARS: Pinna normal b/l, no pain on palpation. External ears normal appearing.   NOSE: Clear, no discharge or congestion  MOUTH/THROAT: Oropharynx is clear. Normal dentition for age, no mucosal lesions.  NECK: Supple, full range of motion, no masses  LYMPH NODES: No cervical lymphadenopathy.  LUNGS: No increased WOB.  Lungs clear to auscultation throughout; slightly diminished in LLL.  No crackles or wheezes.  Left chest with large incision, healing well. Edges nicely approximated and without erythema nor drainage. Old chest tube site is C/D/I.  HEART: HR mildly tachycardic. S1 and S2 are normal. No murmurs, rubs, gallops. The radial pulses are 2+ bilaterally. Cap refill <3 sec in upper extremities.  ABDOMEN: Normal bowel sounds. Soft, non-tender, non-distended, no masses or hepatosplenomegaly.  NEUROLOGIC: Grossly intact, no focal neurologic deficits.    :  Deferred today.    Labs:  Results for orders placed or performed in visit on 04/10/17   CBC with platelets differential   Result Value Ref Range    WBC 2.6 (L) 4.0 - 11.0 10e9/L    RBC Count 3.87 3.8 - 5.2 10e12/L     Hemoglobin 12.7 11.7 - 15.7 g/dL    Hematocrit 37.5 35.0 - 47.0 %    MCV 97 78 - 100 fl    MCH 32.8 26.5 - 33.0 pg    MCHC 33.9 31.5 - 36.5 g/dL    RDW 11.8 10.0 - 15.0 %    Platelet Count 180 150 - 450 10e9/L    Diff Method Automated Method     % Neutrophils 42.1 %    % Lymphocytes 38.3 %    % Monocytes 16.9 %    % Eosinophils 1.5 %    % Basophils 0.4 %    % Immature Granulocytes 0.8 %    Nucleated RBCs 0 0 /100    Absolute Neutrophil 1.1 (L) 1.6 - 8.3 10e9/L    Absolute Lymphocytes 1.0 0.8 - 5.3 10e9/L    Absolute Monocytes 0.4 0.0 - 1.3 10e9/L    Absolute Eosinophils 0.0 0.0 - 0.7 10e9/L    Absolute Basophils 0.0 0.0 - 0.2 10e9/L    Abs Immature Granulocytes 0.0 0 - 0.4 10e9/L    Absolute Nucleated RBC 0.0        Radiology:  Recent Results (from the past 24 hour(s))   X-ray Chest 2 vws*    Narrative    XR CHEST 2 VW  3/27/2017 11:20 AM      HISTORY: follow up pneumothorax, Malignant neoplasm of connective and  soft tissue, unspecified    COMPARISON: 3/23/2017    FINDINGS: PA and lateral views of the chest. Central venous catheter  tip projecting over the right atrium is stable in position. Slight  decrease in small right pneumothorax. Small left pleural effusion is  stable, with unchanged pulmonary opacities at the left lung base. The  cardiac silhouette size is normal. The visualized upper abdomen is  unremarkable.      Impression    IMPRESSION:   1. Slight decrease in small right pneumothorax.  2. Stable small left pleural effusion with unchanged left basilar  opacities.    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   Biopsy of labial mass reveals synovial sarcoma as well. Her recent PET CT does not show any new sites of disease (areas in buttocks correspond to Depo-Lupron injection sites).  She is s/p 3 cycles of chemotherapy per [id number]  (2nd and 3rd cycle 75% dosing), radiation and resection of the labial mass with reconstruction.  Pathology showed no residual tumor.  She delayed her thoracotomy and unfortunately had an increased tumor burden at time of her lung surgery, she had numerous masses removed from her left chest, all consistent with synovial sarcoma, once with positive margins.  She has had a difficult recovery but now is doing very well.        Plan:  1) Will plan for PET/CT to assess current disease burden to help Olimpia with treatment decisions  2) Discussed my recommendation to proceed with systemic therapy;  Olimpia does not want to pursue cytotoxic chemotherapy;  We discussed Pazopanib risks/potential benenfits;  She may be interested in starting this after the PET/CT  3)  F/U after PET/CT      I spent 45 minutes face to face with Olimpia, >50% of which was spent on counseling.

## 2017-06-06 NOTE — PROGRESS NOTES
[facility]  PEDIATRIC SOCIAL WORK PROGRESS NOTE      DATA:     Met with Olimpia and cousin during clinic visit, along with [facility]  to check in and offer support. Olimpia reported that she had been doing pretty well except for having headaches recently. She was wondering about increasing her PCA hours. She feels that her last assessment for services was not an accurate picture and that her needs have changed since her last scan. Discussed SW checking into this further, as she reported that she did not know if she had a  or  who she could contact. Inquired as to whether she had spoken with the  she had spoken with previously regarding her mom coming to the [place]. Olimpia said she has not had any additional contact with her and explained that the one time she met with the , it was during an infusion visit with a hospital . Olimpia is hoping the  could help expedite getting her mom to the [place] given the progression of Olimpia's disease. She was agreeable to SW reaching back out to her on her behalf to check in. Also explained that THI Burnett will be returning in next few weeks and that this writer would update her when she returns. Olimpia expressed understanding, denied other needs at this time and thanked SW for the assistance.     INTERVENTION:     1. Provided ongoing assessment of patient and family's level of coping.   2. Provided psychosocial supportive counseling and crisis intervention as needed.   3. Facilitate service linkage with hospital and community resources as needed.   4. Collaborate with healthcare team and professional in community to meet patient and family's needs as needed.  ASSESSMENT:     Olimpia and her cousin were both pleasant and receptive to SW.     PLAN:     THI will continue to monitor, support and assist with ongoing social service needs.     Jessie Looney, BELKIS, [facility]  -  Pediatric Hematology/Oncology  Phone: [phone]  Pager: [phone]  [email or web address]     NO LETTER

## 2017-06-06 NOTE — MR AVS SNAPSHOT
"              After Visit Summary   6/6/2017    Olimpia Childress    MRN: [id number]           Patient Information     Date Of Birth          1995        Visit Information        Provider Department      6/6/2017 2:16 PM Jessie Looney MSW [facility]        Today's Diagnoses     Encounter for counseling    -  1       Follow-ups after your visit        Future tests that were ordered for you today     Open Future Orders        Priority Expected Expires Ordered    CBC with platelets differential Routine 6/19/2017 7/3/2017 6/6/2017    Comprehensive metabolic panel Routine 6/19/2017 7/3/2017 6/6/2017    Routine UA with microscopic - No culture Routine 6/19/2017 7/3/2017 6/6/2017            Who to contact     If you have questions or need follow up information about today's clinic visit or your schedule please contact [facility] directly at No information on file..  Normal or non-critical lab and imaging results will be communicated to you by [employer], letter or phone within 4 business days after the clinic has received the results. If you do not hear from us within 7 days, please contact the clinic through [employer] or phone. If you have a critical or abnormal lab result, we will notify you by phone as soon as possible.  Submit refill requests through [employer] or call your pharmacy and they will forward the refill request to us. Please allow 3 business days for your refill to be completed.          Additional Information About Your Visit        MyChart Information     [employer] lets you send messages to your doctor, view your test results, renew your prescriptions, schedule appointments and more. To sign up, go to [email or web address] . Click on \"Log in\" on the left side of the screen, which will take you to the Welcome page. Then click on \"Sign up Now\" on the right side of the page.     You will be asked to enter the access code listed below, as well as some personal information. Please follow the directions " to create your username and password.     Your access code is: [email or web address]  Expires: 2017  2:20 PM     Your access code will  in 90 days. If you need help or a new code, please call your [facility] clinic or [phone].        Care EveryWhere ID     This is your Care EveryWhere ID. This could be used by other organizations to access your [facility] medical records  [id number]         Blood Pressure from Last 3 Encounters:   17 102/77   17 108/79   17 113/64    Weight from Last 3 Encounters:   17 60.8 kg (134 lb 0.6 oz)   17 60.2 kg (132 lb 11.5 oz)   17 61 kg (134 lb 7.7 oz)              Today, you had the following     No orders found for display         Today's Medication Changes          These changes are accurate as of: 17  3:54 PM.  If you have any questions, ask your nurse or doctor.               Start taking these medicines.        Dose/Directions    lidocaine visc 2% 2.5mL/5mL & maalox/mylanta w/ simeth 2.5mL/5mL & diphenhydrAMINE 5mg/5mL Susp suspension   Commonly known as:  [facility]   Used for:  Stomatitis   Started by:  Omar Trammell APRN CNP        Dose:  10 mL   Swish and spit 10 mLs in mouth every 6 hours as needed for mouth sores   Quantity:  240 mL   Refills:  3            Where to get your medicines      These medications were sent to [facility] [place], [place] - [address]  [address] [address]     Phone:  [phone]     lidocaine visc 2% 2.5mL/5mL & maalox/mylanta w/ simeth 2.5mL/5mL & diphenhydrAMINE 5mg/5mL Susp suspension                Primary Care Provider Office Phone # Fax #    Coretta Yanez [phone]       [place] [address] [facility] [address]        Thank you!     Thank you for choosing [facility]  for your care. Our goal is always to provide you with excellent care. Hearing back from our patients is one way we  can continue to improve our services. Please take a few minutes to complete the written survey that you may receive in the mail after your visit with us. Thank you!             Your Updated Medication List - Protect others around you: Learn how to safely use, store and throw away your medicines at www.disposemymeds.org.          This list is accurate as of: 6/6/17  3:54 PM.  Always use your most recent med list.                   Brand Name Dispense Instructions for use    acetaminophen 325 MG tablet    TYLENOL    100 tablet    Take 2 tablets (650 mg) by mouth every 4 hours as needed for mild pain       bisacodyl 5 MG EC tablet     30 tablet    Take 1 tablet (5 mg) by mouth 2 times daily       diltiazem 2% in PLO cream (FV COMPOUNDED) 2% Gel     60 g    To anal opening three times daily.  Use a pea-sized amount.  Store at room temperature.       diphenhydrAMINE 25 MG tablet    BENADRYL    60 tablet    Take 1-2 tablets (25-50 mg) by mouth every 6 hours as needed for other (Nausea or vomiting)       ibuprofen 600 MG tablet    ADVIL/MOTRIN    60 tablet    Take 1 tablet (600 mg) by mouth every 6 hours as needed for moderate pain       leuprolide 11.25 MG Kit kit    LUPRON DEPOT-PED    1 each    Inject 11.25 mg into the muscle every 3 months       lidocaine visc 2% 2.5mL/5mL & maalox/mylanta w/ simeth 2.5mL/5mL & diphenhydrAMINE 5mg/5mL Susp suspension    [facility] M[facility]    240 mL    Swish and spit 10 mLs in mouth every 6 hours as needed for mouth sores       lidocaine-prilocaine cream    EMLA    30 g    Apply to port 30 minutes prior to access       ondansetron 8 MG tablet    ZOFRAN    30 tablet    Take 1 tablet (8 mg) by mouth every 6 hours as needed for nausea       oxyCODONE 5 MG IR tablet    ROXICODONE    20 tablet    Take 1-2 tablets (5-10 mg) by mouth every 4 hours as needed for moderate to severe pain       pantoprazole 40 MG EC tablet    PROTONIX    30 tablet    Take 1 tablet (40 mg) by mouth daily        pazopanib 200 MG tablet CHEMOTHERAPY    VOTRIENT    120 tablet    Take 4 tablets (800 mg) by mouth daily       polyethylene glycol Packet    MIRALAX/GLYCOLAX    7 packet    Take 17 g by mouth daily       senna-docusate 8.6-50 MG per tablet    SENOKOT-S;PERICOLACE    30 tablet    Take 2 tablets by mouth 2 times daily as needed for constipation

## 2017-06-09 NOTE — TELEPHONE ENCOUNTER
Received call from Olimpia stating that she had something in her eye and has an appointment to see the eye doctor at 11:30a today but doesn't have transportation. She said she called her insurance transportation, but was unable to get a ride. Inquired about family or friends and she said that everyone is busy today. Agreed to arrange for cab voucher to get her to her appt. She then explained that she also ran out of her medication last night and needs it for today and wondered if she could get a ride to the pharmacy, as well. She stated that she tried to fill it earlier this week, but that it wasn't ready. Confirmed that she had gotten update that the prescriptions were ready today at the  pharmacy. Agreed to arrange trip from eye doctor to pharmacy and then back home. Olimpia was appreciative of the assistance. Provided her with [employer] phone number for her to call in between trips.     Arranged three-leg trip via [employer] (Ph. [phone], F. [phone]). Faxed cab vouchers.     BELKIS Sheppard, [facility]  [place]  - Pediatric Hematology/Oncology  Phone: [phone]  Pager: [phone]  [email or web address]     NO LETTER

## 2017-06-12 NOTE — LETTER
6/12/2017      RE: Olimpia Childress  [address]   [facility] [address]       Pediatric Hematology/Oncology Clinic Note    History- Olimpia initially presented with a growth on her right labia in 2013 when she was living in [place]. 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 She discontinued further treatment b/c her physicians were unsure of the exact diagnosis and she felt uncomfortable proceeding. Olimpia subsequently immigrated to the  in August of 2015 and in October she first noticed a recurrence of the mass in the area of previous excision. She was in [place] at that time, seen by oncology and had a port placed but then moved to [place] where she was seen by Dr. Mckeon at [facility] in November. There she underwent an MRI that showed a solid and cystic subcutaneous mass in the right labia measuring 1.7 x 1.6 x 1cm with question of nodular extension vs a second nodule measuring 0.7 cm. There was no invasion into the vagina. On November 16th 2015, Olimpia had a biopsy of the mass by a gynecologist, Dr. Terry, at [facility]. Cytology was reviewed at [facility] and read as a SMARCB1-deficient genital sarcoma, likely falling within the overall spectrum of malignant rhabdoid tumor. By immunohistochemistry, the cells shows a complete loss of SMARCb1. Wide spectrum cytokeratin, CD34, WT1, Desmin and CD99 were all negative. RT PCR for Wooten Sarcoma associated fusion transcripts were negative as well. Olimpia went on to have a PET/CT as well which showed multiple pulmonary lesions and biopsy confirmed a lesion to be metastatic disease. Olimpia was seen by Tomás White at [facility] who reviewed the diagnosis and potential treatment plans with her, however she prefered to be treated here at [facility]. 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    Olimpia underwent left sided thoracotomy and resection of two pulmonary nodules on 7/20/16. Pathology revealed that one lesion was benign lymphoid tissue and the other was consistent with synovial sarcoma.  This was confirmed with FISH  With 91% of cell examined having a signal pattern indictivate of a rearrangement of the SS18 locus.  Olimpia has now completed 3 cycles of chemotherapy per [id number] and started her radiation on 9/13/16 and completed on 10/17/16.  She then went on to have a resection of her labial mass on 11/16/16 by Jannet Pastrana and Chikis with reconstruction, including skin flaps by Dr. Corona from plastics.  Pathology showed no residual tumor.  She had a f/u chest CT today on 12/5/16 that showed persistance of her pulmonary nodules as well as few additonal pulmonary nodules.  She saw Dr. Pierre who was in agreement with surgical resection, however Olimpia wanted to wait until March to have more time to recover from her last surgery. She underwent left sided thoracotomy on 3/8/17.    In May, Olimpia's follow up PET/CT demonstrated significant progression of her pulmonary metastatic disease, with an anterior mediastinal mass compression her right ventricle and potentially right outflow tract.  Subsequent echocardiogram did not show altered cardiac function, Dr Man felt radiation therapy was not in her best interest at this time but would consider if she developed cardiac dysfunction.  Olimpia was started on oral Pazopanib 4 weeks ago for palliative therapy.    HPI:  Olimpia comes in today with foot pain that has worsened over the last few days to where it is difficult to walk.  Her feet are red with some discoloration throughout.  No issues with her hands.  She also reports continued mouth pain, particularly her gums and states that her mouth was bleeding last night but stopped.  No other  bleeding.  Energy level remains low.  So cough or difficulties breathing.  She denies any pain other than her feet.      History was obtained from [facility] via professional [employer] .     Allergies as of 06/12/2017 - [facility] as Reviewed 06/12/2017   Allergen Reaction Noted     Heparin flush Other (See Comments) 01/27/2016     Pork derived products  02/09/2016     Tegaderm transparent dressing (informational only) Other (See Comments) and Rash 04/20/2016       Current Outpatient Prescriptions   Medication Sig Dispense Refill     magic mouthwash suspension (diphenhydrAMINE, lidocaine, aluminum-magnesium & simethicone) Swish and spit 10 mLs in mouth every 6 hours as needed for mouth sores 240 mL 3     pazopanib (VOTRIENT) 200 MG tablet CHEMOTHERAPY Take 4 tablets (800 mg) by mouth daily 120 tablet 5     bisacodyl (DULCOLAX) 5 MG EC tablet Take 1 tablet (5 mg) by mouth 2 times daily 30 tablet 2     oxyCODONE (ROXICODONE) 5 MG IR tablet Take 1-2 tablets (5-10 mg) by mouth every 4 hours as needed for moderate to severe pain 20 tablet 0     polyethylene glycol (MIRALAX/GLYCOLAX) Packet Take 17 g by mouth daily 7 packet 1     senna-docusate (SENOKOT-S;PERICOLACE) 8.6-50 MG per tablet Take 2 tablets by mouth 2 times daily as needed for constipation 30 tablet 1     acetaminophen (TYLENOL) 325 MG tablet Take 2 tablets (650 mg) by mouth every 4 hours as needed for mild pain 100 tablet 1     ibuprofen (ADVIL/MOTRIN) 600 MG tablet Take 1 tablet (600 mg) by mouth every 6 hours as needed for moderate pain 60 tablet 1     diltiazem 2% in PLO cream, FV COMPOUNDED, 2% GEL To anal opening three times daily.  Use a pea-sized amount.  Store at room temperature. 60 g 0     ondansetron (ZOFRAN) 8 MG tablet Take 1 tablet (8 mg) by mouth every 6 hours as needed for nausea 30 tablet 6     diphenhydrAMINE (BENADRYL) 25 MG tablet Take 1-2 tablets (25-50 mg) by mouth every 6 hours as needed for other (Nausea or vomiting) 60 tablet 3      lidocaine-prilocaine (EMLA) cream Apply to port 30 minutes prior to access 30 g 5     pantoprazole (PROTONIX) 40 MG enteric coated tablet Take 1 tablet (40 mg) by mouth daily 30 tablet 3     leuprolide (LUPRON DEPOT-PED) 11.25 MG KIT Inject 11.25 mg into the muscle every 3 months 1 each 1       Past Medical History:   Diagnosis Date     Anemia 6/3/2016     Constipation      Extrarenal rhabdoid neoplasm (H)      Febrile neutropenia (H) 4/17/2016     History of blood transfusion      Latent tuberculosis      Lung disease      On antineoplastic chemotherapy      Sarcoma of vulva (H)        Social History     Social History     Marital status:      Spouse name: N/A     Number of children: N/A     Years of education: N/A     Occupational History     Not on file.     Social History Main Topics     Smoking status: Never Smoker     Smokeless tobacco: Never Used     Alcohol use No     Drug use: No     Sexual activity: No     Other Topics Concern     Not on file     Social History Narrative       Family History   Problem Relation Age of Onset     Other Cancer No family hx of      ROS  See HPI. Complete ROS otherwise negative.    Physical Exam   Wt Readings from Last 4 Encounters:   06/12/17 59.8 kg (131 lb 13.4 oz)   06/06/17 60.8 kg (134 lb 0.6 oz)   05/19/17 60.2 kg (132 lb 11.5 oz)   05/01/17 61 kg (134 lb 7.7 oz)       Temp:  [97.9  F (36.6  C)] 97.9  F (36.6  C)  Pulse:  [68] 68  Resp:  [20] 20  BP: (109)/(73) 109/73  SpO2:  [96 %] 96 %    GENERAL: Alert, well appearing young woman in no acute distress.  SKIN: bilateral plantar surface of feet red, some hyperpigmented flat lesions bilateral;  Increased warmth   HEAD: Normocephalic, atraumatic     EYES: Normal lids, conjunctivae/cornea normal, mild icterus. PERRL. EOMI.  EARS: Pinna normal b/l, no pain on palpation. External ears normal appearing.   NOSE: Clear, no discharge or congestion  MOUTH/THROAT: Oropharynx is clear. Tongue without sores, possible mild  diffuse erythema. Normal dentition for age, no mucosal lesions.  NECK: Supple, full range of motion, no masses  LYMPH NODES: No cervical lymphadenopathy.  LUNGS: No increased WOB.  Lungs clear to auscultation throughout; diminished in LLL.  No crackles or wheezes.  HEART: HRR. S1 and S2 are normal. No murmurs, rubs, gallops. The radial pulses are 2+ bilaterally. Cap refill <3 sec in upper extremities.  ABDOMEN: Normal bowel sounds. Soft, non-tender, non-distended, no masses or hepatosplenomegaly.  NEUROLOGIC: Grossly intact, no focal neurologic deficits.    :  Deferred today.    Labs:  Results for orders placed or performed in visit on 06/12/17   CBC with platelets differential   Result Value Ref Range    WBC 2.4 (L) 4.0 - 11.0 10e9/L    RBC Count 4.49 3.8 - 5.2 10e12/L    Hemoglobin 14.7 11.7 - 15.7 g/dL    Hematocrit 41.2 35.0 - 47.0 %    MCV 92 78 - 100 fl    MCH 32.7 26.5 - 33.0 pg    MCHC 35.7 31.5 - 36.5 g/dL    RDW 11.9 10.0 - 15.0 %    Platelet Count 57 (L) 150 - 450 10e9/L    Diff Method Automated Method     % Neutrophils 40.9 %    % Lymphocytes 46.7 %    % Monocytes 12.0 %    % Eosinophils 0.4 %    % Basophils 0.0 %    % Immature Granulocytes 0.0 %    Nucleated RBCs 0 0 /100    Absolute Neutrophil 1.0 (L) 1.6 - 8.3 10e9/L    Absolute Lymphocytes 1.1 0.8 - 5.3 10e9/L    Absolute Monocytes 0.3 0.0 - 1.3 10e9/L    Absolute Eosinophils 0.0 0.0 - 0.7 10e9/L    Absolute Basophils 0.0 0.0 - 0.2 10e9/L    Abs Immature Granulocytes 0.0 0 - 0.4 10e9/L    Absolute Nucleated RBC 0.0     RBC Morphology Normal     Platelet Estimate Confirming automated cell count      Impression:  Olimpia is a 22-year-old female with recurrent metastatic synovial sarcoma, recent scans show progression of her pulmonary disease.  She is currently having trouble with both stomatitis and plantar dysesthesia.  WBC and PLTS remain low but stable.    Plan:  1) Grade 2 plantar dysesthesia and grade 2 stomatitis-currently unacceptable side  effects for Asma.  Will hold Pazpopanib for 1 week and reassess.  If symptoms have improved will restart pazopanib at 50% dosing.  2) Morphine gel for foot pain;  Also recommended tylenol or oxycodone as needed  3) Plts stable and bleeding stopped;  No need for plts today;  Asma will call if there is additional bleeding  4) Will keep Asma's port in place at this time, she is comfortable with this plan.  Port flush next week  5) RTC in 2 weeks for repeat labs next week (CBC, CMP, UA).  6) Plan to repeat chest CT 2-3 months from initiation of pazopanib (started 5/1/17), sooner with progressive symptoms.      Coretta Yanez MD

## 2017-06-12 NOTE — NURSING NOTE
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

## 2017-06-12 NOTE — MR AVS SNAPSHOT
After Visit Summary   2017    Olimpia Childress    MRN: [id number]           Patient Information     Date Of Birth          1995        Visit Information        Provider Department      2017 12:00 PM Open, Assignments; Coretta Yanez MD Peds Hematology Oncology        Today's Diagnoses     Synovial sarcoma (H)    -  1          [facility], 9th floor  [address] [place] [address]  Phone: [phone]  Clinic Hours:   Monday-Friday:   7 am to 5:00 pm   closed weekends and major  holidays     If your fever is 100.5  or greater,   call the clinic during business hours.   After hours call [phone] and ask for the pediatric hematology / oncology physician to be paged for you.               Follow-ups after your visit        Who to contact     Please call your clinic at [phone] to:    Ask questions about your health    Make or cancel appointments    Discuss your medicines    Learn about your test results    Speak to your doctor   If you have compliments or concerns about an experience at your clinic, or if you wish to file a complaint, please contact [facility] Patient Relations at [phone] or email us at [email or web address]         Additional Information About Your Visit        MyChart Information     [employer] is an electronic gateway that provides easy, online access to your medical records. With [employer], you can request a clinic appointment, read your test results, renew a prescription or communicate with your care team.     To sign up for [employer] visit the website at [email or web address]   You will be asked to enter the access code listed below, as well as some personal information. Please follow the directions to create your username and password.     Your access code is: [email or web address]  Expires: 2017  2:20 PM     Your access code will  in 90 days. If you need help  "or a new code, please contact your [facility] or call [phone] for assistance.        Care EveryWhere ID     This is your Care EveryWhere ID. This could be used by other organizations to access your [facility] medical records  [id number]        Your Vitals Were     Pulse Temperature Respirations Height Pulse Oximetry BMI (Body Mass Index)    68 97.9  F (36.6  C) (Oral) 20 1.619 m (5' 3.74\") 96% 22.81 kg/m2       Blood Pressure from Last 3 Encounters:   06/12/17 109/73   06/06/17 102/77   05/19/17 108/79    Weight from Last 3 Encounters:   06/12/17 59.8 kg (131 lb 13.4 oz)   06/06/17 60.8 kg (134 lb 0.6 oz)   05/19/17 60.2 kg (132 lb 11.5 oz)              We Performed the Following     CBC with platelets differential        Primary Care Provider Office Phone # Fax #    Coretta Yanez [phone]       [address] [address]        Thank you!     Thank you for choosing PEDS HEMATOLOGY ONCOLOGY  for your care. Our goal is always to provide you with excellent care. Hearing back from our patients is one way we can continue to improve our services. Please take a few minutes to complete the written survey that you may receive in the mail after your visit with us. Thank you!             Your Updated Medication List - Protect others around you: Learn how to safely use, store and throw away your medicines at www.disposemymeds.org.          This list is accurate as of: 6/12/17 11:59 PM.  Always use your most recent med list.                   Brand Name Dispense Instructions for use    acetaminophen 325 MG tablet    TYLENOL    100 tablet    Take 2 tablets (650 mg) by mouth every 4 hours as needed for mild pain       bisacodyl 5 MG EC tablet     30 tablet    Take 1 tablet (5 mg) by mouth 2 times daily       diltiazem 2% in PLO cream (FV COMPOUNDED) 2% Gel     60 g    To anal opening three times daily.  Use a pea-sized amount.  Store " at room temperature.       diphenhydrAMINE 25 MG tablet    BENADRYL    60 tablet    Take 1-2 tablets (25-50 mg) by mouth every 6 hours as needed for other (Nausea or vomiting)       ibuprofen 600 MG tablet    ADVIL/MOTRIN    60 tablet    Take 1 tablet (600 mg) by mouth every 6 hours as needed for moderate pain       leuprolide 11.25 MG Kit kit    LUPRON DEPOT-PED    1 each    Inject 11.25 mg into the muscle every 3 months       lidocaine visc 2% 2.5mL/5mL & maalox/mylanta w/ simeth 2.5mL/5mL & diphenhydrAMINE 5mg/5mL Susp suspension    [facility]    240 mL    Swish and spit 10 mLs in mouth every 6 hours as needed for mouth sores       lidocaine-prilocaine cream    EMLA    30 g    Apply to port 30 minutes prior to access       ondansetron 8 MG tablet    ZOFRAN    30 tablet    Take 1 tablet (8 mg) by mouth every 6 hours as needed for nausea       oxyCODONE 5 MG IR tablet    ROXICODONE    20 tablet    Take 1-2 tablets (5-10 mg) by mouth every 4 hours as needed for moderate to severe pain       pantoprazole 40 MG EC tablet    PROTONIX    30 tablet    Take 1 tablet (40 mg) by mouth daily       pazopanib 200 MG tablet CHEMOTHERAPY    VOTRIENT    120 tablet    Take 4 tablets (800 mg) by mouth daily       polyethylene glycol Packet    MIRALAX/GLYCOLAX    7 packet    Take 17 g by mouth daily       senna-docusate 8.6-50 MG per tablet    SENOKOT-S;PERICOLACE    30 tablet    Take 2 tablets by mouth 2 times daily as needed for constipation

## 2017-06-13 NOTE — PROGRESS NOTES
Pediatric Hematology/Oncology Clinic Note    History- Olimpia initially presented with a growth on her right labia in 2013 when she was living in [place]. 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 She discontinued further treatment b/c her physicians were unsure of the exact diagnosis and she felt uncomfortable proceeding. Olimpia subsequently immigrated to the  in August of 2015 and in October she first noticed a recurrence of the mass in the area of previous excision. She was in [place] at that time, seen by oncology and had a port placed but then moved to [place] where she was seen by Dr. Mckeon at [facility] in November. There she underwent an MRI that showed a solid and cystic subcutaneous mass in the right labia measuring 1.7 x 1.6 x 1cm with question of nodular extension vs a second nodule measuring 0.7 cm. There was no invasion into the vagina. On November 16th 2015, Olimpia had a biopsy of the mass by a gynecologist, Dr. Terry, at [facility]. Cytology was reviewed at [place] and read as a SMARCB1-deficient genital sarcoma, likely falling within the overall spectrum of malignant rhabdoid tumor. By immunohistochemistry, the cells shows a complete loss of SMARCb1. Wide spectrum cytokeratin, CD34, WT1, Desmin and CD99 were all negative. RT PCR for Wooten Sarcoma associated fusion transcripts were negative as well. Olimpia went on to have a PET/CT as well which showed multiple pulmonary lesions and biopsy confirmed a lesion to be metastatic disease. Olimpia was seen by Tomás White at [facility] who reviewed the diagnosis and potential treatment plans with her, however she prefered to be treated here at [facility]. 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    Olimpia underwent left sided thoracotomy and resection of two pulmonary nodules on 7/20/16. Pathology revealed that one lesion was benign lymphoid tissue and the other was consistent with synovial sarcoma.  This was confirmed with FISH  With 91% of cell examined having a signal pattern indictivate of a rearrangement of the SS18 locus.  Olimpia has now completed 3 cycles of chemotherapy per [id number] and started her radiation on 9/13/16 and completed on 10/17/16.  She then went on to have a resection of her labial mass on 11/16/16 by Jannet Pastrana and [facility] with reconstruction, including skin flaps by Dr. Corona from plastics.  Pathology showed no residual tumor.  She had a f/u chest CT today on 12/5/16 that showed persistance of her pulmonary nodules as well as few additonal pulmonary nodules.  She saw Dr. Pierre who was in agreement with surgical resection, however Olimpia wanted to wait until March to have more time to recover from her last surgery. She underwent left sided thoracotomy on 3/8/17.    In May, Olimpia's follow up PET/CT demonstrated significant progression of her pulmonary metastatic disease, with an anterior mediastinal mass compression her right ventricle and potentially right outflow tract.  Subsequent echocardiogram did not show altered cardiac function, Dr Man felt radiation therapy was not in her best interest at this time but would consider if she developed cardiac dysfunction.  Olimpia was started on oral Pazopanib 4 weeks ago for palliative therapy.    HPI:  Olimpia comes in today with foot pain that has worsened over the last few days to where it is difficult to walk.  Her feet are red with some discoloration throughout.  No issues with her hands.  She also reports continued mouth pain, particularly her gums and states that her mouth was bleeding last night but stopped.  No other bleeding.  Energy level remains low.  So cough or difficulties breathing.  She  denies any pain other than her feet.      History was obtained from [facility] via professional [facility] .     Allergies as of 06/12/2017 - [place] as Reviewed 06/12/2017   Allergen Reaction Noted     Heparin flush Other (See Comments) 01/27/2016     Pork derived products  02/09/2016     Tegaderm transparent dressing (informational only) Other (See Comments) and Rash 04/20/2016       Current Outpatient Prescriptions   Medication Sig Dispense Refill     magic mouthwash suspension (diphenhydrAMINE, lidocaine, aluminum-magnesium & simethicone) Swish and spit 10 mLs in mouth every 6 hours as needed for mouth sores 240 mL 3     pazopanib (VOTRIENT) 200 MG tablet CHEMOTHERAPY Take 4 tablets (800 mg) by mouth daily 120 tablet 5     bisacodyl (DULCOLAX) 5 MG EC tablet Take 1 tablet (5 mg) by mouth 2 times daily 30 tablet 2     oxyCODONE (ROXICODONE) 5 MG IR tablet Take 1-2 tablets (5-10 mg) by mouth every 4 hours as needed for moderate to severe pain 20 tablet 0     polyethylene glycol (MIRALAX/GLYCOLAX) Packet Take 17 g by mouth daily 7 packet 1     senna-docusate (SENOKOT-S;PERICOLACE) 8.6-50 MG per tablet Take 2 tablets by mouth 2 times daily as needed for constipation 30 tablet 1     acetaminophen (TYLENOL) 325 MG tablet Take 2 tablets (650 mg) by mouth every 4 hours as needed for mild pain 100 tablet 1     ibuprofen (ADVIL/MOTRIN) 600 MG tablet Take 1 tablet (600 mg) by mouth every 6 hours as needed for moderate pain 60 tablet 1     diltiazem 2% in PLO cream, FV COMPOUNDED, 2% GEL To anal opening three times daily.  Use a pea-sized amount.  Store at room temperature. 60 g 0     ondansetron (ZOFRAN) 8 MG tablet Take 1 tablet (8 mg) by mouth every 6 hours as needed for nausea 30 tablet 6     diphenhydrAMINE (BENADRYL) 25 MG tablet Take 1-2 tablets (25-50 mg) by mouth every 6 hours as needed for other (Nausea or vomiting) 60 tablet 3     lidocaine-prilocaine (EMLA) cream Apply to port 30 minutes prior to access 30 g 5      pantoprazole (PROTONIX) 40 MG enteric coated tablet Take 1 tablet (40 mg) by mouth daily 30 tablet 3     leuprolide (LUPRON DEPOT-PED) 11.25 MG KIT Inject 11.25 mg into the muscle every 3 months 1 each 1       Past Medical History:   Diagnosis Date     Anemia 6/3/2016     Constipation      Extrarenal rhabdoid neoplasm (H)      Febrile neutropenia (H) 4/17/2016     History of blood transfusion      Latent tuberculosis      Lung disease      On antineoplastic chemotherapy      Sarcoma of vulva (H)        Social History     Social History     Marital status:      Spouse name: N/A     Number of children: N/A     Years of education: N/A     Occupational History     Not on file.     Social History Main Topics     Smoking status: Never Smoker     Smokeless tobacco: Never Used     Alcohol use No     Drug use: No     Sexual activity: No     Other Topics Concern     Not on file     Social History Narrative       Family History   Problem Relation Age of Onset     Other Cancer No family hx of      ROS  See HPI. Complete ROS otherwise negative.    Physical Exam   Wt Readings from Last 4 Encounters:   06/12/17 59.8 kg (131 lb 13.4 oz)   06/06/17 60.8 kg (134 lb 0.6 oz)   05/19/17 60.2 kg (132 lb 11.5 oz)   05/01/17 61 kg (134 lb 7.7 oz)       Temp:  [97.9  F (36.6  C)] 97.9  F (36.6  C)  Pulse:  [68] 68  Resp:  [20] 20  BP: (109)/(73) 109/73  SpO2:  [96 %] 96 %    GENERAL: Alert, well appearing young woman in no acute distress.  SKIN: bilateral plantar surface of feet red, some hyperpigmented flat lesions bilateral;  Increased warmth   HEAD: Normocephalic, atraumatic     EYES: Normal lids, conjunctivae/cornea normal, mild icterus. PERRL. EOMI.  EARS: Pinna normal b/l, no pain on palpation. External ears normal appearing.   NOSE: Clear, no discharge or congestion  MOUTH/THROAT: Oropharynx is clear. Tongue without sores, possible mild diffuse erythema. Normal dentition for age, no mucosal lesions.  NECK: Supple, full range  of motion, no masses  LYMPH NODES: No cervical lymphadenopathy.  LUNGS: No increased WOB.  Lungs clear to auscultation throughout; diminished in LLL.  No crackles or wheezes.  HEART: HRR. S1 and S2 are normal. No murmurs, rubs, gallops. The radial pulses are 2+ bilaterally. Cap refill <3 sec in upper extremities.  ABDOMEN: Normal bowel sounds. Soft, non-tender, non-distended, no masses or hepatosplenomegaly.  NEUROLOGIC: Grossly intact, no focal neurologic deficits.    :  Deferred today.    Labs:  Results for orders placed or performed in visit on 06/12/17   CBC with platelets differential   Result Value Ref Range    WBC 2.4 (L) 4.0 - 11.0 10e9/L    RBC Count 4.49 3.8 - 5.2 10e12/L    Hemoglobin 14.7 11.7 - 15.7 g/dL    Hematocrit 41.2 35.0 - 47.0 %    MCV 92 78 - 100 fl    MCH 32.7 26.5 - 33.0 pg    MCHC 35.7 31.5 - 36.5 g/dL    RDW 11.9 10.0 - 15.0 %    Platelet Count 57 (L) 150 - 450 10e9/L    Diff Method Automated Method     % Neutrophils 40.9 %    % Lymphocytes 46.7 %    % Monocytes 12.0 %    % Eosinophils 0.4 %    % Basophils 0.0 %    % Immature Granulocytes 0.0 %    Nucleated RBCs 0 0 /100    Absolute Neutrophil 1.0 (L) 1.6 - 8.3 10e9/L    Absolute Lymphocytes 1.1 0.8 - 5.3 10e9/L    Absolute Monocytes 0.3 0.0 - 1.3 10e9/L    Absolute Eosinophils 0.0 0.0 - 0.7 10e9/L    Absolute Basophils 0.0 0.0 - 0.2 10e9/L    Abs Immature Granulocytes 0.0 0 - 0.4 10e9/L    Absolute Nucleated RBC 0.0     RBC Morphology Normal     Platelet Estimate Confirming automated cell count      Impression:  Olimpia is a 22-year-old female with recurrent metastatic synovial sarcoma, recent scans show progression of her pulmonary disease.  She is currently having trouble with both stomatitis and plantar dysesthesia.  WBC and PLTS remain low but stable.    Plan:  1) Grade 2 plantar dysesthesia and grade 2 stomatitis-currently unacceptable side effects for Olimpia.  Will hold Pazpopanib for 1 week and reassess.  If symptoms have improved will  restart pazopanib at 50% dosing.  2) Morphine gel for foot pain;  Also recommended tylenol or oxycodone as needed  3) Plts stable and bleeding stopped;  No need for plts today;  Asma will call if there is additional bleeding  4) Will keep Asma's port in place at this time, she is comfortable with this plan.  Port flush next week  5) RTC in 2 weeks for repeat labs next week (CBC, CMP, UA).  6) Plan to repeat chest CT 2-3 months from initiation of pazopanib (started 5/1/17), sooner with progressive symptoms.

## 2017-06-19 NOTE — MR AVS SNAPSHOT
"              After Visit Summary   6/19/2017    Olimpia Childress    MRN: [id number]           Patient Information     Date Of Birth          1995        Visit Information        Provider Department      6/19/2017 10:36 AM Jesise Looney MSW [facility]        Today's Diagnoses     Encounter for counseling    -  1       Follow-ups after your visit        Your next 10 appointments already scheduled     Jul 03, 2017 12:30 PM CDT   Return Visit with Coretta Yanez MD   Peds Hematology Oncology ([facility])    [facility]  9th Floor  [address] [id number] [phone]              Who to contact     If you have questions or need follow up information about today's clinic visit or your schedule please contact [facility] directly at No information on file..  Normal or non-critical lab and imaging results will be communicated to you by MyChart, letter or phone within 4 business days after the clinic has received the results. If you do not hear from us within 7 days, please contact the clinic through [employer] or phone. If you have a critical or abnormal lab result, we will notify you by phone as soon as possible.  Submit refill requests through [employer] or call your pharmacy and they will forward the refill request to us. Please allow 3 business days for your refill to be completed.          Additional Information About Your Visit        [employer] Information     [employer] lets you send messages to your doctor, view your test results, renew your prescriptions, schedule appointments and more. To sign up, go to [email or web address] . Click on \"Log in\" on the left side of the screen, which will take you to the Welcome page. Then click on \"Sign up Now\" on the right side of the page.     You will be asked to enter the access code listed below, as well as some personal information. Please follow the directions to create your username and password.     Your " access code is: [email or web address]  Expires: 2017  2:20 PM     Your access code will  in 90 days. If you need help or a new code, please call your [facility] or [phone].        Care EveryWhere ID     This is your Care EveryWhere ID. This could be used by other organizations to access your [facility] medical records  [id number]         Blood Pressure from Last 3 Encounters:   17 100/69   17 109/73   17 102/77    Weight from Last 3 Encounters:   17 61.6 kg (135 lb 12.9 oz)   17 59.8 kg (131 lb 13.4 oz)   17 60.8 kg (134 lb 0.6 oz)              Today, you had the following     No orders found for display       Primary Care Provider Office Phone # Fax #    Coretta Yanez [phone]       [address] [address]        Equal Access to Services     [facility] : Hadii leonid ku hadasho Soomaali, waaxda luqadaha, qaybta kaalmada adeegyalaney, wilner blue . So [facility] [phone].    ATENCIÓN: Si habla español, tiene a ordoñez disposición servicios gratuitos de asistencia lingüística. Llame al [phone].    We comply with applicable federal civil rights laws and [place] laws. We do not discriminate on the basis of race, color, national origin, age, disability sex, sexual orientation or gender identity.            Thank you!     Thank you for choosing [facility]  for your care. Our goal is always to provide you with excellent care. Hearing back from our patients is one way we can continue to improve our services. Please take a few minutes to complete the written survey that you may receive in the mail after your visit with us. Thank you!             Your Updated Medication List - Protect others around you: Learn how to safely use, store and throw away your medicines at www.disposemymeds.org.          This list is accurate as of: 17 11:59 PM.  Always use your most recent med list.                    Brand Name Dispense Instructions for use Diagnosis    acetaminophen 325 MG tablet    TYLENOL    100 tablet    Take 2 tablets (650 mg) by mouth every 4 hours as needed for mild pain    Sarcoma of vulva (H)       bisacodyl 5 MG EC tablet     30 tablet    Take 1 tablet (5 mg) by mouth 2 times daily    Slow transit constipation       diltiazem 2% in PLO cream (FV COMPOUNDED) 2% Gel     60 g    To anal opening three times daily.  Use a pea-sized amount.  Store at room temperature.    Anal fissure       diphenhydrAMINE 25 MG tablet    BENADRYL    60 tablet    Take 1-2 tablets (25-50 mg) by mouth every 6 hours as needed for other (Nausea or vomiting)    Chemotherapy induced nausea and vomiting       ibuprofen 600 MG tablet    ADVIL/MOTRIN    60 tablet    Take 1 tablet (600 mg) by mouth every 6 hours as needed for moderate pain    Sarcoma of vulva (H)       leuprolide 11.25 MG Kit kit    LUPRON DEPOT-PED    1 each    Inject 11.25 mg into the muscle every 3 months    Visit for fertility preservation counseling prior to cancer therapy, Malignant tumor cells (H)       lidocaine visc 2% 2.5mL/5mL & maalox/mylanta w/ simeth 2.5mL/5mL & diphenhydrAMINE 5mg/5mL Susp suspension    [facility]    240 mL    Swish and spit 10 mLs in mouth every 6 hours as needed for mouth sores    Stomatitis       lidocaine-prilocaine cream    EMLA    30 g    Apply to port 30 minutes prior to access    Malignant tumor cells (H)       ondansetron 8 MG tablet    ZOFRAN    30 tablet    Take 1 tablet (8 mg) by mouth every 6 hours as needed for nausea    Encounter for antineoplastic chemotherapy       oxyCODONE 5 MG IR tablet    ROXICODONE    20 tablet    Take 1-2 tablets (5-10 mg) by mouth every 4 hours as needed for moderate to severe pain    Acute post-operative pain       pantoprazole 40 MG EC tablet    PROTONIX    30 tablet    Take 1 tablet (40 mg) by mouth daily    Extrarenal rhabdoid neoplasm (H)       pazopanib 200 MG  tablet CHEMOTHERAPY    VOTRIENT    120 tablet    Take 4 tablets (800 mg) by mouth daily    Synovial sarcoma (H)       polyethylene glycol Packet    MIRALAX/GLYCOLAX    7 packet    Take 17 g by mouth daily    Acute post-operative pain       senna-docusate 8.6-50 MG per tablet    SENOKOT-S;PERICOLACE    30 tablet    Take 2 tablets by mouth 2 times daily as needed for constipation    Acute post-operative pain

## 2017-06-19 NOTE — PROGRESS NOTES
[facility]  PEDIATRIC SOCIAL WORK PROGRESS NOTE      DATA:     Met with Olimpia and her cousin, along with [facility] [employer]  during clinic visit. Two of Olimpia's cousin's children were also present in the room. Relayed questions from the  she has worked with regarding the status of her mom. Olimpia again explained that she would like for her mom to come to the [place] permanently to be with her in light of her illness and thinks it would be more difficult for her mom to come temporarily. Explained that the  noted that she would need to work with someone with a different area of expertise for this. Olimpia was agreeable to THI responding to her email to discuss this further. Also discussed process for requesting re-assessment for PCA hours. Olimpia was agreeable to THI collaborating with MD and submitting letter to her insurance provider requesting this and providing update on her medical status since last assessment. Discussed THI Hortencia's upcoming return and wished Olimpia well. She thanked THI for the assistance.     INTERVENTION:     1. Provided ongoing assessment of patient and family's level of coping.   2. Provided psychosocial supportive counseling and crisis intervention as needed.   3. Facilitate service linkage with hospital and community resources as needed.   4. Collaborate with healthcare team and professional in community to meet patient and family's needs as needed.  ASSESSMENT:     Olimpia was pleasant and receptive to THI. Her cousin remains a supportive advocate for her.     PLAN:     THI will continue to monitor, support and assist with ongoing social service needs.     Jessie Looney, BELKIS, [facility]  [place]  - Pediatric Hematology/Oncology  Phone: [phone]  Pager: [phone]  [email or web address]     NO LETTER

## 2017-06-19 NOTE — MR AVS SNAPSHOT
After Visit Summary   6/19/2017    Olimpia Childress    MRN: [id number]           Patient Information     Date Of Birth          1995        Visit Information        Provider Department      6/19/2017 9:45 AM [place]; Coretta Yanez MD Peds Hematology Oncology        Today's Diagnoses     Synovial sarcoma (H)    -  1          [facility], 9th floor  [address] [place] [address]  Phone: [phone]  Clinic Hours:   Monday-Friday:   7 am to 5:00 pm   closed weekends and major  holidays     If your fever is 100.5  or greater,   call the clinic during business hours.   After hours call [phone] and ask for the pediatric hematology / oncology physician to be paged for you.               Follow-ups after your visit        Your next 10 appointments already scheduled     Jul 03, 2017 12:30 PM CDT   Return Visit with Coretta Yanez MD   Peds Hematology Oncology ([facility])    [facility]  9th Floor  [address] [place] [id number] [phone]              Future tests that were ordered for you today     Open Future Orders        Priority Expected Expires Ordered    CBC with platelets differential Routine 7/3/2017 6/19/2018 6/19/2017    Comprehensive metabolic panel Routine 7/3/2017 6/19/2018 6/19/2017    Routine UA with microscopic - No culture Routine 7/3/2017 6/19/2018 6/19/2017            Who to contact     Please call your clinic at [phone] to:    Ask questions about your health    Make or cancel appointments    Discuss your medicines    Learn about your test results    Speak to your doctor   If you have compliments or concerns about an experience at your clinic, or if you wish to file a complaint, please contact [facility] Patient Relations at [phone] or email us at [email or web address]         Additional Information About Your  Visit        [employer] Information     [employer] is an electronic gateway that provides easy, online access to your medical records. With [employer], you can request a clinic appointment, read your test results, renew a prescription or communicate with your care team.     To sign up for [employer] visit the website at [email or web address]   You will be asked to enter the access code listed below, as well as some personal information. Please follow the directions to create your username and password.     Your access code is: [email or web address]  Expires: 2017  2:20 PM     Your access code will  in 90 days. If you need help or a new code, please contact your [facility] or call [phone] for assistance.        Care EveryWhere ID     This is your Care EveryWhere ID. This could be used by other organizations to access your [facility] medical records  [id number]        Your Vitals Were     Pulse Temperature Respirations Pulse Oximetry BMI (Body Mass Index)       90 97.5  F (36.4  C) (Oral) 20 100% 23.5 kg/m2        Blood Pressure from Last 3 Encounters:   17 100/69   17 109/73   17 102/77    Weight from Last 3 Encounters:   17 61.6 kg (135 lb 12.9 oz)   17 59.8 kg (131 lb 13.4 oz)   17 60.8 kg (134 lb 0.6 oz)              We Performed the Following     CBC with platelets differential     Comprehensive metabolic panel     Routine UA with microscopic - No culture        Primary Care Provider Office Phone # Fax #    Coretta Yanez [phone]       [facility] [address] [facility] [address]        Thank you!     Thank you for choosing PEDS HEMATOLOGY ONCOLOGY  for your care. Our goal is always to provide you with excellent care. Hearing back from our patients is one way we can continue to improve our services. Please take a few minutes to complete the written survey that you may receive in the mail after your visit  with us. Thank you!             Your Updated Medication List - Protect others around you: Learn how to safely use, store and throw away your medicines at www.disposemymeds.org.          This list is accurate as of: 6/19/17  3:39 PM.  Always use your most recent med list.                   Brand Name Dispense Instructions for use    acetaminophen 325 MG tablet    TYLENOL    100 tablet    Take 2 tablets (650 mg) by mouth every 4 hours as needed for mild pain       bisacodyl 5 MG EC tablet     30 tablet    Take 1 tablet (5 mg) by mouth 2 times daily       diltiazem 2% in PLO cream (FV COMPOUNDED) 2% Gel     60 g    To anal opening three times daily.  Use a pea-sized amount.  Store at room temperature.       diphenhydrAMINE 25 MG tablet    BENADRYL    60 tablet    Take 1-2 tablets (25-50 mg) by mouth every 6 hours as needed for other (Nausea or vomiting)       ibuprofen 600 MG tablet    ADVIL/MOTRIN    60 tablet    Take 1 tablet (600 mg) by mouth every 6 hours as needed for moderate pain       leuprolide 11.25 MG Kit kit    LUPRON DEPOT-PED    1 each    Inject 11.25 mg into the muscle every 3 months       lidocaine visc 2% 2.5mL/5mL & maalox/mylanta w/ simeth 2.5mL/5mL & diphenhydrAMINE 5mg/5mL Susp suspension    [facility] M[facility]    240 mL    Swish and spit 10 mLs in mouth every 6 hours as needed for mouth sores       lidocaine-prilocaine cream    EMLA    30 g    Apply to port 30 minutes prior to access       ondansetron 8 MG tablet    ZOFRAN    30 tablet    Take 1 tablet (8 mg) by mouth every 6 hours as needed for nausea       oxyCODONE 5 MG IR tablet    ROXICODONE    20 tablet    Take 1-2 tablets (5-10 mg) by mouth every 4 hours as needed for moderate to severe pain       pantoprazole 40 MG EC tablet    PROTONIX    30 tablet    Take 1 tablet (40 mg) by mouth daily       pazopanib 200 MG tablet CHEMOTHERAPY    VOTRIENT    120 tablet    Take 4 tablets (800 mg) by mouth daily       polyethylene glycol Packet     MIRALAX/GLYCOLAX    7 packet    Take 17 g by mouth daily       senna-docusate 8.6-50 MG per tablet    SENOKOT-S;PERICOLACE    30 tablet    Take 2 tablets by mouth 2 times daily as needed for constipation

## 2017-06-19 NOTE — NURSING NOTE
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

## 2017-06-19 NOTE — PROGRESS NOTES
Pediatric Hematology/Oncology Clinic Note    History- Olimpia initially presented with a growth on her right labia in 2013 when she was living in [place]. 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 She discontinued further treatment b/c her physicians were unsure of the exact diagnosis and she felt uncomfortable proceeding. Olimpia subsequently immigrated to the  in August of 2015 and in October she first noticed a recurrence of the mass in the area of previous excision. She was in [place] at that time, seen by oncology and had a port placed but then moved to [place] where she was seen by Dr. Mckeon at [facility] in November. There she underwent an MRI that showed a solid and cystic subcutaneous mass in the right labia measuring 1.7 x 1.6 x 1cm with question of nodular extension vs a second nodule measuring 0.7 cm. There was no invasion into the vagina. On November 16th 2015, Olimpia had a biopsy of the mass by a gynecologist, Dr. Terry, at [facility]. Cytology was reviewed at [facility] and read as a SMARCB1-deficient genital sarcoma, likely falling within the overall spectrum of malignant rhabdoid tumor. By immunohistochemistry, the cells shows a complete loss of SMARCb1. Wide spectrum cytokeratin, CD34, WT1, Desmin and CD99 were all negative. RT PCR for Wooten Sarcoma associated fusion transcripts were negative as well. Olimpia went on to have a PET/CT as well which showed multiple pulmonary lesions and biopsy confirmed a lesion to be metastatic disease. Olimpia was seen by Tomás White at [facility] who reviewed the diagnosis and potential treatment plans with her, however she prefered to be treated here at [facility]. 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    Olimpia underwent left sided thoracotomy and resection of two pulmonary nodules on 7/20/16. Pathology revealed that one lesion was benign lymphoid tissue and the other was consistent with synovial sarcoma.  This was confirmed with FISH  With 91% of cell examined having a signal pattern indictivate of a rearrangement of the SS18 locus.  Olimpia has now completed 3 cycles of chemotherapy per [id number] and started her radiation on 9/13/16 and completed on 10/17/16.  She then went on to have a resection of her labial mass on 11/16/16 by Jannet Pastrana and [facility] with reconstruction, including skin flaps by Dr. Corona from plastics.  Pathology showed no residual tumor.  She had a f/u chest CT today on 12/5/16 that showed persistance of her pulmonary nodules as well as few additonal pulmonary nodules.  She saw Dr. Pierre who was in agreement with surgical resection, however Olimpia wanted to wait until March to have more time to recover from her last surgery. She underwent left sided thoracotomy on 3/8/17.    In May, Olimpia's follow up PET/CT demonstrated significant progression of her pulmonary metastatic disease, with an anterior mediastinal mass compression her right ventricle and potentially right outflow tract.  Subsequent echocardiogram did not show altered cardiac function, Dr Man felt radiation therapy was not in her best interest at this time but would consider if she developed cardiac dysfunction.  Olimpia was started on oral Pazopanib 4 weeks ago for palliative therapy.    HPI:  Olimpia reports that she feels much better today.  Her foot pain has completely resolved over the past week, as well as her mouth pain.  Her gums are still bleeding when she brushes her teeth , but at no other time.  Her energy level is improving.  No significant cough, SOB or other concerns.      History was obtained from Olimpia via professional [employer] .     Allergies as of 06/19/2017 - [place] as  Reviewed 06/19/2017   Allergen Reaction Noted     Heparin flush Other (See Comments) 01/27/2016     Pork derived products  02/09/2016     Tegaderm transparent dressing (informational only) Other (See Comments) and Rash 04/20/2016       Current Outpatient Prescriptions   Medication Sig Dispense Refill     magic mouthwash suspension (diphenhydrAMINE, lidocaine, aluminum-magnesium & simethicone) Swish and spit 10 mLs in mouth every 6 hours as needed for mouth sores 240 mL 3     pazopanib (VOTRIENT) 200 MG tablet CHEMOTHERAPY Take 4 tablets (800 mg) by mouth daily 120 tablet 5     bisacodyl (DULCOLAX) 5 MG EC tablet Take 1 tablet (5 mg) by mouth 2 times daily 30 tablet 2     oxyCODONE (ROXICODONE) 5 MG IR tablet Take 1-2 tablets (5-10 mg) by mouth every 4 hours as needed for moderate to severe pain 20 tablet 0     polyethylene glycol (MIRALAX/GLYCOLAX) Packet Take 17 g by mouth daily 7 packet 1     senna-docusate (SENOKOT-S;PERICOLACE) 8.6-50 MG per tablet Take 2 tablets by mouth 2 times daily as needed for constipation 30 tablet 1     acetaminophen (TYLENOL) 325 MG tablet Take 2 tablets (650 mg) by mouth every 4 hours as needed for mild pain 100 tablet 1     ibuprofen (ADVIL/MOTRIN) 600 MG tablet Take 1 tablet (600 mg) by mouth every 6 hours as needed for moderate pain 60 tablet 1     diltiazem 2% in PLO cream, FV COMPOUNDED, 2% GEL To anal opening three times daily.  Use a pea-sized amount.  Store at room temperature. 60 g 0     ondansetron (ZOFRAN) 8 MG tablet Take 1 tablet (8 mg) by mouth every 6 hours as needed for nausea 30 tablet 6     diphenhydrAMINE (BENADRYL) 25 MG tablet Take 1-2 tablets (25-50 mg) by mouth every 6 hours as needed for other (Nausea or vomiting) 60 tablet 3     lidocaine-prilocaine (EMLA) cream Apply to port 30 minutes prior to access 30 g 5     pantoprazole (PROTONIX) 40 MG enteric coated tablet Take 1 tablet (40 mg) by mouth daily 30 tablet 3     leuprolide (LUPRON DEPOT-PED) 11.25 MG KIT  Inject 11.25 mg into the muscle every 3 months 1 each 1       Past Medical History:   Diagnosis Date     Anemia 6/3/2016     Constipation      Extrarenal rhabdoid neoplasm (H)      Febrile neutropenia (H) 4/17/2016     History of blood transfusion      Latent tuberculosis      Lung disease      On antineoplastic chemotherapy      Sarcoma of vulva (H)        Social History     Social History     Marital status:      Spouse name: N/A     Number of children: N/A     Years of education: N/A     Occupational History     Not on file.     Social History Main Topics     Smoking status: Never Smoker     Smokeless tobacco: Never Used     Alcohol use No     Drug use: No     Sexual activity: No     Other Topics Concern     Not on file     Social History Narrative       Family History   Problem Relation Age of Onset     Other Cancer No family hx of      ROS  See HPI. Complete ROS otherwise negative.    Physical Exam   Wt Readings from Last 4 Encounters:   06/19/17 61.6 kg (135 lb 12.9 oz)   06/12/17 59.8 kg (131 lb 13.4 oz)   06/06/17 60.8 kg (134 lb 0.6 oz)   05/19/17 60.2 kg (132 lb 11.5 oz)       Temp:  [97.5  F (36.4  C)] 97.5  F (36.4  C)  Pulse:  [90] 90  Resp:  [20] 20  BP: (100)/(69) 100/69  SpO2:  [100 %] 100 %    GENERAL: Alert, well appearing young woman in no acute distress.  SKIN: bilateral plantar surface of feet red, some hyperpigmented flat lesions bilateral;  Increased warmth   HEAD: Normocephalic, atraumatic     EYES: Normal lids, conjunctivae/cornea normal, mild icterus. PERRL. EOMI.  EARS: Pinna normal b/l, no pain on palpation. External ears normal appearing.   NOSE: Clear, no discharge or congestion  MOUTH/THROAT: Oropharynx is clear. Tongue without sores, possible mild diffuse erythema. Normal dentition for age, no mucosal lesions.  NECK: Supple, full range of motion, no masses  LYMPH NODES: No cervical lymphadenopathy.  LUNGS: No increased WOB.  Lungs clear to auscultation throughout; diminished in  LLL.  No crackles or wheezes.  HEART: HRR. S1 and S2 are normal. No murmurs, rubs, gallops. The radial pulses are 2+ bilaterally. Cap refill <3 sec in upper extremities.  ABDOMEN: Normal bowel sounds. Soft, non-tender, non-distended, no masses or hepatosplenomegaly.  NEUROLOGIC: Grossly intact, no focal neurologic deficits.    :  Deferred today.    Labs:  Results for orders placed or performed in visit on 06/19/17   CBC with platelets differential   Result Value Ref Range    WBC 2.3 (L) 4.0 - 11.0 10e9/L    RBC Count 3.59 (L) 3.8 - 5.2 10e12/L    Hemoglobin 12.0 11.7 - 15.7 g/dL    Hematocrit 34.6 (L) 35.0 - 47.0 %    MCV 96 78 - 100 fl    MCH 33.4 (H) 26.5 - 33.0 pg    MCHC 34.7 31.5 - 36.5 g/dL    RDW 12.9 10.0 - 15.0 %    Platelet Count 112 (L) 150 - 450 10e9/L    Diff Method Automated Method     % Neutrophils 39.2 %    % Lymphocytes 39.0 %    % Monocytes 19.7 %    % Eosinophils 1.3 %    % Basophils 0.4 %    % Immature Granulocytes 0.4 %    Nucleated RBCs 0 0 /100    Absolute Neutrophil 0.9 (L) 1.6 - 8.3 10e9/L    Absolute Lymphocytes 0.9 0.8 - 5.3 10e9/L    Absolute Monocytes 0.5 0.0 - 1.3 10e9/L    Absolute Eosinophils 0.0 0.0 - 0.7 10e9/L    Absolute Basophils 0.0 0.0 - 0.2 10e9/L    Abs Immature Granulocytes 0.0 0 - 0.4 10e9/L    Absolute Nucleated RBC 0.0     RBC Morphology Normal     Platelet Estimate Confirming automated cell count    Comprehensive metabolic panel   Result Value Ref Range    Sodium 141 133 - 144 mmol/L    Potassium 4.1 3.4 - 5.3 mmol/L    Chloride 106 94 - 109 mmol/L    Carbon Dioxide 29 20 - 32 mmol/L    Anion Gap 6 3 - 14 mmol/L    Glucose 81 70 - 99 mg/dL    Urea Nitrogen 9 7 - 30 mg/dL    Creatinine 0.56 0.52 - 1.04 mg/dL    GFR Estimate >90  Non  GFR Calc   >60 mL/min/1.7m2    GFR Estimate If Black >90   GFR Calc   >60 mL/min/1.7m2    Calcium 8.8 8.5 - 10.1 mg/dL    Bilirubin Total 0.4 0.2 - 1.3 mg/dL    Albumin 3.1 (L) 3.4 - 5.0 g/dL    Protein Total  7.0 6.8 - 8.8 g/dL    Alkaline Phosphatase 91 40 - 150 U/L    ALT 25 0 - 50 U/L    AST 24 0 - 45 U/L   Routine UA with microscopic - No culture   Result Value Ref Range    Color Urine Yellow     Appearance Urine Clear     Glucose Urine Negative NEG mg/dL    Bilirubin Urine Negative NEG    Ketones Urine Negative NEG mg/dL    Specific Gravity Urine 1.016 1.003 - 1.035    Blood Urine Negative NEG    pH Urine 6.5 5.0 - 7.0 pH    Protein Albumin Urine 10 (A) NEG mg/dL    Urobilinogen mg/dL Normal 0.0 - 2.0 mg/dL    Nitrite Urine Negative NEG    Leukocyte Esterase Urine Moderate (A) NEG    Source Midstream Urine     WBC Urine 8 (H) 0 - 2 /HPF    RBC Urine 0 0 - 2 /HPF    Squamous Epithelial /HPF Urine 5 (H) 0 - 1 /HPF    Transitional Epi 2 (H) 0 - 1 /HPF    Mucous Urine Present (A) NEG /LPF     Impression:  Olimpia is a 22-year-old female with recurrent metastatic synovial sarcoma, recent scans show progression of her pulmonary disease. She has been on Pazopanib but we held it one week ago due to palmar plantar dystesthesia and stomatitis.  She is now doing well.  Both her foot and mouth pain have resolved.  Her platelet count is improving    Plan:  1)Restart pazopanib at 50% dosing (400 mg daily)  2) Will keep Olimpia's port in place at this time, she is comfortable with this plan.  Port flush in 4 weeks  3) RTC in 2 weeks for repeat labs (CBC, CMP, UA).  4) Plan to repeat chest CT 2-3 months from initiation of pazopanib (started 5/1/17), sooner with progressive symptoms.

## 2017-06-19 NOTE — LETTER
6/19/2017      RE: Olimpia Childress  [address]   [facility] [id number]       Pediatric Hematology/Oncology Clinic Note    History- Olimpia initially presented with a growth on her right labia in 2013 when she was living in [place]. 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 She discontinued further treatment b/c her physicians were unsure of the exact diagnosis and she felt uncomfortable proceeding. Olimpia subsequently immigrated to the  in August of 2015 and in October she first noticed a recurrence of the mass in the area of previous excision. She was in [place] at that time, seen by oncology and had a port placed but then moved to [place] where she was seen by Dr. Mckeon at [facility] in November. There she underwent an MRI that showed a solid and cystic subcutaneous mass in the right labia measuring 1.7 x 1.6 x 1cm with question of nodular extension vs a second nodule measuring 0.7 cm. There was no invasion into the vagina. On November 16th 2015, Olimpia had a biopsy of the mass by a gynecologist, Dr. Terry, at [facility]. Cytology was reviewed at [facility] and read as a SMARCB1-deficient genital sarcoma, likely falling within the overall spectrum of malignant rhabdoid tumor. By immunohistochemistry, the cells shows a complete loss of SMARCb1. Wide spectrum cytokeratin, CD34, WT1, Desmin and CD99 were all negative. RT PCR for Wooten Sarcoma associated fusion transcripts were negative as well. Olimpia went on to have a PET/CT as well which showed multiple pulmonary lesions and biopsy confirmed a lesion to be metastatic disease. Olimpia was seen by Tomás White at [facility] who reviewed the diagnosis and potential treatment plans with her, however she prefered to be treated here at [facility]. 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    Olimpia underwent left sided thoracotomy and resection of two pulmonary nodules on 7/20/16. Pathology revealed that one lesion was benign lymphoid tissue and the other was consistent with synovial sarcoma.  This was confirmed with FISH  With 91% of cell examined having a signal pattern indictivate of a rearrangement of the SS18 locus.  Olimpia has now completed 3 cycles of chemotherapy per [id number] and started her radiation on 9/13/16 and completed on 10/17/16.  She then went on to have a resection of her labial mass on 11/16/16 by Jannet Pastrana and Ch[facility] with reconstruction, including skin flaps by Dr. Corona from plastics.  Pathology showed no residual tumor.  She had a f/u chest CT today on 12/5/16 that showed persistance of her pulmonary nodules as well as few additonal pulmonary nodules.  She saw Dr. Pierre who was in agreement with surgical resection, however Olimpia wanted to wait until March to have more time to recover from her last surgery. She underwent left sided thoracotomy on 3/8/17.    In May, Olimpia's follow up PET/CT demonstrated significant progression of her pulmonary metastatic disease, with an anterior mediastinal mass compression her right ventricle and potentially right outflow tract.  Subsequent echocardiogram did not show altered cardiac function, Dr Man felt radiation therapy was not in her best interest at this time but would consider if she developed cardiac dysfunction.  Olimpia was started on oral Pazopanib 4 weeks ago for palliative therapy.    HPI:  Olimpia reports that she feels much better today.  Her foot pain has completely resolved over the past week, as well as her mouth pain.  Her gums are still bleeding when she brushes her teeth , but at no other time.  Her energy level is improving.  No significant cough, SOB or other concerns.      History was obtained from Olimpia  via professional [facility] .     Allergies as of 06/19/2017 - [facility] as Reviewed 06/19/2017   Allergen Reaction Noted     Heparin flush Other (See Comments) 01/27/2016     Pork derived products  02/09/2016     Tegaderm transparent dressing (informational only) Other (See Comments) and Rash 04/20/2016       Current Outpatient Prescriptions   Medication Sig Dispense Refill     magic mouthwash suspension (diphenhydrAMINE, lidocaine, aluminum-magnesium & simethicone) Swish and spit 10 mLs in mouth every 6 hours as needed for mouth sores 240 mL 3     pazopanib (VOTRIENT) 200 MG tablet CHEMOTHERAPY Take 4 tablets (800 mg) by mouth daily 120 tablet 5     bisacodyl (DULCOLAX) 5 MG EC tablet Take 1 tablet (5 mg) by mouth 2 times daily 30 tablet 2     oxyCODONE (ROXICODONE) 5 MG IR tablet Take 1-2 tablets (5-10 mg) by mouth every 4 hours as needed for moderate to severe pain 20 tablet 0     polyethylene glycol (MIRALAX/GLYCOLAX) Packet Take 17 g by mouth daily 7 packet 1     senna-docusate (SENOKOT-S;PERICOLACE) 8.6-50 MG per tablet Take 2 tablets by mouth 2 times daily as needed for constipation 30 tablet 1     acetaminophen (TYLENOL) 325 MG tablet Take 2 tablets (650 mg) by mouth every 4 hours as needed for mild pain 100 tablet 1     ibuprofen (ADVIL/MOTRIN) 600 MG tablet Take 1 tablet (600 mg) by mouth every 6 hours as needed for moderate pain 60 tablet 1     diltiazem 2% in PLO cream, FV COMPOUNDED, 2% GEL To anal opening three times daily.  Use a pea-sized amount.  Store at room temperature. 60 g 0     ondansetron (ZOFRAN) 8 MG tablet Take 1 tablet (8 mg) by mouth every 6 hours as needed for nausea 30 tablet 6     diphenhydrAMINE (BENADRYL) 25 MG tablet Take 1-2 tablets (25-50 mg) by mouth every 6 hours as needed for other (Nausea or vomiting) 60 tablet 3     lidocaine-prilocaine (EMLA) cream Apply to port 30 minutes prior to access 30 g 5     pantoprazole (PROTONIX) 40 MG enteric coated tablet Take 1 tablet (40  mg) by mouth daily 30 tablet 3     leuprolide (LUPRON DEPOT-PED) 11.25 MG KIT Inject 11.25 mg into the muscle every 3 months 1 each 1       Past Medical History:   Diagnosis Date     Anemia 6/3/2016     Constipation      Extrarenal rhabdoid neoplasm (H)      Febrile neutropenia (H) 4/17/2016     History of blood transfusion      Latent tuberculosis      Lung disease      On antineoplastic chemotherapy      Sarcoma of vulva (H)        Social History     Social History     Marital status:      Spouse name: N/A     Number of children: N/A     Years of education: N/A     Occupational History     Not on file.     Social History Main Topics     Smoking status: Never Smoker     Smokeless tobacco: Never Used     Alcohol use No     Drug use: No     Sexual activity: No     Other Topics Concern     Not on file     Social History Narrative       Family History   Problem Relation Age of Onset     Other Cancer No family hx of      ROS  See HPI. Complete ROS otherwise negative.    Physical Exam   Wt Readings from Last 4 Encounters:   06/19/17 61.6 kg (135 lb 12.9 oz)   06/12/17 59.8 kg (131 lb 13.4 oz)   06/06/17 60.8 kg (134 lb 0.6 oz)   05/19/17 60.2 kg (132 lb 11.5 oz)       Temp:  [97.5  F (36.4  C)] 97.5  F (36.4  C)  Pulse:  [90] 90  Resp:  [20] 20  BP: (100)/(69) 100/69  SpO2:  [100 %] 100 %    GENERAL: Alert, well appearing young woman in no acute distress.  SKIN: bilateral plantar surface of feet red, some hyperpigmented flat lesions bilateral;  Increased warmth   HEAD: Normocephalic, atraumatic     EYES: Normal lids, conjunctivae/cornea normal, mild icterus. PERRL. EOMI.  EARS: Pinna normal b/l, no pain on palpation. External ears normal appearing.   NOSE: Clear, no discharge or congestion  MOUTH/THROAT: Oropharynx is clear. Tongue without sores, possible mild diffuse erythema. Normal dentition for age, no mucosal lesions.  NECK: Supple, full range of motion, no masses  LYMPH NODES: No cervical  lymphadenopathy.  LUNGS: No increased WOB.  Lungs clear to auscultation throughout; diminished in LLL.  No crackles or wheezes.  HEART: HRR. S1 and S2 are normal. No murmurs, rubs, gallops. The radial pulses are 2+ bilaterally. Cap refill <3 sec in upper extremities.  ABDOMEN: Normal bowel sounds. Soft, non-tender, non-distended, no masses or hepatosplenomegaly.  NEUROLOGIC: Grossly intact, no focal neurologic deficits.    :  Deferred today.    Labs:  Results for orders placed or performed in visit on 06/19/17   CBC with platelets differential   Result Value Ref Range    WBC 2.3 (L) 4.0 - 11.0 10e9/L    RBC Count 3.59 (L) 3.8 - 5.2 10e12/L    Hemoglobin 12.0 11.7 - 15.7 g/dL    Hematocrit 34.6 (L) 35.0 - 47.0 %    MCV 96 78 - 100 fl    MCH 33.4 (H) 26.5 - 33.0 pg    MCHC 34.7 31.5 - 36.5 g/dL    RDW 12.9 10.0 - 15.0 %    Platelet Count 112 (L) 150 - 450 10e9/L    Diff Method Automated Method     % Neutrophils 39.2 %    % Lymphocytes 39.0 %    % Monocytes 19.7 %    % Eosinophils 1.3 %    % Basophils 0.4 %    % Immature Granulocytes 0.4 %    Nucleated RBCs 0 0 /100    Absolute Neutrophil 0.9 (L) 1.6 - 8.3 10e9/L    Absolute Lymphocytes 0.9 0.8 - 5.3 10e9/L    Absolute Monocytes 0.5 0.0 - 1.3 10e9/L    Absolute Eosinophils 0.0 0.0 - 0.7 10e9/L    Absolute Basophils 0.0 0.0 - 0.2 10e9/L    Abs Immature Granulocytes 0.0 0 - 0.4 10e9/L    Absolute Nucleated RBC 0.0     RBC Morphology Normal     Platelet Estimate Confirming automated cell count    Comprehensive metabolic panel   Result Value Ref Range    Sodium 141 133 - 144 mmol/L    Potassium 4.1 3.4 - 5.3 mmol/L    Chloride 106 94 - 109 mmol/L    Carbon Dioxide 29 20 - 32 mmol/L    Anion Gap 6 3 - 14 mmol/L    Glucose 81 70 - 99 mg/dL    Urea Nitrogen 9 7 - 30 mg/dL    Creatinine 0.56 0.52 - 1.04 mg/dL    GFR Estimate >90  Non  GFR Calc   >60 mL/min/1.7m2    GFR Estimate If Black >90   GFR Calc   >60 mL/min/1.7m2    Calcium 8.8 8.5 -  10.1 mg/dL    Bilirubin Total 0.4 0.2 - 1.3 mg/dL    Albumin 3.1 (L) 3.4 - 5.0 g/dL    Protein Total 7.0 6.8 - 8.8 g/dL    Alkaline Phosphatase 91 40 - 150 U/L    ALT 25 0 - 50 U/L    AST 24 0 - 45 U/L   Routine UA with microscopic - No culture   Result Value Ref Range    Color Urine Yellow     Appearance Urine Clear     Glucose Urine Negative NEG mg/dL    Bilirubin Urine Negative NEG    Ketones Urine Negative NEG mg/dL    Specific Gravity Urine 1.016 1.003 - 1.035    Blood Urine Negative NEG    pH Urine 6.5 5.0 - 7.0 pH    Protein Albumin Urine 10 (A) NEG mg/dL    Urobilinogen mg/dL Normal 0.0 - 2.0 mg/dL    Nitrite Urine Negative NEG    Leukocyte Esterase Urine Moderate (A) NEG    Source Midstream Urine     WBC Urine 8 (H) 0 - 2 /HPF    RBC Urine 0 0 - 2 /HPF    Squamous Epithelial /HPF Urine 5 (H) 0 - 1 /HPF    Transitional Epi 2 (H) 0 - 1 /HPF    Mucous Urine Present (A) NEG /LPF     Impression:  Olimpia is a 22-year-old female with recurrent metastatic synovial sarcoma, recent scans show progression of her pulmonary disease. She has been on Pazopanib but we held it one week ago due to palmar plantar dystesthesia and stomatitis.  She is now doing well.  Both her foot and mouth pain have resolved.  Her platelet count is improving    Plan:  1)Restart pazopanib at 50% dosing (400 mg daily)  2) Will keep Olimpia's port in place at this time, she is comfortable with this plan.  Port flush in 4 weeks  3) RTC in 2 weeks for repeat labs (CBC, CMP, UA).  4) Plan to repeat chest CT 2-3 months from initiation of pazopanib (started 5/1/17), sooner with progressive symptoms.      Coretta Yanez MD

## 2017-06-23 NOTE — PROGRESS NOTES
Faxed letter to Jovimoriah's insurance ([employer] [place], PCA Dept: Ph. [phone], F. [phone]) describing her medical history and current status to request re-assessment for PCA hours, as discussed with Olimpia during last appt.     BELKIS Sheppard, [facility]  - Pediatric Hematology/Oncology  Phone: [phone]  Pager: [phone]  [email or web address]     NO LETTER

## 2017-06-27 NOTE — PROGRESS NOTES
THI recently returned from extended leave and was notified by MD that Olimpia is continuing to pursue getting her mother to permanently move to the [place] from [place]. The  that Olimpia has been working with does not specialize in immigration and therefore is unable to provide further  unless Olimpia decides to instead to help her mother apply for a Visitors Visa. THI emailed  asking for immigration /support recommendations in addition to any documentation that has been collected over the past several months that may help Olimpia's mother through the application process. THI and MD wrote letter indicating Olimpia's diagnosis and treatment and the importance of her mother being present with [facility] in [place]. Mailed to Olimpia's cousins home per her request.    THI covering for this THI while on leave wrote an additional letter to [facility]'s insurance requesting a reassessment of PCA hours given Olimpia's current medical condition.     Social work will continue to assess needs and provide ongoing psychosocial support and access to resources.       BELKIS Salinas, [facility]  Pediatric Hem/Onc   Phone: [phone]  Pager: [phone]

## 2017-07-17 NOTE — NURSING NOTE
"Chief Complaint   Patient presents with     RECHECK     Patient is here today for Synovial sarcoma (H) follow up     /78 (BP Location: Right arm, Patient Position: Fowlers, Cuff Size: Adult Regular)  Pulse 70  Temp 97.5  F (36.4  C) (Oral)  Resp 20  Ht 1.611 m (5' 3.43\")  Wt 61 kg (134 lb 7.7 oz)  SpO2 100%  BMI 23.5 kg/m2  Tasia Narvaez M.A  July 17, 2017    "

## 2017-07-17 NOTE — PROGRESS NOTES
Olimpia came to clinic today and needed her port accessed and flushed. Port accessed with 20g 1in power needle, blood return noted, and port citrate locked. Port de accessed.

## 2017-07-17 NOTE — PROGRESS NOTES
Pediatric Hematology/Oncology Clinic Note    History- Olimpia initially presented with a growth on her right labia in 2013 when she was living in [place]. 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 She discontinued further treatment b/c her physicians were unsure of the exact diagnosis and she felt uncomfortable proceeding. Olimpia subsequently immigrated to the  in August of 2015 and in October she first noticed a recurrence of the mass in the area of previous excision. She was in [place] at that time, seen by oncology and had a port placed but then moved to [place] where she was seen by Dr. Mckeon at [facility] in November. There she underwent an MRI that showed a solid and cystic subcutaneous mass in the right labia measuring 1.7 x 1.6 x 1cm with question of nodular extension vs a second nodule measuring 0.7 cm. There was no invasion into the vagina. On November 16th 2015, Olimpia had a biopsy of the mass by a gynecologist, Dr. Terry, at [facility]. Cytology was reviewed at [facility] and read as a SMARCB1-deficient genital sarcoma, likely falling within the overall spectrum of malignant rhabdoid tumor. By immunohistochemistry, the cells shows a complete loss of SMARCb1. Wide spectrum cytokeratin, CD34, WT1, Desmin and CD99 were all negative. RT PCR for Wooten Sarcoma associated fusion transcripts were negative as well. Olimpia went on to have a PET/CT as well which showed multiple pulmonary lesions and biopsy confirmed a lesion to be metastatic disease. Olimpia was seen by Tomás White at [place] who reviewed the diagnosis and potential treatment plans with her, however she prefered to be treated here at [facility]. 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    Olimpia underwent left sided thoracotomy and resection of two pulmonary nodules on 7/20/16. Pathology revealed that one lesion was benign lymphoid tissue and the other was consistent with synovial sarcoma.  This was confirmed with FISH  With 91% of cell examined having a signal pattern indictivate of a rearrangement of the SS18 locus.  Olimpia has now completed 3 cycles of chemotherapy per [id number] and started her radiation on 9/13/16 and completed on 10/17/16.  She then went on to have a resection of her labial mass on 11/16/16 by Jannet Pastrana and Chikis with reconstruction, including skin flaps by Dr. Corona from plastics.  Pathology showed no residual tumor.  She had a f/u chest CT today on 12/5/16 that showed persistance of her pulmonary nodules as well as few additonal pulmonary nodules.  She saw Dr. Pierre who was in agreement with surgical resection, however Olimpia wanted to wait until March to have more time to recover from her last surgery. She underwent left sided thoracotomy on 3/8/17.    In May, Olimpia's follow up PET/CT demonstrated significant progression of her pulmonary metastatic disease, with an anterior mediastinal mass compression her right ventricle and potentially right outflow tract.  Subsequent echocardiogram did not show altered cardiac function, Dr Man felt radiation therapy was not in her best interest at this time but would consider if she developed cardiac dysfunction.  Olimpia was started on oral Pazopanib in the beginning of May for palliative therapy.  About 1 month into it we held her doses due to palmar plantar dysesthesia and stomatitis.  Her symptoms resolved quickly within 1 week and she restarted at 50% dosing on 6/19/17.    HPI:  Olimpia reports that she feels quite well.  Her feet are very mildly peeling in some areas but she feels that is improving and she denies any pain.  No mouth sores.  Her appetite is good, she feels she is eating well.   Her energy level is ok.  No cough, SOB or any other concerns..    History was obtained from [facility] via professional [facility] .     Allergies as of 07/17/2017 - Sheldon as Reviewed 06/19/2017   Allergen Reaction Noted     Heparin flush Other (See Comments) 01/27/2016     Pork derived products  02/09/2016     Tegaderm transparent dressing (informational only) Other (See Comments) and Rash 04/20/2016       Current Outpatient Prescriptions   Medication Sig Dispense Refill     magic mouthwash suspension (diphenhydrAMINE, lidocaine, aluminum-magnesium & simethicone) Swish and spit 10 mLs in mouth every 6 hours as needed for mouth sores 240 mL 3     pazopanib (VOTRIENT) 200 MG tablet CHEMOTHERAPY Take 4 tablets (800 mg) by mouth daily 120 tablet 5     bisacodyl (DULCOLAX) 5 MG EC tablet Take 1 tablet (5 mg) by mouth 2 times daily 30 tablet 2     oxyCODONE (ROXICODONE) 5 MG IR tablet Take 1-2 tablets (5-10 mg) by mouth every 4 hours as needed for moderate to severe pain 20 tablet 0     polyethylene glycol (MIRALAX/GLYCOLAX) Packet Take 17 g by mouth daily 7 packet 1     senna-docusate (SENOKOT-S;PERICOLACE) 8.6-50 MG per tablet Take 2 tablets by mouth 2 times daily as needed for constipation 30 tablet 1     acetaminophen (TYLENOL) 325 MG tablet Take 2 tablets (650 mg) by mouth every 4 hours as needed for mild pain 100 tablet 1     ibuprofen (ADVIL/MOTRIN) 600 MG tablet Take 1 tablet (600 mg) by mouth every 6 hours as needed for moderate pain 60 tablet 1     diltiazem 2% in PLO cream, FV COMPOUNDED, 2% GEL To anal opening three times daily.  Use a pea-sized amount.  Store at room temperature. 60 g 0     ondansetron (ZOFRAN) 8 MG tablet Take 1 tablet (8 mg) by mouth every 6 hours as needed for nausea 30 tablet 6     diphenhydrAMINE (BENADRYL) 25 MG tablet Take 1-2 tablets (25-50 mg) by mouth every 6 hours as needed for other (Nausea or vomiting) 60 tablet 3     lidocaine-prilocaine (EMLA) cream Apply to port 30 minutes  prior to access 30 g 5     pantoprazole (PROTONIX) 40 MG enteric coated tablet Take 1 tablet (40 mg) by mouth daily 30 tablet 3     leuprolide (LUPRON DEPOT-PED) 11.25 MG KIT Inject 11.25 mg into the muscle every 3 months 1 each 1       Past Medical History:   Diagnosis Date     Anemia 6/3/2016     Constipation      Extrarenal rhabdoid neoplasm (H)      Febrile neutropenia (H) 4/17/2016     History of blood transfusion      Latent tuberculosis      Lung disease      On antineoplastic chemotherapy      Sarcoma of vulva (H)        Social History     Social History     Marital status:      Spouse name: N/A     Number of children: N/A     Years of education: N/A     Occupational History     Not on file.     Social History Main Topics     Smoking status: Never Smoker     Smokeless tobacco: Never Used     Alcohol use No     Drug use: No     Sexual activity: No     Other Topics Concern     Not on file     Social History Narrative       Family History   Problem Relation Age of Onset     Other Cancer No family hx of      ROS  See HPI. Complete ROS otherwise negative.    Physical Exam   Wt Readings from Last 4 Encounters:   07/17/17 61 kg (134 lb 7.7 oz)   06/19/17 61.6 kg (135 lb 12.9 oz)   06/12/17 59.8 kg (131 lb 13.4 oz)   06/06/17 60.8 kg (134 lb 0.6 oz)       Temp:  [97.5  F (36.4  C)] 97.5  F (36.4  C)  Pulse:  [70] 70  Resp:  [20] 20  BP: (109)/(78) 109/78  SpO2:  [100 %] 100 %    GENERAL: Alert, well appearing young woman in no acute distress.  SKIN: mild peeling of left and right big toes;  No erythema  HEAD: Normocephalic, atraumatic     EYES: Normal lids, conjunctivae/cornea normal, mild icterus. PERRL. EOMI.  EARS: Pinna normal b/l, no pain on palpation. External ears normal appearing.   NOSE: Clear, no discharge or congestion  MOUTH/THROAT: Oropharynx is clear. Tongue without sores. Normal dentition for age, no mucosal lesions.  NECK: Supple, full range of motion, no masses  LYMPH NODES: No cervical  lymphadenopathy.  LUNGS: No increased WOB.  Lungs clear to auscultation throughout; diminished in LLL.  No crackles or wheezes.  HEART: HRR. S1 and S2 are normal. No murmurs, rubs, gallops. The radial pulses are 2+ bilaterally. Cap refill <3 sec in upper extremities.  ABDOMEN: Normal bowel sounds. Soft, non-tender, non-distended, no masses or hepatosplenomegaly.  NEUROLOGIC: Grossly intact, no focal neurologic deficits.    :  Deferred today.      Labs:  Results for orders placed or performed in visit on 07/17/17   CBC with platelets differential   Result Value Ref Range    WBC 2.5 (L) 4.0 - 11.0 10e9/L    RBC Count 4.08 3.8 - 5.2 10e12/L    Hemoglobin 14.0 11.7 - 15.7 g/dL    Hematocrit 37.8 35.0 - 47.0 %    MCV 93 78 - 100 fl    MCH 34.3 (H) 26.5 - 33.0 pg    MCHC 37.0 (H) 31.5 - 36.5 g/dL    RDW 15.3 (H) 10.0 - 15.0 %    Platelet Count 113 (L) 150 - 450 10e9/L    Diff Method Automated Method     % Neutrophils 44.8 %    % Lymphocytes 41.7 %    % Monocytes 12.3 %    % Eosinophils 0.4 %    % Basophils 0.4 %    % Immature Granulocytes 0.4 %    Nucleated RBCs 0 0 /100    Absolute Neutrophil 1.1 (L) 1.6 - 8.3 10e9/L    Absolute Lymphocytes 1.1 0.8 - 5.3 10e9/L    Absolute Monocytes 0.3 0.0 - 1.3 10e9/L    Absolute Eosinophils 0.0 0.0 - 0.7 10e9/L    Absolute Basophils 0.0 0.0 - 0.2 10e9/L    Abs Immature Granulocytes 0.0 0 - 0.4 10e9/L    Absolute Nucleated RBC 0.0    Comprehensive metabolic panel   Result Value Ref Range    Sodium 142 133 - 144 mmol/L    Potassium 4.0 3.4 - 5.3 mmol/L    Chloride 105 94 - 109 mmol/L    Carbon Dioxide 26 20 - 32 mmol/L    Anion Gap 11 3 - 14 mmol/L    Glucose 118 (H) 70 - 99 mg/dL    Urea Nitrogen 12 7 - 30 mg/dL    Creatinine 0.56 0.52 - 1.04 mg/dL    GFR Estimate >90  Non  GFR Calc   >60 mL/min/1.7m2    GFR Estimate If Black >90   GFR Calc   >60 mL/min/1.7m2    Calcium 9.1 8.5 - 10.1 mg/dL    Bilirubin Total 0.6 0.2 - 1.3 mg/dL    Albumin 3.3 (L) 3.4  - 5.0 g/dL    Protein Total 7.4 6.8 - 8.8 g/dL    Alkaline Phosphatase 83 40 - 150 U/L    ALT 40 0 - 50 U/L    AST 31 0 - 45 U/L   UA with Microscopic   Result Value Ref Range    Color Urine Yellow     Appearance Urine Clear     Glucose Urine Negative NEG mg/dL    Bilirubin Urine Negative NEG    Ketones Urine Negative NEG mg/dL    Specific Gravity Urine 1.015 1.003 - 1.035    Blood Urine Negative NEG    pH Urine 6.5 5.0 - 7.0 pH    Protein Albumin Urine Negative NEG mg/dL    Urobilinogen mg/dL Normal 0.0 - 2.0 mg/dL    Nitrite Urine Negative NEG    Leukocyte Esterase Urine Moderate (A) NEG    Source Midstream Urine     WBC Urine 4 (H) 0 - 2 /HPF    RBC Urine 1 0 - 2 /HPF    Squamous Epithelial /HPF Urine 1 0 - 1 /HPF    Mucous Urine Present (A) NEG /LPF     Impression:  Olimpia is a 22-year-old female with recurrent metastatic synovial sarcoma, recent scans show progression of her pulmonary disease. She is on 400 mg Pazopanib daily and overall doing well.    Plan:  1)Continue pazopanib at 50% dosing (400 mg daily);  D/w Olimpia increasing to 600 mg daily but she would like to wait 2 weeks and reassess  2) Port flushed today;  Next due in 4 weeks  3) RTC in 2 weeks for repeat labs (CBC, CMP, UA) and visit with Shayla  4) Will plan for chest CT in 4 weeks

## 2017-07-17 NOTE — MR AVS SNAPSHOT
After Visit Summary   7/17/2017    Olimpia Childress    MRN: [id number]           Patient Information     Date Of Birth          1995        Visit Information        Provider Department      7/17/2017 1:15 PM Sofi Childress Emily Gustava, MD Peds Hematology Oncology        Today's Diagnoses     Synovial sarcoma (H)              [facility], 9th floor  [address] [place] [address]  Phone: [phone]  Clinic Hours:   Monday-Friday:   7 am to 5:00 pm   closed weekends and major  holidays     If your fever is 100.5  or greater,   call the clinic during business hours.   After hours call [phone] and ask for the pediatric hematology / oncology physician to be paged for you.               Follow-ups after your visit        Future tests that were ordered for you today     Open Future Orders        Priority Expected Expires Ordered    Routine UA with microscopic - No culture Routine 7/31/2017 7/17/2018 7/17/2017    CT Chest w/o contrast Routine 8/17/2017 7/17/2018 7/17/2017    CBC with platelets differential Routine 7/31/2017 7/17/2018 7/17/2017    Comprehensive metabolic panel Routine 7/31/2017 7/17/2018 7/17/2017            Who to contact     Please call your clinic at [phone] to:    Ask questions about your health    Make or cancel appointments    Discuss your medicines    Learn about your test results    Speak to your doctor   If you have compliments or concerns about an experience at your clinic, or if you wish to file a complaint, please contact [facility] Patient Relations at [phone] or email us at [email or web address]         Additional Information About Your Visit        MyChart Information     Settle is an electronic gateway that provides easy, online access to your medical records. With Settle, you can request a clinic appointment, read your test results, renew a prescription or  "communicate with your care team.     To sign up for [facility] visit the website at [email or web address]   You will be asked to enter the access code listed below, as well as some personal information. Please follow the directions to create your username and password.     Your access code is: [email or web address]  Expires: 2017  2:20 PM     Your access code will  in 90 days. If you need help or a new code, please contact your [facility] or call [phone] for assistance.        Care EveryWhere ID     This is your Care EveryWhere ID. This could be used by other organizations to access your [facility] medical records  [id number]        Your Vitals Were     Pulse Temperature Respirations Height Pulse Oximetry BMI (Body Mass Index)    70 97.5  F (36.4  C) (Oral) 20 1.611 m (5' 3.43\") 100% 23.5 kg/m2       Blood Pressure from Last 3 Encounters:   17 109/78   17 100/69   17 109/73    Weight from Last 3 Encounters:   17 61 kg (134 lb 7.7 oz)   17 61.6 kg (135 lb 12.9 oz)   17 59.8 kg (131 lb 13.4 oz)              We Performed the Following     CBC with platelets differential     Comprehensive metabolic panel     Routine UA with microscopic - No culture     UA with Microscopic        Primary Care Provider Office Phone # Fax #    Coretta Yanez [phone]       [address] [address]        Equal Access to Services     [facility]: Hadii aad ku hadasho Soomaali, waaxda luqadaha, qaybta kaalmada anne-marie, wilner blue . So [facility] [phone].    ATENCIÓN: Si habla español, tiene a ordoñez disposición servicios gratuitos de asistencia lingüística. Llame al [phone].    We comply with applicable federal civil rights laws and [place] laws. We do not discriminate on the basis of race, color, national origin, age, disability sex, sexual orientation or gender " identity.            Thank you!     [facility]  [facility] your care. Our goal is always to provide you with excellent care. Hearing back from our patients is one way we can continue to improve our services. Please take a few minutes to complete the written survey that you may receive in the mail after your visit with us. Thank you!             Your Updated Medication List - Protect others around you: Learn how to safely use, store and throw away your medicines at www.disposemymeds.org.          This list is accurate as of: 7/17/17  4:58 PM.  Always use your most recent med list.                   Brand Name Dispense Instructions for use Diagnosis    acetaminophen 325 MG tablet    TYLENOL    100 tablet    Take 2 tablets (650 mg) by mouth every 4 hours as needed for mild pain    Sarcoma of vulva (H)       bisacodyl 5 MG EC tablet     30 tablet    Take 1 tablet (5 mg) by mouth 2 times daily    Slow transit constipation       diltiazem 2% in PLO cream (FV COMPOUNDED) 2% Gel     60 g    To anal opening three times daily.  Use a pea-sized amount.  Store at room temperature.    Anal fissure       diphenhydrAMINE 25 MG tablet    BENADRYL    60 tablet    Take 1-2 tablets (25-50 mg) by mouth every 6 hours as needed for other (Nausea or vomiting)    Chemotherapy induced nausea and vomiting       ibuprofen 600 MG tablet    ADVIL/MOTRIN    60 tablet    Take 1 tablet (600 mg) by mouth every 6 hours as needed for moderate pain    Sarcoma of vulva (H)       leuprolide 11.25 MG Kit kit    LUPRON DEPOT-PED    1 each    Inject 11.25 mg into the muscle every 3 months    Visit for fertility preservation counseling prior to cancer therapy, Malignant tumor cells (H)       lidocaine visc 2% 2.5mL/5mL & maalox/mylanta w/ simeth 2.5mL/5mL & diphenhydrAMINE 5mg/5mL Susp suspension    [facility] Mouthwash [facility]    240 mL    Swish and spit 10 mLs in mouth every 6 hours as needed for mouth sores    Stomatitis        lidocaine-prilocaine cream    EMLA    30 g    Apply to port 30 minutes prior to access    Malignant tumor cells (H)       ondansetron 8 MG tablet    ZOFRAN    30 tablet    Take 1 tablet (8 mg) by mouth every 6 hours as needed for nausea    Encounter for antineoplastic chemotherapy       oxyCODONE 5 MG IR tablet    ROXICODONE    20 tablet    Take 1-2 tablets (5-10 mg) by mouth every 4 hours as needed for moderate to severe pain    Acute post-operative pain       pantoprazole 40 MG EC tablet    PROTONIX    30 tablet    Take 1 tablet (40 mg) by mouth daily    Extrarenal rhabdoid neoplasm (H)       pazopanib 200 MG tablet CHEMOTHERAPY    VOTRIENT    120 tablet    Take 4 tablets (800 mg) by mouth daily    Synovial sarcoma (H)       polyethylene glycol Packet    MIRALAX/GLYCOLAX    7 packet    Take 17 g by mouth daily    Acute post-operative pain       senna-docusate 8.6-50 MG per tablet    SENOKOT-S;PERICOLACE    30 tablet    Take 2 tablets by mouth 2 times daily as needed for constipation    Acute post-operative pain

## 2017-07-17 NOTE — LETTER
"  7/17/2017      RE: Olimpia Childress  [address]   [place] [facility] [address]       [facility]  PEDIATRIC HEMATOLOGY/ONCOLOGY   SOCIAL WORK PROGRESS NOTE      DATA:     Olimpia is a 22-year-old female with recurrent metastatic synovial sarcoma. THI met with Olimpia and [place]  per her request.     Olimpia reported that she has been feeling fairly well, good enough to continue the treatment she is currently on. She reported that she does spend a lot of her time sleeping and being in the apartment, as it is uncomfortable to walk too far. She is hoping to enroll in school the fall semester, even if it is just one English class \"I have to try\". Her mother was able to get her passport, however Olimpia decided against trying for the visitors Visa and instead attempting permanent immigration. THI discussed difficulties of this and the low chances of getting her mother here permanently, however this is the decision she has made at this time. SW and medical team willing to provide letters re: Olimpia's dx and treatment if helpful.     INTERVENTION:     Supportive check in. Utilization of [employer] cab voucher to transport Olimpia to and from her appointment today. Olimpia decided against working with  for visitors visa and will be completing immigration paperwork on behalf of her mother on her own.     ASSESSMENT:     Olimpia in good spirits today, easily engaged. She continues to state she is doing well, though does endorse sleeping the majority of her days and is not leaving her apartment often due to pain. She expressed a desire to work though does not feel this is realistic in her current state, though does want to try to take an English course this coming fall.     PLAN:     Social work will continue to assess needs and provide ongoing psychosocial support and access to resources.       BELKIS Salinas, [facility]  Pediatric Hem/Onc   Phone: [phone]  Pager: " [phone]       Signature              BELKIS Salinas

## 2017-07-17 NOTE — LETTER
7/17/2017      RE: Olimpia Childress  [address]   [facility] [address]       Pediatric Hematology/Oncology Clinic Note    History- Olimpia initially presented with a growth on her right labia in 2013 when she was living in [place]. 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 She discontinued further treatment b/c her physicians were unsure of the exact diagnosis and she felt uncomfortable proceeding. Olimpia subsequently immigrated to the  in August of 2015 and in October she first noticed a recurrence of the mass in the area of previous excision. She was in [place] at that time, seen by oncology and had a port placed but then moved to [place] where she was seen by Dr. Mckeon at [facility] in November. There she underwent an MRI that showed a solid and cystic subcutaneous mass in the right labia measuring 1.7 x 1.6 x 1cm with question of nodular extension vs a second nodule measuring 0.7 cm. There was no invasion into the vagina. On November 16th 2015, Olimpia had a biopsy of the mass by a gynecologist, Dr. Terry, at [facility]. Cytology was reviewed at [facility] and read as a SMARCB1-deficient genital sarcoma, likely falling within the overall spectrum of malignant rhabdoid tumor. By immunohistochemistry, the cells shows a complete loss of SMARCb1. Wide spectrum cytokeratin, CD34, WT1, Desmin and CD99 were all negative. RT PCR for Wooten Sarcoma associated fusion transcripts were negative as well. Olimpia went on to have a PET/CT as well which showed multiple pulmonary lesions and biopsy confirmed a lesion to be metastatic disease. Olimpia was seen by Tomás White at [facility] who reviewed the diagnosis and potential treatment plans with her, however she prefered to be treated here at [facility]. 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    Olimpia underwent left sided thoracotomy and resection of two pulmonary nodules on 7/20/16. Pathology revealed that one lesion was benign lymphoid tissue and the other was consistent with synovial sarcoma.  This was confirmed with FISH  With 91% of cell examined having a signal pattern indictivate of a rearrangement of the SS18 locus.  Olimpia has now completed 3 cycles of chemotherapy per [id number] and started her radiation on 9/13/16 and completed on 10/17/16.  She then went on to have a resection of her labial mass on 11/16/16 by Jannet Pastrana and [facility] with reconstruction, including skin flaps by Dr. Corona from plastics.  Pathology showed no residual tumor.  She had a f/u chest CT today on 12/5/16 that showed persistance of her pulmonary nodules as well as few additonal pulmonary nodules.  She saw Dr. Pierre who was in agreement with surgical resection, however Olimpia wanted to wait until March to have more time to recover from her last surgery. She underwent left sided thoracotomy on 3/8/17.    In May, Olimpia's follow up PET/CT demonstrated significant progression of her pulmonary metastatic disease, with an anterior mediastinal mass compression her right ventricle and potentially right outflow tract.  Subsequent echocardiogram did not show altered cardiac function, Dr Man felt radiation therapy was not in her best interest at this time but would consider if she developed cardiac dysfunction.  Olimpia was started on oral Pazopanib in the beginning of May for palliative therapy.  About 1 month into it we held her doses due to palmar plantar dysesthesia and stomatitis.  Her symptoms resolved quickly within 1 week and she restarted at 50% dosing on 6/19/17.    HPI:  Olimpia reports that she feels quite well.  Her feet are very mildly peeling in some areas but she feels that is improving and she denies  any pain.  No mouth sores.  Her appetite is good, she feels she is eating well.  Her energy level is ok.  No cough, SOB or any other concerns..    History was obtained from [facility] via professional [facility] .     Allergies as of 07/17/2017 - Sheldon as Reviewed 06/19/2017   Allergen Reaction Noted     Heparin flush Other (See Comments) 01/27/2016     Pork derived products  02/09/2016     Tegaderm transparent dressing (informational only) Other (See Comments) and Rash 04/20/2016       Current Outpatient Prescriptions   Medication Sig Dispense Refill     magic mouthwash suspension (diphenhydrAMINE, lidocaine, aluminum-magnesium & simethicone) Swish and spit 10 mLs in mouth every 6 hours as needed for mouth sores 240 mL 3     pazopanib (VOTRIENT) 200 MG tablet CHEMOTHERAPY Take 4 tablets (800 mg) by mouth daily 120 tablet 5     bisacodyl (DULCOLAX) 5 MG EC tablet Take 1 tablet (5 mg) by mouth 2 times daily 30 tablet 2     oxyCODONE (ROXICODONE) 5 MG IR tablet Take 1-2 tablets (5-10 mg) by mouth every 4 hours as needed for moderate to severe pain 20 tablet 0     polyethylene glycol (MIRALAX/GLYCOLAX) Packet Take 17 g by mouth daily 7 packet 1     senna-docusate (SENOKOT-S;PERICOLACE) 8.6-50 MG per tablet Take 2 tablets by mouth 2 times daily as needed for constipation 30 tablet 1     acetaminophen (TYLENOL) 325 MG tablet Take 2 tablets (650 mg) by mouth every 4 hours as needed for mild pain 100 tablet 1     ibuprofen (ADVIL/MOTRIN) 600 MG tablet Take 1 tablet (600 mg) by mouth every 6 hours as needed for moderate pain 60 tablet 1     diltiazem 2% in PLO cream, FV COMPOUNDED, 2% GEL To anal opening three times daily.  Use a pea-sized amount.  Store at room temperature. 60 g 0     ondansetron (ZOFRAN) 8 MG tablet Take 1 tablet (8 mg) by mouth every 6 hours as needed for nausea 30 tablet 6     diphenhydrAMINE (BENADRYL) 25 MG tablet Take 1-2 tablets (25-50 mg) by mouth every 6 hours as needed for other (Nausea or  vomiting) 60 tablet 3     lidocaine-prilocaine (EMLA) cream Apply to port 30 minutes prior to access 30 g 5     pantoprazole (PROTONIX) 40 MG enteric coated tablet Take 1 tablet (40 mg) by mouth daily 30 tablet 3     leuprolide (LUPRON DEPOT-PED) 11.25 MG KIT Inject 11.25 mg into the muscle every 3 months 1 each 1       Past Medical History:   Diagnosis Date     Anemia 6/3/2016     Constipation      Extrarenal rhabdoid neoplasm (H)      Febrile neutropenia (H) 4/17/2016     History of blood transfusion      Latent tuberculosis      Lung disease      On antineoplastic chemotherapy      Sarcoma of vulva (H)        Social History     Social History     Marital status:      Spouse name: N/A     Number of children: N/A     Years of education: N/A     Occupational History     Not on file.     Social History Main Topics     Smoking status: Never Smoker     Smokeless tobacco: Never Used     Alcohol use No     Drug use: No     Sexual activity: No     Other Topics Concern     Not on file     Social History Narrative       Family History   Problem Relation Age of Onset     Other Cancer No family hx of      ROS  See HPI. Complete ROS otherwise negative.    Physical Exam   Wt Readings from Last 4 Encounters:   07/17/17 61 kg (134 lb 7.7 oz)   06/19/17 61.6 kg (135 lb 12.9 oz)   06/12/17 59.8 kg (131 lb 13.4 oz)   06/06/17 60.8 kg (134 lb 0.6 oz)       Temp:  [97.5  F (36.4  C)] 97.5  F (36.4  C)  Pulse:  [70] 70  Resp:  [20] 20  BP: (109)/(78) 109/78  SpO2:  [100 %] 100 %    GENERAL: Alert, well appearing young woman in no acute distress.  SKIN: mild peeling of left and right big toes;  No erythema  HEAD: Normocephalic, atraumatic     EYES: Normal lids, conjunctivae/cornea normal, mild icterus. PERRL. EOMI.  EARS: Pinna normal b/l, no pain on palpation. External ears normal appearing.   NOSE: Clear, no discharge or congestion  MOUTH/THROAT: Oropharynx is clear. Tongue without sores. Normal dentition for age, no mucosal  lesions.  NECK: Supple, full range of motion, no masses  LYMPH NODES: No cervical lymphadenopathy.  LUNGS: No increased WOB.  Lungs clear to auscultation throughout; diminished in LLL.  No crackles or wheezes.  HEART: HRR. S1 and S2 are normal. No murmurs, rubs, gallops. The radial pulses are 2+ bilaterally. Cap refill <3 sec in upper extremities.  ABDOMEN: Normal bowel sounds. Soft, non-tender, non-distended, no masses or hepatosplenomegaly.  NEUROLOGIC: Grossly intact, no focal neurologic deficits.    :  Deferred today.      Labs:  Results for orders placed or performed in visit on 07/17/17   CBC with platelets differential   Result Value Ref Range    WBC 2.5 (L) 4.0 - 11.0 10e9/L    RBC Count 4.08 3.8 - 5.2 10e12/L    Hemoglobin 14.0 11.7 - 15.7 g/dL    Hematocrit 37.8 35.0 - 47.0 %    MCV 93 78 - 100 fl    MCH 34.3 (H) 26.5 - 33.0 pg    MCHC 37.0 (H) 31.5 - 36.5 g/dL    RDW 15.3 (H) 10.0 - 15.0 %    Platelet Count 113 (L) 150 - 450 10e9/L    Diff Method Automated Method     % Neutrophils 44.8 %    % Lymphocytes 41.7 %    % Monocytes 12.3 %    % Eosinophils 0.4 %    % Basophils 0.4 %    % Immature Granulocytes 0.4 %    Nucleated RBCs 0 0 /100    Absolute Neutrophil 1.1 (L) 1.6 - 8.3 10e9/L    Absolute Lymphocytes 1.1 0.8 - 5.3 10e9/L    Absolute Monocytes 0.3 0.0 - 1.3 10e9/L    Absolute Eosinophils 0.0 0.0 - 0.7 10e9/L    Absolute Basophils 0.0 0.0 - 0.2 10e9/L    Abs Immature Granulocytes 0.0 0 - 0.4 10e9/L    Absolute Nucleated RBC 0.0    Comprehensive metabolic panel   Result Value Ref Range    Sodium 142 133 - 144 mmol/L    Potassium 4.0 3.4 - 5.3 mmol/L    Chloride 105 94 - 109 mmol/L    Carbon Dioxide 26 20 - 32 mmol/L    Anion Gap 11 3 - 14 mmol/L    Glucose 118 (H) 70 - 99 mg/dL    Urea Nitrogen 12 7 - 30 mg/dL    Creatinine 0.56 0.52 - 1.04 mg/dL    GFR Estimate >90  Non  GFR Calc   >60 mL/min/1.7m2    GFR Estimate If Black >90   GFR Calc   >60 mL/min/1.7m2    Calcium  9.1 8.5 - 10.1 mg/dL    Bilirubin Total 0.6 0.2 - 1.3 mg/dL    Albumin 3.3 (L) 3.4 - 5.0 g/dL    Protein Total 7.4 6.8 - 8.8 g/dL    Alkaline Phosphatase 83 40 - 150 U/L    ALT 40 0 - 50 U/L    AST 31 0 - 45 U/L   UA with Microscopic   Result Value Ref Range    Color Urine Yellow     Appearance Urine Clear     Glucose Urine Negative NEG mg/dL    Bilirubin Urine Negative NEG    Ketones Urine Negative NEG mg/dL    Specific Gravity Urine 1.015 1.003 - 1.035    Blood Urine Negative NEG    pH Urine 6.5 5.0 - 7.0 pH    Protein Albumin Urine Negative NEG mg/dL    Urobilinogen mg/dL Normal 0.0 - 2.0 mg/dL    Nitrite Urine Negative NEG    Leukocyte Esterase Urine Moderate (A) NEG    Source Midstream Urine     WBC Urine 4 (H) 0 - 2 /HPF    RBC Urine 1 0 - 2 /HPF    Squamous Epithelial /HPF Urine 1 0 - 1 /HPF    Mucous Urine Present (A) NEG /LPF     Impression:  Olimpia is a 22-year-old female with recurrent metastatic synovial sarcoma, recent scans show progression of her pulmonary disease. She is on 400 mg Pazopanib daily and overall doing well.    Plan:  1)Continue pazopanib at 50% dosing (400 mg daily);  D/w Olimpia increasing to 600 mg daily but she would like to wait 2 weeks and reassess  2) Port flushed today;  Next due in 4 weeks  3) RTC in 2 weeks for repeat labs (CBC, CMP, UA) and visit with Shayla  4) Will plan for chest CT in 4 weeks      Coretta Yanez MD

## 2017-07-17 NOTE — MR AVS SNAPSHOT
After Visit Summary   2017    Olimpia Childress    MRN: [id number]           Patient Information     Date Of Birth          1995        Visit Information        Provider Department      2017 2:30 PM [facility] PEDS INFUSION CHAIR 6 Peds IV Infusion        Today's Diagnoses     Sarcoma of vulva (H)    -  1       Follow-ups after your visit        Future tests that were ordered for you today     Open Future Orders        Priority Expected Expires Ordered    CT Chest w/o contrast Routine 2017    CBC with platelets differential Routine 2017    Comprehensive metabolic panel Routine 2017            Who to contact     Please call your clinic at [phone] to:    Ask questions about your health    Make or cancel appointments    Discuss your medicines    Learn about your test results    Speak to your doctor   If you have compliments or concerns about an experience at your clinic, or if you wish to file a complaint, please contact [facility] Patient Relations at [phone] or email us at [email or web address]         Additional Information About Your Visit        amcurehart Information     [employer] is an electronic gateway that provides easy, online access to your medical records. With [employer], you can request a clinic appointment, read your test results, renew a prescription or communicate with your care team.     To sign up for [employer] visit the website at [email or web address]   You will be asked to enter the access code listed below, as well as some personal information. Please follow the directions to create your username and password.     Your access code is: [email or web address]  Expires: 2017  2:20 PM     Your access code will  in 90 days. If you need help or a new code, please contact your [facility] or call [phone] for assistance.        Care  EveryWhere ID     This is your Care EveryWhere ID. This could be used by other organizations to access your [place] medical records  [id number]         Blood Pressure from Last 3 Encounters:   07/17/17 109/78   06/19/17 100/69   06/12/17 109/73    Weight from Last 3 Encounters:   07/17/17 61 kg (134 lb 7.7 oz)   06/19/17 61.6 kg (135 lb 12.9 oz)   06/12/17 59.8 kg (131 lb 13.4 oz)              Today, you had the following     No orders found for display       Primary Care Provider Office Phone # Fax #    Coretta Yanez [phone]       [address] [address]        Equal Access to Services     RONIT BAEZ : Rupa salgado Sodolores, waaxda luqadaha, qaybta kaalmada adeegyada, wilner blue . So [facility] [phone].    ATENCIÓN: Si habla español, tiene a ordoñez disposición servicios gratuitos de asistencia lingüística. Darius al [phone].    We comply with applicable federal civil rights laws and [place] laws. We do not discriminate on the basis of race, color, national origin, age, disability sex, sexual orientation or gender identity.            Thank you!     Thank you for choosing PEDS IV INFUSION  for your care. Our goal is always to provide you with excellent care. Hearing back from our patients is one way we can continue to improve our services. Please take a few minutes to complete the written survey that you may receive in the mail after your visit with us. Thank you!             Your Updated Medication List - Protect others around you: Learn how to safely use, store and throw away your medicines at www.disposemymeds.org.          This list is accurate as of: 7/17/17  3:48 PM.  Always use your most recent med list.                   Brand Name Dispense Instructions for use Diagnosis    acetaminophen 325 MG tablet    TYLENOL    100 tablet    Take 2 tablets (650 mg) by mouth every 4 hours as needed for mild pain     Sarcoma of vulva (H)       bisacodyl 5 MG EC tablet     30 tablet    Take 1 tablet (5 mg) by mouth 2 times daily    Slow transit constipation       diltiazem 2% in PLO cream (FV COMPOUNDED) 2% Gel     60 g    To anal opening three times daily.  Use a pea-sized amount.  Store at room temperature.    Anal fissure       diphenhydrAMINE 25 MG tablet    BENADRYL    60 tablet    Take 1-2 tablets (25-50 mg) by mouth every 6 hours as needed for other (Nausea or vomiting)    Chemotherapy induced nausea and vomiting       ibuprofen 600 MG tablet    ADVIL/MOTRIN    60 tablet    Take 1 tablet (600 mg) by mouth every 6 hours as needed for moderate pain    Sarcoma of vulva (H)       leuprolide 11.25 MG Kit kit    LUPRON DEPOT-PED    1 each    Inject 11.25 mg into the muscle every 3 months    Visit for fertility preservation counseling prior to cancer therapy, Malignant tumor cells (H)       lidocaine visc 2% 2.5mL/5mL & maalox/mylanta w/ simeth 2.5mL/5mL & diphenhydrAMINE 5mg/5mL Susp suspension    [facility]    240 mL    Swish and spit 10 mLs in mouth every 6 hours as needed for mouth sores    Stomatitis       lidocaine-prilocaine cream    EMLA    30 g    Apply to port 30 minutes prior to access    Malignant tumor cells (H)       ondansetron 8 MG tablet    ZOFRAN    30 tablet    Take 1 tablet (8 mg) by mouth every 6 hours as needed for nausea    Encounter for antineoplastic chemotherapy       oxyCODONE 5 MG IR tablet    ROXICODONE    20 tablet    Take 1-2 tablets (5-10 mg) by mouth every 4 hours as needed for moderate to severe pain    Acute post-operative pain       pantoprazole 40 MG EC tablet    PROTONIX    30 tablet    Take 1 tablet (40 mg) by mouth daily    Extrarenal rhabdoid neoplasm (H)       pazopanib 200 MG tablet CHEMOTHERAPY    VOTRIENT    120 tablet    Take 4 tablets (800 mg) by mouth daily    Synovial sarcoma (H)       polyethylene glycol Packet    MIRALAX/GLYCOLAX    7 packet    Take 17 g by mouth  daily    Acute post-operative pain       senna-docusate 8.6-50 MG per tablet    SENOKOT-S;PERICOLACE    30 tablet    Take 2 tablets by mouth 2 times daily as needed for constipation    Acute post-operative pain

## 2017-07-17 NOTE — MR AVS SNAPSHOT
After Visit Summary   7/17/2017    Olimpia Childress    MRN: [id number]           Patient Information     Date Of Birth          1995        Visit Information        Provider Department      7/17/2017 10:12 AM Hortencia Cuevas MSW Peds Hematology Oncology        Today's Diagnoses     Encounter for counseling    -  1          [facility], 9th floor  [address] [place] [address]  Phone: [phone]  Clinic Hours:   Monday-Friday:   7 am to 5:00 pm   closed weekends and major  holidays     If your fever is 100.5  or greater,   call the clinic during business hours.   After hours call [phone] and ask for the pediatric hematology / oncology physician to be paged for you.               Follow-ups after your visit        Your next 10 appointments already scheduled     Aug 01, 2017  1:45 PM CDT   Return Visit with JULIO C Pittman CNP Hematology Oncology ([facility])    [facility]  9th Floor  [address] [place] [id number] [phone]            Aug 14, 2017  1:00 PM CDT   CT CHEST W/O CONTRAST with URCT1   [facility] [place], Radiology ([facility])    [address] [place] [id number] [phone]           Please bring any scans or X-rays taken at other hospitals, if similar tests were done. Also bring a list of your medicines, including vitamins, minerals and over-the-counter drugs. It is safest to leave personal items at home.  Be sure to tell your doctor:   If you have any allergies.   If there s any chance you are pregnant.   If you are breastfeeding.   If you have any special needs.  You do not need to do anything special to prepare.  Please wear loose clothing, such as a sweat suit or jogging clothes. Avoid snaps, zippers and other metal. We may ask you to undress and put on a hospital gown.            Aug 14, 2017  1:30 PM CDT    Return Visit with MD Alexis Mary Hematology Oncology ([facility])    [facility]  9th Floor  [address] [facility] [address] [phone]              Future tests that were ordered for you today     Open Future Orders        Priority Expected Expires Ordered    Routine UA with microscopic - No culture Routine 2017    CT Chest w/o contrast Routine 2017    CBC with platelets differential Routine 2017    Comprehensive metabolic panel Routine 2017            Who to contact     Please call your clinic at [phone] to:    Ask questions about your health    Make or cancel appointments    Discuss your medicines    Learn about your test results    Speak to your doctor   If you have compliments or concerns about an experience at your clinic, or if you wish to file a complaint, please contact [facility] Patient Relations at [phone] or email us at [email or web address]         Additional Information About Your Visit        RiverWiredhart Information     Finicityt is an electronic gateway that provides easy, online access to your medical records. With Ezeecube, you can request a clinic appointment, read your test results, renew a prescription or communicate with your care team.     To sign up for Finicityt visit the website at [email or web address]   You will be asked to enter the access code listed below, as well as some personal information. Please follow the directions to create your username and password.     Your access code is: [email or web address]  Expires: 2017  2:20 PM     Your access code will  in 90 days. If you need help or a new code, please contact your [facility] Clinic or call [phone] for assistance.        Care EveryWhere ID     This is your Care EveryWhere ID. This could be used by  other organizations to access your [facility] medical records  [id number]         Blood Pressure from Last 3 Encounters:   07/17/17 109/78   06/19/17 100/69   06/12/17 109/73    Weight from Last 3 Encounters:   07/17/17 61 kg (134 lb 7.7 oz)   06/19/17 61.6 kg (135 lb 12.9 oz)   06/12/17 59.8 kg (131 lb 13.4 oz)              Today, you had the following     No orders found for display       Primary Care Provider Office Phone # Fax #    Corettalila Yanez [phone]       [address] [address]        Equal Access to Services     RONIT BAEZ : Rupa Cheek, jenni flores, eva kaalmalaney xie, wilner singleton. So [facility] [phone].    ATENCIÓN: Si habla español, tiene a ordoñez disposición servicios gratuitos de asistencia lingüística. Llame al [phone].    We comply with applicable federal civil rights laws and [place] laws. We do not discriminate on the basis of race, color, national origin, age, disability sex, sexual orientation or gender identity.            Thank you!     Thank you for choosing [facility] HEMATOLOGY ONCOLOGY  for your care. Our goal is always to provide you with excellent care. Hearing back from our patients is one way we can continue to improve our services. Please take a few minutes to complete the written survey that you may receive in the mail after your visit with us. Thank you!             Your Updated Medication List - Protect others around you: Learn how to safely use, store and throw away your medicines at www.disposemymeds.org.          This list is accurate as of: 7/17/17 11:59 PM.  Always use your most recent med list.                   Brand Name Dispense Instructions for use Diagnosis    acetaminophen 325 MG tablet    TYLENOL    100 tablet    Take 2 tablets (650 mg) by mouth every 4 hours as needed for mild pain    Sarcoma of vulva (H)       bisacodyl 5 MG EC tablet     30 tablet     Take 1 tablet (5 mg) by mouth 2 times daily    Slow transit constipation       diltiazem 2% in PLO cream (FV COMPOUNDED) 2% Gel     60 g    To anal opening three times daily.  Use a pea-sized amount.  Store at room temperature.    Anal fissure       diphenhydrAMINE 25 MG tablet    BENADRYL    60 tablet    Take 1-2 tablets (25-50 mg) by mouth every 6 hours as needed for other (Nausea or vomiting)    Chemotherapy induced nausea and vomiting       ibuprofen 600 MG tablet    ADVIL/MOTRIN    60 tablet    Take 1 tablet (600 mg) by mouth every 6 hours as needed for moderate pain    Sarcoma of vulva (H)       leuprolide 11.25 MG Kit kit    LUPRON DEPOT-PED    1 each    Inject 11.25 mg into the muscle every 3 months    Visit for fertility preservation counseling prior to cancer therapy, Malignant tumor cells (H)       lidocaine visc 2% 2.5mL/5mL & maalox/mylanta w/ simeth 2.5mL/5mL & diphenhydrAMINE 5mg/5mL Susp suspension    [facility]    240 mL    Swish and spit 10 mLs in mouth every 6 hours as needed for mouth sores    Stomatitis       lidocaine-prilocaine cream    EMLA    30 g    Apply to port 30 minutes prior to access    Malignant tumor cells (H)       ondansetron 8 MG tablet    ZOFRAN    30 tablet    Take 1 tablet (8 mg) by mouth every 6 hours as needed for nausea    Encounter for antineoplastic chemotherapy       oxyCODONE 5 MG IR tablet    ROXICODONE    20 tablet    Take 1-2 tablets (5-10 mg) by mouth every 4 hours as needed for moderate to severe pain    Acute post-operative pain       pantoprazole 40 MG EC tablet    PROTONIX    30 tablet    Take 1 tablet (40 mg) by mouth daily    Extrarenal rhabdoid neoplasm (H)       pazopanib 200 MG tablet CHEMOTHERAPY    VOTRIENT    120 tablet    Take 4 tablets (800 mg) by mouth daily    Synovial sarcoma (H)       polyethylene glycol Packet    MIRALAX/GLYCOLAX    7 packet    Take 17 g by mouth daily    Acute post-operative pain       senna-docusate 8.6-50 MG  per tablet    SENOKOT-S;PERICOLACE    30 tablet    Take 2 tablets by mouth 2 times daily as needed for constipation    Acute post-operative pain

## 2017-07-18 NOTE — PROGRESS NOTES
"[facility]  PEDIATRIC HEMATOLOGY/ONCOLOGY   SOCIAL WORK PROGRESS NOTE      DATA:     Olimpia is a 22-year-old female with recurrent metastatic synovial sarcoma. THI met with Olimpia and Filipino  per her request.     Olimpia reported that she has been feeling fairly well, good enough to continue the treatment she is currently on. She reported that she does spend a lot of her time sleeping and being in the apartment, as it is uncomfortable to walk too far. She is hoping to enroll in school the fall semester, even if it is just one English class \"I have to try\". Her mother was able to get her passport, however Olimpia decided against trying for the visitors Visa and instead attempting permanent immigration. THI discussed difficulties of this and the low chances of getting her mother here permanently, however this is the decision she has made at this time. THI and medical team willing to provide letters re: Olimpia's dx and treatment if helpful.     INTERVENTION:     Supportive check in. Utilization of [employer] cab voucher to transport Olimpia to and from her appointment today. Olimpia decided against working with  for visitors visa and will be completing immigration paperwork on behalf of her mother on her own.     ASSESSMENT:     Olimpia in good spirits today, easily engaged. She continues to state she is doing well, though does endorse sleeping the majority of her days and is not leaving her apartment often due to pain. She expressed a desire to work though does not feel this is realistic in her current state, though does want to try to take an English course this coming fall.     PLAN:     Social work will continue to assess needs and provide ongoing psychosocial support and access to resources.       BELKIS Salinas, [facility]  Pediatric Hem/Onc   Phone: [phone]  Pager: [phone]      THI Signature            "

## 2017-08-03 NOTE — LETTER
8/3/2017      RE: Olimpia Childress  [address]   [facility] [address]       Pediatric Hematology/Oncology Clinic Note    History- Olimpia initially presented with a growth on her right labia in 2013 when she was living in [place]. 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 She discontinued further treatment b/c her physicians were unsure of the exact diagnosis and she felt uncomfortable proceeding. Olimpia subsequently immigrated to the  in August of 2015 and in October she first noticed a recurrence of the mass in the area of previous excision. She was in [place] at that time, seen by oncology and had a port placed but then moved to [place] where she was seen by Dr. Mckeon at [facility] in November. There she underwent an MRI that showed a solid and cystic subcutaneous mass in the right labia measuring 1.7 x 1.6 x 1cm with question of nodular extension vs a second nodule measuring 0.7 cm. There was no invasion into the vagina. On November 16th 2015, Olimpia had a biopsy of the mass by a gynecologist, Dr. Terry, at [facility]. Cytology was reviewed at [facility] and read as a SMARCB1-deficient genital sarcoma, likely falling within the overall spectrum of malignant rhabdoid tumor. By immunohistochemistry, the cells shows a complete loss of SMARCb1. Wide spectrum cytokeratin, CD34, WT1, Desmin and CD99 were all negative. RT PCR for Wooten Sarcoma associated fusion transcripts were negative as well. Olimpia went on to have a PET/CT as well which showed multiple pulmonary lesions and biopsy confirmed a lesion to be metastatic disease. Olimpia was seen by Tomás White at [facility] who reviewed the diagnosis and potential treatment plans with her, however she prefered to be treated here at [facility]. Olimpia received therapy for metastatic extrarenal rhabdoid tumor per [id number] regimen I until  the diagnosis was questioned at the time of resection of her pulmonary mets and was determined to be synovial sarcoma.    Olimpia underwent left sided thoracotomy and resection of two pulmonary nodules on 7/20/16. Pathology revealed that one lesion was benign lymphoid tissue and the other was consistent with synovial sarcoma.  This was confirmed with FISH  With 91% of cell examined having a signal pattern indictivate of a rearrangement of the SS18 locus.  Olimpia has now completed 3 cycles of chemotherapy per [id number] and started her radiation on 9/13/16 and completed on 10/17/16.  She then went on to have a resection of her labial mass on 11/16/16 by Jannet Pastrana and Ch[facility]is with reconstruction, including skin flaps by Dr. Corona from plastics.  Pathology showed no residual tumor.  She had a f/u chest CT today on 12/5/16 that showed persistance of her pulmonary nodules as well as few additonal pulmonary nodules.  She saw Dr. Pierre who was in agreement with surgical resection, however Olimpia wanted to wait until March to have more time to recover from her last surgery. She underwent left sided thoracotomy on 3/8/17.    In May, Olimpia's follow up PET/CT demonstrated significant progression of her pulmonary metastatic disease, with an anterior mediastinal mass compression her right ventricle and potentially right outflow tract.  Subsequent echocardiogram did not show altered cardiac function, Dr Man felt radiation therapy was not in her best interest at this time but would consider if she developed cardiac dysfunction.  Olimpia was started on oral Pazopanib in the beginning of May for palliative therapy.  About 1 month into it we held her doses due to palmar plantar dysesthesia and stomatitis.  Her symptoms resolved quickly within 1 week and she restarted at 50% dosing on 6/19/17.    HPI:  Olimpia has been doing well overall since her last visit.  She continues to note fatigue when walking any significant distance.  No SOB  at rest.  No pain in her chest.  Her feet continue to peel, she notes some tenderness associated with this, overall she feels it is stable.  No other skin concerns.  Eating well, occasional constipation managed with miralax.    History was obtained from [facility] via professional [facility] .     Allergies as of 08/03/2017 - [place] as Reviewed 08/03/2017   Allergen Reaction Noted     Heparin flush Other (See Comments) 01/27/2016     Pork derived products  02/09/2016     Tegaderm transparent dressing (informational only) Other (See Comments) and Rash 04/20/2016       Current Outpatient Prescriptions   Medication Sig Dispense Refill     pazopanib (VOTRIENT) 200 MG tablet CHEMOTHERAPY Take 4 tablets (800 mg) by mouth daily 120 tablet 5     oxyCODONE (ROXICODONE) 5 MG IR tablet Take 1-2 tablets (5-10 mg) by mouth every 4 hours as needed for moderate to severe pain 20 tablet 0     polyethylene glycol (MIRALAX/GLYCOLAX) Packet Take 17 g by mouth daily 7 packet 1     senna-docusate (SENOKOT-S;PERICOLACE) 8.6-50 MG per tablet Take 2 tablets by mouth 2 times daily as needed for constipation 30 tablet 1     acetaminophen (TYLENOL) 325 MG tablet Take 2 tablets (650 mg) by mouth every 4 hours as needed for mild pain 100 tablet 1     ibuprofen (ADVIL/MOTRIN) 600 MG tablet Take 1 tablet (600 mg) by mouth every 6 hours as needed for moderate pain 60 tablet 1     diltiazem 2% in PLO cream, FV COMPOUNDED, 2% GEL To anal opening three times daily.  Use a pea-sized amount.  Store at room temperature. 60 g 0     ondansetron (ZOFRAN) 8 MG tablet Take 1 tablet (8 mg) by mouth every 6 hours as needed for nausea 30 tablet 6     diphenhydrAMINE (BENADRYL) 25 MG tablet Take 1-2 tablets (25-50 mg) by mouth every 6 hours as needed for other (Nausea or vomiting) 60 tablet 3     lidocaine-prilocaine (EMLA) cream Apply to port 30 minutes prior to access 30 g 5       Past Medical History:   Diagnosis Date     Anemia 6/3/2016     Constipation       Extrarenal rhabdoid neoplasm (H)      Febrile neutropenia (H) 4/17/2016     History of blood transfusion      Latent tuberculosis      Lung disease      On antineoplastic chemotherapy      Sarcoma of vulva (H)        Social History     Social History     Marital status:      Spouse name: N/A     Number of children: N/A     Years of education: N/A     Occupational History     Not on file.     Social History Main Topics     Smoking status: Never Smoker     Smokeless tobacco: Never Used     Alcohol use No     Drug use: No     Sexual activity: No     Other Topics Concern     Not on file     Social History Narrative       Family History   Problem Relation Age of Onset     Other Cancer No family hx of      ROS  See HPI. Complete ROS otherwise negative.    Physical Exam   Wt Readings from Last 4 Encounters:   08/03/17 61.5 kg (135 lb 9.3 oz)   07/17/17 61 kg (134 lb 7.7 oz)   06/19/17 61.6 kg (135 lb 12.9 oz)   06/12/17 59.8 kg (131 lb 13.4 oz)       Temp:  [97  F (36.1  C)] 97  F (36.1  C)  Pulse:  [64] 64  Resp:  [18] 18  BP: (111)/(84) 111/84  SpO2:  [100 %] 100 %    GENERAL: Alert, interactive, appears a bit tired today.  SKIN: Multiple layers of skin peeling on left great toe, more mild on left heel.  No erythema or drainage.  Right great toe with mild peeling.  HEAD: Normocephalic, atraumatic     EYES: Normal lids, conjunctivae/cornea normal, mild icterus. PERRL. EOMI.  EARS: Pinna normal b/l, no pain on palpation. External ears normal appearing.   NOSE: Clear, no discharge or congestion  MOUTH/THROAT: Oropharynx is clear. Tongue without sores. Normal dentition for age, no mucosal lesions.  NECK: Supple, full range of motion, no masses  LYMPH NODES: No cervical lymphadenopathy.  LUNGS: No increased WOB.  Lungs clear to auscultation throughout; diminished in LLL.  No crackles or wheezes.  HEART: HRR. S1 and S2 are normal. No murmurs, rubs, gallops. The radial pulses are 2+ bilaterally. Cap refill <3 sec in  upper extremities.  ABDOMEN: Normal bowel sounds. Soft, non-tender, non-distended, no masses or hepatosplenomegaly.  NEUROLOGIC: Grossly intact, no focal neurologic deficits.    :  Deferred today.      Labs:  Results for orders placed or performed in visit on 08/03/17 (from the past 24 hour(s))   Comprehensive metabolic panel   Result Value Ref Range    Sodium 139 133 - 144 mmol/L    Potassium 3.9 3.4 - 5.3 mmol/L    Chloride 103 94 - 109 mmol/L    Carbon Dioxide 29 20 - 32 mmol/L    Anion Gap 7 3 - 14 mmol/L    Glucose 83 70 - 99 mg/dL    Urea Nitrogen 12 7 - 30 mg/dL    Creatinine 0.57 0.52 - 1.04 mg/dL    GFR Estimate >90  Non  GFR Calc   >60 mL/min/1.7m2    GFR Estimate If Black >90   GFR Calc   >60 mL/min/1.7m2    Calcium 8.8 8.5 - 10.1 mg/dL    Bilirubin Total 0.7 0.2 - 1.3 mg/dL    Albumin 3.5 3.4 - 5.0 g/dL    Protein Total 7.7 6.8 - 8.8 g/dL    Alkaline Phosphatase 80 40 - 150 U/L    ALT 36 0 - 50 U/L    AST 33 0 - 45 U/L   CBC with platelets differential   Result Value Ref Range    WBC 2.6 (L) 4.0 - 11.0 10e9/L    RBC Count 3.91 3.8 - 5.2 10e12/L    Hemoglobin 13.7 11.7 - 15.7 g/dL    Hematocrit 39.2 35.0 - 47.0 %     78 - 100 fl    MCH 35.0 (H) 26.5 - 33.0 pg    MCHC 34.9 31.5 - 36.5 g/dL    RDW 16.3 (H) 10.0 - 15.0 %    Platelet Count 125 (L) 150 - 450 10e9/L    Diff Method Automated Method     % Neutrophils 43.1 %    % Lymphocytes 44.0 %    % Monocytes 11.7 %    % Eosinophils 0.4 %    % Basophils 0.4 %    % Immature Granulocytes 0.4 %    Nucleated RBCs 0 0 /100    Absolute Neutrophil 1.1 (L) 1.6 - 8.3 10e9/L    Absolute Lymphocytes 1.1 0.8 - 5.3 10e9/L    Absolute Monocytes 0.3 0.0 - 1.3 10e9/L    Absolute Eosinophils 0.0 0.0 - 0.7 10e9/L    Absolute Basophils 0.0 0.0 - 0.2 10e9/L    Abs Immature Granulocytes 0.0 0 - 0.4 10e9/L    Absolute Nucleated RBC 0.0    UA with Microscopic   Result Value Ref Range    Color Urine Yellow     Appearance Urine Clear      Glucose Urine Negative NEG mg/dL    Bilirubin Urine Negative NEG    Ketones Urine Negative NEG mg/dL    Specific Gravity Urine 1.011 1.003 - 1.035    Blood Urine Negative NEG    pH Urine 6.5 5.0 - 7.0 pH    Protein Albumin Urine Negative NEG mg/dL    Urobilinogen mg/dL Normal 0.0 - 2.0 mg/dL    Nitrite Urine Negative NEG    Leukocyte Esterase Urine Moderate (A) NEG    Source Midstream Urine     WBC Urine 10 (H) 0 - 2 /HPF    RBC Urine <1 0 - 2 /HPF    Squamous Epithelial /HPF Urine 2 (H) 0 - 1 /HPF    Renal Tub Epi 1 (A) NEG /HPF         Impression:  Olimpia is a 22-year-old female with recurrent metastatic synovial sarcoma, recent scans show progression of her pulmonary disease. She is on 400 mg Pazopanib daily and overall doing well.  Persistent peeling of her feet bilaterally, mild tenderness associated with this.  Labs are stable.    Plan:  1) Continue pazopanib at 50% dosing (400 mg daily), given skin changes will not increase at this time.  2) Reviewed labs with Olimpia.  3) Port flushed due at next visit  4) RTC in 2 weeks for repeat labs and chest CT.      JULIO C Pittman CNP

## 2017-08-03 NOTE — PROGRESS NOTES
Pediatric Hematology/Oncology Clinic Note    History- Olimpia initially presented with a growth on her right labia in 2013 when she was living in [place]. 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 She discontinued further treatment b/c her physicians were unsure of the exact diagnosis and she felt uncomfortable proceeding. Olimpia subsequently immigrated to the  in August of 2015 and in October she first noticed a recurrence of the mass in the area of previous excision. She was in [place] at that time, seen by oncology and had a port placed but then moved to [place] where she was seen by Dr. Mckeon at [facility] in November. There she underwent an MRI that showed a solid and cystic subcutaneous mass in the right labia measuring 1.7 x 1.6 x 1cm with question of nodular extension vs a second nodule measuring 0.7 cm. There was no invasion into the vagina. On November 16th 2015, Olimpia had a biopsy of the mass by a gynecologist, Dr. Terry, at [facility]. Cytology was reviewed at [facility] and read as a SMARCB1-deficient genital sarcoma, likely falling within the overall spectrum of malignant rhabdoid tumor. By immunohistochemistry, the cells shows a complete loss of SMARCb1. Wide spectrum cytokeratin, CD34, WT1, Desmin and CD99 were all negative. RT PCR for Wooten Sarcoma associated fusion transcripts were negative as well. Olimpia went on to have a PET/CT as well which showed multiple pulmonary lesions and biopsy confirmed a lesion to be metastatic disease. Olimpia was seen by Tomás White at [facility] who reviewed the diagnosis and potential treatment plans with her, however she prefered to be treated here at [facility]. 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    Olimpia underwent left sided thoracotomy and resection of two pulmonary nodules on 7/20/16. Pathology revealed that one lesion was benign lymphoid tissue and the other was consistent with synovial sarcoma.  This was confirmed with FISH  With 91% of cell examined having a signal pattern indictivate of a rearrangement of the SS18 locus.  lOimpia has now completed 3 cycles of chemotherapy per [id number] and started her radiation on 9/13/16 and completed on 10/17/16.  She then went on to have a resection of her labial mass on 11/16/16 by Jannet Pastrana and Chikis with reconstruction, including skin flaps by Dr. Corona from plastics.  Pathology showed no residual tumor.  She had a f/u chest CT today on 12/5/16 that showed persistance of her pulmonary nodules as well as few additonal pulmonary nodules.  She saw Dr. Pierre who was in agreement with surgical resection, however Olimpia wanted to wait until March to have more time to recover from her last surgery. She underwent left sided thoracotomy on 3/8/17.    In May, Olimpia's follow up PET/CT demonstrated significant progression of her pulmonary metastatic disease, with an anterior mediastinal mass compression her right ventricle and potentially right outflow tract.  Subsequent echocardiogram did not show altered cardiac function, Dr Man felt radiation therapy was not in her best interest at this time but would consider if she developed cardiac dysfunction.  Olimpia was started on oral Pazopanib in the beginning of May for palliative therapy.  About 1 month into it we held her doses due to palmar plantar dysesthesia and stomatitis.  Her symptoms resolved quickly within 1 week and she restarted at 50% dosing on 6/19/17.    HPI:  Olimpia has been doing well overall since her last visit.  She continues to note fatigue when walking any significant distance.  No SOB at rest.  No pain in her chest.  Her feet continue to peel, she notes some tenderness  associated with this, overall she feels it is stable.  No other skin concerns.  Eating well, occasional constipation managed with miralax.    History was obtained from [facility] via professional [facility] .     Allergies as of 08/03/2017 - [facility] as Reviewed 08/03/2017   Allergen Reaction Noted     Heparin flush Other (See Comments) 01/27/2016     Pork derived products  02/09/2016     Tegaderm transparent dressing (informational only) Other (See Comments) and Rash 04/20/2016       Current Outpatient Prescriptions   Medication Sig Dispense Refill     pazopanib (VOTRIENT) 200 MG tablet CHEMOTHERAPY Take 4 tablets (800 mg) by mouth daily 120 tablet 5     oxyCODONE (ROXICODONE) 5 MG IR tablet Take 1-2 tablets (5-10 mg) by mouth every 4 hours as needed for moderate to severe pain 20 tablet 0     polyethylene glycol (MIRALAX/GLYCOLAX) Packet Take 17 g by mouth daily 7 packet 1     senna-docusate (SENOKOT-S;PERICOLACE) 8.6-50 MG per tablet Take 2 tablets by mouth 2 times daily as needed for constipation 30 tablet 1     acetaminophen (TYLENOL) 325 MG tablet Take 2 tablets (650 mg) by mouth every 4 hours as needed for mild pain 100 tablet 1     ibuprofen (ADVIL/MOTRIN) 600 MG tablet Take 1 tablet (600 mg) by mouth every 6 hours as needed for moderate pain 60 tablet 1     diltiazem 2% in PLO cream, FV COMPOUNDED, 2% GEL To anal opening three times daily.  Use a pea-sized amount.  Store at room temperature. 60 g 0     ondansetron (ZOFRAN) 8 MG tablet Take 1 tablet (8 mg) by mouth every 6 hours as needed for nausea 30 tablet 6     diphenhydrAMINE (BENADRYL) 25 MG tablet Take 1-2 tablets (25-50 mg) by mouth every 6 hours as needed for other (Nausea or vomiting) 60 tablet 3     lidocaine-prilocaine (EMLA) cream Apply to port 30 minutes prior to access 30 g 5       Past Medical History:   Diagnosis Date     Anemia 6/3/2016     Constipation      Extrarenal rhabdoid neoplasm (H)      Febrile neutropenia (H) 4/17/2016     History of  blood transfusion      Latent tuberculosis      Lung disease      On antineoplastic chemotherapy      Sarcoma of vulva (H)        Social History     Social History     Marital status:      Spouse name: N/A     Number of children: N/A     Years of education: N/A     Occupational History     Not on file.     Social History Main Topics     Smoking status: Never Smoker     Smokeless tobacco: Never Used     Alcohol use No     Drug use: No     Sexual activity: No     Other Topics Concern     Not on file     Social History Narrative       Family History   Problem Relation Age of Onset     Other Cancer No family hx of      ROS  See HPI. Complete ROS otherwise negative.    Physical Exam   Wt Readings from Last 4 Encounters:   08/03/17 61.5 kg (135 lb 9.3 oz)   07/17/17 61 kg (134 lb 7.7 oz)   06/19/17 61.6 kg (135 lb 12.9 oz)   06/12/17 59.8 kg (131 lb 13.4 oz)       Temp:  [97  F (36.1  C)] 97  F (36.1  C)  Pulse:  [64] 64  Resp:  [18] 18  BP: (111)/(84) 111/84  SpO2:  [100 %] 100 %    GENERAL: Alert, interactive, appears a bit tired today.  SKIN: Multiple layers of skin peeling on left great toe, more mild on left heel.  No erythema or drainage.  Right great toe with mild peeling.  HEAD: Normocephalic, atraumatic     EYES: Normal lids, conjunctivae/cornea normal, mild icterus. PERRL. EOMI.  EARS: Pinna normal b/l, no pain on palpation. External ears normal appearing.   NOSE: Clear, no discharge or congestion  MOUTH/THROAT: Oropharynx is clear. Tongue without sores. Normal dentition for age, no mucosal lesions.  NECK: Supple, full range of motion, no masses  LYMPH NODES: No cervical lymphadenopathy.  LUNGS: No increased WOB.  Lungs clear to auscultation throughout; diminished in LLL.  No crackles or wheezes.  HEART: HRR. S1 and S2 are normal. No murmurs, rubs, gallops. The radial pulses are 2+ bilaterally. Cap refill <3 sec in upper extremities.  ABDOMEN: Normal bowel sounds. Soft, non-tender, non-distended, no  masses or hepatosplenomegaly.  NEUROLOGIC: Grossly intact, no focal neurologic deficits.    :  Deferred today.      Labs:  Results for orders placed or performed in visit on 08/03/17 (from the past 24 hour(s))   Comprehensive metabolic panel   Result Value Ref Range    Sodium 139 133 - 144 mmol/L    Potassium 3.9 3.4 - 5.3 mmol/L    Chloride 103 94 - 109 mmol/L    Carbon Dioxide 29 20 - 32 mmol/L    Anion Gap 7 3 - 14 mmol/L    Glucose 83 70 - 99 mg/dL    Urea Nitrogen 12 7 - 30 mg/dL    Creatinine 0.57 0.52 - 1.04 mg/dL    GFR Estimate >90  Non  GFR Calc   >60 mL/min/1.7m2    GFR Estimate If Black >90   GFR Calc   >60 mL/min/1.7m2    Calcium 8.8 8.5 - 10.1 mg/dL    Bilirubin Total 0.7 0.2 - 1.3 mg/dL    Albumin 3.5 3.4 - 5.0 g/dL    Protein Total 7.7 6.8 - 8.8 g/dL    Alkaline Phosphatase 80 40 - 150 U/L    ALT 36 0 - 50 U/L    AST 33 0 - 45 U/L   CBC with platelets differential   Result Value Ref Range    WBC 2.6 (L) 4.0 - 11.0 10e9/L    RBC Count 3.91 3.8 - 5.2 10e12/L    Hemoglobin 13.7 11.7 - 15.7 g/dL    Hematocrit 39.2 35.0 - 47.0 %     78 - 100 fl    MCH 35.0 (H) 26.5 - 33.0 pg    MCHC 34.9 31.5 - 36.5 g/dL    RDW 16.3 (H) 10.0 - 15.0 %    Platelet Count 125 (L) 150 - 450 10e9/L    Diff Method Automated Method     % Neutrophils 43.1 %    % Lymphocytes 44.0 %    % Monocytes 11.7 %    % Eosinophils 0.4 %    % Basophils 0.4 %    % Immature Granulocytes 0.4 %    Nucleated RBCs 0 0 /100    Absolute Neutrophil 1.1 (L) 1.6 - 8.3 10e9/L    Absolute Lymphocytes 1.1 0.8 - 5.3 10e9/L    Absolute Monocytes 0.3 0.0 - 1.3 10e9/L    Absolute Eosinophils 0.0 0.0 - 0.7 10e9/L    Absolute Basophils 0.0 0.0 - 0.2 10e9/L    Abs Immature Granulocytes 0.0 0 - 0.4 10e9/L    Absolute Nucleated RBC 0.0    UA with Microscopic   Result Value Ref Range    Color Urine Yellow     Appearance Urine Clear     Glucose Urine Negative NEG mg/dL    Bilirubin Urine Negative NEG    Ketones Urine Negative  NEG mg/dL    Specific Gravity Urine 1.011 1.003 - 1.035    Blood Urine Negative NEG    pH Urine 6.5 5.0 - 7.0 pH    Protein Albumin Urine Negative NEG mg/dL    Urobilinogen mg/dL Normal 0.0 - 2.0 mg/dL    Nitrite Urine Negative NEG    Leukocyte Esterase Urine Moderate (A) NEG    Source Midstream Urine     WBC Urine 10 (H) 0 - 2 /HPF    RBC Urine <1 0 - 2 /HPF    Squamous Epithelial /HPF Urine 2 (H) 0 - 1 /HPF    Renal Tub Epi 1 (A) NEG /HPF         Impression:  Olimpia is a 22-year-old female with recurrent metastatic synovial sarcoma, recent scans show progression of her pulmonary disease. She is on 400 mg Pazopanib daily and overall doing well.  Persistent peeling of her feet bilaterally, mild tenderness associated with this.  Labs are stable.    Plan:  1) Continue pazopanib at 50% dosing (400 mg daily), given skin changes will not increase at this time.  2) Reviewed labs with Olimpia.  3) Port flushed due at next visit  4) RTC in 2 weeks for repeat labs and chest CT.

## 2017-08-03 NOTE — TELEPHONE ENCOUNTER
THI contacted [facility] to ensure she had transportation to her appointment today, as she missed her appt on Monday due to lack of transportation despite SW utilizing [employer] cab voucher. Asma identified that she has not yet secured a ride to her appointment today and requested a cab voucher. SW agreed and set up ride with [employer] utilizing [facility] funds. Encouraged [facility] to notify SW in advance of appointment in hopes to utilize [employer] for her appointments. Social work will continue to assess needs and provide ongoing psychosocial support and access to resources.       BELKIS Salinas, [facility]  Pediatric Hem/Onc   Phone: [phone]  Pager: [phone]

## 2017-08-03 NOTE — NURSING NOTE
"Chief Complaint   Patient presents with     RECHECK     Patient is here today for Synovial sarcoma (H) follow up     /84 (BP Location: Right arm, Patient Position: Fowlers, Cuff Size: Adult Regular)  Pulse 64  Temp 97  F (36.1  C) (Oral)  Resp 18  Ht 1.61 m (5' 3.39\")  Wt 61.5 kg (135 lb 9.3 oz)  SpO2 100%  BMI 23.73 kg/m2  I spent 8 minutes reviewing medications, allergies, and obtaining vitals.    Radha Mason LPN  August 3, 2017    "

## 2017-08-14 NOTE — LETTER
8/14/2017      RE: Olimpia Childress  [address]   [place] [facility] [id number]       Pediatric Hematology/Oncology Clinic Note    History- Olimpia initially presented with a growth on her right labia in 2013 when she was living in [place]. 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 She discontinued further treatment b/c her physicians were unsure of the exact diagnosis and she felt uncomfortable proceeding. Olimpia subsequently immigrated to the  in August of 2015 and in October she first noticed a recurrence of the mass in the area of previous excision. She was in [place] at that time, seen by oncology and had a port placed but then moved to [place] where she was seen by Dr. Mckoen at [facility] in November. There she underwent an MRI that showed a solid and cystic subcutaneous mass in the right labia measuring 1.7 x 1.6 x 1cm with question of nodular extension vs a second nodule measuring 0.7 cm. There was no invasion into the vagina. On November 16th 2015, Olimpia had a biopsy of the mass by a gynecologist, Dr. Terry, at [facility]. Cytology was reviewed at [place] and read as a SMARCB1-deficient genital sarcoma, likely falling within the overall spectrum of malignant rhabdoid tumor. By immunohistochemistry, the cells shows a complete loss of SMARCb1. Wide spectrum cytokeratin, CD34, WT1, Desmin and CD99 were all negative. RT PCR for Wooten Sarcoma associated fusion transcripts were negative as well. Olimpia went on to have a PET/CT as well which showed multiple pulmonary lesions and biopsy confirmed a lesion to be metastatic disease. Olimpia was seen by Tomás White at [place] who reviewed the diagnosis and potential treatment plans with her, however she prefered to be treated here at [facility]. 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    Olimpia underwent left sided thoracotomy and resection of two pulmonary nodules on 7/20/16. Pathology revealed that one lesion was benign lymphoid tissue and the other was consistent with synovial sarcoma.  This was confirmed with FISH  With 91% of cell examined having a signal pattern indictivate of a rearrangement of the SS18 locus.  Olimpia has now completed 3 cycles of chemotherapy per [id number] and started her radiation on 9/13/16 and completed on 10/17/16.  She then went on to have a resection of her labial mass on 11/16/16 by Jannet Pastrana and Chikis with reconstruction, including skin flaps by Dr. Corona from plastics.  Pathology showed no residual tumor.  She had a f/u chest CT today on 12/5/16 that showed persistance of her pulmonary nodules as well as few additonal pulmonary nodules.  She saw Dr. Pierre who was in agreement with surgical resection, however Olimpia wanted to wait until March to have more time to recover from her last surgery. She underwent left sided thoracotomy on 3/8/17.    In May, Olimpia's follow up PET/CT demonstrated significant progression of her pulmonary metastatic disease, with an anterior mediastinal mass compression her right ventricle and potentially right outflow tract.  Subsequent echocardiogram did not show altered cardiac function, Dr Man felt radiation therapy was not in her best interest at this time but would consider if she developed cardiac dysfunction.  Olimpia was started on oral Pazopanib in the beginning of May for palliative therapy.  About 1 month into it we held her doses due to palmar plantar dysesthesia and stomatitis.  Her symptoms resolved quickly within 1 week and she restarted at 50% dosing on 6/19/17.    HPI:  Olimpia has been doing well overall since her last visit. She continues to have fatigue when walking any significant distance but  believes this has improved. No SOB at rest and denies any chest pain. Her feet continue to peel, but she believes they have been improving and denies any pain. No other rashes, peeling, or lesions. Her appetite has been stable and she continues to have a BM every 1-2 days with miralax. She also has periodic nausea that has been well controlled with zofran. She has no new complaints today, is excited to get her CT scan results.     History was obtained from [facility] via professional [employer] .     Allergies as of 08/14/2017 - [facility] as Reviewed 08/14/2017   Allergen Reaction Noted     Heparin flush Other (See Comments) 01/27/2016     Pork derived products  02/09/2016     Tegaderm transparent dressing (informational only) Other (See Comments) and Rash 04/20/2016       Current Outpatient Prescriptions   Medication Sig Dispense Refill     pazopanib (VOTRIENT) 200 MG tablet CHEMOTHERAPY Take 4 tablets (800 mg) by mouth daily 120 tablet 5     oxyCODONE (ROXICODONE) 5 MG IR tablet Take 1-2 tablets (5-10 mg) by mouth every 4 hours as needed for moderate to severe pain 20 tablet 0     polyethylene glycol (MIRALAX/GLYCOLAX) Packet Take 17 g by mouth daily 7 packet 1     senna-docusate (SENOKOT-S;PERICOLACE) 8.6-50 MG per tablet Take 2 tablets by mouth 2 times daily as needed for constipation 30 tablet 1     acetaminophen (TYLENOL) 325 MG tablet Take 2 tablets (650 mg) by mouth every 4 hours as needed for mild pain 100 tablet 1     ibuprofen (ADVIL/MOTRIN) 600 MG tablet Take 1 tablet (600 mg) by mouth every 6 hours as needed for moderate pain 60 tablet 1     diltiazem 2% in PLO cream, FV COMPOUNDED, 2% GEL To anal opening three times daily.  Use a pea-sized amount.  Store at room temperature. 60 g 0     ondansetron (ZOFRAN) 8 MG tablet Take 1 tablet (8 mg) by mouth every 6 hours as needed for nausea 30 tablet 6     diphenhydrAMINE (BENADRYL) 25 MG tablet Take 1-2 tablets (25-50 mg) by mouth every 6 hours as needed for other  (Nausea or vomiting) 60 tablet 3     lidocaine-prilocaine (EMLA) cream Apply to port 30 minutes prior to access 30 g 5       Past Medical History:   Diagnosis Date     Anemia 6/3/2016     Constipation      Extrarenal rhabdoid neoplasm (H)      Febrile neutropenia (H) 4/17/2016     History of blood transfusion      Latent tuberculosis      Lung disease      On antineoplastic chemotherapy      Sarcoma of vulva (H)        Social History     Social History     Marital status:      Spouse name: N/A     Number of children: N/A     Years of education: N/A     Occupational History     Not on file.     Social History Main Topics     Smoking status: Never Smoker     Smokeless tobacco: Never Used     Alcohol use No     Drug use: No     Sexual activity: No     Other Topics Concern     Not on file     Social History Narrative       Family History   Problem Relation Age of Onset     Other Cancer No family hx of      ROS  See HPI. Complete ROS otherwise negative.    Physical Exam   Wt Readings from Last 4 Encounters:   08/14/17 61.5 kg (135 lb 9.3 oz)   08/03/17 61.5 kg (135 lb 9.3 oz)   07/17/17 61 kg (134 lb 7.7 oz)   06/19/17 61.6 kg (135 lb 12.9 oz)       Temp:  [97.9  F (36.6  C)] 97.9  F (36.6  C)  Pulse:  [77] 77  Resp:  [18] 18  BP: (104)/(79) 104/79  SpO2:  [99 %] 99 %    GENERAL: Alert, interactive, excited about CT results.  SKIN: Multiple layers of skin peeling on left great toe, more mild on left heel.  No erythema or drainage.  Right great toe with mild peeling.  HEAD: Normocephalic, atraumatic     EYES: Normal lids, conjunctivae/cornea normal, mild scleral icterus. PERRL. EOMI.  EARS: Pinna normal b/l, no pain on palpation. External ears normal appearing.   NOSE: Clear, no discharge or congestion  MOUTH/THROAT: Oropharynx is clear. Tongue without sores. Normal dentition for age, no mucosal lesions.  NECK: Supple, full range of motion, no masses  LYMPH NODES: No cervical lymphadenopathy.  LUNGS: No increased  WOB.  Lungs clear to auscultation throughout; diminished in LLL.  No crackles or wheezes.  HEART: HRR. S1 and S2 are normal. No murmurs, rubs, gallops. The radial pulses are 2+ bilaterally. Cap refill <3 sec in upper extremities.  ABDOMEN: Normal bowel sounds. Soft, non-tender, non-distended, no masses or hepatosplenomegaly.  NEUROLOGIC: Grossly intact, no focal neurologic deficits.    :  Deferred today.      Labs:  Results for orders placed or performed in visit on 08/14/17 (from the past 24 hour(s))   CBC with platelets differential   Result Value Ref Range    WBC 3.1 (L) 4.0 - 11.0 10e9/L    RBC Count 3.65 (L) 3.8 - 5.2 10e12/L    Hemoglobin 13.2 11.7 - 15.7 g/dL    Hematocrit 37.0 35.0 - 47.0 %     (H) 78 - 100 fl    MCH 36.2 (H) 26.5 - 33.0 pg    MCHC 35.7 31.5 - 36.5 g/dL    RDW 15.4 (H) 10.0 - 15.0 %    Platelet Count 128 (L) 150 - 450 10e9/L    Diff Method Automated Method     % Neutrophils 44.7 %    % Lymphocytes 44.3 %    % Monocytes 10.4 %    % Eosinophils 0.3 %    % Basophils 0.0 %    % Immature Granulocytes 0.3 %    Nucleated RBCs 0 0 /100    Absolute Neutrophil 1.4 (L) 1.6 - 8.3 10e9/L    Absolute Lymphocytes 1.4 0.8 - 5.3 10e9/L    Absolute Monocytes 0.3 0.0 - 1.3 10e9/L    Absolute Eosinophils 0.0 0.0 - 0.7 10e9/L    Absolute Basophils 0.0 0.0 - 0.2 10e9/L    Abs Immature Granulocytes 0.0 0 - 0.4 10e9/L    Absolute Nucleated RBC 0.0      CT Scan 8/14/17  IMPRESSION:    Substantial decrease in metastatic disease within the chest. Small  bilateral pneumothoraces, left greater than right.       Impression:  Olimpia is a 22-year-old female with recurrent metastatic synovial sarcoma. CT scan today showing substantial decrease in metastatic disease within the chest with small bilateral pneumothoraces. No increased SOB or chest pain at this time. She is on 400 mg Pazopanib daily and overall doing well.  Persistent peeling of her feet bilaterally. Labs are stable.    Plan:  1) Continue pazopanib at 50%  dosing (400 mg daily), given decrease in metastatic disease and previous skin changes will not increase at this time.  2) Reviewed labs and CT scan results with Olimpia.  3) Advised Olimpia to call if she experiences any increased SOB, chest pain, palpitations or other concerns.   4) Return to clinic on Monday for CXR to evaluate small bilateral pneumothoraces. Then return again in 1 month from now for repeat labs and follow-up.    Patient was seen and discussed with Dr Yanez.   Arely Marcos MD  [facility] Pediatric Resident, [facility]    Physician Attestation   I, Coretta Yanez MD, saw this patient with the resident and agree with the resident s findings and plan of care as documented in the resident s note.      I personally reviewed vital signs, medications, labs and imaging.    Coretta Yanez MD  Date of Service (when I saw the patient): Aug 14, 2017      Olimpia came to clinic today and needed her port flushed and labs drawn. Cream applied upon arrival to clinic. Port accessed using sterile technique with 20g 1 in power needle. + bld return noted, labs drawn, port citrate locked and de accessed. patient tolerated procedure well.     Coretta Yanez MD

## 2017-08-14 NOTE — MR AVS SNAPSHOT
After Visit Summary   8/14/2017    Olimpia Childress    MRN: [id number]           Patient Information     Date Of Birth          1995        Visit Information        Provider Department      8/14/2017 1:30 PM Candy Laird Emily Gustava, MD Peds Hematology Oncology        Today's Diagnoses     Synovial sarcoma (H)              [facility], 9th floor  [address] [place] [address]  Phone: [phone]  Clinic Hours:   Monday-Friday:   7 am to 5:00 pm   closed weekends and major  holidays     If your fever is 100.5  or greater,   call the clinic during business hours.   After hours call [phone] and ask for the pediatric hematology / oncology physician to be paged for you.               Follow-ups after your visit        Follow-up notes from your care team     Return in about 7 days (around 8/21/2017) for chest x-ray and visit with Funmilayo Almanza.      Your next 10 appointments already scheduled     Aug 21, 2017  1:30 PM CDT   Return Visit with JULIO C Benedict CNP   Peds Hematology Oncology ([facility])    [facility]  [address] [address] [phone]              Who to contact     Please call your clinic at [phone] to:    Ask questions about your health    Make or cancel appointments    Discuss your medicines    Learn about your test results    Speak to your doctor   If you have compliments or concerns about an experience at your clinic, or if you wish to file a complaint, please contact [facility] Patient Relations at [phone] or email us at [email or web address]         Additional Information About Your Visit        MyChart Information     PingMe is an electronic gateway that provides easy, online access to your medical records. With PingMe, you can request a clinic appointment, read your test results, renew a prescription or  "communicate with your care team.     To sign up for [employer] visit the website at [email or web address]   You will be asked to enter the access code listed below, as well as some personal information. Please follow the directions to create your username and password.     Your access code is: [email or web address]  Expires: 2017  2:20 PM     Your access code will  in 90 days. If you need help or a new code, please contact your [facility] or call [phone] for assistance.        Care EveryWhere ID     This is your Care EveryWhere ID. This could be used by other organizations to access your [place] medical records  [id number]        Your Vitals Were     Pulse Temperature Respirations Height Pulse Oximetry BMI (Body Mass Index)    77 97.9  F (36.6  C) (Oral) 18 1.61 m (5' 3.39\") 99% 23.73 kg/m2       Blood Pressure from Last 3 Encounters:   17 104/79   17 111/84   17 109/78    Weight from Last 3 Encounters:   17 61.5 kg (135 lb 9.3 oz)   17 61.5 kg (135 lb 9.3 oz)   17 61 kg (134 lb 7.7 oz)              We Performed the Following     CBC with platelets differential        Primary Care Provider Office Phone # Fax #    Coretta Yanez [phone] [address] [facility] [address]        Equal Access to Services     [facility]: Hadii aad ku hadasho Soomaali, waaxda luqadaha, qaybta kaalmada adeegyada, wilner singleton. So [facility] [phone].    ATENCIÓN: Si habla español, tiene a ordoñez disposición servicios gratuitos de asistencia lingüística. Llame al [phone].    We comply with applicable federal civil rights laws and [place] laws. We do not discriminate on the basis of race, color, national origin, age, disability sex, sexual orientation or gender identity.            Thank you!     Thank you for choosing PEDS HEMATOLOGY ONCOLOGY  for your care. Our goal is always to provide " you with excellent care. Hearing back from our patients is one way we can continue to improve our services. Please take a few minutes to complete the written survey that you may receive in the mail after your visit with us. Thank you!             Your Updated Medication List - Protect others around you: Learn how to safely use, store and throw away your medicines at www.disposemymeds.org.          This list is accurate as of: 8/14/17 11:59 PM.  Always use your most recent med list.                   Brand Name Dispense Instructions for use Diagnosis    acetaminophen 325 MG tablet    TYLENOL    100 tablet    Take 2 tablets (650 mg) by mouth every 4 hours as needed for mild pain    Sarcoma of vulva (H)       diltiazem 2% in PLO cream (FV COMPOUNDED) 2% Gel     60 g    To anal opening three times daily.  Use a pea-sized amount.  Store at room temperature.    Anal fissure       diphenhydrAMINE 25 MG tablet    BENADRYL    60 tablet    Take 1-2 tablets (25-50 mg) by mouth every 6 hours as needed for other (Nausea or vomiting)    Chemotherapy induced nausea and vomiting       ibuprofen 600 MG tablet    ADVIL/MOTRIN    60 tablet    Take 1 tablet (600 mg) by mouth every 6 hours as needed for moderate pain    Sarcoma of vulva (H)       lidocaine-prilocaine cream    EMLA    30 g    Apply to port 30 minutes prior to access    Malignant tumor cells (H)       ondansetron 8 MG tablet    ZOFRAN    30 tablet    Take 1 tablet (8 mg) by mouth every 6 hours as needed for nausea    Encounter for antineoplastic chemotherapy       oxyCODONE 5 MG IR tablet    ROXICODONE    20 tablet    Take 1-2 tablets (5-10 mg) by mouth every 4 hours as needed for moderate to severe pain    Acute post-operative pain       pazopanib 200 MG tablet CHEMOTHERAPY    VOTRIENT    120 tablet    Take 4 tablets (800 mg) by mouth daily    Synovial sarcoma (H)       polyethylene glycol Packet    MIRALAX/GLYCOLAX    7 packet    Take 17 g by mouth daily    Acute  post-operative pain       senna-docusate 8.6-50 MG per tablet    SENOKOT-S;PERICOLACE    30 tablet    Take 2 tablets by mouth 2 times daily as needed for constipation    Acute post-operative pain

## 2017-08-14 NOTE — PROGRESS NOTES
Olimpia came to clinic today and needed her port flushed and labs drawn. Cream applied upon arrival to clinic. Port accessed using sterile technique with 20g 1 in power needle. + bld return noted, labs drawn, port citrate locked and de accessed. patient tolerated procedure well.

## 2017-08-14 NOTE — NURSING NOTE
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  "

## 2017-08-14 NOTE — MR AVS SNAPSHOT
After Visit Summary   8/14/2017    Olimpia Childress    MRN: [id number]           Patient Information     Date Of Birth          1995        Visit Information        Provider Department      8/14/2017 2:05 PM Hortencia Cuevas MSW Peds Hematology Oncology        Today's Diagnoses     Encounter for counseling    -  1          [facility], 9th floor  [address] [place] [address]  Phone: [phone]  Clinic Hours:   Monday-Friday:   7 am to 5:00 pm   closed weekends and major  holidays     If your fever is 100.5  or greater,   call the clinic during business hours.   After hours call [phone] and ask for the pediatric hematology / oncology physician to be paged for you.               Follow-ups after your visit        Your next 10 appointments already scheduled     Aug 21, 2017  1:30 PM CDT   Return Visit with JULIO C Benedict CNP Hematology Oncology ([facility])    [facility]  9th Floor  [address] [place] [address] [phone]              Who to contact     Please call your clinic at [phone] to:    Ask questions about your health    Make or cancel appointments    Discuss your medicines    Learn about your test results    Speak to your doctor   If you have compliments or concerns about an experience at your clinic, or if you wish to file a complaint, please contact [facility] Physicians Patient Relations at [phone] or email us at [email or web address]         Additional Information About Your Visit        MyChart Information     Sonetert is an electronic gateway that provides easy, online access to your medical records. With SaveUp, you can request a clinic appointment, read your test results, renew a prescription or communicate with your care team.     To sign up for Sonetert visit the website at [email or web address]   You will be asked to enter the  access code listed below, as well as some personal information. Please follow the directions to create your username and password.     Your access code is: [email or web address]  Expires: 2017  2:20 PM     Your access code will  in 90 days. If you need help or a new code, please contact your [facility] or call [phone] for assistance.        Care EveryWhere ID     This is your Care EveryWhere ID. This could be used by other organizations to access your [place] medical records  [id number]         Blood Pressure from Last 3 Encounters:   17 104/79   17 111/84   17 109/78    Weight from Last 3 Encounters:   17 61.5 kg (135 lb 9.3 oz)   17 61.5 kg (135 lb 9.3 oz)   17 61 kg (134 lb 7.7 oz)              Today, you had the following     No orders found for display       Primary Care Provider Office Phone # Fax #    Coretta Yanez [phone] [address] [facility] [address]        Equal Access to Services     [facility]: Hadii aad ku hadasho Sodolores, waaxda luqadaha, qaybta kaalmada adeegyada, wilner blue . So [facility] [phone].    ATENCIÓN: Si habla español, tiene a ordoñez disposición servicios gratuitos de asistencia lingüística. Llame al [phone].    We comply with applicable federal civil rights laws and [place] laws. We do not discriminate on the basis of race, color, national origin, age, disability sex, sexual orientation or gender identity.            Thank you!     Thank you for choosing PEDS HEMATOLOGY ONCOLOGY  for your care. Our goal is always to provide you with excellent care. Hearing back from our patients is one way we can continue to improve our services. Please take a few minutes to complete the written survey that you may receive in the mail after your visit with us. Thank you!             Your Updated Medication List - Protect others around you: Learn how  to safely use, store and throw away your medicines at www.disposemymeds.org.          This list is accurate as of: 8/14/17 11:59 PM.  Always use your most recent med list.                   Brand Name Dispense Instructions for use Diagnosis    acetaminophen 325 MG tablet    TYLENOL    100 tablet    Take 2 tablets (650 mg) by mouth every 4 hours as needed for mild pain    Sarcoma of vulva (H)       diltiazem 2% in PLO cream (FV COMPOUNDED) 2% Gel     60 g    To anal opening three times daily.  Use a pea-sized amount.  Store at room temperature.    Anal fissure       diphenhydrAMINE 25 MG tablet    BENADRYL    60 tablet    Take 1-2 tablets (25-50 mg) by mouth every 6 hours as needed for other (Nausea or vomiting)    Chemotherapy induced nausea and vomiting       ibuprofen 600 MG tablet    ADVIL/MOTRIN    60 tablet    Take 1 tablet (600 mg) by mouth every 6 hours as needed for moderate pain    Sarcoma of vulva (H)       lidocaine-prilocaine cream    EMLA    30 g    Apply to port 30 minutes prior to access    Malignant tumor cells (H)       ondansetron 8 MG tablet    ZOFRAN    30 tablet    Take 1 tablet (8 mg) by mouth every 6 hours as needed for nausea    Encounter for antineoplastic chemotherapy       oxyCODONE 5 MG IR tablet    ROXICODONE    20 tablet    Take 1-2 tablets (5-10 mg) by mouth every 4 hours as needed for moderate to severe pain    Acute post-operative pain       pazopanib 200 MG tablet CHEMOTHERAPY    VOTRIENT    120 tablet    Take 4 tablets (800 mg) by mouth daily    Synovial sarcoma (H)       polyethylene glycol Packet    MIRALAX/GLYCOLAX    7 packet    Take 17 g by mouth daily    Acute post-operative pain       senna-docusate 8.6-50 MG per tablet    SENOKOT-S;PERICOLACE    30 tablet    Take 2 tablets by mouth 2 times daily as needed for constipation    Acute post-operative pain

## 2017-08-14 NOTE — LETTER
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    SW met with Olimpia prior to her provider visit. Discussed transportation, as she has been utilizing cab rides through [employer]. Encouraged her to schedule transportation in advance through her insurance, as the [employer] are not meant for ongoing usage if patients have other transportation benefits. Provided contact info for [employer] and instructions on how to do this, though Olimpia has utilized them historically and is familiar with the process.     INTERVENTION:     Supportive check in. Provided education about utilizing insurance benefits to schedule rides in advance for appointments when able. Olimpia has utilized [employer] in the past and is familiar with the process.    Olimpia continues to decline assistance through [employer]  for her mother to obtain a visitors visa due to her wanting her mother to come and stay in the  permanently. THI provided following resources that may be able to assist with this: [employer] [place] ([phone]), [employer] ([phone]),  of [facility] ([phone]), and [employer] ([phone]).     ASSESSMENT:     Olimpia in good spirits today and is feeling well. Appreciative of  services. Continues to stay with her cousin who lives in [place]. Cousin has a 5 month old daughter whom Olimpia enjoys being around.     PLAN:     Social work will continue to assess needs and provide ongoing psychosocial support and access to resources.       BELKIS Salinas, [facility]  Pediatric Hem/Onc   Phone: [phone]  Pager: [phone]                  BELKIS Salinas

## 2017-08-14 NOTE — PROGRESS NOTES
[facility]  PEDIATRIC HEMATOLOGY/ONCOLOGY   SOCIAL WORK PROGRESS NOTE      DATA:     Olimpia is a 22-year-old female with recurrent metastatic synovial sarcoma.    SW met with Olimpia prior to her provider visit. Discussed transportation, as she has been utilizing cab rides through [employer]. Encouraged her to schedule transportation in advance through her insurance, as the [employer] are not meant for ongoing usage if patients have other transportation benefits. Provided contact info for [employer] and instructions on how to do this, though Olimpia has utilized them historically and is familiar with the process.     INTERVENTION:     Supportive check in. Provided education about utilizing insurance benefits to schedule rides in advance for appointments when able. Olimpia has utilized [employer] in the past and is familiar with the process.    Olimpia continues to decline assistance through [employer]  for her mother to obtain a visitors visa due to her wanting her mother to come and stay in the  permanently. THI provided following resources that may be able to assist with this: [employer] [place] ([phone]), [employer] ([phone]),  of [facility] ([phone]), and [employer] ([phone]).     ASSESSMENT:     Olimpia in good spirits today and is feeling well. Appreciative of  services. Continues to stay with her cousin who lives in [place]. Cousin has a 5 month old daughter whom Olimpia enjoys being around.     PLAN:     Social work will continue to assess needs and provide ongoing psychosocial support and access to resources.       BELKIS Salinas, [facility]  Pediatric Hem/Onc   Phone: [phone]  Pager: [phone]

## 2017-08-14 NOTE — PROGRESS NOTES
Pediatric Hematology/Oncology Clinic Note    History- Olimpia initially presented with a growth on her right labia in 2013 when she was living in [place]. 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 She discontinued further treatment b/c her physicians were unsure of the exact diagnosis and she felt uncomfortable proceeding. Olimpia subsequently immigrated to the  in August of 2015 and in October she first noticed a recurrence of the mass in the area of previous excision. She was in [place] at that time, seen by oncology and had a port placed but then moved to [place] where she was seen by Dr. Mckeon at [facility] in November. There she underwent an MRI that showed a solid and cystic subcutaneous mass in the right labia measuring 1.7 x 1.6 x 1cm with question of nodular extension vs a second nodule measuring 0.7 cm. There was no invasion into the vagina. On November 16th 2015, Olimpia had a biopsy of the mass by a gynecologist, Dr. Terry, at [facility]. Cytology was reviewed at [place] and read as a SMARCB1-deficient genital sarcoma, likely falling within the overall spectrum of malignant rhabdoid tumor. By immunohistochemistry, the cells shows a complete loss of SMARCb1. Wide spectrum cytokeratin, CD34, WT1, Desmin and CD99 were all negative. RT PCR for Wooten Sarcoma associated fusion transcripts were negative as well. Olimpia went on to have a PET/CT as well which showed multiple pulmonary lesions and biopsy confirmed a lesion to be metastatic disease. Olimpia was seen by Tomás White at [facility] who reviewed the diagnosis and potential treatment plans with her, however she prefered to be treated here at [facility]. 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    Olimpia underwent left sided thoracotomy and resection of two pulmonary nodules on 7/20/16. Pathology revealed that one lesion was benign lymphoid tissue and the other was consistent with synovial sarcoma.  This was confirmed with FISH  With 91% of cell examined having a signal pattern indictivate of a rearrangement of the SS18 locus.  Olimpia has now completed 3 cycles of chemotherapy per [id number] and started her radiation on 9/13/16 and completed on 10/17/16.  She then went on to have a resection of her labial mass on 11/16/16 by Jannet Pastrana and Chikis with reconstruction, including skin flaps by Dr. Corona from plastics.  Pathology showed no residual tumor.  She had a f/u chest CT today on 12/5/16 that showed persistance of her pulmonary nodules as well as few additonal pulmonary nodules.  She saw Dr. Pierre who was in agreement with surgical resection, however Olimpia wanted to wait until March to have more time to recover from her last surgery. She underwent left sided thoracotomy on 3/8/17.    In May, Olimpia's follow up PET/CT demonstrated significant progression of her pulmonary metastatic disease, with an anterior mediastinal mass compression her right ventricle and potentially right outflow tract.  Subsequent echocardiogram did not show altered cardiac function, Dr Man felt radiation therapy was not in her best interest at this time but would consider if she developed cardiac dysfunction.  Olimpia was started on oral Pazopanib in the beginning of May for palliative therapy.  About 1 month into it we held her doses due to palmar plantar dysesthesia and stomatitis.  Her symptoms resolved quickly within 1 week and she restarted at 50% dosing on 6/19/17.    HPI:  Olimpia has been doing well overall since her last visit. She continues to have fatigue when walking any significant distance but believes this has improved. No SOB at rest and denies any chest pain. Her feet continue to  peel, but she believes they have been improving and denies any pain. No other rashes, peeling, or lesions. Her appetite has been stable and she continues to have a BM every 1-2 days with miralax. She also has periodic nausea that has been well controlled with zofran. She has no new complaints today, is excited to get her CT scan results.     History was obtained from [facility] via professional [employer] .     Allergies as of 08/14/2017 - [place] as Reviewed 08/14/2017   Allergen Reaction Noted     Heparin flush Other (See Comments) 01/27/2016     Pork derived products  02/09/2016     Tegaderm transparent dressing (informational only) Other (See Comments) and Rash 04/20/2016       Current Outpatient Prescriptions   Medication Sig Dispense Refill     pazopanib (VOTRIENT) 200 MG tablet CHEMOTHERAPY Take 4 tablets (800 mg) by mouth daily 120 tablet 5     oxyCODONE (ROXICODONE) 5 MG IR tablet Take 1-2 tablets (5-10 mg) by mouth every 4 hours as needed for moderate to severe pain 20 tablet 0     polyethylene glycol (MIRALAX/GLYCOLAX) Packet Take 17 g by mouth daily 7 packet 1     senna-docusate (SENOKOT-S;PERICOLACE) 8.6-50 MG per tablet Take 2 tablets by mouth 2 times daily as needed for constipation 30 tablet 1     acetaminophen (TYLENOL) 325 MG tablet Take 2 tablets (650 mg) by mouth every 4 hours as needed for mild pain 100 tablet 1     ibuprofen (ADVIL/MOTRIN) 600 MG tablet Take 1 tablet (600 mg) by mouth every 6 hours as needed for moderate pain 60 tablet 1     diltiazem 2% in PLO cream, FV COMPOUNDED, 2% GEL To anal opening three times daily.  Use a pea-sized amount.  Store at room temperature. 60 g 0     ondansetron (ZOFRAN) 8 MG tablet Take 1 tablet (8 mg) by mouth every 6 hours as needed for nausea 30 tablet 6     diphenhydrAMINE (BENADRYL) 25 MG tablet Take 1-2 tablets (25-50 mg) by mouth every 6 hours as needed for other (Nausea or vomiting) 60 tablet 3     lidocaine-prilocaine (EMLA) cream Apply to port 30  minutes prior to access 30 g 5       Past Medical History:   Diagnosis Date     Anemia 6/3/2016     Constipation      Extrarenal rhabdoid neoplasm (H)      Febrile neutropenia (H) 4/17/2016     History of blood transfusion      Latent tuberculosis      Lung disease      On antineoplastic chemotherapy      Sarcoma of vulva (H)        Social History     Social History     Marital status:      Spouse name: N/A     Number of children: N/A     Years of education: N/A     Occupational History     Not on file.     Social History Main Topics     Smoking status: Never Smoker     Smokeless tobacco: Never Used     Alcohol use No     Drug use: No     Sexual activity: No     Other Topics Concern     Not on file     Social History Narrative       Family History   Problem Relation Age of Onset     Other Cancer No family hx of      ROS  See HPI. Complete ROS otherwise negative.    Physical Exam   Wt Readings from Last 4 Encounters:   08/14/17 61.5 kg (135 lb 9.3 oz)   08/03/17 61.5 kg (135 lb 9.3 oz)   07/17/17 61 kg (134 lb 7.7 oz)   06/19/17 61.6 kg (135 lb 12.9 oz)       Temp:  [97.9  F (36.6  C)] 97.9  F (36.6  C)  Pulse:  [77] 77  Resp:  [18] 18  BP: (104)/(79) 104/79  SpO2:  [99 %] 99 %    GENERAL: Alert, interactive, excited about CT results.  SKIN: Multiple layers of skin peeling on left great toe, more mild on left heel.  No erythema or drainage.  Right great toe with mild peeling.  HEAD: Normocephalic, atraumatic     EYES: Normal lids, conjunctivae/cornea normal, mild scleral icterus. PERRL. EOMI.  EARS: Pinna normal b/l, no pain on palpation. External ears normal appearing.   NOSE: Clear, no discharge or congestion  MOUTH/THROAT: Oropharynx is clear. Tongue without sores. Normal dentition for age, no mucosal lesions.  NECK: Supple, full range of motion, no masses  LYMPH NODES: No cervical lymphadenopathy.  LUNGS: No increased WOB.  Lungs clear to auscultation throughout; diminished in LLL.  No crackles or  wheezes.  HEART: HRR. S1 and S2 are normal. No murmurs, rubs, gallops. The radial pulses are 2+ bilaterally. Cap refill <3 sec in upper extremities.  ABDOMEN: Normal bowel sounds. Soft, non-tender, non-distended, no masses or hepatosplenomegaly.  NEUROLOGIC: Grossly intact, no focal neurologic deficits.    :  Deferred today.      Labs:  Results for orders placed or performed in visit on 08/14/17 (from the past 24 hour(s))   CBC with platelets differential   Result Value Ref Range    WBC 3.1 (L) 4.0 - 11.0 10e9/L    RBC Count 3.65 (L) 3.8 - 5.2 10e12/L    Hemoglobin 13.2 11.7 - 15.7 g/dL    Hematocrit 37.0 35.0 - 47.0 %     (H) 78 - 100 fl    MCH 36.2 (H) 26.5 - 33.0 pg    MCHC 35.7 31.5 - 36.5 g/dL    RDW 15.4 (H) 10.0 - 15.0 %    Platelet Count 128 (L) 150 - 450 10e9/L    Diff Method Automated Method     % Neutrophils 44.7 %    % Lymphocytes 44.3 %    % Monocytes 10.4 %    % Eosinophils 0.3 %    % Basophils 0.0 %    % Immature Granulocytes 0.3 %    Nucleated RBCs 0 0 /100    Absolute Neutrophil 1.4 (L) 1.6 - 8.3 10e9/L    Absolute Lymphocytes 1.4 0.8 - 5.3 10e9/L    Absolute Monocytes 0.3 0.0 - 1.3 10e9/L    Absolute Eosinophils 0.0 0.0 - 0.7 10e9/L    Absolute Basophils 0.0 0.0 - 0.2 10e9/L    Abs Immature Granulocytes 0.0 0 - 0.4 10e9/L    Absolute Nucleated RBC 0.0      CT Scan 8/14/17  IMPRESSION:    Substantial decrease in metastatic disease within the chest. Small  bilateral pneumothoraces, left greater than right.       Impression:  Olimpia is a 22-year-old female with recurrent metastatic synovial sarcoma. CT scan today showing substantial decrease in metastatic disease within the chest with small bilateral pneumothoraces. No increased SOB or chest pain at this time. She is on 400 mg Pazopanib daily and overall doing well.  Persistent peeling of her feet bilaterally. Labs are stable.    Plan:  1) Continue pazopanib at 50% dosing (400 mg daily), given decrease in metastatic disease and previous skin  changes will not increase at this time.  2) Reviewed labs and CT scan results with Asma.  3) Advised Asma to call if she experiences any increased SOB, chest pain, palpitations or other concerns.   4) Return to clinic on Monday for CXR to evaluate small bilateral pneumothoraces. Then return again in 1 month from now for repeat labs and follow-up.    Patient was seen and discussed with Dr Yanez.   Arely Marcos MD  [facility] Pediatric Resident, PL1    Physician Attestation   I, Coretta Yanez MD, saw this patient with the resident and agree with the resident s findings and plan of care as documented in the resident s note.      I personally reviewed vital signs, medications, labs and imaging.    Coretta Yanez MD  Date of Service (when I saw the patient): Aug 14, 2017

## 2017-08-21 NOTE — NURSING NOTE
"Chief Complaint   Patient presents with     RECHECK     Patient is here today for Synovial sarcoma (H) follow up     /73 (BP Location: Right arm, Patient Position: Fowlers, Cuff Size: Adult Regular)  Pulse 62  Temp 97.9  F (36.6  C) (Oral)  Resp 12  Ht 1.619 m (5' 3.74\")  Wt 61.2 kg (134 lb 14.7 oz)  SpO2 100%  BMI 23.35 kg/m2  I spent 9 minutes reviewing medications, allergies, and obtaining vitals.    Radha Mason LPN  August 21, 2017    "

## 2017-08-21 NOTE — MR AVS SNAPSHOT
After Visit Summary   8/21/2017    Olimpia Childress    MRN: [id number]           Patient Information     Date Of Birth          1995        Visit Information        Provider Department      8/21/2017 1:30 PM Yue Nichols Jill L, APRN CNP Peds Hematology Oncology        Today's Diagnoses     Bilateral pneumothoraces    -  1    Dermatitis              [facility], 9th floor  [address] [place] [address]  Phone: [phone]  Clinic Hours:   Monday-Friday:   7 am to 5:00 pm   closed weekends and major  holidays     If your fever is 100.5  or greater,   call the clinic during business hours.   After hours call [phone] and ask for the pediatric hematology / oncology physician to be paged for you.               Follow-ups after your visit        Follow-up notes from your care team     Return in about 5 weeks (around 9/22/2017).      Your next 10 appointments already scheduled     Sep 22, 2017  1:45 PM CDT   Return Visit with JULIO C Pittman CNP   Peds Hematology Oncology ([facility])    [facility]  [address] [address] [phone]              Who to contact     Please call your clinic at [phone] to:    Ask questions about your health    Make or cancel appointments    Discuss your medicines    Learn about your test results    Speak to your doctor   If you have compliments or concerns about an experience at your clinic, or if you wish to file a complaint, please contact [facility] Patient Relations at [phone] or email us at [email or web address]         Additional Information About Your Visit        MyChart Information     Tapjoy is an electronic gateway that provides easy, online access to your medical records. With Tapjoy, you can request a clinic appointment, read your test results, renew a prescription or communicate  "with your care team.     To sign up for [employer]art visit the website at [email or web address]   You will be asked to enter the access code listed below, as well as some personal information. Please follow the directions to create your username and password.     Your access code is: [email or web address]  Expires: 2017  2:20 PM     Your access code will  in 90 days. If you need help or a new code, please contact your [facility] or call [phone] for assistance.        Care EveryWhere ID     This is your Care EveryWhere ID. This could be used by other organizations to access your [facility] medical records  [id number]        Your Vitals Were     Pulse Temperature Respirations Height Pulse Oximetry BMI (Body Mass Index)    62 97.9  F (36.6  C) (Oral) 12 1.619 m (5' 3.74\") 100% 23.35 kg/m2       Blood Pressure from Last 3 Encounters:   17 103/73   17 104/79   17 111/84    Weight from Last 3 Encounters:   17 61.2 kg (134 lb 14.7 oz)   17 61.5 kg (135 lb 9.3 oz)   17 61.5 kg (135 lb 9.3 oz)              Today, you had the following     No orders found for display         Today's Medication Changes          These changes are accurate as of: 17  3:00 PM.  If you have any questions, ask your nurse or doctor.               Start taking these medicines.        Dose/Directions    triamcinolone 0.1 % ointment   Commonly known as:  KENALOG   Used for:  Dermatitis   Started by:  Funmilayo Almanza APRN CNP        Apply sparingly to affected area two to three times daily for 7 days.   Quantity:  30 g   Refills:  0            Where to get your medicines      These medications were sent to [facility] [address] [facility]  [facility] [address] [facility] [address]   [facility] [address], [facility] [address]     Phone:  [phone]     triamcinolone 0.1 % ointment                Primary Care Provider Office Phone # Fax #    Coretta Yanez, " [phone] [address] [facility] [address]        Equal Access to Services     RONIT BAEZ : Hadii aad ku hadbertjorge Sharonali, warichardda adelamohanha, hsubhamta kanancyda tressasusannah, wilner stewin hayaamaggie bustillosbetsy garnett su singleton. [facility] [phone].    ATENCIÓN: Si habla español, tiene a ordoñez disposición servicios gratuitos de asistencia lingüística. Darius al [phone].    We comply with applicable federal civil rights laws and [place] laws. We do not discriminate on the basis of race, color, national origin, age, disability sex, sexual orientation or gender identity.            Thank you!     Thank you for choosing PEDS HEMATOLOGY ONCOLOGY  for your care. Our goal is always to provide you with excellent care. Hearing back from our patients is one way we can continue to improve our services. Please take a few minutes to complete the written survey that you may receive in the mail after your visit with us. Thank you!             Your Updated Medication List - Protect others around you: Learn how to safely use, store and throw away your medicines at www.disposemymeds.org.          This list is accurate as of: 8/21/17  3:00 PM.  Always use your most recent med list.                   Brand Name Dispense Instructions for use Diagnosis    acetaminophen 325 MG tablet    TYLENOL    100 tablet    Take 2 tablets (650 mg) by mouth every 4 hours as needed for mild pain    Sarcoma of vulva (H)       diltiazem 2% in PLO cream (FV COMPOUNDED) 2% Gel     60 g    To anal opening three times daily.  Use a pea-sized amount.  Store at room temperature.    Anal fissure       diphenhydrAMINE 25 MG tablet    BENADRYL    60 tablet    Take 1-2 tablets (25-50 mg) by mouth every 6 hours as needed for other (Nausea or vomiting)    Chemotherapy induced nausea and vomiting       ibuprofen 600 MG tablet    ADVIL/MOTRIN    60 tablet    Take 1 tablet (600 mg) by mouth every 6 hours as needed for moderate pain    Sarcoma of  vulva (H)       lidocaine-prilocaine cream    EMLA    30 g    Apply to port 30 minutes prior to access    Malignant tumor cells (H)       ondansetron 8 MG tablet    ZOFRAN    30 tablet    Take 1 tablet (8 mg) by mouth every 6 hours as needed for nausea    Encounter for antineoplastic chemotherapy       oxyCODONE 5 MG IR tablet    ROXICODONE    20 tablet    Take 1-2 tablets (5-10 mg) by mouth every 4 hours as needed for moderate to severe pain    Acute post-operative pain       pazopanib 200 MG tablet CHEMOTHERAPY    VOTRIENT    120 tablet    Take 4 tablets (800 mg) by mouth daily    Synovial sarcoma (H)       polyethylene glycol Packet    MIRALAX/GLYCOLAX    7 packet    Take 17 g by mouth daily    Acute post-operative pain       senna-docusate 8.6-50 MG per tablet    SENOKOT-S;PERICOLACE    30 tablet    Take 2 tablets by mouth 2 times daily as needed for constipation    Acute post-operative pain       triamcinolone 0.1 % ointment    KENALOG    30 g    Apply sparingly to affected area two to three times daily for 7 days.    Dermatitis

## 2017-08-21 NOTE — LETTER
8/21/2017      RE: Olimpia Childress  [address]   [facility] [id number]       Pediatric Hematology/Oncology Clinic Note    History- Olimpia initially presented with a growth on her right labia in 2013 when she was living in [place]. 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 She discontinued further treatment b/c her physicians were unsure of the exact diagnosis and she felt uncomfortable proceeding. Olimpia subsequently immigrated to the  in August of 2015 and in October she first noticed a recurrence of the mass in the area of previous excision. She was in [place] at that time, seen by oncology and had a port placed but then moved to [place] where she was seen by Dr. Mckeon at [facility] in November. There she underwent an MRI that showed a solid and cystic subcutaneous mass in the right labia measuring 1.7 x 1.6 x 1cm with question of nodular extension vs a second nodule measuring 0.7 cm. There was no invasion into the vagina. On November 16th 2015, Olimpia had a biopsy of the mass by a gynecologist, Dr. Terry, at [facility]. Cytology was reviewed at [place] and read as a SMARCB1-deficient genital sarcoma, likely falling within the overall spectrum of malignant rhabdoid tumor. By immunohistochemistry, the cells shows a complete loss of SMARCb1. Wide spectrum cytokeratin, CD34, WT1, Desmin and CD99 were all negative. RT PCR for Wooten Sarcoma associated fusion transcripts were negative as well. Olimpia went on to have a PET/CT as well which showed multiple pulmonary lesions and biopsy confirmed a lesion to be metastatic disease. Olimpia was seen by Tomás White at [place] who reviewed the diagnosis and potential treatment plans with her, however she prefered to be treated here at [facility]. 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    Olimpia underwent left sided thoracotomy and resection of two pulmonary nodules on 7/20/16. Pathology revealed that one lesion was benign lymphoid tissue and the other was consistent with synovial sarcoma.  This was confirmed with FISH  With 91% of cell examined having a signal pattern indictivate of a rearrangement of the SS18 locus.  Olimpia has now completed 3 cycles of chemotherapy per [id number] and started her radiation on 9/13/16 and completed on 10/17/16.  She then went on to have a resection of her labial mass on 11/16/16 by Jannet Pastrana and Chikis with reconstruction, including skin flaps by Dr. Corona from plastics.  Pathology showed no residual tumor.  She had a f/u chest CT today on 12/5/16 that showed persistance of her pulmonary nodules as well as few additonal pulmonary nodules.  She saw Dr. Pierre who was in agreement with surgical resection, however Olimpia wanted to wait until March to have more time to recover from her last surgery. She underwent left sided thoracotomy on 3/8/17.    In May, Olimpia's follow up PET/CT demonstrated significant progression of her pulmonary metastatic disease, with an anterior mediastinal mass compression her right ventricle and potentially right outflow tract.  Subsequent echocardiogram did not show altered cardiac function, Dr Man felt radiation therapy was not in her best interest at this time but would consider if she developed cardiac dysfunction.  Olimpia was started on oral Pazopanib in the beginning of May for palliative therapy.  About 1 month into it we held her doses due to palmar plantar dysesthesia and stomatitis.  Her symptoms resolved quickly within 1 week and she restarted at 50% dosing on 6/19/17.    HPI:  Olimpia is here today to follow up s/p her bilateral small pneumothoraces seen on Chest CT last week.   Olimpia has been doing well overall  since her last visit.   She has noticed a little rash to her right elbow with some itching but no other new areas.  No changes in energy level.  No SOB at rest and denies any chest pain. Her feet continue to peel, but she believes they have been improving and denies any pain. . Her appetite has been stable and she continues to have a BM every 1-2 days with miralax. She also has periodic nausea that has been well controlled with zofran.     History was obtained from [facility] via professional [facility] .     Allergies as of 08/21/2017 - Sheldon as Reviewed 08/21/2017   Allergen Reaction Noted     Heparin flush Other (See Comments) 01/27/2016     Pork derived products  02/09/2016     Tegaderm transparent dressing (informational only) Other (See Comments) and Rash 04/20/2016       Current Outpatient Prescriptions   Medication Sig Dispense Refill     triamcinolone (KENALOG) 0.1 % ointment Apply sparingly to affected area two to three times daily for 7 days. 30 g 0     pazopanib (VOTRIENT) 200 MG tablet CHEMOTHERAPY Take 4 tablets (800 mg) by mouth daily 120 tablet 5     oxyCODONE (ROXICODONE) 5 MG IR tablet Take 1-2 tablets (5-10 mg) by mouth every 4 hours as needed for moderate to severe pain 20 tablet 0     polyethylene glycol (MIRALAX/GLYCOLAX) Packet Take 17 g by mouth daily 7 packet 1     senna-docusate (SENOKOT-S;PERICOLACE) 8.6-50 MG per tablet Take 2 tablets by mouth 2 times daily as needed for constipation 30 tablet 1     acetaminophen (TYLENOL) 325 MG tablet Take 2 tablets (650 mg) by mouth every 4 hours as needed for mild pain 100 tablet 1     ibuprofen (ADVIL/MOTRIN) 600 MG tablet Take 1 tablet (600 mg) by mouth every 6 hours as needed for moderate pain 60 tablet 1     diltiazem 2% in PLO cream, FV COMPOUNDED, 2% GEL To anal opening three times daily.  Use a pea-sized amount.  Store at room temperature. 60 g 0     ondansetron (ZOFRAN) 8 MG tablet Take 1 tablet (8 mg) by mouth every 6 hours as needed for  nausea 30 tablet 6     diphenhydrAMINE (BENADRYL) 25 MG tablet Take 1-2 tablets (25-50 mg) by mouth every 6 hours as needed for other (Nausea or vomiting) 60 tablet 3     lidocaine-prilocaine (EMLA) cream Apply to port 30 minutes prior to access 30 g 5       Past Medical History:   Diagnosis Date     Anemia 6/3/2016     Constipation      Extrarenal rhabdoid neoplasm (H)      Febrile neutropenia (H) 4/17/2016     History of blood transfusion      Latent tuberculosis      Lung disease      On antineoplastic chemotherapy      Sarcoma of vulva (H)        Social History     Social History     Marital status:      Spouse name: N/A     Number of children: N/A     Years of education: N/A     Occupational History     Not on file.     Social History Main Topics     Smoking status: Never Smoker     Smokeless tobacco: Never Used     Alcohol use No     Drug use: No     Sexual activity: No     Other Topics Concern     Not on file     Social History Narrative       Family History   Problem Relation Age of Onset     Other Cancer No family hx of      ROS  See HPI. Complete ROS otherwise negative.    Physical Exam   Wt Readings from Last 4 Encounters:   08/21/17 61.2 kg (134 lb 14.7 oz)   08/14/17 61.5 kg (135 lb 9.3 oz)   08/03/17 61.5 kg (135 lb 9.3 oz)   07/17/17 61 kg (134 lb 7.7 oz)       Temp:  [97.9  F (36.6  C)] 97.9  F (36.6  C)  Pulse:  [62] 62  Resp:  [12] 12  BP: (103)/(73) 103/73  SpO2:  [100 %] 100 %    GENERAL: Alert, interactive, excited about CT results.  SKIN: Multiple layers of skin peeling on left great toe, more mild on left heel.  No erythema or drainage.  Right great toe with mild peeling.  Mild erythema and nummular area of excoriation on right elbow.  HEAD: Normocephalic, atraumatic     EYES: Normal lids, conjunctivae/cornea normal, mild scleral icterus. PERRL. EOMI.  EARS: Pinna normal b/l, no pain on palpation. External ears normal appearing.   NOSE: Clear, no discharge or congestion  MOUTH/THROAT:  Oropharynx is clear. Tongue without sores. Normal dentition for age, no mucosal lesions.  NECK: Supple, full range of motion, no masses  LYMPH NODES: No cervical lymphadenopathy.  LUNGS: No increased WOB.  Lungs clear to auscultation throughout.  No crackles or wheezes.  HEART: HRR. S1 and S2 are normal. No murmurs, rubs, gallops. The radial pulses are 2+ bilaterally. Cap refill <3 sec in upper extremities.  ABDOMEN: Normal bowel sounds. Soft, non-tender, non-distended, no masses or hepatosplenomegaly.  NEUROLOGIC: Grossly intact, no focal neurologic deficits.    :  Deferred today.      Labs:  Results for orders placed or performed during the hospital encounter of 08/21/17 (from the past 24 hour(s))   X-ray Chest 2 vws*    Narrative    XR CHEST 2 VW  8/21/2017 1:36 PM      HISTORY: f/u pneumothorax diagnosed 3/23, Malignant neoplasm of  connective and soft tissue, unspecified    COMPARISON: CT 8/14/2017    FINDINGS: PA and lateral views of the chest. Port-A-Cath tip  projecting over the right atrium is stable in position. There are  persistent tiny biapical pneumothoraces. Pleural thickening and fluid  at the left lung base is unchanged from comparison examinations.  Linear densities at the left lung base are also unchanged. Metastatic  nodules are better visualized on the comparison CT.      Impression    IMPRESSION: Persistent tiny biapical pneumothoraces.    YUSRA GAMEZ MD     CT Scan 8/14/17  IMPRESSION:    Substantial decrease in metastatic disease within the chest. Small  bilateral pneumothoraces, left greater than right.       Impression:  Olimpia is a 22-year-old female with recurrent metastatic synovial sarcoma. CT scan last week showing substantial decrease in metastatic disease within the chest and CXR today with persistent tiny bilateral pneumothoraces. No increased SOB or chest pain at this time. She is on 400 mg Pazopanib daily and overall doing well.  Persistent peeling of her feet bilaterally.  Small  new rash (dermatitis) to right elbow.      Plan:  1) Continue pazopanib at 50% dosing (400 mg daily), given decrease in metastatic disease and previous skin changes will not increase at this time.  2) Reviewed labs and CXR results with Asma.  3) Advised Asma to call if she experiences any increased SOB, chest pain, palpitations or other concerns.   4) Return to clinic  in 1 month from now for repeat labs and follow-up.  Will be seen sooner if she has any above symptoms or concerns.    5)  Rash to right elbow:  Triamcinolone 0.1% ointment to right elbow 2-3 times a day for 7 days.    Funmilayo Almanza MSN, APRN, CPNP-AC, CPON  Department of Pediatrics  Division of Hematology/Oncology

## 2017-08-21 NOTE — PROGRESS NOTES
Pediatric Hematology/Oncology Clinic Note    History- Olimpia initially presented with a growth on her right labia in 2013 when she was living in [place]. 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 She discontinued further treatment b/c her physicians were unsure of the exact diagnosis and she felt uncomfortable proceeding. Olimpia subsequently immigrated to the  in August of 2015 and in October she first noticed a recurrence of the mass in the area of previous excision. She was in [place] at that time, seen by oncology and had a port placed but then moved to [place] where she was seen by Dr. Mckeon at [facility] in November. There she underwent an MRI that showed a solid and cystic subcutaneous mass in the right labia measuring 1.7 x 1.6 x 1cm with question of nodular extension vs a second nodule measuring 0.7 cm. There was no invasion into the vagina. On November 16th 2015, Olimpia had a biopsy of the mass by a gynecologist, Dr. Terry, at [facility]. Cytology was reviewed at [place] and read as a SMARCB1-deficient genital sarcoma, likely falling within the overall spectrum of malignant rhabdoid tumor. By immunohistochemistry, the cells shows a complete loss of SMARCb1. Wide spectrum cytokeratin, CD34, WT1, Desmin and CD99 were all negative. RT PCR for Wooten Sarcoma associated fusion transcripts were negative as well. Olimpia went on to have a PET/CT as well which showed multiple pulmonary lesions and biopsy confirmed a lesion to be metastatic disease. Olimpia was seen by Tomás White at [place] who reviewed the diagnosis and potential treatment plans with her, however she prefered to be treated here at [facility]. 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    Olimpia underwent left sided thoracotomy and resection of two pulmonary nodules on 7/20/16. Pathology revealed that one lesion was benign lymphoid tissue and the other was consistent with synovial sarcoma.  This was confirmed with FISH  With 91% of cell examined having a signal pattern indictivate of a rearrangement of the SS18 locus.  Olimpia has now completed 3 cycles of chemotherapy per [id number] and started her radiation on 9/13/16 and completed on 10/17/16.  She then went on to have a resection of her labial mass on 11/16/16 by Jannet Pastrana and Chikis with reconstruction, including skin flaps by Dr. Corona from plastics.  Pathology showed no residual tumor.  She had a f/u chest CT today on 12/5/16 that showed persistance of her pulmonary nodules as well as few additonal pulmonary nodules.  She saw Dr. Pierre who was in agreement with surgical resection, however Olimpia wanted to wait until March to have more time to recover from her last surgery. She underwent left sided thoracotomy on 3/8/17.    In May, Olimpia's follow up PET/CT demonstrated significant progression of her pulmonary metastatic disease, with an anterior mediastinal mass compression her right ventricle and potentially right outflow tract.  Subsequent echocardiogram did not show altered cardiac function, Dr Man felt radiation therapy was not in her best interest at this time but would consider if she developed cardiac dysfunction.  Olimpia was started on oral Pazopanib in the beginning of May for palliative therapy.  About 1 month into it we held her doses due to palmar plantar dysesthesia and stomatitis.  Her symptoms resolved quickly within 1 week and she restarted at 50% dosing on 6/19/17.    HPI:  Olimpia is here today to follow up s/p her bilateral small pneumothoraces seen on Chest CT last week.   Olimpia has been doing well overall since her last visit.   She has noticed a little rash to her right elbow with some  itching but no other new areas.  No changes in energy level.  No SOB at rest and denies any chest pain. Her feet continue to peel, but she believes they have been improving and denies any pain. . Her appetite has been stable and she continues to have a BM every 1-2 days with miralax. She also has periodic nausea that has been well controlled with zofran.     History was obtained from [facility] via professional [employer] .     Allergies as of 08/21/2017 - Sheldon as Reviewed 08/21/2017   Allergen Reaction Noted     Heparin flush Other (See Comments) 01/27/2016     Pork derived products  02/09/2016     Tegaderm transparent dressing (informational only) Other (See Comments) and Rash 04/20/2016       Current Outpatient Prescriptions   Medication Sig Dispense Refill     triamcinolone (KENALOG) 0.1 % ointment Apply sparingly to affected area two to three times daily for 7 days. 30 g 0     pazopanib (VOTRIENT) 200 MG tablet CHEMOTHERAPY Take 4 tablets (800 mg) by mouth daily 120 tablet 5     oxyCODONE (ROXICODONE) 5 MG IR tablet Take 1-2 tablets (5-10 mg) by mouth every 4 hours as needed for moderate to severe pain 20 tablet 0     polyethylene glycol (MIRALAX/GLYCOLAX) Packet Take 17 g by mouth daily 7 packet 1     senna-docusate (SENOKOT-S;PERICOLACE) 8.6-50 MG per tablet Take 2 tablets by mouth 2 times daily as needed for constipation 30 tablet 1     acetaminophen (TYLENOL) 325 MG tablet Take 2 tablets (650 mg) by mouth every 4 hours as needed for mild pain 100 tablet 1     ibuprofen (ADVIL/MOTRIN) 600 MG tablet Take 1 tablet (600 mg) by mouth every 6 hours as needed for moderate pain 60 tablet 1     diltiazem 2% in PLO cream, FV COMPOUNDED, 2% GEL To anal opening three times daily.  Use a pea-sized amount.  Store at room temperature. 60 g 0     ondansetron (ZOFRAN) 8 MG tablet Take 1 tablet (8 mg) by mouth every 6 hours as needed for nausea 30 tablet 6     diphenhydrAMINE (BENADRYL) 25 MG tablet Take 1-2 tablets (25-50  mg) by mouth every 6 hours as needed for other (Nausea or vomiting) 60 tablet 3     lidocaine-prilocaine (EMLA) cream Apply to port 30 minutes prior to access 30 g 5       Past Medical History:   Diagnosis Date     Anemia 6/3/2016     Constipation      Extrarenal rhabdoid neoplasm (H)      Febrile neutropenia (H) 4/17/2016     History of blood transfusion      Latent tuberculosis      Lung disease      On antineoplastic chemotherapy      Sarcoma of vulva (H)        Social History     Social History     Marital status:      Spouse name: N/A     Number of children: N/A     Years of education: N/A     Occupational History     Not on file.     Social History Main Topics     Smoking status: Never Smoker     Smokeless tobacco: Never Used     Alcohol use No     Drug use: No     Sexual activity: No     Other Topics Concern     Not on file     Social History Narrative       Family History   Problem Relation Age of Onset     Other Cancer No family hx of      ROS  See HPI. Complete ROS otherwise negative.    Physical Exam   Wt Readings from Last 4 Encounters:   08/21/17 61.2 kg (134 lb 14.7 oz)   08/14/17 61.5 kg (135 lb 9.3 oz)   08/03/17 61.5 kg (135 lb 9.3 oz)   07/17/17 61 kg (134 lb 7.7 oz)       Temp:  [97.9  F (36.6  C)] 97.9  F (36.6  C)  Pulse:  [62] 62  Resp:  [12] 12  BP: (103)/(73) 103/73  SpO2:  [100 %] 100 %    GENERAL: Alert, interactive, excited about CT results.  SKIN: Multiple layers of skin peeling on left great toe, more mild on left heel.  No erythema or drainage.  Right great toe with mild peeling.  Mild erythema and nummular area of excoriation on right elbow.  HEAD: Normocephalic, atraumatic     EYES: Normal lids, conjunctivae/cornea normal, mild scleral icterus. PERRL. EOMI.  EARS: Pinna normal b/l, no pain on palpation. External ears normal appearing.   NOSE: Clear, no discharge or congestion  MOUTH/THROAT: Oropharynx is clear. Tongue without sores. Normal dentition for age, no mucosal  lesions.  NECK: Supple, full range of motion, no masses  LYMPH NODES: No cervical lymphadenopathy.  LUNGS: No increased WOB.  Lungs clear to auscultation throughout.  No crackles or wheezes.  HEART: HRR. S1 and S2 are normal. No murmurs, rubs, gallops. The radial pulses are 2+ bilaterally. Cap refill <3 sec in upper extremities.  ABDOMEN: Normal bowel sounds. Soft, non-tender, non-distended, no masses or hepatosplenomegaly.  NEUROLOGIC: Grossly intact, no focal neurologic deficits.    :  Deferred today.      Labs:  Results for orders placed or performed during the hospital encounter of 08/21/17 (from the past 24 hour(s))   X-ray Chest 2 vws*    Narrative    XR CHEST 2 VW  8/21/2017 1:36 PM      HISTORY: f/u pneumothorax diagnosed 3/23, Malignant neoplasm of  connective and soft tissue, unspecified    COMPARISON: CT 8/14/2017    FINDINGS: PA and lateral views of the chest. Port-A-Cath tip  projecting over the right atrium is stable in position. There are  persistent tiny biapical pneumothoraces. Pleural thickening and fluid  at the left lung base is unchanged from comparison examinations.  Linear densities at the left lung base are also unchanged. Metastatic  nodules are better visualized on the comparison CT.      Impression    IMPRESSION: Persistent tiny biapical pneumothoraces.    YUSRA GAMEZ MD     CT Scan 8/14/17  IMPRESSION:    Substantial decrease in metastatic disease within the chest. Small  bilateral pneumothoraces, left greater than right.       Impression:  Olimpia is a 22-year-old female with recurrent metastatic synovial sarcoma. CT scan last week showing substantial decrease in metastatic disease within the chest and CXR today with persistent tiny bilateral pneumothoraces. No increased SOB or chest pain at this time. She is on 400 mg Pazopanib daily and overall doing well.  Persistent peeling of her feet bilaterally.  Small new rash (dermatitis) to right elbow.      Plan:  1) Continue pazopanib at 50%  dosing (400 mg daily), given decrease in metastatic disease and previous skin changes will not increase at this time.  2) Reviewed labs and CXR results with Asma.  3) Advised Asma to call if she experiences any increased SOB, chest pain, palpitations or other concerns.   4) Return to clinic  in 1 month from now for repeat labs and follow-up.  Will be seen sooner if she has any above symptoms or concerns.    5)  Rash to right elbow:  Triamcinolone 0.1% ointment to right elbow 2-3 times a day for 7 days.    Funmilayo Almanza MSN, APRN, CPNP-AC, CPON  Department of Pediatrics  Division of Hematology/Oncology

## 2017-09-22 NOTE — LETTER
9/22/2017      RE: Olimpia Childress  [address]   [facility] [address]       Pediatric Hematology/Oncology Clinic Note    History- Olimpia initially presented with a growth on her right labia in 2013 when she was living in [place]. 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 She discontinued further treatment b/c her physicians were unsure of the exact diagnosis and she felt uncomfortable proceeding. Olimpia subsequently immigrated to the  in August of 2015 and in October she first noticed a recurrence of the mass in the area of previous excision. She was in [place] at that time, seen by oncology and had a port placed but then moved to [place] where she was seen by Dr. Mckeon at [facility] in November. There she underwent an MRI that showed a solid and cystic subcutaneous mass in the right labia measuring 1.7 x 1.6 x 1cm with question of nodular extension vs a second nodule measuring 0.7 cm. There was no invasion into the vagina. On November 16th 2015, Olimpia had a biopsy of the mass by a gynecologist, Dr. Terry, at [facility]. Cytology was reviewed at [facility] and read as a SMARCB1-deficient genital sarcoma, likely falling within the overall spectrum of malignant rhabdoid tumor. By immunohistochemistry, the cells shows a complete loss of SMARCb1. Wide spectrum cytokeratin, CD34, WT1, Desmin and CD99 were all negative. RT PCR for Wooten Sarcoma associated fusion transcripts were negative as well. Olimpia went on to have a PET/CT as well which showed multiple pulmonary lesions and biopsy confirmed a lesion to be metastatic disease. Olimpia was seen by Tomás White at [place] who reviewed the diagnosis and potential treatment plans with her, however she prefered to be treated here at [facility]. 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    Olimpia underwent left sided thoracotomy and resection of two pulmonary nodules on 7/20/16. Pathology revealed that one lesion was benign lymphoid tissue and the other was consistent with synovial sarcoma.  This was confirmed with FISH  With 91% of cell examined having a signal pattern indictivate of a rearrangement of the SS18 locus.  Olimpia has now completed 3 cycles of chemotherapy per [id number] and started her radiation on 9/13/16 and completed on 10/17/16.  She then went on to have a resection of her labial mass on 11/16/16 by Jannet Pastrana and Chikis with reconstruction, including skin flaps by Dr. Corona from plastics.  Pathology showed no residual tumor.  She had a f/u chest CT today on 12/5/16 that showed persistance of her pulmonary nodules as well as few additonal pulmonary nodules.  She saw Dr. Pierre who was in agreement with surgical resection, however Olimpia wanted to wait until March to have more time to recover from her last surgery. She underwent left sided thoracotomy on 3/8/17.    In May, Olimpia's follow up PET/CT demonstrated significant progression of her pulmonary metastatic disease, with an anterior mediastinal mass compression her right ventricle and potentially right outflow tract.  Subsequent echocardiogram did not show altered cardiac function, Dr Man felt radiation therapy was not in her best interest at this time but would consider if she developed cardiac dysfunction.  Olimpia was started on oral Pazopanib in the beginning of May for palliative therapy.  About 1 month into it we held her doses due to palmar plantar dysesthesia and stomatitis.  Her symptoms resolved quickly within 1 week and she restarted at 50% dosing on 6/19/17.    HPI:  Olimpia had been doing well since her last visit up until yesterday. She reports she developed nasal congestion at that time.  She denies  sore throat, cough, fever, SOB, body aches.  Energy level has been good.  She is eating well.  Occasional constipation which she manages with miralax.  Occasional pruritic rash that comes on her ankles and elbows intermittently.  Stable peeling of her feet, does not cause her any pain.    History was obtained from [facility] via professional [facility] .     Allergies as of 09/22/2017 - Sheldon as Reviewed 08/21/2017   Allergen Reaction Noted     Heparin flush Other (See Comments) 01/27/2016     Pork derived products  02/09/2016     Tegaderm transparent dressing (informational only) Other (See Comments) and Rash 04/20/2016       Current Outpatient Prescriptions   Medication Sig Dispense Refill     triamcinolone (KENALOG) 0.1 % ointment Apply sparingly to affected area two to three times daily for 7 days. 30 g 0     pazopanib (VOTRIENT) 200 MG tablet CHEMOTHERAPY Take 4 tablets (800 mg) by mouth daily 120 tablet 5     oxyCODONE (ROXICODONE) 5 MG IR tablet Take 1-2 tablets (5-10 mg) by mouth every 4 hours as needed for moderate to severe pain 20 tablet 0     polyethylene glycol (MIRALAX/GLYCOLAX) Packet Take 17 g by mouth daily 7 packet 1     senna-docusate (SENOKOT-S;PERICOLACE) 8.6-50 MG per tablet Take 2 tablets by mouth 2 times daily as needed for constipation 30 tablet 1     acetaminophen (TYLENOL) 325 MG tablet Take 2 tablets (650 mg) by mouth every 4 hours as needed for mild pain 100 tablet 1     ibuprofen (ADVIL/MOTRIN) 600 MG tablet Take 1 tablet (600 mg) by mouth every 6 hours as needed for moderate pain 60 tablet 1     diltiazem 2% in PLO cream, FV COMPOUNDED, 2% GEL To anal opening three times daily.  Use a pea-sized amount.  Store at room temperature. 60 g 0     ondansetron (ZOFRAN) 8 MG tablet Take 1 tablet (8 mg) by mouth every 6 hours as needed for nausea 30 tablet 6     diphenhydrAMINE (BENADRYL) 25 MG tablet Take 1-2 tablets (25-50 mg) by mouth every 6 hours as needed for other (Nausea or vomiting) 60  tablet 3     lidocaine-prilocaine (EMLA) cream Apply to port 30 minutes prior to access 30 g 5       Past Medical History:   Diagnosis Date     Anemia 6/3/2016     Constipation      Extrarenal rhabdoid neoplasm (H)      Febrile neutropenia (H) 4/17/2016     History of blood transfusion      Latent tuberculosis      Lung disease      On antineoplastic chemotherapy      Sarcoma of vulva (H)      Social History:  Olimpia is attending school with the goal to get her diploma, she goes Mon-Thursday and is really enjoying it.    Family History   Problem Relation Age of Onset     Other Cancer No family hx of      ROS  See HPI. Complete ROS otherwise negative.    Physical Exam   Wt Readings from Last 4 Encounters:   08/21/17 61.2 kg (134 lb 14.7 oz)   08/14/17 61.5 kg (135 lb 9.3 oz)   08/03/17 61.5 kg (135 lb 9.3 oz)   07/17/17 61 kg (134 lb 7.7 oz)       Temp:  [98.4  F (36.9  C)] 98.4  F (36.9  C)  Pulse:  [122] 122  Resp:  [20] 20  BP: (109)/(83) 109/83  SpO2:  [100 %] 100 %    GENERAL: Alert, interactive.  SKIN: Multiple layers of skin peeling on left great toe, more mild on left heel.  Right foot with mild peeling as well.  Excoriated rash on right and left ankles overlying the lateral malleolus. No drainage.  HEAD: Normocephalic, atraumatic     EYES: Normal lids, conjunctivae/cornea normal, mild scleral icterus. PERRL. EOMI.  EARS: Pinna normal b/l, no pain on palpation. External ears normal appearing.  TMs opaque bilaterally  NOSE: Congested, no nasal drainage noted.  No facial pain.  MOUTH/THROAT: Oropharynx is clear. Tongue without sores. Normal dentition for age, no mucosal lesions.  NECK: Supple, full range of motion, no masses  LYMPH NODES: No cervical lymphadenopathy.  LUNGS: No increased WOB.  Lungs clear to auscultation throughout; diminished in LLL.  No crackles or wheezes.  HEART: HRR. S1 and S2 are normal. No murmurs, rubs, gallops. The radial pulses are 2+ bilaterally. Cap refill <3 sec in upper  extremities.  ABDOMEN: Normal bowel sounds. Soft, non-tender, non-distended, no masses or hepatosplenomegaly.  NEUROLOGIC: Grossly intact, no focal neurologic deficits.    :  Deferred today.      Labs:  Results for orders placed or performed in visit on 09/22/17 (from the past 24 hour(s))   CBC with platelets differential   Result Value Ref Range    WBC 3.9 (L) 4.0 - 11.0 10e9/L    RBC Count 3.59 (L) 3.8 - 5.2 10e12/L    Hemoglobin 13.3 11.7 - 15.7 g/dL    Hematocrit 37.7 35.0 - 47.0 %     (H) 78 - 100 fl    MCH 37.0 (H) 26.5 - 33.0 pg    MCHC 35.3 31.5 - 36.5 g/dL    RDW 11.9 10.0 - 15.0 %    Platelet Count 105 (L) 150 - 450 10e9/L    Diff Method Automated Method     % Neutrophils 57.1 %    % Lymphocytes 27.6 %    % Monocytes 13.7 %    % Eosinophils 1.3 %    % Basophils 0.0 %    % Immature Granulocytes 0.3 %    Nucleated RBCs 0 0 /100    Absolute Neutrophil 2.2 1.6 - 8.3 10e9/L    Absolute Lymphocytes 1.1 0.8 - 5.3 10e9/L    Absolute Monocytes 0.5 0.0 - 1.3 10e9/L    Absolute Eosinophils 0.1 0.0 - 0.7 10e9/L    Absolute Basophils 0.0 0.0 - 0.2 10e9/L    Abs Immature Granulocytes 0.0 0 - 0.4 10e9/L    Absolute Nucleated RBC 0.0          Impression:  Olimpia is a 22-year-old female with recurrent metastatic synovial sarcoma. She is doing well on Pazopanib, most recent CT scan showed substantial decrease in metastatic disease.  Peeling of her feet is stable.  Ongoing intermittent rash on ankles and elbows consistent with dermatitis.  New nasal congestion today, no fever or other symptoms. CBC is stable, urine pending.  Unfortunately CMP inadvertently not draw by nursing.  Will not make Olimpia come back for this draw, recheck next month.    Plan:  1) Continue pazopanib at 50% dosing (400 mg daily), given decrease in metastatic disease and previous skin changes will not increase at this time.  2) Triamcinolone cream refilled, apply to areas of dermatitis as needed.  If not improving with triamcinolone or spreading  to other areas will consider derm consult.  3) Advised Asma to call if she experiences any increased SOB, chest pain, palpitations given her small bilateral pneumothoraces.    4) Supportive care for congestion, she will call if she develops significant respiratory symptoms or any fever.   5) Asma deferred influenza vaccine today, she will consider next month.  6) RTC in one month for port access, labs and follow up visit.      Port accessed using sterile technique without issue. Blood return noted. Labs drawn as ordered. Port citrate locked and deaccessed without issue.    JULIO C Pittman CNP

## 2017-09-22 NOTE — PROGRESS NOTES
Port accessed using sterile technique without issue. Blood return noted. Labs drawn as ordered. Port citrate locked and deaccessed without issue.

## 2017-09-22 NOTE — PROGRESS NOTES
Pediatric Hematology/Oncology Clinic Note    History- Olimpia initially presented with a growth on her right labia in 2013 when she was living in [place]. 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 She discontinued further treatment b/c her physicians were unsure of the exact diagnosis and she felt uncomfortable proceeding. Olimpia subsequently immigrated to the  in August of 2015 and in October she first noticed a recurrence of the mass in the area of previous excision. She was in [place] at that time, seen by oncology and had a port placed but then moved to [place] where she was seen by Dr. Mckeon at [facility] in November. There she underwent an MRI that showed a solid and cystic subcutaneous mass in the right labia measuring 1.7 x 1.6 x 1cm with question of nodular extension vs a second nodule measuring 0.7 cm. There was no invasion into the vagina. On November 16th 2015, Olimpia had a biopsy of the mass by a gynecologist, Dr. Terry, at [facility]. Cytology was reviewed at [facility] and read as a SMARCB1-deficient genital sarcoma, likely falling within the overall spectrum of malignant rhabdoid tumor. By immunohistochemistry, the cells shows a complete loss of SMARCb1. Wide spectrum cytokeratin, CD34, WT1, Desmin and CD99 were all negative. RT PCR for Wooten Sarcoma associated fusion transcripts were negative as well. Olimpia went on to have a PET/CT as well which showed multiple pulmonary lesions and biopsy confirmed a lesion to be metastatic disease. Olimpia was seen by Tomás White at [place] who reviewed the diagnosis and potential treatment plans with her, however she prefered to be treated here at [facility]. 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    Olimpia underwent left sided thoracotomy and resection of two pulmonary nodules on 7/20/16. Pathology revealed that one lesion was benign lymphoid tissue and the other was consistent with synovial sarcoma.  This was confirmed with FISH  With 91% of cell examined having a signal pattern indictivate of a rearrangement of the SS18 locus.  Olimpia has now completed 3 cycles of chemotherapy per [id number] and started her radiation on 9/13/16 and completed on 10/17/16.  She then went on to have a resection of her labial mass on 11/16/16 by Jannet Pastrana and Chikis with reconstruction, including skin flaps by Dr. Corona from plastics.  Pathology showed no residual tumor.  She had a f/u chest CT today on 12/5/16 that showed persistance of her pulmonary nodules as well as few additonal pulmonary nodules.  She saw Dr. Pierre who was in agreement with surgical resection, however Olimpia wanted to wait until March to have more time to recover from her last surgery. She underwent left sided thoracotomy on 3/8/17.    In May, Olimpia's follow up PET/CT demonstrated significant progression of her pulmonary metastatic disease, with an anterior mediastinal mass compression her right ventricle and potentially right outflow tract.  Subsequent echocardiogram did not show altered cardiac function, Dr Man felt radiation therapy was not in her best interest at this time but would consider if she developed cardiac dysfunction.  Olimpia was started on oral Pazopanib in the beginning of May for palliative therapy.  About 1 month into it we held her doses due to palmar plantar dysesthesia and stomatitis.  Her symptoms resolved quickly within 1 week and she restarted at 50% dosing on 6/19/17.    HPI:  Olimpia had been doing well since her last visit up until yesterday. She reports she developed nasal congestion at that time.  She denies sore throat, cough, fever, SOB, body aches.  Energy level has been good.  She is  eating well.  Occasional constipation which she manages with miralax.  Occasional pruritic rash that comes on her ankles and elbows intermittently.  Stable peeling of her feet, does not cause her any pain.    History was obtained from [facility] via professional [facility] .     Allergies as of 09/22/2017 - Sheldon as Reviewed 08/21/2017   Allergen Reaction Noted     Heparin flush Other (See Comments) 01/27/2016     Pork derived products  02/09/2016     Tegaderm transparent dressing (informational only) Other (See Comments) and Rash 04/20/2016       Current Outpatient Prescriptions   Medication Sig Dispense Refill     triamcinolone (KENALOG) 0.1 % ointment Apply sparingly to affected area two to three times daily for 7 days. 30 g 0     pazopanib (VOTRIENT) 200 MG tablet CHEMOTHERAPY Take 4 tablets (800 mg) by mouth daily 120 tablet 5     oxyCODONE (ROXICODONE) 5 MG IR tablet Take 1-2 tablets (5-10 mg) by mouth every 4 hours as needed for moderate to severe pain 20 tablet 0     polyethylene glycol (MIRALAX/GLYCOLAX) Packet Take 17 g by mouth daily 7 packet 1     senna-docusate (SENOKOT-S;PERICOLACE) 8.6-50 MG per tablet Take 2 tablets by mouth 2 times daily as needed for constipation 30 tablet 1     acetaminophen (TYLENOL) 325 MG tablet Take 2 tablets (650 mg) by mouth every 4 hours as needed for mild pain 100 tablet 1     ibuprofen (ADVIL/MOTRIN) 600 MG tablet Take 1 tablet (600 mg) by mouth every 6 hours as needed for moderate pain 60 tablet 1     diltiazem 2% in PLO cream, FV COMPOUNDED, 2% GEL To anal opening three times daily.  Use a pea-sized amount.  Store at room temperature. 60 g 0     ondansetron (ZOFRAN) 8 MG tablet Take 1 tablet (8 mg) by mouth every 6 hours as needed for nausea 30 tablet 6     diphenhydrAMINE (BENADRYL) 25 MG tablet Take 1-2 tablets (25-50 mg) by mouth every 6 hours as needed for other (Nausea or vomiting) 60 tablet 3     lidocaine-prilocaine (EMLA) cream Apply to port 30 minutes prior to  access 30 g 5       Past Medical History:   Diagnosis Date     Anemia 6/3/2016     Constipation      Extrarenal rhabdoid neoplasm (H)      Febrile neutropenia (H) 4/17/2016     History of blood transfusion      Latent tuberculosis      Lung disease      On antineoplastic chemotherapy      Sarcoma of vulva (H)      Social History:  Olimpia is attending school with the goal to get her diploma, she goes Mon-Thursday and is really enjoying it.    Family History   Problem Relation Age of Onset     Other Cancer No family hx of      ROS  See HPI. Complete ROS otherwise negative.    Physical Exam   Wt Readings from Last 4 Encounters:   08/21/17 61.2 kg (134 lb 14.7 oz)   08/14/17 61.5 kg (135 lb 9.3 oz)   08/03/17 61.5 kg (135 lb 9.3 oz)   07/17/17 61 kg (134 lb 7.7 oz)       Temp:  [98.4  F (36.9  C)] 98.4  F (36.9  C)  Pulse:  [122] 122  Resp:  [20] 20  BP: (109)/(83) 109/83  SpO2:  [100 %] 100 %    GENERAL: Alert, interactive.  SKIN: Multiple layers of skin peeling on left great toe, more mild on left heel.  Right foot with mild peeling as well.  Excoriated rash on right and left ankles overlying the lateral malleolus. No drainage.  HEAD: Normocephalic, atraumatic     EYES: Normal lids, conjunctivae/cornea normal, mild scleral icterus. PERRL. EOMI.  EARS: Pinna normal b/l, no pain on palpation. External ears normal appearing.  TMs opaque bilaterally  NOSE: Congested, no nasal drainage noted.  No facial pain.  MOUTH/THROAT: Oropharynx is clear. Tongue without sores. Normal dentition for age, no mucosal lesions.  NECK: Supple, full range of motion, no masses  LYMPH NODES: No cervical lymphadenopathy.  LUNGS: No increased WOB.  Lungs clear to auscultation throughout; diminished in LLL.  No crackles or wheezes.  HEART: HRR. S1 and S2 are normal. No murmurs, rubs, gallops. The radial pulses are 2+ bilaterally. Cap refill <3 sec in upper extremities.  ABDOMEN: Normal bowel sounds. Soft, non-tender, non-distended, no masses or  hepatosplenomegaly.  NEUROLOGIC: Grossly intact, no focal neurologic deficits.    :  Deferred today.      Labs:  Results for orders placed or performed in visit on 09/22/17 (from the past 24 hour(s))   CBC with platelets differential   Result Value Ref Range    WBC 3.9 (L) 4.0 - 11.0 10e9/L    RBC Count 3.59 (L) 3.8 - 5.2 10e12/L    Hemoglobin 13.3 11.7 - 15.7 g/dL    Hematocrit 37.7 35.0 - 47.0 %     (H) 78 - 100 fl    MCH 37.0 (H) 26.5 - 33.0 pg    MCHC 35.3 31.5 - 36.5 g/dL    RDW 11.9 10.0 - 15.0 %    Platelet Count 105 (L) 150 - 450 10e9/L    Diff Method Automated Method     % Neutrophils 57.1 %    % Lymphocytes 27.6 %    % Monocytes 13.7 %    % Eosinophils 1.3 %    % Basophils 0.0 %    % Immature Granulocytes 0.3 %    Nucleated RBCs 0 0 /100    Absolute Neutrophil 2.2 1.6 - 8.3 10e9/L    Absolute Lymphocytes 1.1 0.8 - 5.3 10e9/L    Absolute Monocytes 0.5 0.0 - 1.3 10e9/L    Absolute Eosinophils 0.1 0.0 - 0.7 10e9/L    Absolute Basophils 0.0 0.0 - 0.2 10e9/L    Abs Immature Granulocytes 0.0 0 - 0.4 10e9/L    Absolute Nucleated RBC 0.0          Impression:  Olimpia is a 22-year-old female with recurrent metastatic synovial sarcoma. She is doing well on Pazopanib, most recent CT scan showed substantial decrease in metastatic disease.  Peeling of her feet is stable.  Ongoing intermittent rash on ankles and elbows consistent with dermatitis.  New nasal congestion today, no fever or other symptoms. CBC is stable, urine pending.  Unfortunately CMP inadvertently not draw by nursing.  Will not make Olimpia come back for this draw, recheck next month.    Plan:  1) Continue pazopanib at 50% dosing (400 mg daily), given decrease in metastatic disease and previous skin changes will not increase at this time.  2) Triamcinolone cream refilled, apply to areas of dermatitis as needed.  If not improving with triamcinolone or spreading to other areas will consider derm consult.  3) Advised Asma to call if she experiences any  increased SOB, chest pain, palpitations given her small bilateral pneumothoraces.    4) Supportive care for congestion, she will call if she develops significant respiratory symptoms or any fever.   5) Asma deferred influenza vaccine today, she will consider next month.  6) RTC in one month for port access, labs and follow up visit.

## 2017-09-22 NOTE — MR AVS SNAPSHOT
After Visit Summary   9/22/2017    Olimpia Childress    MRN: [id number]           Patient Information     Date Of Birth          1995        Visit Information        Provider Department      9/22/2017 1:30 PM Yue Nichols Alexis Rupp, JULIO C CNP Peds Hematology Oncology        Today's Diagnoses     Rhabdoid tumor (H)    -  1    Dermatitis              [facility], 9th floor  [address] [place] [address]  Phone: [phone]  Clinic Hours:   Monday-Friday:   7 am to 5:00 pm   closed weekends and major  holidays     If your fever is 100.5  or greater,   call the clinic during business hours.   After hours call [phone] and ask for the pediatric hematology / oncology physician to be paged for you.               Follow-ups after your visit        Future tests that were ordered for you today     Open Standing Orders        Priority Remaining Interval Expires Ordered    CBC with platelets differential Routine 20/20 9/21/2018 9/22/2017    Comprehensive metabolic panel Routine 20/20 9/21/2018 9/22/2017    Routine UA with microscopic - No culture Routine 20/20 9/21/2018 9/22/2017            Who to contact     Please call your clinic at [phone] to:    Ask questions about your health    Make or cancel appointments    Discuss your medicines    Learn about your test results    Speak to your doctor   If you have compliments or concerns about an experience at your clinic, or if you wish to file a complaint, please contact [facility] Patient Relations at [phone] or email us at [email or web address]         Additional Information About Your Visit        MyChart Information     [employer] is an electronic gateway that provides easy, online access to your medical records. With [employer], you can request a clinic appointment, read your test results, renew a prescription or communicate with your care team.     To  sign up for NEHPt visit the website at [email or web address]   You will be asked to enter the access code listed below, as well as some personal information. Please follow the directions to create your username and password.     Your access code is: [email or web address]  Expires: 2017  4:05 PM     Your access code will  in 90 days. If you need help or a new code, please contact your [facility] or call [phone] for assistance.        Care EveryWhere ID     This is your Care EveryWhere ID. This could be used by other organizations to access your [facility] medical records  [id number]        Your Vitals Were     Pulse Temperature Respirations Pulse Oximetry          122 98.4  F (36.9  C) (Oral) 20 100%         Blood Pressure from Last 3 Encounters:   17 109/83   17 103/73   17 104/79    Weight from Last 3 Encounters:   17 61.2 kg (134 lb 14.7 oz)   17 61.5 kg (135 lb 9.3 oz)   17 61.5 kg (135 lb 9.3 oz)              We Performed the Following     CBC with platelets differential     Comprehensive metabolic panel     Routine UA with microscopic - No culture          Today's Medication Changes          These changes are accurate as of: 17  4:05 PM.  If you have any questions, ask your nurse or doctor.               Start taking these medicines.        Dose/Directions    triamcinolone 0.1 % ointment   Commonly known as:  KENALOG   Used for:  Dermatitis   Started by:  Omar Trammell APRN CNP        Apply sparingly to affected area two to three times daily for 7 days.   Quantity:  30 g   Refills:  0            Where to get your medicines      These medications were sent to [facility] [place], [place] - [address]  [address] [address]     Phone:  [phone]     triamcinolone 0.1 % ointment                Primary Care Provider Office Phone # Fax #    Coretta Yanez MD  [phone]       [address] [facility] [address]        Equal Access to Services     RONIT BAEZ : Hadshaquille leonid finley naomi Cheek, warichardda luqadaha, qaybta kaalmada anne-marie, wilner garcia tressabetsy garnett laFelicianojoleen singleton. So [facility] [phone].    ATENCIÓN: Si habla español, tiene a ordoñez disposición servicios gratuitos de asistencia lingüística. Darius al [phone].    We comply with applicable federal civil rights laws and [place] laws. We do not discriminate on the basis of race, color, national origin, age, disability sex, sexual orientation or gender identity.            Thank you!     Thank you for choosing PEDS HEMATOLOGY ONCOLOGY  for your care. Our goal is always to provide you with excellent care. Hearing back from our patients is one way we can continue to improve our services. Please take a few minutes to complete the written survey that you may receive in the mail after your visit with us. Thank you!             Your Updated Medication List - Protect others around you: Learn how to safely use, store and throw away your medicines at www.disposemymeds.org.          This list is accurate as of: 9/22/17  4:05 PM.  Always use your most recent med list.                   Brand Name Dispense Instructions for use Diagnosis    acetaminophen 325 MG tablet    TYLENOL    100 tablet    Take 2 tablets (650 mg) by mouth every 4 hours as needed for mild pain    Sarcoma of vulva (H)       diltiazem 2% in PLO cream (FV COMPOUNDED) 2% Gel     60 g    To anal opening three times daily.  Use a pea-sized amount.  Store at room temperature.    Anal fissure       diphenhydrAMINE 25 MG tablet    BENADRYL    60 tablet    Take 1-2 tablets (25-50 mg) by mouth every 6 hours as needed for other (Nausea or vomiting)    Chemotherapy induced nausea and vomiting       ibuprofen 600 MG tablet    ADVIL/MOTRIN    60 tablet    Take 1 tablet (600 mg) by mouth every 6 hours as needed for moderate pain    Sarcoma of vulva  (H)       lidocaine-prilocaine cream    EMLA    30 g    Apply to port 30 minutes prior to access    Malignant tumor cells (H)       ondansetron 8 MG tablet    ZOFRAN    30 tablet    Take 1 tablet (8 mg) by mouth every 6 hours as needed for nausea    Encounter for antineoplastic chemotherapy       oxyCODONE 5 MG IR tablet    ROXICODONE    20 tablet    Take 1-2 tablets (5-10 mg) by mouth every 4 hours as needed for moderate to severe pain    Acute post-operative pain       pazopanib 200 MG tablet CHEMOTHERAPY    VOTRIENT    120 tablet    Take 4 tablets (800 mg) by mouth daily    Synovial sarcoma (H)       polyethylene glycol Packet    MIRALAX/GLYCOLAX    7 packet    Take 17 g by mouth daily    Acute post-operative pain       senna-docusate 8.6-50 MG per tablet    SENOKOT-S;PERICOLACE    30 tablet    Take 2 tablets by mouth 2 times daily as needed for constipation    Acute post-operative pain       triamcinolone 0.1 % ointment    KENALOG    30 g    Apply sparingly to affected area two to three times daily for 7 days.    Dermatitis

## 2017-09-22 NOTE — NURSING NOTE
Chief Complaint   Patient presents with     RECHECK     Patient is here today for Rhabdoid tumor (H) follow up     /83 (BP Location: Right arm, Patient Position: Fowlers, Cuff Size: Adult Regular)  Pulse 122  Temp 98.4  F (36.9  C) (Oral)  Resp 20  SpO2 100%  I spent 8 minutes reviewing medications, allergies, and obtaining vitals.    Radha Mason LPN  September 22, 2017

## 2017-10-04 NOTE — TELEPHONE ENCOUNTER
THI received call from [facility] stating that she received a call from [employer] stating that her insurance has termed. Olimpia has denied getting any renewal paperwork in the mail. She is curious about next steps. THI left  for [facility] financial counselor. Awaiting return call. Social work will continue to assess needs and provide ongoing psychosocial support and access to resources.       BELKIS Salinas, [facility]  Pediatric Hem/Onc   Phone: [phone]  Pager: [phone]

## 2017-11-02 NOTE — NURSING NOTE
"Chief Complaint   Patient presents with     RECHECK     Patient here today for Rhabdoid tumor (H)     /70 (BP Location: Right arm, Patient Position: Chair, Cuff Size: Adult Regular)  Pulse 74  Temp 97.4  F (36.3  C) (Oral)  Resp 18  Ht 1.59 m (5' 2.6\")  Wt 61.9 kg (136 lb 7.4 oz)  SpO2 100%  BMI 24.49 kg/m2  I spent 10 minutes reviewing medications, allergies, and obtaining vitals.    Polly Salas MA  November 2, 2017    "

## 2017-11-02 NOTE — LETTER
11/2/2017      RE: Olimpia Childress  [address]   [facility] [address]       Patient came to clinic today and needed a port draw. I accessed port and osorio labs. Blood return noted, 3-5ml blood wasted. Flushed port with 5ml saline and heparin locked 3ml. Documented in doc flowsheets.      Pediatric Hematology/Oncology Clinic Note    History- Olimpia initially presented with a growth on her right labia in 2013 when she was living in [facility]. 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 She discontinued further treatment b/c her physicians were unsure of the exact diagnosis and she felt uncomfortable proceeding. Olimpia subsequently immigrated to the  in August of 2015 and in October she first noticed a recurrence of the mass in the area of previous excision. She was in [place] at that time, seen by oncology and had a port placed but then moved to [place] where she was seen by Dr. Mckeon at [facility] in November. There she underwent an MRI that showed a solid and cystic subcutaneous mass in the right labia measuring 1.7 x 1.6 x 1cm with question of nodular extension vs a second nodule measuring 0.7 cm. There was no invasion into the vagina. On November 16th 2015, Olimpia had a biopsy of the mass by a gynecologist, Dr. Terry, at [facility]. 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 By immunohistochemistry, the cells shows a complete loss of SMARCb1. Wide spectrum cytokeratin, CD34, WT1, Desmin and CD99 were all negative. RT PCR for Wooten Sarcoma associated fusion transcripts were negative as well. Olimpia went on to have a PET/CT as well which showed multiple pulmonary lesions and biopsy confirmed a lesion to be metastatic disease. Olimpia was seen by Tomás White at [facility] who  reviewed the diagnosis and potential treatment plans with her, however she prefered to be treated here at [facility]. 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    Olimpia underwent left sided thoracotomy and resection of two pulmonary nodules on 7/20/16. Pathology revealed that one lesion was benign lymphoid tissue and the other was consistent with synovial sarcoma.  This was confirmed with FISH  With 91% of cell examined having a signal pattern indictivate of a rearrangement of the SS18 locus.  Olimpia has now completed 3 cycles of chemotherapy per [id number] and started her radiation on 9/13/16 and completed on 10/17/16.  She then went on to have a resection of her labial mass on 11/16/16 by Jannet Pastrana and Chikis with reconstruction, including skin flaps by Dr. Corona from plastics.  Pathology showed no residual tumor.  She had a f/u chest CT today on 12/5/16 that showed persistance of her pulmonary nodules as well as few additonal pulmonary nodules.  She saw Dr. Pierre who was in agreement with surgical resection, however Olimpia wanted to wait until March to have more time to recover from her last surgery. She underwent left sided thoracotomy on 3/8/17.    In May, Olimpia's follow up PET/CT demonstrated significant progression of her pulmonary metastatic disease, with an anterior mediastinal mass compression her right ventricle and potentially right outflow tract.  Subsequent echocardiogram did not show altered cardiac function, Dr Man felt radiation therapy was not in her best interest at this time but would consider if she developed cardiac dysfunction.  Olimpia was started on oral Pazopanib in the beginning of May for palliative therapy.  About 1 month into it we held her doses due to palmar plantar dysesthesia and stomatitis.  Her symptoms  resolved quickly within 1 week and she restarted at 50% dosing on 6/19/17.    HPI:  Since her last visit Olimpia has been doing really well.  Her appetite is great.  She is still a little fatigued but sleeping well and able to particpate in what she wants to do.  She did develop a painful rash on her hands earlier this week and in general feels her skin is more fragile  She denies pain elswewhere.  No new lumps or bumps.  No cough or SOB.  No n/v/d/constipation.  Urinating without issue.  She is going to school, taking ESL courses.    History was obtained from [facility] via professional [facility] .     Allergies as of 11/02/2017 - [place] as Reviewed 11/02/2017   Allergen Reaction Noted     Heparin flush Other (See Comments) 01/27/2016     Pork derived products  02/09/2016     Tegaderm transparent dressing (informational only) Other (See Comments) and Rash 04/20/2016       Current Outpatient Prescriptions   Medication Sig Dispense Refill     pyridOXINE (VITAMIN B6) 100 MG TABS Take 3 tablets (300 mg) by mouth daily 90 tablet 3     Colloidal Oatmeal 1 % CREA Externally apply topically 2 times daily 1 Tube 3     triamcinolone (KENALOG) 0.1 % ointment Apply sparingly to affected area two to three times daily for 7 days. 30 g 0     pazopanib (VOTRIENT) 200 MG tablet CHEMOTHERAPY Take 4 tablets (800 mg) by mouth daily 120 tablet 5     oxyCODONE (ROXICODONE) 5 MG IR tablet Take 1-2 tablets (5-10 mg) by mouth every 4 hours as needed for moderate to severe pain 20 tablet 0     polyethylene glycol (MIRALAX/GLYCOLAX) Packet Take 17 g by mouth daily 7 packet 1     senna-docusate (SENOKOT-S;PERICOLACE) 8.6-50 MG per tablet Take 2 tablets by mouth 2 times daily as needed for constipation 30 tablet 1     acetaminophen (TYLENOL) 325 MG tablet Take 2 tablets (650 mg) by mouth every 4 hours as needed for mild pain 100 tablet 1     ibuprofen (ADVIL/MOTRIN) 600 MG tablet Take 1 tablet (600 mg) by mouth every 6 hours as needed for  moderate pain 60 tablet 1     diltiazem 2% in PLO cream, FV COMPOUNDED, 2% GEL To anal opening three times daily.  Use a pea-sized amount.  Store at room temperature. 60 g 0     ondansetron (ZOFRAN) 8 MG tablet Take 1 tablet (8 mg) by mouth every 6 hours as needed for nausea 30 tablet 6     diphenhydrAMINE (BENADRYL) 25 MG tablet Take 1-2 tablets (25-50 mg) by mouth every 6 hours as needed for other (Nausea or vomiting) 60 tablet 3     lidocaine-prilocaine (EMLA) cream Apply to port 30 minutes prior to access 30 g 5       Past Medical History:   Diagnosis Date     Anemia 6/3/2016     Constipation      Extrarenal rhabdoid neoplasm (H)      Febrile neutropenia (H) 4/17/2016     History of blood transfusion      Latent tuberculosis      Lung disease      On antineoplastic chemotherapy      Sarcoma of vulva (H)      Social History:  Olimpia is attending school with the goal to get her diploma, she goes Mon-Thursday and is really enjoying it.    Family History   Problem Relation Age of Onset     Other Cancer No family hx of      ROS  See HPI. Complete ROS otherwise negative.    Physical Exam   Wt Readings from Last 4 Encounters:   11/02/17 61.9 kg (136 lb 7.4 oz)   08/21/17 61.2 kg (134 lb 14.7 oz)   08/14/17 61.5 kg (135 lb 9.3 oz)   08/03/17 61.5 kg (135 lb 9.3 oz)       Temp:  [97.4  F (36.3  C)] 97.4  F (36.3  C)  Pulse:  [74] 74  Resp:  [18] 18  BP: (104)/(70) 104/70  SpO2:  [100 %] 100 %    GENERAL: Alert, interactive.  SKIN: dorsum of hands with erythematous scaly plaques;  peeling.  Abrasion on left foot healing. No drainage.  HEAD: Normocephalic, atraumatic     EYES: Normal lids, conjunctivae/cornea normal, mild scleral icterus. PERRL. EOMI.  EARS: Pinna normal b/l, no pain on palpation. External ears normal appearing.  TMs opaque bilaterally  NOSE: Congested, no nasal drainage noted.  No facial pain.  MOUTH/THROAT: Oropharynx is clear. Tongue without sores. Normal dentition for age, no mucosal lesions.  NECK:  Supple, full range of motion, no masses  LYMPH NODES: No cervical lymphadenopathy.  LUNGS: No increased WOB.  Lungs clear to auscultation throughout; diminished in LLL.  No crackles or wheezes.  HEART: HRR. S1 and S2 are normal. No murmurs, rubs, gallops. The radial pulses are 2+ bilaterally. Cap refill <3 sec in upper extremities.  ABDOMEN: Normal bowel sounds. Soft, non-tender, non-distended, no masses or hepatosplenomegaly.  NEUROLOGIC: Grossly intact, no focal neurologic deficits.    :  Deferred today.      Labs:  Results for orders placed or performed in visit on 11/02/17 (from the past 24 hour(s))   CBC with platelets differential   Result Value Ref Range    WBC 4.2 4.0 - 11.0 10e9/L    RBC Count 3.87 3.8 - 5.2 10e12/L    Hemoglobin 14.1 11.7 - 15.7 g/dL    Hematocrit 40.3 35.0 - 47.0 %     (H) 78 - 100 fl    MCH 36.4 (H) 26.5 - 33.0 pg    MCHC 35.0 31.5 - 36.5 g/dL    RDW 11.3 10.0 - 15.0 %    Platelet Count 133 (L) 150 - 450 10e9/L    Diff Method Automated Method     % Neutrophils 49.7 %    % Lymphocytes 40.0 %    % Monocytes 9.2 %    % Eosinophils 0.7 %    % Basophils 0.2 %    % Immature Granulocytes 0.2 %    Nucleated RBCs 0 0 /100    Absolute Neutrophil 2.1 1.6 - 8.3 10e9/L    Absolute Lymphocytes 1.7 0.8 - 5.3 10e9/L    Absolute Monocytes 0.4 0.0 - 1.3 10e9/L    Absolute Eosinophils 0.0 0.0 - 0.7 10e9/L    Absolute Basophils 0.0 0.0 - 0.2 10e9/L    Abs Immature Granulocytes 0.0 0 - 0.4 10e9/L    Absolute Nucleated RBC 0.0    Comprehensive metabolic panel   Result Value Ref Range    Sodium 139 133 - 144 mmol/L    Potassium 3.7 3.4 - 5.3 mmol/L    Chloride 102 94 - 109 mmol/L    Carbon Dioxide 29 20 - 32 mmol/L    Anion Gap 8 3 - 14 mmol/L    Glucose 113 (H) 70 - 99 mg/dL    Urea Nitrogen 10 7 - 30 mg/dL    Creatinine 0.50 (L) 0.52 - 1.04 mg/dL    GFR Estimate >90 >60 mL/min/1.7m2    GFR Estimate If Black >90 >60 mL/min/1.7m2    Calcium 8.9 8.5 - 10.1 mg/dL    Bilirubin Total 0.4 0.2 - 1.3 mg/dL     Albumin 3.4 3.4 - 5.0 g/dL    Protein Total 7.2 6.8 - 8.8 g/dL    Alkaline Phosphatase 77 40 - 150 U/L    ALT 32 0 - 50 U/L    AST 28 0 - 45 U/L   Routine UA with microscopic - No culture   Result Value Ref Range    Color Urine Light Yellow     Appearance Urine Clear     Glucose Urine Negative NEG^Negative mg/dL    Bilirubin Urine Negative NEG^Negative    Ketones Urine Negative NEG^Negative mg/dL    Specific Gravity Urine 1.003 1.003 - 1.035    Blood Urine Negative NEG^Negative    pH Urine 6.0 5.0 - 7.0 pH    Protein Albumin Urine Negative NEG^Negative mg/dL    Urobilinogen mg/dL Normal 0.0 - 2.0 mg/dL    Nitrite Urine Negative NEG^Negative    Leukocyte Esterase Urine Small (A) NEG^Negative    Source Midstream Urine     WBC Urine 3 (H) 0 - 2 /HPF    RBC Urine 0 0 - 2 /HPF    Squamous Epithelial /HPF Urine 1 0 - 1 /HPF         Impression:  Olimpia is a 22-year-old female with recurrent metastatic synovial sarcoma. She is doing well on Pazopanib, most recent CT scan showed substantial decrease in metastatic disease.  She has a new eryhtematous rash on the dorsum of her hands, otherwise doing great.  Plan:  1) Continue pazopanib at 50% dosing (400 mg daily), given decrease in metastatic disease and skin changes will not increase at this time.  2) Will try oral vitamin B6 and topical eucerin;  If hands get worse will consider holding Pazopanib;  Other skin rashes habe resolved for now  3) Advised Olimpia to call if she experiences any increased SOB, chest pain, palpitations given her small bilateral pneumothoraces.    4) Deferred influenza vaccine today  5) F/U in 1 months for visit and labs      Coretta Yanez MD

## 2017-11-02 NOTE — MR AVS SNAPSHOT
After Visit Summary   11/2/2017    Olimpia Childress    MRN: [id number]           Patient Information     Date Of Birth          1995        Visit Information        Provider Department      11/2/2017 3:15 PM Yue Nichols Emily Gustava, MD Peds Hematology Oncology        Today's Diagnoses     Palmar plantar dysesthesia    -  1    Rhabdoid tumor (H)              [facility], 9th floor  [address] [place] [id number]  Phone: [phone]  Clinic Hours:   Monday-Friday:   7 am to 5:00 pm   closed weekends and major  holidays     If your fever is 100.5  or greater,   call the clinic during business hours.   After hours call [phone] and ask for the pediatric hematology / oncology physician to be paged for you.               Follow-ups after your visit        Your next 10 appointments already scheduled     Dec 11, 2017  2:30 PM CST   Return Visit with Coretta Yanez MD   Peds Hematology Oncology ([facility])    [facility]  [address] [address] [phone]              Who to contact     Please call your clinic at [phone] to:    Ask questions about your health    Make or cancel appointments    Discuss your medicines    Learn about your test results    Speak to your doctor   If you have compliments or concerns about an experience at your clinic, or if you wish to file a complaint, please contact [facility] Patient Relations at [phone] or email us at [email or web address]         Additional Information About Your Visit        MyChart Information     Edsby is an electronic gateway that provides easy, online access to your medical records. With Edsby, you can request a clinic appointment, read your test results, renew a prescription or communicate with your care team.     To sign up for Edsby visit the website at  [email or web address]   You will be asked to enter the access code listed below, as well as some personal information. Please follow the directions to create your username and password.     Your access code is: [email or web address]  Expires: 2017  4:05 PM     Your access code will  in 90 days. If you need help or a new code, please contact your [facility] or call [phone] for assistance.        Care EveryWhere ID     This is your Care EveryWhere ID. This could be used by other organizations to access your [facility] medical records  [id number]        Your Vitals Were     Pulse Temperature Respirations Height Pulse Oximetry BMI (Body Mass Index)    74 97.4  F (36.3  C) (Oral) 18 5' 2.6\" (159 cm) 100% 24.49 kg/m2       Blood Pressure from Last 3 Encounters:   17 104/70   17 109/83   17 103/73    Weight from Last 3 Encounters:   17 136 lb 7.4 oz (61.9 kg)   17 134 lb 14.7 oz (61.2 kg)   17 135 lb 9.3 oz (61.5 kg)              We Performed the Following     CBC with platelets differential     Comprehensive metabolic panel     Routine UA with microscopic - No culture          Today's Medication Changes          These changes are accurate as of: 17  4:42 PM.  If you have any questions, ask your nurse or doctor.               Start taking these medicines.        Dose/Directions    Colloidal Oatmeal 1 % Crea   Used for:  Palmar plantar dysesthesia   Started by:  Coretta Yanez MD        Externally apply topically 2 times daily   Quantity:  1 Tube   Refills:  3       pyridOXINE 100 MG Tabs   Commonly known as:  VITAMIN B6   Used for:  Palmar plantar dysesthesia   Started by:  Coretta Yanez MD        Dose:  300 mg   Take 3 tablets (300 mg) by mouth daily   Quantity:  90 tablet   Refills:  3            Where to get your medicines      These medications were sent to [facility] [id number] [place] [address] [address] AT [facility] [address]  [address], [facility] [address]     Phone:  [phone]     Colloidal Oatmeal 1 % Crea    pyridOXINE 100 MG Tabs                Primary Care Provider Office Phone # Fax #    Coretta Yanez [phone]       [facility] [address]        Equal Access to Services     RONIT BAEZ AH: Hadii aad ku hadasho Soomaali, waaxda luqadaha, qaybta kaalmada adeegyada, waxay idiin hayaan adeeg kharash la'joleen ah. So [facility] [phone].    ATENCIÓN: Si habla espjaime, tiene a ordoñez disposición servicios gratuitos de asistencia lingüística. [facility] [phone].    We comply with applicable federal civil rights laws and [place] laws. We do not discriminate on the basis of race, color, national origin, age, disability, sex, sexual orientation, or gender identity.            Thank you!     [facility]  [facility] your care. Our goal is always to provide you with excellent care. Hearing back from our patients is one way we can continue to improve our services. Please take a few minutes to complete the written survey that you may receive in the mail after your visit with us. Thank you!             Your Updated Medication List - Protect others around you: Learn how to safely use, store and throw away your medicines at www.disposemymeds.org.          This list is accurate as of: 11/2/17  4:42 PM.  Always use your most recent med list.                   Brand Name Dispense Instructions for use Diagnosis    acetaminophen 325 MG tablet    TYLENOL    100 tablet    Take 2 tablets (650 mg) by mouth every 4 hours as needed for mild pain    Sarcoma of vulva (H)       Colloidal Oatmeal 1 % Crea     1 Tube    Externally apply topically 2 times daily    Palmar plantar dysesthesia       diltiazem 2% in PLO cream (FV COMPOUNDED) 2% Gel     60 g    To anal opening three times daily.  Use a pea-sized amount.  Store at room  temperature.    Anal fissure       diphenhydrAMINE 25 MG tablet    BENADRYL    60 tablet    Take 1-2 tablets (25-50 mg) by mouth every 6 hours as needed for other (Nausea or vomiting)    Chemotherapy induced nausea and vomiting       ibuprofen 600 MG tablet    ADVIL/MOTRIN    60 tablet    Take 1 tablet (600 mg) by mouth every 6 hours as needed for moderate pain    Sarcoma of vulva (H)       lidocaine-prilocaine cream    EMLA    30 g    Apply to port 30 minutes prior to access    Malignant tumor cells (H)       ondansetron 8 MG tablet    ZOFRAN    30 tablet    Take 1 tablet (8 mg) by mouth every 6 hours as needed for nausea    Encounter for antineoplastic chemotherapy       oxyCODONE IR 5 MG tablet    ROXICODONE    20 tablet    Take 1-2 tablets (5-10 mg) by mouth every 4 hours as needed for moderate to severe pain    Acute post-operative pain       pazopanib 200 MG tablet CHEMOTHERAPY    VOTRIENT    120 tablet    Take 4 tablets (800 mg) by mouth daily    Synovial sarcoma (H)       polyethylene glycol Packet    MIRALAX/GLYCOLAX    7 packet    Take 17 g by mouth daily    Acute post-operative pain       pyridOXINE 100 MG Tabs    VITAMIN B6    90 tablet    Take 3 tablets (300 mg) by mouth daily    Palmar plantar dysesthesia       senna-docusate 8.6-50 MG per tablet    SENOKOT-S;PERICOLACE    30 tablet    Take 2 tablets by mouth 2 times daily as needed for constipation    Acute post-operative pain       triamcinolone 0.1 % ointment    KENALOG    30 g    Apply sparingly to affected area two to three times daily for 7 days.    Dermatitis

## 2017-11-02 NOTE — PROGRESS NOTES
Patient came to clinic today and needed a port draw. I accessed port and osorio labs. Blood return noted, 3-5ml blood wasted. Flushed port with 5ml saline and heparin locked 3ml. Documented in doc flowsheets.

## 2017-11-03 NOTE — PROGRESS NOTES
Pediatric Hematology/Oncology Clinic Note    History- Olimpia initially presented with a growth on her right labia in 2013 when she was living in [place]. 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 She discontinued further treatment b/c her physicians were unsure of the exact diagnosis and she felt uncomfortable proceeding. Olimpia subsequently immigrated to the  in August of 2015 and in October she first noticed a recurrence of the mass in the area of previous excision. She was in [place] at that time, seen by oncology and had a port placed but then moved to [place] where she was seen by Dr. Mckeon at [facility] in November. There she underwent an MRI that showed a solid and cystic subcutaneous mass in the right labia measuring 1.7 x 1.6 x 1cm with question of nodular extension vs a second nodule measuring 0.7 cm. There was no invasion into the vagina. On November 16th 2015, Olimpia had a biopsy of the mass by a gynecologist, Dr. Terry, at [facility]. 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 By immunohistochemistry, the cells shows a complete loss of SMARCb1. Wide spectrum cytokeratin, CD34, WT1, Desmin and CD99 were all negative. RT PCR for Wooten Sarcoma associated fusion transcripts were negative as well. Olimpia went on to have a PET/CT as well which showed multiple pulmonary lesions and biopsy confirmed a lesion to be metastatic disease. Olimpia was seen by Tomás White at [facility] who reviewed the diagnosis and potential treatment plans with her, however she prefered to be treated here at [facility]. 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    Olimpia underwent left sided thoracotomy and resection of two pulmonary nodules on 7/20/16. Pathology revealed that one lesion was benign lymphoid tissue and the other was consistent with synovial sarcoma.  This was confirmed with FISH  With 91% of cell examined having a signal pattern indictivate of a rearrangement of the SS18 locus.  Olimpia has now completed 3 cycles of chemotherapy per [id number] and started her radiation on 9/13/16 and completed on 10/17/16.  She then went on to have a resection of her labial mass on 11/16/16 by Jannet Pastrana and Ch[facility]is with reconstruction, including skin flaps by Dr. Corona from plastics.  Pathology showed no residual tumor.  She had a f/u chest CT today on 12/5/16 that showed persistance of her pulmonary nodules as well as few additonal pulmonary nodules.  She saw Dr. Pierre who was in agreement with surgical resection, however Olimpia wanted to wait until March to have more time to recover from her last surgery. She underwent left sided thoracotomy on 3/8/17.    In May, Olimpia's follow up PET/CT demonstrated significant progression of her pulmonary metastatic disease, with an anterior mediastinal mass compression her right ventricle and potentially right outflow tract.  Subsequent echocardiogram did not show altered cardiac function, Dr Man felt radiation therapy was not in her best interest at this time but would consider if she developed cardiac dysfunction.  Olimpia was started on oral Pazopanib in the beginning of May for palliative therapy.  About 1 month into it we held her doses due to palmar plantar dysesthesia and stomatitis.  Her symptoms resolved quickly within 1 week and she restarted at 50% dosing on 6/19/17.    HPI:  Since her last visit Olimpia has been doing really well.  Her appetite is great.  She is still a little fatigued but sleeping well and able to particpate in what she wants to do.  She did develop a painful rash on her  hands earlier this week and in general feels her skin is more fragile  She denies pain el[place]where.  No new lumps or bumps.  No cough or SOB.  No n/v/d/constipation.  Urinating without issue.  She is going to school, taking ESL courses.    History was obtained from [facility] via professional [facility] .     Allergies as of 11/02/2017 - [place] as Reviewed 11/02/2017   Allergen Reaction Noted     Heparin flush Other (See Comments) 01/27/2016     Pork derived products  02/09/2016     Tegaderm transparent dressing (informational only) Other (See Comments) and Rash 04/20/2016       Current Outpatient Prescriptions   Medication Sig Dispense Refill     pyridOXINE (VITAMIN B6) 100 MG TABS Take 3 tablets (300 mg) by mouth daily 90 tablet 3     Colloidal Oatmeal 1 % CREA Externally apply topically 2 times daily 1 Tube 3     triamcinolone (KENALOG) 0.1 % ointment Apply sparingly to affected area two to three times daily for 7 days. 30 g 0     pazopanib (VOTRIENT) 200 MG tablet CHEMOTHERAPY Take 4 tablets (800 mg) by mouth daily 120 tablet 5     oxyCODONE (ROXICODONE) 5 MG IR tablet Take 1-2 tablets (5-10 mg) by mouth every 4 hours as needed for moderate to severe pain 20 tablet 0     polyethylene glycol (MIRALAX/GLYCOLAX) Packet Take 17 g by mouth daily 7 packet 1     senna-docusate (SENOKOT-S;PERICOLACE) 8.6-50 MG per tablet Take 2 tablets by mouth 2 times daily as needed for constipation 30 tablet 1     acetaminophen (TYLENOL) 325 MG tablet Take 2 tablets (650 mg) by mouth every 4 hours as needed for mild pain 100 tablet 1     ibuprofen (ADVIL/MOTRIN) 600 MG tablet Take 1 tablet (600 mg) by mouth every 6 hours as needed for moderate pain 60 tablet 1     diltiazem 2% in PLO cream, FV COMPOUNDED, 2% GEL To anal opening three times daily.  Use a pea-sized amount.  Store at room temperature. 60 g 0     ondansetron (ZOFRAN) 8 MG tablet Take 1 tablet (8 mg) by mouth every 6 hours as needed for nausea 30 tablet 6      diphenhydrAMINE (BENADRYL) 25 MG tablet Take 1-2 tablets (25-50 mg) by mouth every 6 hours as needed for other (Nausea or vomiting) 60 tablet 3     lidocaine-prilocaine (EMLA) cream Apply to port 30 minutes prior to access 30 g 5       Past Medical History:   Diagnosis Date     Anemia 6/3/2016     Constipation      Extrarenal rhabdoid neoplasm (H)      Febrile neutropenia (H) 4/17/2016     History of blood transfusion      Latent tuberculosis      Lung disease      On antineoplastic chemotherapy      Sarcoma of vulva (H)      Social History:  Olimpia is attending school with the goal to get her diploma, she goes Mon-Thursday and is really enjoying it.    Family History   Problem Relation Age of Onset     Other Cancer No family hx of      ROS  See HPI. Complete ROS otherwise negative.    Physical Exam   Wt Readings from Last 4 Encounters:   11/02/17 61.9 kg (136 lb 7.4 oz)   08/21/17 61.2 kg (134 lb 14.7 oz)   08/14/17 61.5 kg (135 lb 9.3 oz)   08/03/17 61.5 kg (135 lb 9.3 oz)       Temp:  [97.4  F (36.3  C)] 97.4  F (36.3  C)  Pulse:  [74] 74  Resp:  [18] 18  BP: (104)/(70) 104/70  SpO2:  [100 %] 100 %    GENERAL: Alert, interactive.  SKIN: dorsum of hands with erythematous scaly plaques;  peeling.  Abrasion on left foot healing. No drainage.  HEAD: Normocephalic, atraumatic     EYES: Normal lids, conjunctivae/cornea normal, mild scleral icterus. PERRL. EOMI.  EARS: Pinna normal b/l, no pain on palpation. External ears normal appearing.  TMs opaque bilaterally  NOSE: Congested, no nasal drainage noted.  No facial pain.  MOUTH/THROAT: Oropharynx is clear. Tongue without sores. Normal dentition for age, no mucosal lesions.  NECK: Supple, full range of motion, no masses  LYMPH NODES: No cervical lymphadenopathy.  LUNGS: No increased WOB.  Lungs clear to auscultation throughout; diminished in LLL.  No crackles or wheezes.  HEART: HRR. S1 and S2 are normal. No murmurs, rubs, gallops. The radial pulses are 2+ bilaterally.  Cap refill <3 sec in upper extremities.  ABDOMEN: Normal bowel sounds. Soft, non-tender, non-distended, no masses or hepatosplenomegaly.  NEUROLOGIC: Grossly intact, no focal neurologic deficits.    :  Deferred today.      Labs:  Results for orders placed or performed in visit on 11/02/17 (from the past 24 hour(s))   CBC with platelets differential   Result Value Ref Range    WBC 4.2 4.0 - 11.0 10e9/L    RBC Count 3.87 3.8 - 5.2 10e12/L    Hemoglobin 14.1 11.7 - 15.7 g/dL    Hematocrit 40.3 35.0 - 47.0 %     (H) 78 - 100 fl    MCH 36.4 (H) 26.5 - 33.0 pg    MCHC 35.0 31.5 - 36.5 g/dL    RDW 11.3 10.0 - 15.0 %    Platelet Count 133 (L) 150 - 450 10e9/L    Diff Method Automated Method     % Neutrophils 49.7 %    % Lymphocytes 40.0 %    % Monocytes 9.2 %    % Eosinophils 0.7 %    % Basophils 0.2 %    % Immature Granulocytes 0.2 %    Nucleated RBCs 0 0 /100    Absolute Neutrophil 2.1 1.6 - 8.3 10e9/L    Absolute Lymphocytes 1.7 0.8 - 5.3 10e9/L    Absolute Monocytes 0.4 0.0 - 1.3 10e9/L    Absolute Eosinophils 0.0 0.0 - 0.7 10e9/L    Absolute Basophils 0.0 0.0 - 0.2 10e9/L    Abs Immature Granulocytes 0.0 0 - 0.4 10e9/L    Absolute Nucleated RBC 0.0    Comprehensive metabolic panel   Result Value Ref Range    Sodium 139 133 - 144 mmol/L    Potassium 3.7 3.4 - 5.3 mmol/L    Chloride 102 94 - 109 mmol/L    Carbon Dioxide 29 20 - 32 mmol/L    Anion Gap 8 3 - 14 mmol/L    Glucose 113 (H) 70 - 99 mg/dL    Urea Nitrogen 10 7 - 30 mg/dL    Creatinine 0.50 (L) 0.52 - 1.04 mg/dL    GFR Estimate >90 >60 mL/min/1.7m2    GFR Estimate If Black >90 >60 mL/min/1.7m2    Calcium 8.9 8.5 - 10.1 mg/dL    Bilirubin Total 0.4 0.2 - 1.3 mg/dL    Albumin 3.4 3.4 - 5.0 g/dL    Protein Total 7.2 6.8 - 8.8 g/dL    Alkaline Phosphatase 77 40 - 150 U/L    ALT 32 0 - 50 U/L    AST 28 0 - 45 U/L   Routine UA with microscopic - No culture   Result Value Ref Range    Color Urine Light Yellow     Appearance Urine Clear     Glucose Urine  Negative NEG^Negative mg/dL    Bilirubin Urine Negative NEG^Negative    Ketones Urine Negative NEG^Negative mg/dL    Specific Gravity Urine 1.003 1.003 - 1.035    Blood Urine Negative NEG^Negative    pH Urine 6.0 5.0 - 7.0 pH    Protein Albumin Urine Negative NEG^Negative mg/dL    Urobilinogen mg/dL Normal 0.0 - 2.0 mg/dL    Nitrite Urine Negative NEG^Negative    Leukocyte Esterase Urine Small (A) NEG^Negative    Source Midstream Urine     WBC Urine 3 (H) 0 - 2 /HPF    RBC Urine 0 0 - 2 /HPF    Squamous Epithelial /HPF Urine 1 0 - 1 /HPF         Impression:  Olimpia is a 22-year-old female with recurrent metastatic synovial sarcoma. She is doing well on Pazopanib, most recent CT scan showed substantial decrease in metastatic disease.  She has a new eryhtematous rash on the dorsum of her hands, otherwise doing great.  Plan:  1) Continue pazopanib at 50% dosing (400 mg daily), given decrease in metastatic disease and skin changes will not increase at this time.  2) Will try oral vitamin B6 and topical eucerin;  If hands get worse will consider holding Pazopanib;  Other skin rashes habe resolved for now  3) Advised Olimpia to call if she experiences any increased SOB, chest pain, palpitations given her small bilateral pneumothoraces.    4) Deferred influenza vaccine today  5) F/U in 1 months for visit and labs

## 2017-12-11 PROBLEM — J93.9 PNEUMOTHORAX: Status: ACTIVE | Noted: 2017-01-01

## 2017-12-11 NOTE — MR AVS SNAPSHOT
After Visit Summary   12/11/2017    Olimpia Childress    MRN: [id number]           Patient Information     Date Of Birth          1995        Visit Information        Provider Department      12/11/2017 10:00 AM Coretta Yanez MD Peds Hematology Oncology        Today's Diagnoses     Acute chest pain    -  1    Palmar plantar dysesthesia        Synovial sarcoma (H)              [facility], 9th floor  [address] [place] [address]  Phone: [phone]  Clinic Hours:   Monday-Friday:   7 am to 5:00 pm   closed weekends and major  holidays     If your fever is 100.5  or greater,   call the clinic during business hours.   After hours call [phone] and ask for the pediatric hematology / oncology physician to be paged for you.               Follow-ups after your visit        Your next 10 appointments already scheduled     Dec 18, 2017  1:30 PM CST   Return Visit with Coretta Yanez MD   Peds Hematology Oncology ([facility])    [facility]  9th Floor  [address] [place] [address] [phone]              Who to contact     Please call your clinic at [phone] to:    Ask questions about your health    Make or cancel appointments    Discuss your medicines    Learn about your test results    Speak to your doctor   If you have compliments or concerns about an experience at your clinic, or if you wish to file a complaint, please contact [facility] Patient Relations at [phone] or email us at [email or web address]         Additional Information About Your Visit        MyChart Information     SMX is an electronic gateway that provides easy, online access to your medical records. With SMX, you can request a clinic appointment, read your test results, renew a prescription or communicate with your care team.     To sign up for SMX visit the  website at [email or web address]   You will be asked to enter the access code listed below, as well as some personal information. Please follow the directions to create your username and password.     Your access code is: [email or web address]  Expires: 2017  3:05 PM     Your access code will  in 90 days. If you need help or a new code, please contact your [facility] or call [phone] for assistance.        Care EveryWhere ID     This is your Care EveryWhere ID. This could be used by other organizations to access your [facility] medical records  [id number]        Your Vitals Were     Pulse Temperature Respirations Pulse Oximetry          143 99.6  F (37.6  C) (Oral) 28 94%         Blood Pressure from Last 3 Encounters:   17 98/76   17 97/72   17 113/78    Weight from Last 3 Encounters:   17 64.2 kg (141 lb 8.6 oz)   17 61.9 kg (136 lb 7.4 oz)   17 61.2 kg (134 lb 14.7 oz)              We Performed the Following     CBC with platelets differential     Comprehensive metabolic panel     XR Chest Port 1 View        Primary Care Provider Office Phone # Fax #    Coretta Reyes Yanez [phone] [address] [facility] [address]        Equal Access to Services     [facility]: Hadii aad ku hadasho Sodolores, waaxda luqadaha, qaybta kaalmada anne-marie, wilner blue . So [facility] [phone].    ATENCIÓN: Si habla español, tiene a ordoñez disposición servicios gratuitos de asistencia lingüística. Darius al [phone].    We comply with applicable federal civil rights laws and [place] laws. We do not discriminate on the basis of race, color, national origin, age, disability, sex, sexual orientation, or gender identity.            Thank you!     Thank you for choosing PEDS HEMATOLOGY ONCOLOGY  for your care. Our goal is always to provide you with excellent care. Hearing back from our patients  is one way we can continue to improve our services. Please take a few minutes to complete the written survey that you may receive in the mail after your visit with us. Thank you!             Your Updated Medication List - Protect others around you: Learn how to safely use, store and throw away your medicines at www.disposemymeds.org.          This list is accurate as of: 12/11/17  1:29 PM.  Always use your most recent med list.                   Brand Name Dispense Instructions for use Diagnosis    acetaminophen 325 MG tablet    TYLENOL    100 tablet    Take 2 tablets (650 mg) by mouth every 4 hours as needed for mild pain    Sarcoma of vulva (H)       cefdinir 300 MG capsule    OMNICEF    14 capsule    Take 1 capsule (300 mg) by mouth 2 times daily for 14 doses    Pneumonia due to infectious organism, unspecified laterality, unspecified part of lung       Colloidal Oatmeal 1 % Crea     1 Tube    Externally apply topically 2 times daily    Palmar plantar dysesthesia       dextromethorphan 30 MG/5ML liquid    DELSYM    89 mL    Take 5 mLs (30 mg) by mouth every 12 hours as needed for cough    Pneumonia due to infectious organism, unspecified laterality, unspecified part of lung       diltiazem 2% in PLO cream (FV COMPOUNDED) 2% Gel     60 g    To anal opening three times daily.  Use a pea-sized amount.  Store at room temperature.    Anal fissure       diphenhydrAMINE 25 MG tablet    BENADRYL    60 tablet    Take 1-2 tablets (25-50 mg) by mouth every 6 hours as needed for other (Nausea or vomiting)    Chemotherapy induced nausea and vomiting       ibuprofen 600 MG tablet    ADVIL/MOTRIN    60 tablet    Take 1 tablet (600 mg) by mouth every 6 hours as needed for moderate pain    Sarcoma of vulva (H)       lidocaine-prilocaine cream    EMLA    30 g    Apply to port 30 minutes prior to access    Malignant tumor cells (H)       ondansetron 8 MG tablet    ZOFRAN    30 tablet    Take 1 tablet (8 mg) by mouth every 6 hours  as needed for nausea    Encounter for antineoplastic chemotherapy       oxyCODONE IR 5 MG tablet    ROXICODONE    20 tablet    Take 1-2 tablets (5-10 mg) by mouth every 4 hours as needed for moderate to severe pain    Acute post-operative pain       pazopanib 200 MG tablet CHEMOTHERAPY    VOTRIENT    120 tablet    Take 4 tablets (800 mg) by mouth daily    Synovial sarcoma (H)       polyethylene glycol Packet    MIRALAX/GLYCOLAX    7 packet    Take 17 g by mouth daily    Acute post-operative pain       pyridOXINE 100 MG Tabs    VITAMIN B6    90 tablet    Take 3 tablets (300 mg) by mouth daily    Palmar plantar dysesthesia       senna-docusate 8.6-50 MG per tablet    SENOKOT-S;PERICOLACE    30 tablet    Take 2 tablets by mouth 2 times daily as needed for constipation    Acute post-operative pain       triamcinolone 0.1 % ointment    KENALOG    30 g    Apply sparingly to affected area two to three times daily for 7 days.    Dermatitis

## 2017-12-11 NOTE — IP AVS SNAPSHOT
[facility]    [address]    [facility] [place] [address]    Phone:  [phone]                                       After Visit Summary   12/11/2017    Olimpia Childress    MRN: [id number]           After Visit Summary Signature Page     I have received my discharge instructions, and my questions have been answered. I have discussed any challenges I see with this plan with the nurse or doctor.    ..........................................................................................................................................  Patient/Patient Representative Signature      ..........................................................................................................................................  Patient Representative Print Name and Relationship to Patient    ..................................................               ................................................  Date                                            Time    ..........................................................................................................................................  Reviewed by Signature/Title    ...................................................              ..............................................  Date                                                            Time

## 2017-12-11 NOTE — IP AVS SNAPSHOT
MRN:[id number]                      After Visit Summary   12/11/2017    Olimpia Childress    MRN: [id number]           Thank you!     Thank you for choosing [facility] for your care. Our goal is always to provide you with excellent care. Hearing back from our patients is one way we can continue to improve our services. Please take a few minutes to complete the written survey that you may receive in the mail after you visit with us. Thank you!        Patient Information     Date Of Birth          1995        Designated Caregiver       Most Recent Value    Caregiver    Will someone help with your care after discharge? yes    Name of designated caregiver Nely [Sister]    Phone number of caregiver same as pt    Caregiver address same as pt      About your hospital stay     You were admitted on:  December 11, 2017 You last received care in the:  [facility] 5    You were discharged on:  December 14, 2017        Reason for your hospital stay       Olimpia was admitted due to R pneumothorax likely due to viral vs bacterial pneumonia. She received IV antibiotics while in the hospital and will continue oral antibiotics for 7 days after going home.                  Who to Call     For medical emergencies, please call 911.  For non-urgent questions about your medical care, please call your primary care provider or clinic, [phone]          Attending Provider     Provider Specialty    Coretta Yanez MD Pediatric Hematology/Oncology       Primary Care Provider Office Phone # Fax #    Coretta Yanez [phone]      After Care Instructions     Activity       Return to home regular activity as tolerated            Diet       Return to home diet                  Follow-up Appointments     Follow Up and recommended labs and tests       Follow up on Monday at 1:30 pm at [facility] with Dr. Yanez                 "  Your next 10 appointments already scheduled     Dec 18, 2017  1:30 PM CST   Return Visit with Coretta Yanez MD   Peds Hematology Oncology ([facility])    [facility]  9th Floor  [address] [id number] [phone]              Further instructions from your care team       For temperature >100.4, increased nausea, vomiting, pain or any other concerns, please call [phone] & ask to talk to the Pediatric Oncology Fellow On Call.    Monday, December 18  -  Radiology (main level) for chest x-ray @ 1 PM  - [facility]/Dr. Yanez @ 1:30 PM    Pending Results     Date and Time Order Name Status Description    12/11/2017 2033 Blood culture Preliminary             Statement of Approval     Ordered          12/14/17 1113  I have reviewed and agree with all the recommendations and orders detailed in this document.  EFFECTIVE NOW     Approved and electronically signed by:  Abhay Sweeney MD             Admission Information     Date & Time Provider Department Dept. Phone    12/11/2017 Coretta Yanez MD [facility] Unit 5 [phone]      Your Vitals Were     Blood Pressure Pulse Temperature Respirations Height Weight    104/81 70 98.6  F (37  C) (Oral) 23 1.64 m (5' 4.57\") 64.2 kg (141 lb 8.6 oz)    Pulse Oximetry BMI (Body Mass Index)                95% 23.87 kg/m2          [employer] Information     [employer] lets you send messages to your doctor, view your test results, renew your prescriptions, schedule appointments and more. To sign up, go to [email or web address] . Click on \"Log in\" on the left side of the screen, which will take you to the Welcome page. Then click on \"Sign up Now\" on the right side of the page.     You will be asked to enter the access code listed below, as well as some personal information. Please follow the directions to create your username and password.     Your " access code is: [email or web address]  Expires: 2017  3:05 PM     Your access code will  in 90 days. If you need help or a new code, please call your [facility] clinic or [phone].        Care EveryWhere ID     This is your Care EveryWhere ID. This could be used by other organizations to access your [facility] medical records  [id number]        Equal Access to Services     TRACI BAEZ : Hadii aad ku hadasho Soomaali, waaxda luqadaha, qaybta kaalmada adeegyada, waxmoises stewin markn adebetsy garnett lachacortamaggie . So [facility] [phone].    ATENCIÓN: Si habla español, tiene a ordoñez disposición servicios gratuitos de asistencia lingüística. Llame al [phone].    We comply with applicable federal civil rights laws and [place] laws. We do not discriminate on the basis of race, color, national origin, age, disability, sex, sexual orientation, or gender identity.               Review of your medicines      START taking        Dose / Directions    cefdinir 300 MG capsule   Commonly known as:  OMNICEF   Used for:  Pneumonia due to infectious organism, unspecified laterality, unspecified part of lung        Dose:  300 mg   Take 1 capsule (300 mg) by mouth 2 times daily for 14 doses   Quantity:  14 capsule   Refills:  0       dextromethorphan 30 MG/5ML liquid   Commonly known as:  DELSYM   Used for:  Pneumonia due to infectious organism, unspecified laterality, unspecified part of lung        Dose:  30 mg   Take 5 mLs (30 mg) by mouth every 12 hours as needed for cough   Quantity:  89 mL   Refills:  0         CONTINUE these medicines which may have CHANGED, or have new prescriptions. If we are uncertain of the size of tablets/capsules you have at home, strength may be listed as something that might have changed.        Dose / Directions    * acetaminophen 325 MG tablet   Commonly known as:  TYLENOL   This may have changed:  Another medication with the same name was added. Make sure you understand how and when to take each.   Used  for:  Sarcoma of vulva (H)        Dose:  650 mg   Take 2 tablets (650 mg) by mouth every 4 hours as needed for mild pain   Quantity:  100 tablet   Refills:  1       * acetaminophen 325 MG tablet   Commonly known as:  TYLENOL   This may have changed:  You were already taking a medication with the same name, and this prescription was added. Make sure you understand how and when to take each.   Used for:  Pneumonia due to infectious organism, unspecified laterality, unspecified part of lung        Dose:  650 mg   Take 2 tablets (650 mg) by mouth every 4 hours as needed for mild pain   Quantity:  100 tablet   Refills:  0       * Notice:  This list has 2 medication(s) that are the same as other medications prescribed for you. Read the directions carefully, and ask your doctor or other care provider to review them with you.      CONTINUE these medicines which have NOT CHANGED        Dose / Directions    Colloidal Oatmeal 1 % Crea   Used for:  Palmar plantar dysesthesia        Externally apply topically 2 times daily   Quantity:  1 Tube   Refills:  3       diltiazem 2% in PLO cream (FV COMPOUNDED) 2% Gel   Used for:  Anal fissure        To anal opening three times daily.  Use a pea-sized amount.  Store at room temperature.   Quantity:  60 g   Refills:  0       diphenhydrAMINE 25 MG tablet   Commonly known as:  BENADRYL   Used for:  Chemotherapy induced nausea and vomiting        Dose:  25-50 mg   Take 1-2 tablets (25-50 mg) by mouth every 6 hours as needed for other (Nausea or vomiting)   Quantity:  60 tablet   Refills:  3       ibuprofen 600 MG tablet   Commonly known as:  ADVIL/MOTRIN   Used for:  Sarcoma of vulva (H)        Dose:  600 mg   Take 1 tablet (600 mg) by mouth every 6 hours as needed for moderate pain   Quantity:  60 tablet   Refills:  1       lidocaine-prilocaine cream   Commonly known as:  EMLA   Used for:  Malignant tumor cells (H)        Apply to port 30 minutes prior to access   Quantity:  30 g    Refills:  5       ondansetron 8 MG tablet   Commonly known as:  ZOFRAN   Used for:  Encounter for antineoplastic chemotherapy        Dose:  8 mg   Take 1 tablet (8 mg) by mouth every 6 hours as needed for nausea   Quantity:  30 tablet   Refills:  6       oxyCODONE IR 5 MG tablet   Commonly known as:  ROXICODONE   Used for:  Acute post-operative pain        Dose:  5-10 mg   Take 1-2 tablets (5-10 mg) by mouth every 4 hours as needed for moderate to severe pain   Quantity:  20 tablet   Refills:  0       pazopanib 200 MG tablet CHEMOTHERAPY   Commonly known as:  VOTRIENT   Used for:  Synovial sarcoma (H)        Dose:  800 mg   Take 4 tablets (800 mg) by mouth daily   Quantity:  120 tablet   Refills:  5       polyethylene glycol Packet   Commonly known as:  MIRALAX/GLYCOLAX   Used for:  Acute post-operative pain        Dose:  17 g   Take 17 g by mouth daily   Quantity:  7 packet   Refills:  1       pyridOXINE 100 MG Tabs   Commonly known as:  VITAMIN B6   Used for:  Palmar plantar dysesthesia        Dose:  300 mg   Take 3 tablets (300 mg) by mouth daily   Quantity:  90 tablet   Refills:  3       senna-docusate 8.6-50 MG per tablet   Commonly known as:  SENOKOT-S;PERICOLACE   Used for:  Acute post-operative pain        Dose:  2 tablet   Take 2 tablets by mouth 2 times daily as needed for constipation   Quantity:  30 tablet   Refills:  1       triamcinolone 0.1 % ointment   Commonly known as:  KENALOG   Used for:  Dermatitis        Apply sparingly to affected area two to three times daily for 7 days.   Quantity:  30 g   Refills:  0            Where to get your medicines      These medications were sent to [facility] [place], [place] - [address]  [address] [address]     Phone:  [phone]     acetaminophen 325 MG tablet    cefdinir 300 MG capsule    dextromethorphan 30 MG/5ML liquid               ANTIBIOTIC INSTRUCTION     You've Been Prescribed an Antibiotic - Now  What?  Your healthcare team thinks that you or your loved one might have an infection. Some infections can be treated with antibiotics, which are powerful, life-saving drugs. Like all medications, antibiotics have side effects and should only be used when necessary. There are some important things you should know about your antibiotic treatment.      Your healthcare team may run tests before you start taking an antibiotic.    Your team may take samples (e.g., from your blood, urine or other areas) to run tests to look for bacteria. These test can be important to determine if you need an antibiotic at all and, if you do, which antibiotic will work best.      Within a few days, your healthcare team might change or even stop your antibiotic.    Your team may start you on an antibiotic while they are working to find out what is making you sick.    Your team might change your antibiotic because test results show that a different antibiotic would be better to treat your infection.    In some cases, once your team has more information, they learn that you do not need an antibiotic at all. They may find out that you don't have an infection, or that the antibiotic you're taking won't work against your infection. For example, an infection caused by a virus can't be treated with antibiotics. Staying on an antibiotic when you don't need it is more likely to be harmful than helpful.      You may experience side effects from your antibiotic.    Like all medications, antibiotics have side effects. Some of these can be serious.    Let you healthcare team know if you have any known allergies when you are admitted to the hospital.    One significant side effect of nearly all antibiotics is the risk of severe and sometimes deadly diarrhea caused by Clostridium difficile (C. Difficile). This occurs when a person takes antibiotics because some good germs are destroyed. Antibiotic use allows C. diificile to take over, putting patients at  high risk for this serious infection.    As a patient or caregiver, it is important to understand your or your loved one's antibiotic treatment. It is especially important for caregivers to speak up when patients can't speak for themselves. Here are some important questions to ask your healthcare team.    What infection is this antibiotic treating and how do you know I have that infection?    What side effects might occur from this antibiotic?    How long will I need to take this antibiotic?    Is it safe to take this antibiotic with other medications or supplements (e.g., vitamins) that I am taking?     Are there any special directions I need to know about taking this antibiotic? For example, should I take it with food?    How will I be monitored to know whether my infection is responding to the antibiotic?    What tests may help to make sure the right antibiotic is prescribed for me?      Information provided by:  www.cdc.gov/getsmart  U.S. Department of Health and Human Services  Centers for disease Control and Prevention  National Center for Emerging and Zoonotic Infectious Diseases  Division of Healthcare Quality Promotion         Protect others around you: Learn how to safely use, store and throw away your medicines at www.disposemymeds.org.             Medication List: This is a list of all your medications and when to take them. Check marks below indicate your daily home schedule. Keep this list as a reference.      Medications           Morning Afternoon Evening Bedtime As Needed    * acetaminophen 325 MG tablet   Commonly known as:  TYLENOL   Take 2 tablets (650 mg) by mouth every 4 hours as needed for mild pain   Last time this was given:  650 mg on 12/13/2017 12:35 AM                                * acetaminophen 325 MG tablet   Commonly known as:  TYLENOL   Take 2 tablets (650 mg) by mouth every 4 hours as needed for mild pain   Last time this was given:  650 mg on 12/13/2017 12:35 AM                                 cefdinir 300 MG capsule   Commonly known as:  OMNICEF   Take 1 capsule (300 mg) by mouth 2 times daily for 14 doses                                Colloidal Oatmeal 1 % Crea   Externally apply topically 2 times daily                                dextromethorphan 30 MG/5ML liquid   Commonly known as:  DELSYM   Take 5 mLs (30 mg) by mouth every 12 hours as needed for cough   Last time this was given:  30 mg on 12/13/2017  8:11 PM                                diltiazem 2% in PLO cream (FV COMPOUNDED) 2% Gel   To anal opening three times daily.  Use a pea-sized amount.  Store at room temperature.                                diphenhydrAMINE 25 MG tablet   Commonly known as:  BENADRYL   Take 1-2 tablets (25-50 mg) by mouth every 6 hours as needed for other (Nausea or vomiting)                                ibuprofen 600 MG tablet   Commonly known as:  ADVIL/MOTRIN   Take 1 tablet (600 mg) by mouth every 6 hours as needed for moderate pain   Last time this was given:  600 mg on 12/11/2017 10:43 PM                                lidocaine-prilocaine cream   Commonly known as:  EMLA   Apply to port 30 minutes prior to access                                ondansetron 8 MG tablet   Commonly known as:  ZOFRAN   Take 1 tablet (8 mg) by mouth every 6 hours as needed for nausea   Last time this was given:  8 mg on 12/11/2017  8:21 PM                                oxyCODONE IR 5 MG tablet   Commonly known as:  ROXICODONE   Take 1-2 tablets (5-10 mg) by mouth every 4 hours as needed for moderate to severe pain                                pazopanib 200 MG tablet CHEMOTHERAPY   Commonly known as:  VOTRIENT   Take 4 tablets (800 mg) by mouth daily                                polyethylene glycol Packet   Commonly known as:  MIRALAX/GLYCOLAX   Take 17 g by mouth daily   Last time this was given:  17 g on 12/13/2017  8:51 AM                                pyridOXINE 100 MG Tabs   Commonly known as:  VITAMIN  B6   Take 3 tablets (300 mg) by mouth daily   Last time this was given:  300 mg on 12/13/2017  8:52 AM                                senna-docusate 8.6-50 MG per tablet   Commonly known as:  SENOKOT-S;PERICOLACE   Take 2 tablets by mouth 2 times daily as needed for constipation                                triamcinolone 0.1 % ointment   Commonly known as:  KENALOG   Apply sparingly to affected area two to three times daily for 7 days.                                * Notice:  This list has 2 medication(s) that are the same as other medications prescribed for you. Read the directions carefully, and ask your doctor or other care provider to review them with you.

## 2017-12-11 NOTE — PROGRESS NOTES
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 SW met with pt briefly in clinic prior to her admission to unit 5. Olimpia declined talking with SW at this time due to pain and fatigue. Provided essential oils and encouraged deep breathing and left the room. Attempted to check in again when admitted, though she was peacefully sleeping. SW will attempt check in tomorrow.     INTERVENTION:     Very brief check in. Provided calming environment with essential oils.    ASSESSMENT:     Olimpia was tearful and barely able to hold her eyes open. Per MD, she did not sleep at all last night and was in significant pain. Will check in again when in less pain and well rested.    PLAN:     Social work will continue to assess needs and provide ongoing psychosocial support and access to resources.       BELKIS Salinas, [facility]  Pediatric Hem/Onc   Phone: [phone]  Pager: [phone]

## 2017-12-11 NOTE — CONSULTS
"  PEDIATRIC SURGERY CONSULT  December 11, 2017      REQUESTING PROVIDER: Abhay Sweeney MD    REASON FOR CONSULT: Right sided pneumothorax.       ASSESSMENT: Olimpia Childress is a 22 year old female with hx of synovial sarcoma with pulmonary metastasis s/p left thoracotomy with resection x2 who presented to her clinic appointment today with SOB, cough and right sided chest pain. CXR consistent with right sided apical pneumothorax. Patient is currently stable on 2 L NC and reports improved symptoms.     PLAN:    - Repeat CXR at 4 PM   - Pigtail chest tube with IR if symptoms worsen   - Keep NPO for now.   - Surgery will continue to follow      Patient's findings and plan discussed with staff, Dr. Wakefield.        HISTORY PRESENTING ILLNESS: Olimpia Childress is a 22 year old female well known to the surgery service with hx of synovial sarcoma with pulmonary metastasis s/p left thoracotomy with lung and chest wall resection resection x2 (7/2016 and 3/2017)who presented to her clinic appointment today with SOB, cough and right sided chest pain. CXR consistent with right sided apical pneumothorax. She is currently stable on 2 L NC and reports improved symptoms. Patient reports that she has been have flu like symptoms associated with dry cough over the past 3-4 days. Last night, the cough was at it's worst and she started having increased SOB and chest pain. She reported some nausea but no vomiting. No change in bowel habits.   Of note, Patient was first diagnosed with synovial sarcoma in 2013 and she had received multiple cycles or chemotherapy as well as vulvar resection of the mass. From the Heme/onc note 11/02/2017 : \"In May, Olimpia's follow up PET/CT demonstrated significant progression of her pulmonary metastatic disease, with an anterior mediastinal mass compression her right ventricle and potentially right outflow tract.  Subsequent echocardiogram did not show altered cardiac function, Dr Man felt radiation therapy was not in " "her best interest at this time but would consider if she developed cardiac dysfunction.  Olimpia was started on oral Pazopanib in the beginning of May for palliative therapy.  About 1 month into it we held her doses due to palmar plantar dysesthesia and stomatitis.  Her symptoms resolved quickly within 1 week and she restarted at 50% dosing on 6/19/17.\"    REVIEW OF SYSTEMS: A 10 point ROS was negative except as mentioned above.     PAST MEDICAL HISTORY:   Past Medical History:   Diagnosis Date     Anemia 6/3/2016     Constipation      Extrarenal rhabdoid neoplasm (H)      Febrile neutropenia (H) 4/17/2016     History of blood transfusion      Latent tuberculosis      Lung disease      On antineoplastic chemotherapy      Sarcoma of vulva (H)        SURGICAL HISTORY:   Past Surgical History:   Procedure Laterality Date     BIOPSY VULVA       BIOPSY VULVA Right 8/3/2016    Procedure: BIOPSY VULVA;  Surgeon: Sangita Pastrana MD;  Location: [facility] OR     EXAM UNDER ANESTHESIA PELVIC N/A 11/16/2016    Procedure: EXAM UNDER ANESTHESIA PELVIC;  Surgeon: Sangita Pastrana MD;  Location: [facility] OR     EXCISE MASS VULVA       EXCISE TUMOR PELVIS ANTERIOR  11/16/2016    Procedure: EXCISE TUMOR PELVIS ANTERIOR;  Surgeon: Pradeep Diop MD;  Location: [facility] OR     INSERT CHEST TUBE Left 2/24/2016    Procedure: INSERT CHEST TUBE;  Surgeon: Dharmesh Uribe MD;  Location: [facility] OR     INSERT PORT VASCULAR ACCESS       RECONSTRUCT VULVA WITH MUSCLE FLAP  11/16/2016    Procedure: RECONSTRUCT VULVA WITH MUSCLE FLAP;  Surgeon: Sidra Corona MD;  Location: [facility] OR     THORACOSCOPIC BIOPSY LUNG       THORACOTOMY Left 7/20/2016    Procedure: THORACOTOMY;  Surgeon: Byron Pierre MD;  Location: [facility]     THORACOTOMY Left 3/8/2017    Procedure: THORACOTOMY;  Surgeon: Byron Pierre MD;  Location: [facility]     VULVECTOMY RADICAL DISSECT GROIN(S) N/A 11/16/2016    Procedure: VULVECTOMY RADICAL DISSECT GROIN(S);  Surgeon: " Sangita Pastrana MD;  Location:  OR       SOCIAL HISTORY:   Social History     Social History     Marital status:      Spouse name: N/A     Number of children: N/A     Years of education: N/A     Occupational History     Not on file.     Social History Main Topics     Smoking status: Never Smoker     Smokeless tobacco: Never Used     Alcohol use No     Drug use: No     Sexual activity: No     Other Topics Concern     Not on file     Social History Narrative       FAMILY HISTORY:   Family History   Problem Relation Age of Onset     Other Cancer No family hx of        ALLERGIES:      Allergies   Allergen Reactions     Heparin Flush Other (See Comments)     Due to [employer] reasons     Pork Derived Products      Mandaen reasons     Tegaderm Transparent Dressing (Informational Only) Other (See Comments) and Rash     PLEASE USE IV 3000 FOR DRESSING(S)       MEDICATIONS:    Current Facility-Administered Medications on File Prior to Encounter:  acetaminophen (TYLENOL) 325 MG tablet   [COMPLETED] acetaminophen (TYLENOL) tablet 650 mg   morphine (PF) injection 2 mg   ondansetron (ZOFRAN) injection 6 mg   [COMPLETED] anticoagulant citrate flush     Current Outpatient Prescriptions on File Prior to Encounter:  pazopanib (VOTRIENT) 200 MG tablet CHEMOTHERAPY Take 4 tablets (800 mg) by mouth daily   oxyCODONE (ROXICODONE) 5 MG IR tablet Take 1-2 tablets (5-10 mg) by mouth every 4 hours as needed for moderate to severe pain   senna-docusate (SENOKOT-S;PERICOLACE) 8.6-50 MG per tablet Take 2 tablets by mouth 2 times daily as needed for constipation   acetaminophen (TYLENOL) 325 MG tablet Take 2 tablets (650 mg) by mouth every 4 hours as needed for mild pain   ondansetron (ZOFRAN) 8 MG tablet Take 1 tablet (8 mg) by mouth every 6 hours as needed for nausea   lidocaine-prilocaine (EMLA) cream Apply to port 30 minutes prior to access   pyridOXINE (VITAMIN B6) 100 MG TABS Take 3 tablets (300 mg) by mouth daily    Colloidal Oatmeal 1 % CREA Externally apply topically 2 times daily   triamcinolone (KENALOG) 0.1 % ointment Apply sparingly to affected area two to three times daily for 7 days.   polyethylene glycol (MIRALAX/GLYCOLAX) Packet Take 17 g by mouth daily   ibuprofen (ADVIL/MOTRIN) 600 MG tablet Take 1 tablet (600 mg) by mouth every 6 hours as needed for moderate pain   diltiazem 2% in PLO cream, FV COMPOUNDED, 2% GEL To anal opening three times daily.  Use a pea-sized amount.  Store at room temperature.   diphenhydrAMINE (BENADRYL) 25 MG tablet Take 1-2 tablets (25-50 mg) by mouth every 6 hours as needed for other (Nausea or vomiting)       PHYSICAL EXAMINATION:  Temp:  [98.5  F (36.9  C)-99.8  F (37.7  C)] 99.8  F (37.7  C)  Pulse:  [117-143] 117  Resp:  [26-28] 26  BP: ()/(64-78) 93/64  SpO2:  [94 %-98 %] 98 %  General: Cachectic in mild distress.  Respiratory: Non-labored breathing. Lung sounds decreased bilaterally. On 2 L NC  Cardiovascular: Regular rate and rhythm.   Gastrointestinal: Abdomen soft, non-distended, non-tender to palpation.  Extremities: Warm well perfused,  No pedal edema.     LABS: Reviewed.   Complete Blood Count     Recent Labs  Lab 12/11/17  1112   WBC 8.6   HGB 13.5   *     Basic Metabolic Panel    Recent Labs  Lab 12/11/17  1112      POTASSIUM 3.5   CHLORIDE 98   CO2 25   BUN 16   CR 0.77   *     Liver Function Tests    Recent Labs  Lab 12/11/17  1112   AST 32   ALT 31   ALKPHOS 54   BILITOTAL 0.6   ALBUMIN 2.9*       IMAGING:  Results for orders placed or performed in visit on 12/11/17   XR Chest Port 1 View     Value    Radiologist flags Right-sided pneumothorax (Urgent)    Narrative    XR CHEST PORT 1 VW  12/11/2017 11:08 AM      HISTORY: ; Acute chest pain; Synovial sarcoma (H)    COMPARISON: 8/21/2017    FINDINGS:   Portable upright view of the chest. There is a new moderate right  pneumothorax. There is a new circular opacity in the left upper  lung.  Increased conspicuity of density in the right middle lobe, and  increased pulmonary opacities at the lung left lung base with small  left effusion/pleural thickening.      Impression    IMPRESSION:   1. Moderate right pneumothorax.  2. Circular opacity in the left upper lung, new metastatic nodule  until proven otherwise. Additionally, increased pulmonary opacities at  both lung bases, including opacities associated with the metastatic  lesion in the right middle lobe. Recommend follow-up chest CT.    [Urgent Result: Right-sided pneumothorax]    Finding was identified on 12/11/2017 11:12 AM.     Dr. Yanez was contacted by Dr. Martinez at 12/11/2017 11:41 AM and  verbalized understanding of the urgent finding.     I have personally reviewed the examination and initial interpretation  and I agree with the findings.    YUSRA GAMEZ MD         CO-MORBIDITIES:   No diagnosis found.      Livia Mcintosh MD  General Surgery, PGY-2  [phone]    -----    Attending Attestation:  December 11, 2017    Olimpia Childress was seen and examined with team. I agree with note and plan as discussed.    Imaging reviewed.      Impression/Plan:  Doing OK.  Making steady progress.  Family updated and comfortable with plan as discussed with team.  Sister and pt requesting no intervention if conceivable; advised that we will closely monitor.  Discussed case with her primary surgeon, Dr. Pierre.  Discussed close observation with covering primary service overnight.    Todd Wakefield MD, PhD  Division of Pediatric Surgery, [facility] [phone]

## 2017-12-11 NOTE — PLAN OF CARE
Problem: Patient Care Overview  Goal: Plan of Care/Patient Progress Review  Outcome: No Change  Pt arrived from clinic at 1330. Upon arrival pt was afebrile. TR=560. BP soft=93/64, RR=26. On NC at 2L O2 and sating at 98%. MDs aware of vital signs upon arrival. Lung sounds absent in RLL, diminished in RML and clear in RUL. L side lung sounds course. Pt taking shallow breaths. Bowel sounds present. Pt did not c/o pain upon arrival, did rate it at a 4/10 after being settled but refusing PO pain medications. Pt slept and now stating pain at a 0/10. MIVF started via port. No UOP or stool. Some white patches (dry skin) noted on feet and hands. Pt now NPO for possible procedure. Plan for follow up chest x ray this evening. Sister at bedside. Sister and pt updated on plan of care and oriented to unit and room. Hourly rounding completed. Will continue to monitor and notify MD with changes.

## 2017-12-11 NOTE — MR AVS SNAPSHOT
After Visit Summary   12/11/2017    Olimpia Childress    MRN: [id number]           Patient Information     Date Of Birth          1995        Visit Information        Provider Department      12/11/2017 11:00 AM [facility] PEDS INFUSION CHAIR 12 Peds IV Infusion        Today's Diagnoses     Synovial sarcoma (H)    -  1       Follow-ups after your visit        Future tests that were ordered for you today     Open Standing Orders        Priority Remaining Interval Expires Ordered    XR Chest Port 1 View Routine 1/1 1 TIME IMAGING  12/11/2017    Oxygen: Nasal cannula Routine 91887/81069 CONTINUOUS  12/11/2017    Activity: Up ad raine Routine [id number] PRN  12/11/2017    Daily weights Routine 1/1 DAILY  12/11/2017          Open Future Orders        Priority Expected Expires Ordered    UA with Microscopic Routine 12/11/2017 12/11/2018 12/11/2017            Who to contact     Please call your clinic at [phone] to:    Ask questions about your health    Make or cancel appointments    Discuss your medicines    Learn about your test results    Speak to your doctor   If you have compliments or concerns about an experience at your clinic, or if you wish to file a complaint, please contact [facility] Patient Relations at [phone] or email us at [email or web address]         Additional Information About Your Visit        MyChart Information     [employer] is an electronic gateway that provides easy, online access to your medical records. With [employer], you can request a clinic appointment, read your test results, renew a prescription or communicate with your care team.     To sign up for [employer] visit the website at [email or web address]   You will be asked to enter the access code listed below, as well as some personal information. Please follow the directions to create your username and password.     Your access code is: [email or web address]  Expires: 12/21/2017  3:05 PM     Your  access code will  in 90 days. If you need help or a new code, please contact your [facility] or call [phone] for assistance.        Care EveryWhere ID     This is your Care EveryWhere ID. This could be used by other organizations to access your Tony medical records  [id number]        Your Vitals Were     Pulse Temperature Respirations Pulse Oximetry          127 98.5  F (36.9  C) (Oral) 26 94%         Blood Pressure from Last 3 Encounters:   17 93/64   17 97/72   17 113/78    Weight from Last 3 Encounters:   17 59.9 kg (132 lb 0.9 oz)   17 61.9 kg (136 lb 7.4 oz)   17 61.2 kg (134 lb 14.7 oz)              Today, you had the following     No orders found for display       Primary Care Provider Office Phone # Fax #    Coretta Yanez [phone]       [facility] [facility] [address]        Equal Access to Services     [facility]: Hadii aad ku hadasho Soomaali, waaxda luqadaha, qaybta kaalmada adeegyada, wilner blue . So [facility] [phone].    ATENCIÓN: Si habla español, tiene a ordoñez disposición servicios gratuitos de asistencia lingüística. Llame al [phone].    We comply with applicable federal civil rights laws and [place] laws. We do not discriminate on the basis of race, color, national origin, age, disability, sex, sexual orientation, or gender identity.            Thank you!     Thank you for choosing PEDS IV INFUSION  for your care. Our goal is always to provide you with excellent care. Hearing back from our patients is one way we can continue to improve our services. Please take a few minutes to complete the written survey that you may receive in the mail after your visit with us. Thank you!             Your Updated Medication List - Protect others around you: Learn how to safely use, store and throw away your medicines at www.disposemymeds.org.          This  list is accurate as of: 12/11/17  1:29 PM.  Always use your most recent med list.                   Brand Name Dispense Instructions for use Diagnosis    acetaminophen 325 MG tablet    TYLENOL    100 tablet    Take 2 tablets (650 mg) by mouth every 4 hours as needed for mild pain    Sarcoma of vulva (H)       Colloidal Oatmeal 1 % Crea     1 Tube    Externally apply topically 2 times daily    Palmar plantar dysesthesia       diltiazem 2% in PLO cream (FV COMPOUNDED) 2% Gel     60 g    To anal opening three times daily.  Use a pea-sized amount.  Store at room temperature.    Anal fissure       diphenhydrAMINE 25 MG tablet    BENADRYL    60 tablet    Take 1-2 tablets (25-50 mg) by mouth every 6 hours as needed for other (Nausea or vomiting)    Chemotherapy induced nausea and vomiting       ibuprofen 600 MG tablet    ADVIL/MOTRIN    60 tablet    Take 1 tablet (600 mg) by mouth every 6 hours as needed for moderate pain    Sarcoma of vulva (H)       lidocaine-prilocaine cream    EMLA    30 g    Apply to port 30 minutes prior to access    Malignant tumor cells (H)       ondansetron 8 MG tablet    ZOFRAN    30 tablet    Take 1 tablet (8 mg) by mouth every 6 hours as needed for nausea    Encounter for antineoplastic chemotherapy       oxyCODONE IR 5 MG tablet    ROXICODONE    20 tablet    Take 1-2 tablets (5-10 mg) by mouth every 4 hours as needed for moderate to severe pain    Acute post-operative pain       pazopanib 200 MG tablet CHEMOTHERAPY    VOTRIENT    120 tablet    Take 4 tablets (800 mg) by mouth daily    Synovial sarcoma (H)       polyethylene glycol Packet    MIRALAX/GLYCOLAX    7 packet    Take 17 g by mouth daily    Acute post-operative pain       pyridOXINE 100 MG Tabs    VITAMIN B6    90 tablet    Take 3 tablets (300 mg) by mouth daily    Palmar plantar dysesthesia       senna-docusate 8.6-50 MG per tablet    SENOKOT-S;PERICOLACE    30 tablet    Take 2 tablets by mouth 2 times daily as needed for  constipation    Acute post-operative pain       triamcinolone 0.1 % ointment    KENALOG    30 g    Apply sparingly to affected area two to three times daily for 7 days.    Dermatitis

## 2017-12-11 NOTE — LETTER
12/11/2017      RE: Olimpia Childress  [address]   [facility] [address]       Brief exam note.   I was asked by nursing to evaluate Olimpia due to complaints of difficulty breathing. She had a cough for several days prior to today and yesterday she had acute onset of R sided chest pain that kept her awake and made it difficult to breath. She had tactile temp at home but no temp taken. No other pain in any other locations except a headache that was improved with tylenol.     EXAM:  B/P: 113/78, T: 99.6, P: 143, R: 28 O2 Sats of 91-94% on RA.  Constitutional: alert, moderate distress, cooperative and crying   Cardiovascular: PMI normal. No lifts, heaves, or thrills. Tachycardic. No murmurs, clicks gallops or rub  Respiratory: tachypneic with slightly decreased breath sounds. Unable to take deep breaths due to pain.  Head: Normocephalic. No masses, lesions, tenderness or abnormalities  Neck: Neck supple. No adenopathy. Thyroid symmetric, normal size,  NEURO: Gait normal. Reflexes normal and symmetric. Sensation grossly WNL.    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  1) Stat portable CXR and CBC, CMP. Placed on monitor and continuous pulse ox  2) Tylenol and morphine for pain.  3) O2 for comfort    Pt seen and discussed with Dr. Coretta Yanez who assumed her care at this time.    Izaiah Acosta MD  Pediatric Heme/Onc/BMT Fellow  [phone]      Pediatric Hematology/Oncology Clinic Note    History- Olimpia initially presented with a growth on her right labia in 2013 when she was living in [place]. 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 She discontinued further treatment b/c her physicians were unsure of the exact diagnosis and she  felt uncomfortable proceeding. Olimpia subsequently immigrated to the  in August of 2015 and in October she first noticed a recurrence of the mass in the area of previous excision. She was in [place] at that time, seen by oncology and had a port placed but then moved to [place] where she was seen by Dr. Mckeon at [facility] in November. There she underwent an MRI that showed a solid and cystic subcutaneous mass in the right labia measuring 1.7 x 1.6 x 1cm with question of nodular extension vs a second nodule measuring 0.7 cm. There was no invasion into the vagina. On November 16th 2015, Olimpia had a biopsy of the mass by a gynecologist, Dr. Terry, at [facility]. Cytology was reviewed at [facility] and read as a SMARCB1-deficient genital sarcoma, likely falling within the overall spectrum of malignant rhabdoid tumor. By immunohistochemistry, the cells shows a complete loss of SMARCb1. Wide spectrum cytokeratin, CD34, WT1, Desmin and CD99 were all negative. RT PCR for Wooten Sarcoma associated fusion transcripts were negative as well. Olimpia went on to have a PET/CT as well which showed multiple pulmonary lesions and biopsy confirmed a lesion to be metastatic disease. Olimpia was seen by Tomás White at [facility] who reviewed the diagnosis and potential treatment plans with her, however she prefered to be treated here at [facility]. Olimpia received therapy for metastatic extrarenal rhabdoid tumor per [id number] regimen I until the diagnosis was questioned at the time of resection of her pulmonary mets and was determined to be synovial sarcoma.    Olimpia underwent left sided thoracotomy and resection of two pulmonary nodules on 7/20/16. Pathology revealed that one lesion was benign lymphoid tissue and the other was consistent with synovial sarcoma.  This was confirmed with FISH  With 91% of cell examined having a signal pattern indictivate of a rearrangement of the SS18 locus.  Olimpia has now completed 3 cycles of chemotherapy per  [id number] and started her radiation on 9/13/16 and completed on 10/17/16.  She then went on to have a resection of her labial mass on 11/16/16 by Jannet Pastrana and Chikis with reconstruction, including skin flaps by Dr. Corona from plastics.  Pathology showed no residual tumor.  She had a f/u chest CT today on 12/5/16 that showed persistance of her pulmonary nodules as well as few additonal pulmonary nodules.  She saw Dr. Pierre who was in agreement with surgical resection, however Olimpia wanted to wait until March to have more time to recover from her last surgery. She underwent left sided thoracotomy on 3/8/17.    In May, Olimpia's follow up PET/CT demonstrated significant progression of her pulmonary metastatic disease, with an anterior mediastinal mass compression her right ventricle and potentially right outflow tract.  Subsequent echocardiogram did not show altered cardiac function, Dr Man felt radiation therapy was not in her best interest at this time but would consider if she developed cardiac dysfunction.  Olimpia was started on oral Pazopanib in the beginning of May for palliative therapy.  About 1 month into it we held her doses due to palmar plantar dysesthesia and stomatitis.  Her symptoms resolved quickly within 1 week and she restarted at 50% dosing on 6/19/17.  Olimpia has imaging in August 2017 that showed a positive response to therapy.    HPI:  Olimpia was doing well until about 1 week ago when she developed a URI.  That improved but over the last 3 days she has had worsening cough and yesterday developed severe right sided chest pain.  She also complains of a headache and tactile fever.  No other pain.  No N/V/D.  No skin issues.    History was obtained from Olimpia via professional [employer] .     Allergies as of 12/11/2017 - Review Complete 12/11/2017   Allergen Reaction Noted     Heparin flush Other (See Comments) 01/27/2016     Pork derived products  02/09/2016     Tegaderm transparent  dressing (informational only) Other (See Comments) and Rash 04/20/2016       Current Outpatient Prescriptions   Medication Sig Dispense Refill     dextromethorphan (DELSYM) 30 MG/5ML liquid Take 5 mLs (30 mg) by mouth every 12 hours as needed for cough 89 mL 0     cefdinir (OMNICEF) 300 MG capsule Take 1 capsule (300 mg) by mouth 2 times daily for 14 doses 14 capsule 0       Past Medical History:   Diagnosis Date     Anemia 6/3/2016     Constipation      Extrarenal rhabdoid neoplasm (H)      Febrile neutropenia (H) 4/17/2016     History of blood transfusion      Latent tuberculosis      Lung disease      On antineoplastic chemotherapy      Sarcoma of vulva (H)      Social History:  Olimpia is attending school with the goal to get her diploma, she goes Mon-Thursday and is really enjoying it.    Family History   Problem Relation Age of Onset     Other Cancer No family hx of      ROS  See HPI. Complete ROS otherwise negative.    Physical Exam   Wt Readings from Last 4 Encounters:   12/12/17 64.2 kg (141 lb 8.6 oz)   11/02/17 61.9 kg (136 lb 7.4 oz)   08/21/17 61.2 kg (134 lb 14.7 oz)   08/14/17 61.5 kg (135 lb 9.3 oz)       Temp:  [98.1  F (36.7  C)-98.9  F (37.2  C)] 98.8  F (37.1  C)  Heart Rate:  [66-97] 66  Resp:  [19-23] 22  BP: ()/(74-86) 98/76  SpO2:  [95 %-100 %] 100 %    See above exam      Labs:  No results found for this or any previous visit (from the past 24 hour(s)).      Impression:  Olimpia is a 22-year-old female with recurrent metastatic synovial sarcoma on Pazopanib since May 2017 who presents with cough and chest pain.  CXR shows pneumothorax.  No other significant findings.  Plan:  1) Admit for management of pneumothorax  2) continue Pazopanib  3) Will assess disease status after resolution of pneumothorax.      Coretta Yanez MD

## 2017-12-11 NOTE — PROGRESS NOTES
Olimpia came to clinic today, accompanied by her cousin, for provider appointment and labs. Upon arrival to waiting room, pt complained of 8/10 pain and trouble breathing. Pt immediately roomed on infusion side of clinic for assessment. Dr. Acosta called to bedside. VSS. O2 saturation 94% on room air. Pt placed on 2L NC for comfort. Port accessed using sterile technique. Blood return noted and labs drawn as ordered. 650 mg tylenol administered PO as ordered. Portable chest xray completed in clinic. Pt reported 8/10 pain despite tylenol. IV morphine administered as ordered. Pt reported this resolved pain, but complained of nausea. IV zofran administered as ordered. Per provider, pt will transfer to inpatient on unit 5 for closer observation and further care. Report given to Martine HALL. Port citrate locked and patient transferred by yashira with Shauna Jaimes RN and Marilynn Monteiro RN.

## 2017-12-11 NOTE — H&P
History and Physical  Pediatric Hematology / Oncology     Date of Admission:  12/11/2017    Assessment & Plan   Olimpia Childress is a 22 year old female with synovial sarcoma with lung mets s/p thoracotomies and resection on 7/20/16 and 3/8/17 who presents with 2-3 days of worsening cough, right sided chest pain, and shortness of breath and a CXR consistent with R pneumothorax. Surgery team is consulted and will see her for possible chest tube placement.    PULMONARY  R Pneumothorax  - Surgery Consulted, recs appreciated  - NC 2 LPM at least, increasing if she desaturates <92 or needs more support  - 4pm CXR, then AM CXR to follow up R pneumothorax  - NPO for possible IR placed pig tail if her condition worsens    HEMATOLOGY/ONCOLOGY  Synovial Sarcoma with Lung Metastasis  - Continue Pazopanib immunotherapy starting tomorrow at 50% dose (400 mg)  - History of skin reactions to drug so will continue to monitor with daily skin exams   - Aveeno lotion BID   - Diltiazem 2% PLO cream TID   - Kenalog ointment TID PRN    GASTROINTESTINAL  Nausea/ Vomiting  Zofran 8 mg Q6h PRN  Benadryl 25-50 mg Q6H PRN    Constipation  Senna BID PRN    PAIN/FEVER  Tylenol 650 mg Q4H PRN  Morphine IV 2 mg Q2H PRN  Naloxone 0.1-0.4 PRN opioid reversal    FEN  NPO for possible pig tail placement  D5 NS @ 100cc/hr  Strict Is and Os  Daily weights    Access: Port-A-Cath    Abhay Sweeney MD  Pediatrics Resident, PL-1    Primary Care Physician   Coretta Yanez MD     Physician Attestation   I, Coretta Yanez MD, saw this patient with the resident and agree with the resident s findings and plan of care as documented in the resident s note.      I personally reviewed vital signs, medications, labs and imaging.    Coretta Yanez MD  Date of Service (when I saw the patient): 12/11/17      Chief Complaint   Chest pain, shortness of breath, acute cough    History is obtained from the patient and patient's sister    History of  "Present Illness   Olimpia Chlidress is a 22 year old female with synovial sarcoma with lung mets s/p thoracotomies and resection on 7/20/16 and 3/8/17 who initially presented in clinic today with chest pain, shortness of breath, and severe cough. She has had a viral respiratory infection this past week but for the past three days she has been having worsening cough keeping her up at night, chest pain and tenderness especially on the right side, and occasional episodes of shortness of breath. A CXR in clinic showed a R pneumothorax so she was directly admitted to the hematology/oncology inpatient team for further workup and management. She denies fever, diarrhea, constipation, or emesis. She has been receiving Pazopanib at 50% regular dose (400 mg) because of skin reactions to the drug.    Oncologic History Copied from Dr. Yanez's clinic note dated 11/02/2017  \"Olimpia initially presented with a growth on her right labia in 2013 when she was living in [facility]. 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 She discontinued further treatment b/c her physicians were unsure of the exact diagnosis and she felt uncomfortable proceeding. Olimpia subsequently immigrated to the  in August of 2015 and in October she first noticed a recurrence of the mass in the area of previous excision. She was in [place] at that time, seen by oncology and had a port placed but then moved to [place] where she was seen by Dr. Mckeon at [facility] in November. There she underwent an MRI that showed a solid and cystic subcutaneous mass in the right labia measuring 1.7 x 1.6 x 1cm with question of nodular extension vs a second nodule measuring 0.7 cm. There was no invasion into the vagina. On November 16th 2015, Olimpia had a biopsy of the mass by a gynecologist, Dr. Terry, at [facility]. Cytology was reviewed at " [facility] and read as a SMARCB1-deficient genital sarcoma, likely falling within the overall spectrum of malignant rhabdoid tumor. By immunohistochemistry, the cells shows a complete loss of SMARCb1. Wide spectrum cytokeratin, CD34, WT1, Desmin and CD99 were all negative. RT PCR for Wooten Sarcoma associated fusion transcripts were negative as well. Olimpia went on to have a PET/CT as well which showed multiple pulmonary lesions and biopsy confirmed a lesion to be metastatic disease. Olimpia was seen by Tomás White at [facility] who reviewed the diagnosis and potential treatment plans with her, however she prefered to be treated here at [facility]. Olimpia received therapy for metastatic extrarenal rhabdoid tumor per [id number] regimen I until the diagnosis was questioned at the time of resection of her pulmonary mets and was determined to be synovial sarcoma.     Olimpia underwent left sided thoracotomy and resection of two pulmonary nodules on 7/20/16. Pathology revealed that one lesion was benign lymphoid tissue and the other was consistent with synovial sarcoma.  This was confirmed with FISH  With 91% of cell examined having a signal pattern indictivate of a rearrangement of the SS18 locus.  Olimpia has now completed 3 cycles of chemotherapy per [id number] and started her radiation on 9/13/16 and completed on 10/17/16.  She then went on to have a resection of her labial mass on 11/16/16 by Jannet Pastrana and Chikis with reconstruction, including skin flaps by Dr. Corona from plastics.  Pathology showed no residual tumor.  She had a f/u chest CT today on 12/5/16 that showed persistance of her pulmonary nodules as well as few additonal pulmonary nodules.  She saw Dr. Pierre who was in agreement with surgical resection, however Olimpia wanted to wait until March to have more time to recover from her last surgery. She underwent left sided thoracotomy on 3/8/17.     In May, Olimpia's follow up PET/CT demonstrated significant progression of her  "pulmonary metastatic disease, with an anterior mediastinal mass compression her right ventricle and potentially right outflow tract.  Subsequent echocardiogram did not show altered cardiac function, Dr Man felt radiation therapy was not in her best interest at this time but would consider if she developed cardiac dysfunction.  Olimpia was started on oral Pazopanib in the beginning of May for palliative therapy.  About 1 month into it we held her doses due to palmar plantar dysesthesia and stomatitis.  Her symptoms resolved quickly within 1 week and she restarted at 50% dosing on 6/19/17.\"    Past Medical History    I have reviewed this patient's medical history and updated it with pertinent information if needed.   Past Medical History:   Diagnosis Date     Anemia 6/3/2016     Constipation      Extrarenal rhabdoid neoplasm (H)      Febrile neutropenia (H) 4/17/2016     History of blood transfusion      Latent tuberculosis      Lung disease      On antineoplastic chemotherapy      Sarcoma of vulva (H)        Past Surgical History   I have reviewed this patient's surgical history and updated it with pertinent information if needed.  Past Surgical History:   Procedure Laterality Date     BIOPSY VULVA       BIOPSY VULVA Right 8/3/2016    Procedure: BIOPSY VULVA;  Surgeon: Sangita Pastrana MD;  Location: [facility] OR     EXAM UNDER ANESTHESIA PELVIC N/A 11/16/2016    Procedure: EXAM UNDER ANESTHESIA PELVIC;  Surgeon: Sangita Pastrana MD;  Location: [facility] OR     EXCISE MASS VULVA       EXCISE TUMOR PELVIS ANTERIOR  11/16/2016    Procedure: EXCISE TUMOR PELVIS ANTERIOR;  Surgeon: Pradeep Diop MD;  Location: [facility] OR     INSERT CHEST TUBE Left 2/24/2016    Procedure: INSERT CHEST TUBE;  Surgeon: Dharmesh Uribe MD;  Location: [facility] OR     INSERT PORT VASCULAR ACCESS       RECONSTRUCT VULVA WITH MUSCLE FLAP  11/16/2016    Procedure: RECONSTRUCT VULVA WITH MUSCLE FLAP;  Surgeon: Sidra Corona MD;  Location: " [facility] OR     THORACOSCOPIC BIOPSY LUNG       THORACOTOMY Left 7/20/2016    Procedure: THORACOTOMY;  Surgeon: Byron Pierre MD;  [facility]: [facility] OR     THORACOTOMY Left 3/8/2017    Procedure: THORACOTOMY;  Surgeon: Byron Pierre MD;  [facility]: [facility] OR     VULVECTOMY RADICAL DISSECT GROIN(S) N/A 11/16/2016    Procedure: VULVECTOMY RADICAL DISSECT GROIN(S);  Surgeon: Sangita Pastrana MD;  Location: [facility] OR       Immunization History   Immunization Status:  up to date and documented    Prior to Admission Medications   Cannot display prior to admission medications because the patient has not been admitted in this contact.     Allergies   Allergies   Allergen Reactions     Heparin Flush Other (See Comments)     Due to Anabaptism reasons     Pork Derived Products      Episcopalian reasons     Tegaderm Transparent Dressing (Informational Only) Other (See Comments) and Rash     PLEASE USE IV 3000 FOR DRESSING(S)       Social History    Lives with parents at home. Moved to [place] in 2015.    Family History   No Family history of other cancers    Review of Systems   The 10 point Review of Systems is negative other than noted in the HPI or here.    Physical Exam                      Vital Signs with Ranges  Temp:  [99.6  F (37.6  C)] 99.6  F (37.6  C)  Pulse:  [143] 143  Resp:  [28] 28  BP: (113)/(78) 113/78  SpO2:  [94 %] 94 %  0 lbs 0 oz    GENERAL: Appears tired and in pain yet breathing comfortably with NC  HEAD: Normocephalic, atraumatic  EYES: Pupils equal, round, reactive, Extraocular muscles intact. Normal conjunctivae.  NOSE: Normal without discharge.  MOUTH/THROAT: Clear. No oral lesions. Teeth without obvious abnormalities.  NECK: Supple, no masses.  No thyromegaly.  LYMPH NODES: No adenopathy  LUNGS: Diminshed lung sounds on R. Clear air movement on the left side.  HEART: Regular rhythm. Normal S1/S2. No murmurs. Normal pulses.  ABDOMEN: Soft, non-tender, not distended, no masses or hepatosplenomegaly.  Bowel sounds normal.   NEUROLOGIC: No focal findings. Cranial nerves grossly intact: DTR's normal. Normal gait, strength and tone  EXTREMITIES: Full range of motion, no deformities     Data   Results for orders placed or performed in visit on 12/11/17 (from the past 24 hour(s))   XR Chest Port 1 View   Result Value Ref Range    Radiologist flags Right-sided pneumothorax (Urgent)     Narrative    XR CHEST PORT 1 VW  12/11/2017 11:08 AM      HISTORY: ; Acute chest pain; Synovial sarcoma (H)    COMPARISON: 8/21/2017    FINDINGS:   Portable upright view of the chest. There is a new moderate right  pneumothorax. There is a new circular opacity in the left upper lung.  Increased conspicuity of density in the right middle lobe, and  increased pulmonary opacities at the lung left lung base with small  left effusion/pleural thickening.      Impression    IMPRESSION:   1. Moderate right pneumothorax.  2. Circular opacity in the left upper lung, new metastatic nodule  until proven otherwise. Additionally, increased pulmonary opacities at  both lung bases, including opacities associated with the metastatic  lesion in the right middle lobe. Recommend follow-up chest CT.    [Urgent Result: Right-sided pneumothorax]    Finding was identified on 12/11/2017 11:12 AM.     Dr. Yanez was contacted by Dr. Martinez at 12/11/2017 11:41 AM and  verbalized understanding of the urgent finding.     I have personally reviewed the examination and initial interpretation  and I agree with the findings.    YUSRA GAMEZ MD   CBC with platelets differential   Result Value Ref Range    WBC 8.6 4.0 - 11.0 10e9/L    RBC Count 3.71 (L) 3.8 - 5.2 10e12/L    Hemoglobin 13.5 11.7 - 15.7 g/dL    Hematocrit 38.8 35.0 - 47.0 %     (H) 78 - 100 fl    MCH 36.4 (H) 26.5 - 33.0 pg    MCHC 34.8 31.5 - 36.5 g/dL    RDW 12.0 10.0 - 15.0 %    Platelet Count 120 (L) 150 - 450 10e9/L    Diff Method Automated Method     % Neutrophils 85.1 %    % Lymphocytes  7.2 %    % Monocytes 6.6 %    % Eosinophils 0.0 %    % Basophils 0.1 %    % Immature Granulocytes 1.0 %    Nucleated RBCs 0 0 /100    Absolute Neutrophil 7.3 1.6 - 8.3 10e9/L    Absolute Lymphocytes 0.6 (L) 0.8 - 5.3 10e9/L    Absolute Monocytes 0.6 0.0 - 1.3 10e9/L    Absolute Eosinophils 0.0 0.0 - 0.7 10e9/L    Absolute Basophils 0.0 0.0 - 0.2 10e9/L    Abs Immature Granulocytes 0.1 0 - 0.4 10e9/L    Absolute Nucleated RBC 0.0    Comprehensive metabolic panel   Result Value Ref Range    Sodium 133 133 - 144 mmol/L    Potassium 3.5 3.4 - 5.3 mmol/L    Chloride 98 94 - 109 mmol/L    Carbon Dioxide 25 20 - 32 mmol/L    Anion Gap 10 3 - 14 mmol/L    Glucose 159 (H) 70 - 99 mg/dL    Urea Nitrogen 16 7 - 30 mg/dL    Creatinine 0.77 0.52 - 1.04 mg/dL    GFR Estimate >90 >60 mL/min/1.7m2    GFR Estimate If Black >90 >60 mL/min/1.7m2    Calcium 8.4 (L) 8.5 - 10.1 mg/dL    Bilirubin Total 0.6 0.2 - 1.3 mg/dL    Albumin 2.9 (L) 3.4 - 5.0 g/dL    Protein Total 7.2 6.8 - 8.8 g/dL    Alkaline Phosphatase 54 40 - 150 U/L    ALT 31 0 - 50 U/L    AST 32 0 - 45 U/L

## 2017-12-11 NOTE — LETTER
Transition Communication Hand-off for Care Transitions to Next Level of Care Provider    Name: Olimpia Childress  MRN #: [id number]  Primary Care Provider: Coretta Yanez MD     Primary Clinic: [address] [place] [address]     Reason for Hospitalization:  Pneumothorax, synovial sarcoma  Pneumothorax  Pneumothorax  Admit Date/Time: 12/11/2017  1:29 PM  Discharge Date: 12/14/17    Payor Source: No coverage found.      Readmission Assessment Measure (NANCY) Risk Score/category: low         Reason for Communication Hand-off Referral: Fragility      Follow-up plan:  Future Appointments  Date Time Provider Department Center   12/15/2017 9:30 AM Karissa Laird [facility]   12/16/2017 9:30 AM Anitha Flynn [facility]   12/17/2017 9:30 AM Anitha Flynn [facility]   12/18/2017 9:30 AM Deep [place] [facility]   12/18/2017 1:30 PM Coretta Yanez MD [facility]   12/19/2017 9:30 AM Needed, , MD [facility]       Any outstanding tests or procedures:              Key Recommendations:      Eveline Lomax    AVS/Discharge Summary is the source of truth; this is a helpful guide for improved communication of patient story

## 2017-12-12 NOTE — PLAN OF CARE
Problem: Patient Care Overview  Goal: Plan of Care/Patient Progress Review  Outcome: No Change  Tmax 103.2.  Blood cultures and RVP sent. UA/UC still needed. Tylenol admin x1 with no decrease in temp, one time dose Ibuprofen admin per MD Hortencia. HR 120s. O2 >92% on 2 L NC, RR 20s. Congested cough remains. IVF at 100 ml/hr. 1 L bolus started d/t low UO. Pt had c/o HA slightly relieved by Tylenol but no other c/o pain. Pt approp but very sleepy/fatigued throughout shift. Siblings at bedside, hourly rounding complete. Will continue to monitor.

## 2017-12-12 NOTE — PHARMACY-VANCOMYCIN DOSING SERVICE
Pharmacy Vancomycin Initial Note  Date of Service 2017  Patient's  1995  22 year old, female    Indication: Sepsis    Current estimated CrCl = Estimated Creatinine Clearance: 106.5 mL/min (based on Cr of 0.77).    Creatinine for last 3 days  2017: 11:12 AM Creatinine 0.77 mg/dL    Recent Vancomycin Level(s) for last 3 days  No results found for requested labs within last 72 hours.      Vancomycin IV Administrations (past 72 hours)      No vancomycin orders with administrations in past 72 hours.                Nephrotoxins and other renal medications (Future)    Start     Dose/Rate Route Frequency Ordered Stop    17 0800  vancomycin (VANCOCIN) 1000 mg in dextrose 5% 200 mL PREMIX      1,000 mg  200 mL/hr over 1 Hours Intravenous EVERY 8 HOURS 17 2346      17 0000  vancomycin (VANCOCIN) 1,250 mg in NaCl 0.9 % 250 mL intermittent infusion      1,250 mg  over 90 Minutes Intravenous ONCE 17 2346            Contrast Orders - past 72 hours     None                Plan:  1.  Start vancomycin  1250 mg IV once, followed by 1000 mg IV q8h.   2.  Goal Trough Level: 15-20 mg/L   3.  Pharmacy will check trough levels as appropriate in 1-3 Days.    4. Serum creatinine levels will be ordered daily for the first week of therapy and at least twice weekly for subsequent weeks.    5. [place] method utilized to dose vancomycin therapy: Method 1    Shaggy Colvin, PharmD

## 2017-12-12 NOTE — PLAN OF CARE
Problem: Patient Care Overview  Goal: Plan of Care/Patient Progress Review  Outcome: No Change  Tmax 102.9. Temp eventually came down post one time dose of Ibuprofen admin on evening shift. Pt afebrile for the rest of the night. HR now 60s-70s. RR 20s, O2 >92% on 2 L NC. No SOB noted, cough remains. BPs soft this shift, 80s/70s over 50s/40s. MD Hortencia aware of these findings. No c/o nausea. Tylenol admin q 4 hrs for comfort. Low UO, urine tea colored/gage in color. UA/UC sent. IVF at 100 ml/hr. X-ray scheduled for this am, 1st surgical scrub complete. No family at bedside this shift, pt slept well. Hourly rounding complete. Will continue to monitor.

## 2017-12-12 NOTE — PROGRESS NOTES
"Pediatric Surgery Progress Note - 12/12/2017     Subjective: Olimpia had a fever up to 39.6oC last night that did not come down with tylenol. Fever eventually decreased with one dose of ibuprofen. Blood cultures, respiratory virus panel, and UA were sent. Her O2 sats remained in the mid-high 90s on 2L nasal cannula. She remains NPO.     Objective:  Temp:  [97.7  F (36.5  C)-103.2  F (39.6  C)] 97.7  F (36.5  C)  Pulse:  [117-143] 117  Heart Rate:  [] 70  Resp:  [16-28] 16  BP: ()/(48-78) 74/52  SpO2:  [94 %-99 %] 99 %    Intake/Output Summary (Last 24 hours) at 12/12/17 0632  Last data filed at 12/12/17 0400   Gross per 24 hour   Intake             2050 ml   Output              500 ml   Net             1550 ml       Exam:  General: Female sleeping comfortably in bed.  Resp: Shallow breathing. Clear to auscultation with decreased breath sounds bilaterally  On 2L O2 via NC      Labs: Reviewed  UA: Increased WBC, bacteria present, leukocyte esterase positive, nitrite negative. 4 squamous epithelial cells/HPF present. Urine albumin elevated.  Urine culture pending  Respiratory virus panel pending  Blood culture pending- no growth after 4 hours    Imaging: Reviewed  Chest xray 12/11 at 16:39-   \"IMPRESSION:   1. Stable moderate right pneumothorax. Suspect tiny left apical  pneumothorax.  2. Nodule in the left upper lobe is new from the CT of 8/14/2017.  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 CXR shows moderate right sided apical pneumothorax. She is stable on 2L NC.    - Follow Chest xray today, 12/12 in AM  - Pigtail chest tube with IR if symptoms worsen  - Ok for clears from surgery prospective. Continue IVF per primary team. On  D5-NS at 100ml/hr    Will " discuss with staff    Georgette Beltran, MS3    Livia Mcintosh MD  General Surgery, PGY-2  [phone]    -----    Attending Attestation:  December 12, 2017    Olimpia Childress was seen and examined with team. I agree with note and plan as discussed.    Improving clinically and radiographically.    Impression/Plan:  Doing well.  Making steady progress.  Family updated and comfortable with plan as discussed with team.    Todd Wakefield MD, PhD  Division of Pediatric Surgery, [facility] [phone]

## 2017-12-12 NOTE — SIGNIFICANT EVENT
Blood pressures consistently low 80s/70s over 50s/40s. Lowest reading 74/52. Rapid response called to assess pt. Dr. Baron, PICU Fellow in to see pt in addition to RRT. Plan to give another 1 L bolus and reassess BPs. Will continue to monitor.

## 2017-12-12 NOTE — PROGRESS NOTES
[facility], [place]    Pediatric Hematology / Oncology Progress Note    Date of Service (when I saw the patient): 12/12/2017     Assessment & Plan   Olimpia Childress is a 22 year old female with synovial sarcoma with lung mets s/p thoracotomies and resection on 7/20/16 and 3/8/17 who presents with 2-3 days of worsening cough, right sided chest pain, and shortness of breath and a CXR consistent with R pneumothorax. Given her history of improving viral infection last week and then abrupt worsening 2-3 days prior to admission her presentation is consistent with post viral bacterial infection causing severe cough, fever, and dehydration. Most likely culprits would be Strep pneumo or Staph aureus but CXR doesn't show obvious signs of pneumonia. Follow up CXR showing stable size and possible left apical pneumothorax. She is now on vancomycin and ceftriaxone given sepsis with fevers and hypotension.     ID  Sepsis  - Blood culture 12/11 pending  - Vancomycin 1 g Q8H  - Ceftriaxone 2 g Q24  - If fever we will obtain a second blood culture if no cultures within 24 hrs and expand coverage  - RVP pending  - Contact and droplet precautions  - Monitor BP closely throughout the day    PULMONARY  R Pneumothorax  - Surgery Consulted, recs appreciated  - NC 2 LPM at least, increasing if she desaturates <92 or needs more support  - 4pm CXR, then AM CXR to follow up R pneumothorax  - NPO for possible IR placed pig tail if her condition worsens     HEMATOLOGY/ONCOLOGY  Synovial Sarcoma with Lung Metastasis  - Continue Pazopanib starting tomorrow at 50% dose (400 mg)  - History of skin reactions to drug so will continue to monitor with daily skin exams                        - Aveeno lotion BID                        - Diltiazem 2% PLO cream TID                        - Kenalog ointment TID PRN     GASTROINTESTINAL  Nausea/ Vomiting  Zofran 8 mg Q6h PRN  Benadryl 25-50 mg Q6H PRN     Constipation  Senna BID  PRN     PAIN/FEVER  Tylenol 650 mg Q4H PRN  Morphine IV 2 mg Q2H PRN  Naloxone 0.1-0.4 PRN opioid reversal     FEN  NPO for possible pig tail placement  D5 NS @ 100cc/hr  Strict Is and Os  Daily weights     Access: Port-A-Cath     Abhay Sweeney MD  Pediatrics Resident, PL-1    Physician Attestation   I, Coretta Yanez MD, saw this patient with the resident and agree with the resident s findings and plan of care as documented in the resident s note.      I personally reviewed vital signs, medications, labs and imaging.    Coretta Yanez MD  Date of Service (when I saw the patient): 12/12/17      Interval History   Asma spiked a fever last night Tmax of 39.6 and had lower blood pressures around 70s/50s and so was given 2x boluses. Her BP this morning is still mildly hypotensive but she has good capillary refill and pulse and otherwise VSS. She is still on 2L NC and breathing comfortably. We will continue to monitor her BPs closely    Physical Exam   Temp: 97.8  F (36.6  C) Temp src: Oral BP: (!) 80/55 Pulse: 117 Heart Rate: 66 Resp: 16 SpO2: 100 % O2 Device: Nasal cannula Oxygen Delivery: 2 LPM  Vitals:    12/11/17 1333   Weight: 59.9 kg (132 lb 0.9 oz)     Vital Signs with Ranges  Temp:  [97.7  F (36.5  C)-103.2  F (39.6  C)] 97.8  F (36.6  C)  Pulse:  [117-143] 117  Heart Rate:  [] 66  Resp:  [16-28] 16  BP: ()/(48-78) 80/55  SpO2:  [94 %-100 %] 100 %  I/O last 3 completed shifts:  In: 2850 [P.O.:240; I.V.:1610; IV Piggyback:1000]  Out: 500 [Urine:500]    GENERAL: Active, alert, in no acute distress.  HEAD: Normocephalic, atraumatic  EYES: Pupils equal, round, reactive, Extraocular muscles intact. Normal conjunctivae.  MOUTH/THROAT: Clear. No oral lesions. Teeth without obvious abnormalities.  NECK: Supple, no masses.  No thyromegaly.  LUNGS: Clear lung sounds bilaterally. No rales, rhonchi, wheezing or retractions  HEART: Regular rhythm. Normal S1/S2. No murmurs. Normal  pulses.  ABDOMEN: Soft, non-tender, not distended, no masses or hepatosplenomegaly. Bowel sounds normal.   NEUROLOGIC: No focal findings. Cranial nerves grossly intact    Medications     dextrose 5% and 0.9% NaCl 100 mL/hr at 12/12/17 0238       NaCl         diltiazem 2% in PLO cream (FV COMPOUNDED)   Topical TID     pramoxine-calamine  1 applicator Topical BID     polyethylene glycol  17 g Oral Daily     pyridOXINE  300 mg Oral Daily     anticoagulant citrate  5 mL Intracatheter Q24H     anticoagulant citrate  5 mL Intracatheter Q28 Days     sodium chloride (PF)  10 mL Intracatheter Q28 Days     pazopanib  400 mg Oral Daily     cefTRIAXone  2,000 mg Intravenous Q24H     vancomycin (VANCOCIN) IV  1,000 mg Intravenous Q8H       Data   Results for orders placed or performed during the hospital encounter of 12/11/17 (from the past 24 hour(s))   PEDS Surgery IP Consult: Patient to be seen: Routine within 24 hrs; Call back #: [phone]; 21 yo with synovial sarcoma and lung mets admitted for R apical pneumothorax, will likely need chest tube; Consultant may enter orders: Yes    Narrative    Livia Mcintosh MD     12/11/2017  3:18 PM    PEDIATRIC SURGERY CONSULT  December 11, 2017      REQUESTING PROVIDER: Abhay Sweeney MD    REASON FOR CONSULT: Right sided pneumothorax.       ASSESSMENT: Olimpia Childress is a 22 year old female with hx of   synovial sarcoma with pulmonary metastasis s/p left thoracotomy   with resection x2 who presented to her clinic appointment today   with SOB, cough and right sided chest pain. CXR consistent with   right sided apical pneumothorax. Patient is currently stable on 2   L NC and reports improved symptoms.     PLAN:    - Repeat CXR at 4 PM   - Pigtail chest tube with IR if symptoms worsen   - Keep NPO for now.   - Surgery will continue to follow      Patient's findings and plan discussed with staff, Dr. Wakefield.        HISTORY PRESENTING ILLNESS: Olimpia Childress is a 22 year old female   well known  "to the surgery service with hx of synovial sarcoma   with pulmonary metastasis s/p left thoracotomy with lung and   chest wall resection resection x2 (7/2016 and 3/2017)who   presented to her clinic appointment today with SOB, cough and   right sided chest pain. CXR consistent with right sided apical   pneumothorax. She is currently stable on 2 L NC and reports   improved symptoms. Patient reports that she has been have flu   like symptoms associated with dry cough over the past 3-4 days.   Last night, the cough was at it's worst and she started having   increased SOB and chest pain. She reported some nausea but no   vomiting. No change in bowel habits.   Of note, Patient was first diagnosed with synovial sarcoma in   2013 and she had received multiple cycles or chemotherapy as well   as vulvar resection of the mass. From the Heme/onc note   11/02/2017 : \"In May, Olimpia's follow up PET/CT demonstrated   significant progression of her pulmonary metastatic disease, with   an anterior mediastinal mass compression her right ventricle and   potentially right outflow tract.  Subsequent echocardiogram did   not show altered cardiac function, Dr Man felt radiation   therapy was not in her best interest at this time but would   consider if she developed cardiac dysfunction.  Olimpia was started   on oral Pazopanib in the beginning of May for palliative therapy.    About 1 month into it we held her doses due to palmar plantar   dysesthesia and stomatitis.  Her symptoms resolved quickly within   1 week and she restarted at 50% dosing on 6/19/17.\"    REVIEW OF SYSTEMS: A 10 point ROS was negative except as   mentioned above.     PAST MEDICAL HISTORY:   Past Medical History:   Diagnosis Date     Anemia 6/3/2016     Constipation      Extrarenal rhabdoid neoplasm (H)      Febrile neutropenia (H) 4/17/2016     History of blood transfusion      Latent tuberculosis      Lung disease      On antineoplastic chemotherapy      Sarcoma " of vulva (H)        SURGICAL HISTORY:   Past Surgical History:   Procedure Laterality Date     BIOPSY VULVA       BIOPSY VULVA Right 8/3/2016    Procedure: BIOPSY VULVA;  Surgeon: Sangita Pastrana MD;    Location: [facility] OR     EXAM UNDER ANESTHESIA PELVIC N/A 11/16/2016    Procedure: EXAM UNDER ANESTHESIA PELVIC;  Surgeon: Sangita Pastrana MD;  Location: [facility] OR     EXCISE MASS VULVA       EXCISE TUMOR PELVIS ANTERIOR  11/16/2016    Procedure: EXCISE TUMOR PELVIS ANTERIOR;  Surgeon: Pradeep Diop MD;  Location: [facility] OR     INSERT CHEST TUBE Left 2/24/2016    Procedure: INSERT CHEST TUBE;  Surgeon: Dharmesh Uribe MD;    Location: [facility] OR     INSERT PORT VASCULAR ACCESS       RECONSTRUCT VULVA WITH MUSCLE FLAP  11/16/2016    Procedure: RECONSTRUCT VULVA WITH MUSCLE FLAP;  Surgeon:   Sidra Corona MD;  Location: [facility] OR     THORACOSCOPIC BIOPSY LUNG       THORACOTOMY Left 7/20/2016    Procedure: THORACOTOMY;  Surgeon: Byron Pierre MD;    Location: [facility] OR     THORACOTOMY Left 3/8/2017    Procedure: THORACOTOMY;  Surgeon: Byron Pierre MD;    Location: [facility]     VULVECTOMY RADICAL DISSECT GROIN(S) N/A 11/16/2016    Procedure: VULVECTOMY RADICAL DISSECT GROIN(S);  Surgeon:   Sangita Pastrana MD;  Location: [facility] OR       SOCIAL HISTORY:   Social History     Social History     Marital status:      Spouse name: N/A     Number of children: N/A     Years of education: N/A     Occupational History     Not on file.     Social History Main Topics     Smoking status: Never Smoker     Smokeless tobacco: Never Used     Alcohol use No     Drug use: No     Sexual activity: No     Other Topics Concern     Not on file     Social History Narrative       FAMILY HISTORY:   Family History   Problem Relation Age of Onset     Other Cancer No family hx of        ALLERGIES:      Allergies   Allergen Reactions     Heparin Flush Other (See Comments)     Due to Sikhism reasons     Pork Derived  Products      Anabaptist reasons     Tegaderm Transparent Dressing (Informational Only) Other (See   Comments) and Rash     PLEASE USE IV 3000 FOR DRESSING(S)       MEDICATIONS:    Current Facility-Administered Medications on File Prior to   Encounter:  acetaminophen (TYLENOL) 325 MG tablet   [COMPLETED] acetaminophen (TYLENOL) tablet 650 mg   morphine (PF) injection 2 mg   ondansetron (ZOFRAN) injection 6 mg   [COMPLETED] anticoagulant citrate flush     Current Outpatient Prescriptions on File Prior to Encounter:  pazopanib (VOTRIENT) 200 MG tablet CHEMOTHERAPY Take 4 tablets   (800 mg) by mouth daily   oxyCODONE (ROXICODONE) 5 MG IR tablet Take 1-2 tablets (5-10 mg)   by mouth every 4 hours as needed for moderate to severe pain   senna-docusate (SENOKOT-S;PERICOLACE) 8.6-50 MG per tablet Take 2   tablets by mouth 2 times daily as needed for constipation   acetaminophen (TYLENOL) 325 MG tablet Take 2 tablets (650 mg) by   mouth every 4 hours as needed for mild pain   ondansetron (ZOFRAN) 8 MG tablet Take 1 tablet (8 mg) by mouth   every 6 hours as needed for nausea   lidocaine-prilocaine (EMLA) cream Apply to port 30 minutes prior   to access   pyridOXINE (VITAMIN B6) 100 MG TABS Take 3 tablets (300 mg) by   mouth daily   Colloidal Oatmeal 1 % CREA Externally apply topically 2 times   daily   triamcinolone (KENALOG) 0.1 % ointment Apply sparingly to   affected area two to three times daily for 7 days.   polyethylene glycol (MIRALAX/GLYCOLAX) Packet Take 17 g by mouth   daily   ibuprofen (ADVIL/MOTRIN) 600 MG tablet Take 1 tablet (600 mg) by   mouth every 6 hours as needed for moderate pain   diltiazem 2% in PLO cream, FV COMPOUNDED, 2% GEL To anal opening   three times daily.  Use a pea-sized amount.  Store at room   temperature.   diphenhydrAMINE (BENADRYL) 25 MG tablet Take 1-2 tablets (25-50   mg) by mouth every 6 hours as needed for other (Nausea or   vomiting)       PHYSICAL EXAMINATION:  Temp:  [98.5  F (36.9   C)-99.8  F (37.7  C)] 99.8  F (37.7  C)  Pulse:  [117-143] 117  Resp:  [26-28] 26  BP: ()/(64-78) 93/64  SpO2:  [94 %-98 %] 98 %  General: Cachectic in mild distress.  Respiratory: Non-labored breathing. Lung sounds decreased   bilaterally. On 2 L NC  Cardiovascular: Regular rate and rhythm.   Gastrointestinal: Abdomen soft, non-distended, non-tender to   palpation.  Extremities: Warm well perfused,  No pedal edema.     LABS: Reviewed.   Complete Blood Count     Recent Labs  Lab 12/11/17  1112   WBC 8.6   HGB 13.5   *     Basic Metabolic Panel    Recent Labs  Lab 12/11/17  1112      POTASSIUM 3.5   CHLORIDE 98   CO2 25   BUN 16   CR 0.77   *     Liver Function Tests    Recent Labs  Lab 12/11/17  1112   AST 32   ALT 31   ALKPHOS 54   BILITOTAL 0.6   ALBUMIN 2.9*       IMAGING:  Results for orders placed or performed in visit on 12/11/17   XR Chest Port 1 View     Value    Radiologist flags Right-sided pneumothorax (Urgent)    Narrative    XR CHEST PORT 1 VW  12/11/2017 11:08 AM      HISTORY: ; Acute chest pain; Synovial sarcoma (H)    COMPARISON: 8/21/2017    FINDINGS:   Portable upright view of the chest. There is a new moderate right  pneumothorax. There is a new circular opacity in the left upper   lung.  Increased conspicuity of density in the right middle lobe, and  increased pulmonary opacities at the lung left lung base with   small  left effusion/pleural thickening.      Impression    IMPRESSION:   1. Moderate right pneumothorax.  2. Circular opacity in the left upper lung, new metastatic nodule  until proven otherwise. Additionally, increased pulmonary   opacities at  both lung bases, including opacities associated with the   metastatic  lesion in the right middle lobe. Recommend follow-up chest CT.    [Urgent Result: Right-sided pneumothorax]    Finding was identified on 12/11/2017 11:12 AM.     Dr. Yanez was contacted by Dr. Martinez at 12/11/2017 11:41   AM and  verbalized  understanding of the urgent finding.     I have personally reviewed the examination and initial   interpretation  and I agree with the findings.    YUSRA GAMEZ MD         CO-MORBIDITIES:   No diagnosis found.      Livia Mcintosh MD  General Surgery, PGY-2  [phone]     XR Chest Port 1 View    Narrative    XR CHEST PORT 1 VW  12/11/2017 4:39 PM      HISTORY: f/u R pneumothorax;     COMPARISON: Same day    FINDINGS:   Portable upright view of the chest. Port-A-Cath is stable in position.  Stable moderate right pneumothorax. There is likely also a tiny left  apical pneumothorax. There is a 2 mm left upper lobe nodule and a 1.5  cm right middle lobe nodule, as seen on the comparison radiograph  earlier today. There continue to be right middle lobe and left lower  lobe opacities, with a small amount of pleural fluid versus pleural  thickening on the left.      Impression    IMPRESSION:   1. Stable moderate right pneumothorax. Suspect tiny left apical  pneumothorax.  2. Nodule in the left upper lobe is new from the CT of 8/14/2017.  Right middle lobe nodule appears increased in conspicuity.    YUSRA GAMEZ MD   Blood culture   Result Value Ref Range    Specimen Description Blood Unspecified Site     Culture Micro No growth after 9 hours    UA with Microscopic   Result Value Ref Range    Color Urine Yellow     Appearance Urine Slightly Cloudy     Glucose Urine Negative NEG^Negative mg/dL    Bilirubin Urine Negative NEG^Negative    Ketones Urine Negative NEG^Negative mg/dL    Specific Gravity Urine 1.020 1.003 - 1.035    Blood Urine Negative NEG^Negative    pH Urine 5.5 5.0 - 7.0 pH    Protein Albumin Urine 30 (A) NEG^Negative mg/dL    Urobilinogen mg/dL Normal 0.0 - 2.0 mg/dL    Nitrite Urine Negative NEG^Negative    Leukocyte Esterase Urine Large (A) NEG^Negative    Source Midstream Urine     WBC Urine 63 (H) 0 - 2 /HPF    RBC Urine 0 0 - 2 /HPF    Bacteria Urine Few (A) NEG^Negative /HPF    Squamous Epithelial /HPF  Urine 4 (H) 0 - 1 /HPF    Renal Tub Epi 1 (A) NEG^Negative /HPF   Creatinine   Result Value Ref Range    Creatinine 0.71 0.52 - 1.04 mg/dL    GFR Estimate >90 >60 mL/min/1.7m2    GFR Estimate If Black >90 >60 mL/min/1.7m2

## 2017-12-12 NOTE — PLAN OF CARE
Problem: Patient Care Overview  Goal: Plan of Care/Patient Progress Review  Outcome: No Change  VSS, temps decreased--97's oral and bp's improved as well 80-90's/50-60's. Patient starting to void more, urine more clear. C/o headache all shift--mostly with coughing episodes. Prn tylenol and ice packs with little relief. C/o feeling dizzy when sits up, reminded to call out for assistance when using bathroom. Lung sounds clear, diminished at bases--sats upper 90's-100 on 2 L via NC. Chest x-ray unchanged from yesterday. Advancing diet. Sister and family attentive at bedside.

## 2017-12-12 NOTE — PLAN OF CARE
"Problem: Patient Care Overview  Goal: Plan of Care/Patient Progress Review  Outcome: No Change  Pt was afebrile.  RR in the low 20s and continues to require 2L oxygen via NC.  Lungs sound coarse with absent RLL lung sounds.  Pt has a fair productive cough that is cause cranial pressures.  Cold compresses and tylenol offered.  No c/o of shortness of breath with current interventions.  Pt reports feels \"very tired\" and slept on and off most of the shift.  Sister is here at the bedside and requested that she be called if any interventions is needed overnight.  Pt updated on the POC.  No questions at this time. Hourly rounding completed.       "

## 2017-12-12 NOTE — DISCHARGE INSTRUCTIONS
For temperature >100.4, increased nausea, vomiting, pain or any other concerns, please call [phone] & ask to talk to the Pediatric Oncology Fellow On Call.    Monday, December 18  -  Radiology (main level) for chest x-ray @ 1 PM  - [facility]/Dr. Yanez @ 1:30 PM

## 2017-12-13 NOTE — PLAN OF CARE
Problem: Patient Care Overview  Goal: Plan of Care/Patient Progress Review  Outcome: No Change  AVSS. Patient complained of headache x1, tylenol given. No further complaints of pain. Nausea complaint, benedryl x1. BPs 100s/60s-70s. On 2L of oxygen continuous. Will continue to monitor and notify MD of any changes.

## 2017-12-13 NOTE — PROGRESS NOTES
Pediatric Surgery Progress Note - 12/13/2017     Subjective: No acute events overnight. satting well, no respiratory distress.     Objective:  Temp:  [97.5  F (36.4  C)-98.3  F (36.8  C)] 98.2  F (36.8  C)  Pulse:  [64-70] 70  Heart Rate:  [65-79] 79  Resp:  [15-22] 20  BP: ()/(57-75) 100/67  SpO2:  [95 %-100 %] 100 %    Intake/Output Summary (Last 24 hours) at 12/13/17 0710  Last data filed at 12/13/17 0659   Gross per 24 hour   Intake          3680.83 ml   Output             1435 ml   Net          2245.83 ml       Exam:  General: Adult  female resting comfortably in be  Resp: nonlabored, CTAb; on NC  CV: RRR.  Abd: soft, non-tender, non-distended.    Incision: none.     Labs: Reviewed    BMP  Recent Labs  Lab 12/13/17  0517      POTASSIUM 3.3*   CHLORIDE 113*   CO2 24   ANIONGAP 7   BUN 8   CR 0.80   GFRESTIMATED 87   *   JANA 7.2*     CBC  Recent Labs  Lab 12/13/17  0517   WBC 5.2   HGB 11.1*   PLT 66*   HCT 32.2*        Imaging: Reviewed; CXR 12/13/2017 - pending.    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 CXR shows moderate right sided apical pneumothorax. She is stable on NC.  - Follow up CXR from today.  - IR pigtail chest tube if needed.  - No surgical intervention at this time. Ok to advance diet as tolerated.    Will discuss with Dr. Wakefield and Dr. Pierre.    Adri Hu MD - PGY4 - Pediatric Surgery Resident - Pager: [phone]     -----    Attending Attestation:  December 13, 2017    Olimpia Childress was seen and examined with team. I agree with note and plan as discussed.    Impression/Plan:  Doing well.  Making steady progress.  Family updated and comfortable with plan as discussed with team.    Todd Wakefield MD, PhD  Division of Pediatric Surgery, [facility] [phone]

## 2017-12-13 NOTE — PHARMACY-VANCOMYCIN DOSING SERVICE
Pharmacy Vancomycin Note  Date of Service 2017  Patient's  1995   22 year old, female    Indication: Sepsis  Goal Trough Level: 15-20 mg/L  Day of Therapy: 3  Current Vancomycin regimen:  1000 mg IV q12h    Current estimated CrCl = Estimated Creatinine Clearance: 109.9 mL/min (based on Cr of 0.8).    Creatinine for last 3 days  2017: 11:12 AM Creatinine 0.77 mg/dL  2017:  5:48 AM Creatinine 0.71 mg/dL  2017:  5:17 AM Creatinine 0.80 mg/dL    Recent Vancomycin Levels (past 3 days)  2017:  5:17 AM Vancomycin Level 14.5 mg/L at 8.33 hours post dose    Vancomycin IV Administrations (past 72 hours)                   vancomycin (VANCOCIN) 1000 mg in dextrose 5% 200 mL PREMIX (mg) 1,000 mg New Bag 17 0852     1,000 mg New Bag 17 2055    vancomycin (VANCOCIN) 1000 mg in dextrose 5% 200 mL PREMIX (mg) 1,000 mg New Bag 17 0807    vancomycin (VANCOCIN) 1,250 mg in NaCl 0.9 % 250 mL intermittent infusion (mg) 1,250 mg New Bag 17 0009                Nephrotoxins and other renal medications (Future)    Start     Dose/Rate Route Frequency Ordered Stop    17  vancomycin (VANCOCIN) 1000 mg in dextrose 5% 200 mL PREMIX      1,000 mg  200 mL/hr over 1 Hours Intravenous EVERY 12 HOURS 17 0831               Contrast Orders - past 72 hours     None          Interpretation of levels and current regimen:  Trough level is  Subtherapeutic at a time of 8.33 hours    Has serum creatinine changed > 50% in last 72 hours: No    Urine output:  good urine output    Renal Function: Stable    Plan:  1. Recomend Increase Dose to 1250 mn q12 hours  2.  Pharmacy will check trough levels as appropriate in 1-3 Days.    3. Serum creatinine levels will be ordered daily for the first week of therapy and at least twice weekly for subsequent weeks.      Brandan Young, PharmD          .

## 2017-12-13 NOTE — PROGRESS NOTES
[facility], [place]    Pediatric Hematology / Oncology Progress Note    Date of Service (when I saw the patient): 12/13/2017     Assessment & Plan   Olimpia Childress is a 22 year old female with synovial sarcoma with lung mets s/p thoracotomies and resection on 7/20/16 and 3/8/17 who presents with 2-3 days of worsening cough, right sided chest pain, and shortness of breath and a CXR consistent with R pneumothorax. Given her history of improving viral infection last week and then abrupt worsening 2-3 days prior to admission her presentation is consistent with post viral bacterial infection causing severe cough, fever, and dehydration vs viral pneumonia. CXR today shows improved R pneumothorax      ID  Sepsis  - Blood culture 12/11 pending  - Ceftriaxone 2 g Q24  - If fever we will obtain a second blood culture if no cultures within 24 hrs and expand coverage  - RVP pending  - Contact and droplet precautions  - Monitor BP closely throughout the day     PULMONARY  R Pneumothorax likely due to RSV, increased coughing now with secondary pneumonia  - Surgery Consulted, recs appreciated  - NC 2 LPM at least, increasing if she desaturates <92 or needs more support  - 4pm CXR, then AM CXR to follow up R pneumothorax  - NPO for possible IR placed pig tail if her condition worsens      HEMATOLOGY/ONCOLOGY  Synovial Sarcoma with Lung Metastasis  - Continue Pazopanib starting  at 50% dose (400 mg)- Held per patient's wishes  - Schedule Chest CT when pneumothorax improves to assess metastatic lung nodules  - History of skin reactions to drug so will continue to monitor with daily skin exams                        - Aveeno lotion BID                        - Diltiazem 2% PLO cream TID                        - Kenalog ointment TID PRN      GASTROINTESTINAL  Nausea/ Vomiting  Zofran 8 mg Q6h PRN  Benadryl 25-50 mg Q6H PRN      Constipation  Senna BID PRN      PAIN/FEVER  Tylenol 650 mg Q4H PRN  Morphine IV 2  mg Q2H PRN  Naloxone 0.1-0.4 PRN opioid reversal      FEN  NPO for possible pig tail placement  D5 NS @ 100cc/hr  Strict Is and Os  Daily weights      Access: Port-A-Cath      Abhay Sweeney MD  Pediatrics Resident, PL-1    Physician Attestation   I, Coretta Yanez MD, saw this patient with the resident and agree with the resident s findings and plan of care as documented in the resident s note.      I personally reviewed vital signs, medications, labs and imaging.    Coretta Yanez MD  Date of Service (when I saw the patient): 12/13/17      Interval History   Afebrile, VSS. Reports some nausea which improved with prn medications.     Physical Exam   Temp: 98.2  F (36.8  C) Temp src: Oral BP: 100/67 Pulse: 70 Heart Rate: 79 Resp: 20 SpO2: 100 % O2 Device: Nasal cannula Oxygen Delivery: 2 LPM  Vitals:    12/11/17 1333 12/12/17 2007   Weight: 59.9 kg (132 lb 0.9 oz) 64.2 kg (141 lb 8.6 oz)     Vital Signs with Ranges  Temp:  [97.5  F (36.4  C)-98.3  F (36.8  C)] 98.2  F (36.8  C)  Pulse:  [64-70] 70  Heart Rate:  [65-79] 79  Resp:  [15-22] 20  BP: ()/(57-75) 100/67  SpO2:  [95 %-100 %] 100 %  I/O last 3 completed shifts:  In: 4680.83 [P.O.:360; I.V.:3320.83; IV Piggyback:1000]  Out: 1435 [Urine:1135; Stool:300]    GENERAL: Active, alert, in no acute distress.  SKIN: Clear. No significant rash, abnormal pigmentation or lesions  HEAD: Normocephalic, atraumatic  EYES: Pupils equal, round, reactive, Extraocular muscles intact. Normal conjunctivae.  MOUTH/THROAT: Clear. No oral lesions. Teeth without obvious abnormalities.  NECK: Supple, no masses.  No thyromegaly.  LYMPH NODES: No adenopathy  LUNGS: Clear. No rales, rhonchi, wheezing or retractions  HEART: Regular rhythm. Normal S1/S2. No murmurs. Normal pulses.  ABDOMEN: Soft, non-tender, not distended, no masses or hepatosplenomegaly. Bowel sounds normal.   NEUROLOGIC: No focal findings. Cranial nerves grossly intact    Medications     dextrose  5% and 0.9% NaCl 150 mL/hr at 12/13/17 0233       vancomycin (VANCOCIN) IV  1,000 mg Intravenous Q12H     acyclovir   Topical 5x Daily     diltiazem 2% in PLO cream (FV COMPOUNDED)   Topical TID     pramoxine-calamine  1 applicator Topical BID     polyethylene glycol  17 g Oral Daily     pyridOXINE  300 mg Oral Daily     anticoagulant citrate  5 mL Intracatheter Q24H     anticoagulant citrate  5 mL Intracatheter Q28 Days     sodium chloride (PF)  10 mL Intracatheter Q28 Days     pazopanib  400 mg Oral Daily     cefTRIAXone  2,000 mg Intravenous Q24H       Data   Results for orders placed or performed during the hospital encounter of 12/11/17 (from the past 24 hour(s))   XR Chest Port 1 View    Narrative    XR CHEST PORT 1 VW  12/12/2017 7:36 AM      HISTORY: Evaluation of R pneumothorax progression;     COMPARISON: 12/11/2017    FINDINGS: Left-sided Port-A-Cath with tip in the lower right atrium.  Redemonstration of small right-sided pneumothorax, likely unchanged in  size given differences in patient position. Small left pleural  effusion with retrocardiac opacification, similar to the prior study.  Postoperative changes in chest. Nodular opacity in the left upper  lung, as well as smaller nodular opacities in the left lower lung.      Impression    IMPRESSION:   1. Stable right-sided pneumothorax to differences in patient position.  2. Continued asymmetric left perihilar and basilar opacities with  small left effusion.  3. Concern for new metastatic disease, most pronounced in the left  apex. CT of the chest recommended for further evaluation.    I have personally reviewed the examination and initial interpretation  and I agree with the findings.    SHONNA BRYANT MD   CBC with platelets differential   Result Value Ref Range    WBC 5.2 4.0 - 11.0 10e9/L    RBC Count 3.04 (L) 3.8 - 5.2 10e12/L    Hemoglobin 11.1 (L) 11.7 - 15.7 g/dL    Hematocrit 32.2 (L) 35.0 - 47.0 %     (H) 78 - 100 fl    MCH 36.5 (H)  26.5 - 33.0 pg    MCHC 34.5 31.5 - 36.5 g/dL    RDW 12.7 10.0 - 15.0 %    Platelet Count 66 (L) 150 - 450 10e9/L    Diff Method Automated Method     % Neutrophils 72.3 %    % Lymphocytes 17.6 %    % Monocytes 7.6 %    % Eosinophils 1.1 %    % Basophils 0.4 %    % Immature Granulocytes 1.0 %    Nucleated RBCs 0 0 /100    Absolute Neutrophil 3.8 1.6 - 8.3 10e9/L    Absolute Lymphocytes 0.9 0.8 - 5.3 10e9/L    Absolute Monocytes 0.4 0.0 - 1.3 10e9/L    Absolute Eosinophils 0.1 0.0 - 0.7 10e9/L    Absolute Basophils 0.0 0.0 - 0.2 10e9/L    Abs Immature Granulocytes 0.1 0 - 0.4 10e9/L    Absolute Nucleated RBC 0.0    Vancomycin level   Result Value Ref Range    Vancomycin Level 14.5 mg/L   Basic metabolic panel   Result Value Ref Range    Sodium 144 133 - 144 mmol/L    Potassium 3.3 (L) 3.4 - 5.3 mmol/L    Chloride 113 (H) 94 - 109 mmol/L    Carbon Dioxide 24 20 - 32 mmol/L    Anion Gap 7 3 - 14 mmol/L    Glucose 112 (H) 70 - 99 mg/dL    Urea Nitrogen 8 7 - 30 mg/dL    Creatinine 0.80 0.52 - 1.04 mg/dL    GFR Estimate 87 >60 mL/min/1.7m2    GFR Estimate If Black >90 >60 mL/min/1.7m2    Calcium 7.2 (L) 8.5 - 10.1 mg/dL

## 2017-12-13 NOTE — PLAN OF CARE
Problem: Patient Care Overview  Goal: Plan of Care/Patient Progress Review  Outcome: No Change  RN had patient from [id number]. AVSS, blood pressures continue to be soft but within parameters (MD and RN staying in close contact on BP's). Patient placed back on 2 L of oxygen via NC per MD request, not due to de-saturations in oxygen. Complained of a headache at the start of the evening, received PRN Tylenol x 1 with relief. Improved urine output throughout the evening. BM x 1. No complaints of nausea or vomiting. Fair PO intake of foods, poor PO intake of fluids. Port site intact, IVF's continue to infuse at 150 mL's. MD notified of patients new lip lesion, Acyclovir cream ordered. MD also notified of patients refusal to take her ordered Chemotherapy, Fellow MD also aware, issue to be discussed in the AM per MD report. Family at bedside for part of the evening. Hourly rounding completed. Will continue to monitor and notify team of changes.

## 2017-12-13 NOTE — PLAN OF CARE
Problem: Patient Care Overview  Goal: Plan of Care/Patient Progress Review  Outcome: No Change  Pt is 100% O2 sats on Room air.  Pt is tachypneic.  OVSS.  No c/o headache this shift.  Strong, productive cough.  Pt is starting to have periorbital swelling.  MDs are aware.  Continue to monitor, notify md of issues or concerns.

## 2017-12-13 NOTE — PROGRESS NOTES
"[facility]  PEDIATRIC HEMATOLOGY/ONCOLOGY   SOCIAL WORK PROGRESS NOTE      DATA:     Olimpia Childress is a 22 year old female with synovial sarcoma with lung mets s/p thoracotomies and resection on 7/20/16 and 3/8/17 who presents with 2-3 days of worsening cough, right sided chest pain, and shortness of breath and a CXR consistent with R pneumothorax.    THI met with Olimpia and her cousin to discuss her emotional health and coping given this unexpected admission after severe pain. Olimpia reported that she is feeling much better today despite an annoying cough. She is hopeful to avoid a surgery and looking forward to discharging in the next 1-2 days. Prior to this admission, Olimpia has been enjoying going to school for ESL in [place]. She has excelled in her class and will be moving to the next level next semester. She also was connected to a volunteer who is helping her learn how to drive \"on a computer\" with a long term goal of getting her drivers license. She has been communicating with her mom via phone, though has not attempted to get her here given the current political climate and the difficulty in obtaining visas. She is not currently getting PCA services due to the lapse in her insurance. THI consulted with financial counselor who has assured that the [place] application has been submitted, though due to a backlog at [facility], it has not yet been processed.      INTERVENTION:     Supportive check in. Provided positive feedback re: the goals she has set for herself and the progress she has made while not allowing cancer to interfere.     ASSESSMENT:     Olimpia was in good spirits today as she is feeling much better and has not yet had to have a chest tube placed, which she is hopeful to avoid if possible. Easily engaged and pleasant to meet with. Requested ongoing observation of her insurance status and assistance getting PCA hours back once insurance reinstated. THI will " continue to follow and consult with financial counseling.     PLAN:     Social work will continue to assess needs and provide ongoing psychosocial support and access to resources.       BELKIS Salinas, [facility]  Pediatric Hem/Onc   Phone: [phone]  Pager: [phone]

## 2017-12-14 NOTE — DISCHARGE SUMMARY
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 She has had a viral respiratory infection this past week but for the past three days she has been having worsening cough keeping her up at night, chest pain and tenderness especially on the right side, and occasional episodes of shortness of breath. A CXR in clinic showed a R pneumothorax so she was directly admitted to the hematology/oncology inpatient team for further workup and management. She denies fever, diarrhea, constipation, or emesis. She has been receiving Pazopanib at 50% regular dose (400 mg) because of skin reactions to the drug.\"    Hospital Course   Olimpia Childress was admitted on 12/11/2017.  The following problems were addressed during her hospitalization:    ID  R Pneumothorax Secondary to Pneumonia  Olimpia was admitted to the heme/onc service with chest pain, acute cough, and shortness of breath requiring supplemental oxygen. Surgery was consulted and recommended conservative management given improving respiratory status. Initially afebrile, Olimpia later spiked a fever of 39.6 and was hypotensive overnight prompting a sepsis workup and IV antibiotics. She received one day of vancomycin and 3 days of ceftriaxone. Blood culture 12/11 are negative, urine " culture <10k colonies of mixed urogenital anamika, and RVP positive for RSV B. Olimpia's respiratory status continued to improve throughout the admission and daily CXR showed shrinking pneumothorax and L pleural effusion. On day of discharge she was stable, afebrile, and breathing comfortably on RA. She was sent home on 7 more days of cefdinir 300 mg BID.    Heme/Onc  Synovial Sarcoma w/ Lung mets  Olimpia opted to hold her home pozapanib during this admission till she recovers from this acute illness. She will follow up with Dr. Yanez on Monday 12/18/17 with CXR to evaluate the progression of pneumothorax. Her CXR during admission showed lung mets on the L lung, a change from previous x rays prior to admission. After resolution of pneumothorax we recommend a chest CT to evaluate the lung metastasis further.     Abhay Sweeney MD  Pediatric Resident, PL-1    Significant Results and Procedures    CXR 12/11 R pneumothorax and L pleural effusion  Improving CXRs on subsequent days  12/11 Blood culture NGTD  12/11 Urine culture <10k colonies of mixed urogenital anamika    Pending Results   These results will be followed up by 12/18  Unresulted Labs Ordered in the Past 30 Days of this Admission     Date and Time Order Name Status Description    12/11/2017 2033 Blood culture Preliminary           Code Status   Full Code    Primary Care Physician   Coretta Yanez MD    Physical Exam   Temp: 98.8  F (37.1  C) Temp src: Oral BP: 98/76   Heart Rate: 66 Resp: 22 SpO2: 100 % O2 Device: Nasal cannula Oxygen Delivery: 2 LPM  Vitals:    12/11/17 1333 12/12/17 2007   Weight: 59.9 kg (132 lb 0.9 oz) 64.2 kg (141 lb 8.6 oz)     Vital Signs with Ranges  Temp:  [98.1  F (36.7  C)-98.9  F (37.2  C)] 98.8  F (37.1  C)  Heart Rate:  [65-97] 66  Resp:  [18-23] 22  BP: ()/(73-86) 98/76  SpO2:  [95 %-100 %] 100 %  I/O last 3 completed shifts:  In: 3044.17 [P.O.:1040; I.V.:2004.17]  Out: 2855 [Urine:2855]    GENERAL: Active, alert, in  no acute distress.  SKIN: Clear. No significant rash, abnormal pigmentation or lesions  HEAD: Normocephalic  EYES: Pupils equal, round, reactive, Extraocular muscles intact. Normal conjunctivae.  NOSE: Normal without discharge.  MOUTH/THROAT: Clear. No oral lesions. Teeth without obvious abnormalities.  NECK: Supple, no masses.  No thyromegaly.  LUNGS: Clear. No rales, rhonchi, wheezing or retractions  HEART: Regular rhythm. Normal S1/S2. No murmurs. Normal pulses.  ABDOMEN: Soft, non-tender, not distended, no masses or hepatosplenomegaly. Bowel sounds normal.   NEUROLOGIC: No focal findings. Cranial nerves grossly intact    Discharge Disposition   Discharged to home  Condition at discharge: Stable    Consultations This Hospital Stay   PEDS SURGERY IP CONSULT  PHARMACY TO DOSE VANCO    Discharge Orders     Reason for your hospital stay   Olimpia was admitted due to R pneumothorax likely due to viral vs bacterial pneumonia. She received IV antibiotics while in the hospital and will continue oral antibiotics for 7 days after going home.     Follow Up and recommended labs and tests   Follow up on Monday at 1:30 pm at [facility] with Dr. Yanez     Activity   Return to home regular activity as tolerated     Full Code     Diet   Return to home diet       Discharge Medications   Current Discharge Medication List      START taking these medications    Details   dextromethorphan (DELSYM) 30 MG/5ML liquid Take 5 mLs (30 mg) by mouth every 12 hours as needed for cough  Qty: 89 mL, Refills: 0    Associated Diagnoses: Secondary spontaneous pneumothorax; Pneumonia due to infectious organism, unspecified laterality, unspecified part of lung      cefdinir (OMNICEF) 300 MG capsule Take 1 capsule (300 mg) by mouth 2 times daily for 7 days  Qty: 14 capsule, Refills: 0    Associated Diagnoses: Pneumonia due to infectious organism, unspecified laterality, unspecified part of lung; Secondary spontaneous pneumothorax         CONTINUE  these medications which have NOT CHANGED    Details   pazopanib (VOTRIENT) 200 MG tablet CHEMOTHERAPY Take 4 tablets (800 mg) by mouth daily  Qty: 120 tablet, Refills: 5    Comments: Zonia- please run through insurance and see if we can get approved.  Please don't fill yet but follow up with me and let me know if approved.  Thanks, Shayla  Associated Diagnoses: Synovial sarcoma (H)      oxyCODONE (ROXICODONE) 5 MG IR tablet Take 1-2 tablets (5-10 mg) by mouth every 4 hours as needed for moderate to severe pain  Qty: 20 tablet, Refills: 0    Associated Diagnoses: Acute post-operative pain      senna-docusate (SENOKOT-S;PERICOLACE) 8.6-50 MG per tablet Take 2 tablets by mouth 2 times daily as needed for constipation  Qty: 30 tablet, Refills: 1    Associated Diagnoses: Acute post-operative pain      acetaminophen (TYLENOL) 325 MG tablet Take 2 tablets (650 mg) by mouth every 4 hours as needed for mild pain  Qty: 100 tablet, Refills: 1    Associated Diagnoses: Sarcoma of vulva (H)      ondansetron (ZOFRAN) 8 MG tablet Take 1 tablet (8 mg) by mouth every 6 hours as needed for nausea  Qty: 30 tablet, Refills: 6    Associated Diagnoses: Encounter for antineoplastic chemotherapy      lidocaine-prilocaine (EMLA) cream Apply to port 30 minutes prior to access  Qty: 30 g, Refills: 5    Associated Diagnoses: Malignant tumor cells (H)      pyridOXINE (VITAMIN B6) 100 MG TABS Take 3 tablets (300 mg) by mouth daily  Qty: 90 tablet, Refills: 3    Associated Diagnoses: Palmar plantar dysesthesia      Colloidal Oatmeal 1 % CREA Externally apply topically 2 times daily  Qty: 1 Tube, Refills: 3    Associated Diagnoses: Palmar plantar dysesthesia      triamcinolone (KENALOG) 0.1 % ointment Apply sparingly to affected area two to three times daily for 7 days.  Qty: 30 g, Refills: 0    Associated Diagnoses: Dermatitis      polyethylene glycol (MIRALAX/GLYCOLAX) Packet Take 17 g by mouth daily  Qty: 7 packet, Refills: 1    Associated  Diagnoses: Acute post-operative pain      ibuprofen (ADVIL/MOTRIN) 600 MG tablet Take 1 tablet (600 mg) by mouth every 6 hours as needed for moderate pain  Qty: 60 tablet, Refills: 1    Associated Diagnoses: Sarcoma of vulva (H)      diltiazem 2% in PLO cream, FV COMPOUNDED, 2% GEL To anal opening three times daily.  Use a pea-sized amount.  Store at room temperature.  Qty: 60 g, Refills: 0    Associated Diagnoses: Anal fissure      diphenhydrAMINE (BENADRYL) 25 MG tablet Take 1-2 tablets (25-50 mg) by mouth every 6 hours as needed for other (Nausea or vomiting)  Qty: 60 tablet, Refills: 3    Associated Diagnoses: Chemotherapy induced nausea and vomiting           Allergies   Allergies   Allergen Reactions     Heparin Flush Other (See Comments)     Due to Sikhism reasons     Pork Derived Products      Tenriism reasons     Tegaderm Transparent Dressing (Informational Only) Other (See Comments) and Rash     PLEASE USE IV 3000 FOR DRESSING(S)     Data   Most Recent 3 CBC's:  Recent Labs   Lab Test  12/13/17   0517  12/11/17   1112  11/02/17   1633   WBC  5.2  8.6  4.2   HGB  11.1*  13.5  14.1   MCV  106*  105*  104*   PLT  66*  120*  133*     Most Recent 3 BMP's:  Recent Labs   Lab Test  12/13/17   0517  12/12/17   0548  12/11/17   1112  11/02/17   1633   NA  144   --   133  139   POTASSIUM  3.3*   --   3.5  3.7   CHLORIDE  113*   --   98  102   CO2  24   --   25  29   BUN  8   --   16  10   CR  0.80  0.71  0.77  0.50*   ANIONGAP  7   --   10  8   JANA  7.2*   --   8.4*  8.9   GLC  112*   --   159*  113*     Most Recent 6 Bacteria Isolates From Any Culture (See EPIC Reports for Culture Details):  Recent Labs   Lab Test  12/12/17   0220  12/11/17   2049  06/06/17   1347  03/08/17   1010  03/08/17   0952  03/08/17   0945   CULT  <10,000 colonies/mL  mixed urogenital anamika  Susceptibility testing not routinely done    No growth after 3 days  10,000 to 50,000 colonies/mL mixed urogenital anamika Susceptibility testing  not   routinely done    Culture negative for acid fast bacilli  Assayed at [facility] [address]    Culture negative after 4 weeks  No growth  Culture negative for acid fast bacilli  Assayed at [facility] [address]    Culture negative after 4 weeks  No growth  Culture negative for acid fast bacilli  Assayed at [facility] [address]    Culture negative after 4 weeks  No growth     Most Recent Urinalysis:  Recent Labs   Lab Test  12/12/17   0220   COLOR  Yellow   APPEARANCE  Slightly Cloudy   URINEGLC  Negative   URINEBILI  Negative   URINEKETONE  Negative   SG  1.020   UBLD  Negative   URINEPH  5.5   PROTEIN  30*   NITRITE  Negative   LEUKEST  Large*   RBCU  0   WBCU  63*     I saw and evaluated this patient and agree with the resident's assessment and plan. Greater than 30 minutes were spent arranging the discharge and discussing the discharge plan and follow-up with the patient/family.  Alta Moon MD, MPH    [facility]  Division of Pediatric Hematology/Oncology

## 2017-12-14 NOTE — PLAN OF CARE
Problem: Patient Care Overview  Goal: Plan of Care/Patient Progress Review  Outcome: No Change  VSS afebrile. Continues on 2L oxygen NC. Taking good PO, IV fluids titrated to 10. Good UOP, no stool this shift. PRN zofran given x1 for nausea with good relief. Will continue to monitor.

## 2017-12-14 NOTE — PLAN OF CARE
Problem: Patient Care Overview  Goal: Plan of Care/Patient Progress Review  Outcome: No Change  Pt. VSS and afebrile. LS diminished in Right lobe. 2LPM O2 via Nasal canula. Infrequent cough. Respiratiory rate 22. Fluids increased to 100 ml/hr to meet fluid goal. Hourly rounding and safety checks performed.

## 2017-12-14 NOTE — PLAN OF CARE
Problem: Patient Care Overview  Goal: Plan of Care/Patient Progress Review  Outcome: No Change  Pt placed on RA this am without issues.  O2 sats remained in mid to high 90s.  Frequent, productive cough.  Pt had no c/o discomfort.  DC Rx and instructions reviewed with pt and sister.  DC to home.

## 2017-12-21 NOTE — MR AVS SNAPSHOT
After Visit Summary   12/21/2017    Olimpia Childress    MRN: [id number]           Patient Information     Date Of Birth          1995        Visit Information        Provider Department      12/21/2017 1:30 PM Omar Trammell APRN CNP Peds Hematology Oncology        Today's Diagnoses     Synovial sarcoma (H)              [facility], 9th floor  [address] [place] [address]  Phone: [phone]  Clinic Hours:   Monday-Friday:   7 am to 5:00 pm   closed weekends and major  holidays     If your fever is 100.5  or greater,   call the clinic during business hours.   After hours call [phone] and ask for the pediatric hematology / oncology physician to be paged for you.               Follow-ups after your visit        Your next 10 appointments already scheduled     Jan 11, 2018 12:45 PM CST   (Arrive by 12:30 PM)   CT CHEST W/O CONTRAST with URCT1   [facility], [place], Radiology ([facility])    [address] [place] [address] [phone]           Please bring any scans or X-rays taken at other hospitals, if similar tests were done. Also bring a list of your medicines, including vitamins, minerals and over-the-counter drugs. It is safest to leave personal items at home.  Be sure to tell your doctor:   If you have any allergies.   If there s any chance you are pregnant.   If you are breastfeeding.   If you have any special needs.  You do not need to do anything special to prepare.  Please wear loose clothing, such as a sweat suit or jogging clothes. Avoid snaps, zippers and other metal. We may ask you to undress and put on a hospital gown.            Jan 11, 2018  2:30 PM CST   Return Visit with Coretta Yanez MD   Peds Hematology Oncology ([facility])    [facility]  9th Floor  [address] [facility] [address] [phone]            Jan  2018  2:30 PM CST   [facility] Peds Infusion 60 with [facility] PEDS INFUSION CHAIR 8   Peds IV Infusion ([facility])    [facility]  9th Floor  [address] [facility] [address] [phone]              Future tests that were ordered for you today     Open Future Orders        Priority Expected Expires Ordered    CT Chest w/o contrast Routine 2018            Who to contact     Please call your clinic at [phone] to:    Ask questions about your health    Make or cancel appointments    Discuss your medicines    Learn about your test results    Speak to your doctor   If you have compliments or concerns about an experience at your clinic, or if you wish to file a complaint, please contact [facility] Patient Relations at [phone] or email us at [email or web address]         Additional Information About Your Visit        [employer] Information     [employer] is an electronic gateway that provides easy, online access to your medical records. With BigString, you can request a clinic appointment, read your test results, renew a prescription or communicate with your care team.     To sign up for BigString visit the website at [email or web address]   You will be asked to enter the access code listed below, as well as some personal information. Please follow the directions to create your username and password.     Your access code is: [email or web address]  Expires: 3/22/2018 11:14 AM     Your access code will  in 90 days. If you need help or a new code, please contact your [facility] or call [phone] for assistance.        Care EveryWhere ID     This is your Care EveryWhere ID. This could be used by other organizations to access your [facility] medical records  [id number]        Your Vitals Were     Pulse Temperature Respirations Height Pulse Oximetry BMI (Body Mass Index)    96 98.2  F (36.8  C)  "(Oral) 21 1.595 m (5' 2.8\") 97% 23.7 kg/m2       Blood Pressure from Last 3 Encounters:   12/21/17 104/69   12/14/17 104/81   12/11/17 97/72    Weight from Last 3 Encounters:   12/21/17 60.3 kg (132 lb 15 oz)   12/12/17 64.2 kg (141 lb 8.6 oz)   11/02/17 61.9 kg (136 lb 7.4 oz)              We Performed the Following     CBC with platelets differential          Today's Medication Changes          These changes are accurate as of: 12/21/17 11:59 PM.  If you have any questions, ask your nurse or doctor.               These medicines have changed or have updated prescriptions.        Dose/Directions    pazopanib 200 MG tablet CHEMOTHERAPY   Commonly known as:  VOTRIENT   This may have changed:  how much to take   Used for:  Synovial sarcoma (H)   Changed by:  Omar Trammell APRN CNP        Dose:  400 mg   Take 2 tablets (400 mg) by mouth daily   Quantity:  60 tablet   Refills:  5            Where to get your medicines      These medications were sent to [facility] [place], [place] - [address]  [address] [address]     Phone:  [phone]     pazopanib 200 MG tablet CHEMOTHERAPY                Primary Care Provider Office Phone # Fax #    Coretta Yanez [phone] [address] [facility] [address]        Equal Access to Services     [facility] CenterPATRICIA AH: Rupa Cheek, warichardda lumady, qaybta kaalmada anne-marie, wilner garcia adebetsy singleton. So [facility] [phone].    ATENCIÓN: Si habla español, tiene a ordoñez disposición servicios gratuitos de asistencia lingüística. Llame al [phone].    We comply with applicable federal civil rights laws and [place] laws. We do not discriminate on the basis of race, color, national origin, age, disability, sex, sexual orientation, or gender identity.            Thank you!     Thank you for choosing PEDS HEMATOLOGY ONCOLOGY  for your care. Our goal is always to provide " you with excellent care. Hearing back from our patients is one way we can continue to improve our services. Please take a few minutes to complete the written survey that you may receive in the mail after your visit with us. Thank you!             Your Updated Medication List - Protect others around you: Learn how to safely use, store and throw away your medicines at www.disposemymeds.org.          This list is accurate as of: 12/21/17 11:59 PM.  Always use your most recent med list.                   Brand Name Dispense Instructions for use Diagnosis    * acetaminophen 325 MG tablet    TYLENOL    100 tablet    Take 2 tablets (650 mg) by mouth every 4 hours as needed for mild pain    Sarcoma of vulva (H)       * acetaminophen 325 MG tablet    TYLENOL    100 tablet    Take 2 tablets (650 mg) by mouth every 4 hours as needed for mild pain    Pneumonia due to infectious organism, unspecified laterality, unspecified part of lung, Secondary spontaneous pneumothorax       cefdinir 300 MG capsule    OMNICEF    14 capsule    Take 1 capsule (300 mg) by mouth 2 times daily for 14 doses    Pneumonia due to infectious organism, unspecified laterality, unspecified part of lung, Secondary spontaneous pneumothorax       Colloidal Oatmeal 1 % Crea     1 Tube    Externally apply topically 2 times daily    Palmar plantar dysesthesia       dextromethorphan 30 MG/5ML liquid    DELSYM    89 mL    Take 5 mLs (30 mg) by mouth every 12 hours as needed for cough    Secondary spontaneous pneumothorax, Pneumonia due to infectious organism, unspecified laterality, unspecified part of lung       diltiazem 2% in PLO cream (FV COMPOUNDED) 2% Gel     60 g    To anal opening three times daily.  Use a pea-sized amount.  Store at room temperature.    Anal fissure       diphenhydrAMINE 25 MG tablet    BENADRYL    60 tablet    Take 1-2 tablets (25-50 mg) by mouth every 6 hours as needed for other (Nausea or vomiting)    Chemotherapy induced nausea and  vomiting       ibuprofen 600 MG tablet    ADVIL/MOTRIN    60 tablet    Take 1 tablet (600 mg) by mouth every 6 hours as needed for moderate pain    Sarcoma of vulva (H)       lidocaine-prilocaine cream    EMLA    30 g    Apply to port 30 minutes prior to access    Malignant tumor cells (H)       ondansetron 8 MG tablet    ZOFRAN    30 tablet    Take 1 tablet (8 mg) by mouth every 6 hours as needed for nausea    Encounter for antineoplastic chemotherapy       oxyCODONE IR 5 MG tablet    ROXICODONE    20 tablet    Take 1-2 tablets (5-10 mg) by mouth every 4 hours as needed for moderate to severe pain    Acute post-operative pain       pazopanib 200 MG tablet CHEMOTHERAPY    VOTRIENT    60 tablet    Take 2 tablets (400 mg) by mouth daily    Synovial sarcoma (H)       polyethylene glycol Packet    MIRALAX/GLYCOLAX    7 packet    Take 17 g by mouth daily    Acute post-operative pain       pyridOXINE 100 MG Tabs    VITAMIN B6    90 tablet    Take 3 tablets (300 mg) by mouth daily    Palmar plantar dysesthesia       senna-docusate 8.6-50 MG per tablet    SENOKOT-S;PERICOLACE    30 tablet    Take 2 tablets by mouth 2 times daily as needed for constipation    Acute post-operative pain       triamcinolone 0.1 % ointment    KENALOG    30 g    Apply sparingly to affected area two to three times daily for 7 days.    Dermatitis       * Notice:  This list has 2 medication(s) that are the same as other medications prescribed for you. Read the directions carefully, and ask your doctor or other care provider to review them with you.

## 2017-12-21 NOTE — NURSING NOTE
"Chief Complaint   Patient presents with     RECHECK     Patient here today for follow up with Synovial sarcoma (H)     /69 (BP Location: Right arm, Patient Position: Fowlers, Cuff Size: Adult Regular)  Pulse 96  Temp 98.2  F (36.8  C) (Oral)  Resp 21  Ht 1.595 m (5' 2.8\")  Wt 60.3 kg (132 lb 15 oz)  SpO2 97%  BMI 23.7 kg/m2  Tasia Narvaez, [facility]  December 21, 2017    "

## 2017-12-21 NOTE — PROGRESS NOTES
Pediatric Hematology/Oncology Clinic Note    History- Olimpia initially presented with a growth on her right labia in 2013 when she was living in [place]. 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 She discontinued further treatment b/c her physicians were unsure of the exact diagnosis and she felt uncomfortable proceeding. Olimpia subsequently immigrated to the  in August of 2015 and in October she first noticed a recurrence of the mass in the area of previous excision. She was in [place] at that time, seen by oncology and had a port placed but then moved to [place] where she was seen by Dr. Mckeon at [facility] in November. There she underwent an MRI that showed a solid and cystic subcutaneous mass in the right labia measuring 1.7 x 1.6 x 1cm with question of nodular extension vs a second nodule measuring 0.7 cm. There was no invasion into the vagina. On November 16th 2015, Olimpia had a biopsy of the mass by a gynecologist, Dr. Terry, at [facility]. Cytology was reviewed at [facility] and read as a SMARCB1-deficient genital sarcoma, likely falling within the overall spectrum of malignant rhabdoid tumor. By immunohistochemistry, the cells shows a complete loss of SMARCb1. Wide spectrum cytokeratin, CD34, WT1, Desmin and CD99 were all negative. RT PCR for Wooten Sarcoma associated fusion transcripts were negative as well. Olimpia went on to have a PET/CT as well which showed multiple pulmonary lesions and biopsy confirmed a lesion to be metastatic disease. Olimpia was seen by Tomás White at [facility] who reviewed the diagnosis and potential treatment plans with her, however she prefered to be treated here at [facility]. 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    Olimpia underwent left sided thoracotomy and resection of two pulmonary nodules on 7/20/16. Pathology revealed that one lesion was benign lymphoid tissue and the other was consistent with synovial sarcoma.  This was confirmed with FISH  With 91% of cell examined having a signal pattern indictivate of a rearrangement of the SS18 locus.  Olimpia has now completed 3 cycles of chemotherapy per [id number] and started her radiation on 9/13/16 and completed on 10/17/16.  She then went on to have a resection of her labial mass on 11/16/16 by Jannet Pastrana and Chikis with reconstruction, including skin flaps by Dr. Corona from plastics.  Pathology showed no residual tumor.  She had a f/u chest CT today on 12/5/16 that showed persistance of her pulmonary nodules as well as few additonal pulmonary nodules.  She saw Dr. Pierre who was in agreement with surgical resection, however Olimpia wanted to wait until March to have more time to recover from her last surgery. She underwent left sided thoracotomy on 3/8/17.    In May, Olimpia's follow up PET/CT demonstrated significant progression of her pulmonary metastatic disease, with an anterior mediastinal mass compression her right ventricle and potentially right outflow tract.  Subsequent echocardiogram did not show altered cardiac function, Dr Man felt radiation therapy was not in her best interest at this time but would consider if she developed cardiac dysfunction.  Olimpia was started on oral Pazopanib in the beginning of May for palliative therapy.  About 1 month into it we held her doses due to palmar plantar dysesthesia and stomatitis.  Her symptoms resolved quickly within 1 week and she restarted at 50% dosing on 6/19/17.  Olimpia has imaging in August 2017 that showed a positive response to therapy.    HPI:  Olimpia was recently admitted for pneumothorax in the setting of RSV/pneumonia.  She was managed conservatively and her respiratory status  improved.  She was discharged home to complete a course of Omnicef.  Her oral chemo has been on hold during this time.    Olimpia comes to clinic today with her cousin.  She has really improved over the past week.  She reports her breathing is normal, no cough. No pain in her chest.  No fevers.  Energy level is improving.  She doesn't have much of an appetite but is making herself eat.  No vomiting or diarrhea.  Notes discomfort in her left back, feels better when it is massaged, no urinary symptoms.  An  was not present for this visit, Olimpia elected not to have an  today.    Allergies as of 12/21/2017 - [facility]on as Reviewed 12/21/2017   Allergen Reaction Noted     Heparin flush Other (See Comments) 01/27/2016     Pork derived products  02/09/2016     Tegaderm transparent dressing (informational only) Other (See Comments) and Rash 04/20/2016       Current Outpatient Prescriptions   Medication Sig Dispense Refill     dextromethorphan (DELSYM) 30 MG/5ML liquid Take 5 mLs (30 mg) by mouth every 12 hours as needed for cough 89 mL 0     cefdinir (OMNICEF) 300 MG capsule Take 1 capsule (300 mg) by mouth 2 times daily for 14 doses 14 capsule 0     acetaminophen (TYLENOL) 325 MG tablet Take 2 tablets (650 mg) by mouth every 4 hours as needed for mild pain 100 tablet 0     pyridOXINE (VITAMIN B6) 100 MG TABS Take 3 tablets (300 mg) by mouth daily 90 tablet 3     Colloidal Oatmeal 1 % CREA Externally apply topically 2 times daily 1 Tube 3     triamcinolone (KENALOG) 0.1 % ointment Apply sparingly to affected area two to three times daily for 7 days. 30 g 0     pazopanib (VOTRIENT) 200 MG tablet CHEMOTHERAPY Take 4 tablets (800 mg) by mouth daily 120 tablet 5     oxyCODONE (ROXICODONE) 5 MG IR tablet Take 1-2 tablets (5-10 mg) by mouth every 4 hours as needed for moderate to severe pain 20 tablet 0     polyethylene glycol (MIRALAX/GLYCOLAX) Packet Take 17 g by mouth daily 7 packet 1     senna-docusate  (SENOKOT-S;PERICOLACE) 8.6-50 MG per tablet Take 2 tablets by mouth 2 times daily as needed for constipation 30 tablet 1     acetaminophen (TYLENOL) 325 MG tablet Take 2 tablets (650 mg) by mouth every 4 hours as needed for mild pain 100 tablet 1     ibuprofen (ADVIL/MOTRIN) 600 MG tablet Take 1 tablet (600 mg) by mouth every 6 hours as needed for moderate pain 60 tablet 1     diltiazem 2% in PLO cream, FV COMPOUNDED, 2% GEL To anal opening three times daily.  Use a pea-sized amount.  Store at room temperature. 60 g 0     ondansetron (ZOFRAN) 8 MG tablet Take 1 tablet (8 mg) by mouth every 6 hours as needed for nausea 30 tablet 6     diphenhydrAMINE (BENADRYL) 25 MG tablet Take 1-2 tablets (25-50 mg) by mouth every 6 hours as needed for other (Nausea or vomiting) 60 tablet 3     lidocaine-prilocaine (EMLA) cream Apply to port 30 minutes prior to access 30 g 5   Omnicef course will finish today.  Pazopanib has been on hold.    Past Medical History:   Diagnosis Date     Anemia 6/3/2016     Constipation      Extrarenal rhabdoid neoplasm (H)      Febrile neutropenia (H) 4/17/2016     History of blood transfusion      Latent tuberculosis      Lung disease      On antineoplastic chemotherapy      Sarcoma of vulva (H)      Social History:  Olimpia is attending school with the goal to get her diploma, she starts class again in January.  She is eager to travel somewhere although she does not have a passport.  Has been able to talk with her mom quite a bit on the phone.    Family History   Problem Relation Age of Onset     Other Cancer No family hx of      ROS  See HPI. Complete ROS otherwise negative.    Physical Exam   Temp:  [98.2  F (36.8  C)] 98.2  F (36.8  C)  Pulse:  [96] 96  Resp:  [21] 21  BP: (104)/(69) 104/69  SpO2:  [97 %] 97 %  Wt Readings from Last 4 Encounters:   12/21/17 60.3 kg (132 lb 15 oz)   12/12/17 64.2 kg (141 lb 8.6 oz)   11/02/17 61.9 kg (136 lb 7.4 oz)   08/21/17 61.2 kg (134 lb 14.7 oz)     GENERAL:  Alert, interactive.  SKIN: dorsum of hands with nearly resolved peeling.  Continued peeling on feet.  HEAD: Normocephalic, atraumatic     EYES: Normal lids, conjunctivae/cornea normal. PERRL. EOMI.  EARS: Pinna normal b/l, no pain on palpation. External ears normal appearing.  TMs opaque bilaterally  NOSE: Congested, no nasal drainage noted.  No facial pain.  MOUTH/THROAT: Oropharynx is clear. Tongue without sores. Normal dentition for age, no mucosal lesions.  NECK: Supple, full range of motion, no masses  LYMPH NODES: No cervical lymphadenopathy.  LUNGS: No increased WOB.  Lungs clear to auscultation throughout; diminished in LLL.  No crackles or wheezes.  HEART: HRR. S1 and S2 are normal. No murmurs, rubs, gallops. The radial pulses are 2+ bilaterally. Cap refill <3 sec in upper extremities.  ABDOMEN: Normal bowel sounds. Soft, non-tender, non-distended, no masses or hepatosplenomegaly.  NEUROLOGIC: Grossly intact, no focal neurologic deficits.    :  Deferred today.    Labs:  Results for orders placed or performed in visit on 12/21/17 (from the past 24 hour(s))   CBC with platelets differential   Result Value Ref Range    WBC 4.5 4.0 - 11.0 10e9/L    RBC Count 3.38 (L) 3.8 - 5.2 10e12/L    Hemoglobin 12.2 11.7 - 15.7 g/dL    Hematocrit 36.5 35.0 - 47.0 %     (H) 78 - 100 fl    MCH 36.1 (H) 26.5 - 33.0 pg    MCHC 33.4 31.5 - 36.5 g/dL    RDW 12.7 10.0 - 15.0 %    Platelet Count 179 150 - 450 10e9/L    Diff Method Automated Method     % Neutrophils 54.8 %    % Lymphocytes 32.7 %    % Monocytes 9.8 %    % Eosinophils 1.6 %    % Basophils 0.2 %    % Immature Granulocytes 0.9 %    Nucleated RBCs 0 0 /100    Absolute Neutrophil 2.5 1.6 - 8.3 10e9/L    Absolute Lymphocytes 1.5 0.8 - 5.3 10e9/L    Absolute Monocytes 0.4 0.0 - 1.3 10e9/L    Absolute Eosinophils 0.1 0.0 - 0.7 10e9/L    Absolute Basophils 0.0 0.0 - 0.2 10e9/L    Abs Immature Granulocytes 0.0 0 - 0.4 10e9/L    Absolute Nucleated RBC 0.0         Imaging:  Results for orders placed or performed during the hospital encounter of 12/21/17 (from the past 24 hour(s))   X-ray Chest 2 vws*    Narrative    XR CHEST 2 VW  12/21/2017 1:08 PM      HISTORY: 22 year old F with synovial sarcoma with mult pulm mets and  recent admission for R sided moderate apical pneumothorax. Please eval  pneumothroax.; Sarcoma (H)    COMPARISON: 12/14/2017    FINDINGS: PA and lateral views of the chest. Port-A-Cath tip projects  over the high right atrium. The cardiac silhouette size is normal.  There is a small left hydropneumothorax. There are again seen  multifocal pulmonary opacities, for example in the right middle lobe,  lingula, and left lower lobe. The nodule in the left upper lobe has  increased in size from 12/14/2017.      Impression    IMPRESSION:   1. Small left hydropneumothorax, not appreciably changed from  comparison examinations. No significant right pneumothorax.  2. Continued multifocal pulmonary opacities, with increase in size of  the left upper lobe pulmonary nodule.    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  Her pneumothorax is stable, increased size of left upper lobe pulmonary nodule.  She has been off chemo since her admission to the hospital.  Respiratory status is back to baseline.  Some pain in her left back, likely MSK in nature.  CBC looks excellent, platelets fully recovered.    Plan:  1) Discussed xray results with Olimpia, she will restart her chemotherapy today at 50% full dose (400mg).  2) Advised gentle movement (less lying in bed), stretching and massage for discomfort.  If it becomes worse or she develops other symptoms she will call.  3) Will ask Hortencia FLOOD To follow up with Olimpia regarding her insurance as I was notified by pharmacy after Olimpai left that her pazopanib could not be filled due to  lack of insurance.   4) RTC in 3 weeks for Chest CT and repeat exam.

## 2017-12-21 NOTE — LETTER
12/21/2017    RE: Olimpia Childress  [address]   [facility] [id number]     Pediatric Hematology/Oncology Clinic Note    History- Olimpia initially presented with a growth on her right labia in 2013 when she was living in [place]. 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 She discontinued further treatment b/c her physicians were unsure of the exact diagnosis and she felt uncomfortable proceeding. Olimpia subsequently immigrated to the  in August of 2015 and in October she first noticed a recurrence of the mass in the area of previous excision. She was in [place] at that time, seen by oncology and had a port placed but then moved to [place] where she was seen by Dr. Mckeon at [facility] in November. There she underwent an MRI that showed a solid and cystic subcutaneous mass in the right labia measuring 1.7 x 1.6 x 1cm with question of nodular extension vs a second nodule measuring 0.7 cm. There was no invasion into the vagina. On November 16th 2015, Olimpia had a biopsy of the mass by a gynecologist, Dr. Terry, at [facility]. Cytology was reviewed at [facility] and read as a SMARCB1-deficient genital sarcoma, likely falling within the overall spectrum of malignant rhabdoid tumor. By immunohistochemistry, the cells shows a complete loss of SMARCb1. Wide spectrum cytokeratin, CD34, WT1, Desmin and CD99 were all negative. RT PCR for Wooten Sarcoma associated fusion transcripts were negative as well. Olimpia went on to have a PET/CT as well which showed multiple pulmonary lesions and biopsy confirmed a lesion to be metastatic disease. Olimpia was seen by Tomás White at [facility] who reviewed the diagnosis and potential treatment plans with her, however she prefered to be treated here at [facility]. 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    Olimpia underwent left sided thoracotomy and resection of two pulmonary nodules on 7/20/16. Pathology revealed that one lesion was benign lymphoid tissue and the other was consistent with synovial sarcoma.  This was confirmed with FISH  With 91% of cell examined having a signal pattern indictivate of a rearrangement of the SS18 locus.  Olimpia has now completed 3 cycles of chemotherapy per [id number] and started her radiation on 9/13/16 and completed on 10/17/16.  She then went on to have a resection of her labial mass on 11/16/16 by Jannet Pastrana and Chikis with reconstruction, including skin flaps by Dr. Corona from plastics.  Pathology showed no residual tumor.  She had a f/u chest CT today on 12/5/16 that showed persistance of her pulmonary nodules as well as few additonal pulmonary nodules.  She saw Dr. Pierre who was in agreement with surgical resection, however Olimpia wanted to wait until March to have more time to recover from her last surgery. She underwent left sided thoracotomy on 3/8/17.    In May, Olimpia's follow up PET/CT demonstrated significant progression of her pulmonary metastatic disease, with an anterior mediastinal mass compression her right ventricle and potentially right outflow tract.  Subsequent echocardiogram did not show altered cardiac function, Dr Man felt radiation therapy was not in her best interest at this time but would consider if she developed cardiac dysfunction.  Olimpia was started on oral Pazopanib in the beginning of May for palliative therapy.  About 1 month into it we held her doses due to palmar plantar dysesthesia and stomatitis.  Her symptoms resolved quickly within 1 week and she restarted at 50% dosing on 6/19/17.  Olimpia has imaging in August 2017 that showed a positive response to therapy.    HPI:  Olimpia was recently admitted for pneumothorax in the setting of  RSV/pneumonia.  She was managed conservatively and her respiratory status improved.  She was discharged home to complete a course of Omnicef.  Her oral chemo has been on hold during this time.    Olimpia comes to clinic today with her cousin.  She has really improved over the past week.  She reports her breathing is normal, no cough. No pain in her chest.  No fevers.  Energy level is improving.  She doesn't have much of an appetite but is making herself eat.  No vomiting or diarrhea.  Notes discomfort in her left back, feels better when it is massaged, no urinary symptoms.  An  was not present for this visit, Olimpia elected not to have an  today.    Allergies as of 12/21/2017 - [facility] as Reviewed 12/21/2017   Allergen Reaction Noted     Heparin flush Other (See Comments) 01/27/2016     Pork derived products  02/09/2016     Tegaderm transparent dressing (informational only) Other (See Comments) and Rash 04/20/2016       Current Outpatient Prescriptions   Medication Sig Dispense Refill     dextromethorphan (DELSYM) 30 MG/5ML liquid Take 5 mLs (30 mg) by mouth every 12 hours as needed for cough 89 mL 0     cefdinir (OMNICEF) 300 MG capsule Take 1 capsule (300 mg) by mouth 2 times daily for 14 doses 14 capsule 0     acetaminophen (TYLENOL) 325 MG tablet Take 2 tablets (650 mg) by mouth every 4 hours as needed for mild pain 100 tablet 0     pyridOXINE (VITAMIN B6) 100 MG TABS Take 3 tablets (300 mg) by mouth daily 90 tablet 3     Colloidal Oatmeal 1 % CREA Externally apply topically 2 times daily 1 Tube 3     triamcinolone (KENALOG) 0.1 % ointment Apply sparingly to affected area two to three times daily for 7 days. 30 g 0     pazopanib (VOTRIENT) 200 MG tablet CHEMOTHERAPY Take 4 tablets (800 mg) by mouth daily 120 tablet 5     oxyCODONE (ROXICODONE) 5 MG IR tablet Take 1-2 tablets (5-10 mg) by mouth every 4 hours as needed for moderate to severe pain 20 tablet 0     polyethylene glycol (MIRALAX/GLYCOLAX)  Packet Take 17 g by mouth daily 7 packet 1     senna-docusate (SENOKOT-S;PERICOLACE) 8.6-50 MG per tablet Take 2 tablets by mouth 2 times daily as needed for constipation 30 tablet 1     acetaminophen (TYLENOL) 325 MG tablet Take 2 tablets (650 mg) by mouth every 4 hours as needed for mild pain 100 tablet 1     ibuprofen (ADVIL/MOTRIN) 600 MG tablet Take 1 tablet (600 mg) by mouth every 6 hours as needed for moderate pain 60 tablet 1     diltiazem 2% in PLO cream, FV COMPOUNDED, 2% GEL To anal opening three times daily.  Use a pea-sized amount.  Store at room temperature. 60 g 0     ondansetron (ZOFRAN) 8 MG tablet Take 1 tablet (8 mg) by mouth every 6 hours as needed for nausea 30 tablet 6     diphenhydrAMINE (BENADRYL) 25 MG tablet Take 1-2 tablets (25-50 mg) by mouth every 6 hours as needed for other (Nausea or vomiting) 60 tablet 3     lidocaine-prilocaine (EMLA) cream Apply to port 30 minutes prior to access 30 g 5   Omnicef course will finish today.  Pazopanib has been on hold.    Past Medical History:   Diagnosis Date     Anemia 6/3/2016     Constipation      Extrarenal rhabdoid neoplasm (H)      Febrile neutropenia (H) 4/17/2016     History of blood transfusion      Latent tuberculosis      Lung disease      On antineoplastic chemotherapy      Sarcoma of vulva (H)      Social History:  Olimpia is attending school with the goal to get her diploma, she starts class again in January.  She is eager to travel somewhere although she does not have a passport.  Has been able to talk with her mom quite a bit on the phone.    Family History   Problem Relation Age of Onset     Other Cancer No family hx of      ROS  See HPI. Complete ROS otherwise negative.    Physical Exam   Temp:  [98.2  F (36.8  C)] 98.2  F (36.8  C)  Pulse:  [96] 96  Resp:  [21] 21  BP: (104)/(69) 104/69  SpO2:  [97 %] 97 %  Wt Readings from Last 4 Encounters:   12/21/17 60.3 kg (132 lb 15 oz)   12/12/17 64.2 kg (141 lb 8.6 oz)   11/02/17 61.9 kg (136  lb 7.4 oz)   08/21/17 61.2 kg (134 lb 14.7 oz)     GENERAL: Alert, interactive.  SKIN: dorsum of hands with nearly resolved peeling.  Continued peeling on feet.  HEAD: Normocephalic, atraumatic     EYES: Normal lids, conjunctivae/cornea normal. PERRL. EOMI.  EARS: Pinna normal b/l, no pain on palpation. External ears normal appearing.  TMs opaque bilaterally  NOSE: Congested, no nasal drainage noted.  No facial pain.  MOUTH/THROAT: Oropharynx is clear. Tongue without sores. Normal dentition for age, no mucosal lesions.  NECK: Supple, full range of motion, no masses  LYMPH NODES: No cervical lymphadenopathy.  LUNGS: No increased WOB.  Lungs clear to auscultation throughout; diminished in LLL.  No crackles or wheezes.  HEART: HRR. S1 and S2 are normal. No murmurs, rubs, gallops. The radial pulses are 2+ bilaterally. Cap refill <3 sec in upper extremities.  ABDOMEN: Normal bowel sounds. Soft, non-tender, non-distended, no masses or hepatosplenomegaly.  NEUROLOGIC: Grossly intact, no focal neurologic deficits.    :  Deferred today.    Labs:  Results for orders placed or performed in visit on 12/21/17 (from the past 24 hour(s))   CBC with platelets differential   Result Value Ref Range    WBC 4.5 4.0 - 11.0 10e9/L    RBC Count 3.38 (L) 3.8 - 5.2 10e12/L    Hemoglobin 12.2 11.7 - 15.7 g/dL    Hematocrit 36.5 35.0 - 47.0 %     (H) 78 - 100 fl    MCH 36.1 (H) 26.5 - 33.0 pg    MCHC 33.4 31.5 - 36.5 g/dL    RDW 12.7 10.0 - 15.0 %    Platelet Count 179 150 - 450 10e9/L    Diff Method Automated Method     % Neutrophils 54.8 %    % Lymphocytes 32.7 %    % Monocytes 9.8 %    % Eosinophils 1.6 %    % Basophils 0.2 %    % Immature Granulocytes 0.9 %    Nucleated RBCs 0 0 /100    Absolute Neutrophil 2.5 1.6 - 8.3 10e9/L    Absolute Lymphocytes 1.5 0.8 - 5.3 10e9/L    Absolute Monocytes 0.4 0.0 - 1.3 10e9/L    Absolute Eosinophils 0.1 0.0 - 0.7 10e9/L    Absolute Basophils 0.0 0.0 - 0.2 10e9/L    Abs Immature Granulocytes  0.0 0 - 0.4 10e9/L    Absolute Nucleated RBC 0.0        Imaging:  Results for orders placed or performed during the hospital encounter of 12/21/17 (from the past 24 hour(s))   X-ray Chest 2 vws*    Narrative    XR CHEST 2 VW  12/21/2017 1:08 PM      HISTORY: 22 year old F with synovial sarcoma with mult pulm mets and  recent admission for R sided moderate apical pneumothorax. Please eval  pneumothroax.; Sarcoma (H)    COMPARISON: 12/14/2017    FINDINGS: PA and lateral views of the chest. Port-A-Cath tip projects  over the high right atrium. The cardiac silhouette size is normal.  There is a small left hydropneumothorax. There are again seen  multifocal pulmonary opacities, for example in the right middle lobe,  lingula, and left lower lobe. The nodule in the left upper lobe has  increased in size from 12/14/2017.      Impression    IMPRESSION:   1. Small left hydropneumothorax, not appreciably changed from  comparison examinations. No significant right pneumothorax.  2. Continued multifocal pulmonary opacities, with increase in size of  the left upper lobe pulmonary nodule.    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  Her pneumothorax is stable, increased size of left upper lobe pulmonary nodule.  She has been off chemo since her admission to the hospital.  Respiratory status is back to baseline.  Some pain in her left back, likely MSK in nature.  CBC looks excellent, platelets fully recovered.    Plan:  1) Discussed xray results with Olimpia, she will restart her chemotherapy today at 50% full dose (400mg).  2) Advised gentle movement (less lying in bed), stretching and massage for discomfort.  If it becomes worse or she develops other symptoms she will call.  3) Will ask Hortencia FLOOD To follow up with Olimpia regarding her insurance as I was notified by pharmacy after Olimpia left  that her pazopanib could not be filled due to lack of insurance.   4) RTC in 3 weeks for Chest CT and repeat exam.      JULIO C Pittman CNP

## 2018-01-01 ENCOUNTER — DOCUMENTATION ONLY (OUTPATIENT)
Dept: CARE COORDINATION | Facility: CLINIC | Age: 23
End: 2018-01-01

## 2018-01-01 ENCOUNTER — OFFICE VISIT (OUTPATIENT)
Dept: RADIATION ONCOLOGY | Facility: CLINIC | Age: 23
End: 2018-01-01
Attending: RADIOLOGY
Payer: MEDICAID

## 2018-01-01 ENCOUNTER — HOME INFUSION (PRE-WILLOW HOME INFUSION) (OUTPATIENT)
Dept: PHARMACY | Facility: CLINIC | Age: 23
End: 2018-01-01

## 2018-01-01 ENCOUNTER — TELEPHONE (OUTPATIENT)
Dept: INFUSION THERAPY | Facility: CLINIC | Age: 23
End: 2018-01-01

## 2018-01-01 ENCOUNTER — HOSPITAL ENCOUNTER (INPATIENT)
Facility: CLINIC | Age: 23
LOS: 4 days | Discharge: HOME HEALTH | End: 2018-02-18
Attending: EMERGENCY MEDICINE | Admitting: PEDIATRICS
Payer: MEDICAID

## 2018-01-01 ENCOUNTER — OFFICE VISIT (OUTPATIENT)
Dept: PEDIATRIC HEMATOLOGY/ONCOLOGY | Facility: CLINIC | Age: 23
End: 2018-01-01
Attending: PEDIATRICS
Payer: MEDICAID

## 2018-01-01 ENCOUNTER — OFFICE VISIT (OUTPATIENT)
Dept: PEDIATRIC HEMATOLOGY/ONCOLOGY | Facility: CLINIC | Age: 23
End: 2018-01-01

## 2018-01-01 ENCOUNTER — APPOINTMENT (OUTPATIENT)
Dept: GENERAL RADIOLOGY | Facility: CLINIC | Age: 23
End: 2018-01-01
Payer: MEDICAID

## 2018-01-01 ENCOUNTER — HOSPITAL ENCOUNTER (INPATIENT)
Facility: CLINIC | Age: 23
LOS: 3 days | Discharge: HOME HEALTH | End: 2018-02-05
Attending: PEDIATRICS | Admitting: PEDIATRICS
Payer: MEDICAID

## 2018-01-01 ENCOUNTER — OFFICE VISIT (OUTPATIENT)
Dept: INTERPRETER SERVICES | Facility: CLINIC | Age: 23
End: 2018-01-01
Payer: MEDICAID

## 2018-01-01 ENCOUNTER — APPOINTMENT (OUTPATIENT)
Dept: CT IMAGING | Facility: CLINIC | Age: 23
End: 2018-01-01
Attending: RADIOLOGY
Payer: MEDICAID

## 2018-01-01 ENCOUNTER — TELEPHONE (OUTPATIENT)
Dept: PEDIATRIC HEMATOLOGY/ONCOLOGY | Facility: CLINIC | Age: 23
End: 2018-01-01

## 2018-01-01 ENCOUNTER — INFUSION THERAPY VISIT (OUTPATIENT)
Dept: INFUSION THERAPY | Facility: CLINIC | Age: 23
End: 2018-01-01
Attending: PEDIATRICS
Payer: MEDICAID

## 2018-01-01 ENCOUNTER — INFUSION THERAPY VISIT (OUTPATIENT)
Dept: INFUSION THERAPY | Facility: CLINIC | Age: 23
End: 2018-01-01
Attending: NURSE PRACTITIONER
Payer: MEDICAID

## 2018-01-01 ENCOUNTER — APPOINTMENT (OUTPATIENT)
Dept: CT IMAGING | Facility: CLINIC | Age: 23
End: 2018-01-01
Attending: PEDIATRICS
Payer: MEDICAID

## 2018-01-01 ENCOUNTER — OFFICE VISIT (OUTPATIENT)
Dept: PEDIATRIC HEMATOLOGY/ONCOLOGY | Facility: CLINIC | Age: 23
End: 2018-01-01
Attending: NURSE PRACTITIONER
Payer: MEDICAID

## 2018-01-01 ENCOUNTER — HOSPITAL ENCOUNTER (OUTPATIENT)
Dept: CT IMAGING | Facility: CLINIC | Age: 23
Discharge: HOME | End: 2018-01-25
Attending: NURSE PRACTITIONER | Admitting: NURSE PRACTITIONER
Payer: MEDICAID

## 2018-01-01 ENCOUNTER — APPOINTMENT (OUTPATIENT)
Dept: ULTRASOUND IMAGING | Facility: CLINIC | Age: 23
End: 2018-01-01
Payer: MEDICAID

## 2018-01-01 ENCOUNTER — OFFICE VISIT (OUTPATIENT)
Dept: INTERPRETER SERVICES | Facility: CLINIC | Age: 23
End: 2018-01-01

## 2018-01-01 ENCOUNTER — HOSPITAL ENCOUNTER (OUTPATIENT)
Dept: PET IMAGING | Facility: CLINIC | Age: 23
Discharge: HOME | End: 2018-02-08
Attending: PEDIATRICS | Admitting: PEDIATRICS
Payer: MEDICAID

## 2018-01-01 ENCOUNTER — TELEPHONE (OUTPATIENT)
Dept: PHARMACY | Facility: CLINIC | Age: 23
End: 2018-01-01

## 2018-01-01 ENCOUNTER — HOSPITAL ENCOUNTER (OUTPATIENT)
Dept: CT IMAGING | Facility: CLINIC | Age: 23
Discharge: HOME | End: 2018-04-27
Attending: NURSE PRACTITIONER | Admitting: NURSE PRACTITIONER
Payer: MEDICAID

## 2018-01-01 ENCOUNTER — HOSPITAL ENCOUNTER (EMERGENCY)
Facility: CLINIC | Age: 23
Discharge: HOME | End: 2018-04-12
Attending: EMERGENCY MEDICINE | Admitting: EMERGENCY MEDICINE
Payer: MEDICAID

## 2018-01-01 ENCOUNTER — OFFICE VISIT (OUTPATIENT)
Dept: PEDIATRICS | Facility: CLINIC | Age: 23
End: 2018-01-01
Attending: PEDIATRICS
Payer: MEDICAID

## 2018-01-01 VITALS
WEIGHT: 130.07 LBS | BODY MASS INDEX: 23.05 KG/M2 | DIASTOLIC BLOOD PRESSURE: 76 MMHG | TEMPERATURE: 97.8 F | HEIGHT: 63 IN | RESPIRATION RATE: 20 BRPM | HEART RATE: 118 BPM | OXYGEN SATURATION: 97 % | SYSTOLIC BLOOD PRESSURE: 112 MMHG

## 2018-01-01 VITALS
RESPIRATION RATE: 20 BRPM | BODY MASS INDEX: 21.64 KG/M2 | SYSTOLIC BLOOD PRESSURE: 118 MMHG | TEMPERATURE: 97 F | HEART RATE: 101 BPM | OXYGEN SATURATION: 100 % | HEIGHT: 63 IN | DIASTOLIC BLOOD PRESSURE: 77 MMHG | WEIGHT: 122.14 LBS

## 2018-01-01 VITALS
DIASTOLIC BLOOD PRESSURE: 74 MMHG | BODY MASS INDEX: 23.59 KG/M2 | TEMPERATURE: 98.2 F | OXYGEN SATURATION: 100 % | RESPIRATION RATE: 12 BRPM | HEART RATE: 108 BPM | WEIGHT: 133.16 LBS | SYSTOLIC BLOOD PRESSURE: 113 MMHG | HEIGHT: 63 IN

## 2018-01-01 VITALS
WEIGHT: 120.59 LBS | TEMPERATURE: 98.3 F | OXYGEN SATURATION: 97 % | RESPIRATION RATE: 24 BRPM | BODY MASS INDEX: 21.31 KG/M2 | HEART RATE: 134 BPM | SYSTOLIC BLOOD PRESSURE: 114 MMHG | DIASTOLIC BLOOD PRESSURE: 74 MMHG

## 2018-01-01 VITALS
RESPIRATION RATE: 24 BRPM | HEART RATE: 100 BPM | TEMPERATURE: 97.8 F | HEIGHT: 63 IN | WEIGHT: 125.22 LBS | OXYGEN SATURATION: 92 % | DIASTOLIC BLOOD PRESSURE: 89 MMHG | SYSTOLIC BLOOD PRESSURE: 117 MMHG | BODY MASS INDEX: 22.19 KG/M2

## 2018-01-01 VITALS
SYSTOLIC BLOOD PRESSURE: 104 MMHG | RESPIRATION RATE: 16 BRPM | OXYGEN SATURATION: 99 % | TEMPERATURE: 97.4 F | DIASTOLIC BLOOD PRESSURE: 76 MMHG

## 2018-01-01 VITALS
SYSTOLIC BLOOD PRESSURE: 86 MMHG | OXYGEN SATURATION: 99 % | RESPIRATION RATE: 16 BRPM | HEART RATE: 78 BPM | BODY MASS INDEX: 23.87 KG/M2 | TEMPERATURE: 98.1 F | DIASTOLIC BLOOD PRESSURE: 57 MMHG | WEIGHT: 134.7 LBS | HEIGHT: 63 IN

## 2018-01-01 VITALS
HEIGHT: 63 IN | WEIGHT: 130.07 LBS | BODY MASS INDEX: 23.05 KG/M2 | SYSTOLIC BLOOD PRESSURE: 105 MMHG | RESPIRATION RATE: 20 BRPM | TEMPERATURE: 97.9 F | OXYGEN SATURATION: 98 % | DIASTOLIC BLOOD PRESSURE: 79 MMHG | HEART RATE: 88 BPM

## 2018-01-01 VITALS
TEMPERATURE: 97.9 F | RESPIRATION RATE: 18 BRPM | DIASTOLIC BLOOD PRESSURE: 70 MMHG | SYSTOLIC BLOOD PRESSURE: 103 MMHG | HEART RATE: 78 BPM | BODY MASS INDEX: 24.49 KG/M2 | WEIGHT: 138.23 LBS | OXYGEN SATURATION: 100 % | HEIGHT: 63 IN

## 2018-01-01 VITALS
RESPIRATION RATE: 28 BRPM | DIASTOLIC BLOOD PRESSURE: 79 MMHG | BODY MASS INDEX: 21.63 KG/M2 | OXYGEN SATURATION: 95 % | TEMPERATURE: 97.7 F | HEART RATE: 94 BPM | SYSTOLIC BLOOD PRESSURE: 101 MMHG | WEIGHT: 123.02 LBS

## 2018-01-01 VITALS
HEIGHT: 63 IN | HEART RATE: 111 BPM | RESPIRATION RATE: 20 BRPM | TEMPERATURE: 97.5 F | SYSTOLIC BLOOD PRESSURE: 108 MMHG | WEIGHT: 132.72 LBS | OXYGEN SATURATION: 100 % | BODY MASS INDEX: 23.52 KG/M2 | DIASTOLIC BLOOD PRESSURE: 77 MMHG

## 2018-01-01 VITALS
BODY MASS INDEX: 21.25 KG/M2 | RESPIRATION RATE: 20 BRPM | DIASTOLIC BLOOD PRESSURE: 75 MMHG | HEART RATE: 85 BPM | SYSTOLIC BLOOD PRESSURE: 102 MMHG | TEMPERATURE: 96.2 F | OXYGEN SATURATION: 99 % | HEIGHT: 63 IN | WEIGHT: 119.93 LBS

## 2018-01-01 VITALS
OXYGEN SATURATION: 97 % | HEART RATE: 87 BPM | HEIGHT: 63 IN | RESPIRATION RATE: 21 BRPM | WEIGHT: 121.69 LBS | SYSTOLIC BLOOD PRESSURE: 108 MMHG | TEMPERATURE: 96.6 F | BODY MASS INDEX: 21.56 KG/M2 | DIASTOLIC BLOOD PRESSURE: 77 MMHG

## 2018-01-01 VITALS — WEIGHT: 128.9 LBS | BODY MASS INDEX: 22.67 KG/M2

## 2018-01-01 DIAGNOSIS — C49.9 SYNOVIAL SARCOMA (H): ICD-10-CM

## 2018-01-01 DIAGNOSIS — K59.09 OTHER CONSTIPATION: ICD-10-CM

## 2018-01-01 DIAGNOSIS — Z45.2 ENCOUNTER FOR CARE RELATED TO VASCULAR ACCESS PORT: ICD-10-CM

## 2018-01-01 DIAGNOSIS — R10.9 ABDOMINAL PAIN, UNSPECIFIED ABDOMINAL LOCATION: ICD-10-CM

## 2018-01-01 DIAGNOSIS — G89.3 NEOPLASM RELATED PAIN: Primary | ICD-10-CM

## 2018-01-01 DIAGNOSIS — T45.1X5A CHEMOTHERAPY INDUCED NAUSEA AND VOMITING: ICD-10-CM

## 2018-01-01 DIAGNOSIS — R64 CACHEXIA (H): ICD-10-CM

## 2018-01-01 DIAGNOSIS — R11.2 CHEMOTHERAPY INDUCED NAUSEA AND VOMITING: ICD-10-CM

## 2018-01-01 DIAGNOSIS — C49.9 RHABDOID TUMOR (H): ICD-10-CM

## 2018-01-01 DIAGNOSIS — C49.9 SYNOVIAL SARCOMA (H): Primary | ICD-10-CM

## 2018-01-01 DIAGNOSIS — C49.9 RHABDOID TUMOR (H): Primary | ICD-10-CM

## 2018-01-01 DIAGNOSIS — C80.1: ICD-10-CM

## 2018-01-01 DIAGNOSIS — Z53.9 ERRONEOUS ENCOUNTER--DISREGARD: Primary | ICD-10-CM

## 2018-01-01 DIAGNOSIS — K29.70 GASTRITIS WITHOUT BLEEDING, UNSPECIFIED CHRONICITY, UNSPECIFIED GASTRITIS TYPE: ICD-10-CM

## 2018-01-01 DIAGNOSIS — G89.18 ACUTE POST-OPERATIVE PAIN: ICD-10-CM

## 2018-01-01 DIAGNOSIS — R11.0 NAUSEA: ICD-10-CM

## 2018-01-01 DIAGNOSIS — C79.89: ICD-10-CM

## 2018-01-01 DIAGNOSIS — R52 PAIN: ICD-10-CM

## 2018-01-01 DIAGNOSIS — Z71.9 ENCOUNTER FOR COUNSELING: Primary | ICD-10-CM

## 2018-01-01 DIAGNOSIS — K59.01 SLOW TRANSIT CONSTIPATION: Primary | ICD-10-CM

## 2018-01-01 DIAGNOSIS — R07.89 OTHER CHEST PAIN: ICD-10-CM

## 2018-01-01 DIAGNOSIS — R11.2 NON-INTRACTABLE VOMITING WITH NAUSEA, UNSPECIFIED VOMITING TYPE: ICD-10-CM

## 2018-01-01 DIAGNOSIS — R07.9 CHEST PAIN, UNSPECIFIED TYPE: ICD-10-CM

## 2018-01-01 DIAGNOSIS — Z51.11 ENCOUNTER FOR ANTINEOPLASTIC CHEMOTHERAPY: Primary | ICD-10-CM

## 2018-01-01 DIAGNOSIS — R52 PAIN: Primary | ICD-10-CM

## 2018-01-01 DIAGNOSIS — C76.1 MALIGNANT NEOPLASM OF CHEST WALL (H): Primary | ICD-10-CM

## 2018-01-01 DIAGNOSIS — C51.9 SARCOMA OF VULVA (H): ICD-10-CM

## 2018-01-01 DIAGNOSIS — C76.1 MALIGNANT NEOPLASM OF CHEST WALL (H): ICD-10-CM

## 2018-01-01 LAB
ALBUMIN SERPL-MCNC: 2.2 G/DL (ref 3.4–5)
ALBUMIN SERPL-MCNC: 2.4 G/DL (ref 3.4–5)
ALBUMIN SERPL-MCNC: 2.8 G/DL (ref 3.4–5)
ALBUMIN SERPL-MCNC: 2.8 G/DL (ref 3.4–5)
ALBUMIN SERPL-MCNC: 3.1 G/DL (ref 3.4–5)
ALBUMIN SERPL-MCNC: 3.4 G/DL (ref 3.4–5)
ALBUMIN SERPL-MCNC: 3.4 G/DL (ref 3.4–5)
ALBUMIN SERPL-MCNC: 3.6 G/DL (ref 3.4–5)
ALBUMIN UR-MCNC: 10 MG/DL
ALBUMIN UR-MCNC: 30 MG/DL
ALBUMIN UR-MCNC: 30 MG/DL
ALBUMIN UR-MCNC: NEGATIVE MG/DL
ALBUMIN UR-MCNC: NEGATIVE MG/DL
ALP SERPL-CCNC: 101 U/L (ref 40–150)
ALP SERPL-CCNC: 46 U/L (ref 40–150)
ALP SERPL-CCNC: 51 U/L (ref 40–150)
ALP SERPL-CCNC: 65 U/L (ref 40–150)
ALP SERPL-CCNC: 71 U/L (ref 40–150)
ALP SERPL-CCNC: 72 U/L (ref 40–150)
ALP SERPL-CCNC: 81 U/L (ref 40–150)
ALP SERPL-CCNC: 84 U/L (ref 40–150)
ALP SERPL-CCNC: 84 U/L (ref 40–150)
ALP SERPL-CCNC: 85 U/L (ref 40–150)
ALT SERPL W P-5'-P-CCNC: 144 U/L (ref 0–50)
ALT SERPL W P-5'-P-CCNC: 17 U/L (ref 0–50)
ALT SERPL W P-5'-P-CCNC: 21 U/L (ref 0–50)
ALT SERPL W P-5'-P-CCNC: 315 U/L (ref 0–50)
ALT SERPL W P-5'-P-CCNC: 32 U/L (ref 0–50)
ALT SERPL W P-5'-P-CCNC: 32 U/L (ref 0–50)
ALT SERPL W P-5'-P-CCNC: 35 U/L (ref 0–50)
ALT SERPL W P-5'-P-CCNC: 40 U/L (ref 0–50)
ALT SERPL W P-5'-P-CCNC: 477 U/L (ref 0–50)
ALT SERPL W P-5'-P-CCNC: 95 U/L (ref 0–50)
AMYLASE SERPL-CCNC: 54 U/L (ref 30–110)
AMYLASE SERPL-CCNC: 58 U/L (ref 30–110)
AMYLASE SERPL-CCNC: 59 U/L (ref 30–110)
ANION GAP SERPL CALCULATED.3IONS-SCNC: 4 MMOL/L (ref 3–14)
ANION GAP SERPL CALCULATED.3IONS-SCNC: 5 MMOL/L (ref 3–14)
ANION GAP SERPL CALCULATED.3IONS-SCNC: 7 MMOL/L (ref 3–14)
ANION GAP SERPL CALCULATED.3IONS-SCNC: 7 MMOL/L (ref 3–14)
ANION GAP SERPL CALCULATED.3IONS-SCNC: 8 MMOL/L (ref 3–14)
ANION GAP SERPL CALCULATED.3IONS-SCNC: 9 MMOL/L (ref 3–14)
APPEARANCE UR: ABNORMAL
APPEARANCE UR: ABNORMAL
APPEARANCE UR: CLEAR
AST SERPL W P-5'-P-CCNC: 142 U/L (ref 0–45)
AST SERPL W P-5'-P-CCNC: 27 U/L (ref 0–45)
AST SERPL W P-5'-P-CCNC: 28 U/L (ref 0–45)
AST SERPL W P-5'-P-CCNC: 332 U/L (ref 0–45)
AST SERPL W P-5'-P-CCNC: 37 U/L (ref 0–45)
AST SERPL W P-5'-P-CCNC: 43 U/L (ref 0–45)
AST SERPL W P-5'-P-CCNC: 46 U/L (ref 0–45)
AST SERPL W P-5'-P-CCNC: 46 U/L (ref 0–45)
AST SERPL W P-5'-P-CCNC: 68 U/L (ref 0–45)
AST SERPL W P-5'-P-CCNC: 686 U/L (ref 0–45)
BACTERIA #/AREA URNS HPF: ABNORMAL /HPF
BASOPHILS # BLD AUTO: 0 10E9/L (ref 0–0.2)
BASOPHILS NFR BLD AUTO: 0 %
BASOPHILS NFR BLD AUTO: 0.2 %
BASOPHILS NFR BLD AUTO: 0.3 %
BASOPHILS NFR BLD AUTO: 0.5 %
BASOPHILS NFR BLD AUTO: 0.8 %
BILE AC SERPL-SCNC: 0.3 UMOL/L (ref 0–2.5)
BILIRUB SERPL-MCNC: 0.4 MG/DL (ref 0.2–1.3)
BILIRUB SERPL-MCNC: 0.4 MG/DL (ref 0.2–1.3)
BILIRUB SERPL-MCNC: 0.5 MG/DL (ref 0.2–1.3)
BILIRUB SERPL-MCNC: 0.5 MG/DL (ref 0.2–1.3)
BILIRUB SERPL-MCNC: 0.6 MG/DL (ref 0.2–1.3)
BILIRUB SERPL-MCNC: 0.8 MG/DL (ref 0.2–1.3)
BILIRUB SERPL-MCNC: 1 MG/DL (ref 0.2–1.3)
BILIRUB SERPL-MCNC: 1.2 MG/DL (ref 0.2–1.3)
BILIRUB SERPL-MCNC: 1.7 MG/DL (ref 0.2–1.3)
BILIRUB SERPL-MCNC: 1.9 MG/DL (ref 0.2–1.3)
BILIRUB UR QL STRIP: NEGATIVE
BUN SERPL-MCNC: 10 MG/DL (ref 7–30)
BUN SERPL-MCNC: 10 MG/DL (ref 7–30)
BUN SERPL-MCNC: 11 MG/DL (ref 7–30)
BUN SERPL-MCNC: 12 MG/DL (ref 7–30)
BUN SERPL-MCNC: 13 MG/DL (ref 7–30)
BUN SERPL-MCNC: 6 MG/DL (ref 7–30)
BUN SERPL-MCNC: 9 MG/DL (ref 7–30)
BUN SERPL-MCNC: 9 MG/DL (ref 7–30)
CALCIUM SERPL-MCNC: 8.2 MG/DL (ref 8.5–10.1)
CALCIUM SERPL-MCNC: 8.2 MG/DL (ref 8.5–10.1)
CALCIUM SERPL-MCNC: 8.6 MG/DL (ref 8.5–10.1)
CALCIUM SERPL-MCNC: 8.7 MG/DL (ref 8.5–10.1)
CALCIUM SERPL-MCNC: 8.8 MG/DL (ref 8.5–10.1)
CALCIUM SERPL-MCNC: 8.9 MG/DL (ref 8.5–10.1)
CALCIUM SERPL-MCNC: 9.1 MG/DL (ref 8.5–10.1)
CALCIUM SERPL-MCNC: 9.1 MG/DL (ref 8.5–10.1)
CALCIUM SERPL-MCNC: 9.4 MG/DL (ref 8.5–10.1)
CALCIUM SERPL-MCNC: 9.4 MG/DL (ref 8.5–10.1)
CDCAE SERPL-SCNC: 2.6 UMOL/L (ref 0–3.4)
CHLORIDE SERPL-SCNC: 100 MMOL/L (ref 94–109)
CHLORIDE SERPL-SCNC: 101 MMOL/L (ref 94–109)
CHLORIDE SERPL-SCNC: 103 MMOL/L (ref 94–109)
CHLORIDE SERPL-SCNC: 106 MMOL/L (ref 94–109)
CHLORIDE SERPL-SCNC: 108 MMOL/L (ref 94–109)
CHLORIDE SERPL-SCNC: 99 MMOL/L (ref 94–109)
CHOLATE SERPL-SCNC: 3.9 UMOL/L (ref 0–7)
CO2 SERPL-SCNC: 27 MMOL/L (ref 20–32)
CO2 SERPL-SCNC: 27 MMOL/L (ref 20–32)
CO2 SERPL-SCNC: 28 MMOL/L (ref 20–32)
CO2 SERPL-SCNC: 28 MMOL/L (ref 20–32)
CO2 SERPL-SCNC: 29 MMOL/L (ref 20–32)
CO2 SERPL-SCNC: 30 MMOL/L (ref 20–32)
CO2 SERPL-SCNC: 30 MMOL/L (ref 20–32)
CO2 SERPL-SCNC: 31 MMOL/L (ref 20–32)
COLOR UR AUTO: ABNORMAL
COLOR UR AUTO: YELLOW
COLOR UR AUTO: YELLOW
CREAT SERPL-MCNC: 0.44 MG/DL (ref 0.52–1.04)
CREAT SERPL-MCNC: 0.54 MG/DL (ref 0.52–1.04)
CREAT SERPL-MCNC: 0.56 MG/DL (ref 0.52–1.04)
CREAT SERPL-MCNC: 0.56 MG/DL (ref 0.52–1.04)
CREAT SERPL-MCNC: 0.59 MG/DL (ref 0.52–1.04)
CREAT SERPL-MCNC: 0.6 MG/DL (ref 0.52–1.04)
CREAT SERPL-MCNC: 0.62 MG/DL (ref 0.52–1.04)
CREAT SERPL-MCNC: 0.66 MG/DL (ref 0.52–1.04)
CREAT SERPL-MCNC: 0.7 MG/DL (ref 0.52–1.04)
CREAT SERPL-MCNC: 0.82 MG/DL (ref 0.52–1.04)
DIFFERENTIAL METHOD BLD: ABNORMAL
DO-CHOLATE SERPL-SCNC: 0.7 UMOL/L (ref 0–1.9)
EOSINOPHIL # BLD AUTO: 0 10E9/L (ref 0–0.7)
EOSINOPHIL # BLD AUTO: 0.1 10E9/L (ref 0–0.7)
EOSINOPHIL NFR BLD AUTO: 0.2 %
EOSINOPHIL NFR BLD AUTO: 0.3 %
EOSINOPHIL NFR BLD AUTO: 0.4 %
EOSINOPHIL NFR BLD AUTO: 0.6 %
EOSINOPHIL NFR BLD AUTO: 0.7 %
EOSINOPHIL NFR BLD AUTO: 0.8 %
EOSINOPHIL NFR BLD AUTO: 0.9 %
EOSINOPHIL NFR BLD AUTO: 1.6 %
EOSINOPHIL NFR BLD AUTO: 1.7 %
EOSINOPHIL NFR BLD AUTO: 2.3 %
ERYTHROCYTE [DISTWIDTH] IN BLOOD BY AUTOMATED COUNT: 12.5 % (ref 10–15)
ERYTHROCYTE [DISTWIDTH] IN BLOOD BY AUTOMATED COUNT: 12.6 % (ref 10–15)
ERYTHROCYTE [DISTWIDTH] IN BLOOD BY AUTOMATED COUNT: 12.7 % (ref 10–15)
ERYTHROCYTE [DISTWIDTH] IN BLOOD BY AUTOMATED COUNT: 12.8 % (ref 10–15)
ERYTHROCYTE [DISTWIDTH] IN BLOOD BY AUTOMATED COUNT: 13 % (ref 10–15)
ERYTHROCYTE [DISTWIDTH] IN BLOOD BY AUTOMATED COUNT: 13 % (ref 10–15)
ERYTHROCYTE [DISTWIDTH] IN BLOOD BY AUTOMATED COUNT: 13.4 % (ref 10–15)
ERYTHROCYTE [DISTWIDTH] IN BLOOD BY AUTOMATED COUNT: 14.1 % (ref 10–15)
ERYTHROCYTE [DISTWIDTH] IN BLOOD BY AUTOMATED COUNT: 16.3 % (ref 10–15)
ERYTHROCYTE [DISTWIDTH] IN BLOOD BY AUTOMATED COUNT: 17.3 % (ref 10–15)
GFR SERPL CREATININE-BSD FRML MDRD: 85 ML/MIN/1.7M2
GFR SERPL CREATININE-BSD FRML MDRD: >90 ML/MIN/1.7M2
GGT SERPL-CCNC: 119 U/L (ref 0–40)
GLUCOSE BLDC GLUCOMTR-MCNC: 93 MG/DL (ref 70–99)
GLUCOSE SERPL-MCNC: 104 MG/DL (ref 70–99)
GLUCOSE SERPL-MCNC: 104 MG/DL (ref 70–99)
GLUCOSE SERPL-MCNC: 107 MG/DL (ref 70–99)
GLUCOSE SERPL-MCNC: 115 MG/DL (ref 70–99)
GLUCOSE SERPL-MCNC: 66 MG/DL (ref 70–99)
GLUCOSE SERPL-MCNC: 76 MG/DL (ref 70–99)
GLUCOSE SERPL-MCNC: 79 MG/DL (ref 70–99)
GLUCOSE SERPL-MCNC: 88 MG/DL (ref 70–99)
GLUCOSE SERPL-MCNC: 92 MG/DL (ref 70–99)
GLUCOSE SERPL-MCNC: 96 MG/DL (ref 70–99)
GLUCOSE UR STRIP-MCNC: NEGATIVE MG/DL
HCT VFR BLD AUTO: 28.9 % (ref 35–47)
HCT VFR BLD AUTO: 31.8 % (ref 35–47)
HCT VFR BLD AUTO: 32.6 % (ref 35–47)
HCT VFR BLD AUTO: 34.9 % (ref 35–47)
HCT VFR BLD AUTO: 35.2 % (ref 35–47)
HCT VFR BLD AUTO: 35.7 % (ref 35–47)
HCT VFR BLD AUTO: 35.9 % (ref 35–47)
HCT VFR BLD AUTO: 37.8 % (ref 35–47)
HCT VFR BLD AUTO: 39.2 % (ref 35–47)
HCT VFR BLD AUTO: 40.1 % (ref 35–47)
HGB BLD-MCNC: 10.3 G/DL (ref 11.7–15.7)
HGB BLD-MCNC: 11 G/DL (ref 11.7–15.7)
HGB BLD-MCNC: 11.8 G/DL (ref 11.7–15.7)
HGB BLD-MCNC: 12 G/DL (ref 11.7–15.7)
HGB BLD-MCNC: 12.1 G/DL (ref 11.7–15.7)
HGB BLD-MCNC: 12.2 G/DL (ref 11.7–15.7)
HGB BLD-MCNC: 12.6 G/DL (ref 11.7–15.7)
HGB BLD-MCNC: 13.3 G/DL (ref 11.7–15.7)
HGB BLD-MCNC: 13.5 G/DL (ref 11.7–15.7)
HGB BLD-MCNC: 9.5 G/DL (ref 11.7–15.7)
HGB UR QL STRIP: NEGATIVE
IMM GRANULOCYTES # BLD: 0 10E9/L (ref 0–0.4)
IMM GRANULOCYTES NFR BLD: 0 %
IMM GRANULOCYTES NFR BLD: 0.2 %
IMM GRANULOCYTES NFR BLD: 0.2 %
IMM GRANULOCYTES NFR BLD: 0.3 %
IMM GRANULOCYTES NFR BLD: 0.4 %
IMM GRANULOCYTES NFR BLD: 0.4 %
IMM GRANULOCYTES NFR BLD: 0.5 %
INTERPRETATION ECG - MUSE: NORMAL
INTERPRETATION ECG - MUSE: NORMAL
KETONES UR STRIP-MCNC: 10 MG/DL
KETONES UR STRIP-MCNC: NEGATIVE MG/DL
LEUKOCYTE ESTERASE UR QL STRIP: ABNORMAL
LEUKOCYTE ESTERASE UR QL STRIP: NEGATIVE
LEUKOCYTE ESTERASE UR QL STRIP: NEGATIVE
LIPASE SERPL-CCNC: 160 U/L (ref 73–393)
LIPASE SERPL-CCNC: 259 U/L (ref 73–393)
LIPASE SERPL-CCNC: 518 U/L (ref 73–393)
LIPASE SERPL-CCNC: 693 U/L (ref 73–393)
LIPASE SERPL-CCNC: 911 U/L (ref 73–393)
LYMPHOCYTES # BLD AUTO: 0.3 10E9/L (ref 0.8–5.3)
LYMPHOCYTES # BLD AUTO: 0.3 10E9/L (ref 0.8–5.3)
LYMPHOCYTES # BLD AUTO: 0.5 10E9/L (ref 0.8–5.3)
LYMPHOCYTES # BLD AUTO: 0.9 10E9/L (ref 0.8–5.3)
LYMPHOCYTES # BLD AUTO: 1.1 10E9/L (ref 0.8–5.3)
LYMPHOCYTES # BLD AUTO: 1.1 10E9/L (ref 0.8–5.3)
LYMPHOCYTES # BLD AUTO: 1.2 10E9/L (ref 0.8–5.3)
LYMPHOCYTES # BLD AUTO: 1.4 10E9/L (ref 0.8–5.3)
LYMPHOCYTES # BLD AUTO: 1.7 10E9/L (ref 0.8–5.3)
LYMPHOCYTES # BLD AUTO: 1.9 10E9/L (ref 0.8–5.3)
LYMPHOCYTES NFR BLD AUTO: 12.8 %
LYMPHOCYTES NFR BLD AUTO: 23.1 %
LYMPHOCYTES NFR BLD AUTO: 31.5 %
LYMPHOCYTES NFR BLD AUTO: 31.6 %
LYMPHOCYTES NFR BLD AUTO: 33.6 %
LYMPHOCYTES NFR BLD AUTO: 33.9 %
LYMPHOCYTES NFR BLD AUTO: 37 %
LYMPHOCYTES NFR BLD AUTO: 37.1 %
LYMPHOCYTES NFR BLD AUTO: 6.9 %
LYMPHOCYTES NFR BLD AUTO: 9.9 %
MACROCYTES BLD QL SMEAR: PRESENT
MCH RBC QN AUTO: 34.6 PG (ref 26.5–33)
MCH RBC QN AUTO: 34.9 PG (ref 26.5–33)
MCH RBC QN AUTO: 35 PG (ref 26.5–33)
MCH RBC QN AUTO: 35.2 PG (ref 26.5–33)
MCH RBC QN AUTO: 35.8 PG (ref 26.5–33)
MCH RBC QN AUTO: 36 PG (ref 26.5–33)
MCH RBC QN AUTO: 36 PG (ref 26.5–33)
MCH RBC QN AUTO: 36.1 PG (ref 26.5–33)
MCHC RBC AUTO-ENTMCNC: 32.4 G/DL (ref 31.5–36.5)
MCHC RBC AUTO-ENTMCNC: 32.9 G/DL (ref 31.5–36.5)
MCHC RBC AUTO-ENTMCNC: 33.3 G/DL (ref 31.5–36.5)
MCHC RBC AUTO-ENTMCNC: 33.7 G/DL (ref 31.5–36.5)
MCHC RBC AUTO-ENTMCNC: 33.7 G/DL (ref 31.5–36.5)
MCHC RBC AUTO-ENTMCNC: 33.8 G/DL (ref 31.5–36.5)
MCHC RBC AUTO-ENTMCNC: 33.9 G/DL (ref 31.5–36.5)
MCHC RBC AUTO-ENTMCNC: 33.9 G/DL (ref 31.5–36.5)
MCHC RBC AUTO-ENTMCNC: 34 G/DL (ref 31.5–36.5)
MCHC RBC AUTO-ENTMCNC: 34.1 G/DL (ref 31.5–36.5)
MCV RBC AUTO: 103 FL (ref 78–100)
MCV RBC AUTO: 104 FL (ref 78–100)
MCV RBC AUTO: 106 FL (ref 78–100)
MCV RBC AUTO: 106 FL (ref 78–100)
MCV RBC AUTO: 107 FL (ref 78–100)
MCV RBC AUTO: 108 FL (ref 78–100)
MONOCYTES # BLD AUTO: 0.5 10E9/L (ref 0–1.3)
MONOCYTES # BLD AUTO: 0.5 10E9/L (ref 0–1.3)
MONOCYTES # BLD AUTO: 0.6 10E9/L (ref 0–1.3)
MONOCYTES # BLD AUTO: 0.7 10E9/L (ref 0–1.3)
MONOCYTES # BLD AUTO: 0.9 10E9/L (ref 0–1.3)
MONOCYTES NFR BLD AUTO: 10.9 %
MONOCYTES NFR BLD AUTO: 11.8 %
MONOCYTES NFR BLD AUTO: 12.5 %
MONOCYTES NFR BLD AUTO: 14.4 %
MONOCYTES NFR BLD AUTO: 14.5 %
MONOCYTES NFR BLD AUTO: 15.5 %
MONOCYTES NFR BLD AUTO: 16.4 %
MONOCYTES NFR BLD AUTO: 17.3 %
MONOCYTES NFR BLD AUTO: 20.2 %
MONOCYTES NFR BLD AUTO: 22.2 %
MUCOUS THREADS #/AREA URNS LPF: PRESENT /LPF
NEUTROPHILS # BLD AUTO: 1.5 10E9/L (ref 1.6–8.3)
NEUTROPHILS # BLD AUTO: 1.6 10E9/L (ref 1.6–8.3)
NEUTROPHILS # BLD AUTO: 1.7 10E9/L (ref 1.6–8.3)
NEUTROPHILS # BLD AUTO: 1.7 10E9/L (ref 1.6–8.3)
NEUTROPHILS # BLD AUTO: 2.1 10E9/L (ref 1.6–8.3)
NEUTROPHILS # BLD AUTO: 2.3 10E9/L (ref 1.6–8.3)
NEUTROPHILS # BLD AUTO: 2.4 10E9/L (ref 1.6–8.3)
NEUTROPHILS # BLD AUTO: 2.8 10E9/L (ref 1.6–8.3)
NEUTROPHILS # BLD AUTO: 2.9 10E9/L (ref 1.6–8.3)
NEUTROPHILS # BLD AUTO: 3.7 10E9/L (ref 1.6–8.3)
NEUTROPHILS NFR BLD AUTO: 46.9 %
NEUTROPHILS NFR BLD AUTO: 47 %
NEUTROPHILS NFR BLD AUTO: 48.9 %
NEUTROPHILS NFR BLD AUTO: 49.4 %
NEUTROPHILS NFR BLD AUTO: 50.2 %
NEUTROPHILS NFR BLD AUTO: 52.6 %
NEUTROPHILS NFR BLD AUTO: 60.8 %
NEUTROPHILS NFR BLD AUTO: 61.9 %
NEUTROPHILS NFR BLD AUTO: 75.2 %
NEUTROPHILS NFR BLD AUTO: 78.6 %
NITRATE UR QL: NEGATIVE
NRBC # BLD AUTO: 0 10*3/UL
NRBC BLD AUTO-RTO: 0 /100
PH UR STRIP: 6.5 PH (ref 5–7)
PH UR STRIP: 7 PH (ref 5–7)
PH UR STRIP: 7.5 PH (ref 5–7)
PLATELET # BLD AUTO: 114 10E9/L (ref 150–450)
PLATELET # BLD AUTO: 118 10E9/L (ref 150–450)
PLATELET # BLD AUTO: 134 10E9/L (ref 150–450)
PLATELET # BLD AUTO: 164 10E9/L (ref 150–450)
PLATELET # BLD AUTO: 217 10E9/L (ref 150–450)
PLATELET # BLD AUTO: 227 10E9/L (ref 150–450)
PLATELET # BLD AUTO: 230 10E9/L (ref 150–450)
PLATELET # BLD AUTO: 230 10E9/L (ref 150–450)
PLATELET # BLD AUTO: 246 10E9/L (ref 150–450)
PLATELET # BLD AUTO: 276 10E9/L (ref 150–450)
PLATELET # BLD EST: ABNORMAL 10*3/UL
PLATELET # BLD EST: ABNORMAL 10*3/UL
POTASSIUM SERPL-SCNC: 3.2 MMOL/L (ref 3.4–5.3)
POTASSIUM SERPL-SCNC: 3.5 MMOL/L (ref 3.4–5.3)
POTASSIUM SERPL-SCNC: 3.6 MMOL/L (ref 3.4–5.3)
POTASSIUM SERPL-SCNC: 3.6 MMOL/L (ref 3.4–5.3)
POTASSIUM SERPL-SCNC: 3.7 MMOL/L (ref 3.4–5.3)
POTASSIUM SERPL-SCNC: 3.8 MMOL/L (ref 3.4–5.3)
PROT SERPL-MCNC: 5.6 G/DL (ref 6.8–8.8)
PROT SERPL-MCNC: 6.1 G/DL (ref 6.8–8.8)
PROT SERPL-MCNC: 6.7 G/DL (ref 6.8–8.8)
PROT SERPL-MCNC: 7.4 G/DL (ref 6.8–8.8)
PROT SERPL-MCNC: 7.7 G/DL (ref 6.8–8.8)
PROT SERPL-MCNC: 7.8 G/DL (ref 6.8–8.8)
PROT SERPL-MCNC: 8.4 G/DL (ref 6.8–8.8)
PROT SERPL-MCNC: 8.4 G/DL (ref 6.8–8.8)
RBC # BLD AUTO: 2.7 10E12/L (ref 3.8–5.2)
RBC # BLD AUTO: 2.98 10E12/L (ref 3.8–5.2)
RBC # BLD AUTO: 3.05 10E12/L (ref 3.8–5.2)
RBC # BLD AUTO: 3.3 10E12/L (ref 3.8–5.2)
RBC # BLD AUTO: 3.43 10E12/L (ref 3.8–5.2)
RBC # BLD AUTO: 3.44 10E12/L (ref 3.8–5.2)
RBC # BLD AUTO: 3.47 10E12/L (ref 3.8–5.2)
RBC # BLD AUTO: 3.5 10E12/L (ref 3.8–5.2)
RBC # BLD AUTO: 3.69 10E12/L (ref 3.8–5.2)
RBC # BLD AUTO: 3.87 10E12/L (ref 3.8–5.2)
RBC #/AREA URNS AUTO: 0 /HPF (ref 0–2)
RBC #/AREA URNS AUTO: 0 /HPF (ref 0–2)
RBC #/AREA URNS AUTO: 3 /HPF (ref 0–2)
RBC #/AREA URNS AUTO: <1 /HPF (ref 0–2)
RBC #/AREA URNS AUTO: <1 /HPF (ref 0–2)
RBC MORPH BLD: NORMAL
SODIUM SERPL-SCNC: 135 MMOL/L (ref 133–144)
SODIUM SERPL-SCNC: 137 MMOL/L (ref 133–144)
SODIUM SERPL-SCNC: 139 MMOL/L (ref 133–144)
SODIUM SERPL-SCNC: 140 MMOL/L (ref 133–144)
SOURCE: ABNORMAL
SP GR UR STRIP: 1 (ref 1–1.03)
SP GR UR STRIP: 1.01 (ref 1–1.03)
SQUAMOUS #/AREA URNS AUTO: 1 /HPF (ref 0–1)
SQUAMOUS #/AREA URNS AUTO: 5 /HPF (ref 0–1)
SQUAMOUS #/AREA URNS AUTO: <1 /HPF (ref 0–1)
TRANS CELLS #/AREA URNS HPF: <1 /HPF (ref 0–1)
UROBILINOGEN UR STRIP-MCNC: 2 MG/DL (ref 0–2)
UROBILINOGEN UR STRIP-MCNC: NORMAL MG/DL (ref 0–2)
URSODEOXYCHOLATE SERPL-SCNC: 0.3 UMOL/L (ref 0–1)
WBC # BLD AUTO: 2.6 10E9/L (ref 4–11)
WBC # BLD AUTO: 3.3 10E9/L (ref 4–11)
WBC # BLD AUTO: 3.4 10E9/L (ref 4–11)
WBC # BLD AUTO: 3.5 10E9/L (ref 4–11)
WBC # BLD AUTO: 3.8 10E9/L (ref 4–11)
WBC # BLD AUTO: 3.9 10E9/L (ref 4–11)
WBC # BLD AUTO: 4.6 10E9/L (ref 4–11)
WBC # BLD AUTO: 4.7 10E9/L (ref 4–11)
WBC # BLD AUTO: 4.7 10E9/L (ref 4–11)
WBC # BLD AUTO: 5.5 10E9/L (ref 4–11)
WBC #/AREA URNS AUTO: 1 /HPF (ref 0–2)
WBC #/AREA URNS AUTO: 11 /HPF (ref 0–2)
WBC #/AREA URNS AUTO: 2 /HPF (ref 0–2)
WBC #/AREA URNS AUTO: 29 /HPF (ref 0–5)
WBC #/AREA URNS AUTO: 5 /HPF (ref 0–5)

## 2018-01-01 PROCEDURE — 25000132 ZZH RX MED GY IP 250 OP 250 PS 637: Performed by: STUDENT IN AN ORGANIZED HEALTH CARE EDUCATION/TRAINING PROGRAM

## 2018-01-01 PROCEDURE — 25000128 H RX IP 250 OP 636: Performed by: PEDIATRICS

## 2018-01-01 PROCEDURE — 36415 COLL VENOUS BLD VENIPUNCTURE: CPT | Performed by: NURSE PRACTITIONER

## 2018-01-01 PROCEDURE — 77387 GUIDANCE FOR RADJ TX DLVR: CPT | Performed by: RADIOLOGY

## 2018-01-01 PROCEDURE — 12000014 ZZH R&B PEDS UMMC

## 2018-01-01 PROCEDURE — 25000132 ZZH RX MED GY IP 250 OP 250 PS 637: Performed by: PEDIATRICS

## 2018-01-01 PROCEDURE — G0463 HOSPITAL OUTPT CLINIC VISIT: HCPCS | Mod: 25

## 2018-01-01 PROCEDURE — 80053 COMPREHEN METABOLIC PANEL: CPT | Performed by: NURSE PRACTITIONER

## 2018-01-01 PROCEDURE — 93005 ELECTROCARDIOGRAM TRACING: CPT

## 2018-01-01 PROCEDURE — T1013 SIGN LANG/ORAL INTERPRETER: HCPCS | Mod: U3

## 2018-01-01 PROCEDURE — 25000128 H RX IP 250 OP 636: Performed by: STUDENT IN AN ORGANIZED HEALTH CARE EDUCATION/TRAINING PROGRAM

## 2018-01-01 PROCEDURE — 77412 RADIATION TX DELIVERY LVL 3: CPT | Performed by: RADIOLOGY

## 2018-01-01 PROCEDURE — T1013 SIGN LANG/ORAL INTERPRETER: HCPCS | Mod: U3,ZF

## 2018-01-01 PROCEDURE — A9552 F18 FDG: HCPCS | Performed by: PEDIATRICS

## 2018-01-01 PROCEDURE — 83789 MASS SPECTROMETRY QUAL/QUAN: CPT | Performed by: STUDENT IN AN ORGANIZED HEALTH CARE EDUCATION/TRAINING PROGRAM

## 2018-01-01 PROCEDURE — G0463 HOSPITAL OUTPT CLINIC VISIT: HCPCS | Mod: ZF

## 2018-01-01 PROCEDURE — 71250 CT THORAX DX C-: CPT

## 2018-01-01 PROCEDURE — 85025 COMPLETE CBC W/AUTO DIFF WBC: CPT | Performed by: NURSE PRACTITIONER

## 2018-01-01 PROCEDURE — 77334 RADIATION TREATMENT AID(S): CPT | Performed by: RADIOLOGY

## 2018-01-01 PROCEDURE — 36415 COLL VENOUS BLD VENIPUNCTURE: CPT | Performed by: STUDENT IN AN ORGANIZED HEALTH CARE EDUCATION/TRAINING PROGRAM

## 2018-01-01 PROCEDURE — T1013 SIGN LANG/ORAL INTERPRETER: HCPCS | Mod: U3,ZF | Performed by: NURSE PRACTITIONER

## 2018-01-01 PROCEDURE — 36591 DRAW BLOOD OFF VENOUS DEVICE: CPT

## 2018-01-01 PROCEDURE — 25000125 ZZHC RX 250: Mod: ZF

## 2018-01-01 PROCEDURE — 83690 ASSAY OF LIPASE: CPT | Performed by: NURSE PRACTITIONER

## 2018-01-01 PROCEDURE — 81001 URINALYSIS AUTO W/SCOPE: CPT | Performed by: NURSE PRACTITIONER

## 2018-01-01 PROCEDURE — 40000809 ZZH STATISTIC NO DOCUMENTATION TO SUPPORT CHARGE

## 2018-01-01 PROCEDURE — 82150 ASSAY OF AMYLASE: CPT | Performed by: NURSE PRACTITIONER

## 2018-01-01 PROCEDURE — 25000125 ZZHC RX 250

## 2018-01-01 PROCEDURE — 93005 ELECTROCARDIOGRAM TRACING: CPT | Mod: ZF

## 2018-01-01 PROCEDURE — 80053 COMPREHEN METABOLIC PANEL: CPT | Performed by: STUDENT IN AN ORGANIZED HEALTH CARE EDUCATION/TRAINING PROGRAM

## 2018-01-01 PROCEDURE — 77332 RADIATION TREATMENT AID(S): CPT | Performed by: RADIOLOGY

## 2018-01-01 PROCEDURE — 25000125 ZZHC RX 250: Performed by: PEDIATRICS

## 2018-01-01 PROCEDURE — 83690 ASSAY OF LIPASE: CPT | Performed by: STUDENT IN AN ORGANIZED HEALTH CARE EDUCATION/TRAINING PROGRAM

## 2018-01-01 PROCEDURE — 25000128 H RX IP 250 OP 636: Mod: ZF | Performed by: NURSE PRACTITIONER

## 2018-01-01 PROCEDURE — 25000125 ZZHC RX 250: Performed by: EMERGENCY MEDICINE

## 2018-01-01 PROCEDURE — 77290 THER RAD SIMULAJ FIELD CPLX: CPT | Performed by: RADIOLOGY

## 2018-01-01 PROCEDURE — 77336 RADIATION PHYSICS CONSULT: CPT | Performed by: RADIOLOGY

## 2018-01-01 PROCEDURE — 25000128 H RX IP 250 OP 636: Mod: ZF

## 2018-01-01 PROCEDURE — 99285 EMERGENCY DEPT VISIT HI MDM: CPT | Mod: Z6 | Performed by: EMERGENCY MEDICINE

## 2018-01-01 PROCEDURE — 96374 THER/PROPH/DIAG INJ IV PUSH: CPT | Mod: 59

## 2018-01-01 PROCEDURE — 96375 TX/PRO/DX INJ NEW DRUG ADDON: CPT

## 2018-01-01 PROCEDURE — 34300033 ZZH RX 343: Performed by: PEDIATRICS

## 2018-01-01 PROCEDURE — 25000125 ZZHC RX 250: Performed by: STUDENT IN AN ORGANIZED HEALTH CARE EDUCATION/TRAINING PROGRAM

## 2018-01-01 PROCEDURE — 25000128 H RX IP 250 OP 636

## 2018-01-01 PROCEDURE — 74177 CT ABD & PELVIS W/CONTRAST: CPT

## 2018-01-01 PROCEDURE — 77295 3-D RADIOTHERAPY PLAN: CPT | Performed by: RADIOLOGY

## 2018-01-01 PROCEDURE — 82150 ASSAY OF AMYLASE: CPT | Performed by: STUDENT IN AN ORGANIZED HEALTH CARE EDUCATION/TRAINING PROGRAM

## 2018-01-01 PROCEDURE — 25000128 H RX IP 250 OP 636: Performed by: EMERGENCY MEDICINE

## 2018-01-01 PROCEDURE — 77014 CT THERAPY CORRELATE: CPT | Mod: TC

## 2018-01-01 PROCEDURE — T1013 SIGN LANG/ORAL INTERPRETER: HCPCS | Mod: U3,ZF | Performed by: RADIOLOGY

## 2018-01-01 PROCEDURE — 76700 US EXAM ABDOM COMPLETE: CPT

## 2018-01-01 PROCEDURE — 78816 PET IMAGE W/CT FULL BODY: CPT | Mod: PS

## 2018-01-01 PROCEDURE — 96374 THER/PROPH/DIAG INJ IV PUSH: CPT

## 2018-01-01 PROCEDURE — 77300 RADIATION THERAPY DOSE PLAN: CPT | Performed by: RADIOLOGY

## 2018-01-01 PROCEDURE — G0463 HOSPITAL OUTPT CLINIC VISIT: HCPCS | Mod: 25,ZF

## 2018-01-01 PROCEDURE — 99285 EMERGENCY DEPT VISIT HI MDM: CPT | Mod: 25

## 2018-01-01 PROCEDURE — 74018 RADEX ABDOMEN 1 VIEW: CPT

## 2018-01-01 PROCEDURE — 40000141 ZZH STATISTIC PERIPHERAL IV START W/O US GUIDANCE

## 2018-01-01 PROCEDURE — 82962 GLUCOSE BLOOD TEST: CPT

## 2018-01-01 PROCEDURE — 96523 IRRIG DRUG DELIVERY DEVICE: CPT

## 2018-01-01 PROCEDURE — 40000268 ZZH STATISTIC NO CHARGES

## 2018-01-01 PROCEDURE — 82977 ASSAY OF GGT: CPT | Performed by: STUDENT IN AN ORGANIZED HEALTH CARE EDUCATION/TRAINING PROGRAM

## 2018-01-01 PROCEDURE — 85025 COMPLETE CBC W/AUTO DIFF WBC: CPT | Performed by: STUDENT IN AN ORGANIZED HEALTH CARE EDUCATION/TRAINING PROGRAM

## 2018-01-01 PROCEDURE — 96376 TX/PRO/DX INJ SAME DRUG ADON: CPT

## 2018-01-01 RX ORDER — SIMETHICONE 80 MG
80 TABLET,CHEWABLE ORAL EVERY 6 HOURS PRN
Status: DISCONTINUED | OUTPATIENT
Start: 2018-01-01 | End: 2018-01-01 | Stop reason: HOSPADM

## 2018-01-01 RX ORDER — MORPHINE SULFATE 2 MG/ML
2-4 INJECTION, SOLUTION INTRAMUSCULAR; INTRAVENOUS ONCE
Status: COMPLETED | OUTPATIENT
Start: 2018-01-01 | End: 2018-01-01

## 2018-01-01 RX ORDER — MORPHINE SULFATE 2 MG/ML
2 INJECTION, SOLUTION INTRAMUSCULAR; INTRAVENOUS EVERY 4 HOURS
Status: DISCONTINUED | OUTPATIENT
Start: 2018-01-01 | End: 2018-01-01

## 2018-01-01 RX ORDER — HYDROMORPHONE HYDROCHLORIDE 2 MG/1
1-2 TABLET ORAL
Qty: 30 TABLET | Refills: 0 | Status: SHIPPED | OUTPATIENT
Start: 2018-01-01 | End: 2018-01-01

## 2018-01-01 RX ORDER — LORAZEPAM 2 MG/ML
2 INJECTION INTRAMUSCULAR ONCE
Status: COMPLETED | OUTPATIENT
Start: 2018-01-01 | End: 2018-01-01

## 2018-01-01 RX ORDER — POLYETHYLENE GLYCOL 3350 17 G/17G
17 POWDER, FOR SOLUTION ORAL DAILY PRN
Qty: 7 PACKET | Refills: 1 | Status: SHIPPED | OUTPATIENT
Start: 2018-01-01 | End: 2018-01-01

## 2018-01-01 RX ORDER — DIPHENHYDRAMINE HCL 25 MG
25-50 CAPSULE ORAL EVERY 6 HOURS PRN
Status: DISCONTINUED | OUTPATIENT
Start: 2018-01-01 | End: 2018-01-01

## 2018-01-01 RX ORDER — SENNOSIDES 8.6 MG
8.6 TABLET ORAL DAILY
Status: DISCONTINUED | OUTPATIENT
Start: 2018-01-01 | End: 2018-01-01

## 2018-01-01 RX ORDER — ACETAMINOPHEN 325 MG/1
650 TABLET ORAL EVERY 6 HOURS
Status: DISCONTINUED | OUTPATIENT
Start: 2018-01-01 | End: 2018-01-01 | Stop reason: HOSPADM

## 2018-01-01 RX ORDER — HYDROMORPHONE HYDROCHLORIDE 1 MG/ML
0.5 INJECTION, SOLUTION INTRAMUSCULAR; INTRAVENOUS; SUBCUTANEOUS ONCE
Status: DISCONTINUED | OUTPATIENT
Start: 2018-01-01 | End: 2018-01-01

## 2018-01-01 RX ORDER — ONDANSETRON 8 MG/1
8 TABLET, ORALLY DISINTEGRATING ORAL EVERY 6 HOURS
Qty: 120 TABLET | Refills: 1 | Status: SHIPPED | OUTPATIENT
Start: 2018-01-01 | End: 2018-01-01

## 2018-01-01 RX ORDER — DEXAMETHASONE 4 MG/1
4 TABLET ORAL 2 TIMES DAILY WITH MEALS
Qty: 30 TABLET | Refills: 0 | Status: SHIPPED | OUTPATIENT
Start: 2018-01-01 | End: 2018-01-01

## 2018-01-01 RX ORDER — IBUPROFEN 200 MG
600 TABLET ORAL EVERY 6 HOURS
Status: DISCONTINUED | OUTPATIENT
Start: 2018-01-01 | End: 2018-01-01 | Stop reason: HOSPADM

## 2018-01-01 RX ORDER — POLYETHYLENE GLYCOL 3350 17 G/17G
17 POWDER, FOR SOLUTION ORAL DAILY PRN
Qty: 7 PACKET | Refills: 1 | Status: SHIPPED | OUTPATIENT
Start: 2018-01-01

## 2018-01-01 RX ORDER — ONDANSETRON 4 MG/1
4 TABLET, ORALLY DISINTEGRATING ORAL EVERY 6 HOURS PRN
Status: DISCONTINUED | OUTPATIENT
Start: 2018-01-01 | End: 2018-01-01 | Stop reason: HOSPADM

## 2018-01-01 RX ORDER — ONDANSETRON 2 MG/ML
8 INJECTION INTRAMUSCULAR; INTRAVENOUS ONCE
Status: COMPLETED | OUTPATIENT
Start: 2018-01-01 | End: 2018-01-01

## 2018-01-01 RX ORDER — NALOXONE HYDROCHLORIDE 0.4 MG/ML
.1-.4 INJECTION, SOLUTION INTRAMUSCULAR; INTRAVENOUS; SUBCUTANEOUS
Status: DISCONTINUED | OUTPATIENT
Start: 2018-01-01 | End: 2018-01-01 | Stop reason: HOSPADM

## 2018-01-01 RX ORDER — ONDANSETRON 8 MG/1
8 TABLET, FILM COATED ORAL EVERY 6 HOURS PRN
Qty: 30 TABLET | Refills: 0 | Status: SHIPPED | OUTPATIENT
Start: 2018-01-01 | End: 2018-01-01

## 2018-01-01 RX ORDER — KETOROLAC TROMETHAMINE 30 MG/ML
30 INJECTION, SOLUTION INTRAMUSCULAR; INTRAVENOUS EVERY 6 HOURS
Status: DISCONTINUED | OUTPATIENT
Start: 2018-01-01 | End: 2018-01-01

## 2018-01-01 RX ORDER — LORAZEPAM 1 MG/1
1 TABLET ORAL EVERY 6 HOURS PRN
Qty: 30 TABLET | Refills: 2 | Status: SHIPPED | OUTPATIENT
Start: 2018-01-01

## 2018-01-01 RX ORDER — ACETAMINOPHEN 10 MG/ML
15 INJECTION, SOLUTION INTRAVENOUS EVERY 6 HOURS
Status: DISCONTINUED | OUTPATIENT
Start: 2018-01-01 | End: 2018-01-01

## 2018-01-01 RX ORDER — POLYETHYLENE GLYCOL 3350 17 G/17G
17 POWDER, FOR SOLUTION ORAL DAILY PRN
Status: DISCONTINUED | OUTPATIENT
Start: 2018-01-01 | End: 2018-01-01

## 2018-01-01 RX ORDER — LIDOCAINE 50 MG/G
1 PATCH TOPICAL EVERY 24 HOURS
Qty: 30 PATCH | Refills: 3 | Status: SHIPPED | OUTPATIENT
Start: 2018-01-01

## 2018-01-01 RX ORDER — OLANZAPINE 5 MG/1
2.5 TABLET ORAL 2 TIMES DAILY
Qty: 30 TABLET | Refills: 1 | Status: SHIPPED | OUTPATIENT
Start: 2018-01-01

## 2018-01-01 RX ORDER — DIPHENHYDRAMINE HYDROCHLORIDE 50 MG/ML
25 INJECTION INTRAMUSCULAR; INTRAVENOUS EVERY 6 HOURS PRN
Status: DISCONTINUED | OUTPATIENT
Start: 2018-01-01 | End: 2018-01-01 | Stop reason: HOSPADM

## 2018-01-01 RX ORDER — NALOXONE HYDROCHLORIDE 0.4 MG/ML
.1-.4 INJECTION, SOLUTION INTRAMUSCULAR; INTRAVENOUS; SUBCUTANEOUS
Status: DISCONTINUED | OUTPATIENT
Start: 2018-01-01 | End: 2018-01-01

## 2018-01-01 RX ORDER — ACETAMINOPHEN 325 MG/1
650 TABLET ORAL EVERY 6 HOURS PRN
Status: DISCONTINUED | OUTPATIENT
Start: 2018-01-01 | End: 2018-01-01 | Stop reason: HOSPADM

## 2018-01-01 RX ORDER — MORPHINE SULFATE 2 MG/ML
2 INJECTION, SOLUTION INTRAMUSCULAR; INTRAVENOUS EVERY 4 HOURS PRN
Status: DISCONTINUED | OUTPATIENT
Start: 2018-01-01 | End: 2018-01-01

## 2018-01-01 RX ORDER — LIDOCAINE 40 MG/G
CREAM TOPICAL
Status: DISCONTINUED | OUTPATIENT
Start: 2018-01-01 | End: 2018-01-01

## 2018-01-01 RX ORDER — SODIUM CHLORIDE 9 MG/ML
INJECTION, SOLUTION INTRAVENOUS CONTINUOUS
Status: DISCONTINUED | OUTPATIENT
Start: 2018-01-01 | End: 2018-01-01

## 2018-01-01 RX ORDER — PANTOPRAZOLE SODIUM 40 MG/1
40 TABLET, DELAYED RELEASE ORAL
Qty: 60 TABLET | Refills: 0 | Status: ON HOLD | OUTPATIENT
Start: 2018-01-01 | End: 2018-01-01

## 2018-01-01 RX ORDER — DIPHENHYDRAMINE HCL 25 MG
50 CAPSULE ORAL EVERY 6 HOURS PRN
Status: DISCONTINUED | OUTPATIENT
Start: 2018-01-01 | End: 2018-01-01

## 2018-01-01 RX ORDER — POLYETHYLENE GLYCOL 3350 17 G/17G
17 POWDER, FOR SOLUTION ORAL DAILY
Status: DISCONTINUED | OUTPATIENT
Start: 2018-01-01 | End: 2018-01-01

## 2018-01-01 RX ORDER — ONDANSETRON 2 MG/ML
6 INJECTION INTRAMUSCULAR; INTRAVENOUS EVERY 6 HOURS
Status: DISCONTINUED | OUTPATIENT
Start: 2018-01-01 | End: 2018-01-01

## 2018-01-01 RX ORDER — PAZOPANIB 200 MG/1
800 TABLET, FILM COATED ORAL DAILY
Qty: 160 TABLET | Refills: 5 | COMMUNITY
Start: 2018-01-01 | End: 2018-01-01

## 2018-01-01 RX ORDER — POLYETHYLENE GLYCOL 3350 17 G/17G
17 POWDER, FOR SOLUTION ORAL DAILY
Status: DISCONTINUED | OUTPATIENT
Start: 2018-01-01 | End: 2018-01-01 | Stop reason: HOSPADM

## 2018-01-01 RX ORDER — ONDANSETRON 4 MG/1
8 TABLET, ORALLY DISINTEGRATING ORAL EVERY 6 HOURS PRN
Status: DISCONTINUED | OUTPATIENT
Start: 2018-01-01 | End: 2018-01-01

## 2018-01-01 RX ORDER — DIPHENHYDRAMINE HCL 25 MG
25 CAPSULE ORAL EVERY 6 HOURS PRN
Status: DISCONTINUED | OUTPATIENT
Start: 2018-01-01 | End: 2018-01-01 | Stop reason: HOSPADM

## 2018-01-01 RX ORDER — SENNOSIDES 8.6 MG
8.6 TABLET ORAL DAILY
Status: DISCONTINUED | OUTPATIENT
Start: 2018-01-01 | End: 2018-01-01 | Stop reason: HOSPADM

## 2018-01-01 RX ORDER — SODIUM CHLORIDE 9 MG/ML
INJECTION, SOLUTION INTRAVENOUS CONTINUOUS
Status: DISCONTINUED | OUTPATIENT
Start: 2018-01-01 | End: 2018-01-01 | Stop reason: HOSPADM

## 2018-01-01 RX ORDER — MORPHINE SULFATE 2 MG/ML
INJECTION, SOLUTION INTRAMUSCULAR; INTRAVENOUS
Status: DISCONTINUED
Start: 2018-01-01 | End: 2018-01-01 | Stop reason: HOSPADM

## 2018-01-01 RX ORDER — ACETAMINOPHEN 325 MG/1
650 TABLET ORAL EVERY 6 HOURS
Status: DISCONTINUED | OUTPATIENT
Start: 2018-01-01 | End: 2018-01-01

## 2018-01-01 RX ORDER — LIDOCAINE 40 MG/G
CREAM TOPICAL
Status: DISCONTINUED | OUTPATIENT
Start: 2018-01-01 | End: 2018-01-01 | Stop reason: HOSPADM

## 2018-01-01 RX ORDER — SCOLOPAMINE TRANSDERMAL SYSTEM 1 MG/1
PATCH, EXTENDED RELEASE TRANSDERMAL
Qty: 4 PATCH | Refills: 0 | Status: SHIPPED | OUTPATIENT
Start: 2018-01-01 | End: 2018-01-01

## 2018-01-01 RX ORDER — OXYCODONE HCL 20 MG/1
20 TABLET, FILM COATED, EXTENDED RELEASE ORAL EVERY 12 HOURS
Qty: 28 TABLET | Refills: 0 | Status: SHIPPED | OUTPATIENT
Start: 2018-01-01 | End: 2018-01-01

## 2018-01-01 RX ORDER — HYDROMORPHONE HYDROCHLORIDE 1 MG/ML
0.3 INJECTION, SOLUTION INTRAMUSCULAR; INTRAVENOUS; SUBCUTANEOUS
Status: DISCONTINUED | OUTPATIENT
Start: 2018-01-01 | End: 2018-01-01

## 2018-01-01 RX ORDER — HYDROMORPHONE HYDROCHLORIDE 1 MG/ML
0.5 INJECTION, SOLUTION INTRAMUSCULAR; INTRAVENOUS; SUBCUTANEOUS ONCE
Status: COMPLETED | OUTPATIENT
Start: 2018-01-01 | End: 2018-01-01

## 2018-01-01 RX ORDER — PAZOPANIB 200 MG/1
800 TABLET, FILM COATED ORAL DAILY
Qty: 160 TABLET | Refills: 5 | Status: SHIPPED | OUTPATIENT
Start: 2018-01-01

## 2018-01-01 RX ORDER — SENNOSIDES 8.6 MG
8.6 TABLET ORAL DAILY PRN
Status: DISCONTINUED | OUTPATIENT
Start: 2018-01-01 | End: 2018-01-01

## 2018-01-01 RX ORDER — KETOROLAC TROMETHAMINE 15 MG/ML
15 INJECTION, SOLUTION INTRAMUSCULAR; INTRAVENOUS ONCE
Status: COMPLETED | OUTPATIENT
Start: 2018-01-01 | End: 2018-01-01

## 2018-01-01 RX ORDER — OLANZAPINE 5 MG/1
2.5 TABLET ORAL 2 TIMES DAILY
Qty: 30 TABLET | Refills: 1 | Status: SHIPPED | OUTPATIENT
Start: 2018-01-01 | End: 2018-01-01

## 2018-01-01 RX ORDER — HYDROMORPHONE HYDROCHLORIDE 1 MG/ML
0.5 INJECTION, SOLUTION INTRAMUSCULAR; INTRAVENOUS; SUBCUTANEOUS EVERY 4 HOURS
Status: DISCONTINUED | OUTPATIENT
Start: 2018-01-01 | End: 2018-01-01

## 2018-01-01 RX ORDER — AMOXICILLIN 250 MG
2 CAPSULE ORAL 2 TIMES DAILY
Status: DISCONTINUED | OUTPATIENT
Start: 2018-01-01 | End: 2018-01-01 | Stop reason: HOSPADM

## 2018-01-01 RX ORDER — DIPHENHYDRAMINE HYDROCHLORIDE 50 MG/ML
25 INJECTION INTRAMUSCULAR; INTRAVENOUS EVERY 6 HOURS PRN
Status: DISCONTINUED | OUTPATIENT
Start: 2018-01-01 | End: 2018-01-01

## 2018-01-01 RX ORDER — ONDANSETRON 4 MG/1
8 TABLET, ORALLY DISINTEGRATING ORAL EVERY 6 HOURS
Status: DISCONTINUED | OUTPATIENT
Start: 2018-01-01 | End: 2018-01-01

## 2018-01-01 RX ORDER — ONDANSETRON 4 MG/1
8 TABLET, ORALLY DISINTEGRATING ORAL EVERY 6 HOURS
Status: DISCONTINUED | OUTPATIENT
Start: 2018-01-01 | End: 2018-01-01 | Stop reason: HOSPADM

## 2018-01-01 RX ORDER — ONDANSETRON 2 MG/ML
8 INJECTION INTRAMUSCULAR; INTRAVENOUS EVERY 6 HOURS PRN
Status: DISCONTINUED | OUTPATIENT
Start: 2018-01-01 | End: 2018-01-01

## 2018-01-01 RX ORDER — METHADONE HYDROCHLORIDE 5 MG/1
2.5 TABLET ORAL EVERY 8 HOURS
Qty: 90 TABLET | Refills: 0 | Status: SHIPPED | OUTPATIENT
Start: 2018-01-01

## 2018-01-01 RX ORDER — DIPHENHYDRAMINE HYDROCHLORIDE 50 MG/ML
50 INJECTION INTRAMUSCULAR; INTRAVENOUS EVERY 6 HOURS PRN
Status: DISCONTINUED | OUTPATIENT
Start: 2018-01-01 | End: 2018-01-01

## 2018-01-01 RX ORDER — MEGESTROL ACETATE 40 MG/1
200 TABLET ORAL 2 TIMES DAILY
Qty: 300 TABLET | Refills: 3 | Status: SHIPPED | OUTPATIENT
Start: 2018-01-01 | End: 2018-01-01

## 2018-01-01 RX ORDER — ONDANSETRON 2 MG/ML
8 INJECTION INTRAMUSCULAR; INTRAVENOUS EVERY 6 HOURS
Status: DISCONTINUED | OUTPATIENT
Start: 2018-01-01 | End: 2018-01-01

## 2018-01-01 RX ORDER — OLANZAPINE 5 MG/1
5 TABLET ORAL AT BEDTIME
Qty: 30 TABLET | Refills: 1 | Status: SHIPPED | OUTPATIENT
Start: 2018-01-01 | End: 2018-01-01

## 2018-01-01 RX ORDER — GRANISETRON HYDROCHLORIDE 1 MG/1
1 TABLET, FILM COATED ORAL EVERY 12 HOURS PRN
Qty: 60 TABLET | Refills: 0 | Status: SHIPPED | OUTPATIENT
Start: 2018-01-01 | End: 2018-01-01

## 2018-01-01 RX ORDER — AMOXICILLIN 250 MG
2 CAPSULE ORAL 2 TIMES DAILY PRN
Qty: 30 TABLET | Refills: 0 | Status: SHIPPED | OUTPATIENT
Start: 2018-01-01 | End: 2018-01-01

## 2018-01-01 RX ORDER — AMOXICILLIN 250 MG
2 CAPSULE ORAL 2 TIMES DAILY PRN
Status: DISCONTINUED | OUTPATIENT
Start: 2018-01-01 | End: 2018-01-01

## 2018-01-01 RX ORDER — PAZOPANIB 200 MG/1
800 TABLET, FILM COATED ORAL EVERY 24 HOURS
Status: DISCONTINUED | OUTPATIENT
Start: 2018-01-01 | End: 2018-01-01 | Stop reason: HOSPADM

## 2018-01-01 RX ORDER — IOPAMIDOL 755 MG/ML
60-135 INJECTION, SOLUTION INTRAVASCULAR ONCE
Status: COMPLETED | OUTPATIENT
Start: 2018-01-01 | End: 2018-01-01

## 2018-01-01 RX ORDER — DIPHENHYDRAMINE HCL 25 MG
25-50 TABLET ORAL EVERY 6 HOURS PRN
Qty: 60 TABLET | Refills: 0 | Status: SHIPPED | OUTPATIENT
Start: 2018-01-01 | End: 2018-01-01

## 2018-01-01 RX ORDER — ACETAMINOPHEN 325 MG/1
650 TABLET ORAL EVERY 4 HOURS PRN
Status: DISCONTINUED | OUTPATIENT
Start: 2018-01-01 | End: 2018-01-01

## 2018-01-01 RX ORDER — OXYCODONE HYDROCHLORIDE 5 MG/1
5-10 TABLET ORAL EVERY 4 HOURS PRN
Qty: 20 TABLET | Refills: 0 | Status: SHIPPED | OUTPATIENT
Start: 2018-01-01 | End: 2018-01-01

## 2018-01-01 RX ORDER — PANTOPRAZOLE SODIUM 40 MG/1
40 TABLET, DELAYED RELEASE ORAL
Status: DISCONTINUED | OUTPATIENT
Start: 2018-01-01 | End: 2018-01-01

## 2018-01-01 RX ORDER — DIPHENHYDRAMINE HYDROCHLORIDE 50 MG/ML
INJECTION INTRAMUSCULAR; INTRAVENOUS
Status: COMPLETED
Start: 2018-01-01 | End: 2018-01-01

## 2018-01-01 RX ORDER — HYDROMORPHONE HYDROCHLORIDE 1 MG/ML
0.5 INJECTION, SOLUTION INTRAMUSCULAR; INTRAVENOUS; SUBCUTANEOUS EVERY 4 HOURS PRN
Status: DISCONTINUED | OUTPATIENT
Start: 2018-01-01 | End: 2018-01-01

## 2018-01-01 RX ORDER — HYDROMORPHONE HYDROCHLORIDE 2 MG/1
1-2 TABLET ORAL
Qty: 30 TABLET | Refills: 0 | Status: SHIPPED | OUTPATIENT
Start: 2018-01-01

## 2018-01-01 RX ORDER — OXYCODONE HYDROCHLORIDE 5 MG/1
5-10 TABLET ORAL EVERY 4 HOURS PRN
Status: DISCONTINUED | OUTPATIENT
Start: 2018-01-01 | End: 2018-01-01 | Stop reason: HOSPADM

## 2018-01-01 RX ORDER — SODIUM CHLORIDE 9 MG/ML
INJECTION, SOLUTION INTRAVENOUS
Status: DISPENSED
Start: 2018-01-01 | End: 2018-01-01

## 2018-01-01 RX ORDER — AMOXICILLIN 250 MG
2 CAPSULE ORAL 2 TIMES DAILY PRN
Qty: 30 TABLET | Refills: 6 | Status: SHIPPED | OUTPATIENT
Start: 2018-01-01

## 2018-01-01 RX ORDER — OXYCODONE HYDROCHLORIDE 5 MG/1
5 TABLET ORAL EVERY 4 HOURS PRN
Status: DISCONTINUED | OUTPATIENT
Start: 2018-01-01 | End: 2018-01-01

## 2018-01-01 RX ORDER — LIDOCAINE 50 MG/G
1 PATCH TOPICAL EVERY 24 HOURS
Qty: 30 PATCH | Refills: 3 | Status: SHIPPED | OUTPATIENT
Start: 2018-01-01 | End: 2018-01-01

## 2018-01-01 RX ORDER — ONDANSETRON 2 MG/ML
4 INJECTION INTRAMUSCULAR; INTRAVENOUS ONCE
Status: COMPLETED | OUTPATIENT
Start: 2018-01-01 | End: 2018-01-01

## 2018-01-01 RX ORDER — POLYETHYLENE GLYCOL 3350 17 G/17G
17 POWDER, FOR SOLUTION ORAL DAILY
Qty: 7 PACKET | Refills: 3 | Status: SHIPPED | OUTPATIENT
Start: 2018-01-01 | End: 2018-01-01

## 2018-01-01 RX ORDER — IBUPROFEN 200 MG
600 TABLET ORAL EVERY 6 HOURS PRN
Status: DISCONTINUED | OUTPATIENT
Start: 2018-01-01 | End: 2018-01-01

## 2018-01-01 RX ORDER — ONDANSETRON 8 MG/1
8 TABLET, ORALLY DISINTEGRATING ORAL EVERY 6 HOURS
Qty: 120 TABLET | Refills: 1 | Status: SHIPPED | OUTPATIENT
Start: 2018-01-01

## 2018-01-01 RX ORDER — OXYCODONE HYDROCHLORIDE 5 MG/1
5-10 TABLET ORAL EVERY 4 HOURS PRN
Qty: 20 TABLET | Refills: 0 | Status: ON HOLD | OUTPATIENT
Start: 2018-01-01 | End: 2018-01-01

## 2018-01-01 RX ORDER — POLYETHYLENE GLYCOL 3350 17 G/17G
17 POWDER, FOR SOLUTION ORAL 2 TIMES DAILY
Status: DISCONTINUED | OUTPATIENT
Start: 2018-01-01 | End: 2018-01-01 | Stop reason: HOSPADM

## 2018-01-01 RX ORDER — IBUPROFEN 200 MG
600 TABLET ORAL EVERY 6 HOURS PRN
Status: DISCONTINUED | OUTPATIENT
Start: 2018-01-01 | End: 2018-01-01 | Stop reason: HOSPADM

## 2018-01-01 RX ORDER — HYDROMORPHONE HCL/0.9% NACL/PF 0.2MG/0.2
0.2 SYRINGE (ML) INTRAVENOUS ONCE
Status: COMPLETED | OUTPATIENT
Start: 2018-01-01 | End: 2018-01-01

## 2018-01-01 RX ORDER — PANTOPRAZOLE SODIUM 40 MG/1
40 TABLET, DELAYED RELEASE ORAL
Status: DISCONTINUED | OUTPATIENT
Start: 2018-01-01 | End: 2018-01-01 | Stop reason: HOSPADM

## 2018-01-01 RX ORDER — POLYETHYLENE GLYCOL 3350 17 G/17G
1 POWDER, FOR SOLUTION ORAL DAILY
Qty: 1 BOTTLE | Refills: 11 | Status: SHIPPED | OUTPATIENT
Start: 2018-01-01

## 2018-01-01 RX ORDER — IOPAMIDOL 612 MG/ML
100 INJECTION, SOLUTION INTRAVASCULAR ONCE
Status: COMPLETED | OUTPATIENT
Start: 2018-01-01 | End: 2018-01-01

## 2018-01-01 RX ORDER — OXYCODONE HCL 20 MG/1
20 TABLET, FILM COATED, EXTENDED RELEASE ORAL EVERY 12 HOURS
Status: DISCONTINUED | OUTPATIENT
Start: 2018-01-01 | End: 2018-01-01 | Stop reason: HOSPADM

## 2018-01-01 RX ORDER — OXYCODONE HCL 10 MG/1
10 TABLET, FILM COATED, EXTENDED RELEASE ORAL EVERY 12 HOURS
Qty: 60 TABLET | Refills: 0 | Status: ON HOLD | OUTPATIENT
Start: 2018-01-01 | End: 2018-01-01

## 2018-01-01 RX ADMIN — SIMETHICONE CHEW TAB 80 MG 80 MG: 80 TABLET ORAL at 10:05

## 2018-01-01 RX ADMIN — SIMETHICONE CHEW TAB 80 MG 80 MG: 80 TABLET ORAL at 16:37

## 2018-01-01 RX ADMIN — Medication 0.3 MG: at 18:01

## 2018-01-01 RX ADMIN — SODIUM CHLORIDE 1000 ML: 9 INJECTION, SOLUTION INTRAVENOUS at 05:45

## 2018-01-01 RX ADMIN — PANTOPRAZOLE SODIUM 40 MG: 40 INJECTION, POWDER, FOR SOLUTION INTRAVENOUS at 07:53

## 2018-01-01 RX ADMIN — ACETAMINOPHEN 650 MG: 325 TABLET, FILM COATED ORAL at 14:57

## 2018-01-01 RX ADMIN — DIPHENHYDRAMINE HYDROCHLORIDE 50 MG: 50 INJECTION, SOLUTION INTRAMUSCULAR; INTRAVENOUS at 00:31

## 2018-01-01 RX ADMIN — DIPHENHYDRAMINE HYDROCHLORIDE 25 MG: 50 INJECTION, SOLUTION INTRAMUSCULAR; INTRAVENOUS at 12:13

## 2018-01-01 RX ADMIN — OXYCODONE HYDROCHLORIDE 20 MG: 20 TABLET, FILM COATED, EXTENDED RELEASE ORAL at 08:23

## 2018-01-01 RX ADMIN — ONDANSETRON 6 MG: 2 INJECTION INTRAMUSCULAR; INTRAVENOUS at 19:13

## 2018-01-01 RX ADMIN — IBUPROFEN 600 MG: 200 TABLET, FILM COATED ORAL at 16:37

## 2018-01-01 RX ADMIN — KETOROLAC TROMETHAMINE 30 MG: 30 INJECTION, SOLUTION INTRAMUSCULAR at 05:34

## 2018-01-01 RX ADMIN — ANTICOAGULANT CITRATE DEXTROSE SOLUTION FORMULA A 10 ML: 12.25; 11; 3.65 SOLUTION INTRAVENOUS at 16:07

## 2018-01-01 RX ADMIN — SODIUM CHLORIDE 50 ML: 9 INJECTION, SOLUTION INTRAVENOUS at 14:46

## 2018-01-01 RX ADMIN — SENNOSIDES AND DOCUSATE SODIUM 2 TABLET: 8.6; 5 TABLET ORAL at 20:15

## 2018-01-01 RX ADMIN — ONDANSETRON 6 MG: 2 INJECTION INTRAMUSCULAR; INTRAVENOUS at 06:17

## 2018-01-01 RX ADMIN — PANTOPRAZOLE SODIUM 40 MG: 40 INJECTION, POWDER, FOR SOLUTION INTRAVENOUS at 17:44

## 2018-01-01 RX ADMIN — MORPHINE SULFATE 2 MG: 2 INJECTION, SOLUTION INTRAMUSCULAR; INTRAVENOUS at 11:25

## 2018-01-01 RX ADMIN — LORAZEPAM 2 MG: 2 INJECTION INTRAMUSCULAR; INTRAVENOUS at 14:40

## 2018-01-01 RX ADMIN — POLYETHYLENE GLYCOL 3350 17 G: 17 POWDER, FOR SOLUTION ORAL at 08:09

## 2018-01-01 RX ADMIN — DIPHENHYDRAMINE HYDROCHLORIDE 25 MG: 50 INJECTION, SOLUTION INTRAMUSCULAR; INTRAVENOUS at 15:23

## 2018-01-01 RX ADMIN — Medication 1 MG: at 21:38

## 2018-01-01 RX ADMIN — OXYCODONE HYDROCHLORIDE 5 MG: 5 TABLET ORAL at 09:20

## 2018-01-01 RX ADMIN — SENNOSIDES AND DOCUSATE SODIUM 2 TABLET: 8.6; 5 TABLET ORAL at 08:24

## 2018-01-01 RX ADMIN — ACETAMINOPHEN 870 MG: 10 INJECTION, SOLUTION INTRAVENOUS at 09:06

## 2018-01-01 RX ADMIN — Medication 0.5 MG: at 13:40

## 2018-01-01 RX ADMIN — Medication 0.5 MG: at 08:34

## 2018-01-01 RX ADMIN — OXYCODONE HYDROCHLORIDE 5 MG: 5 TABLET ORAL at 10:41

## 2018-01-01 RX ADMIN — IOPAMIDOL 82 ML: 755 INJECTION, SOLUTION INTRAVENOUS at 14:58

## 2018-01-01 RX ADMIN — ONDANSETRON 6 MG: 2 INJECTION INTRAMUSCULAR; INTRAVENOUS at 01:17

## 2018-01-01 RX ADMIN — HYDROMORPHONE HYDROCHLORIDE 0.2 MG: 10 INJECTION, SOLUTION INTRAMUSCULAR; INTRAVENOUS; SUBCUTANEOUS at 08:12

## 2018-01-01 RX ADMIN — ACETAMINOPHEN 650 MG: 325 TABLET, FILM COATED ORAL at 20:29

## 2018-01-01 RX ADMIN — OXYCODONE HYDROCHLORIDE 20 MG: 20 TABLET, FILM COATED, EXTENDED RELEASE ORAL at 08:07

## 2018-01-01 RX ADMIN — Medication 0.5 MG: at 04:15

## 2018-01-01 RX ADMIN — DEXTROSE AND SODIUM CHLORIDE: 5; 900 INJECTION, SOLUTION INTRAVENOUS at 09:16

## 2018-01-01 RX ADMIN — POLYETHYLENE GLYCOL 3350 17 G: 17 POWDER, FOR SOLUTION ORAL at 10:04

## 2018-01-01 RX ADMIN — Medication 0.5 MG: at 00:09

## 2018-01-01 RX ADMIN — IBUPROFEN 600 MG: 200 TABLET, FILM COATED ORAL at 21:28

## 2018-01-01 RX ADMIN — SENNOSIDES 8.6 MG: 8.6 TABLET, FILM COATED ORAL at 15:51

## 2018-01-01 RX ADMIN — HYDROMORPHONE HYDROCHLORIDE 0.5 MG: 1 INJECTION, SOLUTION INTRAMUSCULAR; INTRAVENOUS; SUBCUTANEOUS at 08:09

## 2018-01-01 RX ADMIN — IBUPROFEN 600 MG: 200 TABLET, FILM COATED ORAL at 19:03

## 2018-01-01 RX ADMIN — ANTICOAGULANT CITRATE DEXTROSE SOLUTION FORMULA A 5 ML: 12.25; 11; 3.65 SOLUTION INTRAVENOUS at 11:16

## 2018-01-01 RX ADMIN — ACETAMINOPHEN 650 MG: 325 TABLET, FILM COATED ORAL at 04:01

## 2018-01-01 RX ADMIN — Medication 1 MG: at 21:05

## 2018-01-01 RX ADMIN — DIPHENHYDRAMINE HYDROCHLORIDE 25 MG: 50 INJECTION, SOLUTION INTRAMUSCULAR; INTRAVENOUS at 21:54

## 2018-01-01 RX ADMIN — OXYCODONE HYDROCHLORIDE 5 MG: 5 TABLET ORAL at 05:19

## 2018-01-01 RX ADMIN — ONDANSETRON 8 MG: 4 TABLET, ORALLY DISINTEGRATING ORAL at 14:47

## 2018-01-01 RX ADMIN — PANTOPRAZOLE SODIUM 40 MG: 40 TABLET, DELAYED RELEASE ORAL at 16:33

## 2018-01-01 RX ADMIN — ACETAMINOPHEN 650 MG: 325 TABLET, FILM COATED ORAL at 15:27

## 2018-01-01 RX ADMIN — SODIUM CHLORIDE: 9 INJECTION, SOLUTION INTRAVENOUS at 01:08

## 2018-01-01 RX ADMIN — HYDROMORPHONE HYDROCHLORIDE 0.5 MG: 1 INJECTION, SOLUTION INTRAMUSCULAR; INTRAVENOUS; SUBCUTANEOUS at 03:37

## 2018-01-01 RX ADMIN — POLYETHYLENE GLYCOL 3350 17 G: 17 POWDER, FOR SOLUTION ORAL at 08:06

## 2018-01-01 RX ADMIN — SENNOSIDES AND DOCUSATE SODIUM 2 TABLET: 8.6; 5 TABLET ORAL at 20:28

## 2018-01-01 RX ADMIN — Medication 0.5 MG: at 22:31

## 2018-01-01 RX ADMIN — KETOROLAC TROMETHAMINE 30 MG: 30 INJECTION, SOLUTION INTRAMUSCULAR at 20:15

## 2018-01-01 RX ADMIN — PANTOPRAZOLE SODIUM 40 MG: 40 TABLET, DELAYED RELEASE ORAL at 15:51

## 2018-01-01 RX ADMIN — ONDANSETRON 8 MG: 2 INJECTION INTRAMUSCULAR; INTRAVENOUS at 19:30

## 2018-01-01 RX ADMIN — ANTICOAGULANT CITRATE DEXTROSE SOLUTION FORMULA A 5 ML: 12.25; 11; 3.65 SOLUTION INTRAVENOUS at 20:23

## 2018-01-01 RX ADMIN — RANITIDINE 150 MG: 150 TABLET ORAL at 08:23

## 2018-01-01 RX ADMIN — POLYETHYLENE GLYCOL 3350 17 G: 17 POWDER, FOR SOLUTION ORAL at 07:57

## 2018-01-01 RX ADMIN — ACETAMINOPHEN 650 MG: 325 TABLET, FILM COATED ORAL at 01:08

## 2018-01-01 RX ADMIN — ANTICOAGULANT CITRATE DEXTROSE SOLUTION FORMULA A 5 ML: 12.25; 11; 3.65 SOLUTION INTRAVENOUS at 15:18

## 2018-01-01 RX ADMIN — SODIUM CHLORIDE 1000 ML: 9 INJECTION, SOLUTION INTRAVENOUS at 17:41

## 2018-01-01 RX ADMIN — PAZOPANIB 800 MG: 200 TABLET, FILM COATED ORAL at 20:40

## 2018-01-01 RX ADMIN — Medication 0.5 MG: at 13:44

## 2018-01-01 RX ADMIN — Medication 0.5 MG: at 16:02

## 2018-01-01 RX ADMIN — POLYETHYLENE GLYCOL 3350 17 G: 17 POWDER, FOR SOLUTION ORAL at 19:03

## 2018-01-01 RX ADMIN — OXYCODONE HYDROCHLORIDE 5 MG: 5 TABLET ORAL at 20:20

## 2018-01-01 RX ADMIN — MORPHINE SULFATE 2 MG: 5 INJECTION, SOLUTION INTRAVENOUS at 17:35

## 2018-01-01 RX ADMIN — ONDANSETRON 6 MG: 2 INJECTION INTRAMUSCULAR; INTRAVENOUS at 13:26

## 2018-01-01 RX ADMIN — ACETAMINOPHEN 870 MG: 10 INJECTION, SOLUTION INTRAVENOUS at 17:19

## 2018-01-01 RX ADMIN — ACETAMINOPHEN 650 MG: 325 TABLET, FILM COATED ORAL at 07:16

## 2018-01-01 RX ADMIN — KETOROLAC TROMETHAMINE 30 MG: 30 INJECTION, SOLUTION INTRAMUSCULAR at 08:50

## 2018-01-01 RX ADMIN — OXYCODONE HYDROCHLORIDE 5 MG: 5 TABLET ORAL at 15:51

## 2018-01-01 RX ADMIN — ONDANSETRON 8 MG: 4 TABLET, ORALLY DISINTEGRATING ORAL at 08:23

## 2018-01-01 RX ADMIN — ANTICOAGULANT CITRATE DEXTROSE SOLUTION FORMULA A 5 ML: 12.25; 11; 3.65 SOLUTION INTRAVENOUS at 11:25

## 2018-01-01 RX ADMIN — KETOROLAC TROMETHAMINE 30 MG: 30 INJECTION, SOLUTION INTRAMUSCULAR at 01:30

## 2018-01-01 RX ADMIN — IBUPROFEN 600 MG: 200 TABLET, FILM COATED ORAL at 23:11

## 2018-01-01 RX ADMIN — PAZOPANIB 800 MG: 200 TABLET, FILM COATED ORAL at 19:16

## 2018-01-01 RX ADMIN — ACETAMINOPHEN 870 MG: 10 INJECTION, SOLUTION INTRAVENOUS at 02:20

## 2018-01-01 RX ADMIN — MORPHINE SULFATE 30 MG: 1 INJECTION, SOLUTION INTRAVENOUS at 17:36

## 2018-01-01 RX ADMIN — ONDANSETRON 6 MG: 2 INJECTION INTRAMUSCULAR; INTRAVENOUS at 00:00

## 2018-01-01 RX ADMIN — MORPHINE SULFATE 2 MG: 2 INJECTION, SOLUTION INTRAMUSCULAR; INTRAVENOUS at 15:22

## 2018-01-01 RX ADMIN — PANTOPRAZOLE SODIUM 40 MG: 40 TABLET, DELAYED RELEASE ORAL at 09:02

## 2018-01-01 RX ADMIN — SENNOSIDES AND DOCUSATE SODIUM 2 TABLET: 8.6; 5 TABLET ORAL at 19:13

## 2018-01-01 RX ADMIN — IBUPROFEN 600 MG: 200 TABLET, FILM COATED ORAL at 15:50

## 2018-01-01 RX ADMIN — ONDANSETRON 8 MG: 2 INJECTION INTRAMUSCULAR; INTRAVENOUS at 12:07

## 2018-01-01 RX ADMIN — SENNOSIDES AND DOCUSATE SODIUM 2 TABLET: 8.6; 5 TABLET ORAL at 13:26

## 2018-01-01 RX ADMIN — DEXTROSE AND SODIUM CHLORIDE 1000 ML: 5; 900 INJECTION, SOLUTION INTRAVENOUS at 15:27

## 2018-01-01 RX ADMIN — ACETAMINOPHEN 650 MG: 325 TABLET, FILM COATED ORAL at 20:28

## 2018-01-01 RX ADMIN — DIPHENHYDRAMINE HYDROCHLORIDE 25 MG: 25 CAPSULE ORAL at 12:36

## 2018-01-01 RX ADMIN — OXYCODONE HYDROCHLORIDE 20 MG: 20 TABLET, FILM COATED, EXTENDED RELEASE ORAL at 19:12

## 2018-01-01 RX ADMIN — OXYCODONE HYDROCHLORIDE 5 MG: 5 TABLET ORAL at 00:11

## 2018-01-01 RX ADMIN — ACETAMINOPHEN 650 MG: 325 TABLET, FILM COATED ORAL at 03:30

## 2018-01-01 RX ADMIN — PANTOPRAZOLE SODIUM 40 MG: 40 INJECTION, POWDER, FOR SOLUTION INTRAVENOUS at 17:37

## 2018-01-01 RX ADMIN — DIPHENHYDRAMINE HYDROCHLORIDE 25 MG: 50 INJECTION, SOLUTION INTRAMUSCULAR; INTRAVENOUS at 13:27

## 2018-01-01 RX ADMIN — IBUPROFEN 600 MG: 200 TABLET, FILM COATED ORAL at 13:05

## 2018-01-01 RX ADMIN — IOPAMIDOL 98 ML: 612 INJECTION, SOLUTION INTRAVENOUS at 14:45

## 2018-01-01 RX ADMIN — PANTOPRAZOLE SODIUM 40 MG: 40 INJECTION, POWDER, FOR SOLUTION INTRAVENOUS at 08:08

## 2018-01-01 RX ADMIN — SENNOSIDES 8.6 MG: 8.6 TABLET, FILM COATED ORAL at 08:09

## 2018-01-01 RX ADMIN — Medication 0.5 MG: at 12:39

## 2018-01-01 RX ADMIN — Medication 0.2 ML: at 13:50

## 2018-01-01 RX ADMIN — Medication 1 MG: at 19:17

## 2018-01-01 RX ADMIN — OXYCODONE HYDROCHLORIDE 5 MG: 5 TABLET ORAL at 10:36

## 2018-01-01 RX ADMIN — ONDANSETRON 8 MG: 4 TABLET, ORALLY DISINTEGRATING ORAL at 09:50

## 2018-01-01 RX ADMIN — SODIUM CHLORIDE: 9 INJECTION, SOLUTION INTRAVENOUS at 06:20

## 2018-01-01 RX ADMIN — DEXTROSE AND SODIUM CHLORIDE 1000 ML: 5; 900 INJECTION, SOLUTION INTRAVENOUS at 05:11

## 2018-01-01 RX ADMIN — KETOROLAC TROMETHAMINE 30 MG: 30 INJECTION, SOLUTION INTRAMUSCULAR at 14:21

## 2018-01-01 RX ADMIN — SODIUM CHLORIDE 1000 ML: 9 INJECTION, SOLUTION INTRAVENOUS at 15:40

## 2018-01-01 RX ADMIN — KETOROLAC TROMETHAMINE 15 MG: 15 INJECTION, SOLUTION INTRAMUSCULAR; INTRAVENOUS at 18:42

## 2018-01-01 RX ADMIN — ONDANSETRON 4 MG: 2 INJECTION INTRAMUSCULAR; INTRAVENOUS at 20:12

## 2018-01-01 RX ADMIN — ACETAMINOPHEN 650 MG: 325 TABLET, FILM COATED ORAL at 07:12

## 2018-01-01 RX ADMIN — Medication 0.5 MG: at 20:15

## 2018-01-01 RX ADMIN — POLYETHYLENE GLYCOL 3350 17 G: 17 POWDER, FOR SOLUTION ORAL at 08:24

## 2018-01-01 RX ADMIN — POLYETHYLENE GLYCOL 3350 17 G: 17 POWDER, FOR SOLUTION ORAL at 13:26

## 2018-01-01 RX ADMIN — FLUDEOXYGLUCOSE F-18 10.22 MCI.: 500 INJECTION, SOLUTION INTRAVENOUS at 14:57

## 2018-01-01 RX ADMIN — SENNOSIDES AND DOCUSATE SODIUM 2 TABLET: 8.6; 5 TABLET ORAL at 07:57

## 2018-01-01 RX ADMIN — POLYETHYLENE GLYCOL 3350 17 G: 17 POWDER, FOR SOLUTION ORAL at 20:28

## 2018-01-01 RX ADMIN — ONDANSETRON 8 MG: 2 INJECTION INTRAMUSCULAR; INTRAVENOUS at 17:37

## 2018-01-01 RX ADMIN — DIPHENHYDRAMINE HYDROCHLORIDE 50 MG: 50 INJECTION INTRAMUSCULAR; INTRAVENOUS at 00:31

## 2018-01-01 RX ADMIN — IBUPROFEN 600 MG: 200 TABLET, FILM COATED ORAL at 08:24

## 2018-01-01 RX ADMIN — OXYCODONE HYDROCHLORIDE 20 MG: 20 TABLET, FILM COATED, EXTENDED RELEASE ORAL at 20:28

## 2018-01-01 RX ADMIN — PANTOPRAZOLE SODIUM 40 MG: 40 TABLET, DELAYED RELEASE ORAL at 08:09

## 2018-01-01 RX ADMIN — HYDROMORPHONE HYDROCHLORIDE 1 MG: 1 INJECTION, SOLUTION INTRAMUSCULAR; INTRAVENOUS; SUBCUTANEOUS at 18:34

## 2018-01-01 ASSESSMENT — ACTIVITIES OF DAILY LIVING (ADL)
NUMBER_OF_TIMES_PATIENT_HAS_FALLEN_WITHIN_LAST_SIX_MONTHS: 1
DRESS: 0-->INDEPENDENT
COGNITION: 0 - NO COGNITION ISSUES REPORTED
TRANSFERRING: 0-->INDEPENDENT
AMBULATION: 0-->INDEPENDENT
SWALLOWING: 0-->SWALLOWS FOODS/LIQUIDS WITHOUT DIFFICULTY
RETIRED_COMMUNICATION: 0-->UNDERSTANDS/COMMUNICATES WITHOUT DIFFICULTY
DRESS: 0-->INDEPENDENT
BATHING: 0-->INDEPENDENT
TOILETING: 0-->INDEPENDENT
FALL_HISTORY_WITHIN_LAST_SIX_MONTHS: NO
WHICH_OF_THE_ABOVE_FUNCTIONAL_RISKS_HAD_A_RECENT_ONSET_OR_CHANGE?: TRANSFERRING;TOILETING
PRIOR_FUNCTIONAL_LEVEL_COMMENT: INDEPENDENT
TOILETING: 0 - INDEPENDENT
SWALLOWING: 0 - SWALLOWS FOODS/LIQUIDS WITHOUT DIFFICULTY
SWALLOWING: 0-->SWALLOWS FOODS/LIQUIDS WITHOUT DIFFICULTY
AMBULATION: 0-->INDEPENDENT
DRESS: 0-->INDEPENDENT
RETIRED_COMMUNICATION: 0-->UNDERSTANDS/COMMUNICATES WITHOUT DIFFICULTY
TRANSFERRING: 0 - INDEPENDENT
RETIRED_EATING: 0-->INDEPENDENT
NUMBER_OF_TIMES_PATIENT_HAS_FALLEN_WITHIN_LAST_SIX_MONTHS: 1
COMMUNICATION: 0 - UNDERSTANDS/COMMUNICATES WITHOUT DIFFICULTY
CURRENT_FUNCTIONAL_LEVEL_COMMENT: 0
COGNITION: 0 - NO COGNITION ISSUES REPORTED
COGNITION: 0 - NO COGNITION ISSUES REPORTED
TOILETING: 0-->INDEPENDENT
CHANGE_IN_FUNCTIONAL_STATUS_SINCE_ONSET_OF_CURRENT_ILLNESS/INJURY: YES
TRANSFERRING: 0 - INDEPENDENT
BATHING: 0-->INDEPENDENT
AMBULATION: 0-->INDEPENDENT
RETIRED_EATING: 0-->INDEPENDENT
RETIRED_COMMUNICATION: 0-->UNDERSTANDS/COMMUNICATES WITHOUT DIFFICULTY
EATING: 0 - INDEPENDENT
FALL_HISTORY_WITHIN_LAST_SIX_MONTHS: YES
BATHING: 0-->INDEPENDENT
BATHING: 0 - INDEPENDENT
TOILETING: 0 - INDEPENDENT
SWALLOWING: 0 - SWALLOWS FOODS/LIQUIDS WITHOUT DIFFICULTY
TRANSFERRING: 0-->INDEPENDENT
AMBULATION: 0 - INDEPENDENT
AMBULATION: 0 - INDEPENDENT
BATHING: 0 - INDEPENDENT
FALL_HISTORY_WITHIN_LAST_SIX_MONTHS: YES
DRESS: 0 - INDEPENDENT
EATING: 0 - INDEPENDENT
RETIRED_EATING: 0-->INDEPENDENT
PRIOR_FUNCTIONAL_LEVEL_COMMENT: INDEPENDENT
SWALLOWING: 0-->SWALLOWS FOODS/LIQUIDS WITHOUT DIFFICULTY
COMMUNICATION: 0 - UNDERSTANDS/COMMUNICATES WITHOUT DIFFICULTY
TOILETING: 0-->INDEPENDENT
DRESS: 0 - INDEPENDENT

## 2018-01-01 ASSESSMENT — ENCOUNTER SYMPTOMS
CHILLS: 0
SHORTNESS OF BREATH: 0
FEVER: 0
PALPITATIONS: 0
CHEST TIGHTNESS: 1

## 2018-01-01 ASSESSMENT — PAIN SCALES - GENERAL
PAINLEVEL: MODERATE PAIN (4)
PAINLEVEL: SEVERE PAIN (7)
PAINLEVEL: NO PAIN (0)
PAINLEVEL: MODERATE PAIN (5)
PAINLEVEL: NO PAIN (0)

## 2018-01-04 NOTE — TELEPHONE ENCOUNTER
Can someone provide me with some more information regarding the patient's insurance? I see there is a note from 10/4/17 about termination of her previous plan and another that she is [place] Pending, but I feel it usually doesn't take this long for [place] Medicaid to kick in. I called [facility] but they weren't able to see or tell me if she is pending. The patient has a very expensive drug that she will be needing a refill of.    Mali CHAIDEZ  Pharmacy Technician  [phone]

## 2018-01-11 NOTE — TELEPHONE ENCOUNTER
SW received call from pt requesting a ride to and from [facility] to  her medications. SW reminded pt that she had an appointment today. She reported that she was unaware, though was able to make the appointment is transportation could be arranged. SW utilized taxi voucher through [facility] for a round trip ride today ( time 2pm). Insurance continues to be a barrier, FC's heavily involved. Social work will continue to assess needs and provide ongoing psychosocial support and access to resources.       BELKIS Salinas, [facility]  Pediatric Hem/Onc   Phone: [phone]  Pager: [phone]

## 2018-01-25 NOTE — LETTER
1/25/2018      RE: Olimpia Childress  [address]   [facility] [address]       Pediatric Hematology/Oncology Clinic Note    History- Olimpia initially presented with a growth on her right labia in 2013 when she was living in [place]. 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 She discontinued further treatment b/c her physicians were unsure of the exact diagnosis and she felt uncomfortable proceeding. Olimpia subsequently immigrated to the  in August of 2015 and in October she first noticed a recurrence of the mass in the area of previous excision. She was in [place] at that time, seen by oncology and had a port placed but then moved to [place] where she was seen by Dr. Mckeon at [facility] in November. There she underwent an MRI that showed a solid and cystic subcutaneous mass in the right labia measuring 1.7 x 1.6 x 1cm with question of nodular extension vs a second nodule measuring 0.7 cm. There was no invasion into the vagina. On November 16th 2015, Olimpia had a biopsy of the mass by a gynecologist, Dr. Terry, at [facility]. Cytology was reviewed at [place] and read as a SMARCB1-deficient genital sarcoma, likely falling within the overall spectrum of malignant rhabdoid tumor. By immunohistochemistry, the cells shows a complete loss of SMARCb1. Wide spectrum cytokeratin, CD34, WT1, Desmin and CD99 were all negative. RT PCR for Wooten Sarcoma associated fusion transcripts were negative as well. Olimpia went on to have a PET/CT as well which showed multiple pulmonary lesions and biopsy confirmed a lesion to be metastatic disease. Olimpia was seen by Tomás White at [facility] who reviewed the diagnosis and potential treatment plans with her, however she prefered to be treated here at [facility]. 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    Olimpia underwent left sided thoracotomy and resection of two pulmonary nodules on 7/20/16. Pathology revealed that one lesion was benign lymphoid tissue and the other was consistent with synovial sarcoma.  This was confirmed with FISH  With 91% of cell examined having a signal pattern indictivate of a rearrangement of the SS18 locus.  Olimpia has now completed 3 cycles of chemotherapy per [id number] and started her radiation on 9/13/16 and completed on 10/17/16.  She then went on to have a resection of her labial mass on 11/16/16 by Jannet Pastrana and Chikis with reconstruction, including skin flaps by Dr. Corona from plastics.  Pathology showed no residual tumor.  She had a f/u chest CT today on 12/5/16 that showed persistance of her pulmonary nodules as well as few additonal pulmonary nodules.  She saw Dr. Pierre who was in agreement with surgical resection, however Olimpia wanted to wait until March to have more time to recover from her last surgery. She underwent left sided thoracotomy on 3/8/17.    In May, Olimpia's follow up PET/CT demonstrated significant progression of her pulmonary metastatic disease, with an anterior mediastinal mass compression her right ventricle and potentially right outflow tract.  Subsequent echocardiogram did not show altered cardiac function, Dr Man felt radiation therapy was not in her best interest at this time but would consider if she developed cardiac dysfunction.  Olimpia was started on oral Pazopanib May 2017 for palliative therapy, with doses held due to palmar plantar dysesthesia and stomatitis, and restarted 50% dosing on 6/19/17.  Olimpia has imaging in August 2017 that showed a positive response to therapy.    HPI:  Olimpia presents to clinic by herself with an interpreters. She was admitted 12/11-12/14/17 for pneumothorax in the setting of RSV/pneumonia. Pazopanib  was held during this time. She thinks she restarted it about a week after discharge, and was on therapy for at least 2 weeks, and then has been off pazopanib for at least 2 weeks due to no insurance. We estimate she missed  3-4 weeks of the drug.  Insurance issues complicated by immigration status- she has a green card.     Since her last visit in Dec 2017, she reports generalized fatigue and diffuse body aches, but no localized pain.  Pain improved with activity and prn tylenol, she desires no additional medications for this.  Additionally reports daily bloating. She does take 1 cap miralax twice daily and has soft stools daily. No nausea or emesis.  She is eating well and denies weight loss.   No shortness of breath, cough, chest pain, or palpitations. No skin or mucosa changes. No vaginal discharge, bleeding, dysuria or hematuria.       Allergies as of 01/25/2018 - [place] as Reviewed 01/25/2018   Allergen Reaction Noted     Heparin flush Other (See Comments) 01/27/2016     Pork derived products  02/09/2016     Tegaderm transparent dressing (informational only) Other (See Comments) and Rash 04/20/2016       Current Outpatient Prescriptions   Medication Sig Dispense Refill     cabozantinib (COMETRIQ) 1 X 80 & 3 X 20 MG capsule CHEMOTHERAPY Take 140 mg by mouth daily Take on an empty stomach, not to eat for at least 2 hours before and at least 1 hour after taking this medication. 1 kit 11     pazopanib (VOTRIENT) 200 MG tablet CHEMOTHERAPY Take 2 tablets (400 mg) by mouth daily (Patient not taking: Reported on 1/25/2018) 60 tablet 5     pyridOXINE (VITAMIN B6) 100 MG TABS Take 3 tablets (300 mg) by mouth daily (Patient not taking: Reported on 1/25/2018) 90 tablet 3     Colloidal Oatmeal 1 % CREA Externally apply topically 2 times daily (Patient not taking: Reported on 1/25/2018) 1 Tube 3     triamcinolone (KENALOG) 0.1 % ointment Apply sparingly to affected area two to three times daily for 7 days. (Patient not  taking: Reported on 1/25/2018) 30 g 0     oxyCODONE (ROXICODONE) 5 MG IR tablet Take 1-2 tablets (5-10 mg) by mouth every 4 hours as needed for moderate to severe pain (Patient not taking: Reported on 1/25/2018) 20 tablet 0     polyethylene glycol (MIRALAX/GLYCOLAX) Packet Take 17 g by mouth daily (Patient not taking: Reported on 1/25/2018) 7 packet 1     senna-docusate (SENOKOT-S;PERICOLACE) 8.6-50 MG per tablet Take 2 tablets by mouth 2 times daily as needed for constipation (Patient not taking: Reported on 1/25/2018) 30 tablet 1     acetaminophen (TYLENOL) 325 MG tablet Take 2 tablets (650 mg) by mouth every 4 hours as needed for mild pain (Patient not taking: Reported on 1/25/2018) 100 tablet 1     ibuprofen (ADVIL/MOTRIN) 600 MG tablet Take 1 tablet (600 mg) by mouth every 6 hours as needed for moderate pain (Patient not taking: Reported on 1/25/2018) 60 tablet 1     diltiazem 2% in PLO cream, FV COMPOUNDED, 2% GEL To anal opening three times daily.  Use a pea-sized amount.  Store at room temperature. (Patient not taking: Reported on 1/25/2018) 60 g 0     ondansetron (ZOFRAN) 8 MG tablet Take 1 tablet (8 mg) by mouth every 6 hours as needed for nausea (Patient not taking: Reported on 1/25/2018) 30 tablet 6     diphenhydrAMINE (BENADRYL) 25 MG tablet Take 1-2 tablets (25-50 mg) by mouth every 6 hours as needed for other (Nausea or vomiting) (Patient not taking: Reported on 1/25/2018) 60 tablet 3     lidocaine-prilocaine (EMLA) cream Apply to port 30 minutes prior to access (Patient not taking: Reported on 1/25/2018) 30 g 5   Omnicef course will finish today.  Pazopanib has been on hold.    Past Medical History:   Diagnosis Date     Anemia 6/3/2016     Constipation      Extrarenal rhabdoid neoplasm (H)      Febrile neutropenia (H) 4/17/2016     History of blood transfusion      Latent tuberculosis      Lung disease      On antineoplastic chemotherapy      Sarcoma of vulva (H)      Social History:  [facility]  attending school with the goal to get her diploma, she is in 11th grade and reports her English is improving.    \  Family History   Problem Relation Age of Onset     Other Cancer No family hx of      ROS  See HPI. Complete ROS otherwise negative.    Physical Exam   Temp:  [98.2  F (36.8  C)] 98.2  F (36.8  C)  Pulse:  [108] 108  Resp:  [12] 12  BP: (113)/(74) 113/74  SpO2:  [100 %] 100 %  Wt Readings from Last 4 Encounters:   01/25/18 60.4 kg (133 lb 2.5 oz)   12/21/17 60.3 kg (132 lb 15 oz)   12/12/17 64.2 kg (141 lb 8.6 oz)   11/02/17 61.9 kg (136 lb 7.4 oz)     GENERAL: Alert, interactive.  SKIN: Clear without rash or lesion.   HEAD: Normocephalic, atraumatic     EYES: Normal lids, conjunctivae/cornea normal. PERRL. EOMI.  EARS: Pinna normal b/l,. External ears normal appearing.    NOSE: clear without dischrage.  No facial pain.  MOUTH/THROAT: Oropharynx is clear. Tongue without sores. Normal dentition for age, no mucosal lesions.  NECK: Supple, full range of motion, no masses  LYMPH NODES: No cervical lymphadenopathy.  LUNGS: No increased WOB.  Lungs clear to auscultation throughout;.  No crackles or wheezes.  HEART: HRR. S1 and S2 are normal. No murmurs, rubs, gallops. The radial pulses are 2+ bilaterally. Cap refill <3 sec in upper extremities.  ABDOMEN: Normal bowel sounds. Soft, non-tender, non-distended, no masses or hepatosplenomegaly.  NEUROLOGIC: Grossly intact, no focal neurologic deficits.    :  Deferred today.    Labs:  Results for orders placed or performed in visit on 01/25/18 (from the past 24 hour(s))   Routine UA with microscopic - No culture   Result Value Ref Range    Color Urine Light Yellow     Appearance Urine Clear     Glucose Urine Negative NEG^Negative mg/dL    Bilirubin Urine Negative NEG^Negative    Ketones Urine Negative NEG^Negative mg/dL    Specific Gravity Urine 1.004 1.003 - 1.035    Blood Urine Negative NEG^Negative    pH Urine 6.5 5.0 - 7.0 pH    Protein Albumin Urine Negative  NEG^Negative mg/dL    Urobilinogen mg/dL Normal 0.0 - 2.0 mg/dL    Nitrite Urine Negative NEG^Negative    Leukocyte Esterase Urine Negative NEG^Negative    Source Midstream Urine     WBC Urine 2 0 - 2 /HPF    RBC Urine 0 0 - 2 /HPF    Squamous Epithelial /HPF Urine <1 0 - 1 /HPF    Mucous Urine Present (A) NEG^Negative /LPF   CBC with platelets differential   Result Value Ref Range    WBC 4.7 4.0 - 11.0 10e9/L    RBC Count 3.50 (L) 3.8 - 5.2 10e12/L    Hemoglobin 12.6 11.7 - 15.7 g/dL    Hematocrit 37.8 35.0 - 47.0 %     (H) 78 - 100 fl    MCH 36.0 (H) 26.5 - 33.0 pg    MCHC 33.3 31.5 - 36.5 g/dL    RDW 13.4 10.0 - 15.0 %    Platelet Count 230 150 - 450 10e9/L    Diff Method Automated Method     % Neutrophils 50.2 %    % Lymphocytes 37.0 %    % Monocytes 11.8 %    % Eosinophils 0.6 %    % Basophils 0.2 %    % Immature Granulocytes 0.2 %    Nucleated RBCs 0 0 /100    Absolute Neutrophil 2.3 1.6 - 8.3 10e9/L    Absolute Lymphocytes 1.7 0.8 - 5.3 10e9/L    Absolute Monocytes 0.6 0.0 - 1.3 10e9/L    Absolute Eosinophils 0.0 0.0 - 0.7 10e9/L    Absolute Basophils 0.0 0.0 - 0.2 10e9/L    Abs Immature Granulocytes 0.0 0 - 0.4 10e9/L    Absolute Nucleated RBC 0.0    Comprehensive metabolic panel   Result Value Ref Range    Sodium 139 133 - 144 mmol/L    Potassium 3.5 3.4 - 5.3 mmol/L    Chloride 101 94 - 109 mmol/L    Carbon Dioxide 30 20 - 32 mmol/L    Anion Gap 8 3 - 14 mmol/L    Glucose 76 70 - 99 mg/dL    Urea Nitrogen 11 7 - 30 mg/dL    Creatinine 0.56 0.52 - 1.04 mg/dL    GFR Estimate >90 >60 mL/min/1.7m2    GFR Estimate If Black >90 >60 mL/min/1.7m2    Calcium 8.6 8.5 - 10.1 mg/dL    Bilirubin Total 0.4 0.2 - 1.3 mg/dL    Albumin 3.6 3.4 - 5.0 g/dL    Protein Total 7.7 6.8 - 8.8 g/dL    Alkaline Phosphatase 51 40 - 150 U/L    ALT 21 0 - 50 U/L    AST 27 0 - 45 U/L       Imaging:  Results for orders placed or performed in visit on 01/25/18 (from the past 24 hour(s))   Routine UA with microscopic - No culture    Result Value Ref Range    Color Urine Light Yellow     Appearance Urine Clear     Glucose Urine Negative NEG^Negative mg/dL    Bilirubin Urine Negative NEG^Negative    Ketones Urine Negative NEG^Negative mg/dL    Specific Gravity Urine 1.004 1.003 - 1.035    Blood Urine Negative NEG^Negative    pH Urine 6.5 5.0 - 7.0 pH    Protein Albumin Urine Negative NEG^Negative mg/dL    Urobilinogen mg/dL Normal 0.0 - 2.0 mg/dL    Nitrite Urine Negative NEG^Negative    Leukocyte Esterase Urine Negative NEG^Negative    Source Midstream Urine     WBC Urine 2 0 - 2 /HPF    RBC Urine 0 0 - 2 /HPF    Squamous Epithelial /HPF Urine <1 0 - 1 /HPF    Mucous Urine Present (A) NEG^Negative /LPF   CBC with platelets differential   Result Value Ref Range    WBC 4.7 4.0 - 11.0 10e9/L    RBC Count 3.50 (L) 3.8 - 5.2 10e12/L    Hemoglobin 12.6 11.7 - 15.7 g/dL    Hematocrit 37.8 35.0 - 47.0 %     (H) 78 - 100 fl    MCH 36.0 (H) 26.5 - 33.0 pg    MCHC 33.3 31.5 - 36.5 g/dL    RDW 13.4 10.0 - 15.0 %    Platelet Count 230 150 - 450 10e9/L    Diff Method Automated Method     % Neutrophils 50.2 %    % Lymphocytes 37.0 %    % Monocytes 11.8 %    % Eosinophils 0.6 %    % Basophils 0.2 %    % Immature Granulocytes 0.2 %    Nucleated RBCs 0 0 /100    Absolute Neutrophil 2.3 1.6 - 8.3 10e9/L    Absolute Lymphocytes 1.7 0.8 - 5.3 10e9/L    Absolute Monocytes 0.6 0.0 - 1.3 10e9/L    Absolute Eosinophils 0.0 0.0 - 0.7 10e9/L    Absolute Basophils 0.0 0.0 - 0.2 10e9/L    Abs Immature Granulocytes 0.0 0 - 0.4 10e9/L    Absolute Nucleated RBC 0.0    Comprehensive metabolic panel   Result Value Ref Range    Sodium 139 133 - 144 mmol/L    Potassium 3.5 3.4 - 5.3 mmol/L    Chloride 101 94 - 109 mmol/L    Carbon Dioxide 30 20 - 32 mmol/L    Anion Gap 8 3 - 14 mmol/L    Glucose 76 70 - 99 mg/dL    Urea Nitrogen 11 7 - 30 mg/dL    Creatinine 0.56 0.52 - 1.04 mg/dL    GFR Estimate >90 >60 mL/min/1.7m2    GFR Estimate If Black >90 >60 mL/min/1.7m2     Calcium 8.6 8.5 - 10.1 mg/dL    Bilirubin Total 0.4 0.2 - 1.3 mg/dL    Albumin 3.6 3.4 - 5.0 g/dL    Protein Total 7.7 6.8 - 8.8 g/dL    Alkaline Phosphatase 51 40 - 150 U/L    ALT 21 0 - 50 U/L    AST 27 0 - 45 U/L         Impression:  Olimpia is a 22-year-old female with 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  Unfortunately, her chest CT shows significant progression of metastatic pulmonary disease, with several new nodules.  Although it is unclear how much pazopanib she has missed due to insurance issues, it has at most been one month off therapy- and therefore it seems disease progression is indeed secondary to pazopanib treatment failure rather than missed medication.   She has no symptoms currently from her metastatic disease so palliative radiation is not indicated at this time.  Her CBC and BMP are normal, UA shows no proteinuria.  Pain and bloating are controlled with OTC medications.     Plan:  1) Discussed CT results with Olimpia showing disease progression, will switch from pazopanib to cabozantinib once insurance reinstated.  Side effects discussed, and discussed that cabozantinib unlikely to cure her disease, but may reduce metastatic burden.  2) PET scan to check for worsening disease elsewhere  3)Follow up by phone 1/29/17 regarding insurance issues.  4) RTC to be decided when insurance reinstated.  5) Simethicone prn bloating.     Iona Noonan MD PGY-2  Pager: [phone]    Physician Attestation   I, Coretta Yanez MD, saw this patient with the resident and agree with the resident s findings and plan of care as documented in the resident s note.      I personally reviewed vital signs, medications, labs and imaging    Coretta Yanez MD  Date of Service (when I saw the patient): 01/25/18

## 2018-01-25 NOTE — PROGRESS NOTES
Pediatric Hematology/Oncology Clinic Note    History- Olimpia initially presented with a growth on her right labia in 2013 when she was living in [place]. 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 She discontinued further treatment b/c her physicians were unsure of the exact diagnosis and she felt uncomfortable proceeding. Olimpia subsequently immigrated to the  in August of 2015 and in October she first noticed a recurrence of the mass in the area of previous excision. She was in [place] at that time, seen by oncology and had a port placed but then moved to [place] where she was seen by Dr. Mckeon at [facility] in November. There she underwent an MRI that showed a solid and cystic subcutaneous mass in the right labia measuring 1.7 x 1.6 x 1cm with question of nodular extension vs a second nodule measuring 0.7 cm. There was no invasion into the vagina. On November 16th 2015, Olimpia had a biopsy of the mass by a gynecologist, Dr. Terry, at [facility]. 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 By immunohistochemistry, the cells shows a complete loss of SMARCb1. Wide spectrum cytokeratin, CD34, WT1, Desmin and CD99 were all negative. RT PCR for Wooten Sarcoma associated fusion transcripts were negative as well. Olimpia went on to have a PET/CT as well which showed multiple pulmonary lesions and biopsy confirmed a lesion to be metastatic disease. Olimpia was seen by Tomás White at [place] who reviewed the diagnosis and potential treatment plans with her, however she prefered to be treated here at [facility]. 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    Olimpia underwent left sided thoracotomy and resection of two pulmonary nodules on 7/20/16. Pathology revealed that one lesion was benign lymphoid tissue and the other was consistent with synovial sarcoma.  This was confirmed with FISH  With 91% of cell examined having a signal pattern indictivate of a rearrangement of the SS18 locus.  Olimpia has now completed 3 cycles of chemotherapy per [id number] and started her radiation on 9/13/16 and completed on 10/17/16.  She then went on to have a resection of her labial mass on 11/16/16 by Jannet Pastrana and Chikis with reconstruction, including skin flaps by Dr. Corona from plastics.  Pathology showed no residual tumor.  She had a f/u chest CT today on 12/5/16 that showed persistance of her pulmonary nodules as well as few additonal pulmonary nodules.  She saw Dr. Pierre who was in agreement with surgical resection, however Olimpia wanted to wait until March to have more time to recover from her last surgery. She underwent left sided thoracotomy on 3/8/17.    In May, Olimpia's follow up PET/CT demonstrated significant progression of her pulmonary metastatic disease, with an anterior mediastinal mass compression her right ventricle and potentially right outflow tract.  Subsequent echocardiogram did not show altered cardiac function, Dr Man felt radiation therapy was not in her best interest at this time but would consider if she developed cardiac dysfunction.  Olimpia was started on oral Pazopanib May 2017 for palliative therapy, with doses held due to palmar plantar dysesthesia and stomatitis, and restarted 50% dosing on 6/19/17.  Olimpia has imaging in August 2017 that showed a positive response to therapy.    HPI:  Olimpia presents to clinic by herself with an interpreters. She was admitted 12/11-12/14/17 for pneumothorax in the setting of RSV/pneumonia. Pazopanib was held during this time. She thinks she restarted it about a week after discharge,  and was on therapy for at least 2 weeks, and then has been off pazopanib for at least 2 weeks due to no insurance. We estimate she missed  3-4 weeks of the drug.  Insurance issues complicated by immigration status- she has a green card.     Since her last visit in Dec 2017, she reports generalized fatigue and diffuse body aches, but no localized pain.  Pain improved with activity and prn tylenol, she desires no additional medications for this.  Additionally reports daily bloating. She does take 1 cap miralax twice daily and has soft stools daily. No nausea or emesis.  She is eating well and denies weight loss.   No shortness of breath, cough, chest pain, or palpitations. No skin or mucosa changes. No vaginal discharge, bleeding, dysuria or hematuria.       Allergies as of 01/25/2018 - [place] as Reviewed 01/25/2018   Allergen Reaction Noted     Heparin flush Other (See Comments) 01/27/2016     Pork derived products  02/09/2016     Tegaderm transparent dressing (informational only) Other (See Comments) and Rash 04/20/2016       Current Outpatient Prescriptions   Medication Sig Dispense Refill     cabozantinib (COMETRIQ) 1 X 80 & 3 X 20 MG capsule CHEMOTHERAPY Take 140 mg by mouth daily Take on an empty stomach, not to eat for at least 2 hours before and at least 1 hour after taking this medication. 1 kit 11     pazopanib (VOTRIENT) 200 MG tablet CHEMOTHERAPY Take 2 tablets (400 mg) by mouth daily (Patient not taking: Reported on 1/25/2018) 60 tablet 5     pyridOXINE (VITAMIN B6) 100 MG TABS Take 3 tablets (300 mg) by mouth daily (Patient not taking: Reported on 1/25/2018) 90 tablet 3     Colloidal Oatmeal 1 % CREA Externally apply topically 2 times daily (Patient not taking: Reported on 1/25/2018) 1 Tube 3     triamcinolone (KENALOG) 0.1 % ointment Apply sparingly to affected area two to three times daily for 7 days. (Patient not taking: Reported on 1/25/2018) 30 g 0     oxyCODONE (ROXICODONE) 5 MG IR tablet Take 1-2  tablets (5-10 mg) by mouth every 4 hours as needed for moderate to severe pain (Patient not taking: Reported on 1/25/2018) 20 tablet 0     polyethylene glycol (MIRALAX/GLYCOLAX) Packet Take 17 g by mouth daily (Patient not taking: Reported on 1/25/2018) 7 packet 1     senna-docusate (SENOKOT-S;PERICOLACE) 8.6-50 MG per tablet Take 2 tablets by mouth 2 times daily as needed for constipation (Patient not taking: Reported on 1/25/2018) 30 tablet 1     acetaminophen (TYLENOL) 325 MG tablet Take 2 tablets (650 mg) by mouth every 4 hours as needed for mild pain (Patient not taking: Reported on 1/25/2018) 100 tablet 1     ibuprofen (ADVIL/MOTRIN) 600 MG tablet Take 1 tablet (600 mg) by mouth every 6 hours as needed for moderate pain (Patient not taking: Reported on 1/25/2018) 60 tablet 1     diltiazem 2% in PLO cream, FV COMPOUNDED, 2% GEL To anal opening three times daily.  Use a pea-sized amount.  Store at room temperature. (Patient not taking: Reported on 1/25/2018) 60 g 0     ondansetron (ZOFRAN) 8 MG tablet Take 1 tablet (8 mg) by mouth every 6 hours as needed for nausea (Patient not taking: Reported on 1/25/2018) 30 tablet 6     diphenhydrAMINE (BENADRYL) 25 MG tablet Take 1-2 tablets (25-50 mg) by mouth every 6 hours as needed for other (Nausea or vomiting) (Patient not taking: Reported on 1/25/2018) 60 tablet 3     lidocaine-prilocaine (EMLA) cream Apply to port 30 minutes prior to access (Patient not taking: Reported on 1/25/2018) 30 g 5   Omnicef course will finish today.  Pazopanib has been on hold.    Past Medical History:   Diagnosis Date     Anemia 6/3/2016     Constipation      Extrarenal rhabdoid neoplasm (H)      Febrile neutropenia (H) 4/17/2016     History of blood transfusion      Latent tuberculosis      Lung disease      On antineoplastic chemotherapy      Sarcoma of vulva (H)      Social History:  Olimpia is attending school with the goal to get her diploma, she is in 11th grade and reports her English  is improving.    \  Family History   Problem Relation Age of Onset     Other Cancer No family hx of      ROS  See HPI. Complete ROS otherwise negative.    Physical Exam   Temp:  [98.2  F (36.8  C)] 98.2  F (36.8  C)  Pulse:  [108] 108  Resp:  [12] 12  BP: (113)/(74) 113/74  SpO2:  [100 %] 100 %  Wt Readings from Last 4 Encounters:   01/25/18 60.4 kg (133 lb 2.5 oz)   12/21/17 60.3 kg (132 lb 15 oz)   12/12/17 64.2 kg (141 lb 8.6 oz)   11/02/17 61.9 kg (136 lb 7.4 oz)     GENERAL: Alert, interactive.  SKIN: Clear without rash or lesion.   HEAD: Normocephalic, atraumatic     EYES: Normal lids, conjunctivae/cornea normal. PERRL. EOMI.  EARS: Pinna normal b/l,. External ears normal appearing.    NOSE: clear without dischrage.  No facial pain.  MOUTH/THROAT: Oropharynx is clear. Tongue without sores. Normal dentition for age, no mucosal lesions.  NECK: Supple, full range of motion, no masses  LYMPH NODES: No cervical lymphadenopathy.  LUNGS: No increased WOB.  Lungs clear to auscultation throughout;.  No crackles or wheezes.  HEART: HRR. S1 and S2 are normal. No murmurs, rubs, gallops. The radial pulses are 2+ bilaterally. Cap refill <3 sec in upper extremities.  ABDOMEN: Normal bowel sounds. Soft, non-tender, non-distended, no masses or hepatosplenomegaly.  NEUROLOGIC: Grossly intact, no focal neurologic deficits.    :  Deferred today.    Labs:  Results for orders placed or performed in visit on 01/25/18 (from the past 24 hour(s))   Routine UA with microscopic - No culture   Result Value Ref Range    Color Urine Light Yellow     Appearance Urine Clear     Glucose Urine Negative NEG^Negative mg/dL    Bilirubin Urine Negative NEG^Negative    Ketones Urine Negative NEG^Negative mg/dL    Specific Gravity Urine 1.004 1.003 - 1.035    Blood Urine Negative NEG^Negative    pH Urine 6.5 5.0 - 7.0 pH    Protein Albumin Urine Negative NEG^Negative mg/dL    Urobilinogen mg/dL Normal 0.0 - 2.0 mg/dL    Nitrite Urine Negative  NEG^Negative    Leukocyte Esterase Urine Negative NEG^Negative    Source Midstream Urine     WBC Urine 2 0 - 2 /HPF    RBC Urine 0 0 - 2 /HPF    Squamous Epithelial /HPF Urine <1 0 - 1 /HPF    Mucous Urine Present (A) NEG^Negative /LPF   CBC with platelets differential   Result Value Ref Range    WBC 4.7 4.0 - 11.0 10e9/L    RBC Count 3.50 (L) 3.8 - 5.2 10e12/L    Hemoglobin 12.6 11.7 - 15.7 g/dL    Hematocrit 37.8 35.0 - 47.0 %     (H) 78 - 100 fl    MCH 36.0 (H) 26.5 - 33.0 pg    MCHC 33.3 31.5 - 36.5 g/dL    RDW 13.4 10.0 - 15.0 %    Platelet Count 230 150 - 450 10e9/L    Diff Method Automated Method     % Neutrophils 50.2 %    % Lymphocytes 37.0 %    % Monocytes 11.8 %    % Eosinophils 0.6 %    % Basophils 0.2 %    % Immature Granulocytes 0.2 %    Nucleated RBCs 0 0 /100    Absolute Neutrophil 2.3 1.6 - 8.3 10e9/L    Absolute Lymphocytes 1.7 0.8 - 5.3 10e9/L    Absolute Monocytes 0.6 0.0 - 1.3 10e9/L    Absolute Eosinophils 0.0 0.0 - 0.7 10e9/L    Absolute Basophils 0.0 0.0 - 0.2 10e9/L    Abs Immature Granulocytes 0.0 0 - 0.4 10e9/L    Absolute Nucleated RBC 0.0    Comprehensive metabolic panel   Result Value Ref Range    Sodium 139 133 - 144 mmol/L    Potassium 3.5 3.4 - 5.3 mmol/L    Chloride 101 94 - 109 mmol/L    Carbon Dioxide 30 20 - 32 mmol/L    Anion Gap 8 3 - 14 mmol/L    Glucose 76 70 - 99 mg/dL    Urea Nitrogen 11 7 - 30 mg/dL    Creatinine 0.56 0.52 - 1.04 mg/dL    GFR Estimate >90 >60 mL/min/1.7m2    GFR Estimate If Black >90 >60 mL/min/1.7m2    Calcium 8.6 8.5 - 10.1 mg/dL    Bilirubin Total 0.4 0.2 - 1.3 mg/dL    Albumin 3.6 3.4 - 5.0 g/dL    Protein Total 7.7 6.8 - 8.8 g/dL    Alkaline Phosphatase 51 40 - 150 U/L    ALT 21 0 - 50 U/L    AST 27 0 - 45 U/L       Imaging:  Results for orders placed or performed in visit on 01/25/18 (from the past 24 hour(s))   Routine UA with microscopic - No culture   Result Value Ref Range    Color Urine Light Yellow     Appearance Urine Clear     Glucose  Urine Negative NEG^Negative mg/dL    Bilirubin Urine Negative NEG^Negative    Ketones Urine Negative NEG^Negative mg/dL    Specific Gravity Urine 1.004 1.003 - 1.035    Blood Urine Negative NEG^Negative    pH Urine 6.5 5.0 - 7.0 pH    Protein Albumin Urine Negative NEG^Negative mg/dL    Urobilinogen mg/dL Normal 0.0 - 2.0 mg/dL    Nitrite Urine Negative NEG^Negative    Leukocyte Esterase Urine Negative NEG^Negative    Source Midstream Urine     WBC Urine 2 0 - 2 /HPF    RBC Urine 0 0 - 2 /HPF    Squamous Epithelial /HPF Urine <1 0 - 1 /HPF    Mucous Urine Present (A) NEG^Negative /LPF   CBC with platelets differential   Result Value Ref Range    WBC 4.7 4.0 - 11.0 10e9/L    RBC Count 3.50 (L) 3.8 - 5.2 10e12/L    Hemoglobin 12.6 11.7 - 15.7 g/dL    Hematocrit 37.8 35.0 - 47.0 %     (H) 78 - 100 fl    MCH 36.0 (H) 26.5 - 33.0 pg    MCHC 33.3 31.5 - 36.5 g/dL    RDW 13.4 10.0 - 15.0 %    Platelet Count 230 150 - 450 10e9/L    Diff Method Automated Method     % Neutrophils 50.2 %    % Lymphocytes 37.0 %    % Monocytes 11.8 %    % Eosinophils 0.6 %    % Basophils 0.2 %    % Immature Granulocytes 0.2 %    Nucleated RBCs 0 0 /100    Absolute Neutrophil 2.3 1.6 - 8.3 10e9/L    Absolute Lymphocytes 1.7 0.8 - 5.3 10e9/L    Absolute Monocytes 0.6 0.0 - 1.3 10e9/L    Absolute Eosinophils 0.0 0.0 - 0.7 10e9/L    Absolute Basophils 0.0 0.0 - 0.2 10e9/L    Abs Immature Granulocytes 0.0 0 - 0.4 10e9/L    Absolute Nucleated RBC 0.0    Comprehensive metabolic panel   Result Value Ref Range    Sodium 139 133 - 144 mmol/L    Potassium 3.5 3.4 - 5.3 mmol/L    Chloride 101 94 - 109 mmol/L    Carbon Dioxide 30 20 - 32 mmol/L    Anion Gap 8 3 - 14 mmol/L    Glucose 76 70 - 99 mg/dL    Urea Nitrogen 11 7 - 30 mg/dL    Creatinine 0.56 0.52 - 1.04 mg/dL    GFR Estimate >90 >60 mL/min/1.7m2    GFR Estimate If Black >90 >60 mL/min/1.7m2    Calcium 8.6 8.5 - 10.1 mg/dL    Bilirubin Total 0.4 0.2 - 1.3 mg/dL    Albumin 3.6 3.4 - 5.0 g/dL     Protein Total 7.7 6.8 - 8.8 g/dL    Alkaline Phosphatase 51 40 - 150 U/L    ALT 21 0 - 50 U/L    AST 27 0 - 45 U/L         Impression:  Olimpia is a 22-year-old female with 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  Unfortunately, her chest CT shows significant progression of metastatic pulmonary disease, with several new nodules.  Although it is unclear how much pazopanib she has missed due to insurance issues, it has at most been one month off therapy- and therefore it seems disease progression is indeed secondary to pazopanib treatment failure rather than missed medication.   She has no symptoms currently from her metastatic disease so palliative radiation is not indicated at this time.  Her CBC and BMP are normal, UA shows no proteinuria.  Pain and bloating are controlled with OTC medications.     Plan:  1) Discussed CT results with Olimpia showing disease progression, will switch from pazopanib to cabozantinib once insurance reinstated.  Side effects discussed, and discussed that cabozantinib unlikely to cure her disease, but may reduce metastatic burden.  2) PET scan to check for worsening disease elsewhere  3)Follow up by phone 1/29/17 regarding insurance issues.  4) RTC to be decided when insurance reinstated.  5) Simethicone prn bloating.     Iona Noonan MD PGY-2  Pager: [phone]    Physician Attestation   I, Coretta Yanez MD, saw this patient with the resident and agree with the resident s findings and plan of care as documented in the resident s note.      I personally reviewed vital signs, medications, labs and imaging    Coretta Yanez MD  Date of Service (when I saw the patient): 01/25/18

## 2018-01-25 NOTE — MR AVS SNAPSHOT
After Visit Summary   1/25/2018    Olimpia Childress    MRN: [id number]           Patient Information     Date Of Birth          1995        Visit Information        Provider Department      1/25/2018 3:00 PM Yue Nichols Emily Gustava, MD Peds Hematology Oncology        Today's Diagnoses     Rhabdoid tumor (H)              [facility], 9th floor  [address] [place] [address]  Phone: [phone]  Clinic Hours:   Monday-Friday:   7 am to 5:00 pm   closed weekends and major  holidays     If your fever is 100.5  or greater,   call the clinic during business hours.   After hours call [phone] and ask for the pediatric hematology / oncology physician to be paged for you.               Follow-ups after your visit        Future tests that were ordered for you today     Open Future Orders        Priority Expected Expires Ordered    PET Oncology Whole Body Routine  1/25/2019 1/25/2018            Who to contact     Please call your clinic at [phone] to:    Ask questions about your health    Make or cancel appointments    Discuss your medicines    Learn about your test results    Speak to your doctor   If you have compliments or concerns about an experience at your clinic, or if you wish to file a complaint, please contact [facility] Patient Relations at [phone] or email us at [email or web address]         Additional Information About Your Visit        MyChart Information     [employer] is an electronic gateway that provides easy, online access to your medical records. With [employer], you can request a clinic appointment, read your test results, renew a prescription or communicate with your care team.     To sign up for [employer] visit the website at [email or web address]   You will be asked to enter the access code listed below, as well as some personal information. Please follow the directions  "to create your username and password.     Your access code is: [email or web address]  Expires: 3/22/2018 11:14 AM     Your access code will  in 90 days. If you need help or a new code, please contact your [facility] or call [phone] for assistance.        Care EveryWhere ID     This is your Care EveryWhere ID. This could be used by other organizations to access your [facility] medical records  [id number]        Your Vitals Were     Pulse Temperature Respirations Height Pulse Oximetry BMI (Body Mass Index)    108 98.2  F (36.8  C) (Oral) 12 1.603 m (5' 3.11\") 100% 23.51 kg/m2       Blood Pressure from Last 3 Encounters:   18 113/74   17 104/69   17 104/81    Weight from Last 3 Encounters:   18 60.4 kg (133 lb 2.5 oz)   17 60.3 kg (132 lb 15 oz)   17 64.2 kg (141 lb 8.6 oz)              We Performed the Following     CBC with platelets differential     Comprehensive metabolic panel     Routine UA with microscopic - No culture          Today's Medication Changes          These changes are accurate as of 18  8:05 PM.  If you have any questions, ask your nurse or doctor.               Start taking these medicines.        Dose/Directions    cabozantinib 1 X 80 & 3 X 20 MG capsule CHEMOTHERAPY   Commonly known as:  COMETRIQ   Used for:  Rhabdoid tumor (H)   Started by:  Coretta Yanez MD        Dose:  140 mg   Take 140 mg by mouth daily Take on an empty stomach, not to eat for at least 2 hours before and at least 1 hour after taking this medication.   Quantity:  1 kit   Refills:  11            Where to get your medicines      Some of these will need a paper prescription and others can be bought over the counter.  Ask your nurse if you have questions.     Bring a paper prescription for each of these medications     cabozantinib 1 X 80 & 3 X 20 MG capsule CHEMOTHERAPY                Primary Care Provider Office Phone # Fax #    Coretta Chambers " MD Beau [phone] [address] [facility] [address]        Equal Access to Services     RONIT BAEZ : Hadii aad ku haddarline Cheek, warichardda titoqmohanha, qalindata kanancyda anne-marie, iwlner garcia tressabetsy rbadshawmaggie singleton. So [facility] [phone].    ATENCIÓN: Si habla español, tiene a ordoñez disposición servicios gratuitos de asistencia lingüística. Llame al [phone].    We comply with applicable federal civil rights laws and [place] laws. We do not discriminate on the basis of race, color, national origin, age, disability, sex, sexual orientation, or gender identity.            Thank you!     Thank you for choosing PEDS HEMATOLOGY ONCOLOGY  for your care. Our goal is always to provide you with excellent care. Hearing back from our patients is one way we can continue to improve our services. Please take a few minutes to complete the written survey that you may receive in the mail after your visit with us. Thank you!             Your Updated Medication List - Protect others around you: Learn how to safely use, store and throw away your medicines at www.disposemymeds.org.          This list is accurate as of 1/25/18  8:05 PM.  Always use your most recent med list.                   Brand Name Dispense Instructions for use Diagnosis    acetaminophen 325 MG tablet    TYLENOL    100 tablet    Take 2 tablets (650 mg) by mouth every 4 hours as needed for mild pain    Sarcoma of vulva (H)       cabozantinib 1 X 80 & 3 X 20 MG capsule CHEMOTHERAPY    COMETRIQ    1 kit    Take 140 mg by mouth daily Take on an empty stomach, not to eat for at least 2 hours before and at least 1 hour after taking this medication.    Rhabdoid tumor (H)       Colloidal Oatmeal 1 % Crea     1 Tube    Externally apply topically 2 times daily    Palmar plantar dysesthesia       diltiazem 2% in PLO cream (FV COMPOUNDED) 2% Gel     60 g    To anal opening three times daily.  Use a pea-sized amount.  Store at room  temperature.    Anal fissure       diphenhydrAMINE 25 MG tablet    BENADRYL    60 tablet    Take 1-2 tablets (25-50 mg) by mouth every 6 hours as needed for other (Nausea or vomiting)    Chemotherapy induced nausea and vomiting       ibuprofen 600 MG tablet    ADVIL/MOTRIN    60 tablet    Take 1 tablet (600 mg) by mouth every 6 hours as needed for moderate pain    Sarcoma of vulva (H)       lidocaine-prilocaine cream    EMLA    30 g    Apply to port 30 minutes prior to access    Malignant tumor cells (H)       ondansetron 8 MG tablet    ZOFRAN    30 tablet    Take 1 tablet (8 mg) by mouth every 6 hours as needed for nausea    Encounter for antineoplastic chemotherapy       oxyCODONE IR 5 MG tablet    ROXICODONE    20 tablet    Take 1-2 tablets (5-10 mg) by mouth every 4 hours as needed for moderate to severe pain    Acute post-operative pain       pazopanib 200 MG tablet CHEMOTHERAPY    VOTRIENT    60 tablet    Take 2 tablets (400 mg) by mouth daily    Synovial sarcoma (H)       polyethylene glycol Packet    MIRALAX/GLYCOLAX    7 packet    Take 17 g by mouth daily    Acute post-operative pain       pyridOXINE 100 MG Tabs    VITAMIN B6    90 tablet    Take 3 tablets (300 mg) by mouth daily    Palmar plantar dysesthesia       senna-docusate 8.6-50 MG per tablet    SENOKOT-S;PERICOLACE    30 tablet    Take 2 tablets by mouth 2 times daily as needed for constipation    Acute post-operative pain       triamcinolone 0.1 % ointment    KENALOG    30 g    Apply sparingly to affected area two to three times daily for 7 days.    Dermatitis

## 2018-01-25 NOTE — NURSING NOTE
"Chief Complaint   Patient presents with     RECHECK     Patient is here today for Rhaboid Tumor follow up     /74 (BP Location: Right arm, Patient Position: Fowlers, Cuff Size: Adult Large)  Pulse 108  Temp 98.2  F (36.8  C) (Oral)  Resp 12  Ht 1.603 m (5' 3.11\")  Wt 60.4 kg (133 lb 2.5 oz)  SpO2 100%  BMI 23.51 kg/m2  I spent 10 minutes reviewing medications, allergies, and obtaining vitals.    Radha Mason LPN  January 25, 2018    "

## 2018-01-25 NOTE — TELEPHONE ENCOUNTER
THI contacted Olimpia to discuss further insurance barriers. Despite multiple calls by SW, FC's, and patient, insurance continues to not be active for unknown reasons. Olimpia contacted [facility] multiple times and was told there was confusion re: her legal status. Olimpia recently received her green card, however is not yet a citizen. There has likely been several clerical errors during the processing of Olimpia's application at [facility], despite her application being submitted almost 4 months ago (Oct). Per Olimpia's report, she was told her insurance should be resolved within 24 hours.     Olimpia did request transportation to her appointments tomorrow due to not having insurance transportation at this time. THI agreed and scheduled roundtrip taxi utilizing [facility] [employer] resource.       BELKIS Salinas, [facility]  Pediatric Hem/Onc   Phone: [phone]  Pager: [phone]

## 2018-01-30 NOTE — TELEPHONE ENCOUNTER
"Patient called triage line to report her stomach feels as though it is \"exploding\". She noted cramping, bloating, and air is moving around but not coming out. Last BM was two days ago. No fever noted by patient. Provider Shayla Trammell was notified and recommended patient go to the ED for treatment. This information relayed to Asma who noted she would go to the ED.   "

## 2018-02-02 PROBLEM — R52 PAIN: Status: ACTIVE | Noted: 2018-01-01

## 2018-02-02 NOTE — PROGRESS NOTES
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  Patient denies any fevers and/or infections.  Port accessed using sterile technique without difficulty.  Blood return noted.  Labs drawn as ordered. IV morphine given per slow IV push.  Patient seen by provider Shayla Trammell while in clinic. Patient admitted to Unit 5-report given to Unit 5 nurse and patient walked over at approximately 1145.

## 2018-02-02 NOTE — MR AVS SNAPSHOT
After Visit Summary   2/2/2018    Olimpia Childress    MRN: [id number]           Patient Information     Date Of Birth          1995        Visit Information        Provider Department      2/2/2018 11:00 AM [facility] PEDS INFUSION CHAIR 4 Peds IV Infusion        Today's Diagnoses     Synovial sarcoma (H)    -  1       Follow-ups after your visit        Your next 10 appointments already scheduled     Feb 07, 2018 12:45 PM CST   (Arrive by 12:15 PM)   PET ONCOLOGY WHOLE BODY with UUPET1   [facility] [place] PET CT ([facility])    [address] [address] [phone]           Tell your doctor:   If there is any chance you may be pregnant or if you are breastfeeding.   If you have problems lying in small spaces (claustrophobia). If you do, your doctor may give you medicine to help you relax. If you have diabetes:   Have your exam early in the morning. Your blood glucose will go up as the day goes by.   Your glucose level must be 180 or less at the start of the exam. Please take any medicines you need to ensure this blood glucose level.   If you are taking insulin in the morning take with breakfast by 6 am and schedule procedure between 12 and 2:15 pm.   If you are taking insulin at night take nightly dose, fast overnight, schedule procedure before 10 am.   If you take insulin both morning and night take morning dose by 6am and schedule procedure between 12 and 2:15 pm.   24 hours before your scan: Don t do any heavy exercise. (No jogging, aerobics or other workouts.) Exercise will make your pictures less accurate.  7 hours before your scan: Eat a low carb, high protein meal (Lean meats, seafood, beans, soy, low-fat dairy, eggs, nuts & seeds). 6 hours before your scan:   Stop all food and liquids (except water).   Do not chew gum or suck on mints.   If you need to take medicine with food, you may take it with a few crackers.  Please call your  "Imaging Department at your exam site with any questions.              Who to contact     Please call your clinic at [phone] to:    Ask questions about your health    Make or cancel appointments    Discuss your medicines    Learn about your test results    Speak to your doctor   If you have compliments or concerns about an experience at your clinic, or if you wish to file a complaint, please contact [facility] Patient Relations at [phone] or email us at [email or web address]         Additional Information About Your Visit        [employer] Information     [employer] is an electronic gateway that provides easy, online access to your medical records. With [employer], you can request a clinic appointment, read your test results, renew a prescription or communicate with your care team.     To sign up for [employer] visit the website at [email or web address]   You will be asked to enter the access code listed below, as well as some personal information. Please follow the directions to create your username and password.     Your access code is: [email or web address]  Expires: 3/22/2018 11:14 AM     Your access code will  in 90 days. If you need help or a new code, please contact your [facility] or call [phone] for assistance.        Care EveryWhere ID     This is your Care EveryWhere ID. This could be used by other organizations to access your [place] medical records  [id number]        Your Vitals Were     Pulse Temperature Respirations Height Pulse Oximetry BMI (Body Mass Index)    111 97.5  F (36.4  C) (Oral) 20 1.603 m (5' 3.11\") 100% 23.43 kg/m2       Blood Pressure from Last 3 Encounters:   18 108/77   18 113/74   17 104/69    Weight from Last 3 Encounters:   18 60.2 kg (132 lb 11.5 oz)   18 60.4 kg (133 lb 2.5 oz)   17 60.3 kg (132 lb 15 oz)              Today, you had the following     No orders found for " display         Today's Medication Changes          These changes are accurate as of 2/2/18 12:05 PM.  If you have any questions, ask your nurse or doctor.               Stop taking these medicines if you haven't already. Please contact your care team if you have questions.     pazopanib 200 MG tablet CHEMOTHERAPY   Commonly known as:  VOTRIENT   Stopped by:  Omar Trammell APRN CNP           pyridOXINE 100 MG Tabs   Commonly known as:  VITAMIN B6   Stopped by:  Omar Trammell APRN CNP                    Primary Care Provider Office Phone # Fax #    Coretta Yanez [phone] [address] [address]        Equal Access to Services     [facility] : Rupa Cheek, jenni flores, eva xie, wilner blue . So [facility] [phone].    ATENCIÓN: Si habla español, tiene a ordoñez disposición servicios gratuitos de asistencia lingüística. Llame al [phone].    We comply with applicable federal civil rights laws and [place] laws. We do not discriminate on the basis of race, color, national origin, age, disability, sex, sexual orientation, or gender identity.            Thank you!     Thank you for choosing PEDS IV INFUSION  for your care. Our goal is always to provide you with excellent care. Hearing back from our patients is one way we can continue to improve our services. Please take a few minutes to complete the written survey that you may receive in the mail after your visit with us. Thank you!             Your Updated Medication List - Protect others around you: Learn how to safely use, store and throw away your medicines at www.disposemymeds.org.          This list is accurate as of 2/2/18 12:05 PM.  Always use your most recent med list.                   Brand Name Dispense Instructions for use Diagnosis    acetaminophen 325 MG tablet    TYLENOL    100 tablet    Take 2 tablets (650 mg) by mouth  every 4 hours as needed for mild pain    Sarcoma of vulva (H)       cabozantinib 1 X 80 & 3 X 20 MG capsule CHEMOTHERAPY    COMETRIQ    1 kit    Take 140 mg by mouth daily Take on an empty stomach, not to eat for at least 2 hours before and at least 1 hour after taking this medication.    Rhabdoid tumor (H)       Colloidal Oatmeal 1 % Crea     1 Tube    Externally apply topically 2 times daily    Palmar plantar dysesthesia       diltiazem 2% in PLO cream (FV COMPOUNDED) 2% Gel     60 g    To anal opening three times daily.  Use a pea-sized amount.  Store at room temperature.    Anal fissure       diphenhydrAMINE 25 MG tablet    BENADRYL    60 tablet    Take 1-2 tablets (25-50 mg) by mouth every 6 hours as needed for other (Nausea or vomiting)    Chemotherapy induced nausea and vomiting       ibuprofen 600 MG tablet    ADVIL/MOTRIN    60 tablet    Take 1 tablet (600 mg) by mouth every 6 hours as needed for moderate pain    Sarcoma of vulva (H)       lidocaine-prilocaine cream    EMLA    30 g    Apply to port 30 minutes prior to access    Malignant tumor cells (H)       ondansetron 8 MG tablet    ZOFRAN    30 tablet    Take 1 tablet (8 mg) by mouth every 6 hours as needed for nausea    Encounter for antineoplastic chemotherapy       oxyCODONE IR 5 MG tablet    ROXICODONE    20 tablet    Take 1-2 tablets (5-10 mg) by mouth every 4 hours as needed for moderate to severe pain    Acute post-operative pain       polyethylene glycol Packet    MIRALAX/GLYCOLAX    7 packet    Take 17 g by mouth daily    Acute post-operative pain       senna-docusate 8.6-50 MG per tablet    SENOKOT-S;PERICOLACE    30 tablet    Take 2 tablets by mouth 2 times daily as needed for constipation    Acute post-operative pain       triamcinolone 0.1 % ointment    KENALOG    30 g    Apply sparingly to affected area two to three times daily for 7 days.    Dermatitis

## 2018-02-02 NOTE — IP AVS SNAPSHOT
[facility] Pediatric Medical Surgical Unit 5    [address]    [facility] [place] [facility] [address]    Phone:  [phone]                                       After Visit Summary   2/2/2018    Olimpia Childress    MRN: [id number]           After Visit Summary Signature Page     I have received my discharge instructions, and my questions have been answered. I have discussed any challenges I see with this plan with the nurse or doctor.    ..........................................................................................................................................  Patient/Patient Representative Signature      ..........................................................................................................................................  Patient Representative Print Name and Relationship to Patient    ..................................................               ................................................  Date                                            Time    ..........................................................................................................................................  Reviewed by Signature/Title    ...................................................              ..............................................  Date                                                            Time

## 2018-02-02 NOTE — IP AVS SNAPSHOT
MRN:[id number]                      After Visit Summary   2/2/2018    Olimpia Childress    MRN: [id number]           Thank you!     Thank you for choosing [facility] for your care. Our goal is always to provide you with excellent care. Hearing back from our patients is one way we can continue to improve our services. Please take a few minutes to complete the written survey that you may receive in the mail after you visit with us. Thank you!        Patient Information     Date Of Birth          1995        Designated Caregiver       Most Recent Value    Caregiver    Will someone help with your care after discharge? yes    Name of designated caregiver Luzmamoriah    Phone number of caregiver [phone]    [address]g[facility] [address]        About your hospital stay     You were admitted on:  February 2, 2018 You last received care in the:  [facility]    You were discharged on:  February 5, 2018        Reason for your hospital stay       You were admitted because of pain and bloating related to your cancer. You improved with pain medications.                  Who to Call     For medical emergencies, please call 911.  For non-urgent questions about your medical care, please call your primary care provider or clinic, [phone]          Attending Provider     Provider Specialty    Amalia Lowry MD Pediatrics       Primary Care Provider Office Phone # Fax #    Coretta Yanez [phone]       When to contact your care team       Call your oncologist with worsening pain, nausea/vomiting despite medications, shortness of breath, fever, or any other concerning symptoms.                  After Care Instructions     Activity       Your activity upon discharge: activity as tolerated            Diet       Follow this diet upon discharge: Orders Placed This Encounter      Peds Diet Age 9-18 yrs             Discharge Instructions       1. Continue taking the pantoprazole started here in the hospital.  2. Continue oxycodone as needed for pain. Continue the oxycontin twice daily. These medications can make you constipated, so make sure to take the constipation medications (miralax/senna) if you do not have a bowel movement in 24 hours.  3. Follow-up with your oncologist later this week (after PET).  4. Follow up with PACCT team outpatient for pain follow-up this week as prescribed.  5. HOSPICE team will come out to visit in 24-48 hours (trying to coordinate this) to talk about home hospice/palliative care and to see if you would qualify.                  Follow-up Appointments     Follow Up and recommended labs and tests       Follow up with your oncologist as scheduled.                  Your next 10 appointments already scheduled     Feb 08, 2018 12:45 PM CST   (Arrive by 12:30 PM)   PET ONCOLOGY WHOLE BODY with UUPET1   [facility], [place] PET CT ([facility], [facility])    [address] [address] [phone]           Tell your doctor:   If there is any chance you may be pregnant or if you are breastfeeding.   If you have problems lying in small spaces (claustrophobia). If you do, your doctor may give you medicine to help you relax. If you have diabetes:   Have your exam early in the morning. Your blood glucose will go up as the day goes by.   Your glucose level must be 180 or less at the start of the exam. Please take any medicines you need to ensure this blood glucose level.   If you are taking insulin in the morning take with breakfast by 6 am and schedule procedure between 12 and 2:15 pm.   If you are taking insulin at night take nightly dose, fast overnight, schedule procedure before 10 am.   If you take insulin both morning and night take morning dose by 6am and schedule procedure between 12 and 2:15 pm.   24 hours before your scan: Don t do any heavy  exercise. (No jogging, aerobics or other workouts.) Exercise will make your pictures less accurate.  7 hours before your scan: Eat a low carb, high protein meal (Lean meats, seafood, beans, soy, low-fat dairy, eggs, nuts & seeds). 6 hours before your scan:   Stop all food and liquids (except water).   Do not chew gum or suck on mints.   If you need to take medicine with food, you may take it with a few crackers.  Please call your Imaging Department at your exam site with any questions.              Additional Services     HOSPICE REFERRAL       **Order classes of: [facility], [facility] and [facility] will route to the Home Care and Hospice Referral Pool.  Home Care or Hospice will then contact the patient to schedule their appointment.**    Hospice eligibility overview: prognosis of 6 months or less, end stage disease, patient goals are comfort only, patient is not seeking curative treatment.    If you do not hear from Hospice, or you would like to call to schedule, please call the referring place of service that your provider has listed bFMG: [facility] [phone]   [email or web address].  __________Anticipated discharge date 2/5/2018. Homecare to begin within 48 hours of discharge.____________________________________________________________    Your provider has referred you to:       PLEASE Schedule Hospice consult for 24 - 48 hours.  Please call if there is need for a variance to this timeline.    REASON FOR REFERRAL: Hospice Diagnosis: metastatic synovial cancer    ADDITIONAL SERVICES NEEDED: PCA services    OTHER PERTINENT INFORMATION: Patient was last seen by provider on 2/5/2018 for pain related to metastatic cancer.  Factors that indicate prognosis of less than 6 months: progression despite chemo  Functional decline: weakness, pain, unable to care for self    Current Outpatient Prescriptions:  [START ON 2/6/2018] polyethylene glycol  (MIRALAX/GLYCOLAX) Packet, Take 17 g by mouth daily, Disp: 7 packet, Rfl: 3  oxyCODONE (OXYCONTIN) 10 MG 12 hr tablet, Take 1 tablet (10 mg) by mouth every 12 hours, Disp: 60 tablet, Rfl: 0  oxyCODONE IR (ROXICODONE) 5 MG tablet, Take 1-2 tablets (5-10 mg) by mouth every 4 hours as needed for moderate to severe pain, Disp: 20 tablet, Rfl: 0  ondansetron (ZOFRAN) 8 MG tablet, Take 1 tablet (8 mg) by mouth every 6 hours as needed for nausea, Disp: 30 tablet, Rfl: 0  diphenhydrAMINE (BENADRYL) 25 MG tablet, Take 1-2 tablets (25-50 mg) by mouth every 6 hours as needed for other (Nausea or vomiting), Disp: 60 tablet, Rfl: 0  polyethylene glycol (MIRALAX/GLYCOLAX) Packet, Take 17 g by mouth daily as needed for constipation, Disp: 7 packet, Rfl: 1  senna-docusate (SENOKOT-S;PERICOLACE) 8.6-50 MG per tablet, Take 2 tablets by mouth 2 times daily as needed for constipation, Disp: 30 tablet, Rfl: 0  pantoprazole (PROTONIX) 40 MG EC tablet, Take 1 tablet (40 mg) by mouth 2 times daily (before meals), Disp: 60 tablet, Rfl: 0      Patient Active Problem List:     Malignant tumor cells (H)     Abdominal pain     Ileus second to Vincristine     Constipation     Pneumothorax on left     Pneumothorax, left     Sarcoma of vulva (H)     Febrile neutropenia (H)     Anemia     Chemotherapy-induced neutropenia (H)     Rhabdoid tumor (H)     Synovial sarcoma (H)     Encounter for antineoplastic chemotherapy     Emesis, persistent     Vulvar cancer (H)     Port-a-cath in place     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 This patient will be followed by a physician who will periodically review the plan of care.    Physician/Provider to provide follow up care: Coretta Yanez    [facility] at time of discharge: Unclear what PECOS is    Please be aware  that coverage of these services is subject to the terms and limitations of your health insurance plan.  Call member services at your health plan with any benefit or coverage questions.    *NOTE: Please note that the patient has metastatic synovial cancer with pulmonary metastasis, large chest/abdominal metastatic collection. Has been on chemotherapy before and disease progressed. Patient is currently candidate for palliative chemotherapy (heme/onc is looking into this), however with significant pain and deconditioning and would be good candidate for home hospice/palliative if she qualifies. Please provide an INFORMATIONAL VISIT on 2/6 preferably at home to discuss home care/palliative services.                  Further instructions from your care team       For temperature >100.4, increased nausea, vomiting, pain or any other concerns, please call [phone] & ask to talk to the Pediatric Oncology Fellow On Call.    Thursday, February 8  -  Nothing to eat or drink after 6 AM, may have water up until time of scan.  -  Arrive in Nuclear Med ([facility]) @ 12:15 PM, PET scan @ 12:45 PM.  - [facility] - Dr. Yanez & Dr. Martin (P[facility]).  Will be called with date/time of appointment.    Pending Results     No orders found from 1/31/2018 to 2/3/2018.            Statement of Approval     Ordered          02/05/18 1053  I have reviewed and agree with all the recommendations and orders detailed in this document.  EFFECTIVE NOW     Approved and electronically signed by:  Marialuisa Melgoza MD           02/04/18 1008  I have reviewed and agree with all the recommendations and orders detailed in this document.  EFFECTIVE NOW     Approved and electronically signed by:  Mao Shay MD             Admission Information     Date & Time Provider Department Dept. Phone    2/2/2018 Amalia Lowry MD [facility] Unit 5  "[phone]      Your Vitals Were     Blood Pressure Pulse Temperature Respirations Height Weight    86/57 78 98.1  F (36.7  C) (Oral) 16 1.6 m (5' 2.99\") 61.1 kg (134 lb 11.2 oz)    Pulse Oximetry BMI (Body Mass Index)                99% 23.87 kg/m2          [employer] Information     [employer] lets you send messages to your doctor, view your test results, renew your prescriptions, schedule appointments and more. To sign up, go to [email or web address] . Click on \"Log in\" on the left side of the screen, which will take you to the Welcome page. Then click on \"Sign up Now\" on the right side of the page.     You will be asked to enter the access code listed below, as well as some personal information. Please follow the directions to create your username and password.     Your access code is: [email or web address]  Expires: 3/22/2018 11:14 AM     Your access code will  in 90 days. If you need help or a new code, please call your [place] clinic or [phone].        Care EveryWhere ID     This is your Care EveryWhere ID. This could be used by other organizations to access your [facility] [place] medical records  [id number]        Equal Access to Services     RONIT BAEZ AH: Rupa Cheek, waaxda luqadaha, qaybta kaalmada adesusannah, wilner singleton. So [facility] [phone].    ATENCIÓN: Si habla español, tiene a ordoñez disposición servicios gratuitos de asistencia lingüística. Llame al [phone].    We comply with applicable federal civil rights laws and [place] laws. We do not discriminate on the basis of race, color, national origin, age, disability, sex, sexual orientation, or gender identity.               Review of your medicines      START taking        Dose / Directions    * oxyCODONE 10 MG 12 hr tablet   Commonly known as:  OxyCONTIN   Used for:  Abdominal pain, unspecified abdominal location   Replaces:  oxyCODONE IR 5 MG tablet        Dose:  10 mg   Take 1 tablet (10 mg) by mouth " every 12 hours   Quantity:  60 tablet   Refills:  0       * oxyCODONE IR 5 MG tablet   Commonly known as:  ROXICODONE   Used for:  Acute post-operative pain        Dose:  5-10 mg   Take 1-2 tablets (5-10 mg) by mouth every 4 hours as needed for moderate to severe pain   Quantity:  20 tablet   Refills:  0       pantoprazole 40 MG EC tablet   Commonly known as:  PROTONIX   Used for:  Abdominal pain, unspecified abdominal location        Dose:  40 mg   Take 1 tablet (40 mg) by mouth 2 times daily (before meals)   Quantity:  60 tablet   Refills:  0       * Notice:  This list has 2 medication(s) that are the same as other medications prescribed for you. Read the directions carefully, and ask your doctor or other care provider to review them with you.      CONTINUE these medicines which may have CHANGED, or have new prescriptions. If we are uncertain of the size of tablets/capsules you have at home, strength may be listed as something that might have changed.        Dose / Directions    polyethylene glycol Packet   Commonly known as:  MIRALAX/GLYCOLAX   This may have changed:    - when to take this  - reasons to take this   Used for:  Acute post-operative pain        Dose:  17 g   Take 17 g by mouth daily as needed for constipation   Quantity:  7 packet   Refills:  1         CONTINUE these medicines which have NOT CHANGED        Dose / Directions    acetaminophen 325 MG tablet   Commonly known as:  TYLENOL   Used for:  Sarcoma of vulva (H)        Dose:  650 mg   Take 2 tablets (650 mg) by mouth every 4 hours as needed for mild pain   Quantity:  100 tablet   Refills:  1       diltiazem 2% in PLO cream (FV COMPOUNDED) 2% Gel   Used for:  Anal fissure        To anal opening three times daily.  Use a pea-sized amount.  Store at room temperature.   Quantity:  60 g   Refills:  0       diphenhydrAMINE 25 MG tablet   Commonly known as:  BENADRYL   Used for:  Chemotherapy induced nausea and vomiting        Dose:  25-50 mg   Take  1-2 tablets (25-50 mg) by mouth every 6 hours as needed for other (Nausea or vomiting)   Quantity:  60 tablet   Refills:  0       ibuprofen 600 MG tablet   Commonly known as:  ADVIL/MOTRIN   Used for:  Sarcoma of vulva (H)        Dose:  600 mg   Take 1 tablet (600 mg) by mouth every 6 hours as needed for moderate pain   Quantity:  60 tablet   Refills:  1       ondansetron 8 MG tablet   Commonly known as:  ZOFRAN   Used for:  Encounter for antineoplastic chemotherapy        Dose:  8 mg   Take 1 tablet (8 mg) by mouth every 6 hours as needed for nausea   Quantity:  30 tablet   Refills:  0       senna-docusate 8.6-50 MG per tablet   Commonly known as:  SENOKOT-S;PERICOLACE   Used for:  Acute post-operative pain        Dose:  2 tablet   Take 2 tablets by mouth 2 times daily as needed for constipation   Quantity:  30 tablet   Refills:  0         STOP taking     cabozantinib 1 X 80 & 3 X 20 MG capsule CHEMOTHERAPY   Commonly known as:  COMETRIQ           Colloidal Oatmeal 1 % Crea           lidocaine-prilocaine cream   Commonly known as:  EMLA           oxyCODONE IR 5 MG tablet   Commonly known as:  ROXICODONE   Replaced by:  oxyCODONE 10 MG 12 hr tablet           triamcinolone 0.1 % ointment   Commonly known as:  KENALOG                Where to get your medicines      These medications were sent to [facility] [place], [place] - [address]  [address] [address]     Phone:  [phone]     diphenhydrAMINE 25 MG tablet    ondansetron 8 MG tablet    pantoprazole 40 MG EC tablet    polyethylene glycol Packet    senna-docusate 8.6-50 MG per tablet         Some of these will need a paper prescription and others can be bought over the counter. Ask your nurse if you have questions.     Bring a paper prescription for each of these medications     oxyCODONE 10 MG 12 hr tablet    oxyCODONE IR 5 MG tablet                Protect others around you: Learn how to safely use, store and throw  away your medicines at www.disposemymeds.org.        Information about OPIOIDS     PRESCRIPTION OPIOIDS: WHAT YOU NEED TO KNOW    Prescription opioids can be used to help relieve moderate to severe pain and are often prescribed following a surgery or injury, or for certain health conditions. These medications can be an important part of treatment but also come with serious risks. It is important to work with your health care provider to make sure you are getting the safest, most effective care.    WHAT ARE THE RISKS AND SIDE EFFECTS OF OPIOID USE?  Prescription opioids carry serious risks of addiction and overdose, especially with prolonged use. An opioid overdose, often marked by slowed breathing can cause sudden death. The use of prescription opioids can have a number of side effects as well, even when taken as directed:      Tolerance - meaning you might need to take more of a medication for the same pain relief    Physical dependence - meaning you have symptoms of withdrawal when a medication is stopped    Increased sensitivity to pain    Constipation    Nausea, vomiting, and dry mouth    Sleepiness and dizziness    Confusion    Depression    Low levels of testosterone that can result in lower sex drive, energy, and strength    Itching and sweating    RISKS ARE GREATER WITH:    History of drug misuse, substance use disorder, or overdose    Mental health conditions (such as depression or anxiety)    Sleep apnea    Older age (65 years or older)    Pregnancy    Avoid alcohol while taking prescription opioids.   Also, unless specifically advised by your health care provider, medications to avoid include:    Benzodiazepines (such as Xanax or Valium)    Muscle relaxants (such as Soma or Flexeril)    Hypnotics (such as Ambien or Lunesta)    Other prescription opioids    KNOW YOUR OPTIONS:  Talk to your health care provider about ways to manage your pain that do not involve prescription opioids. Some of these options  may actually work better and have fewer risks and side effects:    Pain relievers such as acetaminophen, ibuprofen, and naproxen    Some medications that are also used for depression or seizures    Physical therapy and exercise    Cognitive behavioral therapy, a psychological, goal-directed approach, in which patients learn how to modify physical, behavioral, and emotional triggers of pain and stress    IF YOU ARE PRESCRIBED OPIOIDS FOR PAIN:    Never take opioids in greater amounts or more often than prescribed    Follow up with your primary health care provider and work together to create a plan on how to manage your pain.    Talk about ways to help manage your pain that do not involve prescription opioids    Talk about all concerns and side effects    Help prevent misuse and abuse    Never sell or share prescription opioids    Never use another person's prescription opioids    Store prescription opioids in a secure place and out of reach of others (this may include visitors, children, friends, and family)    Visit www.cdc.gov/drugoverdose to learn about risks of opioid abuse and overdose    If you believe you may be struggling with addiction, tell your health care provider and ask for guidance or call [facility]'s National Helpline at 9-622-781-HELP    LEARN MORE / www.cdc.gov/drugoverdose/prescribing/guideline.html    Safely dispose of unused prescription opioids: Find your local drug take-back programs and more information about the importance of safe disposal at www.doseofreality.[place].gov             Medication List: This is a list of all your medications and when to take them. Check marks below indicate your daily home schedule. Keep this list as a reference.      Medications           Morning Afternoon Evening Bedtime As Needed    acetaminophen 325 MG tablet   Commonly known as:  TYLENOL   Take 2 tablets (650 mg) by mouth every 4 hours as needed for mild pain   Last time this was given:  650 mg on 2/3/2018  8:29  PM                                diltiazem 2% in PLO cream (FV COMPOUNDED) 2% Gel   To anal opening three times daily.  Use a pea-sized amount.  Store at room temperature.                                diphenhydrAMINE 25 MG tablet   Commonly known as:  BENADRYL   Take 1-2 tablets (25-50 mg) by mouth every 6 hours as needed for other (Nausea or vomiting)                                ibuprofen 600 MG tablet   Commonly known as:  ADVIL/MOTRIN   Take 1 tablet (600 mg) by mouth every 6 hours as needed for moderate pain   Last time this was given:  600 mg on 2/3/2018 11:11 PM                                ondansetron 8 MG tablet   Commonly known as:  ZOFRAN   Take 1 tablet (8 mg) by mouth every 6 hours as needed for nausea                                * oxyCODONE 10 MG 12 hr tablet   Commonly known as:  OxyCONTIN   Take 1 tablet (10 mg) by mouth every 12 hours                                * oxyCODONE IR 5 MG tablet   Commonly known as:  ROXICODONE   Take 1-2 tablets (5-10 mg) by mouth every 4 hours as needed for moderate to severe pain   Last time this was given:  5 mg on 2/5/2018  9:20 AM                                pantoprazole 40 MG EC tablet   Commonly known as:  PROTONIX   Take 1 tablet (40 mg) by mouth 2 times daily (before meals)   Last time this was given:  40 mg on 2/5/2018  8:09 AM                                polyethylene glycol Packet   Commonly known as:  MIRALAX/GLYCOLAX   Take 17 g by mouth daily as needed for constipation   Last time this was given:  17 g on 2/5/2018  8:09 AM                                senna-docusate 8.6-50 MG per tablet   Commonly known as:  SENOKOT-S;PERICOLACE   Take 2 tablets by mouth 2 times daily as needed for constipation                                * Notice:  This list has 2 medication(s) that are the same as other medications prescribed for you. Read the directions carefully, and ask your doctor or other care provider to review them with you.

## 2018-02-02 NOTE — CONSULTS
"  [facility]  Pain and Advanced/Complex Care Team (PACCT)   Initial Consultation    Olimpia Childress MRN# [id number]   Age: 23 year old YOB: 1995   Date:  02/02/2018 Admitted:  2/2/2018     Reason for consult: Pain management  Requesting physician/service: Dr. Henriquez/Dr. Alen lobato heme onc    Recommendations, Patient/Family Counseling & Coordination:     SYMPTOM MANAGEMENT: Pain: \"burning pain\" likely visceral pain  - Start morphine PCA with 1 mg/hr continuous and 1 mg Q10 mins ASAP  - If too sedated, decrease continuous infusion to 0.5 mg/hr and keep PCA dose at 1 mg  - If pain poorly controlled, increase both PCA dose and continuous to 1.5 mg morphine and call PACCT on-call   - May eventually consider addition of gabapentin for visceral pain  - If planning discharge tomorrow, call PACCT for recommendation for transition to PO morphine (will likely start combination of long-acting and short-acting morphine).    Reflux  - Agree with pantoprazole or other PPI scheduled  - Consider anti-emetics such as ondansetron (currently claims she doesn't need it)    Constipation: baseline, compounded by large abdominal cyst and potential for opioid-induced constipation.   - Continue polyethylene glycol 17 grams daily and titrate as needed (as an osmotic laxative)  - Start Senna 8.6 mg tabs, 2 tabs QHS and titrate as needed for daily BM  - Abdominal massage    GOALS OF CARE AND DECISIONAL SUPPORT/SUMMARY OF DISCUSSION WITH PATIENT AND/OR FAMILY: Introduced scope of PACCT, including our role in pain and symptom management, decision-making and support. Olimpia's biggest concern now is her burning abdominal pain, like her stomach exploded. Morphine earlier helped it feel better. I explained about the PCA and Olimpia told me she had used one before. I asked about others who helped her with medical care and decisions, and Olimpia explained that her cousin has been very helpful so far with " everything, but that she was very busy now with school and her own daughter. Olimpia briefly acknowledged that she had received some tough news today, but she did not want to talk about this further and preferred to discuss her symptoms.    Thank you for the opportunity to participate in the care of this patient and family.   Please contact the Pain and Advanced/Complex Care Team (PACCT) with any emergent needs via text page to the PACCT general pager ([phone], answered 8-4:30 Monday to Friday). After hours and on weekends/holidays, please refer to [facility] or [place] on-call.    Attestation:  Total time on the floor involved in the patient's care: 60 minutes  Total time spent in counseling/care coordination: 50 minutes    Discussed with primary team.    Amita Martin MD  Pager: [phone] (please text page)    Assessment:      Diagnoses and symptoms: Olimpia Childress is a 23 year old female with:  Patient Active Problem List   Diagnosis     Malignant tumor cells (H)     Abdominal pain     Ileus second to Vincristine     Constipation     Pneumothorax on left     Pneumothorax, left     Sarcoma of vulva (H)     Febrile neutropenia (H)     Anemia     Chemotherapy-induced neutropenia (H)     Rhabdoid tumor (H)     Synovial sarcoma (H)     Encounter for antineoplastic chemotherapy     Emesis, persistent     Vulvar cancer (H)     Port-a-cath in place     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 Assessments will be ongoing. Per Olimpia, her cousin is her healthcare POA.    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 Per team she is currently not  "interested in any more IV chemotherapry. She has had a week of progressive \"burning\" abdominal pain, that makes it difficult to eat. She denies nausea or vomiting. She feels like her belly is getting larger and today it felt like something in her belly exploded. She is strolling small amounts, up to daily with Miralax. She has tried taking oxycodone at home with no relief (5 mg x 2). She was worried about taking larger doses due to the potential for constipation. She received morphine in clinic, which helped for a little while, and just received another dose, which partially helped but is also making her sleepy. She acknowledges that she was not been sleeping well, due to pain and worry about her disease.     Past Medical History:     Past Medical History:   Diagnosis Date     Anemia 6/3/2016     Constipation      Extrarenal rhabdoid neoplasm (H)      Febrile neutropenia (H) 4/17/2016     History of blood transfusion      Latent tuberculosis      Lung disease      On antineoplastic chemotherapy      Sarcoma of vulva (H)         Past Surgical History:     Past Surgical History:   Procedure Laterality Date     BIOPSY VULVA       BIOPSY VULVA Right 8/3/2016    Procedure: BIOPSY VULVA;  Surgeon: Sangita Pastrana MD;  Location: [facility] OR     EXAM UNDER ANESTHESIA PELVIC N/A 11/16/2016    Procedure: EXAM UNDER ANESTHESIA PELVIC;  Surgeon: Sangita Pastrana MD;  Location: [facility] OR     EXCISE MASS VULVA       EXCISE TUMOR PELVIS ANTERIOR  11/16/2016    Procedure: EXCISE TUMOR PELVIS ANTERIOR;  Surgeon: Pradeep Diop MD;  Location: [facility] OR     INSERT CHEST TUBE Left 2/24/2016    Procedure: INSERT CHEST TUBE;  Surgeon: Dharmesh Uribe MD;  Location: [facility] OR     INSERT PORT VASCULAR ACCESS       RECONSTRUCT VULVA WITH MUSCLE FLAP  11/16/2016    Procedure: RECONSTRUCT VULVA WITH MUSCLE FLAP;  Surgeon: Sidra Corona MD;  Location: [facility] OR     THORACOSCOPIC BIOPSY LUNG       THORACOTOMY Left 7/20/2016    Procedure: " THORACOTOMY;  Surgeon: Byron Pierre MD;  [facility]     THORACOTOMY Left 3/8/2017    Procedure: THORACOTOMY;  Surgeon: Byron Pierre MD;  [facility]     VULVECTOMY RADICAL DISSECT GROIN(S) N/A 11/16/2016    Procedure: VULVECTOMY RADICAL DISSECT GROIN(S);  Surgeon: Sangita Pastrana MD;  Location: [facility] OR       Social/Spiritual History:     Description of family/Living situation: Lives in [place] with her cousin. All other family members live out of the country  Family/Patient support system: Cousin  Parent/caretaker employment/education: Attends school, per chart in 11th grade   [place] affiliation and Involvement in anant community/Use of community resources: Not discussed  Medical decision maker(s)/Legal guardian(s): self. States that cousin is healthcare POA    Family History:     Family History   Problem Relation Age of Onset     Other Cancer No family hx of        Allergies:     Olimpia Childress is allergic to heparin flush; pork derived products; and tegaderm transparent dressing (informational only).    Medications:     I have reviewed this patient's medication profile and medications during this hospitalization.      Scheduled medications:     [START ON 2/3/2018] polyethylene glycol  17 g Oral Daily     sodium chloride (PF)  3 mL Intracatheter Q8H     morphine   Intravenous PCA     morphine  2 mg Intravenous PCA LOADING DOSE     [START ON 2/3/2018] sennosides  8.6 mg Oral Daily     pantoprazole (PROTONIX) IV PEDS/NICU  40 mg Intravenous Daily with breakfast     NaCl         Infusions:   PRN medications: lidocaine (buffered or not buffered), lidocaine 4%, sodium chloride (PF), morphine, naloxone    Review of Systems:     Palliative Symptom Review  The comprehensive review of systems is negative other than noted here and in the HPI. Completed by proxy by parent(s)/caretaker(s) (if applicable)    Physical Exam:     Vitals were reviewed  Temp:  [97.5  F (36.4  C)-98.6  F (37  C)]  98.3  F (36.8  C)  Pulse:  [] 96  Heart Rate:  [89] 89  Resp:  [18-20] 18  BP: (102-118)/(73-78) 102/73  SpO2:  [100 %] 100 %  Weight: 59 kg   Sitting upright in bed. Appeats tired and uncomfortable  Respires comfortably on room air, but slightly tachypneic  Abdomen soft, with loud bowel sounds, non-tender (pain improved with deep palpation, describes feeling like things are moving when mid-abdomen palpated deeply).  Abdominal aorta palpated    Data Reviewed:     Results for orders placed or performed during the hospital encounter of 02/02/18 (from the past 24 hour(s))   CT Abdomen Pelvis w Contrast    Narrative    EXAMINATION: CT ABDOMEN PELVIS W CONTRAST  2/2/2018 2:45 PM      CLINICAL HISTORY: pain;    COMPARISON: PET/CT 5/1/2017, chest CT 8/14/2017 and 1/25/2018    PROCEDURE COMMENTS: CT of the abdomen was performed with 98ml's isovue  300 intravenous and oral contrast. Coronal and sagittal reformatted  images were obtained.    FINDINGS:  There is a partially visualized right-sided pneumothorax. There has  been a further slight increase in metastatic disease seen at the lung  bases from 1/25/2018. Both solid and peripherally enhancing nodules  and masses at both lung bases have increased in size. There are  several heterogeneously enhancing masses at the posteromedial left  lung base, with mass effect on the left side of the heart. Masses are  seen to extend behind the chino and along the left chest wall into the  left upper quadrant of the abdomen. The largest mass with mass effect  on the heart just posterior to the left ventricle measures 5 cm in  maximal AP diameter.     Large masses extending along the left hemidiaphragm and left chest  extend into the left upper abdomen from the costochondral junction  anteriorly to the posterior aspect of the chest with a rind of  enhancing soft tissue and a central fluid component. In transverse  diameter at this measures up to 4.2 cm, previously 3.6 cm. The  length  of the largest collection located on series 2, image 23 measures 11.8  cm, previously 9.9 cm. Tumor extends into the lateral left chest while  beyond the rib margin, as well as into the lateral abdominal wall  musculature. There is mass effect on the spleen, left kidney, stomach,  and there is elevation of the chino by tumor.    No focal lesions identified in the liver, spleen, kidneys, or  pancreas. No abnormally thickened or dilated loops of small bowel or  colon.    No aggressive appearing bone lesions.      Impression    IMPRESSION:  1. Extensive metastatic disease in the chest, with large peripherally  enhancing fluid density masses extending from the left chest along the  left hemidiaphragm into the left upper quadrant of the abdomen. These  have slightly increased from the recent chest CT of 1/25/2018.  2. Partially visualized right pneumothorax.    YUSRA GAMEZ MD

## 2018-02-02 NOTE — LETTER
2/2/2018      RE: Olimpia Childress  [address]   [facility] [id number]       Pediatric Hematology/Oncology Clinic Note    History- Olimpia initially presented with a growth on her right labia in 2013 when she was living in [place]. 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 She discontinued further treatment b/c her physicians were unsure of the exact diagnosis and she felt uncomfortable proceeding. Olimpia subsequently immigrated to the  in August of 2015 and in October she first noticed a recurrence of the mass in the area of previous excision. She was in [place] at that time, seen by oncology and had a port placed but then moved to [place] where she was seen by Dr. Mckeon at [facility] in November. There she underwent an MRI that showed a solid and cystic subcutaneous mass in the right labia measuring 1.7 x 1.6 x 1cm with question of nodular extension vs a second nodule measuring 0.7 cm. There was no invasion into the vagina. On November 16th 2015, Olimpia had a biopsy of the mass by a gynecologist, Dr. Terry, at [facility]. Cytology was reviewed at [facility] and read as a SMARCB1-deficient genital sarcoma, likely falling within the overall spectrum of malignant rhabdoid tumor. By immunohistochemistry, the cells shows a complete loss of SMARCb1. Wide spectrum cytokeratin, CD34, WT1, Desmin and CD99 were all negative. RT PCR for Wooten Sarcoma associated fusion transcripts were negative as well. Olimpia went on to have a PET/CT as well which showed multiple pulmonary lesions and biopsy confirmed a lesion to be metastatic disease. Olimpia was seen by Tomás White at [facility] who reviewed the diagnosis and potential treatment plans with her, however she prefered to be treated here at [facility]. Olimpia received therapy for metastatic extrarenal rhabdoid tumor per [id number] regimen I until  the diagnosis was questioned at the time of resection of her pulmonary mets and was determined to be synovial sarcoma.    Olimpia underwent left sided thoracotomy and resection of two pulmonary nodules on 7/20/16. Pathology revealed that one lesion was benign lymphoid tissue and the other was consistent with synovial sarcoma.  This was confirmed with FISH  With 91% of cell examined having a signal pattern indictivate of a rearrangement of the SS18 locus.  Olimpia has now completed 3 cycles of chemotherapy per [id number] and started her radiation on 9/13/16 and completed on 10/17/16.  She then went on to have a resection of her labial mass on 11/16/16 by Jannet Pastrana and Ch[facility]is with reconstruction, including skin flaps by Dr. Corona from plastics.  Pathology showed no residual tumor.  She had a f/u chest CT today on 12/5/16 that showed persistance of her pulmonary nodules as well as few additonal pulmonary nodules.  She saw Dr. Pierre who was in agreement with surgical resection, however Olimpia wanted to wait until March to have more time to recover from her last surgery. She underwent left sided thoracotomy on 3/8/17.    In May, Olimpia's follow up PET/CT demonstrated significant progression of her pulmonary metastatic disease, with an anterior mediastinal mass compression her right ventricle and potentially right outflow tract.  Subsequent echocardiogram did not show altered cardiac function, Dr Man felt radiation therapy was not in her best interest at this time but would consider if she developed cardiac dysfunction.  Olimpia was started on oral Pazopanib May 2017 for palliative therapy, with doses held due to palmar plantar dysesthesia and stomatitis, and restarted 50% dosing on 6/19/17.  Olimpia has imaging in August 2017 that showed a positive response to therapy.  However on most recent imaging last week she was found to have progression.    HPI:  Olimpia presents to clinic today and reports it feels like her stomach  exploded.  Her pain has been present for about a week and has gotten progressively worse.  Her stomach pain and cramping is keeping her up at night.  She is taking miralax twice daily and passing soft stools.  No difficulty with urination.  No back pain.  Eating and drinking makes her discomfort markedly worse and she has audible digestion which is new.  She has not had her menstrual period since starting therapy.  She denies any respiratory symptoms.  No SOB or palpitations.    Allergies as of 02/02/2018 - [place] as Reviewed 02/02/2018   Allergen Reaction Noted     Heparin flush Other (See Comments) 01/27/2016     Pork derived products  02/09/2016     Tegaderm transparent dressing (informational only) Other (See Comments) and Rash 04/20/2016     Medications:  Miralax daily to BID    Past Medical History:   Diagnosis Date     Anemia 6/3/2016     Constipation      Extrarenal rhabdoid neoplasm (H)      Febrile neutropenia (H) 4/17/2016     History of blood transfusion      Latent tuberculosis      Lung disease      On antineoplastic chemotherapy      Sarcoma of vulva (H)      Social History:  Olimpia is attending school with the goal to get her diploma, she is in 11th grade and reports her English is improving.    \  Family History   Problem Relation Age of Onset     Other Cancer No family hx of      ROS  See HPI. Complete ROS otherwise negative.    Physical Exam   Temp:  [97.5  F (36.4  C)] 97.5  F (36.4  C)  Pulse:  [111] 111  Resp:  [20] 20  BP: (108)/(77) 108/77  SpO2:  [100 %] 100 %  Wt Readings from Last 4 Encounters:   02/02/18 60.2 kg (132 lb 11.5 oz)   01/25/18 60.4 kg (133 lb 2.5 oz)   12/21/17 60.3 kg (132 lb 15 oz)   12/12/17 64.2 kg (141 lb 8.6 oz)     GENERAL: Alert, interactive.  Is visible discomfort.  Appears tired.  SKIN: dorsum of hands with nearly resolved peeling.   Improved peeling on feet.  HEAD: Normocephalic, atraumatic     EYES: Normal lids, conjunctivae/cornea normal. PERRL. EOMI.  EARS: Pinna  normal b/l, no pain on palpation. External ears normal appearing.  TMs opaque bilaterally  NOSE: Congested, no nasal drainage noted.  No facial pain.  MOUTH/THROAT: Oropharynx is clear. Tongue without sores. Normal dentition for age, no mucosal lesions.  NECK: Supple, full range of motion, no masses  LYMPH NODES: No cervical lymphadenopathy.  LUNGS: No increased WOB.  Lungs clear to auscultation throughout; diminished in LLL.  No crackles or wheezes.  HEART: HRR. S1 and S2 are normal. No murmurs, rubs, gallops. The radial pulses are 2+ bilaterally. Cap refill <3 sec in upper extremities.  ABDOMEN: Hyperactive bowel sounds. Soft, generalized tenderness, no rebound tenderness.  Mild distention.  No discrete mass palpated.   NEUROLOGIC: Grossly intact, no focal neurologic deficits.    :  Deferred today.    Labs:  Results for orders placed or performed in visit on 02/02/18 (from the past 24 hour(s))   CBC with platelets differential   Result Value Ref Range    WBC 5.5 4.0 - 11.0 10e9/L    RBC Count 3.30 (L) 3.8 - 5.2 10e12/L    Hemoglobin 11.8 11.7 - 15.7 g/dL    Hematocrit 34.9 (L) 35.0 - 47.0 %     (H) 78 - 100 fl    MCH 35.8 (H) 26.5 - 33.0 pg    MCHC 33.8 31.5 - 36.5 g/dL    RDW 12.7 10.0 - 15.0 %    Platelet Count 217 150 - 450 10e9/L    Diff Method Automated Method     % Neutrophils 52.6 %    % Lymphocytes 33.9 %    % Monocytes 12.5 %    % Eosinophils 0.4 %    % Basophils 0.2 %    % Immature Granulocytes 0.4 %    Nucleated RBCs 0 0 /100    Absolute Neutrophil 2.9 1.6 - 8.3 10e9/L    Absolute Lymphocytes 1.9 0.8 - 5.3 10e9/L    Absolute Monocytes 0.7 0.0 - 1.3 10e9/L    Absolute Eosinophils 0.0 0.0 - 0.7 10e9/L    Absolute Basophils 0.0 0.0 - 0.2 10e9/L    Abs Immature Granulocytes 0.0 0 - 0.4 10e9/L    Absolute Nucleated RBC 0.0        Impression:  Olimpia is a 23-year-old female with 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  She is  currently not receiving any chemotherapy.  She has progressive abdominal pain, exacerbated by eating.  Has not had dedicated imaging of her abdomen.  Most recent Chest CT showed tumor mass effect to the spleen and kidney.     Plan:  1) Morphine for pain control in clinic.  Labs including CBC, CMP and amylase/lipase.  2) Admit to the hospital for ongoing pain control and abdominal imaging.  If pain is tumor related she may benefit from palliative radiation.         JULIO C Pittman CNP

## 2018-02-02 NOTE — MR AVS SNAPSHOT
After Visit Summary   2/2/2018    Olimpia Childress    MRN: [id number]           Patient Information     Date Of Birth          1995        Visit Information        Provider Department      2/2/2018 11:00 AM Open, Assignments; Omar Trammell APRN CNP Peds Hematology Oncology        Today's Diagnoses     Synovial sarcoma (H)    -  1    Rhabdoid tumor (H)              [facility], 9th floor  [address] [place] [address]  Phone: [phone]  Clinic Hours:   Monday-Friday:   7 am to 5:00 pm   closed weekends and major  holidays     If your fever is 100.5  or greater,   call the clinic during business hours.   After hours call [phone] and ask for the pediatric hematology / oncology physician to be paged for you.               Follow-ups after your visit        Your next 10 appointments already scheduled     Feb 07, 2018 12:45 PM CST   (Arrive by 12:15 PM)   PET ONCOLOGY WHOLE BODY with UUPET1   [facility], [place] [facility] ([facility])    [address] [facility] [address] [phone]           Tell your doctor:   If there is any chance you may be pregnant or if you are breastfeeding.   If you have problems lying in small spaces (claustrophobia). If you do, your doctor may give you medicine to help you relax. If you have diabetes:   Have your exam early in the morning. Your blood glucose will go up as the day goes by.   Your glucose level must be 180 or less at the start of the exam. Please take any medicines you need to ensure this blood glucose level.   If you are taking insulin in the morning take with breakfast by 6 am and schedule procedure between 12 and 2:15 pm.   If you are taking insulin at night take nightly dose, fast overnight, schedule procedure before 10 am.   If you take insulin both morning and night take morning dose by 6am and schedule procedure between 12 and 2:15 pm.    24 hours before your scan: Don t do any heavy exercise. (No jogging, aerobics or other workouts.) Exercise will make your pictures less accurate.  7 hours before your scan: Eat a low carb, high protein meal (Lean meats, seafood, beans, soy, low-fat dairy, eggs, nuts & seeds). 6 hours before your scan:   Stop all food and liquids (except water).   Do not chew gum or suck on mints.   If you need to take medicine with food, you may take it with a few crackers.  Please call your Imaging Department at your exam site with any questions.              Who to contact     Please call your clinic at [phone] to:    Ask questions about your health    Make or cancel appointments    Discuss your medicines    Learn about your test results    Speak to your doctor   If you have compliments or concerns about an experience at your clinic, or if you wish to file a complaint, please contact [facility] Patient Relations at [phone] or email us at [email or web address]         Additional Information About Your Visit        [employer] Information     [employer] is an electronic gateway that provides easy, online access to your medical records. With [employer], you can request a clinic appointment, read your test results, renew a prescription or communicate with your care team.     To sign up for [employer] visit the website at [email or web address]   You will be asked to enter the access code listed below, as well as some personal information. Please follow the directions to create your username and password.     Your access code is: [email or web address]  Expires: 3/22/2018 11:14 AM     Your access code will  in 90 days. If you need help or a new code, please contact your [facility] or call [phone] for assistance.        Care EveryWhere ID     This is your Care EveryWhere ID. This could be used by other organizations to access your [place] medical  records  [id number]         Blood Pressure from Last 3 Encounters:   02/02/18 108/77   01/25/18 113/74   12/21/17 104/69    Weight from Last 3 Encounters:   02/02/18 60.2 kg (132 lb 11.5 oz)   01/25/18 60.4 kg (133 lb 2.5 oz)   12/21/17 60.3 kg (132 lb 15 oz)              We Performed the Following     Amylase     CBC with platelets differential     Comprehensive metabolic panel     Lipase          Today's Medication Changes          These changes are accurate as of 2/2/18 11:38 AM.  If you have any questions, ask your nurse or doctor.               Stop taking these medicines if you haven't already. Please contact your care team if you have questions.     pazopanib 200 MG tablet CHEMOTHERAPY   Commonly known as:  VOTRIENT   Stopped by:  Omar Trammell APRN CNP           pyridOXINE 100 MG Tabs   Commonly known as:  VITAMIN B6   Stopped by:  Omar Trammell APRN CNP                    Primary Care Provider Office Phone # Fax #    Coretta Yanez [phone] [address] [address]        Equal Access to Services     [facility]: Hadii leonid Cheek, waaxda lumady, qaybta kaalmada adesusannah, wilner blue . So [facility] [phone].    ATENCIÓN: Si habla español, tiene a ordoñez disposición servicios gratuitos de asistencia lingüística. [facility] [phone].    We comply with applicable federal civil rights laws and [place] laws. We do not discriminate on the basis of race, color, national origin, age, disability, sex, sexual orientation, or gender identity.            Thank you!     Thank you for choosing PEDS HEMATOLOGY ONCOLOGY  for your care. Our goal is always to provide you with excellent care. Hearing back from our patients is one way we can continue to improve our services. Please take a few minutes to complete the written survey that you may receive in the mail after your visit with us. Thank you!             Your  Updated Medication List - Protect others around you: Learn how to safely use, store and throw away your medicines at www.disposemymeds.org.          This list is accurate as of 2/2/18 11:38 AM.  Always use your most recent med list.                   Brand Name Dispense Instructions for use Diagnosis    acetaminophen 325 MG tablet    TYLENOL    100 tablet    Take 2 tablets (650 mg) by mouth every 4 hours as needed for mild pain    Sarcoma of vulva (H)       cabozantinib 1 X 80 & 3 X 20 MG capsule CHEMOTHERAPY    COMETRIQ    1 kit    Take 140 mg by mouth daily Take on an empty stomach, not to eat for at least 2 hours before and at least 1 hour after taking this medication.    Rhabdoid tumor (H)       Colloidal Oatmeal 1 % Crea     1 Tube    Externally apply topically 2 times daily    Palmar plantar dysesthesia       diltiazem 2% in PLO cream (FV COMPOUNDED) 2% Gel     60 g    To anal opening three times daily.  Use a pea-sized amount.  Store at room temperature.    Anal fissure       diphenhydrAMINE 25 MG tablet    BENADRYL    60 tablet    Take 1-2 tablets (25-50 mg) by mouth every 6 hours as needed for other (Nausea or vomiting)    Chemotherapy induced nausea and vomiting       ibuprofen 600 MG tablet    ADVIL/MOTRIN    60 tablet    Take 1 tablet (600 mg) by mouth every 6 hours as needed for moderate pain    Sarcoma of vulva (H)       lidocaine-prilocaine cream    EMLA    30 g    Apply to port 30 minutes prior to access    Malignant tumor cells (H)       ondansetron 8 MG tablet    ZOFRAN    30 tablet    Take 1 tablet (8 mg) by mouth every 6 hours as needed for nausea    Encounter for antineoplastic chemotherapy       oxyCODONE IR 5 MG tablet    ROXICODONE    20 tablet    Take 1-2 tablets (5-10 mg) by mouth every 4 hours as needed for moderate to severe pain    Acute post-operative pain       polyethylene glycol Packet    MIRALAX/GLYCOLAX    7 packet    Take 17 g by mouth daily    Acute post-operative pain        senna-docusate 8.6-50 MG per tablet    SENOKOT-S;PERICOLACE    30 tablet    Take 2 tablets by mouth 2 times daily as needed for constipation    Acute post-operative pain       triamcinolone 0.1 % ointment    KENALOG    30 g    Apply sparingly to affected area two to three times daily for 7 days.    Dermatitis

## 2018-02-02 NOTE — H&P
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    Olimpia has received Cytoxan and Doxorubicin at diagnosis, therapy per [id number], therapy per [id number], and radiation therapy (9/13/16-10/17/16). Despite L-sided thoracotomy and resection of pulmonary nodules 7/20/16 and resection of primary labial mass 11/16/16, she had evidence of persistence of her pulmonary nodules on Chest CT 12/5/16. She underwent L-sided thoracotomy once again 3/8/17. Unfortunately in May of 2017, PET/CT Scan imgaing showed significant progression of her disease with an anterior mediastinal mass. She was started on palliative Pazopanib later that month. Currently, she is not receiving chemotherapy secondary to insurance coverage issues. Her most recent Chest CT on 1/25/18 shows substantial increase in metastatic disease with new and enlarging pulmonary masses and nodules bilaterally. She has loculated fluid collections extending along the L hemidiaphragm with mass effect on the diaphragm, kidney, and spleen.     Today, [facility] with excruciating abdominal pain. She states the pain has been persistent for the last week. She is having daily soft non-bloody stools. Olimpia describes the pain as \"gas\" and that she's trying to \"get it out\". Massaging the belly helps relieve the pain. Olimpia tried oxycodone 5mg at home last night and it did not help. Moving and walking around does help with the pain as well. No dysuria, no hematuria. Her last period was January 2015. No back pain, no headaches. No nausea. Has been eating normally but it makes her stomach feel uncomfortable and heavy. No increased burping or " "increased passing of gases. No fevers at home but notes that she feels burning in the inside of her stomach and sometimes up her chest. No rashes. No sensation of reflux. No trouble breathing. No sick contacts at home.    In clinic, she received morphine x1. Her initial pain score was 7-8/10 and now it is 0/10 but she still feels the uncomfortable \"explosive feeling\" in her belly like \"the air isn't coming out.\" Her chemistry panel was unremarkable including normal amylase and lipase. Her CBCd is stable with normal hemoglobin and platelets. She is not neutropenic.     Plan:  1. Admit to Heme/Onc service  2. CT abdomen/pelvis obtained shows worsening metastatic lesions, one particular large one suitable for possible IR drainage to help relieve pressure. Consulted IR to discuss feasibility - appreciate their assistance.   3. Consulted Pain and Palliative care team - appreciate their assistance. Started morphine drip.   4. Reg diet until 1AM (at which point should be NPO for possible IR procedure), protonix. Also scheduled senna and miralax to keep her regular  5. Labs look great today, no need to repeat in the AM unless clinically indicated     José Miguel Henriquez MD  Pediatric Hematology/Oncology & BMT Fellow    I saw and evaluated the patient and agree with the fellow's assessment and plan.  Amalia Lowry MD, MPH, MSE  Director, Cancer Survivor Program  Pediatric Hematology/Oncology  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    Olimpia has received Cytoxan and Doxorubicin at diagnosis, therapy per [id number], therapy per [id number], and radiation therapy (9/13/16-10/17/16). " Despite L-sided thoracotomy and resection of pulmonary nodules 7/20/16 and resection of primary labial mass 11/16/16, she had evidence of persistence of her pulmonary nodules on Chest CT 12/5/16. She underwent L-sided thoracotomy once again 3/8/17. Unfortunately in May of 2017, PET/CT Scan imgaing showed significant progression of her disease with an anterior mediastinal mass. She was started on palliative Pazopanib later that month. Currently, she is not receiving chemotherapy secondary to insurance coverage issues. Her most recent Chest CT on 1/25/18 shows substantial increase in metastatic disease with new and enlarging pulmonary masses and nodules bilaterally. She has loculated fluid collections extending along the L hemidiaphragm with mass effect on the diaphragm, kidney, and spleen.     Past Medical History    I have reviewed this patient's medical history and updated it with pertinent information if needed.   Past Medical History:   Diagnosis Date     Anemia 6/3/2016     Constipation      Extrarenal rhabdoid neoplasm (H)      Febrile neutropenia (H) 4/17/2016     History of blood transfusion      Latent tuberculosis      Lung disease      On antineoplastic chemotherapy      Sarcoma of vulva (H)      Past Surgical History   I have reviewed this patient's surgical history and updated it with pertinent information if needed.  Past Surgical History:   Procedure Laterality Date     BIOPSY VULVA       BIOPSY VULVA Right 8/3/2016    Procedure: BIOPSY VULVA;  Surgeon: Sangita Pastrana MD;  Location: [facility] OR     EXAM UNDER ANESTHESIA PELVIC N/A 11/16/2016    Procedure: EXAM UNDER ANESTHESIA PELVIC;  Surgeon: Sangita Pastrana MD;  Location: [facility] OR     EXCISE MASS VULVA       EXCISE TUMOR PELVIS ANTERIOR  11/16/2016    Procedure: EXCISE TUMOR PELVIS ANTERIOR;  Surgeon: Pradeep Diop MD;  Location: [facility] OR     INSERT CHEST TUBE Left 2/24/2016    Procedure: INSERT CHEST TUBE;  Surgeon: Dharmesh Uribe MD;   Location: [facility] OR     INSERT PORT VASCULAR ACCESS       RECONSTRUCT VULVA WITH MUSCLE FLAP  11/16/2016    Procedure: RECONSTRUCT VULVA WITH MUSCLE FLAP;  Surgeon: Sidra Corona MD;  Location: [facility] OR     THORACOSCOPIC BIOPSY LUNG       THORACOTOMY Left 7/20/2016    Procedure: THORACOTOMY;  Surgeon: Byron Pierre MD;  Location: [facility] OR     THORACOTOMY Left 3/8/2017    Procedure: THORACOTOMY;  Surgeon: Byron Pierre MD;  Location[facility] OR     VULVECTOMY RADICAL DISSECT GROIN(S) N/A 11/16/2016    Procedure: VULVECTOMY RADICAL DISSECT GROIN(S);  Surgeon: Sangita Pastrana MD;  Location: [facility] OR     Immunization History   Immunization Status:  up to date and documented    Prior to Admission Medications   Prior to Admission Medications   Prescriptions Last Dose Informant Patient Reported? Taking?   Colloidal Oatmeal 1 % CREA   No No   Sig: Externally apply topically 2 times daily   Patient not taking: Reported on 1/25/2018   acetaminophen (TYLENOL) 325 MG tablet   No No   Sig: Take 2 tablets (650 mg) by mouth every 4 hours as needed for mild pain   Patient not taking: Reported on 1/25/2018   cabozantinib (COMETRIQ) 1 X 80 & 3 X 20 MG capsule CHEMOTHERAPY   No No   Sig: Take 140 mg by mouth daily Take on an empty stomach, not to eat for at least 2 hours before and at least 1 hour after taking this medication.   diltiazem 2% in PLO cream, FV COMPOUNDED, 2% GEL   No No   Sig: To anal opening three times daily.  Use a pea-sized amount.  Store at room temperature.   Patient not taking: Reported on 1/25/2018   diphenhydrAMINE (BENADRYL) 25 MG tablet   No No   Sig: Take 1-2 tablets (25-50 mg) by mouth every 6 hours as needed for other (Nausea or vomiting)   Patient not taking: Reported on 1/25/2018   ibuprofen (ADVIL/MOTRIN) 600 MG tablet   No No   Sig: Take 1 tablet (600 mg) by mouth every 6 hours as needed for moderate pain   Patient not taking: Reported on 1/25/2018   lidocaine-prilocaine (EMLA) cream   No  No   Sig: Apply to port 30 minutes prior to access   Patient not taking: Reported on 1/25/2018   ondansetron (ZOFRAN) 8 MG tablet   No No   Sig: Take 1 tablet (8 mg) by mouth every 6 hours as needed for nausea   Patient not taking: Reported on 1/25/2018   oxyCODONE (ROXICODONE) 5 MG IR tablet   No No   Sig: Take 1-2 tablets (5-10 mg) by mouth every 4 hours as needed for moderate to severe pain   Patient not taking: Reported on 1/25/2018   polyethylene glycol (MIRALAX/GLYCOLAX) Packet   No No   Sig: Take 17 g by mouth daily   Patient not taking: Reported on 1/25/2018   senna-docusate (SENOKOT-S;PERICOLACE) 8.6-50 MG per tablet   No No   Sig: Take 2 tablets by mouth 2 times daily as needed for constipation   Patient not taking: Reported on 1/25/2018   triamcinolone (KENALOG) 0.1 % ointment   No No   Sig: Apply sparingly to affected area two to three times daily for 7 days.   Patient not taking: Reported on 1/25/2018      Facility-Administered Medications: None     Allergies   Allergies   Allergen Reactions     Heparin Flush Other (See Comments)     Due to Baptism reasons     Pork Derived Products      Denominational reasons     Tegaderm Transparent Dressing (Informational Only) Other (See Comments) and Rash     PLEASE USE IV 3000 FOR DRESSING(S)     Social History   I have updated and reviewed the following Social History Narrative:   Pediatric History   Patient Guardian Status     Not on file.     Other Topics Concern     Not on file     Social History Narrative   Lives in [place] with her cousin Eduardo. Parents are in [place]. Came to the [place] in 2015 as refugee status. Is studying to finish school.    Family History   I have reviewed this patient's family history and updated it with pertinent information if needed.   Family History   Problem Relation Age of Onset     Other Cancer No family hx of      Review of Systems   A complete and comprehensive review of systems was performed and was negative other than  what is listed above in the HPI.    Physical Exam   Temp: 98.6  F (37  C)   BP: 118/78 Pulse: 96   Resp: 20 SpO2: 100 % O2 Device: None (Room air)    Vital Signs with Ranges  Temp:  [97.5  F (36.4  C)-98.6  F (37  C)] 98.6  F (37  C)  Pulse:  [] 96  Resp:  [20] 20  BP: (108-118)/(77-78) 118/78  SpO2:  [100 %] 100 %  0 lbs 0 oz    General: Talkative, engaged young lady  HEENT: NC/AT. Pupils equal and reactive with extra-ocular motions intact, no scleral icterus. Conjunctivae clear. Nares clear. OP moist/pink without lesions, erythema or exudate.   Neck: Supple, no thyromegaly. Full ROM.  Lymph: No cervical adenopathy.  Resp: Good air entry. Normal WOB. CTAB.  Cardiac: RRR. No murmur. Peripheral pulses intact. Cap refill < 2 sec.  Abdomen: BS+. Soft, NT, ND. No hepatosplenomegaly, relief with deep palpation  Neuro: Alert and oriented. Normal tone. Normal sensation. Normal gait.  Ext: Warm, well perfused. Moved all extremities equally. Symmetric. No edema.  Skin: No rashes, echymoses or other lesions.    Data   Results for orders placed or performed in visit on 02/02/18 (from the past 24 hour(s))   CBC with platelets differential   Result Value Ref Range    WBC 5.5 4.0 - 11.0 10e9/L    RBC Count 3.30 (L) 3.8 - 5.2 10e12/L    Hemoglobin 11.8 11.7 - 15.7 g/dL    Hematocrit 34.9 (L) 35.0 - 47.0 %     (H) 78 - 100 fl    MCH 35.8 (H) 26.5 - 33.0 pg    MCHC 33.8 31.5 - 36.5 g/dL    RDW 12.7 10.0 - 15.0 %    Platelet Count 217 150 - 450 10e9/L    Diff Method Automated Method     % Neutrophils 52.6 %    % Lymphocytes 33.9 %    % Monocytes 12.5 %    % Eosinophils 0.4 %    % Basophils 0.2 %    % Immature Granulocytes 0.4 %    Nucleated RBCs 0 0 /100    Absolute Neutrophil 2.9 1.6 - 8.3 10e9/L    Absolute Lymphocytes 1.9 0.8 - 5.3 10e9/L    Absolute Monocytes 0.7 0.0 - 1.3 10e9/L    Absolute Eosinophils 0.0 0.0 - 0.7 10e9/L    Absolute Basophils 0.0 0.0 - 0.2 10e9/L    Abs Immature Granulocytes 0.0 0 - 0.4 10e9/L     Absolute Nucleated RBC 0.0    Comprehensive metabolic panel   Result Value Ref Range    Sodium 139 133 - 144 mmol/L    Potassium 3.5 3.4 - 5.3 mmol/L    Chloride 103 94 - 109 mmol/L    Carbon Dioxide 27 20 - 32 mmol/L    Anion Gap 9 3 - 14 mmol/L    Glucose 88 70 - 99 mg/dL    Urea Nitrogen 11 7 - 30 mg/dL    Creatinine 0.56 0.52 - 1.04 mg/dL    GFR Estimate >90 >60 mL/min/1.7m2    GFR Estimate If Black >90 >60 mL/min/1.7m2    Calcium 9.4 8.5 - 10.1 mg/dL    Bilirubin Total 0.4 0.2 - 1.3 mg/dL    Albumin 3.4 3.4 - 5.0 g/dL    Protein Total 7.4 6.8 - 8.8 g/dL    Alkaline Phosphatase 46 40 - 150 U/L    ALT 35 0 - 50 U/L    AST 43 0 - 45 U/L   Amylase   Result Value Ref Range    Amylase 54 30 - 110 U/L   Lipase   Result Value Ref Range    Lipase 160 73 - 393 U/L

## 2018-02-02 NOTE — PROGRESS NOTES
Pediatric Hematology/Oncology Clinic Note    History- Olimpia initially presented with a growth on her right labia in 2013 when she was living in [place]. 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 She discontinued further treatment b/c her physicians were unsure of the exact diagnosis and she felt uncomfortable proceeding. Olimpia subsequently immigrated to the  in August of 2015 and in October she first noticed a recurrence of the mass in the area of previous excision. She was in [place] at that time, seen by oncology and had a port placed but then moved to [place] where she was seen by Dr. Mckeon at [facility] in November. There she underwent an MRI that showed a solid and cystic subcutaneous mass in the right labia measuring 1.7 x 1.6 x 1cm with question of nodular extension vs a second nodule measuring 0.7 cm. There was no invasion into the vagina. On November 16th 2015, Olimpia had a biopsy of the mass by a gynecologist, Dr. Terry, at [facility]. Cytology was reviewed at [facility] and read as a SMARCB1-deficient genital sarcoma, likely falling within the overall spectrum of malignant rhabdoid tumor. By immunohistochemistry, the cells shows a complete loss of SMARCb1. Wide spectrum cytokeratin, CD34, WT1, Desmin and CD99 were all negative. RT PCR for Wooten Sarcoma associated fusion transcripts were negative as well. Olimpia went on to have a PET/CT as well which showed multiple pulmonary lesions and biopsy confirmed a lesion to be metastatic disease. Olimpia was seen by Tomás White at [facility] who reviewed the diagnosis and potential treatment plans with her, however she prefered to be treated here at [facility]. 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    Olimpia underwent left sided thoracotomy and resection of two pulmonary nodules on 7/20/16. Pathology revealed that one lesion was benign lymphoid tissue and the other was consistent with synovial sarcoma.  This was confirmed with FISH  With 91% of cell examined having a signal pattern indictivate of a rearrangement of the SS18 locus.  Olimpia has now completed 3 cycles of chemotherapy per [id number] and started her radiation on 9/13/16 and completed on 10/17/16.  She then went on to have a resection of her labial mass on 11/16/16 by Jannet Pastrana and Chikis with reconstruction, including skin flaps by Dr. Corona from plastics.  Pathology showed no residual tumor.  She had a f/u chest CT today on 12/5/16 that showed persistance of her pulmonary nodules as well as few additonal pulmonary nodules.  She saw Dr. Pierre who was in agreement with surgical resection, however Olimpia wanted to wait until March to have more time to recover from her last surgery. She underwent left sided thoracotomy on 3/8/17.    In May, Olimpia's follow up PET/CT demonstrated significant progression of her pulmonary metastatic disease, with an anterior mediastinal mass compression her right ventricle and potentially right outflow tract.  Subsequent echocardiogram did not show altered cardiac function, Dr Man felt radiation therapy was not in her best interest at this time but would consider if she developed cardiac dysfunction.  Olimpia was started on oral Pazopanib May 2017 for palliative therapy, with doses held due to palmar plantar dysesthesia and stomatitis, and restarted 50% dosing on 6/19/17.  Olimpia has imaging in August 2017 that showed a positive response to therapy.  However on most recent imaging last week she was found to have progression.    HPI:  Olimpia presents to clinic today and reports it feels like her stomach exploded.  Her pain has been present for about a week and has gotten progressively  worse.  Her stomach pain and cramping is keeping her up at night.  She is taking miralax twice daily and passing soft stools.  No difficulty with urination.  No back pain.  Eating and drinking makes her discomfort markedly worse and she has audible digestion which is new.  She has not had her menstrual period since starting therapy.  She denies any respiratory symptoms.  No SOB or palpitations.    Allergies as of 02/02/2018 - [place] as Reviewed 02/02/2018   Allergen Reaction Noted     Heparin flush Other (See Comments) 01/27/2016     Pork derived products  02/09/2016     Tegaderm transparent dressing (informational only) Other (See Comments) and Rash 04/20/2016     Medications:  Miralax daily to BID    Past Medical History:   Diagnosis Date     Anemia 6/3/2016     Constipation      Extrarenal rhabdoid neoplasm (H)      Febrile neutropenia (H) 4/17/2016     History of blood transfusion      Latent tuberculosis      Lung disease      On antineoplastic chemotherapy      Sarcoma of vulva (H)      Social History:  Olimpia is attending school with the goal to get her diploma, she is in 11th grade and reports her English is improving.    \  Family History   Problem Relation Age of Onset     Other Cancer No family hx of      ROS  See HPI. Complete ROS otherwise negative.    Physical Exam   Temp:  [97.5  F (36.4  C)] 97.5  F (36.4  C)  Pulse:  [111] 111  Resp:  [20] 20  BP: (108)/(77) 108/77  SpO2:  [100 %] 100 %  Wt Readings from Last 4 Encounters:   02/02/18 60.2 kg (132 lb 11.5 oz)   01/25/18 60.4 kg (133 lb 2.5 oz)   12/21/17 60.3 kg (132 lb 15 oz)   12/12/17 64.2 kg (141 lb 8.6 oz)     GENERAL: Alert, interactive.  Is visible discomfort.  Appears tired.  SKIN: dorsum of hands with nearly resolved peeling.  Improved peeling on feet.  HEAD: Normocephalic, atraumatic     EYES: Normal lids, conjunctivae/cornea normal. PERRL. EOMI.  EARS: Pinna normal b/l, no pain on palpation. External ears normal appearing.  TMs opaque  bilaterally  NOSE: Congested, no nasal drainage noted.  No facial pain.  MOUTH/THROAT: Oropharynx is clear. Tongue without sores. Normal dentition for age, no mucosal lesions.  NECK: Supple, full range of motion, no masses  LYMPH NODES: No cervical lymphadenopathy.  LUNGS: No increased WOB.  Lungs clear to auscultation throughout; diminished in LLL.  No crackles or wheezes.  HEART: HRR. S1 and S2 are normal. No murmurs, rubs, gallops. The radial pulses are 2+ bilaterally. Cap refill <3 sec in upper extremities.  ABDOMEN: Hyperactive bowel sounds. Soft, generalized tenderness, no rebound tenderness.  Mild distention.  No discrete mass palpated.   NEUROLOGIC: Grossly intact, no focal neurologic deficits.    :  Deferred today.    Labs:  Results for orders placed or performed in visit on 02/02/18 (from the past 24 hour(s))   CBC with platelets differential   Result Value Ref Range    WBC 5.5 4.0 - 11.0 10e9/L    RBC Count 3.30 (L) 3.8 - 5.2 10e12/L    Hemoglobin 11.8 11.7 - 15.7 g/dL    Hematocrit 34.9 (L) 35.0 - 47.0 %     (H) 78 - 100 fl    MCH 35.8 (H) 26.5 - 33.0 pg    MCHC 33.8 31.5 - 36.5 g/dL    RDW 12.7 10.0 - 15.0 %    Platelet Count 217 150 - 450 10e9/L    Diff Method Automated Method     % Neutrophils 52.6 %    % Lymphocytes 33.9 %    % Monocytes 12.5 %    % Eosinophils 0.4 %    % Basophils 0.2 %    % Immature Granulocytes 0.4 %    Nucleated RBCs 0 0 /100    Absolute Neutrophil 2.9 1.6 - 8.3 10e9/L    Absolute Lymphocytes 1.9 0.8 - 5.3 10e9/L    Absolute Monocytes 0.7 0.0 - 1.3 10e9/L    Absolute Eosinophils 0.0 0.0 - 0.7 10e9/L    Absolute Basophils 0.0 0.0 - 0.2 10e9/L    Abs Immature Granulocytes 0.0 0 - 0.4 10e9/L    Absolute Nucleated RBC 0.0        Impression:  Olimpia is a 23-year-old female with 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  She is currently not receiving any chemotherapy.  She has progressive abdominal pain,  exacerbated by eating.  Has not had dedicated imaging of her abdomen.  Most recent Chest CT showed tumor mass effect to the spleen and kidney.     Plan:  1) Morphine for pain control in clinic.  Labs including CBC, CMP and amylase/lipase.  2) Admit to the hospital for ongoing pain control and abdominal imaging.  If pain is tumor related she may benefit from palliative radiation.

## 2018-02-02 NOTE — CONSULTS
Patient with h/o metastatic synovial tumor currently admitted for pain control. IR consulted for drainage of metastatic collections for symptomatic relief. The team is not requesting any diagnostic samples of the fluid, only for therapeutic purposes.     Imaging and case reviewed with Dr. Blevins. Plan for patient to begin medication pain management on floor. Will check with team tomorrow to see where comfort level is of patient. At that time, will reassess potentially aspirating the left abdominal fluid collection. No pigtail drain would be left in a metastatic collection.     Plan discussed with Dr. Henriquez and she agrees. Phone [phone] page [phone]    Charmaine Espinoza PA-C  Interventional Radiology

## 2018-02-03 NOTE — PROGRESS NOTES
[facility], [place]  Peds Heme/Onc Progress Note  Date of Service (when I saw the patient): 02/03/2018    Assessment & Plan   Olimpia Childress is a 23 year old female with history of metastatic synovial sarcoma on pazopanib since May 2017 with progression of disease (extensive metastatic disease in chest, with large fluid density mass in L chest extending from L hemidiaphragm into LUQ with mass effect on spleen, L kidney, and stomach), admitted for pain control on 2/2. Initially considered IR aspiration of large fluid density mass in L chest and LUQ, however gastritis seems adequately controlled on PPI and flank pain controlled on PO pain regimen. Patient wishes to pursue medical management only at this time.    Main changes/plan for today:   --stop morphine PCA  --switch pantoprazole to oral BID   --continue PTA oxycodone PRN  --D/w IR, who would be willing to do aspiration however the patient would prefer not to at this time given pain has improved primarily with PPI  --switch to PO zofran PRN  --D/w patient, she is not interested in hospice/palliative home services at this time, wants to talk to primary Dr. Yanez first  --Pt apparently reports she was told [place] but per SW notes on 1/25 it hasn't been active and perhaps due to legal status concerns (she holds [employer], not citizen) --> wondering if PCA services would be covered by [facility] and SW on call rec'd talking to Hortencia on a weekday  --PT consult  --if doing well, d/c to home tomorrow      # Gastritis/bloating/nausea 2/2 mass effect from suspected metastatic fluid density mass in L chest/LUQ, well controlled   Likely 2/2 mass effect for metastatic mass as well as gastritis. Patient was not interested in aspiration of mass, seems to be controlled on PPI at this time.  --scheduled pantoprazole 40mg BID  --PRN zofran     # Flank pain 2/2 metastatic mass  --d/c morphine PCA overnight  --s/p IV dilaudid x 1  this AM  --continue PTA oxycodone 5-10m q4h PRN  --PRN tylenol and ibuprofen  --scheduled bowel regimen (miralax, senna daily)  --D/w patient, she is not interested in hospice/palliative home services at this time, wants to talk to primary Dr. Beau amrtin    # Metastatic synovial sarcoma on pazopanib since May 2017 with progression of disease (extensive metastatic disease in chest, with large fluid density mass in L chest extending from L hemidiaphragm into LUQ with mass effect on spleen, L kidney, and stomach)  --Hold PTA pazopanib  --Heme/Onc considering other palliative treatments at this time  --per above, wants to talk to primary heme/oncologist first to discuss hospice/palliative (referral can be made outpatient)    # Social concerns  # Lack of insurance  Pt previously with PCA services when she was on Medicaid. Apparently was [place] into [place] and patient thought that she was recently activated.   --Weekend SW asked that we speak to her primary THI Burnett on Monday, to see status of [place] and whether PCA services could be reinstated    # Deconditioning likely 2/2 metastatic cancer  --PT consulted  --Likely will need PCA services again based on her level of functioning PTA (coordinate with SW)    FEN/GI: Regular diet, IV-PO titrate  Prophylaxis: No DVT ppx, on home PPI  Access: PIV x 1  CODE: FULL  Disposition: Expected discharge tomorrow if on PO meds    This patient was seen and discussed with Dr. Lowry, who agrees with the assessment and plan of care.    Wendy Melgoza  Med-Peds 4, pager [phone]  Mitchell Lawrence    I saw and evaluated the patient and agree with the resident's assessment and plan.  Amalia Lowry MD, MPH, [facility]  Division of Pediatric Hematology/Oncology        ----------------------------------------------------------------------------------------------------------------------  Interval History   Overnight patient had  "respiratory depression, so continuous morphine rate was stopped and pt remained on PCA doses, however had emesis and pruritis, therefore didn't use any more of her morphine. This morning given IV dilaudid x 1 with good relief of \"ache\" in flank bilaterally. Her bloating, epigastric burning pain is resolved with the IV pantoprazole. She ate breakfast (fruit, drinks) without any nausea/vomiting or abdominal pain. She decided she does not want IR aspiration of the fluid collection. She actually wants to go home. Also, she reports hospice/palliative care team involvement hasn't been brought up to her before. She wants to discuss with her primary Dr. Beau martin. States lost her insurance a year ago, lost PCA services with that. In last few weeks she's been fatigued and having pain, cousin at home has been helping with showering, cooking, getting around home. She goes to [employer] 4x per week (9-12:30pm).    ROS: 4 point ROS neg unless otherwise reported above.    Physical Exam   /69  Pulse 96  Temp 98.2  F (36.8  C) (Oral)  Resp 14  Ht 1.6 m (5' 2.99\")  Wt 59.8 kg (131 lb 13.4 oz)  SpO2 100%  BMI 23.36 kg/m2    Constitutional: appears fatigued, but not toxic appearing  Respiratory: diminished in left base, clear on R  Cardiovascular: RRR normal S1 and S2, no mgr  GI: Soft, non distended, non tender, no CVA tenderness  Skin/Integumen: No rash  Neuro: alert, oriented, CN II-XII grossly intact      Medications     NaCl 20 mL/hr at 02/03/18 1004       polyethylene glycol  17 g Oral Daily     sodium chloride (PF)  3 mL Intracatheter Q8H     sennosides  8.6 mg Oral Daily     pantoprazole (PROTONIX) IV PEDS/NICU  40 mg Intravenous Daily with breakfast       Data     Recent Labs  Lab 02/02/18  1117   WBC 5.5   HGB 11.8   *         POTASSIUM 3.5   CHLORIDE 103   CO2 27   BUN 11   CR 0.56   ANIONGAP 9   JANA 9.4   GLC 88   ALBUMIN 3.4   PROTTOTAL 7.4   BILITOTAL 0.4   ALKPHOS 46   ALT 35   AST " 43   LIPASE 160       Recent Results (from the past 24 hour(s))   CT Abdomen Pelvis w Contrast    Narrative    EXAMINATION: CT ABDOMEN PELVIS W CONTRAST  2/2/2018 2:45 PM      CLINICAL HISTORY: pain;    COMPARISON: PET/CT 5/1/2017, chest CT 8/14/2017 and 1/25/2018    PROCEDURE COMMENTS: CT of the abdomen was performed with 98ml's isovue  300 intravenous and oral contrast. Coronal and sagittal reformatted  images were obtained.    FINDINGS:  There is a partially visualized right-sided pneumothorax. There has  been a further slight increase in metastatic disease seen at the lung  bases from 1/25/2018. Both solid and peripherally enhancing nodules  and masses at both lung bases have increased in size. There are  several heterogeneously enhancing masses at the posteromedial left  lung base, with mass effect on the left side of the heart. Masses are  seen to extend behind the chino and along the left chest wall into the  left upper quadrant of the abdomen. The largest mass with mass effect  on the heart just posterior to the left ventricle measures 5 cm in  maximal AP diameter.     Large masses extending along the left hemidiaphragm and left chest  extend into the left upper abdomen from the costochondral junction  anteriorly to the posterior aspect of the chest with a rind of  enhancing soft tissue and a central fluid component. In transverse  diameter at this measures up to 4.2 cm, previously 3.6 cm. The length  of the largest collection located on series 2, image 23 measures 11.8  cm, previously 9.9 cm. Tumor extends into the lateral left chest while  beyond the rib margin, as well as into the lateral abdominal wall  musculature. There is mass effect on the spleen, left kidney, stomach,  and there is elevation of the chino by tumor.    No focal lesions identified in the liver, spleen, kidneys, or  pancreas. No abnormally thickened or dilated loops of small bowel or  colon.    No aggressive appearing bone lesions.       Impression    IMPRESSION:  1. Extensive metastatic disease in the chest, with large peripherally  enhancing fluid density masses extending from the left chest along the  left hemidiaphragm into the left upper quadrant of the abdomen. These  have slightly increased from the recent chest CT of 1/25/2018.  2. Partially visualized right pneumothorax.    YUSRA GAMEZ MD

## 2018-02-03 NOTE — PROGRESS NOTES
02/02/18 1951   Vitals   Resp 10     MD notified of low RR, patient emesis, increased sedation, with 0/10 pain level. Resp monitor applied, continuous morphine rate discontinued. Will continue to monitor patient's sedation/respiratory status.

## 2018-02-03 NOTE — PLAN OF CARE
Problem: Patient Care Overview  Goal: Plan of Care/Patient Progress Review  Outcome: No Change  Low RR w/continuous morphine. VSS after continuous morphine d/иван. Pt also complained of itching and nausea, improved w/zofran and benadryl x1. Recommend to discontinue morphine d/t side effects if possible, pt took no PCA bumps overnight. Appeared to sleep well. NPO at 0100 for possible procedure today, pre-op scrub x1. No family at the bedside. Will continue to monitor and notify MD of changes.

## 2018-02-03 NOTE — PLAN OF CARE
Problem: Pain, Chronic (Adult)  Goal: Identify Related Risk Factors and Signs and Symptoms  Related risk factors and signs and symptoms are identified upon initiation of Human Response Clinical Practice Guideline (CPG).   Outcome: Improving  Afebrile, VSS.  Pain has been well controlled throughout the day.  Pt has had reports of abdominal pain and body aches, currently rates pain 2/10 for body ache but denies abdominal pain and reports relief of abdominal pain with use of Protonix and 1x dose of Dilaudid given at start of shift, no additional PRNs given.  Pt up to ambulate on the unit, walked 2 laps.  Fair PO intake, IV/PO titrate of 400 mls q 4 hrs.  MIVF infusing at 20 ml/hr.  Denies nausea.  Discussed POC with pt with all questions answered, will continue to monitor and notify MD of any changes.

## 2018-02-03 NOTE — PLAN OF CARE
"Problem: Patient Care Overview  Goal: Plan of Care/Patient Progress Review  Outcome: No Change     Pt arrived to the unit around noon. Up on arrival VSS. Afebrile.  pt denies pain state \" Only burning\" and declined   Intervention.  PIV placed for badimianl CT. Post  CT pt's pain got worst.   Morphine given  With some relief.   Pt started on  Morphine PCA and Protonix .  15 minutes after PCA  Started  pt  Emesis large amount \"  I feel good now\"  MD notified. Zofran dose order  pt refused.  Plan NPO for possible  procedure tomorrow. Pt eat lunch and tolerated well. Drinking lots of water and pt state after Protonix given her burning pain is much better.  Pt's cousin at bedside this aftenoon and left.  Continue plan of care and monitor      "

## 2018-02-04 NOTE — PLAN OF CARE
Problem: Patient Care Overview  Goal: Plan of Care/Patient Progress Review  Outcome: Improving  VSS, no loose stools overnight. Still need stool culture. Complained of generalized achyness but denied any pain meds at this time. Slept all night. Possible d/c later today.

## 2018-02-04 NOTE — PLAN OF CARE
Problem: Patient Care Overview  Goal: Plan of Care/Patient Progress Review  PT: PT orders received, per chart patient was likely to DC today, possibly tomorrow. Will hold PT evaluation and complete if LOS is anticipated to be longer.

## 2018-02-04 NOTE — PLAN OF CARE
Problem: Pain, Chronic (Adult)  Goal: Identify Related Risk Factors and Signs and Symptoms  Related risk factors and signs and symptoms are identified upon initiation of Human Response Clinical Practice Guideline (CPG).   Outcome: No Change  Afebrile, VSS.  Pt had complaint of left sided flank pain this morning, rated 8/10, PRN oxy x1 with decrease in pain.  Denies N/V, tolerating PO intake.  Port dressing intact with MIVF infusing at 100 ml/hr.  Adequate UOP, no stools.  Pt updated on POC, all questions answered.  Will continue to monitor and notify MD of any changes.  Possible d/c tomorrow pending adequate pain control.

## 2018-02-04 NOTE — PLAN OF CARE
Problem: Pain, Chronic (Adult)  Goal: Acceptable Pain Control/Comfort Level  Patient will demonstrate the desired outcomes by discharge/transition of care.   Outcome: Improving  Having loose stools. Complained of generalized achyness, received tylenol x1.  No other pain medication required.  Removed PIV.

## 2018-02-04 NOTE — DISCHARGE SUMMARY
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 She has loculated fluid collections extending along the L hemidiaphragm with mass effect on the diaphragm, kidney, and spleen. Today, [facility]pia [facility] with excruciating abdominal pain. In clinic, she received morphine x1. Her initial pain score was 7-8/10 and now it is 0/10 but she still feels the uncomfortable \"explosive feeling\" in her belly like \"the air isn't coming out.\" She was admitted to the hospital for pain management.    Hospital Course   Olimpia Childress was admitted on 2/2/2018.  The following problems were addressed during her hospitalization:    # Gastritis/bloating/nausea secondary to mass effect from suspected metastatic fluid density mass in L chest/LUQ  # Flank pain 2/2 metastatic mass  On admission the patient had a repeat CT abdomen/pelvis done showing extensive metastatic disease in chest, with large fluid density mass in L chest extending from L hemidiaphragm into LUQ with mass effect on spleen, L kidney, and stomach. This appeared to have slightly " increased from the a chest CT which had been done on 1/25/2018. She was started on IV pantoprazole and IV morphine PCA on admission. She did not tolerate the morphine PCA well (had respiratory depression, nausea, and pruritis and ultimately decided not to use her morphine PCA). IR was consulted to discuss the possibility of drainage of the collection to help to relieve the mass effect; it was felt that the best approach would be aspiration, as they suspected that this fluid would re-accumulate quickly and the risk of infection with leaving an indwelling drain in place would be high in her situation. The team discussed with Olimpia the option of aspiration of the fluid collection, however on HOD 2 her epigastric burning pain and bloating had essentially resolved with IV pantoprazole and she was feeling significantly better with exception of bilateral flank/back pain which was well controlled after just a one-time IV dilaudid dose and then oral pain medications (tylenol, ibuprofen); [facility] proceed with IR aspiration of the fluid collection. Her pain was well controlled on oral pain medications and she was tolerating a regular diet prior to discharge. The team did introduce the idea of home hospice/palliative care, but because this was a foreign concept to the patient she wished to discuss it further with her primary oncologist first. Plan on discharge:   --Continue pantoprazole 40mg BID   --Continue as needed tylenol, ibuprofen, and oxycodone   --Please be aware in future patient does not tolerate morphine PCA well (respiratory depression, nausea, pruritis)   --Will discuss home hospice/palliative with primary oncologist Dr. 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 Given the patient's known metastatic disease, pain, and significant deconditioning, the team discussed home hospice/palliative care with the patient. Since she is over 21 years of age, she would not be able to obtain services through PACCT team, but would need an adult hospice consultation. The patient wished to discuss this further with her primary oncologist however agreed with having [facility] visiting her this week and talk to her in depth regarding their services.  Plan on discharge:   --Oxycontin 10mg q12H   --Oxycodone 5-10mg q4h PRN   --Hold PTA pazopanib   --Heme/Onc considering other palliative treatments at this time   --Will discuss home hospice/palliative with primary oncologist Dr. Yanez     This patient was seen and discussed with Dr. Lowry, who agrees with the above.    Significant Results and Procedures   CT abdomen/pelvis (2/2/2018):  1. Extensive metastatic disease in the chest, with large peripherally  enhancing fluid density masses extending from the left chest along the  left hemidiaphragm into the left upper quadrant of the abdomen. These  have slightly increased from the recent chest CT of 1/25/2018.  2. Partially visualized right pneumothorax.    Immunization History   Immunization Status:  unknown, no records available     Pending Results   None    Primary Care Physician   Coretta Yanez MD  Home clinic: [facility]    Physical Exam   Vital Signs with Ranges  Temp:  [97.7  F (36.5  C)-98.2  F (36.8  C)] 97.9  F (36.6  C)  Heart Rate:  [] 89  Resp:  [14-18] 16  BP: ()/(63-72) 102/71  SpO2:  [97 %-100 %] 98 %  I/O last 3 completed shifts:  In: 3169.67 [P.O.:1800; I.V.:1369.67]  Out: 2380 [Urine:1025; Emesis/NG output:100; Other:1230; Stool:25]    Physical Exam  General: Talkative, engaged young lady  HEENT: NC/AT. Pupils equal and reactive with extra-ocular motions intact, no scleral icterus.  Conjunctivae clear. Nares clear. OP moist/pink without lesions, erythema or exudate.   Neck: Supple, no thyromegaly. Full ROM.  Lymph: No cervical adenopathy.  Resp: Good air entry. Normal WOB. CTAB.  Cardiac: RRR. No murmur. Peripheral pulses intact. Cap refill < 2 sec.  Abdomen: BS+. Soft, mildly tender on LUQ, ND. No hepatosplenomegaly  Neuro: Alert and oriented. Normal tone. Normal sensation. Normal gait.  Ext: Warm, well perfused. Moved all extremities equally. Symmetric. No edema.  Skin: No rashes, echymoses or other lesions.    Time Spent on this Encounter   I, Mao Shay, personally saw the patient today and spent less than or equal to 30 minutes discharging this patient.    Discharge Disposition   Discharged to home  Condition at discharge: Stable    Consultations This Hospital Stay   INTERVENTIONAL RADIOLOGY ADULT/PEDS IP CONSULT  PEDS PACCT (PAIN AND ADVANCED/COMPLEX CARE TEAM) IP CONSULT  PHYSICAL THERAPY PEDS IP CONSULT    Discharge Orders     Reason for your hospital stay   You were admitted because of pain and bloating related to your cancer. You improved with pain medications.     Follow Up and recommended labs and tests   Follow up with your oncologist as scheduled.     Activity   Your activity upon discharge: activity as tolerated     When to contact your care team   Call your oncologist with worsening pain, nausea/vomiting despite medications, shortness of breath, fever, or any other concerning symptoms.     Discharge Instructions   1. Continue taking the pantoprazole started here in the hospital.  2. Continue oxycodone as needed for pain.  3. Follow-up with your oncologist to discuss other treatments for your cancer, as well as home hospice/palliative services.  4. Follow-up with your  regarding your insurance and whether you would qualify again for PCA services.     Full Code     Diet   Follow this diet upon discharge: Orders Placed This Encounter     Peds Diet Age  9-18 yrs       Discharge Medications   Current Discharge Medication List      START taking these medications    Details   pantoprazole (PROTONIX) 40 MG EC tablet Take 1 tablet (40 mg) by mouth 2 times daily (before meals)  Qty: 60 tablet, Refills: 0    Associated Diagnoses: Abdominal pain, unspecified abdominal location         CONTINUE these medications which have CHANGED    Details   oxyCODONE IR (ROXICODONE) 5 MG tablet Take 1-2 tablets (5-10 mg) by mouth every 4 hours as needed for moderate to severe pain  Qty: 20 tablet, Refills: 0    Associated Diagnoses: Acute post-operative pain      ondansetron (ZOFRAN) 8 MG tablet Take 1 tablet (8 mg) by mouth every 6 hours as needed for nausea  Qty: 30 tablet, Refills: 0    Associated Diagnoses: Encounter for antineoplastic chemotherapy      diphenhydrAMINE (BENADRYL) 25 MG tablet Take 1-2 tablets (25-50 mg) by mouth every 6 hours as needed for other (Nausea or vomiting)  Qty: 60 tablet, Refills: 0    Associated Diagnoses: Chemotherapy induced nausea and vomiting      polyethylene glycol (MIRALAX/GLYCOLAX) Packet Take 17 g by mouth daily as needed for constipation  Qty: 7 packet, Refills: 1    Associated Diagnoses: Acute post-operative pain      senna-docusate (SENOKOT-S;PERICOLACE) 8.6-50 MG per tablet Take 2 tablets by mouth 2 times daily as needed for constipation  Qty: 30 tablet, Refills: 0    Associated Diagnoses: Acute post-operative pain         CONTINUE these medications which have NOT CHANGED    Details   acetaminophen (TYLENOL) 325 MG tablet Take 2 tablets (650 mg) by mouth every 4 hours as needed for mild pain  Qty: 100 tablet, Refills: 1    Associated Diagnoses: Sarcoma of vulva (H)      ibuprofen (ADVIL/MOTRIN) 600 MG tablet Take 1 tablet (600 mg) by mouth every 6 hours as needed for moderate pain  Qty: 60 tablet, Refills: 1    Associated Diagnoses: Sarcoma of vulva (H)      diltiazem 2% in PLO cream, FV COMPOUNDED, 2% GEL To anal opening three times daily.   Use a pea-sized amount.  Store at room temperature.  Qty: 60 g, Refills: 0    Associated Diagnoses: Anal fissure         STOP taking these medications       cabozantinib (COMETRIQ) 1 X 80 & 3 X 20 MG capsule CHEMOTHERAPY Comments:   Reason for Stopping:         Colloidal Oatmeal 1 % CREA Comments:   Reason for Stopping:         triamcinolone (KENALOG) 0.1 % ointment Comments:   Reason for Stopping:         lidocaine-prilocaine (EMLA) cream Comments:   Reason for Stopping:             Allergies   Allergies   Allergen Reactions     Heparin Flush Other (See Comments)     Due to Yazidi reasons     Pork Derived Products      Congregational reasons     Tegaderm Transparent Dressing (Informational Only) Other (See Comments) and Rash     PLEASE USE IV 3000 FOR DRESSING(S)     Data   Most Recent 3 CBC's:  Recent Labs   Lab Test  02/02/18   1117  01/25/18   1453  12/21/17   1433   WBC  5.5  4.7  4.5   HGB  11.8  12.6  12.2   MCV  106*  108*  108*   PLT  217  230  179      Most Recent 3 BMP's:  Recent Labs   Lab Test  02/02/18   1117  01/25/18   1453  12/13/17   0517   NA  139  139  144   POTASSIUM  3.5  3.5  3.3*   CHLORIDE  103  101  113*   CO2  27  30  24   BUN  11  11  8   CR  0.56  0.56  0.80   ANIONGAP  9  8  7   JANA  9.4  8.6  7.2*   GLC  88  76  112*     Most Recent 2 LFT's:  Recent Labs   Lab Test  02/02/18   1117  01/25/18   1453   AST  43  27   ALT  35  21   ALKPHOS  46  51   BILITOTAL  0.4  0.4     Most Recent INR's and Anticoagulation Dosing History:  Anticoagulation Dose History     Recent Dosing and Labs Latest Ref Rng & Units 2/22/2016 2/23/2016 6/2/2016 8/25/2016    INR 0.86 - 1.14 0.99 1.00 1.05 1.07        Most Recent 3 Troponin's:No lab results found.  Most Recent Cholesterol Panel:No lab results found.  Most Recent 6 Bacteria Isolates From Any Culture (See EPIC Reports for Culture Details):  Recent Labs   Lab Test  12/12/17   0220  12/11/17   2049  06/06/17   1347  03/08/17   1010  03/08/17   0952   03/08/17   0945   CULT  <10,000 colonies/mL  mixed urogenital anamika  Susceptibility testing not routinely done    No growth  10,000 to 50,000 colonies/mL mixed urogenital anamika Susceptibility testing not   routinely done    Culture negative for acid fast bacilli  Assayed at [email or web address] [address]    Culture negative after 4 weeks  No growth  Culture negative for acid fast bacilli  Assayed at [email or web address] [address]    Culture negative after 4 weeks  No growth  Culture negative for acid fast bacilli  Assayed at [email or web address] [address]    Culture negative after 4 weeks  No growth     Most Recent TSH, T4 and A1c Labs:No lab results found.  Results for orders placed or performed during the hospital encounter of 02/02/18   CT Abdomen Pelvis w Contrast    Narrative    EXAMINATION: CT ABDOMEN PELVIS W CONTRAST  2/2/2018 2:45 PM      CLINICAL HISTORY: pain;    COMPARISON: PET/CT 5/1/2017, chest CT 8/14/2017 and 1/25/2018    PROCEDURE COMMENTS: CT of the abdomen was performed with 98ml's isovue  300 intravenous and oral contrast. Coronal and sagittal reformatted  images were obtained.    FINDINGS:  There is a partially visualized right-sided pneumothorax. There has  been a further slight increase in metastatic disease seen at the lung  bases from 1/25/2018. Both solid and peripherally enhancing nodules  and masses at both lung bases have increased in size. There are  several heterogeneously enhancing masses at the posteromedial left  lung base, with mass effect on the left side of the heart. Masses are  seen to extend behind the chino and along the left chest wall into the  left upper quadrant of the abdomen. The largest mass with mass effect  on the heart just posterior to the left ventricle measures 5 cm in  maximal AP diameter.     Large masses extending along the left hemidiaphragm and left chest  extend into the left upper abdomen from the  costochondral junction  anteriorly to the posterior aspect of the chest with a rind of  enhancing soft tissue and a central fluid component. In transverse  diameter at this measures up to 4.2 cm, previously 3.6 cm. The length  of the largest collection located on series 2, image 23 measures 11.8  cm, previously 9.9 cm. Tumor extends into the lateral left chest while  beyond the rib margin, as well as into the lateral abdominal wall  musculature. There is mass effect on the spleen, left kidney, stomach,  and there is elevation of the chino by tumor.    No focal lesions identified in the liver, spleen, kidneys, or  pancreas. No abnormally thickened or dilated loops of small bowel or  colon.    No aggressive appearing bone lesions.      Impression    IMPRESSION:  1. Extensive metastatic disease in the chest, with large peripherally  enhancing fluid density masses extending from the left chest along the  left hemidiaphragm into the left upper quadrant of the abdomen. These  have slightly increased from the recent chest CT of 1/25/2018.  2. Partially visualized right pneumothorax.    YUSRA GAMEZ MD       I saw and evaluated this patient and agree with the resident's assessment and plan. Greater than 30 minutes were spent arranging the discharge and discussing the discharge plan and follow-up with the patient/family.  Amalia Lowry MD, MPH, MSE  Director, Cancer Survivor Program  Pediatric Hematology/Oncology  [facility]

## 2018-02-04 NOTE — PROGRESS NOTES
[facility], [place]  Peds Heme/Onc Progress Note  Date of Service (when I saw the patient): 02/04/2018    Assessment & Plan   Olimpia Childress is a 23 year old female with history of metastatic synovial sarcoma on pazopanib since May 2017 with progression of disease (extensive metastatic disease in chest, with large fluid density mass in L chest extending from L hemidiaphragm into LUQ with mass effect on spleen, L kidney, and stomach), admitted for pain control on 2/2. Initially considered IR aspiration of large fluid density mass in L chest and LUQ, however gastritis seems adequately controlled on PPI and flank pain controlled on PO pain regimen. Patient wishes to pursue medical management only at this time.    Main changes/plan for today:   --continue PTA oxycodone PRN  --D/w patient, she is not interested in hospice/palliative home services at this time, wants to talk to primary Dr. Beau martin  --Pt apparently reports she was told that [place] [employer] [place] but per SW notes on 1/25 it hasn't been active and perhaps due to legal status concerns (she holds [employer], not citizen) --> wondering if PCA services would be covered by [facility] and SW on call rec'd talking to Hortencia on a weekday  --initial plan was to d/c her home today; she suddenly started feeling worsening pain and decided to stay here for today; if doing well, d/c to home tomorrow    # Gastritis/bloating/nausea 2/2 mass effect from suspected metastatic fluid density mass in L chest/LUQ, well controlled   Likely 2/2 mass effect for metastatic mass as well as gastritis. Patient was not interested in aspiration of mass, seems to be controlled on PPI at this time.  --scheduled pantoprazole 40mg BID  --PRN zofran     # Flank pain 2/2 metastatic mass  --d/c morphine PCA overnight 02/03/2018  --s/p IV dilaudid x 1 02/03/2018 am  --continue PTA oxycodone 5-10m q4h PRN  --PRN tylenol and ibuprofen  --scheduled bowel regimen  (miralax, senna daily)  --per Dr. Martin from PACCT she does not qualify for home pain care with peds PACCT. Will need a referral to adults PACCT. But she can be seen with PACCT here in clinic. Dr. Martin has availability to see her on Thursday this week.  --D/w patient, she is not interested in hospice/palliative home services at this time, wants to talk to primary Dr. 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 Apparently was [place] into [employer] and patient thought that she was recently activated.   --Weekend SW asked that we speak to her primary SW Hortencia on Monday, to see status of [place] [employer] and whether PCA services could be reinstated    # Deconditioning likely 2/2 metastatic cancer  --PT consulted  --Likely will need PCA services again based on her level of functioning PTA (coordinate with SW)    FEN/GI: Regular diet, IV-PO titrate  Prophylaxis: No DVT ppx, on home PPI  Access: PIV x 1  CODE: FULL  Disposition: Expected discharge tomorrow if on PO meds    This patient was seen and discussed with Dr. Lowry, who agrees with the assessment and plan of care.    Mao Shay MD  [facility] Pediatrics Resident, PGY-1  pager [phone]    I saw and evaluated the patient and agree with the resident's assessment and plan.  Amalia Lowry MD, MPH, MSE  Director, Cancer Survivor Program  Pediatric Hematology/Oncology  [facility]  "Hospital    ----------------------------------------------------------------------------------------------------------------------  Interval History   She had been doing well since yesterday evening in terms of pain control. She did not require any oxycodone for pain control. She had a good night overall. In early am when she woke up she rated her pain as 4/10 (same area on Lower L side of her chest extended to LUQ). She stated that this is her baseline pain and she would feel comfortable going home. However around 11 am she stated that her pain has suddenly significantly worsened to 8/10 and wanted to stay in hospital for today. Her bloating, epigastric burning pain has been resolved with the IV pantoprazole. VSS. Also, she reported once again today that hospice/palliative care team involvement hasn't been brought up to her before. She wants to discuss with her primary Dr. Beau martin.     ROS: A complete and comprehensive review of systems was performed and was negative other than what is listed above in the HPI/Interval History.    Physical Exam   /73  Pulse 96  Temp 98.1  F (36.7  C) (Oral)  Resp 16  Ht 1.6 m (5' 2.99\")  Wt 60.7 kg (133 lb 14.4 oz)  SpO2 99%  BMI 23.73 kg/m2    Constitutional: appears fatigued, but not toxic appearing  Respiratory: diminished in left base, clear on R  Cardiovascular: RRR normal S1 and S2, no mgr  GI: Soft, non distended, non tender, no CVA tenderness  Skin/Integumen: No rash  Neuro: alert, oriented, CN II-XII grossly intact      Medications     NaCl 1,000 mL (02/04/18 0545)       pantoprazole  40 mg Oral BID AC       Data     Recent Labs  Lab 02/02/18  1117   WBC 5.5   HGB 11.8   *         POTASSIUM 3.5   CHLORIDE 103   CO2 27   BUN 11   CR 0.56   ANIONGAP 9   JANA 9.4   GLC 88   ALBUMIN 3.4   PROTTOTAL 7.4   BILITOTAL 0.4   ALKPHOS 46   ALT 35   AST 43   LIPASE 160       No results found for this or any previous visit (from the past 24 " hour(s)).

## 2018-02-05 NOTE — PROGRESS NOTES
"  [facility]  Pain and Advanced/Complex Care Team (PACCT)  Progress Note     Olimpia Childress MRN# [id number]   Age: 23 year old YOB: 1995   Date:  02/05/2018 Admitted:  2/2/2018     Recommendations, Patient/Family Counseling & Coordination:     SYMPTOM MANAGEMENT:   Pain: Start OxyContin 10 mg BID (long-acting oxycodone) with an additional 5-10 mg oxycodone (short-acting) Q4 hours PRN.   - Avoid morphine due to intolerable nausea and vomiting  - Might ultimately consider methadone depending on total opioid use and constipation  - Continue pantoprazole for reflux/gastritis pain    Constipation:   - Continue daily polyethylene glycol while on opioids  - Continue daily senna (2-4 tabs) while on opioids    - Follow up in PACCT clinic Thurs 2/8 (can see with Dr. Yanez, or after appointment with Dr. Yanez)  - Homecare/hospice referral for informational purposes and to explore whether she is eligible for homecare    GOALS OF CARE AND DECISIONAL SUPPORT/SUMMARY OF DISCUSSION WITH PATIENT AND/OR FAMILY: Discussion with Olimpia about pain management, and encouraged her to use oxycodone as needed for breakthrough pain. Explained the concept of \"long-acting oxycodone\" and \"shot-acting oxycodone\". She is excited to get out of the hospital. Asked about her understanding of her cancer and treatment of her cancer, using the  iPad, but we ended up conversing mostly in English. She stated that she understands her cancer is getting bigger, and is very surprised that in 2018 we don't have a medicine that can make it go away. We discussed the current plans, and her understanding is that there is another oral chemotherapy that she can take, but they are waiting for insurance approval. She wants to have whatever oral chemotherapy that is offered with the hope that it will help her. I brought up the concept of hospice, and asked if that had ever been discussed with her. She " said that it had only very briefly been discussed. I explained that hospice was specialized care at home for people with advanced illness, like advanced cancer. It is provided by a team of doctors, nurses, social workers, in the home, with the goal of managing bothersome symptoms, like pain. I explained that generally adults will choose to have hospice follow them OR to have chemotherapy, but that usually they were not done together, unfortunately. I explained that I thought home care would be a very good thing to help Olimpia feel as good as possible, and I recommended having the hospice team come out for an informational visit, and they could also assess whether she would be eligible for homecare outside of hospice. We decided that I would see her in clinic the same day as Dr. Yanez to discuss further and check in on pain management.     Thank you for the opportunity to participate in the care of this patient and family.   Please contact the Pain and Advanced/Complex Care Team (PACCT) with any emergent needs via text page to the PACCT general pager ([phone], answered 8-4:30 Monday to Friday). After hours and on weekends/holidays, please refer to [facility] or [place] on-call.    Attestation:  Total time on the floor involved in the patient's care: 60 minutes  Total time spent in counseling/care coordination: 45 minutes    Discussed with primary team.    Amita Martin MD  Pager: [phone] (please text page)    Assessment:      Diagnoses and symptoms: Olimpia Childress is a(n) 23 year old female with:  Patient Active Problem List   Diagnosis     Malignant tumor cells (H)     Abdominal pain     Ileus second to Vincristine     Constipation     Pneumothorax on left     Pneumothorax, left     Sarcoma of vulva (H)     Febrile neutropenia (H)     Anemia     Chemotherapy-induced neutropenia (H)     Rhabdoid tumor (H)     Synovial sarcoma (H)     Encounter for antineoplastic chemotherapy     Emesis, persistent     Vulvar  cancer (H)     Port-a-cath in place     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   Per Olimpia, her cousin is her healthcare proxy    Interval Events:     Pain adequately controlled per patient stated goal today and she wants to go home this morning. Nausea improved and BMs now normal/softer.    Pain assessment: 3-5  Side effects: Nausea (resolved now that not on morphine)     Medications:     I have reviewed this patient's medication profile and medications during this hospitalization.    Scheduled medications:     anticoagulant citrate  5 mL Intracatheter Q24H     anticoagulant citrate  5 mL Intracatheter Q28 Days     sodium chloride (PF)  10 mL Intracatheter Q28 Days     polyethylene glycol  17 g Oral Daily     sennosides  8.6 mg Oral Daily     pantoprazole  40 mg Oral BID AC     Infusions:     NaCl 10 mL/hr at 02/04/18 2213     PRN medications: lidocaine (buffered or not buffered), lidocaine 4%, sodium chloride (PF), anticoagulant citrate, acetaminophen, ibuprofen, oxyCODONE IR, ondansetron, lidocaine 4%, sodium chloride (PF), lidocaine (buffered or not buffered), naloxone    Review of Systems:     Palliative Symptom Review    The comprehensive review of systems is negative other than noted here and in the HPI. Completed by proxy by parent(s)/caretaker(s) (if applicable)    Physical Exam:       Vitals were reviewed  Temp:  [97.4  F (36.3  C)-98.3  F  (36.8  C)] 98.1  F (36.7  C)  Pulse:  [78-99] 78  Heart Rate:  [] 100  Resp:  [16-18] 16  BP: ()/(57-77) 86/57  SpO2:  [99 %-100 %] 99 %  Weight: 61 kg   Lying comfortably in bed. Appears sad and tired.   Respires comfortably on RA  Abdomen non-distended  Moves all extremities  Deferred detailed exam    Data Reviewed:     No results found for this or any previous visit (from the past 24 hour(s)).

## 2018-02-05 NOTE — PLAN OF CARE
AF, VSS. LSC. No nausea, eating well. Pain 3-4/10 in abdomen, adequate for discharge. Reviewed medications, discharge instructions, and upcoming appointments with Asma via . Verbalized understanding. Left unit at 1300 with family.

## 2018-02-05 NOTE — PLAN OF CARE
Problem: Patient Care Overview  Goal: Plan of Care/Patient Progress Review  AVSS. Oxy 5mg given x2 for left sided pain w/ relief. LS clear, BS audible. Good PO intake and output. IV fluids running 50mL/hr. Walked downstairs to get food/watch superbowl. Possible d/c tomorrow. Emotional support given. Will continue to monitor & update MD.

## 2018-02-05 NOTE — PLAN OF CARE
Problem: Patient Care Overview  Goal: Plan of Care/Patient Progress Review  Outcome: No Change  Afebrile. All VSS. Lungs clear, sating well on room air. No s/sx nausea or vomiting. Patient's left-sided pain managed well overnight with PRN Oxy given x2. Voiding well, no stool overnight. Hourly rounding completed, will continue with POC.

## 2018-02-05 NOTE — PROGRESS NOTES
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    SW met with Olimpia prior to her discharge from the hospital. She reported excited to be going home today and having her questions answered. She is hopeful to get her PET scan moved to Thursday to have all of her appointments in one day. Olimpia met with Dr. Martin to discuss hospice/palliative/home care resources. Olimpia is interested in pursuing oral chemotherapy if possible, however insurance is requesting a prior auth which is currently pending. Olimpia agreed to an informational meeting with in home hospice team to discuss the service at more length.     INTERVENTION:     Brief supportive check in.     ASSESSMENT:     Olimpia easily engaged and pleasant to meet with. She was in good spirits today and asked good questions. Olimpia's medical assistance insurance is now active. She may qualify for PCA hours again, SW will assist Olimpia in determining what she is/is not eligible for. Olimpia very conversational today and appeared to appreciate SW check in.     PLAN:     Social work will continue to assess needs and provide ongoing psychosocial support and access to resources.           BELKIS Salinas, [facility]  Pediatric Hem/Onc   Phone: [phone]  Pager: [phone]

## 2018-02-05 NOTE — PROGRESS NOTES
Care Coordinator- Discharge Planning     Admission Date/Time:  2/2/2018  Attending MD:  Hellen att. providers found     Data    Chart reviewed, discussed with interdisciplinary team.   Patient was admitted for:   1. Encounter for antineoplastic chemotherapy    2. 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 Acute post-operative pain    4. Chemotherapy induced nausea and vomiting    5. Abdominal pain, unspecified abdominal location    6. Other constipation    7. Malignant tumor cells (H)         Assessment  Received referral from MD team for homecare referral for hospice informational visit.     Coordination of Care and Referrals: Provided patient/family with options for Home Care. Patient agreeable to [facility]      Plan  Anticipated Discharge Date:  02/05/18    Anticipated Discharge Plan:  Home with homecare    CTS Handoff completed:  YES    Eveline Lomax RN

## 2018-02-05 NOTE — PHARMACY - DISCHARGE MEDICATION RECONCILIATION AND EDUCATION
Discharge medication review for this patient completed.  Pharmacist provided medication teaching for discharge with a focus on new medications/dose changes.  The discharge medication list was reviewed with Asma via  and the following points were discussed, as applicable: Name, description, purpose, dose/strength, strategies for giving medications to children, common side effects and when to call MD.    She was engaged during teaching and verbalized understanding.    Medication(s) left with family in patient room per RN request.    The following medications were discussed:  Current Discharge Medication List      START taking these medications    Details   oxyCODONE (OXYCONTIN) 10 MG 12 hr tablet Take 1 tablet (10 mg) by mouth every 12 hours  Qty: 60 tablet, Refills: 0    Associated Diagnoses: Abdominal pain, unspecified abdominal location      pantoprazole (PROTONIX) 40 MG EC tablet Take 1 tablet (40 mg) by mouth 2 times daily (before meals)  Qty: 60 tablet, Refills: 0    Associated Diagnoses: Abdominal pain, unspecified abdominal location         CONTINUE these medications which have CHANGED    Details   oxyCODONE IR (ROXICODONE) 5 MG tablet Take 1-2 tablets (5-10 mg) by mouth every 4 hours as needed for moderate to severe pain  Qty: 20 tablet, Refills: 0    Associated Diagnoses: Acute post-operative pain      polyethylene glycol (MIRALAX/GLYCOLAX) Packet Take 17 g by mouth daily as needed for constipation  Qty: 7 packet, Refills: 1    Associated Diagnoses: Acute post-operative pain      ondansetron (ZOFRAN) 8 MG tablet Take 1 tablet (8 mg) by mouth every 6 hours as needed for nausea  Qty: 30 tablet, Refills: 0    Associated Diagnoses: Encounter for antineoplastic chemotherapy      diphenhydrAMINE (BENADRYL) 25 MG tablet Take 1-2 tablets (25-50 mg) by mouth every 6 hours as needed for other (Nausea or vomiting)  Qty: 60 tablet, Refills: 0    Associated Diagnoses: Chemotherapy induced nausea and vomiting       senna-docusate (SENOKOT-S;PERICOLACE) 8.6-50 MG per tablet Take 2 tablets by mouth 2 times daily as needed for constipation  Qty: 30 tablet, Refills: 0    Associated Diagnoses: Acute post-operative pain         CONTINUE these medications which have NOT CHANGED    Details   acetaminophen (TYLENOL) 325 MG tablet Take 2 tablets (650 mg) by mouth every 4 hours as needed for mild pain  Qty: 100 tablet, Refills: 1    Associated Diagnoses: Sarcoma of vulva (H)      ibuprofen (ADVIL/MOTRIN) 600 MG tablet Take 1 tablet (600 mg) by mouth every 6 hours as needed for moderate pain  Qty: 60 tablet, Refills: 1    Associated Diagnoses: Sarcoma of vulva (H)      diltiazem 2% in PLO cream, FV COMPOUNDED, 2% GEL To anal opening three times daily.  Use a pea-sized amount.  Store at room temperature.  Qty: 60 g, Refills: 0    Associated Diagnoses: Anal fissure         STOP taking these medications       cabozantinib (COMETRIQ) 1 X 80 & 3 X 20 MG capsule CHEMOTHERAPY Comments:   Reason for Stopping:         Colloidal Oatmeal 1 % CREA Comments:   Reason for Stopping:         triamcinolone (KENALOG) 0.1 % ointment Comments:   Reason for Stopping:         lidocaine-prilocaine (EMLA) cream Comments:   Reason for Stopping:               I spent approximately 15 minutes in patient's room doing discharge medication teaching.

## 2018-02-05 NOTE — DISCHARGE INSTRUCTIONS
For temperature >100.4, increased nausea, vomiting, pain or any other concerns, please call [phone] & ask to talk to the Pediatric Oncology Fellow On Call.    Thursday, February 8  -  Nothing to eat or drink after 6 AM, may have water up until time of scan.  -  Arrive in Nuclear Med ([facility]) @ 12:15 PM, PET scan @ 12:45 PM.  - [facility] - Dr. Yanez & Dr. Martin (PACCT).  Will be called with date/time of appointment.

## 2018-02-08 NOTE — NURSING NOTE
"Chief Complaint   Patient presents with     RECHECK     Patient is here today for a follow up regarding Rhabdoid tumor (H)     /76 (BP Location: Right arm, Patient Position: Chair, Cuff Size: Adult Regular)  Pulse 118  Temp 97.8  F (36.6  C) (Oral)  Resp 20  Ht 1.603 m (5' 3.11\")  Wt 59 kg (130 lb 1.1 oz)  SpO2 97%  BMI 22.96 kg/m2    Yuli Rodgers, [facility]   February 8, 2018    "

## 2018-02-08 NOTE — LETTER
2/8/2018      RE: Olimpia Childress  [address]   [facility] [address]       Pediatric Hematology/Oncology Clinic Note    Oncology History- Olimpia initially presented with a right labial growth in 2013 when she was living in [place]. 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 She underwent resection of the mass and 1 cycle of Cytoxan and Doxorubicin. She discontinued further treatment b/c her physicians were unsure of the exact diagnosis and she felt uncomfortable proceeding. Olimpia subsequently immigrated to the  in August of 2015 and in October she first noticed a recurrence of the mass in the area of previous excision. She was in [place] at that time, seen by oncology and had a port placed but then moved to [place] where she was seen by Dr. Mckeon at [facility] in November. There she underwent an MRI that showed a solid and cystic subcutaneous mass in the right labia measuring 1.7 x 1.6 x 1cm with question of nodular extension vs a second nodule measuring 0.7 cm. There was no invasion into the vagina. On November 16th 2015, Olimpia had a biopsy of the mass by a gynecologist, Dr. Terry, at [facility]. 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 By immunohistochemistry, the cells showed a complete loss of SMARCb1. Wide spectrum cytokeratin, CD34, WT1, Desmin and CD99 were all negative. RT PCR for Wooten Sarcoma associated fusion transcripts were negative as well. Olimpia went on to have a PET/CT as well which showed multiple pulmonary lesions and biopsy confirmed a lesion to be metastatic disease. Olimpia was seen by Tomás White at [place] who reviewed the diagnosis and potential treatment plans with her, however she prefered to be treated here at [facility]. 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    Olimpia underwent left sided thoracotomy and resection of two pulmonary nodules on 7/20/16. Pathology revealed that one lesion was benign lymphoid tissue and the other was consistent with synovial sarcoma. This was confirmed with FISH with 91% of cell examined having a signal pattern indictivate of a rearrangement of the SS18 locus.  Olimpia has now completed 3 cycles of chemotherapy per [id number] and started her radiation on 9/13/16 and completed on 10/17/16.  She then went on to have a resection of her labial mass on 11/16/16 by Jannet Pastrana and Chikis with reconstruction, including skin flaps by Dr. Corona from plastics.  Pathology showed no residual tumor.  She had a f/u chest CT today on 12/5/16 that showed persistance of her pulmonary nodules as well as few additonal pulmonary nodules. She saw Dr. Pierre who was in agreement with surgical resection, however Olimpia wanted to wait until March to have more time to recover from her last surgery. She underwent left sided thoracotomy on 3/8/17.    By 5/17, Olimpia's follow up PET/CT demonstrated significant progression of her pulmonary metastatic disease, with an anterior mediastinal mass compression her right ventricle and potentially right outflow tract.  Subsequent echocardiogram did not show altered cardiac function, Dr Man felt radiation therapy was not in her best interest at this time but would consider if she developed cardiac dysfunction. Olimpia was started on oral Pazopanib May 2017 for palliative therapy, with doses held due to palmar plantar dysesthesia and stomatitis, and restarted 50% dosing on 6/19/17. Olimpia has imaging in August 2017 that showed a positive response to therapy. However January 2018 CT Chest, she was found to have progression.    She was admitted to the inpatient service 2/2/18 for excruciating abdominal pain and was found to have further progressive metastatic  disease extending now into her abdominal cavity on CT abdomen. Olimpia declined to have her largest met drained by IR and opted for non-surgical pain control. Her pain improved with protonix, oxycontin, and oxycodone for break through pain. She met with PACCT team during this admission and a home visit was set up.     HPI:  Since Olimpia has been home, she states she still experiences pain along the left side of her abdomen/chest where her largest met is. She states that the oxycontin and oxycodone certainly help with the pain but that they make her constipated. She continues to use miralax and senna and has a soft bowel movement today. Denies fevers or trouble breathing. Olimpia is interested in the results of her PET/CT Scan. She notes she is very fatigued at home and wonders if she could have access to a PCA. She is very interested in starting the cabozantinib and is asking when it will be approved. Olimpia states that [facility] did come to her house to explain services but told her that she would not qualify as she continues on chemotherapy. Olimpia is not interested in Hospice services at this time.    Allergies as of 02/08/2018 - [facility]on as Reviewed 02/08/2018   Allergen Reaction Noted     Heparin flush Other (See Comments) 01/27/2016     Pork derived products  02/09/2016     Tegaderm transparent dressing (informational only) Other (See Comments) and Rash 04/20/2016     Medications:  Miralax daily to BID  Oxycontin 10 mg BID  Oxycodone 5 mg q4h prn    Past Medical History:   Diagnosis Date     Anemia 6/3/2016     Constipation      Extrarenal rhabdoid neoplasm (H)      Febrile neutropenia (H) 4/17/2016     History of blood transfusion      Latent tuberculosis      Lung disease      On antineoplastic chemotherapy      Sarcoma of vulva (H)      Social History:  Olimpia is attending school with the goal to get her diploma, she is in 11th grade and reports her English is improving.      Family History   Problem Relation Age of Onset      Other Cancer No family hx of      ROS  See HPI. Complete ROS otherwise negative.    Physical Exam   Temp:  [97.8  F (36.6  C)] 97.8  F (36.6  C)  Pulse:  [118] 118  Resp:  [20] 20  BP: (112)/(76) 112/76  SpO2:  [97 %] 97 %  Wt Readings from Last 4 Encounters:   02/08/18 59 kg (130 lb 1.1 oz)   02/04/18 61.1 kg (134 lb 11.2 oz)   02/02/18 60.2 kg (132 lb 11.5 oz)   01/25/18 60.4 kg (133 lb 2.5 oz)     GENERAL: Sitting on examining table, notes some mild pain, talkative although tired appearing  HEENT: NC/AT. PERRL. EOMI. Thin face. External ears normal appearing. nares patent. Oropharynx is clear. Tongue without sores. Normal dentition for age, no mucosal lesions.  NECK: Supple, full range of motion, no masses. No cervical lymphadenopathy.  LUNGS: No increased WOB.  Lungs clear to auscultation throughout; diminished in LLL.  No crackles or wheezes.  HEART: HRR. S1 and S2 are normal. No murmurs, rubs, gallops. The radial pulses are 2+ bilaterally. Cap refill <3 sec in upper extremities.  ABDOMEN: Hyperactive bowel sounds. Soft, generalized tenderness, no rebound tenderness.  Mild distention.  No discrete mass palpated.   NEUROLOGIC: Grossly intact, no focal neurologic deficits.    :  Deferred today.    Labs:  Results for orders placed or performed during the hospital encounter of 02/08/18 (from the past 24 hour(s))   PET Oncology Whole Body    Narrative    Combined Report of:    PET and CT on  2/8/2018 3:34 PM :    1. PET of the neck, chest, abdomen, and pelvis.  2. PET CT Fusion for Attenuation Correction and Anatomical  Localization:    3. Diagnostic CT scan of the chest, abdomen, and pelvis with  intravenous contrast for interpretation.  3. CT of the chest, abdomen and pelvis obtained for diagnostic  interpretation.  4. 3D MIP and PET-CT fused images were processed on an independent  workstation and archived to PACS and reviewed by a radiologist.    Technique:    1. PET: The patient received 10.22 mCi of  F-18-FDG; the serum glucose  was 93 prior to administration, body weight was 61.1 kg. Images were  evaluated in the axial, sagittal, and coronal planes as well as the  rotational whole body MIP. Images were acquired from the Vertex to the  Feet.    UPTAKE WAS MEASURED AT 69 MINUTES.     2. CT: Volumetric acquisition for clinical interpretation of the  chest, abdomen, and pelvis acquired at 3 mm sections . The chest,  abdomen, and pelvis were evaluated at 5 mm sections in bone, soft  tissue, and lung windows.      The patient received 82 cc. Of Isovue 370 intravenously for the  examination.     3. 3D MIP and PET-CT fused images were processed on an independent  workstation and archived to PACS and reviewed by a radiologist.    INDICATION:  Metastatic synovial tumor first presented as a right  labial growth in 2013. Has received chemotherapy and radiation.  Resection of left thoracic tumor March 8, 2017. Most recently on  palliative Pazopanib, currently not receiving chemotherapy secondary  to insurance coverage issues.    COMPARISON: CT 2/2/2018, 1/25/2018, PET/CT 5/1/2017.    FINDINGS:   HEAD/NECK:  There is no  suspicious FDG uptake in the neck.     Mild paranasal sinus mucosal thickening. The mastoid air cells are  clear. The mucosal pharyngeal space, the , prevertebral and  carotid spaces are within normal limits. No masses, mass effect or  pathologically enlarged lymph nodes are evident. The thyroid gland is  within normal limits.    CHEST:  There is progression of multiple predominantly left-sided  pleural-based masses and pulmonary nodules/masses from PET CT  5/1/2017, and furthermore there is evidence of rapid disease  progression from CT 1/25/2018. For example:    -Hypermetabolic pleural-based mass along the medial left lower lobe  pleura (series 3 image 148) with an SUV max of 13.5, measures  approximately 6.8 x 4.8 cm, previously approximately 6.0 x 4.6 cm on  1/25/2018.    -Hypermetabolic left  apical mass (series 3 image 103) which has an SUV  max of 5.9, measures 3.3 x 2.3 cm, previously 2.7 x 1.8 cm on  1/25/2018    -Mass along the anterior right thoracic pleura (series 3 image 136)  which has an SUV max of 3.9, measures 1.7 x 1.4 cm, previously 1.3 x  1.0 cm on 1/25/2018.    -There is a 2.2 x 1.5 cm anterior mediastinal mass (series 3 image  117), which has an SUV max of 4.9 and previously measured  approximately 1.8 x 1.0 cm on 1/25/2018.    In addition there are multiple small pulmonary nodules within the  right lung, several of which are more conspicuous. On images 19  through 65 of series 6 there is very subtle irregularity of the  pleural surface, concerning for metastatic studding. Previously noted  pneumothorax has resolved.    Heart size is normal. Normal caliber of the aorta and pulmonary  artery. No central pulmonary embolus. Visualized thyroid is  unremarkable. Left IJ port tip within the right atrium. The central  airway is patent.    ABDOMEN AND PELVIS:  New FDG uptake the gallbladder fundus with an SUV max of 6.4. No  evidence of associated lesion on CT. FDG activity within the  gallbladder likely represents vicarious excretion. There is asymmetric  soft tissue stranding over the left flank. Much of the chest disease  appears to extend beyond the level of the diaphragm to the  extraperitoneal left upper quadrant, as seen on recent abdominal CT.    The liver, pancreas, spleen, adrenals and kidneys are within normal  limits. Bladder, uterus and ovaries are within normal limits. The  colon, appendix and small bowel are within normal limits. Major  abdominal vasculature is patent. Aorta is normal in caliber. No  lymphadenopathy in the abdomen or pelvis. Postoperative changes  involving the labia and surrounding soft tissues. Low-level probably  physiologic uptake in low vagina/labia without clear evidence of  disease recurrence.    LOWER EXTREMITIES:   No abnormal masses or hypermetabolic  lesions.    BONES:   There are no suspicious lytic or blastic osseous lesions.  There is no  abnormal FDG uptake in the skeleton. Bilateral L5 pars  interarticularis defects without spondylolisthesis.      Impression    IMPRESSION:   In this patient with metastatic synovial tumor:  1. Evidence of rapid disease progression since 1/25/2018, with  increased size of several hypermetabolic pleural-based masses,  pulmonary nodules and a mediastinal lymph node. As noted on the prior  exam there is extension of disease beyond the left hemithorax and into  the extraperitoneal left upper quadrant.  2. Resolution of previously noted right-sided pneumothorax.    I have personally reviewed the examination and initial interpretation  and I agree with the findings.    SHONNA BRYANT MD     Impression:  Olimpia is a 23-year-old female with recurrent and progressive metastatic synovial sarcoma. She is here for hospital follow-up. She was recently admitted for worsening chest/abdominal pain on 2/2/18 was found to have further progressive metastatic disease extending now into her abdominal cavity on CT abdomen. Olimpia's pain is well controlled with oxycontin and oxycodone. She was evaluated by PACCT while in patient but is interested in starting the new oral chemotherapy cabozantinib and not in hospice. Today she expresses that she if very fatigued but feels overall her pain is well controlled. She would like information on having a personal care assistant in her home.     Plan:  1. PET/CT today shows stable disease as compared to abdominal CT obtained while inpatient  2. Continue oxycontin 10 mg BID and oxycodone prn  3. Will reach out to [facility] Burnett to see if Olimpia can get a patient care assistant at home  4. Olimpia will RTC as soon as approval for cabozantinib comes in to begin therapy  5. Otherwise return in one week    Patient seen and evaluated with Pediatric Hematology/Oncology Attending Dr. Beau Henriquez MD  Pediatric  Hematology/Oncology & BMT Fellow      Physician Attestation   I, Coretta Yanez MD, saw this patient with the resident and agree with the resident s findings and plan of care as documented in the resident s note.      I personally reviewed vital signs, medications, labs and imaging.    Coretta Yanez MD  Date of Service (when I saw the patient): 2/8/18

## 2018-02-08 NOTE — TELEPHONE ENCOUNTER
THI received call from [facility] at 1142a requesting assistance with transportation to her PET scan and appointment today, as her cousin has an exam at school and will meet her at the hospital later to bring her home. THI agreed and scheduled a cab to get her at 12p [facility]. THI consulted with inpatient team who stated she could take her medications with water. Social work will continue to assess needs and provide ongoing psychosocial support and access to resources.       BELKIS Salinas, [facility]  Pediatric Hem/Onc   Phone: [phone]  Pager: [phone]

## 2018-02-08 NOTE — MR AVS SNAPSHOT
After Visit Summary   2018    Olimpia Childress    MRN: [id number]           Patient Information     Date Of Birth          1995        Visit Information        Provider Department      2018 3:15 PM Yue Nichols Asmaa, MD Peds Hematology Oncology        Today's Diagnoses     Rhabdoid tumor (H)              [facility], 9th floor  [address] [place] [address]  Phone: [phone]  Clinic Hours:   Monday-Friday:   7 am to 5:00 pm   closed weekends and major  holidays     If your fever is 100.5  or greater,   call the clinic during business hours.   After hours call [phone] and ask for the pediatric hematology / oncology physician to be paged for you.               Follow-ups after your visit        Who to contact     Please call your clinic at [phone] to:    Ask questions about your health    Make or cancel appointments    Discuss your medicines    Learn about your test results    Speak to your doctor            Additional Information About Your Visit        [employer] Information     [employer] is an electronic gateway that provides easy, online access to your medical records. With [employer], you can request a clinic appointment, read your test results, renew a prescription or communicate with your care team.     To sign up for [employer] visit the website at [email or web address]   You will be asked to enter the access code listed below, as well as some personal information. Please follow the directions to create your username and password.     Your access code is: [email or web address]  Expires: 3/22/2018 11:14 AM     Your access code will  in 90 days. If you need help or a new code, please contact your [facility] or call [phone] for assistance.        Care EveryWhere ID     This is your Care EveryWhere ID. This could be used by other organizations to access your [facility]  "records  [id number]        Your Vitals Were     Pulse Temperature Respirations Height Pulse Oximetry BMI (Body Mass Index)    118 97.8  F (36.6  C) (Oral) 20 1.603 m (5' 3.11\") 97% 22.96 kg/m2       Blood Pressure from Last 3 Encounters:   02/08/18 112/76   02/05/18 (!) 86/57   02/02/18 108/77    Weight from Last 3 Encounters:   02/08/18 59 kg (130 lb 1.1 oz)   02/04/18 61.1 kg (134 lb 11.2 oz)   02/02/18 60.2 kg (132 lb 11.5 oz)              Today, you had the following     No orders found for display       Primary Care Provider Office Phone # Fax #    Coretta Yanez [phone] [address] [facility] [address]        Equal Access to Services     [facility] Hospital[facility]ICIA : Hadii leonid mccollumo Sodolores, waaxda luqadaha, qaybta kaalmada adebetsyyalaney, wilner blue . So [facility] [phone].    ATENCIÓN: Si habla español, tiene a ordoñez disposición servicios gratuitos de asistencia lingüística. Darius al [phone].    We comply with applicable federal civil rights laws and [place] laws. We do not discriminate on the basis of race, color, national origin, age, disability, sex, sexual orientation, or gender identity.            Thank you!     [facility] you [facility]  [facility] your care. Our goal is always to provide you with excellent care. Hearing back from our patients is one way we can continue to improve our services. Please take a few minutes to complete the written survey that you may receive in the mail after your visit with us. Thank you!             Your Updated Medication List - Protect others around you: Learn how to safely use, store and throw away your medicines at www.disposemymeds.org.          This list is accurate as of 2/8/18 11:59 PM.  Always use your most recent med list.                   Brand Name Dispense Instructions for use Diagnosis    acetaminophen 325 MG tablet    TYLENOL    100 tablet    Take 2 tablets (650 mg) by mouth " every 4 hours as needed for mild pain    Sarcoma of vulva (H)       diltiazem 2% in PLO cream (FV COMPOUNDED) 2% Gel     60 g    To anal opening three times daily.  Use a pea-sized amount.  Store at room temperature.    Anal fissure       diphenhydrAMINE 25 MG tablet    BENADRYL    60 tablet    Take 1-2 tablets (25-50 mg) by mouth every 6 hours as needed for other (Nausea or vomiting)    Chemotherapy induced nausea and vomiting       ibuprofen 600 MG tablet    ADVIL/MOTRIN    60 tablet    Take 1 tablet (600 mg) by mouth every 6 hours as needed for moderate pain    Sarcoma of vulva (H)       ondansetron 8 MG tablet    ZOFRAN    30 tablet    Take 1 tablet (8 mg) by mouth every 6 hours as needed for nausea    Encounter for antineoplastic chemotherapy       * oxyCODONE 10 MG 12 hr tablet    OxyCONTIN    60 tablet    Take 1 tablet (10 mg) by mouth every 12 hours    Abdominal pain, unspecified abdominal location       * oxyCODONE IR 5 MG tablet    ROXICODONE    20 tablet    Take 1-2 tablets (5-10 mg) by mouth every 4 hours as needed for moderate to severe pain    Acute post-operative pain       pantoprazole 40 MG EC tablet    PROTONIX    60 tablet    Take 1 tablet (40 mg) by mouth 2 times daily (before meals)    Abdominal pain, unspecified abdominal location       polyethylene glycol Packet    MIRALAX/GLYCOLAX    7 packet    Take 17 g by mouth daily as needed for constipation    Acute post-operative pain       senna-docusate 8.6-50 MG per tablet    SENOKOT-S;PERICOLACE    30 tablet    Take 2 tablets by mouth 2 times daily as needed for constipation    Acute post-operative pain       * Notice:  This list has 2 medication(s) that are the same as other medications prescribed for you. Read the directions carefully, and ask your doctor or other care provider to review them with you.

## 2018-02-09 NOTE — PROGRESS NOTES
Pediatric Hematology/Oncology Clinic Note    Oncology History- Olimpia initially presented with a right labial growth in 2013 when she was living in [place]. 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 She underwent resection of the mass and 1 cycle of Cytoxan and Doxorubicin. She discontinued further treatment b/c her physicians were unsure of the exact diagnosis and she felt uncomfortable proceeding. Olimpia subsequently immigrated to the  in August of 2015 and in October she first noticed a recurrence of the mass in the area of previous excision. She was in [place] at that time, seen by oncology and had a port placed but then moved to [place] where she was seen by Dr. Mckeon at [facility] in November. There she underwent an MRI that showed a solid and cystic subcutaneous mass in the right labia measuring 1.7 x 1.6 x 1cm with question of nodular extension vs a second nodule measuring 0.7 cm. There was no invasion into the vagina. On November 16th 2015, Olimpia had a biopsy of the mass by a gynecologist, Dr. Terry, at [facility]. 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 By immunohistochemistry, the cells showed a complete loss of SMARCb1. Wide spectrum cytokeratin, CD34, WT1, Desmin and CD99 were all negative. RT PCR for Wooten Sarcoma associated fusion transcripts were negative as well. Olimpia went on to have a PET/CT as well which showed multiple pulmonary lesions and biopsy confirmed a lesion to be metastatic disease. Olimpia was seen by Tomás White at [facility] who reviewed the diagnosis and potential treatment plans with her, however she prefered to be treated here at [facility]. 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    Olimpia  underwent left sided thoracotomy and resection of two pulmonary nodules on 7/20/16. Pathology revealed that one lesion was benign lymphoid tissue and the other was consistent with synovial sarcoma. This was confirmed with FISH with 91% of cell examined having a signal pattern indictivate of a rearrangement of the SS18 locus.  Olimpia has now completed 3 cycles of chemotherapy per [id number] and started her radiation on 9/13/16 and completed on 10/17/16.  She then went on to have a resection of her labial mass on 11/16/16 by Jannet Pastrana and [facility] with reconstruction, including skin flaps by Dr. Corona from plastics.  Pathology showed no residual tumor.  She had a f/u chest CT today on 12/5/16 that showed persistance of her pulmonary nodules as well as few additonal pulmonary nodules. She saw Dr. Pierre who was in agreement with surgical resection, however Olimpia wanted to wait until March to have more time to recover from her last surgery. She underwent left sided thoracotomy on 3/8/17.    By 5/17, Olimpia's follow up PET/CT demonstrated significant progression of her pulmonary metastatic disease, with an anterior mediastinal mass compression her right ventricle and potentially right outflow tract.  Subsequent echocardiogram did not show altered cardiac function, Dr Man felt radiation therapy was not in her best interest at this time but would consider if she developed cardiac dysfunction. Olimpia was started on oral Pazopanib May 2017 for palliative therapy, with doses held due to palmar plantar dysesthesia and stomatitis, and restarted 50% dosing on 6/19/17. Olimpia has imaging in August 2017 that showed a positive response to therapy. However January 2018 CT Chest, she was found to have progression.    She was admitted to the inpatient service 2/2/18 for excruciating abdominal pain and was found to have further progressive metastatic disease extending now into her abdominal cavity on CT abdomen. Olimpia declined to have  her largest met drained by IR and opted for non-surgical pain control. Her pain improved with protonix, oxycontin, and oxycodone for break through pain. She met with PACCT team during this admission and a home visit was set up.     HPI:  Since Olimpia has been home, she states she still experiences pain along the left side of her abdomen/chest where her largest met is. She states that the oxycontin and oxycodone certainly help with the pain but that they make her constipated. She continues to use miralax and senna and has a soft bowel movement today. Denies fevers or trouble breathing. Olimpia is interested in the results of her PET/CT Scan. She notes she is very fatigued at home and wonders if she could have access to a PCA. She is very interested in starting the cabozantinib and is asking when it will be approved. Olimpia states that [facility] did come to her house to explain services but told her that she would not qualify as she continues on chemotherapy. Olimpia is not interested in Hospice services at this time.    Allergies as of 02/08/2018 - [facility] as Reviewed 02/08/2018   Allergen Reaction Noted     Heparin flush Other (See Comments) 01/27/2016     Pork derived products  02/09/2016     Tegaderm transparent dressing (informational only) Other (See Comments) and Rash 04/20/2016     Medications:  Miralax daily to BID  Oxycontin 10 mg BID  Oxycodone 5 mg q4h prn    Past Medical History:   Diagnosis Date     Anemia 6/3/2016     Constipation      Extrarenal rhabdoid neoplasm (H)      Febrile neutropenia (H) 4/17/2016     History of blood transfusion      Latent tuberculosis      Lung disease      On antineoplastic chemotherapy      Sarcoma of vulva (H)      Social History:  Olimpia is attending school with the goal to get her diploma, she is in 11th grade and reports her English is improving.      Family History   Problem Relation Age of Onset     Other Cancer No family hx of      ROS  See HPI. Complete ROS otherwise  negative.    Physical Exam   Temp:  [97.8  F (36.6  C)] 97.8  F (36.6  C)  Pulse:  [118] 118  Resp:  [20] 20  BP: (112)/(76) 112/76  SpO2:  [97 %] 97 %  Wt Readings from Last 4 Encounters:   02/08/18 59 kg (130 lb 1.1 oz)   02/04/18 61.1 kg (134 lb 11.2 oz)   02/02/18 60.2 kg (132 lb 11.5 oz)   01/25/18 60.4 kg (133 lb 2.5 oz)     GENERAL: Sitting on examining table, notes some mild pain, talkative although tired appearing  HEENT: NC/AT. PERRL. EOMI. Thin face. External ears normal appearing. nares patent. Oropharynx is clear. Tongue without sores. Normal dentition for age, no mucosal lesions.  NECK: Supple, full range of motion, no masses. No cervical lymphadenopathy.  LUNGS: No increased WOB.  Lungs clear to auscultation throughout; diminished in LLL.  No crackles or wheezes.  HEART: HRR. S1 and S2 are normal. No murmurs, rubs, gallops. The radial pulses are 2+ bilaterally. Cap refill <3 sec in upper extremities.  ABDOMEN: Hyperactive bowel sounds. Soft, generalized tenderness, no rebound tenderness.  Mild distention.  No discrete mass palpated.   NEUROLOGIC: Grossly intact, no focal neurologic deficits.    :  Deferred today.    Labs:  Results for orders placed or performed during the hospital encounter of 02/08/18 (from the past 24 hour(s))   PET Oncology Whole Body    Narrative    Combined Report of:    PET and CT on  2/8/2018 3:34 PM :    1. PET of the neck, chest, abdomen, and pelvis.  2. PET CT Fusion for Attenuation Correction and Anatomical  Localization:    3. Diagnostic CT scan of the chest, abdomen, and pelvis with  intravenous contrast for interpretation.  3. CT of the chest, abdomen and pelvis obtained for diagnostic  interpretation.  4. 3D MIP and PET-CT fused images were processed on an independent  workstation and archived to PACS and reviewed by a radiologist.    Technique:    1. PET: The patient received 10.22 mCi of F-18-FDG; the serum glucose  was 93 prior to administration, body weight was  61.1 kg. Images were  evaluated in the axial, sagittal, and coronal planes as well as the  rotational whole body MIP. Images were acquired from the Vertex to the  Feet.    UPTAKE WAS MEASURED AT 69 MINUTES.     2. CT: Volumetric acquisition for clinical interpretation of the  chest, abdomen, and pelvis acquired at 3 mm sections . The chest,  abdomen, and pelvis were evaluated at 5 mm sections in bone, soft  tissue, and lung windows.      The patient received 82 cc. Of Isovue 370 intravenously for the  examination.     3. 3D MIP and PET-CT fused images were processed on an independent  workstation and archived to PACS and reviewed by a radiologist.    INDICATION:  Metastatic synovial tumor first presented as a right  labial growth in 2013. Has received chemotherapy and radiation.  Resection of left thoracic tumor March 8, 2017. Most recently on  palliative Pazopanib, currently not receiving chemotherapy secondary  to insurance coverage issues.    COMPARISON: CT 2/2/2018, 1/25/2018, PET/CT 5/1/2017.    FINDINGS:   HEAD/NECK:  There is no  suspicious FDG uptake in the neck.     Mild paranasal sinus mucosal thickening. The mastoid air cells are  clear. The mucosal pharyngeal space, the , prevertebral and  carotid spaces are within normal limits. No masses, mass effect or  pathologically enlarged lymph nodes are evident. The thyroid gland is  within normal limits.    CHEST:  There is progression of multiple predominantly left-sided  pleural-based masses and pulmonary nodules/masses from PET CT  5/1/2017, and furthermore there is evidence of rapid disease  progression from CT 1/25/2018. For example:    -Hypermetabolic pleural-based mass along the medial left lower lobe  pleura (series 3 image 148) with an SUV max of 13.5, measures  approximately 6.8 x 4.8 cm, previously approximately 6.0 x 4.6 cm on  1/25/2018.    -Hypermetabolic left apical mass (series 3 image 103) which has an SUV  max of 5.9, measures 3.3 x  2.3 cm, previously 2.7 x 1.8 cm on  1/25/2018    -Mass along the anterior right thoracic pleura (series 3 image 136)  which has an SUV max of 3.9, measures 1.7 x 1.4 cm, previously 1.3 x  1.0 cm on 1/25/2018.    -There is a 2.2 x 1.5 cm anterior mediastinal mass (series 3 image  117), which has an SUV max of 4.9 and previously measured  approximately 1.8 x 1.0 cm on 1/25/2018.    In addition there are multiple small pulmonary nodules within the  right lung, several of which are more conspicuous. On images 19  through 65 of series 6 there is very subtle irregularity of the  pleural surface, concerning for metastatic studding. Previously noted  pneumothorax has resolved.    Heart size is normal. Normal caliber of the aorta and pulmonary  artery. No central pulmonary embolus. Visualized thyroid is  unremarkable. Left IJ port tip within the right atrium. The central  airway is patent.    ABDOMEN AND PELVIS:  New FDG uptake the gallbladder fundus with an SUV max of 6.4. No  evidence of associated lesion on CT. FDG activity within the  gallbladder likely represents vicarious excretion. There is asymmetric  soft tissue stranding over the left flank. Much of the chest disease  appears to extend beyond the level of the diaphragm to the  extraperitoneal left upper quadrant, as seen on recent abdominal CT.    The liver, pancreas, spleen, adrenals and kidneys are within normal  limits. Bladder, uterus and ovaries are within normal limits. The  colon, appendix and small bowel are within normal limits. Major  abdominal vasculature is patent. Aorta is normal in caliber. No  lymphadenopathy in the abdomen or pelvis. Postoperative changes  involving the labia and surrounding soft tissues. Low-level probably  physiologic uptake in low vagina/labia without clear evidence of  disease recurrence.    LOWER EXTREMITIES:   No abnormal masses or hypermetabolic lesions.    BONES:   There are no suspicious lytic or blastic osseous lesions.   There is no  abnormal FDG uptake in the skeleton. Bilateral L5 pars  interarticularis defects without spondylolisthesis.      Impression    IMPRESSION:   In this patient with metastatic synovial tumor:  1. Evidence of rapid disease progression since 1/25/2018, with  increased size of several hypermetabolic pleural-based masses,  pulmonary nodules and a mediastinal lymph node. As noted on the prior  exam there is extension of disease beyond the left hemithorax and into  the extraperitoneal left upper quadrant.  2. Resolution of previously noted right-sided pneumothorax.    I have personally reviewed the examination and initial interpretation  and I agree with the findings.    SHONNA BRYANT MD     Impression:  Olimpia is a 23-year-old female with recurrent and progressive metastatic synovial sarcoma. She is here for hospital follow-up. She was recently admitted for worsening chest/abdominal pain on 2/2/18 was found to have further progressive metastatic disease extending now into her abdominal cavity on CT abdomen. Olimpia's pain is well controlled with oxycontin and oxycodone. She was evaluated by PACCT while in patient but is interested in starting the new oral chemotherapy cabozantinib and not in hospice. Today she expresses that she if very fatigued but feels overall her pain is well controlled. She would like information on having a personal care assistant in her home.     Plan:  1. PET/CT today shows stable disease as compared to abdominal CT obtained while inpatient  2. Continue oxycontin 10 mg BID and oxycodone prn  3. Will reach out to [facility] to see if Olimpia can get a patient care assistant at home  4. Olimpia will RTC as soon as approval for cabozantinib comes in to begin therapy  5. Otherwise return in one week    Patient seen and evaluated with Pediatric Hematology/Oncology Attending Dr. Beau Henriquez MD  Pediatric Hematology/Oncology & BMT Fellow      Physician Attestation   I, Coretta Chambers  MD Beau, saw this patient with the resident and agree with the resident s findings and plan of care as documented in the resident s note.      I personally reviewed vital signs, medications, labs and imaging.    Coretta Yanez MD  Date of Service (when I saw the patient): 2/8/18

## 2018-02-12 NOTE — TELEPHONE ENCOUNTER
THI contacted [facility] front door (Juan: [phone]) to request a public health nurse assessment for PCA hours. Juan confirmed that Asma would likely be eligible, though is required to speak with Asma herself. THI left Olimpia a VM with instructions to contact [facility], ask for Juan, and schedule an assessment. Social work will continue to assess needs and provide ongoing psychosocial support and access to resources.       BELKIS Salinas, [facility]  Pediatric Hem/Onc   Phone: [phone]  Pager: [phone]

## 2018-02-14 PROBLEM — C76.1: Status: ACTIVE | Noted: 2018-01-01

## 2018-02-14 NOTE — IP AVS SNAPSHOT
MRN:[id number]                      After Visit Summary   2/14/2018    Olimpia Childress    MRN: [id number]           Thank you!     Thank you for choosing [facility] for your care. Our goal is always to provide you with excellent care. Hearing back from our patients is one way we can continue to improve our services. Please take a few minutes to complete the written survey that you may receive in the mail after you visit with us. Thank you!        Patient Information     Date Of Birth          1995        Designated Caregiver       Most Recent Value    Caregiver    Will someone help with your care after discharge? yes    Name of designated caregiver Irene Childress    Phone number of caregiver [phone]    Caregiver address [address]      About your hospital stay     You were admitted on:  February 14, 2018 You last received care in the:  [facility]    You were discharged on:  February 18, 2018        Reason for your hospital stay       Pain management with IV medications.                  Who to Call     For medical emergencies, please call 911.  For non-urgent questions about your medical care, please call your primary care provider or clinic, [phone]          Attending Provider     Provider Specialty    Abran Collado MD Emergency Medicine    Beau, Coretta Chambers MD Pediatric Hematology/Oncology       Primary Care Provider Office Phone # Fax #    Coretta Yanez [phone]       When to contact your care team       Increased pain despite taking prescribed oxycontin and dilaudid, unable to tolerate fluids due to nausea, new fevers, any additional medical concerns.                  After Care Instructions     Activity       Your activity upon discharge: activity as tolerated            Diet       Follow this diet upon discharge: regular diet                  Follow-up  Appointments     Follow Up and recommended labs and tests       Follow-up with radiation as scheduled tomorrow. Next Heme/Onc visit with Omar on Tuesday 2/20 at 2:15pm.                  Your next 10 appointments already scheduled     Feb 19, 2018  8:15 AM CST   EXTERNAL RADIATION TREATMENT with [facility]TA   [facility] ([facility])    [address] [address]   [phone]            Feb 20, 2018  8:00 AM CST   EXTERNAL RADIATION TREATMENT with [facility] ON[facility] ELEKTA   [facility] ([facility])    [address] [address]   [phone]            Feb 20, 2018  2:15 PM CST   Return Visit with JULIO C Pittman CNP   Peds Hematology Oncology ([facility])    [facility]  9th Floor  [address] [place] [address]   [phone]            Feb 21, 2018  8:00 AM CST   EXTERNAL RADIATION TREATMENT with [facility] ON[facility] ELEKTA   [facility] ([facility])    [address] [address]   [phone]            Feb 22, 2018  8:45 AM CST   EXTERNAL RADIATION TREATMENT with [facility] ON[facility] ELEKTA   [facility] ([facility])    [address] [address]   [phone]            Feb 23, 2018  8:45 AM CST   EXTERNAL RADIATION TREATMENT with [facility] ON[facility] ELEKTA   [facility] ([facility])    [address] [address]   [phone]            Feb 26, 2018  8:00 AM CST   EXTERNAL RADIATION TREATMENT with [facility] ONC ELEKTA   [facility] ([facility])    [address] [address]   [phone]            Feb 27, 2018  8:00 AM CST   EXTERNAL RADIATION TREATMENT with [facility] ONC ELEKTA   [facility] ([facility])    [address] [address] [phone]              Additional Services     Home care nursing referral       Your provider has referred you to: [facility]: [facility] [phone]   [email or web address]    Patient Anticipated Discharge Date: 2/17/18   RN, PT, HHA to begin 24 - 48 hours after discharge.  PLEASE EVALUATE AND TREAT    REASON FOR REFERRAL: Assessment & Treatment: PT and Fall Risk Assessment: Weakness    Documentation of Face to Face and Certification for Home Health Services    I certify that patient, Olimpia Childress is under my care and that I, or a Nurse Practitioner or Physician's Assistant working with me, had a face-to-face encounter that meets the physician face-to-face encounter requirements with this patient on: 2/16/2018.    This encounter with the patient was in whole, or in part, for the following medical condition, which is the primary reason for Home Health Care: Metastatic Cancer.    I certify that, based on my findings, the following services are medically necessary Home Health Services: Nursing and Physical Therapy    My clinical findings support the need for the above services because: Nurse is needed: To provide assessment and oversight required in the home to assure adherence to the medical plan. and Physical Therapy Services are needed to assess and treat the following functional impairments: Weakness.    Further, I certify that my clinical findings support that this patient is homebound (i.e. absences from home require considerable and taxing effort and are for medical reasons or [employer] services or infrequently or of short duration when for other reasons) because: Leaving home is medically contraindicated    Based on the above findings, I certify that this patient is confined to the home and needs intermittent skilled nursing care, physical therapy  "and/or speech therapy.  The patient is under my care, and I have initiated the establishment of the plan of care.  This patient will be followed by a physician who will periodically review the plan of care.    Physician/Provider to provide follow up care: Coretta Yanez    Responsible [place] certified Physician at time of discharge: Dr. Filemon Mendoza    Please be aware that coverage of these services is subject to the terms and limitations of your health insurance plan.  Call member services at your health plan with any benefit or coverage questions.                  Further instructions from your care team       For temperature >100.4, increased nausea, vomiting, pain or any other concerns, please call [phone] & ask to talk to the Pediatric Oncology Fellow On Call.    Tuesday, February 20 @ 2:30 PM - [facility]    Pending Results     Date and Time Order Name Status Description    2/15/2018 1411 EKG 12 lead - pediatric Preliminary             Statement of Approval     Ordered          02/18/18 1614  I have reviewed and agree with all the recommendations and orders detailed in this document.  EFFECTIVE NOW     Approved and electronically signed by:  Fay Stoll MD             Admission Information     Date & Time Provider Department Dept. Phone    2/14/2018 Coretta Yanez MD [facility] [phone]      Your Vitals Were     Blood Pressure Pulse Temperature Respirations Height Weight    104/72 87 97.8  F (36.6  C) (Oral) 16 1.605 m (5' 3.19\") 62.7 kg (138 lb 3.7 oz)    Pulse Oximetry BMI (Body Mass Index)                100% 24.34 kg/m2          [employer] Information     [employer] lets you send messages to your doctor, view your test results, renew your prescriptions, schedule appointments and more. To sign up, go to [email or web address] . Click on \"Log in\" on the left side of the screen, which will take you to " "the Welcome page. Then click on \"Sign up Now\" on the right side of the page.     You will be asked to enter the access code listed below, as well as some personal information. Please follow the directions to create your username and password.     Your access code is: [email or web address]  Expires: 3/22/2018 11:14 AM     Your access code will  in 90 days. If you need help or a new code, please call your [facility] clinic or [phone].        Care EveryWhere ID     This is your Care EveryWhere ID. This could be used by other organizations to access your [facility] medical records  [id number]        Equal Access to Services     TRACI BAEZ : Rupa Cheek, jenni flores, eva xie, wilner singleton. So [facility] [phone].    ATENCIÓN: Si habla español, tiene a ordoñez disposición servicios gratuitos de asistencia lingüística. Llame al [phone].    We comply with applicable federal civil rights laws and [place] laws. We do not discriminate on the basis of race, color, national origin, age, disability, sex, sexual orientation, or gender identity.               Review of your medicines      START taking        Dose / Directions    HYDROmorphone 2 MG tablet   Commonly known as:  DILAUDID   Used for:  Pain        Dose:  1-2 mg   Take 0.5-1 tablets (1-2 mg) by mouth every 2 hours as needed for moderate to severe pain   Quantity:  30 tablet   Refills:  0       ondansetron 8 MG ODT tab   Commonly known as:  ZOFRAN-ODT   Used for:  Non-intractable vomiting with nausea, unspecified vomiting type        Dose:  8 mg   Take 1 tablet (8 mg) by mouth every 6 hours   Quantity:  120 tablet   Refills:  1       ranitidine 150 MG tablet   Commonly known as:  ZANTAC   Used for:  Gastritis without bleeding, unspecified chronicity, unspecified gastritis type        Dose:  150 mg   Take 1 tablet (150 mg) by mouth daily   Quantity:  30 tablet   Refills:  0       scopolamine 72 hr patch "   Commonly known as:  TRANSDERM   Used for:  Non-intractable vomiting with nausea, unspecified vomiting type        Apply 1 patch to hairless area behind one ear at least 4 hours before travel.  Remove old patch and change every 3 days (72 hours).   Quantity:  4 patch   Refills:  0         CONTINUE these medicines which may have CHANGED, or have new prescriptions. If we are uncertain of the size of tablets/capsules you have at home, strength may be listed as something that might have changed.        Dose / Directions    oxyCODONE 20 MG 12 hr tablet   Commonly known as:  OxyCONTIN   This may have changed:    - medication strength  - how much to take  - Another medication with the same name was removed. Continue taking this medication, and follow the directions you see here.   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  Use a pea-sized amount.  Store at room temperature.   Quantity:  60 g   Refills:  0       diphenhydrAMINE 25 MG tablet   Commonly known as:  BENADRYL   Used for:  Chemotherapy induced nausea and vomiting        Dose:  25-50 mg   Take 1-2 tablets (25-50 mg) by mouth every 6 hours as needed for other (Nausea or vomiting)   Quantity:  60 tablet   Refills:  0       ibuprofen 600 MG tablet   Commonly known as:  ADVIL/MOTRIN   Used for:  Sarcoma of vulva (H)        Dose:  600 mg   Take 1 tablet (600 mg) by mouth every 6 hours as needed  for moderate pain   Quantity:  60 tablet   Refills:  1       ondansetron 8 MG tablet   Commonly known as:  ZOFRAN   Used for:  Encounter for antineoplastic chemotherapy        Dose:  8 mg   Take 1 tablet (8 mg) by mouth every 6 hours as needed for nausea   Quantity:  30 tablet   Refills:  0       polyethylene glycol Packet   Commonly known as:  MIRALAX/GLYCOLAX   Used for:  Acute post-operative pain        Dose:  17 g   Take 17 g by mouth daily as needed for constipation   Quantity:  7 packet   Refills:  1       senna-docusate 8.6-50 MG per tablet   Commonly known as:  SENOKOT-S;PERICOLACE   Used for:  Acute post-operative pain        Dose:  2 tablet   Take 2 tablets by mouth 2 times daily as needed for constipation   Quantity:  30 tablet   Refills:  0         STOP taking     pantoprazole 40 MG EC tablet   Commonly known as:  PROTONIX                Where to get your medicines      These medications were sent to [facility] [address]  [address] [address]     Phone:  [phone]     ondansetron 8 MG ODT tab    ranitidine 150 MG tablet    scopolamine 72 hr patch         Some of these will need a paper prescription and others can be bought over the counter. Ask your nurse if you have questions.     Bring a paper prescription for each of these medications     HYDROmorphone 2 MG tablet    oxyCODONE 20 MG 12 hr tablet                Protect others around you: Learn how to safely use, store and throw away your medicines at www.disposemymeds.org.        Information about OPIOIDS     PRESCRIPTION OPIOIDS: WHAT YOU NEED TO KNOW    Prescription opioids can be used to help relieve moderate to severe pain and are often prescribed following a surgery or injury, or for certain health conditions. These medications can be an important part of treatment but also come with serious risks. It is important to work with your health care provider to make sure you are  getting the safest, most effective care.    WHAT ARE THE RISKS AND SIDE EFFECTS OF OPIOID USE?  Prescription opioids carry serious risks of addiction and overdose, especially with prolonged use. An opioid overdose, often marked by slowed breathing can cause sudden death. The use of prescription opioids can have a number of side effects as well, even when taken as directed:      Tolerance - meaning you might need to take more of a medication for the same pain relief    Physical dependence - meaning you have symptoms of withdrawal when a medication is stopped    Increased sensitivity to pain    Constipation    Nausea, vomiting, and dry mouth    Sleepiness and dizziness    Confusion    Depression    Low levels of testosterone that can result in lower sex drive, energy, and strength    Itching and sweating    RISKS ARE GREATER WITH:    History of drug misuse, substance use disorder, or overdose    Mental health conditions (such as depression or anxiety)    Sleep apnea    Older age (65 years or older)    Pregnancy    Avoid alcohol while taking prescription opioids.   Also, unless specifically advised by your health care provider, medications to avoid include:    Benzodiazepines (such as Xanax or Valium)    Muscle relaxants (such as Soma or Flexeril)    Hypnotics (such as Ambien or Lunesta)    Other prescription opioids    KNOW YOUR OPTIONS:  Talk to your health care provider about ways to manage your pain that do not involve prescription opioids. Some of these options may actually work better and have fewer risks and side effects:    Pain relievers such as acetaminophen, ibuprofen, and naproxen    Some medications that are also used for depression or seizures    Physical therapy and exercise    Cognitive behavioral therapy, a psychological, goal-directed approach, in which patients learn how to modify physical, behavioral, and emotional triggers of pain and stress    IF YOU ARE PRESCRIBED OPIOIDS FOR PAIN:    Never take  opioids in greater amounts or more often than prescribed    Follow up with your primary health care provider and work together to create a plan on how to manage your pain.    Talk about ways to help manage your pain that do not involve prescription opioids    Talk about all concerns and side effects    Help prevent misuse and abuse    Never sell or share prescription opioids    Never use another person's prescription opioids    Store prescription opioids in a secure place and out of reach of others (this may include visitors, children, friends, and family)    Visit www.cdc.gov/drugoverdose to learn about risks of opioid abuse and overdose    If you believe you may be struggling with addiction, tell your health care provider and ask for guidance or call [facility]'s National Helpline at 5-413-734-HELP    LEARN MORE / www.cdc.gov/drugoverdose/prescribing/guideline.html    Safely dispose of unused prescription opioids: Find your local drug take-back programs and more information about the importance of safe disposal at www.doseofreality.[place].gov             Medication List: This is a list of all your medications and when to take them. Check marks below indicate your daily home schedule. Keep this list as a reference.      Medications           Morning Afternoon Evening Bedtime As Needed    acetaminophen 325 MG tablet   Commonly known as:  TYLENOL   Take 2 tablets (650 mg) by mouth every 4 hours as needed for mild pain   Last time this was given:  650 mg on 2/18/2018  3:30 AM                                diltiazem 2% in PLO cream (FV COMPOUNDED) 2% Gel   To anal opening three times daily.  Use a pea-sized amount.  Store at room temperature.                                diphenhydrAMINE 25 MG tablet   Commonly known as:  BENADRYL   Take 1-2 tablets (25-50 mg) by mouth every 6 hours as needed for other (Nausea or vomiting)                                HYDROmorphone 2 MG tablet   Commonly known as:  DILAUDID   Take 0.5-1  tablets (1-2 mg) by mouth every 2 hours as needed for moderate to severe pain   Last time this was given:  1 mg on 2/17/2018  7:17 PM                                ibuprofen 600 MG tablet   Commonly known as:  ADVIL/MOTRIN   Take 1 tablet (600 mg) by mouth every 6 hours as needed for moderate pain   Last time this was given:  600 mg on 2/18/2018  1:05 PM                                ondansetron 8 MG ODT tab   Commonly known as:  ZOFRAN-ODT   Take 1 tablet (8 mg) by mouth every 6 hours   Last time this was given:  8 mg on 2/18/2018  2:47 PM                                ondansetron 8 MG tablet   Commonly known as:  ZOFRAN   Take 1 tablet (8 mg) by mouth every 6 hours as needed for nausea                                oxyCODONE 20 MG 12 hr tablet   Commonly known as:  OxyCONTIN   Take 1 tablet (20 mg) by mouth every 12 hours   Last time this was given:  20 mg on 2/18/2018  8:23 AM                                polyethylene glycol Packet   Commonly known as:  MIRALAX/GLYCOLAX   Take 17 g by mouth daily as needed for constipation   Last time this was given:  17 g on 2/18/2018  8:24 AM                                ranitidine 150 MG tablet   Commonly known as:  ZANTAC   Take 1 tablet (150 mg) by mouth daily   Last time this was given:  150 mg on 2/18/2018  8:23 AM                                scopolamine 72 hr patch   Commonly known as:  TRANSDERM   Apply 1 patch to hairless area behind one ear at least 4 hours before travel.  Remove old patch and change every 3 days (72 hours).                                senna-docusate 8.6-50 MG per tablet   Commonly known as:  SENOKOT-S;PERICOLACE   Take 2 tablets by mouth 2 times daily as needed for constipation   Last time this was given:  2 tablets on 2/18/2018  8:24 AM

## 2018-02-14 NOTE — IP AVS SNAPSHOT
[facility]    [address]    [facility] [address]    Phone:  [phone]                                       After Visit Summary   2/14/2018    Olimpia Childress    MRN: [id number]           After Visit Summary Signature Page     I have received my discharge instructions, and my questions have been answered. I have discussed any challenges I see with this plan with the nurse or doctor.    ..........................................................................................................................................  Patient/Patient Representative Signature      ..........................................................................................................................................  Patient Representative Print Name and Relationship to Patient    ..................................................               ................................................  Date                                            Time    ..........................................................................................................................................  Reviewed by Signature/Title    ...................................................              ..............................................  Date                                                            Time

## 2018-02-14 NOTE — ED NOTES
Adult patient arrived Pediatric ED. Patient assessed for acute medical needs prior to escort to adult ED.

## 2018-02-14 NOTE — TELEPHONE ENCOUNTER
Shi (her cousin) called to report an increase in Olimpia's pain over the last few days. She's frequently crying due to discomfort, and isn't sleeping at night. Olimpia currently only has oxycodone 5mg tabs at home, and is taking 1-2 PRN - but Shi doesn't think she's taking it very often (estimates once or twice a day). Additionally, Olimpia will vomit after taking it at times and doesn't typically re-dose it. They are hoping to hear soon that a new medication will get covered to treat her tumors as they think that is what her pain really needs. She's not having shortness of breath. No cough. No fever.     After speaking with Dr. Martin, I relayed to Shi that Olimpia could take Oxycodone (5mg tabs) 10mg by mouth every 6 hours scheduled - and still could have 1-2 tabs PRN for breakthrough. Olimpia will come to clinic tomorrow for further assessment; it was arranged for her to see Dr. Yanez and Dr. Martin. Advised to call back if any questions or worsening symptoms.     Aliyha Mckeon, CNP

## 2018-02-15 NOTE — DISCHARGE INSTRUCTIONS
For temperature >100.4, increased nausea, vomiting, pain or any other concerns, please call [phone] & ask to talk to the Pediatric Oncology Fellow On Call.    Tuesday, February 20 @ 2:30 PM - [facility]

## 2018-02-15 NOTE — PLAN OF CARE
Problem: Patient Care Overview  Goal: Plan of Care/Patient Progress Review  Outcome: No Change  AVSS. LSC on room air. Emesis x2. C/O pain, PRN dilaudid and torodol given and effective. Pt appeared to sleep between cares. Hourly rounding completed. No family at bedside. Continue with plan of care and notify staff of changes.

## 2018-02-15 NOTE — PLAN OF CARE
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    Diagnosis  (1) Abdominal pain secondary to malignant neoplasm of chest wall  (2) Opioid dependence and tolerance in a controlled environment    Recommendations  The following recommendations are based on the WHO Guidelines for the Pharmacological Treatment of Persisting Pain in Children with Medical Illnesses: (1) using a two-step strategy, (2) dosing at regular intervals, (3) using the appropriate route of administration, and (4) adapting treatment to the individual child (available at: http://apps.who.int/iris/bitstream/62473/35350/[email or web address]/[email or web address]_Guidelines.pdf).    SIMPLE ANALGESIA   - As she is not neutropenic nor febrile:   - schedule acetaminophen, 650 mg PO/IV q6h   - schedule ketorolac, 30 mg IV q6h    OPIOID THERAPY   - Start hydromorphone, 0.75 mg IV q4h PRN.  Consider scheduling if using on a regular basis.  - If INadequate pain control WITHOUT signs of over-sedation (difficulty to arouse and keep awake, decreased respiratory rate, dropping oxygen saturations in the setting of decreased respiratory rate), please increase rate and/or dose as follows:  STEP ONE: hydromorphone, 1 mg IV q4h + 0.5 mg IV q4h PRN  STEP TWO: hydromorphone, 1.5 mg IV q4h + 1 mg IV q4h PRN  STEP THREE: Contact the PACCT provider on call for assistance with an opioid rotation.  - If adequate pain control WITH signs of over-sedation, please decrease dose (and PRNs) to the previous step.  If still at the starting dose of 0.75  mg, please decrease the dose by approximately 30-50%:  ~30% REDUCTION: hydromorphone, 0.5 mg IV q4h PRN  ~50% REDUCTION: hydromorphone, 0.3 mg IV q4h PRN  -  If INadequate pain control WITH signs of oversedation, please contact the PACCT provider on call for further instruction.    ADJUVANT ANALGESIA   None recommended at this time; will re-assess need for adjuvants tomorrow morning with a formal consult.    NON-PHARMACOLOGICAL INTERVENTIONS   - Age-appropriate distraction   - Parental involvement   - Recommended consults:  Pain and Advanced/Complex Care Team (PACCT) for pain and symptom management.  [facility] for education and practice with active mind-body techniques to decrease pain sensations.    SIDE-EFFECT MANAGEMENT  Constipation: Per primary team.  Pruritus: If bothersome pruritus becomes present, start a low-dose naloxone infusion at 0.5 mcg/kg/hr.  Increase to effect in 0.5 mcg/kg/hr increments till a maximum dose of 2 mcg/kg/hr has been reached. Note that opioid-induced pruritus is NOT a histamine-mediated reaction, therefore antihistamines (such as diphenhydramine/Benadryl ) are generally ineffective in resolving this symptom.  Nausea/Vomiting: Per primary team.      Thank you for the opportunity to participate in the care of this patient and family.   Please contact the Pain and Advanced/Complex Care Team (PACCT) with any emergent needs via text page to the PACCT general pager ([phone], answered 8-4:30 Monday to Friday). After hours and on weekends/holidays, please refer to [facility] or [facility] on-call.    The above assessment and plan was discussed with the blue team resident.  A formal consult with evaluation and additional recommendations will occur in the morning.  In the meantime, feel free to contact myself with any major status updates and/or clinical concerns that would effect her pain management plan of care.    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 Was recently discharged on 2/4 with prescriptions for 10 mg PO OxyContin q12h with as needed enteral oxycodone 5 mg q4h PRN for breakthrough pain.  Of note, per pharmacy history and records, she has not been taking the OxyContin (prescrtiption was never filled), but has been taking 5 mg oxycodone every six hours (~3-4 times/day).     Since admit, she has received 1.5 mg hydromorphone (1 mg at 1834 and 0.5 mg at 2231), 15 mg ketorolac (at 1842) and ondansetron for nausea.  She is not interested in a PCA for pain management as she became extremely nauseated and pruritic with a morphine PCA during her last admission, but this most likely due to the use of morphine rather than a PCA.  However, when I spoke with the blue team resident at 2348 this evening, her pain was under good control following the administration of the above doses.       OBJECTIVE: Last 24 hours (from 08:00 yesterday morning to 08:00 this morning)  VITALS: Reviewed; all vital signs were within normal limits for age.  INS/OUTS:   Taking PO? YES  Bowel movements? NO (Last documented bowel movement: before admission)  PAIN (Numerical Rating Scale): 0    Current Medications  I have reviewed this patient's medication profile and medications during this hospitalization.  Medications related to this visit are as follows (with PRN use indicated from 08:00 yesterday morning to 08:00 this morning):  INFUSIONS   None    SCHEDULED   None    AS NEEDED   - None    ONE TIME/DISCONTINUED   - ketorolac, 15 mg IV once (given at 1842)   - hydromorphone, 1 mg IV once (given at 1834)   - hydromorphone, 0.5 mg IV once (given at 2231)

## 2018-02-15 NOTE — PLAN OF CARE
Problem: Nausea/Vomiting (Pediatric)  Goal: Identify Related Risk Factors and Signs and Symptoms  Related risk factors and signs and symptoms are identified upon initiation of Human Response Clinical Practice Guideline (CPG).  Outcome: No Change  Avss. Emesis x2. Pain well controlled with scheduled. Nausea continues despite interventions. Zofran x1. Aromatherapy initiated. Minimal PO intake noted.Soft stool x1.  Continue with close assessment. Notify MD of change in status

## 2018-02-15 NOTE — H&P
[facility], [place]    History and Physical  Pediatric Hematology / Oncology     Date of Admission:  2/14/2018    Assessment & Plan   Olimpia Childress is a 23 year old female with metastatic synovial sarcoma, with known mets to the chest and abdomen, who presents for management of severe pain. She was admitted approximately one week ago with similar symptoms. At this time, her physical exam is generally reassuring, and the most likely etiology is mass effect secondary to her known mets, though constipation may be a contributing factor as well. Upon arrival to the floor, she is hemodynamically stable on room air and has her pain adequately managed. She will be admitted for continued pain and fluid management.     NEURO  #Abdominal, chest pain: Likely secondary to mass effect due to extensive metastatic disease. Patient is followed by PACCT, and they were involved with her most recent inpatient pain management. She did not like the morphine PCA during her last admission and does not want morphine. After dilaudid and Toradol in the ED, she now denies pain.  -- PACCT consult, appreciate recs  -- Discussed plan with PACCT team:   Scheduled Tylenol 650mg Q6 -- will trial tablets, switch to IV tylenol if not tolerated  Scheduled Ketoralac 30mg Q6  PRN Dilaudid 0.5mg Q4  May consider the possibility of hydromorphone PCA tomorrow  -- Protonix 40mg IV  -- Hold home oxycontin, oxycodone    HEME/ONC  #Synovial sarcoma, metastatic: Last week she was found to have further progressive metastatic disease extending from her chest into her abdomen. Not currently receiving chemotherapy, waiting for insurance coverage.     FEN/GI  Euvolemic appears well hydrated, reports poor fluid intake and emesis over the past two days.  -- NS MIVF overnight  -- PO as tolerated    #Nausea: intermittent nausea, no significant emesis.   -- Zofran 8mg, will change to IV if not tolerated     #Constipation: History of constipation,  hard stools.   -- Continue home miralax  -- Continue home sennakot    ACCESS: PIV     CODE: Full Code    DISPO: pending pain management and symptomatic improvement, likely 2-3 days.     Patient and plan discussed with Dr. Henriquez, Pediatric Heme/Onc Fellow Physician.          Rachel Castañeda MD  Pediatric Resident, PGY2  [facility]  Pager: [phone]    Physician Attestation   I, Coretta Yanez MD, saw this patient with the resident and agree with the resident s findings and plan of care as documented in the resident s note.      I personally reviewed vital signs, medications and labs    Coretta Yanez MD  Date of Service (when I saw the patient): 2/14/18      Primary Care Physician   Coretta Yanez MD    Chief Complaint   Abdominal, chest pain    History is obtained from the patient    History of Present Illness   Olimpia Childress is a 23 year old female with a known history of metastatic synovial carcinoma, who presents this evening with abdominal and chest pain. For the past several weeks she has had increasing pain in her right abdomen and chest.   She was hospitalized two weeks ago (2/2/18) for similar symptoms and was found to have further progressive metastatic disease extending from her chest into her abdomen. During that hospitalization, she declined to have the largest drained by IR, and opted for non-surgical pain control with oxycodone and oxycontin.     Her symptoms initially improved after discharge, and she reported improvement in pain at her follow-up clinic appointment one week ago. However two days ago her pain again worsened. She has been taking the oxycontin in the morning and evening, and 4 oxycodone throughout the day. She has been experiencing nausea with retching and vomiting, and reports that she has not been able to eat or drink since her pain has worsened.      She denies fever, cough, shortness of breath, headache, rash, or diarrhea. She states the  pain is the same that she has felt in the past, in the same location with greater intensity. She does have increasing constipation, which she states is due to the opioids she has been taking.     PACCT has been involved in her care.      Past Medical History    I have reviewed this patient's medical history and updated it with pertinent information if needed.   Past Medical History:   Diagnosis Date     Anemia 6/3/2016     Constipation      Extrarenal rhabdoid neoplasm (H)      Febrile neutropenia (H) 4/17/2016     History of blood transfusion      Latent tuberculosis      Lung disease      On antineoplastic chemotherapy      Sarcoma of vulva (H)        Past Surgical History   I have reviewed this patient's surgical history and updated it with pertinent information if needed.  Past Surgical History:   Procedure Laterality Date     BIOPSY VULVA       BIOPSY VULVA Right 8/3/2016    Procedure: BIOPSY VULVA;  Surgeon: Sangita Pastrana MD;  Location: [facility] OR     EXAM UNDER ANESTHESIA PELVIC N/A 11/16/2016    Procedure: EXAM UNDER ANESTHESIA PELVIC;  Surgeon: Sangita Pastrana MD;  Location: [facility] OR     EXCISE MASS VULVA       EXCISE TUMOR PELVIS ANTERIOR  11/16/2016    Procedure: EXCISE TUMOR PELVIS ANTERIOR;  Surgeon: Pradeep Diop MD;  Location: [facility] OR     INSERT CHEST TUBE Left 2/24/2016    Procedure: INSERT CHEST TUBE;  Surgeon: Dharmesh Uribe MD;  Location: [facility] OR     INSERT PORT VASCULAR ACCESS       RECONSTRUCT VULVA WITH MUSCLE FLAP  11/16/2016    Procedure: RECONSTRUCT VULVA WITH MUSCLE FLAP;  Surgeon: Sidra Corona MD;  Location: [facility] OR     THORACOSCOPIC BIOPSY LUNG       THORACOTOMY Left 7/20/2016    Procedure: THORACOTOMY;  Surgeon: Byron Pierre MD;  Location: [facility]     THORACOTOMY Left 3/8/2017    Procedure: THORACOTOMY;  Surgeon: Byron Pierre MD;  Location: [facility]     VULVECTOMY RADICAL DISSECT GROIN(S) N/A 11/16/2016    Procedure: VULVECTOMY RADICAL DISSECT  GROIN(S);  Surgeon: Sangita Pastrana MD;  Location: [facility] OR       Immunization History   Immunization Status:  stated as up to date    Prior to Admission Medications   Prior to Admission Medications   Prescriptions Last Dose Informant Patient Reported? Taking?   acetaminophen (TYLENOL) 325 MG tablet Past Week at Unknown time  No Yes   Sig: Take 2 tablets (650 mg) by mouth every 4 hours as needed for mild pain   diltiazem 2% in PLO cream, FV COMPOUNDED, 2% GEL Unknown at Unknown time  No No   Sig: To anal opening three times daily.  Use a pea-sized amount.  Store at room temperature.   Patient not taking: Reported on 2/14/2018   diphenhydrAMINE (BENADRYL) 25 MG tablet Unknown at Unknown time  No No   Sig: Take 1-2 tablets (25-50 mg) by mouth every 6 hours as needed for other (Nausea or vomiting)   ibuprofen (ADVIL/MOTRIN) 600 MG tablet 2/14/2018 at 1500  No Yes   Sig: Take 1 tablet (600 mg) by mouth every 6 hours as needed for moderate pain   ondansetron (ZOFRAN) 8 MG tablet 2/14/2018 at 2000  No Yes   Sig: Take 1 tablet (8 mg) by mouth every 6 hours as needed for nausea   oxyCODONE (OXYCONTIN) 10 MG 12 hr tablet 2/14/2018 at 1430  No Yes   Sig: Take 1 tablet (10 mg) by mouth every 12 hours   oxyCODONE IR (ROXICODONE) 5 MG tablet 2/14/2018 at 1430  No Yes   Sig: Take 1-2 tablets (5-10 mg) by mouth every 4 hours as needed for moderate to severe pain   pantoprazole (PROTONIX) 40 MG EC tablet 2/14/2018 at 1000  No Yes   Sig: Take 1 tablet (40 mg) by mouth 2 times daily (before meals)   polyethylene glycol (MIRALAX/GLYCOLAX) Packet 2/13/2018 at 8am  No Yes   Sig: Take 17 g by mouth daily as needed for constipation   senna-docusate (SENOKOT-S;PERICOLACE) 8.6-50 MG per tablet 2/13/2018 at 9pm  No Yes   Sig: Take 2 tablets by mouth 2 times daily as needed for constipation      Facility-Administered Medications: None     Allergies   Allergies   Allergen Reactions     Heparin Flush Other (See Comments)     Due to Adventist  reasons     Pork Derived Products      Nondenominational reasons     Tegaderm Transparent Dressing (Informational Only) Other (See Comments) and Rash     PLEASE USE IV 3000 FOR DRESSING(S)       Social History   Olimpia is attending school with her goal to get a diploma, she is in 11th grade.    Family History   I have reviewed this patient's family history and updated it with pertinent information if needed.   Family History   Problem Relation Age of Onset     Other Cancer No family hx of        Review of Systems   The 10 point Review of Systems is negative other than noted in the HPI or here.     Physical Exam   Temp: 97.5  F (36.4  C) Temp src: Oral BP: 114/79 Pulse: 88 Heart Rate: 79 Resp: 16 SpO2: 98 % O2 Device: None (Room air)    Vital Signs with Ranges  Temp:  [97.5  F (36.4  C)-98.2  F (36.8  C)] 97.5  F (36.4  C)  Pulse:  [88-94] 88  Heart Rate:  [79-94] 79  Resp:  [16] 16  BP: (105-116)/(72-79) 114/79  SpO2:  [96 %-99 %] 98 %  127 lbs 13.87 oz    GENERAL: Laying quietly in bed, tired-appearing, appropriately responsive and coherent. Appears comfortable, in no acute distress.  SKIN: Clear. No significant rash, abnormal pigmentation or lesions  HEENT: Normocephalic, atraumatic. Pupils equal, round, reactive. Extraocular muscles intact. Normal conjunctivae. Normal ear canals. Nose without discharge. Oropharynx clear, without erythema or lesions.   NECK: Supple, no masses.  No thyromegaly. No cervical lymphadenopathy  LUNGS: Breathing comfortably on room air. Slightly diminished breath sounds in the left middle and lower lobes, otherwise good air movement throughout. No rales, rhonchi, wheezing or retractions.   HEART: Regular rate and rhythm. Normal S1/S2. No murmurs. Normal pulses. Cap refill brisk  ABDOMEN: Soft, non-tender, not distended, no masses or hepatosplenomegaly. Bowel sounds normal.   NEUROLOGIC: No focal findings. Cranial nerves grossly intact: DTR's normal. Normal gait, strength and tone  EXTREMITIES:  Full range of motion, no deformities     Data   No results found for this or any previous visit (from the past 24 hour(s)).

## 2018-02-15 NOTE — PROGRESS NOTES
[facility], [place]    Pediatric Hematology/Oncology Progress Note    Assessment & Plan   Olimpia Childress is a 23 year old female with synovial sarcoma with known metastasis to the chest and abdomen, now admitted for management of severe pain refractory to home medications. Since admission her pain significantly improved.     Neuro:  Chest pain: Similar to prior pain, likely 2/2 extensive metastatic disease. Followed by PACCT.   -- PACCT consulted, appreciate recs  -- Tylenol 650mg Q6H  -- Toradol 30mg Q6H  -- Dilaudid 0.5mg Q4H,+ 0.3mg Q2Hprn  -- Will likely transition back to PO tomorrow    Heme/Onc:   Metastatic synovial sarcoma: Extensive metastatic disease in chest, with large fluid density mass in L chest. Currently not receiving chemotherapy, awaiting insurance coverage    FEN:  - Regular diet  - D5NS MIVF (can switch to NS once PO increases)  - NPO at 0000 for possible abd US in AM     GI:  Nausea/vomitin/2 disease  -- Zofran, benadryl prn  -- Continue PPI    Transaminitis, elevated lipase: No abdominal pain at this time. Concerning for worsening metastasis.  -- CMP in AM, if persistently elevated will obtain abdominal ultrasound    Bowel regimen:  -- Miralax 17g daily, Senna BID    Resp:   Chest metastases with large fluid density along left hemidiaphragm: IR previously consulted who recommended drainage of fluid collection with indwelling drain in place. Olimpia previously declined this option. Currently stable in room air with no respiratory concerns.     Code Status: Prior    Disposition: Expected discharge in 2-3 days, pending adequate pain control on oral medications.     Staffed with Dr. Mendoza.    Tory Hernandez M.D.   PGY-3 Pediatric Resident  [phone]\    I saw and evaluated the patient, see H&P note for more details. I discussed with the resident/fellow and agree with the findings and plan as documented in the resident's note. I personally spent a total of 75 minutes on  the unit/floor in direct care of this patient. Total time included discussion with multiple providers on rounds, discussion with patient/family, physical examination, and reviewing data such as laboratory and radiographic studies. Greater than 50% of the total time was spent counseling and/or coordinating the care of the patient. Details can be found in the resident/fellow note.    Filemon Mendoza M.D./Ph.D  Pediatric Hematology/Oncology      Interval History   Asma denied pain this AM, reporting improvement after dilaudid was given. She does not feel like the oxycontin/oxycodone she had at home is working. She also endorses nausea and had small emesis this AM. She is concerned about constipation as well. She reports her pain is along her left chest, mid-axillary line. She denies new abdominal pain.     -Data reviewed today: I reviewed all new labs and imaging results over the last 24 hours. I personally reviewed no images or EKG's today.    Physical Exam   Temp: 97.5  F (36.4  C) Temp src: Axillary BP: 111/71 Pulse: 99 Heart Rate: 71 Resp: 18 SpO2: 98 % O2 Device: None (Room air)    Vitals:    02/14/18 1745 02/14/18 2201   Weight: 59 kg (130 lb 2 oz) 58 kg (127 lb 13.9 oz)     Vital Signs with Ranges  Temp:  [97.2  F (36.2  C)-98.2  F (36.8  C)] 97.5  F (36.4  C)  Pulse:  [88-99] 99  Heart Rate:  [70-94] 71  Resp:  [16-18] 18  BP: ()/(66-79) 111/71  SpO2:  [96 %-99 %] 98 %  I/O last 3 completed shifts:  In: 585 [I.V.:585]  Out: 40 [Emesis/NG output:40]    GENERAL: Awake, alert, no acute distress  SKIN: Clear. No significant rash, abnormal pigmentation or lesions  HEENT: Normocephalic, atraumatic.. Extraocular muscles intact. Normal conjunctivae. Normal ear canals. Nose without discharge. Oropharynx clear, without erythema or lesions.   NECK: Supple, no masses.    LUNGS: Breathing comfortably on room air. Diminished aeration left base. No rales, rhonchi, wheezing or retractions.   HEART: Regular rate and rhythm.  Normal S1/S2. No murmurs. Normal pulses. Cap refill brisk  ABDOMEN: Soft, non-tender, not distended, no masses or hepatosplenomegaly. Bowel sounds normal.   EXTREMITIES: Full range of motion, no deformities      Medications     dextrose 5% and 0.9% NaCl 100 mL/hr at 02/15/18 0916       pantoprazole (PROTONIX) IV  40 mg Intravenous BID w/meals     acetaminophen  650 mg Oral Q6H     polyethylene glycol  17 g Oral Daily     senna-docusate  2 tablet Oral BID     HYDROmorphone  0.5 mg Intravenous Q4H     ketorolac  30 mg Intravenous Q6H       Data     Recent Labs  Lab 02/15/18  0708   WBC 4.6   HGB 10.3*   *         POTASSIUM 3.5   CHLORIDE 103   CO2 29   BUN 12   CR 0.62   ANIONGAP 8   JANA 8.7   GLC 66*   ALBUMIN 2.8*   PROTTOTAL 6.7*   BILITOTAL 1.9*   ALKPHOS 84   *   *   LIPASE 518*       No results found for this or any previous visit (from the past 24 hour(s)).

## 2018-02-15 NOTE — CONSULTS
[facility]  Pain and Advanced/Complex Care Team (PACCT)   Initial Consultation    Olimpia Childress MRN# [id number]   Age: 23 year old YOB: 1995   Date:  02/15/2018 Admitted:  2018     Reason for consult: Pain management  Symptom management  Continuity of care for current PACCT patient  Requesting physician/service: Dr. Yanez/Alexis salazar onc    Recommendations, Patient/Family Counseling & Coordination:     SYMPTOM MANAGEMENT:     Pain:   - Scheduled hydromorphone 0.5 mg IV Q4 hours with 0.3 mg Q2 hours PRN (she is not interested in a PCA and is vomiting, so cannot take PO)  - Agree with ketorolac and acetaminophen  - Ache ease PRN  - Integrative medicine consult  - Will likely transition to methadone tomorrow when can better estimate total opioid need  - Please obtain EKG for recent baseline QTc prior to starting methadone    Nausea/vomiting:  - Schedule ondansetron 8 mg IV Q8 hours  - If need additional anti-emetics, would use promethazine PRN  - Sea bands, essential oils    Constipation:  - Daily bowel meds (senna and Miralax) while on opioids    Other:   - would recommend homecare after discharge    GOALS OF CARE AND DECISIONAL SUPPORT/SUMMARY OF DISCUSSION WITH PATIENT AND/OR FAMILY: Re-introduced scope of PACCT, including our role in pain and symptom management, decision-making and support. Discussed pain management, as well as homecare to help with management at home. Olimpia spoke about the hospice consult at her home last week. She is not interested in hospice at this time because she wants to try another chemotherapy - she is hoping that there continue to be medications to keep her cancer from progressing. She spoke openly about her belief that our day of death is written and there is nothing that we can do to change that. She spoke about her family members who were all praying for her health (while they were healthy) and how they  suddenly, but she  was the one with cancer. She described how yes, she has cancer, but she has been living with it and hopes to continue to live as long as possible.    Thank you for the opportunity to participate in the care of this patient and family.   Please contact the Pain and Advanced/Complex Care Team (PACCT) with any emergent needs via text page to the PACCT general pager ([phone], answered 8-4:30 Monday to Friday). After hours and on weekends/holidays, please refer to [facility] or [place] on-call.    Attestation:  Total time on the floor involved in the patient's care: 60 minutes  Total time spent in counseling/care coordination: 45 minutes    Discussed with primary team.    Amita Martin MD  Pager: [phone] (please text page)    Assessment:      Diagnoses and symptoms: Olimpia Childress is a 23 year old female with:  Patient Active Problem List   Diagnosis     Malignant tumor cells (H)     Abdominal pain     Ileus second to Vincristine     Constipation     Pneumothorax on left     Pneumothorax, left     Sarcoma of vulva (H)     Febrile neutropenia (H)     Anemia     Chemotherapy-induced neutropenia (H)     Rhabdoid tumor (H)     Synovial sarcoma (H)     Encounter for antineoplastic chemotherapy     Emesis, persistent     Vulvar cancer (H)     Port-a-cath in place     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)     Pneumothorax     Pain     Malignant neoplasm of chest wall (H)   Palliative care needs associated with the above    Psychosocial and spiritual concerns: Fear of procedures    Advance care planning:   Patient/Family understanding of illness: Understands that her cancer is not curable  Patient/Family care goals: To continue to try chemo to slow down or stop cancer progression  Prognosis: Widely metastatic cancer that has progressed on palliative chemo  Code status: presumed to be full code, but not discussed    History of Present Illness/Problem:     Olimpia Childress is a 23 year old  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 She was recently admitted from 2/1-2/5 for pain control, and discharged home on OxyContin with good pain control. She has been up nightly for the past 3 nights with excruciating left chest/upper abdominal and back pain, and 1 day of vomiting. She was not able to keep her anti-emetics or pain medications down, but she did not feel like her pain medications were working anyway. She denies diarrhea and had a normal stool yesterday. She has not had daily stools while at home, though. She denies fever. She reports taking OxyContin as instructed (10 mg twice daily).     Past Medical History:     Past Medical History:   Diagnosis Date     Anemia 6/3/2016     Constipation      Extrarenal rhabdoid neoplasm (H)      Febrile neutropenia (H) 4/17/2016     History of blood transfusion      Latent tuberculosis      Lung disease      On antineoplastic chemotherapy      Sarcoma of vulva (H)         Past Surgical History:     Past Surgical History:   Procedure Laterality Date     BIOPSY VULVA       BIOPSY VULVA Right 8/3/2016    Procedure: BIOPSY VULVA;  Surgeon: Sangita Pastrana MD;  Location: [facility] OR     EXAM UNDER ANESTHESIA PELVIC N/A 11/16/2016    Procedure: EXAM UNDER ANESTHESIA PELVIC;  Surgeon: Sangita Pastrana MD;  Location: [facility] OR     EXCISE MASS VULVA       EXCISE TUMOR PELVIS ANTERIOR  11/16/2016    Procedure: EXCISE TUMOR PELVIS ANTERIOR;  Surgeon: Pradeep Diop MD;  Location: [facility] OR     INSERT CHEST TUBE Left 2/24/2016    Procedure: INSERT CHEST TUBE;  Surgeon: Dharmesh Uribe MD;  Location: [facility] OR     INSERT PORT VASCULAR ACCESS       RECONSTRUCT VULVA WITH MUSCLE FLAP  11/16/2016    Procedure: RECONSTRUCT VULVA WITH MUSCLE FLAP;  Surgeon: Sidra Corona MD;  Location: [facility] OR     THORACOSCOPIC BIOPSY LUNG       THORACOTOMY Left 7/20/2016    Procedure: THORACOTOMY;  Surgeon: Byron Pierre  MD Ravi;  Location: [facility] OR     THORACOTOMY Left 3/8/2017    Procedure: THORACOTOMY;  Surgeon: Byron Pierre MD;  Location: [facility]     VULVECTOMY RADICAL DISSECT GROIN(S) N/A 11/16/2016    Procedure: VULVECTOMY RADICAL DISSECT GROIN(S);  Surgeon: Sangita Pastrana MD;  Location: [facility] OR       Social/Spiritual History:     Description of family/Living situation: Lives with cousin and cousin's 9 year old daughter  Medical decision maker(s)/Legal guardian(s): self    Family History:     Family History   Problem Relation Age of Onset     Other Cancer No family hx of        Allergies:     Olimpia Childress is allergic to heparin flush; pork derived products; and tegaderm transparent dressing (informational only).    Medications:     I have reviewed this patient's medication profile and medications during this hospitalization.      Scheduled medications:     pantoprazole (PROTONIX) IV  40 mg Intravenous BID w/meals     acetaminophen  650 mg Oral Q6H     polyethylene glycol  17 g Oral Daily     senna-docusate  2 tablet Oral BID     HYDROmorphone  0.5 mg Intravenous Q4H     ondansetron  8 mg Intravenous Q6H     ketorolac  30 mg Intravenous Q6H     Infusions:     dextrose 5% and 0.9% NaCl 100 mL/hr at 02/15/18 0916     PRN medications: HYDROmorphone, diphenhydrAMINE, naloxone    Review of Systems:     Palliative Symptom Review  The comprehensive review of systems is negative other than noted here and in the HPI. Completed by proxy by parent(s)/caretaker(s) (if applicable)    Physical Exam:     Vitals were reviewed  Temp:  [97.2  F (36.2  C)-98.2  F (36.8  C)] 97.5  F (36.4  C)  Pulse:  [88-99] 99  Heart Rate:  [70-94] 71  Resp:  [16-18] 18  BP: ()/(66-79) 111/71  SpO2:  [96 %-99 %] 98 %  Weight: 57 kg   Lying in bed. Appears tired.   Respires comfortably on RA  Abdomen soft, non-tender  Extremities WWP  Deferred detailed exam as patient began to vomit    Data Reviewed:     Results for orders placed or performed during  the hospital encounter of 02/14/18 (from the past 24 hour(s))   CBC with platelets differential   Result Value Ref Range    WBC 4.6 4.0 - 11.0 10e9/L    RBC Count 2.98 (L) 3.8 - 5.2 10e12/L    Hemoglobin 10.3 (L) 11.7 - 15.7 g/dL    Hematocrit 31.8 (L) 35.0 - 47.0 %     (H) 78 - 100 fl    MCH 34.6 (H) 26.5 - 33.0 pg    MCHC 32.4 31.5 - 36.5 g/dL    RDW 12.6 10.0 - 15.0 %    Platelet Count 227 150 - 450 10e9/L    Diff Method Automated Method     % Neutrophils 46.9 %    % Lymphocytes 31.6 %    % Monocytes 20.2 %    % Eosinophils 0.7 %    % Basophils 0.2 %    % Immature Granulocytes 0.4 %    Nucleated RBCs 0 0 /100    Absolute Neutrophil 2.1 1.6 - 8.3 10e9/L    Absolute Lymphocytes 1.4 0.8 - 5.3 10e9/L    Absolute Monocytes 0.9 0.0 - 1.3 10e9/L    Absolute Eosinophils 0.0 0.0 - 0.7 10e9/L    Absolute Basophils 0.0 0.0 - 0.2 10e9/L    Abs Immature Granulocytes 0.0 0 - 0.4 10e9/L    Absolute Nucleated RBC 0.0     Macrocytes Present     Platelet Estimate Confirming automated cell count    Comprehensive metabolic panel   Result Value Ref Range    Sodium 140 133 - 144 mmol/L    Potassium 3.5 3.4 - 5.3 mmol/L    Chloride 103 94 - 109 mmol/L    Carbon Dioxide 29 20 - 32 mmol/L    Anion Gap 8 3 - 14 mmol/L    Glucose 66 (L) 70 - 99 mg/dL    Urea Nitrogen 12 7 - 30 mg/dL    Creatinine 0.62 0.52 - 1.04 mg/dL    GFR Estimate >90 >60 mL/min/1.7m2    GFR Estimate If Black >90 >60 mL/min/1.7m2    Calcium 8.7 8.5 - 10.1 mg/dL    Bilirubin Total 1.9 (H) 0.2 - 1.3 mg/dL    Albumin 2.8 (L) 3.4 - 5.0 g/dL    Protein Total 6.7 (L) 6.8 - 8.8 g/dL    Alkaline Phosphatase 84 40 - 150 U/L     (H) 0 - 50 U/L     (HH) 0 - 45 U/L   Amylase   Result Value Ref Range    Amylase 59 30 - 110 U/L   GGT   Result Value Ref Range     (H) 0 - 40 U/L   Lipase   Result Value Ref Range    Lipase 518 (H) 73 - 393 U/L   EKG 12 lead - pediatric   Result Value Ref Range    Interpretation ECG Click View Image link to view waveform and  result

## 2018-02-15 NOTE — ED PROVIDER NOTES
History     Chief Complaint   Patient presents with     Chest Wall Pain     Onset one week ago with increasing right sided pleural pain, no relief with pain medication today, has synovial sarcoma.     HPI  23-year-old female with history of metastatic synovial carcinoma on chemotherapy arriving today to the emergency room for evaluation of pain management.  Patient reports continuous pain for the past several weeks in the region of her right upper abdomen and chest which has been progressive.  Patient currently rates pain at 10 of 10.  This is nonradiating increased with movement she denies alleviating symptoms.  The patient has been taking oxycodone and OxyContin at home but feels as if this is not helping.  Additionally patient feels that she is not tolerating oxycodone frequently developing nausea vomiting following taking this medication.  Patient denies new shortness of breath.  She denies change in character pain.  She reports no fever, chills, cough.  She denies melena or hematochezia.  She reports no abdominal distention or discomfort.  The patient did discuss her case with her oncologist today with recommendation for emergency evaluation for assistance with pain control.    I have reviewed the Medications, Allergies, Past Medical and Surgical History, and Social History in the Epic system.    Review of Systems   Constitutional: Negative for chills and fever.   Respiratory: Positive for chest tightness. Negative for shortness of breath.    Cardiovascular: Positive for chest pain. Negative for palpitations.   All other systems reviewed and are negative.      Physical Exam   BP: 105/72  Pulse: 94  Heart Rate: 94  Temp: 98  F (36.7  C)  Resp: 16  Weight: 59 kg (130 lb 2 oz)  SpO2: 99 %      Physical Exam   Constitutional: She is oriented to person, place, and time. She appears distressed.   HENT:   Head: Normocephalic and atraumatic.   Mouth/Throat: Oropharynx is clear and moist.   Eyes: Conjunctivae are  normal. Pupils are equal, round, and reactive to light.   Cardiovascular: Normal rate.    Pulmonary/Chest: No respiratory distress. She has no wheezes. She exhibits tenderness.   Abdominal: Soft. She exhibits no distension. There is no tenderness. There is no rebound.   Neurological: She is alert and oriented to person, place, and time. No cranial nerve deficit.   Skin: No rash noted.   Psychiatric: She has a normal mood and affect.       ED Course     ED Course     Procedures       Labs Ordered and Resulted from Time of ED Arrival Up to the Time of Departure from the ED - No data to display         Assessments & Plan (with Medical Decision Making)     23-year-old female history of metastatic synovial sarcoma arriving for evaluation of ongoing right-sided chest discomfort.  Upon arrival this patient noted to be alert, afebrile, and hemodynamically stable.  As into the room the patient is lying prone over the bed tearful and significant pain but appears nontoxic.  She is no evidence of external trauma to the chest or overlying skin rash.  I did receive a call from the patient's pediatric oncology fellow with concern of uncontrolled pain based on progression of mass in the right chest.  At this time the patient is speaking in full sentences without evidence of increased work of breathing.  We did obtain IV access and initiate pain medication including narcotics and Toradol.  Laboratory studies from this past week revealed no significant electrolyte abnormalities leukocytosis or anemia.  After discussion with the patient's oncology fellow we will hold on repeat imaging or laboratory studies at this time.  CT scan of the chest performed on February 2 reveals extensive metastatic disease in the chest, and PET scan on February 8 confirm progression of metastatic lesions.  Over the course of the emergency department stay the patient remained hemodynamically stable but despite 3 IV doses of analgesia she remains in  significant distress.  Case was rediscussed with pediatric oncology with recommendation for hospitalization to aid in pain control.    I have reviewed the nursing notes.    I have reviewed the findings, diagnosis, plan and need for follow up with the patient.    New Prescriptions    No medications on file       Final diagnoses:   Chest pain, unspecified type       2/14/2018   [facility], [place], EMERGENCY DEPARTMENT     Abran Collado MD  02/14/18 2015

## 2018-02-15 NOTE — PHARMACY-ADMISSION MEDICATION HISTORY
Admission medication history interview status for the 2/14/2018 admission is complete. See Epic admission navigator for allergy information, pharmacy, prior to admission medications and immunization status.     Medication history interview sources:  Patient, Patient's mother    Changes made to PTA medication list (reason)  Added: None  Deleted: None  Changed: None    Additional medication history information (including reliability of information, actions taken by pharmacist):    Patient and her mother were good historians of patient's medication history.  Patient is currently taking one oxycodone 5mg tablet every 6 hours (3 - 4 times per day) for her PRN oxycodone dose.  Patient has been using acetaminophen, diphenhydramine, ibuprofen, and senna-docusate sporadically.      Prior to Admission medications    Medication Sig Last Dose Taking? Auth Provider   oxyCODONE (OXYCONTIN) 10 MG 12 hr tablet Take 1 tablet (10 mg) by mouth every 12 hours 2/14/2018 at AM Yes Mao Shay MD   oxyCODONE IR (ROXICODONE) 5 MG tablet Take 1-2 tablets (5-10 mg) by mouth every 4 hours as needed for moderate to severe pain 2/14/2018 at 12PM Yes Mao Shay MD   polyethylene glycol (MIRALAX/GLYCOLAX) Packet Take 17 g by mouth daily as needed for constipation 2/14/2018 at AM Yes Amalia Lowry MD   ondansetron (ZOFRAN) 8 MG tablet Take 1 tablet (8 mg) by mouth every 6 hours as needed for nausea 2/14/2018 at AM Yes Amalia Lowry MD   diphenhydrAMINE (BENADRYL) 25 MG tablet Take 1-2 tablets (25-50 mg) by mouth every 6 hours as needed for other (Nausea or vomiting)  Yes Amalia Lowry MD   pantoprazole (PROTONIX) 40 MG EC tablet Take 1 tablet (40 mg) by mouth 2 times daily (before meals) 2/14/2018 at AM Yes Amalia Lowry MD   acetaminophen (TYLENOL) 325 MG tablet Take 2 tablets (650 mg) by mouth every 4 hours as needed for mild pain  Yes Marilynn Cat APRN CNP   ibuprofen (ADVIL/MOTRIN) 600 MG  tablet Take 1 tablet (600 mg) by mouth every 6 hours as needed for moderate pain  Yes [place] Marilynn Bowman APRN CNP   senna-docusate (SENOKOT-S;PERICOLACE) 8.6-50 MG per tablet Take 2 tablets by mouth 2 times daily as needed for constipation Unknown at Unknown time  Amalia Lowry MD   diltiazem 2% in PLO cream, FV COMPOUNDED, 2% GEL To anal opening three times daily.  Use a pea-sized amount.  Store at room temperature.  Patient not taking: Reported on 2/14/2018 Not Taking at Unknown time  Sidra Corona MD         Medication history completed by: Fransico Trinh, Pharmacy Intern

## 2018-02-15 NOTE — CONSULTS
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 Olimpia is accompanied in the room by her sister. Nausea has been a persistent symptom and she is interested in other methods of assisting with this.    ALLERGIES:  Allergies   Allergen Reactions     Heparin Flush Other (See Comments)     Due to Hinduism reasons     Pork Derived Products      Sabianist reasons     Tegaderm Transparent Dressing (Informational Only) Other (See Comments) and Rash     PLEASE USE IV 3000 FOR DRESSING(S)       IMMUNIZATIONS:  There is no immunization history for the selected administration types on file for this patient.    CURRENT MEDICATIONS:  Current Facility-Administered Medications   Medication     pantoprazole (PROTONIX) 40 mg IV push injection     polyethylene glycol (MIRALAX/GLYCOLAX) Packet 17 g     senna-docusate (SENOKOT-S;PERICOLACE) 8.6-50 MG per tablet 2 tablet     dextrose 5% and 0.9% NaCl infusion     HYDROmorphone (PF) (DILAUDID) injection 0.5 mg     HYDROmorphone (PF) (DILAUDID) injection 0.3 mg     diphenhydrAMINE (BENADRYL) injection 25 mg     ondansetron (ZOFRAN) injection 8 mg     acetaminophen (OFIRMEV) infusion 870 mg     ketorolac (TORADOL) injection 30 mg     naloxone (NARCAN) injection 0.1-0.4 mg       PAST MEDICAL HISTORY:  Active Ambulatory Problems     Diagnosis Date Noted     Malignant tumor cells (H) 01/14/2016     Abdominal pain 02/09/2016     Ileus second to Vincristine 02/12/2016     Constipation 02/12/2016     Pneumothorax on left 02/22/2016      Pneumothorax, left 02/23/2016     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     Febrile neutropenia (H) 04/17/2016     Anemia 06/03/2016     Chemotherapy-induced neutropenia (H) 06/06/2016     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     Encounter for antineoplastic chemotherapy 08/09/2016     Emesis, persistent 08/14/2016     Vulvar cancer (H) 11/16/2016     Port-a-cath in place 11/28/2016     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) 03/08/2017     Pneumothorax 12/11/2017     Pain 02/02/2018     Resolved Ambulatory Problems     Diagnosis Date Noted     No Resolved Ambulatory Problems     Past Medical History:   Diagnosis Date     Anemia 6/3/2016     Constipation      Extrarenal rhabdoid neoplasm (H)      Febrile neutropenia (H) 4/17/2016     History of blood transfusion      Latent tuberculosis      Lung disease      On antineoplastic chemotherapy      Sarcoma of vulva (H)          PAST SURGICAL HISTORY:  Past Surgical History:   Procedure Laterality Date     BIOPSY VULVA       BIOPSY VULVA Right 8/3/2016    Procedure: BIOPSY VULVA;  Surgeon: Sangita Pastrana MD;  [facility]     EXAM UNDER ANESTHESIA PELVIC N/A 11/16/2016    Procedure: EXAM UNDER ANESTHESIA PELVIC;  Surgeon: Sangita Pastrana MD;  [facility]     EXCISE MASS VULVA       EXCISE TUMOR PELVIS ANTERIOR  11/16/2016    Procedure: EXCISE TUMOR PELVIS ANTERIOR;  Surgeon: Pradeep Diop MD;  [facility]     INSERT CHEST TUBE Left 2/24/2016    Procedure: INSERT CHEST TUBE;  Surgeon: Dharmesh Uribe MD;  [facility]     INSERT PORT VASCULAR ACCESS       RECONSTRUCT VULVA WITH MUSCLE FLAP  11/16/2016    Procedure: RECONSTRUCT VULVA WITH MUSCLE FLAP;  Surgeon: Sidra Corona MD;  [facility]     THORACOSCOPIC BIOPSY LUNG       THORACOTOMY Left 7/20/2016    Procedure: THORACOTOMY;  Surgeon: Byron Pierre MD;  [facility]     THORACOTOMY Left 3/8/2017     "Procedure: THORACOTOMY;  Surgeon: Byron Pierre MD;  [facility]     VULVECTOMY RADICAL DISSECT GROIN(S) N/A 11/16/2016    Procedure: VULVECTOMY RADICAL DISSECT GROIN(S);  Surgeon: Sangita Pastrana MD;  [facility]       FAMILY HISTORY:  Family History   Problem Relation Age of Onset     Other Cancer No family hx of        Physical Exam:   Temp:  [97.2  F (36.2  C)-98.2  F (36.8  C)] 97.8  F (36.6  C)  Pulse:  [88-99] 99  Heart Rate:  [70-94] 75  Resp:  [16-18] 18  BP: ()/(66-79) 98/68  SpO2:  [96 %-99 %] 97 %  BP 98/68  Pulse 99  Temp 97.8  F (36.6  C) (Oral)  Resp 18  Ht 1.62 m (5' 3.78\")  Wt 58 kg (127 lb 13.9 oz)  SpO2 97%  BMI 22.1 kg/m2  Vitals:    02/14/18 1745 02/14/18 2201   Weight: 59 kg (130 lb 2 oz) 58 kg (127 lb 13.9 oz)     Lying in bed, quiet and occasionally sleepy, but answering questions.     Labs and Tests:  Results for orders placed or performed during the hospital encounter of 02/14/18 (from the past 24 hour(s))   CBC with platelets differential   Result Value Ref Range    WBC 4.6 4.0 - 11.0 10e9/L    RBC Count 2.98 (L) 3.8 - 5.2 10e12/L    Hemoglobin 10.3 (L) 11.7 - 15.7 g/dL    Hematocrit 31.8 (L) 35.0 - 47.0 %     (H) 78 - 100 fl    MCH 34.6 (H) 26.5 - 33.0 pg    MCHC 32.4 31.5 - 36.5 g/dL    RDW 12.6 10.0 - 15.0 %    Platelet Count 227 150 - 450 10e9/L    Diff Method Automated Method     % Neutrophils 46.9 %    % Lymphocytes 31.6 %    % Monocytes 20.2 %    % Eosinophils 0.7 %    % Basophils 0.2 %    % Immature Granulocytes 0.4 %    Nucleated RBCs 0 0 /100    Absolute Neutrophil 2.1 1.6 - 8.3 10e9/L    Absolute Lymphocytes 1.4 0.8 - 5.3 10e9/L    Absolute Monocytes 0.9 0.0 - 1.3 10e9/L    Absolute Eosinophils 0.0 0.0 - 0.7 10e9/L    Absolute Basophils 0.0 0.0 - 0.2 10e9/L    Abs Immature Granulocytes 0.0 0 - 0.4 10e9/L    Absolute Nucleated RBC 0.0     Macrocytes Present     Platelet Estimate Confirming automated cell count    Comprehensive metabolic " panel   Result Value Ref Range    Sodium 140 133 - 144 mmol/L    Potassium 3.5 3.4 - 5.3 mmol/L    Chloride 103 94 - 109 mmol/L    Carbon Dioxide 29 20 - 32 mmol/L    Anion Gap 8 3 - 14 mmol/L    Glucose 66 (L) 70 - 99 mg/dL    Urea Nitrogen 12 7 - 30 mg/dL    Creatinine 0.62 0.52 - 1.04 mg/dL    GFR Estimate >90 >60 mL/min/1.7m2    GFR Estimate If Black >90 >60 mL/min/1.7m2    Calcium 8.7 8.5 - 10.1 mg/dL    Bilirubin Total 1.9 (H) 0.2 - 1.3 mg/dL    Albumin 2.8 (L) 3.4 - 5.0 g/dL    Protein Total 6.7 (L) 6.8 - 8.8 g/dL    Alkaline Phosphatase 84 40 - 150 U/L     (H) 0 - 50 U/L     (HH) 0 - 45 U/L   Amylase   Result Value Ref Range    Amylase 59 30 - 110 U/L   GGT   Result Value Ref Range     (H) 0 - 40 U/L   Lipase   Result Value Ref Range    Lipase 518 (H) 73 - 393 U/L   EKG 12 lead - pediatric   Result Value Ref Range    Interpretation ECG Click View Image link to view waveform and result        Assessment:  Olimpia is a 23 year old female patient with metastatic synovial tumor admitted for control of pain and nausea.      Plan:  1) Placed acumagnets on bilateral wrists and feet (P-6, ST-43). Please remove in 48 hours, earlier prn or MRI.   2) Provided essential oil of peppermint for inhalation in labeled container at bedside.  3) Gave sister a recipe for easy to digest nutritious soup.  4) Sister will get peppermint tea to make ice cubes that Olimpia can suck on for nausea relief.    Will support during this admission as is clinically helpful.   Thank you for this consultation. Please do not hesitate to contact me with any questions or concerns.      Time Spent on this Encounter   I, Danyell Lawrence, spent a total of 25 minutes bedside and on the inpatient unit today managing the care of Olimpia Childress.  Over 50% of my time on the unit was spent counseling the patient and /or coordinating care. See note for details.    Danyell Lawrence MD, CM  Medical Director Pediatric Integrative Health and  Wellbeing, [facility]

## 2018-02-16 NOTE — PROGRESS NOTES
[facility]  Pain and Advanced/Complex Care Team (PACCT)  Progress Note     Olimpia Childress MRN# [id number]   Age: 23 year old YOB: 1995   Date:  02/16/2018 Admitted:  2/14/2018     Recommendations, Patient/Family Counseling & Coordination:     SYMPTOM MANAGEMENT:   Pain: well-controlled. Will switch to home-friendly regimen today if can take PO  - Depending on interactions with pazopanib (to be discussed with pharmacy), long-acting options equivalent to what she is taking now to control pain are:   1) OxyContin 20 mg BID (clarify whether prior auth has gone through. She cannot tolerate morphine - preferred by insurance- due to extreme nausea and vomiting so cannot take MS Contin)  2) methadone 5 mg BID PO (OR if cannot yet take PO, start 2.5 mg IV BID)  - Will start OxyContin tonight, and look to potentially start methadone once we know how pazopanib has effected QTc  - Discontinue hydromorphone 0.3 mg IV Q2 hours PRN breakthrough pain and REPLACE with PO (1-2 mg PO Q2 hours PRN) now  - Continue scheduled IV hydromorphone until 8 pm dose, and replace this dose with the first dose of OxyContin  - Change ondansetron to PO  - Bowel meds scheduled (senna AND Miralax) while on opioids  - Simethicone PRN gas (she is complaining of bloating/gas)  - Non-pharm management of pain and nausea: massage, acupoint therapy, essential oils, abdominal massage, warm packs    Thank you for the opportunity to participate in the care of this patient and family.   Please contact the Pain and Advanced/Complex Care Team (PACCT) with any emergent needs via text page to the PACCT general pager ([phone], answered 8-4:30 Monday to Friday). After hours and on weekends/holidays, please refer to [facility] or [place] on-call.    Attestation:  Total time on the floor involved in the patient's care: 60 minutes  Total time spent in counseling/care coordination: 45 minutes    Discussed with primary  team.    Amita Martin MD  Pager: [phone] (please text page)    Assessment:      Diagnoses and symptoms: Olimpia Childress is a(n) 23 year old female with:  Patient Active Problem List   Diagnosis     Malignant tumor cells (H)     Abdominal pain     Ileus second to Vincristine     Constipation     Pneumothorax on left     Pneumothorax, left     Sarcoma of vulva (H)     Febrile neutropenia (H)     Anemia     Chemotherapy-induced neutropenia (H)     Rhabdoid tumor (H)     Synovial sarcoma (H)     Encounter for antineoplastic chemotherapy     Emesis, persistent     Vulvar cancer (H)     Port-a-cath in place     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 Assessments will be ongoing.    Interval Events:     Pain and nausea well-controlled. Re-starting pazopanib today, and planning for radiation treatment today, Sat and next week. Aiming for discharge tomorrow. Feels ready to change to PO pain meds.    Pain assessment: well-controlled (0-4)  Side effects: constipation     Medications:     I have reviewed this patient's medication profile and medications during this hospitalization.    Scheduled medications:     pantoprazole (PROTONIX) IV  40 mg Intravenous BID w/meals     polyethylene glycol  17 g Oral Daily     senna-docusate  2 tablet Oral BID     HYDROmorphone  0.5 mg Intravenous Q4H     acetaminophen  15 mg/kg Intravenous Q6H     pazopanib  800 mg Oral Daily     ondansetron  6 mg Intravenous Q6H     ketorolac  30 mg Intravenous Q6H     Infusions:     dextrose 5% and 0.9% NaCl 1,000 mL (02/16/18 0511)     PRN medications: HYDROmorphone, diphenhydrAMINE, naloxone    Review of Systems:     Palliative Symptom Review    The comprehensive review  of systems is negative other than noted here and in the HPI. Completed by proxy by parent(s)/caretaker(s) (if applicable)    Physical Exam:       Vitals were reviewed  Temp:  [97.5  F (36.4  C)-98.2  F (36.8  C)] 97.6  F (36.4  C)  Pulse:  [80-99] 80  Heart Rate:  [75-76] 76  Resp:  [12-18] 18  BP: ()/(60-71) 94/60  SpO2:  [96 %-99 %] 99 %  Weight: 59 kg   Lying in bed, c/o bloating  Respires comfortably on RA  Abdomen soft, non-distended, non-tender. Improved pain with deep pressure.  Extremities WWP    Data Reviewed:     Results for orders placed or performed during the hospital encounter of 02/14/18 (from the past 24 hour(s))   PEDS Integrative Health IP Consult: Patient to be seen: Routine within 24 hrs; Call back #: [id number]; nausea/pain management in oncology patient; Consultant may enter orders: Yes    Narrative    Danyell Lawrence MD     2/15/2018  5:07 PM    Pediatric Integrative Health Initial Consultation    Primary Care provider: Coretta Yanez  Consulting Provider:Filemon Mendoza MD  Reason for consultation: integrative suggestions that may be of   assistance for nausea/pain    History of Present Illness: Olimpia Childress is a 23 year old female   with Olimpia Cihldress is a 23 year old female with history of   metastatic synovial tumor (first presented as a right labial   growth in 2013) who is being admitted for pain control. She has   metastatic disease in her lungs. Olimpia is accompanied in the room   by her sister. Nausea has been a persistent symptom and she is   interested in other methods of assisting with this.    ALLERGIES:  Allergies   Allergen Reactions     Heparin Flush Other (See Comments)     Due to Scientology reasons     Pork Derived Products      Pentecostal reasons     Tegaderm Transparent Dressing (Informational Only) Other (See   Comments) and Rash     PLEASE USE IV 3000 FOR DRESSING(S)       IMMUNIZATIONS:  There is no immunization history for the selected administration   types on file  for this patient.    CURRENT MEDICATIONS:  Current Facility-Administered Medications   Medication     pantoprazole (PROTONIX) 40 mg IV push injection     polyethylene glycol (MIRALAX/GLYCOLAX) Packet 17 g     senna-docusate (SENOKOT-S;PERICOLACE) 8.6-50 MG per tablet 2   tablet     dextrose 5% and 0.9% NaCl infusion     HYDROmorphone (PF) (DILAUDID) injection 0.5 mg     HYDROmorphone (PF) (DILAUDID) injection 0.3 mg     diphenhydrAMINE (BENADRYL) injection 25 mg     ondansetron (ZOFRAN) injection 8 mg     acetaminophen (OFIRMEV) infusion 870 mg     ketorolac (TORADOL) injection 30 mg     naloxone (NARCAN) injection 0.1-0.4 mg       PAST MEDICAL HISTORY:  Active Ambulatory Problems     Diagnosis Date Noted     Malignant tumor cells (H) 01/14/2016     Abdominal pain 02/09/2016     Ileus second to Vincristine 02/12/2016     Constipation 02/12/2016     Pneumothorax on left 02/22/2016     Pneumothorax, left 02/23/2016     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     Febrile neutropenia (H) 04/17/2016     Anemia 06/03/2016     Chemotherapy-induced neutropenia (H) 06/06/2016     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     Encounter for antineoplastic chemotherapy 08/09/2016     Emesis, persistent 08/14/2016     Vulvar cancer (H) 11/16/2016     Port-a-cath in place 11/28/2016     Malignant neoplasm metastatic to chest wall with unknown   primary site (H) 03/08/2017     Pneumothorax 12/11/2017     Pain 02/02/2018     Resolved Ambulatory Problems     Diagnosis Date Noted     No Resolved Ambulatory Problems     Past Medical History:   Diagnosis Date     Anemia 6/3/2016     Constipation      Extrarenal rhabdoid neoplasm (H)      Febrile neutropenia (H) 4/17/2016     History of blood transfusion      Latent tuberculosis      Lung disease      On antineoplastic chemotherapy      Sarcoma of vulva (H)          PAST SURGICAL HISTORY:  Past Surgical History:   Procedure Laterality Date     BIOPSY VULVA       BIOPSY  "VULVA Right 8/3/2016    Procedure: BIOPSY VULVA;  Surgeon: Sangita Pastrana MD;    [facility]     EXAM UNDER ANESTHESIA PELVIC N/A 11/16/2016    Procedure: EXAM UNDER ANESTHESIA PELVIC;  Surgeon: Sangita Pastrana MD;  [facility]     EXCISE MASS VULVA       EXCISE TUMOR PELVIS ANTERIOR  11/16/2016    Procedure: EXCISE TUMOR PELVIS ANTERIOR;  Surgeon: Pradeep Diop MD;  [facility]     INSERT CHEST TUBE Left 2/24/2016    Procedure: INSERT CHEST TUBE;  Surgeon: Dharmesh Uribe MD;    [facility]     INSERT PORT VASCULAR ACCESS       RECONSTRUCT VULVA WITH MUSCLE FLAP  11/16/2016    Procedure: RECONSTRUCT VULVA WITH MUSCLE FLAP;  Surgeon:   Sidra Corona MD;  [facility]     THORACOSCOPIC BIOPSY LUNG       THORACOTOMY Left 7/20/2016    Procedure: THORACOTOMY;  Surgeon: Byron Pierre MD;    [facility]     THORACOTOMY Left 3/8/2017    Procedure: THORACOTOMY;  Surgeon: Byron Pierre MD;    [facility]     VULVECTOMY RADICAL DISSECT GROIN(S) N/A 11/16/2016    Procedure: VULVECTOMY RADICAL DISSECT GROIN(S);  Surgeon:   Sangita Pastrana MD;  [facility]       FAMILY HISTORY:  Family History   Problem Relation Age of Onset     Other Cancer No family hx of        Physical Exam:   Temp:  [97.2  F (36.2  C)-98.2  F (36.8  C)] 97.8  F (36.6  C)  Pulse:  [88-99] 99  Heart Rate:  [70-94] 75  Resp:  [16-18] 18  BP: ()/(66-79) 98/68  SpO2:  [96 %-99 %] 97 %  BP 98/68  Pulse 99  Temp 97.8  F (36.6  C) (Oral)  Resp 18    Ht 1.62 m (5' 3.78\")  Wt 58 kg (127 lb 13.9 oz)  SpO2 97%  BMI   22.1 kg/m2  Vitals:    02/14/18 1745 02/14/18 2201   Weight: 59 kg (130 lb 2 oz) 58 kg (127 lb 13.9 oz)     Lying in bed, quiet and occasionally sleepy, but answering   questions.     Labs and Tests:  Results for orders placed or performed during the hospital   encounter of 02/14/18 (from the past 24 hour(s))   CBC with platelets differential   Result " Value Ref Range    WBC 4.6 4.0 - 11.0 10e9/L    RBC Count 2.98 (L) 3.8 - 5.2 10e12/L    Hemoglobin 10.3 (L) 11.7 - 15.7 g/dL    Hematocrit 31.8 (L) 35.0 - 47.0 %     (H) 78 - 100 fl    MCH 34.6 (H) 26.5 - 33.0 pg    MCHC 32.4 31.5 - 36.5 g/dL    RDW 12.6 10.0 - 15.0 %    Platelet Count 227 150 - 450 10e9/L    Diff Method Automated Method     % Neutrophils 46.9 %    % Lymphocytes 31.6 %    % Monocytes 20.2 %    % Eosinophils 0.7 %    % Basophils 0.2 %    % Immature Granulocytes 0.4 %    Nucleated RBCs 0 0 /100    Absolute Neutrophil 2.1 1.6 - 8.3 10e9/L    Absolute Lymphocytes 1.4 0.8 - 5.3 10e9/L    Absolute Monocytes 0.9 0.0 - 1.3 10e9/L    Absolute Eosinophils 0.0 0.0 - 0.7 10e9/L    Absolute Basophils 0.0 0.0 - 0.2 10e9/L    Abs Immature Granulocytes 0.0 0 - 0.4 10e9/L    Absolute Nucleated RBC 0.0     Macrocytes Present     Platelet Estimate Confirming automated cell count    Comprehensive metabolic panel   Result Value Ref Range    Sodium 140 133 - 144 mmol/L    Potassium 3.5 3.4 - 5.3 mmol/L    Chloride 103 94 - 109 mmol/L    Carbon Dioxide 29 20 - 32 mmol/L    Anion Gap 8 3 - 14 mmol/L    Glucose 66 (L) 70 - 99 mg/dL    Urea Nitrogen 12 7 - 30 mg/dL    Creatinine 0.62 0.52 - 1.04 mg/dL    GFR Estimate >90 >60 mL/min/1.7m2    GFR Estimate If Black >90 >60 mL/min/1.7m2    Calcium 8.7 8.5 - 10.1 mg/dL    Bilirubin Total 1.9 (H) 0.2 - 1.3 mg/dL    Albumin 2.8 (L) 3.4 - 5.0 g/dL    Protein Total 6.7 (L) 6.8 - 8.8 g/dL    Alkaline Phosphatase 84 40 - 150 U/L     (H) 0 - 50 U/L     (HH) 0 - 45 U/L   Amylase   Result Value Ref Range    Amylase 59 30 - 110 U/L   GGT   Result Value Ref Range     (H) 0 - 40 U/L   Lipase   Result Value Ref Range    Lipase 518 (H) 73 - 393 U/L   EKG 12 lead - pediatric   Result Value Ref Range    Interpretation ECG Click View Image link to view waveform and   result        Assessment:  Olimpia is a 23 year old female patient with metastatic synovial   tumor  admitted for control of pain and nausea.      Plan:  1) Placed acumagnets on bilateral wrists and feet (P-6, ST-43).   Please remove in 48 hours, earlier prn or MRI.   2) Provided essential oil of peppermint for inhalation in labeled   container at bedside.  3) Gave sister a recipe for easy to digest nutritious soup.  4) Sister will get peppermint tea to make ice cubes that Olimpia can   suck on for nausea relief.    Will support during this admission as is clinically helpful.   Thank you for this consultation. Please do not hesitate to   contact me with any questions or concerns.      Time Spent on this Encounter   I, Danyell Lawrence, spent a total of 25 minutes bedside and on   the inpatient unit today managing the care of Olimpia Childress.  Over   50% of my time on the unit was spent counseling the patient and   /or coordinating care. See note for details.    Danyell Lawrence MD, CM  Medical Director Pediatric Integrative Health and Wellbeing,   [facility]         EKG 12 lead - pediatric   Result Value Ref Range    Interpretation ECG Click View Image link to view waveform and result    Comprehensive metabolic panel   Result Value Ref Range    Sodium 140 133 - 144 mmol/L    Potassium 3.7 3.4 - 5.3 mmol/L    Chloride 108 94 - 109 mmol/L    Carbon Dioxide 28 20 - 32 mmol/L    Anion Gap 4 3 - 14 mmol/L    Glucose 104 (H) 70 - 99 mg/dL    Urea Nitrogen 9 7 - 30 mg/dL    Creatinine 0.70 0.52 - 1.04 mg/dL    GFR Estimate >90 >60 mL/min/1.7m2    GFR Estimate If Black >90 >60 mL/min/1.7m2    Calcium 8.2 (L) 8.5 - 10.1 mg/dL    Bilirubin Total 1.7 (H) 0.2 - 1.3 mg/dL    Albumin 2.4 (L) 3.4 - 5.0 g/dL    Protein Total 6.1 (L) 6.8 - 8.8 g/dL    Alkaline Phosphatase 101 40 - 150 U/L     (H) 0 - 50 U/L     (H) 0 - 45 U/L   Lipase   Result Value Ref Range    Lipase 693 (H) 73 - 393 U/L

## 2018-02-16 NOTE — PLAN OF CARE
Problem: Patient Care Overview  Goal: Plan of Care/Patient Progress Review  Outcome: No Change  AVSS. No Emesis or complaints of nausea. C/O pain x1, received all scheduled pain meds on time and requested no PRN. Pt slept between cares. Educated pt to call for help if needs to use the bathroom due to high amounts of pain medication. Pt alone in room. Pt on NPO for ultrasound scheduled sometime today. NPO started at 0000. Continue with pain management.

## 2018-02-16 NOTE — PROGRESS NOTES
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    THI met with Olimpia and Raul clement. THI and Olimpia contacted [facility] together to schedule PCA assessment. Public health nurse scheduled to do in home assessment on Tuesday, February 27th at 12:30pm. Assessment will take place at Ezio clement, home in [place], as Olimpia spends time at both cousins homes. Olimpia also interested in home nursing and home PT. Notified care coordinator who will make referral. THI scheduled transportation for Olimpia's radiation appointments next week M-Th (cousin will provide transportation on Friday), through [facility] [employer] cab voucher, as [employer] unwilling to pick Olimpia up from [place] address where she will be staying next week.     INTERVENTION:     Supportive check in. Assisted Olimpia in scheduling PCA assessment for 2.27.18. Scheduled transportation for radiation next week. Requested home PT and RN referral.     ASSESSMENT:     Olimpia was in good spirits this afternoon, as her pain was under good control. She is hopeful to discharge tomorrow after her radiation session. She was appreciative of  support and relieved to have PCA assessment scheduled. She is still waiting for oral chemotherapy to be approved by insurance.     PLAN:     Social work will continue to assess needs and provide ongoing psychosocial support and access to resources.       BELKIS Salinas, [facility]  Pediatric Hem/Onc   Phone: [phone]  Pager: [phone]

## 2018-02-16 NOTE — PROGRESS NOTES
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 Since admission her pain has significantly improved thus will attempt to transition to oral medications.     Neuro:  Chest pain: Similar to prior pain, likely 2/2 extensive metastatic disease. Followed by PACCT.   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 Has been off chemotherapy as awaiting insurance coverage.    - Rad/Onc consulted this AM --> will proceed with radiation today  - Will restart Pazopanib daily, awaiting insurance coverage for subsequent chemotherapy    FEN:  - Regular diet  - D5NS MIVF (IV/PO titrate)    GI:  Nausea/vomitin/2 disease  -- Zofran, benadryl prn    GERD:  -- Ranitidine 150mg Qhs    Transaminitis: Improving this AM    Elevated lipase: No abdominal pain at this time. Lipase increased this AM. Areas of pancreas visualized on Abd US were normal.   - Recheck lipase as outpatient    Bowel regimen:  -- Miralax 17g daily, Senna BID    Resp:   Chest metastases with large fluid density along left hemidiaphragm: IR previously consulted who recommended drainage of fluid collection with indwelling drain in place. Olimpia previously declined this option. Currently stable in room air with no respiratory concerns.     Code Status: Prior    Disposition: Expected discharge tomorrow, pending adequate pain control on oral medications and taking adequate PO.     Staffed with Dr. Mendoza.    Tory Hernandez M.D.    PGY-3 Pediatric Resident  [phone]    I saw and evaluated the patient. Switching to oral pain regimen and planning for discharge tomorrow if pain well controlled and tolerating POs. Radiation to start today. I discussed with the resident/fellow and agree with the findings and plan as documented in the resident's note. I personally spent a total of 35 minutes on the unit/floor in direct care of this patient. Total time included discussion with multiple providers on rounds, discussion with patient/family, physical examination, and reviewing data such as laboratory and radiographic studies. Greater than 50% of the total time was spent counseling and/or coordinating the care of the patient. Details can be found in the resident/fellow note.    Filemon Mendoza M.D./Ph.D  Pediatric Hematology/Oncology      Interval History   Pain well controlled on current regimen, only required prn dilaudid x 1. Intermittent nausea/vomiting has improved this morning. Continues to deny abdominal pain but does report bloating. Minimal PO yesterday.     -Data reviewed today: I reviewed all new labs and imaging results over the last 24 hours. I personally reviewed no images or EKG's today.    Physical Exam   Temp: 97.6  F (36.4  C) Temp src: Oral BP: 94/60 Pulse: 80 Heart Rate: 76 Resp: 18 SpO2: 99 % O2 Device: None (Room air)    Vitals:    02/14/18 1745 02/14/18 2201 02/15/18 2000   Weight: 59 kg (130 lb 2 oz) 58 kg (127 lb 13.9 oz) 59.6 kg (131 lb 8 oz)     Vital Signs with Ranges  Temp:  [97.5  F (36.4  C)-98.2  F (36.8  C)] 97.6  F (36.4  C)  Pulse:  [80-99] 80  Heart Rate:  [75-76] 76  Resp:  [12-18] 18  BP: ()/(60-71) 94/60  SpO2:  [96 %-99 %] 99 %  I/O last 3 completed shifts:  In: 2759 [P.O.:180; I.V.:2579]  Out: 1330 [Urine:1200; Emesis/NG output:130]    GENERAL: Awake, alert, no acute distress  SKIN: Clear. No significant rash, abnormal pigmentation or lesions  HEENT: Normocephalic, atraumatic.. Extraocular muscles intact.  Normal conjunctivae. Normal ear canals. Nose without discharge. Oropharynx clear, without erythema or lesions.   NECK: Supple, no masses.    LUNGS: Breathing comfortably on room air. Diminished aeration left base. No rales, rhonchi, wheezing or retractions.   HEART: Regular rate and rhythm. Normal S1/S2. No murmurs. Normal pulses. Cap refill brisk  ABDOMEN: Soft, non-tender, not distended, no masses or hepatosplenomegaly. Bowel sounds normal.   EXTREMITIES: Full range of motion, no deformities      Medications     dextrose 5% and 0.9% NaCl 1,000 mL (02/16/18 0511)       pantoprazole (PROTONIX) IV  40 mg Intravenous BID w/meals     polyethylene glycol  17 g Oral Daily     senna-docusate  2 tablet Oral BID     HYDROmorphone  0.5 mg Intravenous Q4H     acetaminophen  15 mg/kg Intravenous Q6H     pazopanib  800 mg Oral Daily     ondansetron  6 mg Intravenous Q6H     ketorolac  30 mg Intravenous Q6H       Data     Recent Labs  Lab 02/16/18  0612 02/15/18  0708   WBC  --  4.6   HGB  --  10.3*   MCV  --  107*   PLT  --  227    140   POTASSIUM 3.7 3.5   CHLORIDE 108 103   CO2 28 29   BUN 9 12   CR 0.70 0.62   ANIONGAP 4 8   JANA 8.2* 8.7   * 66*   ALBUMIN 2.4* 2.8*   PROTTOTAL 6.1* 6.7*   BILITOTAL 1.7* 1.9*   ALKPHOS 101 84   * 477*   * 686*   LIPASE 693* 518*       No results found for this or any previous visit (from the past 24 hour(s)).

## 2018-02-16 NOTE — PROGRESS NOTES
Care Coordinator- Discharge Planning     Admission Date/Time:  2/14/2018  Attending MD:  Coretta Yanez*     Data  Chart reviewed, discussed with interdisciplinary team.   Patient was admitted for:   1. Chest pain, unspecified type    2. Other chest pain    3. Synovial sarcoma (H)         Assessment  Patient requesting skilled nursing and home PT referrals through [facility]    Coordination of Care and Referrals: Provided patient/family with options for Home Care.      Plan  Anticipated Discharge Date:  1-2 days  Anticipated Discharge Plan:  Home with home care      Eveline Lomax RN

## 2018-02-16 NOTE — PLAN OF CARE
Problem: Patient Care Overview  Goal: Plan of Care/Patient Progress Review  Outcome: No Change  VSS. Pt rates pain 0-4/10, pain relieved with scheduled pain medications. PRN Dilaudid x1. Emesis x1, benadryl given with relief. Patient able to then tolerate some PO intake. Slept on and off throughout the evening. Voiding well. No stool this shift. Family present at bedside and involved in cares. Hourly rounding completed. Continue plan of care.

## 2018-02-16 NOTE — CONSULTS
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 Yanez    HISTORY OF PRESENT ILLNESS:   Ms. Childress is a 23 year old female with a diagnosis of metastatic synovial sarcoma. She previously received treatment in our department to the primary mass in her right labia to 4230 cGy in 24 fractions completed 10/17/16. A full consult note can be found in the EMR on 16. She initially presented with a growth on her right labia in  when she was living in [facility]. She had a biopsy performed there that she was told was consistent with [place] Sarcoma. This was followed by resection of the mass and one cycle of chemotherapy with Cytoxan and Doxorubicin. [place]limp[place] subsequently immig[place] to the  in [place] and in 2015 she first noticed a recurrence of the mass in the area of previous excision. 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 Olimpia went on to have a PET/CT on 12/23/15 at [facility] as well which showed multiple pulmonary lesions and a biopsy confirmed a lesion to be metastatic disease. Olimpia had been receiving therapy for metastatic extrarenal rhabdoid tumor per [id number] regimen I, 16-16.  The plan was to give chemotherapy, then resect the primary and sequentially do lung metastatectomies. She underwent a left thoracotomy  with excision of left upper lobe and left lower lobe metastases on 7/20/16. Pathology showed benign tissue in the RUL but the pathologist was suspicous that this was not rhabdoid tumor. Gene testing confirmed metastatic synovial sarcoma in the LLL ([id number]). This prompted reevaluation of her working diagnosis of extrarenal rhabdoid tumor. On 8/3/16, she underwent excisional biopsy of her right labial mass performed by Dr. Pierre. Pathology from this biopsy showed a 5 cm grade 3 synovial sarcoma, similar to the recently biopsied lung mass ([id number]). She then underwent treatment in our department as discussed above. She then went on to have a resection of her labial mass on 11/16/16 by Jannet Pastrana and Chikis with reconstruction, including skin flaps by Dr. Corona from plastics. Pathology showed no residual tumor.  She had a f/u chest CT today on 12/5/16 that showed persistance of her pulmonary nodules as well as few additonal pulmonary nodules.  She saw Dr. Pierre who was in agreement with surgical resection, however Olimpia wanted to wait until March to have more time to recover from her last surgery. She underwent left sided thoracotomy on 3/8/17.     In May, Olimpia's follow up PET/CT demonstrated significant progression of her pulmonary metastatic disease, with an anterior mediastinal mass compression her right ventricle and potentially right outflow tract.  Subsequent echocardiogram did not show altered cardiac function, Dr Man felt radiation therapy was not in her best interest at this time but would consider if she developed cardiac dysfunction.  Olimpia was started on oral Pazopanib in the beginning of May for palliative therapy with doses held due to palmar plantar dysesthesia and stomatitis, and restarted 50% dosing on 6/19/17. Olimpia has imaging in August 2017 that showed a positive response to therapy. However January 2018 CT Chest, she was found to have progression. Pazopanib was stopped at this time  and plans were made to start therapy with cabozantinib although she has not started this yet due to insurance issues.      She was admitted to the inpatient service 2/2/18 for excruciating abdominal pain and was found to have further progressive metastatic disease extending now into her abdominal cavity on CT abdomen. Olimpia declined to have her largest met drained by IR and opted for non-surgical pain control. Her pain improved with protonix, oxycontin, and oxycodone for break through pain.     She again presented on 2/14/18 with similar symptoms to those that brought her in on 2/2/18. She reports increasing pain in the left abdomen and chest for the past several weeks.     On exam today, Olimpia reports that her pain is currently well-controlled on her current pain regimen. She reports that the pain has been present for about 2 weeks and is present in the left lateral chest wall and flank. She had a PET/CT on 2/8/18 which showed evidence of rapid disease progression since 1/25/18    PAST MEDICAL HISTORY:   Past Medical History:   Diagnosis Date     Anemia 6/3/2016     Constipation      Extrarenal rhabdoid neoplasm (H)      Febrile neutropenia (H) 4/17/2016     History of blood transfusion      Latent tuberculosis      Lung disease      On antineoplastic chemotherapy      Sarcoma of vulva (H)      Past Surgical History:   Procedure Laterality Date     BIOPSY VULVA       BIOPSY VULVA Right 8/3/2016    Procedure: BIOPSY VULVA;  Surgeon: Sangita Pastrana MD;  Location: [facility] OR     EXAM UNDER ANESTHESIA PELVIC N/A 11/16/2016    Procedure: EXAM UNDER ANESTHESIA PELVIC;  Surgeon: Sangita Pastrana MD;  Location: [facility] OR     EXCISE MASS VULVA       EXCISE TUMOR PELVIS ANTERIOR  11/16/2016    Procedure: EXCISE TUMOR PELVIS ANTERIOR;  Surgeon: Pradeep Diop MD;  Location: [facility] OR     INSERT CHEST TUBE Left 2/24/2016    Procedure: INSERT CHEST TUBE;  Surgeon: Dharmesh Uribe MD;  Location: [facility] OR     INSERT PORT  VASCULAR ACCESS       RECONSTRUCT VULVA WITH MUSCLE FLAP  11/16/2016    Procedure: RECONSTRUCT VULVA WITH MUSCLE FLAP;  Surgeon: Sidra Corona MD;  Location: [facility] OR     THORACOSCOPIC BIOPSY LUNG       THORACOTOMY Left 7/20/2016    Procedure: THORACOTOMY;  Surgeon: Byron Pierre MD;  [facility]: [facility]     THORACOTOMY Left 3/8/2017    Procedure: THORACOTOMY;  Surgeon: Byron Pierre MD;  [facility]     VULVECTOMY RADICAL DISSECT GROIN(S) N/A 11/16/2016    Procedure: VULVECTOMY RADICAL DISSECT GROIN(S);  Surgeon: Sangita Pastrana MD;  Location: [facility] OR        CHEMOTHERAPY HISTORY: See HPI    PAST RADIATION THERAPY HISTORY:    1. 4320 cGy in 24 fractions to right labia, completed 10/17/16    MEDICATIONS:   Current Facility-Administered Medications   Medication     ranitidine (ZANTAC) tablet 150 mg     polyethylene glycol (MIRALAX/GLYCOLAX) Packet 17 g     senna-docusate (SENOKOT-S;PERICOLACE) 8.6-50 MG per tablet 2 tablet     dextrose 5% and 0.9% NaCl infusion     HYDROmorphone (PF) (DILAUDID) injection 0.5 mg     HYDROmorphone (PF) (DILAUDID) injection 0.3 mg     diphenhydrAMINE (BENADRYL) injection 25 mg     acetaminophen (OFIRMEV) infusion 870 mg     pazopanib (VOTRIENT) tablet CHEMOTHERAPY 800 mg     ondansetron (ZOFRAN) injection 6 mg     ketorolac (TORADOL) injection 30 mg     naloxone (NARCAN) injection 0.1-0.4 mg       ALLERGIES:  allergic to heparin flush; pork derived products; and tegaderm transparent dressing (informational only).    SOCIAL HISTORY:   Social History     Social History     Marital status:      Spouse name: N/A     Number of children: N/A     Years of education: N/A     Occupational History     Not on file.     Social History Main Topics     Smoking status: Never Smoker     Smokeless tobacco: Never Used     Alcohol use No     Drug use: No     Sexual activity: No     Other Topics Concern     Not on file     Social History Narrative     FAMILY  "HISTORY:   family history is negative for Other Cancer.    REVIEW OF SYMPTOMS:  A full 14-point review of systems was performed.     PHYSICAL EXAMINATION:    /71 (BP Location: Left arm)  Pulse 80  Temp 97.5  F (36.4  C) (Axillary)  Resp 20  Ht 1.605 m (5' 3.19\")  Wt 59.6 kg (131 lb 8 oz)  SpO2 99%  BMI 23.16 kg/m2  General: Alert, oriented, NAD  Skin: No rashes or lesions appreciated  HEENT: NCAT, EOMI  Neck: full ROM  Breasts: Deferred  Heart: WWP  Chest: Breathing comfortably on RA, mild tenderness to palpation of lateral left chest wall  Abd: Soft, nondistended, nontender  /Rectal: Deferred  Ext: Atraumatic, grossly intact strength  Neuro: CNs grossly intact      ASSESSMENT: Metastatic synovial sarcoma with extensive involvement of the left lower lobe and LUQ causing pain.    RECOMMENDATION:   We recommend palliative RT to the left chest wall and superior left abdomen for palliation of pain.    We discussed that she has extensive metastatic involvement in the areas where she had been endorsing pain on admission as evidenced by her recent PET scan. We suspect that this is almost certainly the cause of the pain leading to her past 2 admissions. Although her pain is well controlled on IV pain medication, we feel treatment is indicated at this time given her rapid progression on imaging and inadequate outpatient pain control previously. The patient is interested in radiation treatments for pain palliation. We therefore discussed the logistics of treatment as well as the expected acute and potential late side effects. The patient asked a number of questions which were answered to the best of our ability. Informed consent was obtained and the patient underwent a CT simulation. We will plan to begin treatment later today and will plan for a total of 10 treatments which can be completed as an outpatient since she will likely be discharged before the end of treatment.     Thank you for allowing us to " participate in this patient's care.  Please feel free to call with any questions or concerns.       The patient was seen and discussed with staff, Dr. Man.    Jagdeep Greco MD  Resident, PGY-3  Department of Radiation Oncology  [facility]  [phone]    I was personally present with the resident during the history and exam.  I   discussed the case with the resident and agree with the findings and plan of care as documented in the resident's note. The risks and benefits of radiotherapy were discussed with the patient.  All questions were answered.  Please do not hesitate to call me if you have questions or concerns    Giana Man MD  Pager  [phone]  Clinic   [phone]  [facility]  CC  Patient Care Team:  Coretta Yanez MD as PCP - General (Pediatric Hematology/Oncology)  Lambert Abdullahi MD as MD (Pediatrics)  Omar Trammell APRN CNP as Nurse Practitioner (Pediatrics)  Byron Pierre MD as MD (Pediatric Surgery)  Sangita Pastrana MD as Referring Physician (Oncology)  Sidra Corona MD as MD (Plastic Surgery)  Cheri Issa APRN CNP as Nurse Practitioner

## 2018-02-17 NOTE — PLAN OF CARE
"Problem: Patient Care Overview  Goal: Plan of Care/Patient Progress Review  Outcome: No Change  VSS. Good UOP, stooled. Fair PO intake. Received radiation w/o issues. Complaining of \"burning\" and \"exploding\" pain in stomach, PRN dilaudid and abdominal massage given with partial relief. No family at the bedside. Will continue to monitor and notify MD of changes.      "

## 2018-02-17 NOTE — PLAN OF CARE
"Problem: Patient Care Overview  Goal: Plan of Care/Patient Progress Review  D/I:  Pt afebrile with stable vital signs.  Lung sounds clear bilaterally with diminished in lower lobes.  Pt denied any pain/nausea at beginning of shift, but had an emesis and requested Zofran at 0950.  Zofran 8mg given as per orders.  Pt also given prn Simethicone for \"gassy stomach\".  Pt went to Radiation Therapy @ 1045.  Upon returning, patient ate lunch provided by brother and then emesed after.  Pt given po prn Benadryl, emesed again.  MD ordered IV Benadryl and IV Dilaudid one dose.  Pt slept after given the two iv meds.  Pt has just taken po tylenol.  Pt had portable abd xray done this afternoon.  A:  Pt not tolerating po intake well this shift.  Has not been able to take most of her po meds this shift.  Pt feels a little better now per report.  P:  Administer po meds as ordered, monitor for nausea.  Keep MD informed.  Pt will not be discharged today. Pt is planning on discharge tomorrow.      "

## 2018-02-17 NOTE — PHARMACY - DISCHARGE MEDICATION RECONCILIATION AND EDUCATION
Discharge medication review for this patient completed.  Pharmacist provided medication teaching for discharge with a focus on new medications/dose changes.  The discharge medication list was reviewed with patient and patient's cousin and the following points were discussed, as applicable: Name, description, purpose, dose/strength, duration of medications, special storage requirements, common side effects, food/medications to avoid, action to be taken if dose is missed, when to call MD, safe disposal of unused medications and how to obtain refills.    Olimpia was quite sleepy during teaching, but her cousin was engaged during teaching and verbalized understanding.    All medications in hand during teach except Votrient due to in patient bin in med room (due to billing issues patient using home supply while inpatient) and Oxycontin 20mg due to not covered by insurance. Medication(s) placed in medication room, awaiting discharge.  Olimpia confirmed she has a supply of Oxycontin 10mg tablets at home and understands she will be taking 2 tablets 2 times daily until we can get the 20mg Oxycontin covered by insurance.  Patient's cousin aware the Votrient supply will be sent home with patient.      The following medications were discussed:  Current Discharge Medication List      START taking these medications    Details   HYDROmorphone (DILAUDID) 2 MG tablet Take 0.5-1 tablets (1-2 mg) by mouth every 2 hours as needed for moderate to severe pain  Qty: 30 tablet, Refills: 0    Associated Diagnoses: Pain; Malignant neoplasm of chest wall (H)      ranitidine (ZANTAC) 150 MG tablet Take 1 tablet (150 mg) by mouth daily  Qty: 30 tablet, Refills: 0    Associated Diagnoses: Gastritis without bleeding, unspecified chronicity, unspecified gastritis type         CONTINUE these medications which have CHANGED    Details   oxyCODONE (OXYCONTIN) 20 MG 12 hr tablet Take 1 tablet (20 mg) by mouth every 12 hours  Qty: 28 tablet, Refills: 0     Associated Diagnoses: 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); Pain         CONTINUE these medications which have NOT CHANGED    Details   polyethylene glycol (MIRALAX/GLYCOLAX) Packet Take 17 g by mouth daily as needed for constipation  Qty: 7 packet, Refills: 1    Associated Diagnoses: Acute post-operative pain      ondansetron (ZOFRAN) 8 MG tablet Take 1 tablet (8 mg) by mouth every 6 hours as needed for nausea  Qty: 30 tablet, Refills: 0    Associated Diagnoses: Encounter for antineoplastic chemotherapy      senna-docusate (SENOKOT-S;PERICOLACE) 8.6-50 MG per tablet Take 2 tablets by mouth 2 times daily as needed for constipation  Qty: 30 tablet, Refills: 0    Associated Diagnoses: Acute post-operative pain      acetaminophen (TYLENOL) 325 MG tablet Take 2 tablets (650 mg) by mouth every 4 hours as needed for mild pain  Qty: 100 tablet, Refills: 1    Associated Diagnoses: Sarcoma of vulva (H)      ibuprofen (ADVIL/MOTRIN) 600 MG tablet Take 1 tablet (600 mg) by mouth every 6 hours as needed for moderate pain  Qty: 60 tablet, Refills: 1    Associated Diagnoses: Sarcoma of vulva (H)      diphenhydrAMINE (BENADRYL) 25 MG tablet Take 1-2 tablets (25-50 mg) by mouth every 6 hours as needed for other (Nausea or vomiting)  Qty: 60 tablet, Refills: 0    Associated Diagnoses: Chemotherapy induced nausea and vomiting      diltiazem 2% in PLO cream, FV COMPOUNDED, 2% GEL To anal opening three times daily.  Use a pea-sized amount.  Store at room temperature.  Qty: 60 g, Refills: 0    Associated Diagnoses: Anal fissure         STOP taking these medications       oxyCODONE IR (ROXICODONE) 5 MG tablet Comments:   Reason for Stopping:         pantoprazole (PROTONIX) 40 MG EC tablet Comments:   Reason for Stopping:               I spent approximately 10 minutes in patient's room doing discharge medication teaching.

## 2018-02-17 NOTE — PLAN OF CARE
Problem: Patient Care Overview  Goal: Plan of Care/Patient Progress Review  Outcome: No Change  VSS. Good UOP and PO intake, fluids to TKO. Patient comfortable and able to sleep overnight. At times refusing ibuprofen/tylenol. No family at bedside. Will continue to monitor and notify MD of changes.

## 2018-02-18 NOTE — PLAN OF CARE
Problem: Patient Care Overview  Goal: Plan of Care/Patient Progress Review  Outcome: No Change  AVSS. Pain 4-6/10 last evening, relief noted with scheduled pain meds and PRN PO dilaudid. Denies pain since midnight. C/O nausea last evening, relief noted with PRN IV benadryl; pt refused PO antiemetics. Pt also refused 0200 dose of ibuprofen. Denies nausea since midnight. Good PO intake once nausea was controlled. Good UOP, no BM. Acu-therapy magnets removed last evening per active orders. Radiation scheduled for this AM. D/C pending ability to keep oral meds down. No family at bedside overnight. Hourly rounding completed. Will continue to monitor and reassess.

## 2018-02-18 NOTE — PLAN OF CARE
Problem: Patient Care Overview  Goal: Plan of Care/Patient Progress Review  Pt afeb, vss, denies pain yet nauseous all day.  Scheduled zofran given, benadryl x1, ativan to be given.  Aromatherapy stick brought in to pt.  Minimal to no po intake, good uop with stool noted.  Pt went down for radiation.  Sister at bedside, plan of care discussed with patient.

## 2018-02-18 NOTE — PLAN OF CARE
Problem: Patient Care Overview  Goal: Plan of Care/Patient Progress Review  Outcome: Improving  VSS, afebrile. Patient reported mild pain, but denied nausea/vomiting. Good urine output. She reported eating two eggs for dinner, tolerated well. Patient ambulating to bathroom with standby assist. Family at bedside in the afternoon. Continue plan of care, notify team with changes.

## 2018-02-18 NOTE — PROGRESS NOTES
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 Since admission her pain has improved although she continues to struggle with nausea which is preventing her from fully transitioning to oral medications.     Neuro:  Chest pain: Similar to prior pain, likely 2/2 extensive metastatic disease. Followed by PACCT. Improving.   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 Has been off chemotherapy as awaiting insurance coverage.    - Rad/Onc consulted --> radiation daily started   -  Pazopanib restarted daily, awaiting insurance coverage for subsequent chemotherapy    FEN:  - Regular diet  - D5NS MIVF (IV/PO titrate)    GI:  Nausea/vomitin/2 disease  -- Zofran, benadryl prn    GERD:  -- Ranitidine 150mg Qhs    Transaminitis: Improving this AM  - Repeat with CMP tomorrow    Elevated lipase:  Lipase increased this AM. Areas of pancreas visualized on Abd US were normal.   - Continues to have abdominal pain thus will trend tomorrow    Bowel regimen:  -- Miralax 17g daily --> increase to BID  -- Senna daily     Abdominal pain: Described as \"bloating\" with no focal pain. Abdomen soft. AXR with no significant gaseous distension or stool burden. AUS unremarkable. Consider LUQ metastatic mass vs cramping from viral illness vs intermittent gas.   - trend lipase in AM  - simethicone prn  - bowel regimen as above    Resp:   Chest metastases with large fluid density along left hemidiaphragm: IR previously consulted who recommended drainage of fluid collection with " "indwelling drain in place. Asma previously declined this option. Currently stable in room air with no respiratory concerns.     Code Status: Prior    Disposition: Pending adequate pain control on oral medications.     Staffed with Dr. Mendoza.    Tory Hernandez M.D.   PGY-3 Pediatric Resident  [phone]    I saw and evaluated the patient. Continue to optimize pain and nausea management. I discussed with the resident/fellow and agree with the findings and plan as documented in the resident's note. I personally spent a total of 35 minutes on the unit/floor in direct care of this patient. Total time included discussion with multiple providers on rounds, discussion with patient/family, physical examination, and reviewing data such as laboratory and radiographic studies. Greater than 50% of the total time was spent counseling and/or coordinating the care of the patient. Details can be found in the resident/fellow note.    Filemon Mendoza M.D./Ph.D  Pediatric Hematology/Oncology        Interval History   Pain well controlled on current regimen, only required prn dilaudid x 1. Continues to have intermittent nausea and vomiting. Requested 1x IV dilaudid this afternoon for abdominal pain. Describes abdominal pain as \"bloating, diffuse\".     -Data reviewed today: I reviewed all new labs and imaging results over the last 24 hours. I personally reviewed no images or EKG's today.    Physical Exam   Temp: 97.6  F (36.4  C) Temp src: Oral BP: 98/66 Pulse: 82 Heart Rate: 77 Resp: 18 SpO2: 100 % O2 Device: None (Room air)    Vitals:    02/14/18 2201 02/15/18 2000 02/16/18 1831   Weight: 58 kg (127 lb 13.9 oz) 59.6 kg (131 lb 8 oz) 62.7 kg (138 lb 3.7 oz)     Vital Signs with Ranges  Temp:  [97  F (36.1  C)-98  F (36.7  C)] 97.6  F (36.4  C)  Pulse:  [82] 82  Heart Rate:  [74-86] 77  Resp:  [15-18] 18  BP: ()/(57-74) 98/66  SpO2:  [99 %-100 %] 100 %  I/O last 3 completed shifts:  In: 2557.36 [P.O.:1820; I.V.:737.36]  Out: 4070 " [Urine:2200; Emesis/NG output:400; Other:1020; Stool:450]    GENERAL: Awake, alert, no acute distress  SKIN: Clear. No significant rash, abnormal pigmentation or lesions  HEENT: Normocephalic, atraumatic.. Extraocular muscles intact. Normal conjunctivae. Normal ear canals. Nose without discharge. Oropharynx clear, without erythema or lesions.   NECK: Supple, no masses.    LUNGS: Breathing comfortably on room air. Diminished aeration left base. No rales, rhonchi, wheezing or retractions.   HEART: Regular rate and rhythm. Normal S1/S2. No murmurs. Normal pulses. Cap refill brisk  ABDOMEN: Soft, non-tender, not distended, no masses or hepatosplenomegaly. Bowel sounds normal.   EXTREMITIES: Full range of motion, no deformities      Medications     dextrose 5% and 0.9% NaCl 3 mL/hr at 02/17/18 1549       polyethylene glycol  17 g Oral BID     oxyCODONE  20 mg Oral Q12H     acetaminophen  650 mg Oral Q6H     ibuprofen  600 mg Oral Q6H     ranitidine  150 mg Oral Daily     senna-docusate  2 tablet Oral BID     pazopanib  800 mg Oral Q24H       Data     Recent Labs  Lab 02/16/18  0612 02/15/18  0708   WBC  --  4.6   HGB  --  10.3*   MCV  --  107*   PLT  --  227    140   POTASSIUM 3.7 3.5   CHLORIDE 108 103   CO2 28 29   BUN 9 12   CR 0.70 0.62   ANIONGAP 4 8   JANA 8.2* 8.7   * 66*   ALBUMIN 2.4* 2.8*   PROTTOTAL 6.1* 6.7*   BILITOTAL 1.7* 1.9*   ALKPHOS 101 84   * 477*   * 686*   LIPASE 693* 518*       Recent Results (from the past 24 hour(s))   XR Abdomen Port 1 View    Narrative    XR ABDOMEN PORT 1 VW  2/17/2018 2:01 PM      HISTORY: abdominal pain;     COMPARISON: CT 2/2/2018    FINDINGS: Portable supine view of the abdomen. There is no abnormal  bowel gas distention. No definite pneumatosis or portal venous gas.  Findings at the left lung base are unchanged from the comparison CT.      Impression    IMPRESSION: No abnormal bowel gaseous distention.    YUSRA GAMEZ MD

## 2018-02-18 NOTE — DISCHARGE SUMMARY
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 She was hospitalized two weeks ago (2/2/18) for similar symptoms and was found to have further progressive metastatic disease extending from her chest into her abdomen. During that hospitalization, she declined to have the largest drained by IR, and opted for non-surgical pain control with oxycodone and oxycontin.      Her symptoms initially improved after discharge, and she reported improvement in pain at her follow-up clinic appointment one week ago. However two days prior to admission her pain again worsened. She had been taking the oxycontin in the morning and evening, and 4 oxycodone throughout the day. She had also been experiencing nausea and vomiting, with inability to tolerate oral medications.      Hospital Course   Olimpia Childress was admitted on 2/14/2018.  The following problems were addressed during her hospitalization:    Chest and abdominal pain:   Her chest pain was similar to that described prior, located where she has a large fluid density mass on her left hemidiaphragm. PACCT was consulted and assisted in pain management. She tolerated transition back to oral medications and was discharged on Oxycontin 20mg Q12H with Dilaudid 1-2mg PO Q2Hprn.     Her abdominal pain seemed to be related to nausea and bloating. An abdominal XR was negative for significant stool burden,  gaseous disension, or signs of obstruction. Her liver enzymes and pancreatic enzymes were elevated on hospital day 1 thus an abdominal ultrasound was done which showed no evidence of metastatic disease or other abnormalities in the liver. The portion of the pancreas visualized was also normal. Her liver enzymes were downtrending at discharge. Her lipase was 911 (up from 693 on 2/14) and will be rechecked in clinic next week.     Nausea  Olimpia had nausea throughout her hospital stay and required one dose of IV ativan on the day of discharge. The plan is to continue scheduled zofran Q6. She may space to prn if she is able to tolerate. She was also interested in trying a scopolamine patch so that was prescribed at discharge.     Metastatic synovial sarcoma:  Radiation oncology was consulted and she underwent radiation daily from 2/16-discharge. She will continue this daily at discharge. Her pazopanib was restarted this admission.     Patient was seen and discussed with attending physician, Dr. Mendoza.    Fay Stoll MD  Pediatrics, PGY-1      Significant Results and Procedures   Abdominal US:  FINDINGS:  The liver is normal in contour and of normal echogenicity. There is no  evidence of hepatic metastatic disease or other focal liver mass.  There is no intrahepatic or extrahepatic biliary ductal dilatation.  The common bile duct measures 4.0 mm. The gallbladder is normal,  without gallstones, wall thickening, or pericholecystic fluid.     The spleen measures maximally 9.8 cm and is normal in appearance. The  visualized portions of the pancreas are normal in echogenicity.     The visualized upper abdominal aorta and inferior vena cava are  normal.       The kidneys are normal in position and echogenicity. The right kidney  measures 9.9 cm and the left kidney measures 10.3 cm. There is no  significant urinary tract dilation. The urinary bladder is partially  distended and normal in morphology. The bladder wall is  normal. Some  debris is seen within the bladder.     Small right pleural effusion. Partial visualization of known large  left upper quadrant metastatic lesion exerting mass effect on the  spleen and left kidney. This lesion demonstrates multiple internal  cystic/septated areas which may represent necrosis.  IMPRESSION:   1. Partial demonstration of known large left upper quadrant metastatic  mass.  2. No evidence of metastatic disease the liver and no other definite  cause for elevated LFTs is identified.  3. Debris is seen within the bladder. The bladder is otherwise normal.   FAIZA JAY MD    Pending Results     Unresulted Labs Ordered in the Past 30 Days of this Admission     No orders found from 12/16/2017 to 2/15/2018.          Code Status   Full Code    Primary Care Physician   Coretta Yanez MD    Physical Exam   Temp: 97.8  F (36.6  C) Temp src: Oral BP: 104/72 Pulse: 87 Heart Rate: 72 Resp: 16 SpO2: 100 % O2 Device: None (Room air)    Vitals:    02/14/18 2201 02/15/18 2000 02/16/18 1831   Weight: 58 kg (127 lb 13.9 oz) 59.6 kg (131 lb 8 oz) 62.7 kg (138 lb 3.7 oz)       Constitutional: Awake, alert, cooperative, no apparent distress.  Eyes: Lids and lashes normal, pupils equal, round and reactive to light, extra ocular muscles intact, sclera clear, conjunctiva normal.  ENT: Normocephalic, without obvious abnormality, atraumatic, external ears without lesions, oral pharynx with moist mucus membranes, tonsils without erythema or exudates, gums normal and good dentition.  Respiratory: No increased work of breathing, good air exchange, clear to auscultation bilaterally, no crackles or wheezing.  Cardiovascular: Regular rate and rhythm, normal S1 and S2, no S3 or S4, and no murmur noted.  GI: Normal bowel sounds, soft, non-distended, non-tender, no masses palpated, no hepatosplenomegaly.  Lymph/Hematologic: No cervical lymphadenopathy and no supraclavicular lymphadenopathy.  Musculoskeletal: There is  no redness, warmth, or swelling of the joints.  Full range of motion noted. Tone is normal.  Neurologic: Awake, alert, oriented to name, place and time.  Cranial nerves II-XII are grossly intact.     Time Spent on this Encounter   I, Fay Stoll, personally saw the patient today and spent greater than 30 minutes discharging this patient.    Discharge Disposition   Discharged to home  Condition at discharge: Stable    Consultations This Hospital Stay   MEDICATION HISTORY IP PHARMACY CONSULT  PEDS PACCT (PAIN AND ADVANCED/COMPLEX CARE TEAM) IP CONSULT  PEDS INTEGRATIVE HEALTH IP CONSULT  RADIATION ONCOLOGY IP CONSULT    Discharge Orders     Home care nursing referral     Reason for your hospital stay   Pain management with IV medications.     Follow Up and recommended labs and tests   Follow-up with radiation as scheduled tomorrow. Next Heme/Onc visit with Omar on Tuesday 2/20 at 2:15pm.     Activity   Your activity upon discharge: activity as tolerated     When to contact your care team   Increased pain despite taking prescribed oxycontin and dilaudid, unable to tolerate fluids due to nausea, new fevers, any additional medical concerns.     Full Code     Diet   Follow this diet upon discharge: regular diet       Discharge Medications   Current Discharge Medication List      START taking these medications    Details   ondansetron (ZOFRAN-ODT) 8 MG ODT tab Take 1 tablet (8 mg) by mouth every 6 hours  Qty: 120 tablet, Refills: 1    Associated Diagnoses: Non-intractable vomiting with nausea, unspecified vomiting type      scopolamine (TRANSDERM) 72 hr patch Apply 1 patch to hairless area behind one ear at least 4 hours before travel.  Remove old patch and change every 3 days (72 hours).  Qty: 4 patch, Refills: 0    Associated Diagnoses: Non-intractable vomiting with nausea, unspecified vomiting type      HYDROmorphone (DILAUDID) 2 MG tablet Take 0.5-1 tablets (1-2 mg) by mouth every 2 hours as needed for moderate to  severe pain  Qty: 30 tablet, Refills: 0    Associated Diagnoses: Pain; Malignant neoplasm of chest wall (H)      ranitidine (ZANTAC) 150 MG tablet Take 1 tablet (150 mg) by mouth daily  Qty: 30 tablet, Refills: 0    Associated Diagnoses: Gastritis without bleeding, unspecified chronicity, unspecified gastritis type         CONTINUE these medications which have CHANGED    Details   oxyCODONE (OXYCONTIN) 20 MG 12 hr tablet Take 1 tablet (20 mg) by mouth every 12 hours  Qty: 28 tablet, Refills: 0    Associated Diagnoses: 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); Pain         CONTINUE these medications which have NOT CHANGED    Details   polyethylene glycol (MIRALAX/GLYCOLAX) Packet Take 17 g by mouth daily as needed for constipation  Qty: 7 packet, Refills: 1    Associated Diagnoses: Acute post-operative pain      ondansetron (ZOFRAN) 8 MG tablet Take 1 tablet (8 mg) by mouth every 6 hours as needed for nausea  Qty: 30 tablet, Refills: 0    Associated Diagnoses: Encounter for antineoplastic chemotherapy      senna-docusate (SENOKOT-S;PERICOLACE) 8.6-50 MG per tablet Take 2 tablets by mouth 2 times daily as needed for constipation  Qty: 30 tablet, Refills: 0    Associated Diagnoses: Acute post-operative pain      acetaminophen (TYLENOL) 325 MG tablet Take 2 tablets (650 mg) by mouth every 4 hours as needed for mild pain  Qty: 100 tablet, Refills: 1    Associated Diagnoses: Sarcoma of vulva (H)      ibuprofen (ADVIL/MOTRIN) 600 MG tablet Take 1 tablet (600 mg) by mouth every 6 hours as needed for moderate pain  Qty: 60 tablet, Refills: 1    Associated Diagnoses: Sarcoma of vulva (H)      diphenhydrAMINE (BENADRYL) 25 MG tablet Take 1-2 tablets (25-50 mg) by mouth every 6 hours as needed for other (Nausea or vomiting)  Qty: 60 tablet, Refills: 0    Associated Diagnoses: Chemotherapy induced nausea and vomiting      diltiazem 2% in PLO cream, FV COMPOUNDED, 2% GEL To anal opening three  times daily.  Use a pea-sized amount.  Store at room temperature.  Qty: 60 g, Refills: 0    Associated Diagnoses: Anal fissure         STOP taking these medications       oxyCODONE IR (ROXICODONE) 5 MG tablet Comments:   Reason for Stopping:         pantoprazole (PROTONIX) 40 MG EC tablet Comments:   Reason for Stopping:             Allergies   Allergies   Allergen Reactions     Heparin Flush Other (See Comments)     Due to Hindu reasons     Pork Derived Products      Christian reasons     Tegaderm Transparent Dressing (Informational Only) Other (See Comments) and Rash     PLEASE USE IV 3000 FOR DRESSING(S)     Data   Most Recent 3 CBC's:  Recent Labs   Lab Test  02/18/18   0601  02/15/18   0708  02/02/18   1117   WBC  3.9*  4.6  5.5   HGB  9.5*  10.3*  11.8   MCV  107*  107*  106*   PLT  246  227  217      Most Recent 3 BMP's:  Recent Labs   Lab Test  02/18/18   0601  02/16/18   0612  02/15/18   0708   NA  140  140  140   POTASSIUM  3.2*  3.7  3.5   CHLORIDE  106  108  103   CO2  27  28  29   BUN  9  9  12   CR  0.66  0.70  0.62   ANIONGAP  7  4  8   JANA  8.2*  8.2*  8.7   GLC  79  104*  66*     Most Recent 2 LFT's:  Recent Labs   Lab Test  02/18/18   0601  02/16/18   0612   AST  142*  332*   ALT  144*  315*   ALKPHOS  85  101   BILITOTAL  1.0  1.7*     I saw and evaluated the patient. Pain and nausea better controlled on PO medications. Continue XRT and f/u lipase in clinic. I discussed with the resident/fellow and agree with the findings and plan as documented in the resident's note. I personally spent a total of 35 minutes on the unit/floor in organizing the discharge of this patient. Total time included discussion with multiple providers on rounds, discussion with patient/family, physical examination, and reviewing data such as laboratory and radiographic studies. Greater than 50% of the total time was spent counseling and/or coordinating the discharge planning of the patient. Details can be found in the  resident/fellow note.    Filemon Mendoza M.D./Ph.D  Pediatric Hematology/Oncology

## 2018-02-18 NOTE — PLAN OF CARE
Problem: Patient Care Overview  Goal: Plan of Care/Patient Progress Review  Outcome: Adequate for Discharge Date Met: 02/18/18  VSS and afebrile.  No complaints of pain and pt states that her nausea has resolved since getting IV ativan.  Pt reports that she was able to drink and eat.  Voiding without difficulty.  X2 BM's today.  Alert and oriented and wishes to go home today.  MD in to see pt.  Scolpalomine ordered for take home med.  Zofran scheduled every 6 hours to help control episodes of Nausea and vomiting.  Stable.  Discharge instruction gone over.  No further questions at this time.  Pt to follow up with previously made appointments for chemo and radiation.  DC'd to home with sister.

## 2018-02-19 NOTE — PROGRESS NOTES
[facility] now requests orders and shares plan of care/discharge summaries for some patients through [employer].  Please REPLY TO THIS MESSAGE in order to give authorization for orders when needed.  This is considered a verbal order, you will still receive a faxed copy of orders for signature.  Thank you for your assistance in improving collaboration for our patients.    Requesting nursing orders after SOC [id number], [id number], 2 PRN will reassess for further needs at that time.

## 2018-02-19 NOTE — MR AVS SNAPSHOT
After Visit Summary   2018    Olimpia Childress    MRN: [id number]           Patient Information     Date Of Birth          1995        Visit Information        Provider Department      2018 8:15 AM Maow, Anitha; [facility]  Services Department        Today's Diagnoses     ERRONEOUS ENCOUNTER--DISREGARD    -  1       Follow-ups after your visit        Who to contact     Please call your clinic at [phone] to:    Ask questions about your health    Make or cancel appointments    Discuss your medicines    Learn about your test results    Speak to your doctor            Additional Information About Your Visit        MyChart Information     [employer] is an electronic gateway that provides easy, online access to your medical records. With [employer], you can request a clinic appointment, read your test results, renew a prescription or communicate with your care team.     To sign up for [employer] visit the website at [email or web address]   You will be asked to enter the access code listed below, as well as some personal information. Please follow the directions to create your username and password.     Your access code is: [email or web address]  Expires: 2018  3:59 PM     Your access code will  in 90 days. If you need help or a new code, please contact your [facility] or call [phone] for assistance.        Care EveryWhere ID     This is your Care EveryWhere ID. This could be used by other organizations to access your [facility] medical records  [id number]         Blood Pressure from Last 3 Encounters:   18 104/76   18 102/75   18 108/77    Weight from Last 3 Encounters:   18 54.4 kg (119 lb 14.9 oz)   18 55.2 kg (121 lb 11.1 oz)   18 55.4 kg (122 lb 2.2 oz)              Today, you had the following     No orders found for display       Primary Care Provider Office Phone # Fax #    Coretta Chambers  MD Beau [phone] [address] [facility] [address]        Equal Access to Services     RONIT BAEZ : Hadii aad ku hadbertjorge Cheek, warichardda adelamohanha, qalindata kanancyda anne-marie, wilner stewin hayaan tressabetsy garnett laFelicianojoleen singleton. So [facility] [phone].    ATENCIÓN: Si habla español, tiene a ordoñez disposición servicios gratuitos de asistencia lingüística. Llame al [phone].    We comply with applicable federal civil rights laws and [place] laws. We do not discriminate on the basis of race, color, national origin, age, disability, sex, sexual orientation, or gender identity.            Thank you!     Thank you for choosing [facility]  for your care. Our goal is always to provide you with excellent care. Hearing back from our patients is one way we can continue to improve our services. Please take a few minutes to complete the written survey that you may receive in the mail after your visit with us. Thank you!             Your Updated Medication List - Protect others around you: Learn how to safely use, store and throw away your medicines at www.disposemymeds.org.          This list is accurate as of 2/19/18 11:59 PM.  Always use your most recent med list.                   Brand Name Dispense Instructions for use Diagnosis    acetaminophen 325 MG tablet    TYLENOL    100 tablet    Take 2 tablets (650 mg) by mouth every 4 hours as needed for mild pain    Sarcoma of vulva (H)       diphenhydrAMINE 25 MG tablet    BENADRYL    60 tablet    Take 1-2 tablets (25-50 mg) by mouth every 6 hours as needed for other (Nausea or vomiting)    Chemotherapy induced nausea and vomiting       ibuprofen 600 MG tablet    ADVIL/MOTRIN    60 tablet    Take 1 tablet (600 mg) by mouth every 6 hours as needed for moderate pain    Sarcoma of vulva (H)       ondansetron 8 MG ODT tab    ZOFRAN-ODT    120 tablet    Take 1 tablet (8 mg) by mouth every 6 hours    Non-intractable vomiting with nausea,  unspecified vomiting type       ondansetron 8 MG tablet    ZOFRAN    30 tablet    Take 1 tablet (8 mg) by mouth every 6 hours as needed for nausea    Encounter for antineoplastic chemotherapy       oxyCODONE 20 MG 12 hr tablet    OxyCONTIN    28 tablet    Take 1 tablet (20 mg) by mouth every 12 hours    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), Pain       polyethylene glycol Packet    MIRALAX/GLYCOLAX    7 packet    Take 17 g by mouth daily as needed for constipation    Acute post-operative pain       scopolamine 72 hr patch    TRANSDERM    4 patch    Apply 1 patch to hairless area behind one ear at least 4 hours before travel.  Remove old patch and change every 3 days (72 hours).    Non-intractable vomiting with nausea, unspecified vomiting type

## 2018-02-20 NOTE — PROGRESS NOTES
Pediatric Hematology/Oncology Clinic Note    History- Olimpia initially presented with a growth on her right labia in 2013 when she was living in [place]. 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 She discontinued further treatment b/c her physicians were unsure of the exact diagnosis and she felt uncomfortable proceeding. Olimpia subsequently immigrated to the  in August of 2015 and in October she first noticed a recurrence of the mass in the area of previous excision. She was in [place] at that time, seen by oncology and had a port placed but then moved to [place] where she was seen by Dr. Mckeon at [facility] in November. There she underwent an MRI that showed a solid and cystic subcutaneous mass in the right labia measuring 1.7 x 1.6 x 1cm with question of nodular extension vs a second nodule measuring 0.7 cm. There was no invasion into the vagina. On November 16th 2015, Olimpia had a biopsy of the mass by a gynecologist, Dr. Terry, at [facility]. Cytology was reviewed at [place] and read as a SMARCB1-deficient genital sarcoma, likely falling within the overall spectrum of malignant rhabdoid tumor. By immunohistochemistry, the cells shows a complete loss of SMARCb1. Wide spectrum cytokeratin, CD34, WT1, Desmin and CD99 were all negative. RT PCR for Wooten Sarcoma associated fusion transcripts were negative as well. Olimpia went on to have a PET/CT as well which showed multiple pulmonary lesions and biopsy confirmed a lesion to be metastatic disease. Olimpia was seen by Tomás White at [place] who reviewed the diagnosis and potential treatment plans with her, however she prefered to be treated here at [facility]. 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    Olimpia underwent left sided thoracotomy and resection of two pulmonary nodules on 7/20/16. Pathology revealed that one lesion was benign lymphoid tissue and the other was consistent with synovial sarcoma.  This was confirmed with FISH  With 91% of cell examined having a signal pattern indictivate of a rearrangement of the SS18 locus.  Olimpia has now completed 3 cycles of chemotherapy per [id number] and started her radiation on 9/13/16 and completed on 10/17/16.  She then went on to have a resection of her labial mass on 11/16/16 by Jannet Pastrana and Chikis with reconstruction, including skin flaps by Dr. Corona from plastics.  Pathology showed no residual tumor.  She had a f/u chest CT today on 12/5/16 that showed persistance of her pulmonary nodules as well as few additonal pulmonary nodules.  She saw Dr. Pierre who was in agreement with surgical resection, however Olimpia wanted to wait until March to have more time to recover from her last surgery. She underwent left sided thoracotomy on 3/8/17.    In May, Olimpia's follow up PET/CT demonstrated significant progression of her pulmonary metastatic disease, with an anterior mediastinal mass compression her right ventricle and potentially right outflow tract.  Subsequent echocardiogram did not show altered cardiac function, Dr Man felt radiation therapy was not in her best interest at this time but would consider if she developed cardiac dysfunction.  Olimpia was started on oral Pazopanib May 2017 for palliative therapy, with doses held due to palmar plantar dysesthesia and stomatitis, and restarted 50% dosing on 6/19/17.  Olimpia has imaging in August 2017 that showed a positive response to therapy.  However on most recent imaging last week she was found to have progression.    HPI:  Olimpia was discharged from the hospital last week after admission for pain control.  She is currently receiving palliative radiation.  Olimpia reports  intractable nausea. Nausea improves with benadryl but does not resolve.  She doesn't feel the scopolamine patch or zofran is helpful. Olimpia does not want to eat but is forcing herself to eat some foods. Feels very bloated after eating. Vomited yellow bilious once this morning. Is not drinking much water, maybe 10 oz/day. Urinating at least 3 times per day, normal yellow color. Using miralax daily, stool yesterday and this morning. No trouble sleeping. Pain is well controlled with OxyContin and dilaudid, taking 2 doses of dilaudid per day. No SOB or respiratory symptoms.     ROS  See HPI. Complete ROS otherwise negative.      Allergies as of 02/20/2018 - [facility] as Reviewed 02/20/2018   Allergen Reaction Noted     Heparin flush Other (See Comments) 01/27/2016     Pork derived products  02/09/2016     Tegaderm transparent dressing (informational only) Other (See Comments) and Rash 04/20/2016     Medications:  Miralax daily to BID  OxyContin twice a day in the morning and before bed.   Dilaudid: twice a day, mid-afternoon and before bed.   Benadryl three times per day in morning, afternoon and before bed.     Past Medical History:   Diagnosis Date     Anemia 6/3/2016     Constipation      Extrarenal rhabdoid neoplasm (H)      Febrile neutropenia (H) 4/17/2016     History of blood transfusion      Latent tuberculosis      Lung disease      On antineoplastic chemotherapy      Sarcoma of vulva (H)      Social History:  Olimpia lives with her cousin.  \  Family History   Problem Relation Age of Onset     Other Cancer No family hx of          Physical Exam   Temp:  [97.9  F (36.6  C)] 97.9  F (36.6  C)  Pulse:  [88] 88  Resp:  [20] 20  BP: (105)/(79) 105/79  SpO2:  [98 %] 98 %  Wt Readings from Last 4 Encounters:   02/20/18 59 kg (130 lb 1.1 oz)   02/16/18 62.7 kg (138 lb 3.7 oz)   02/08/18 59 kg (130 lb 1.1 oz)   02/04/18 61.1 kg (134 lb 11.2 oz)     GENERAL: Alert, cooperative, Appears tired.  SKIN: warm, dry, intact. Skin  turgor normal.   HEAD: Normocephalic, atraumatic.     EYES: Sclera clear, slightly anicteric. PERRLA, EOMI.   EARS: TMs pearly gray, opaque, landmarks visualized bilaterally.   NOSE: Nares patent, mucosa pink and moist, no nasal drainage noted.  No facial pain.  MOUTH/THROAT: Oropharynx is clear. Tongue without sores. Normal dentition for age, no mucosal lesions.  NECK: Supple, full range of motion, thyroid non-palpable.   LYMPH NODES: No cervical, supraclavicular or axillary lymphadenopathy.  LUNGS: No increased WOB.  Lungs clear to auscultation throughout; diminished in LLL.  No crackles or wheezes.  HEART: HRR. S1 and S2 are normal. No murmurs, rubs, gallops. The radial pulses are 2+ bilaterally. Cap refill <2 sec  ABDOMEN: Normoactive bowel sounds.   NEUROLOGIC: Grossly intact, no focal neurologic deficits.    :  Deferred today.    Labs:  Results for orders placed or performed in visit on 02/20/18 (from the past 24 hour(s))   CBC with platelets differential   Result Value Ref Range    WBC 4.7 4.0 - 11.0 10e9/L    RBC Count 3.47 (L) 3.8 - 5.2 10e12/L    Hemoglobin 12.2 11.7 - 15.7 g/dL    Hematocrit 35.9 35.0 - 47.0 %     (H) 78 - 100 fl    MCH 35.2 (H) 26.5 - 33.0 pg    MCHC 34.0 31.5 - 36.5 g/dL    RDW 12.8 10.0 - 15.0 %    Platelet Count 276 150 - 450 10e9/L    Diff Method Automated Method     % Neutrophils 78.6 %    % Lymphocytes 9.9 %    % Monocytes 10.9 %    % Eosinophils 0.2 %    % Basophils 0.2 %    % Immature Granulocytes 0.2 %    Nucleated RBCs 0 0 /100    Absolute Neutrophil 3.7 1.6 - 8.3 10e9/L    Absolute Lymphocytes 0.5 (L) 0.8 - 5.3 10e9/L    Absolute Monocytes 0.5 0.0 - 1.3 10e9/L    Absolute Eosinophils 0.0 0.0 - 0.7 10e9/L    Absolute Basophils 0.0 0.0 - 0.2 10e9/L    Abs Immature Granulocytes 0.0 0 - 0.4 10e9/L    Absolute Nucleated RBC 0.0    Comprehensive metabolic panel   Result Value Ref Range    Sodium 135 133 - 144 mmol/L    Potassium 3.6 3.4 - 5.3 mmol/L    Chloride 99 94 - 109  mmol/L    Carbon Dioxide 31 20 - 32 mmol/L    Anion Gap 5 3 - 14 mmol/L    Glucose 104 (H) 70 - 99 mg/dL    Urea Nitrogen 6 (L) 7 - 30 mg/dL    Creatinine 0.59 0.52 - 1.04 mg/dL    GFR Estimate >90 >60 mL/min/1.7m2    GFR Estimate If Black >90 >60 mL/min/1.7m2    Calcium 9.1 8.5 - 10.1 mg/dL    Bilirubin Total 1.2 0.2 - 1.3 mg/dL    Albumin 2.8 (L) 3.4 - 5.0 g/dL    Protein Total 7.8 6.8 - 8.8 g/dL    Alkaline Phosphatase 84 40 - 150 U/L    ALT 95 (H) 0 - 50 U/L    AST 68 (H) 0 - 45 U/L   Lipase   Result Value Ref Range    Lipase 259 73 - 393 U/L       Assessment:   Olimpia is a 23-year-old female with 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  She has restarted her Pazopanib (had been on hold just prior to the time of her progression). Receiving daily palliative radiation. Abdominal pain controlled. Intractable nausea. Lipase has improved.  Labs overall stable to improved.    Plan:  1) Try zyprexa for nausea.  Will try to avoid ativan at this time given her significant fatigue.  Olimpia will  zyprexa tomorrow after radiation treatment, starting at 5mg, could always increase to 10mg if needed.  2) Discussed oral intake, recommend small frequent meals to avoid bloating sensation.  Encouraged fluid intake.  3) Continue current pain regimen, is working well.  4) RTC on Thursday to reassess nausea and hydration.    5) She will then RTC again on Monday for labs and exam.  Will not check further lipase levels unless she is having new symptoms.    MOISÉS Meyer served as scribe for this note.     The documentation recorded by the scribe accurately reflects the services I personally performed and the decisions made by me.  Omar Trammell

## 2018-02-20 NOTE — NURSING NOTE
"Chief Complaint   Patient presents with     RECHECK     Patient here today for low energy, nauseated and not eating     /79 (BP Location: Left arm, Patient Position: Fowlers, Cuff Size: Adult Regular)  Pulse 88  Temp 97.9  F (36.6  C) (Oral)  Resp 20  Ht 1.606 m (5' 3.23\")  Wt 59 kg (130 lb 1.1 oz)  SpO2 98%  BMI 22.88 kg/m2  Tasia Narvaez, [facility]  February 20, 2018    "

## 2018-02-20 NOTE — TELEPHONE ENCOUNTER
Ana from [facility] called and needs orders signed for Asmmoriah to receive nursing home care. Left a message with Ana that I spoke with Dr. Yanez and she was unsure of what the in basket message meant. Patient has follow-up appointment with Shayla Guzman today and hopefully these issues can be resolved. Ana notified of this and to call with more questions.

## 2018-02-20 NOTE — LETTER
2/20/2018      RE: Olimpia Childress  [address]   [facility] [address]       Pediatric Hematology/Oncology Clinic Note    History- Olimpia initially presented with a growth on her right labia in 2013 when she was living in [place]. 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 She discontinued further treatment b/c her physicians were unsure of the exact diagnosis and she felt uncomfortable proceeding. Olimpia subsequently immigrated to the  in August of 2015 and in October she first noticed a recurrence of the mass in the area of previous excision. She was in [place] at that time, seen by oncology and had a port placed but then moved to [place] where she was seen by Dr. Mckeon at [facility] in November. There she underwent an MRI that showed a solid and cystic subcutaneous mass in the right labia measuring 1.7 x 1.6 x 1cm with question of nodular extension vs a second nodule measuring 0.7 cm. There was no invasion into the vagina. On November 16th 2015, Olimpia had a biopsy of the mass by a gynecologist, Dr. Terry, at [facility]. Cytology was reviewed at [facility] and read as a SMARCB1-deficient genital sarcoma, likely falling within the overall spectrum of malignant rhabdoid tumor. By immunohistochemistry, the cells shows a complete loss of SMARCb1. Wide spectrum cytokeratin, CD34, WT1, Desmin and CD99 were all negative. RT PCR for Wooten Sarcoma associated fusion transcripts were negative as well. Olimpia went on to have a PET/CT as well which showed multiple pulmonary lesions and biopsy confirmed a lesion to be metastatic disease. Olimpia was seen by Tomás White at [facility] who reviewed the diagnosis and potential treatment plans with her, however she prefered to be treated here at [facility]. 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    Olimpia underwent left sided thoracotomy and resection of two pulmonary nodules on 7/20/16. Pathology revealed that one lesion was benign lymphoid tissue and the other was consistent with synovial sarcoma.  This was confirmed with FISH  With 91% of cell examined having a signal pattern indictivate of a rearrangement of the SS18 locus.  Olimpia has now completed 3 cycles of chemotherapy per [id number] and started her radiation on 9/13/16 and completed on 10/17/16.  She then went on to have a resection of her labial mass on 11/16/16 by Jannet Pastrana and Chikis with reconstruction, including skin flaps by Dr. Corona from plastics.  Pathology showed no residual tumor.  She had a f/u chest CT today on 12/5/16 that showed persistance of her pulmonary nodules as well as few additonal pulmonary nodules.  She saw Dr. Pierre who was in agreement with surgical resection, however Olimpia wanted to wait until March to have more time to recover from her last surgery. She underwent left sided thoracotomy on 3/8/17.    In May, Olimpia's follow up PET/CT demonstrated significant progression of her pulmonary metastatic disease, with an anterior mediastinal mass compression her right ventricle and potentially right outflow tract.  Subsequent echocardiogram did not show altered cardiac function, Dr Man felt radiation therapy was not in her best interest at this time but would consider if she developed cardiac dysfunction.  Olimpia was started on oral Pazopanib May 2017 for palliative therapy, with doses held due to palmar plantar dysesthesia and stomatitis, and restarted 50% dosing on 6/19/17.  Olimpia has imaging in August 2017 that showed a positive response to therapy.  However on most recent imaging last week she was found to have progression.    HPI:  Olimpia was discharged from the hospital last week after admission  for pain control.  She is currently receiving palliative radiation.  Olimpia reports intractable nausea. Nausea improves with benadryl but does not resolve.  She doesn't feel the scopolamine patch or zofran is helpful. Olimpia does not want to eat but is forcing herself to eat some foods. Feels very bloated after eating. Vomited yellow bilious once this morning. Is not drinking much water, maybe 10 oz/day. Urinating at least 3 times per day, normal yellow color. Using miralax daily, stool yesterday and this morning. No trouble sleeping. Pain is well controlled with OxyContin and dilaudid, taking 2 doses of dilaudid per day. No SOB or respiratory symptoms.     ROS  See HPI. Complete ROS otherwise negative.      Allergies as of 02/20/2018 - [place] as Reviewed 02/20/2018   Allergen Reaction Noted     Heparin flush Other (See Comments) 01/27/2016     Pork derived products  02/09/2016     Tegaderm transparent dressing (informational only) Other (See Comments) and Rash 04/20/2016     Medications:  Miralax daily to BID  OxyContin twice a day in the morning and before bed.   Dilaudid: twice a day, mid-afternoon and before bed.   Benadryl three times per day in morning, afternoon and before bed.     Past Medical History:   Diagnosis Date     Anemia 6/3/2016     Constipation      Extrarenal rhabdoid neoplasm (H)      Febrile neutropenia (H) 4/17/2016     History of blood transfusion      Latent tuberculosis      Lung disease      On antineoplastic chemotherapy      Sarcoma of vulva (H)      Social History:  Olimpia lives with her cousin.  \  Family History   Problem Relation Age of Onset     Other Cancer No family hx of          Physical Exam   Temp:  [97.9  F (36.6  C)] 97.9  F (36.6  C)  Pulse:  [88] 88  Resp:  [20] 20  BP: (105)/(79) 105/79  SpO2:  [98 %] 98 %  Wt Readings from Last 4 Encounters:   02/20/18 59 kg (130 lb 1.1 oz)   02/16/18 62.7 kg (138 lb 3.7 oz)   02/08/18 59 kg (130 lb 1.1 oz)   02/04/18 61.1 kg (134 lb 11.2 oz)      GENERAL: Alert, cooperative, Appears tired.  SKIN: warm, dry, intact. Skin turgor normal.   HEAD: Normocephalic, atraumatic.     EYES: Sclera clear, slightly anicteric. PERRLA, EOMI.   EARS: TMs pearly gray, opaque, landmarks visualized bilaterally.   NOSE: Nares patent, mucosa pink and moist, no nasal drainage noted.  No facial pain.  MOUTH/THROAT: Oropharynx is clear. Tongue without sores. Normal dentition for age, no mucosal lesions.  NECK: Supple, full range of motion, thyroid non-palpable.   LYMPH NODES: No cervical, supraclavicular or axillary lymphadenopathy.  LUNGS: No increased WOB.  Lungs clear to auscultation throughout; diminished in LLL.  No crackles or wheezes.  HEART: HRR. S1 and S2 are normal. No murmurs, rubs, gallops. The radial pulses are 2+ bilaterally. Cap refill <2 sec  ABDOMEN: Normoactive bowel sounds.   NEUROLOGIC: Grossly intact, no focal neurologic deficits.    :  Deferred today.    Labs:  Results for orders placed or performed in visit on 02/20/18 (from the past 24 hour(s))   CBC with platelets differential   Result Value Ref Range    WBC 4.7 4.0 - 11.0 10e9/L    RBC Count 3.47 (L) 3.8 - 5.2 10e12/L    Hemoglobin 12.2 11.7 - 15.7 g/dL    Hematocrit 35.9 35.0 - 47.0 %     (H) 78 - 100 fl    MCH 35.2 (H) 26.5 - 33.0 pg    MCHC 34.0 31.5 - 36.5 g/dL    RDW 12.8 10.0 - 15.0 %    Platelet Count 276 150 - 450 10e9/L    Diff Method Automated Method     % Neutrophils 78.6 %    % Lymphocytes 9.9 %    % Monocytes 10.9 %    % Eosinophils 0.2 %    % Basophils 0.2 %    % Immature Granulocytes 0.2 %    Nucleated RBCs 0 0 /100    Absolute Neutrophil 3.7 1.6 - 8.3 10e9/L    Absolute Lymphocytes 0.5 (L) 0.8 - 5.3 10e9/L    Absolute Monocytes 0.5 0.0 - 1.3 10e9/L    Absolute Eosinophils 0.0 0.0 - 0.7 10e9/L    Absolute Basophils 0.0 0.0 - 0.2 10e9/L    Abs Immature Granulocytes 0.0 0 - 0.4 10e9/L    Absolute Nucleated RBC 0.0    Comprehensive metabolic panel   Result Value Ref Range    Sodium  135 133 - 144 mmol/L    Potassium 3.6 3.4 - 5.3 mmol/L    Chloride 99 94 - 109 mmol/L    Carbon Dioxide 31 20 - 32 mmol/L    Anion Gap 5 3 - 14 mmol/L    Glucose 104 (H) 70 - 99 mg/dL    Urea Nitrogen 6 (L) 7 - 30 mg/dL    Creatinine 0.59 0.52 - 1.04 mg/dL    GFR Estimate >90 >60 mL/min/1.7m2    GFR Estimate If Black >90 >60 mL/min/1.7m2    Calcium 9.1 8.5 - 10.1 mg/dL    Bilirubin Total 1.2 0.2 - 1.3 mg/dL    Albumin 2.8 (L) 3.4 - 5.0 g/dL    Protein Total 7.8 6.8 - 8.8 g/dL    Alkaline Phosphatase 84 40 - 150 U/L    ALT 95 (H) 0 - 50 U/L    AST 68 (H) 0 - 45 U/L   Lipase   Result Value Ref Range    Lipase 259 73 - 393 U/L       Assessment:   Olimpia is a 23-year-old female with 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  She has restarted her Pazopanib (had been on hold just prior to the time of her progression). Receiving daily palliative radiation. Abdominal pain controlled. Intractable nausea. Lipase has improved.  Labs overall stable to improved.    Plan:  1) Try zyprexa for nausea.  Will try to avoid ativan at this time given her significant fatigue.  Olimpia will  zyprexa tomorrow after radiation treatment, starting at 5mg, could always increase to 10mg if needed.  2) Discussed oral intake, recommend small frequent meals to avoid bloating sensation.  Encouraged fluid intake.  3) Continue current pain regimen, is working well.  4) RTC on Thursday to reassess nausea and hydration.    5) She will then RTC again on Monday for labs and exam.  Will not check further lipase levels unless she is having new symptoms.    MOISÉS Meyer served as scribe for this note.     I agree with entirety of note as scribed.  ROMEL Sterling APRN CNP

## 2018-02-20 NOTE — MR AVS SNAPSHOT
After Visit Summary   2/20/2018    Olimpia Childress    MRN: [id number]           Patient Information     Date Of Birth          1995        Visit Information        Provider Department      2/20/2018 2:15 PM Yue Nichols Alexis Rupp, APRN CNP Peds Hematology Oncology        Today's Diagnoses     Synovial sarcoma (H)    -  1    Rhabdoid tumor (H)        Nausea              [facility], 9th floor  [address] [place] [id number]  Phone: [phone]  Clinic Hours:   Monday-Friday:   7 am to 5:00 pm   closed weekends and major  holidays     If your fever is 100.5  or greater,   call the clinic during business hours.   After hours call [phone] and ask for the pediatric hematology / oncology physician to be paged for you.               Follow-ups after your visit        Your next 10 appointments already scheduled     Feb 21, 2018 11:00 AM CST   EXTERNAL RADIATION TREATMENT with [facility] RAD ONC ELEKTA   Radiation Oncology Clinic ([facility])    [facility]  1st Floor  [address] [facility] [address] [address]   [phone]            Feb 21, 2018 11:45 AM CST   ON TREATMENT VISIT with Giana Man MD   Radiation Oncology Clinic ([facility])    [facility]  1st Floor  [address] [address]   [phone]            Feb 22, 2018 11:00 AM CST   EXTERNAL RADIATION TREATMENT with [facility] RAD ONC ELEKTA   Radiation Oncology Clinic ([facility])    [facility]  1st Floor  [address] [address]   [phone]            Feb 22, 2018 12:00 PM CST   Return Visit with JULIO C Pittman CNP   Peds Hematology Oncology ([facility])    [facility]  9th Floor  [address] [facility] [address]   [phone]            Feb 23, 2018 11:15 AM CST   EXTERNAL RADIATION  TREATMENT with [facility] RAD ONC ELEKTA   Radiation Oncology Clinic ([facility])    [facility]  1st Floor  [address] [address] [address]   [phone]            Feb 26, 2018 11:15 AM CST   EXTERNAL RADIATION TREATMENT with [facility] RAD ONC ELEKTA   Radiation Oncology Clinic ([facility])    [facility]  1st Floor  [address] [address] [address]   [phone]            Feb 26, 2018 12:00 PM CST   Return Visit with Coretta Yanez MD   Peds Hematology Oncology ([facility])    [facility]  9th Floor  [address] [facility] [address]   [phone]            Feb 27, 2018 11:15 AM CST   EXTERNAL RADIATION TREATMENT with [facility] RAD ONC ELEKTA   Radiation Oncology Clinic ([facility])    [facility]  1st Floor  [address] [facility] [address]   [phone]            Feb 27, 2018 11:30 AM CST   ON TREATMENT VISIT with Giana Man MD   Radiation Oncology Clinic ([facility])    [facility]  1st Floor  [address] [facility] [address]   [phone]              Who to contact     Please call your clinic at [phone] to:    Ask questions about your health    Make or cancel appointments    Discuss your medicines    Learn about your test results    Speak to your doctor            Additional Information About Your Visit        MyChart Information     amBXt is an electronic gateway that provides easy, online access to your medical records. With Inxero, you can request a clinic appointment, read your test results, renew a prescription or communicate with your care team.     To sign up for amBXt visit the website at [email or web address]   You will be asked to enter the access code listed below, as well as some personal information. Please follow the directions to create your username and  "password.     Your access code is: [email or web address]  Expires: 3/22/2018 11:14 AM     Your access code will  in 90 days. If you need help or a new code, please contact your [facility] or call [phone] for assistance.        Care EveryWhere ID     This is your Care EveryWhere ID. This could be used by other organizations to access your [facility] medical records  [id number]        Your Vitals Were     Pulse Temperature Respirations Height Pulse Oximetry BMI (Body Mass Index)    88 97.9  F (36.6  C) (Oral) 20 1.606 m (5' 3.23\") 98% 22.88 kg/m2       Blood Pressure from Last 3 Encounters:   18 105/79   18 103/70   18 112/76    Weight from Last 3 Encounters:   18 59 kg (130 lb 1.1 oz)   18 62.7 kg (138 lb 3.7 oz)   18 59 kg (130 lb 1.1 oz)              We Performed the Following     CBC with platelets differential     Comprehensive metabolic panel     Lipase     Routine UA with microscopic - No culture          Today's Medication Changes          These changes are accurate as of 18  3:36 PM.  If you have any questions, ask your nurse or doctor.               Start taking these medicines.        Dose/Directions    OLANZapine 5 MG tablet   Commonly known as:  zyPREXA   Used for:  Nausea   Started by:  Omar Trammell APRN CNP        Dose:  5 mg   Take 1 tablet (5 mg) by mouth At Bedtime   Quantity:  30 tablet   Refills:  1            Where to get your medicines      These medications were sent to [facility] [place], [place] - [address]  [address] [address]     Phone:  [phone]     OLANZapine 5 MG tablet                Primary Care Provider Office Phone # Fax #    Coretta Yanez [phone] [address] [facility] [address]        Equal Access to Services     RONIT BAEZ AH: jenni Wong, wilner feldman " oj bustillosbetsy kerlineingrid chavarria'aan ah. So [facility] [phone].    ATENCIÓN: Si kenyala cecil, tiene a ordoñez disposición servicios gratuitos de asistencia lingüística. Darius al [phone].    We comply with applicable federal civil rights laws and [place] laws. We do not discriminate on the basis of race, color, national origin, age, disability, sex, sexual orientation, or gender identity.            Thank you!     [facility]  for your care. Our goal is always to provide you with excellent care. Hearing back from our patients is one way we can continue to improve our services. Please take a few minutes to complete the written survey that you may receive in the mail after your visit with us. Thank you!             Your Updated Medication List - Protect others around you: Learn how to safely use, store and throw away your medicines at www.disposemymeds.org.          This list is accurate as of 2/20/18  3:36 PM.  Always use your most recent med list.                   Brand Name Dispense Instructions for use Diagnosis    acetaminophen 325 MG tablet    TYLENOL    100 tablet    Take 2 tablets (650 mg) by mouth every 4 hours as needed for mild pain    Sarcoma of vulva (H)       diltiazem 2% in PLO cream (FV COMPOUNDED) 2% Gel     60 g    To anal opening three times daily.  Use a pea-sized amount.  Store at room temperature.    Anal fissure       diphenhydrAMINE 25 MG tablet    BENADRYL    60 tablet    Take 1-2 tablets (25-50 mg) by mouth every 6 hours as needed for other (Nausea or vomiting)    Chemotherapy induced nausea and vomiting       HYDROmorphone 2 MG tablet    DILAUDID    30 tablet    Take 0.5-1 tablets (1-2 mg) by mouth every 2 hours as needed for moderate to severe pain    Pain, Malignant neoplasm of chest wall (H)       ibuprofen 600 MG tablet    ADVIL/MOTRIN    60 tablet    Take 1 tablet (600 mg) by mouth every 6 hours as needed for moderate pain    Sarcoma of vulva (H)        OLANZapine 5 MG tablet    zyPREXA    30 tablet    Take 1 tablet (5 mg) by mouth At Bedtime    Nausea       ondansetron 8 MG ODT tab    ZOFRAN-ODT    120 tablet    Take 1 tablet (8 mg) by mouth every 6 hours    Non-intractable vomiting with nausea, unspecified vomiting type       ondansetron 8 MG tablet    ZOFRAN    30 tablet    Take 1 tablet (8 mg) by mouth every 6 hours as needed for nausea    Encounter for antineoplastic chemotherapy       oxyCODONE 20 MG 12 hr tablet    OxyCONTIN    28 tablet    Take 1 tablet (20 mg) by mouth every 12 hours    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  Remove old patch and change every 3 days (72 hours).    Non-intractable vomiting with nausea, unspecified vomiting type       senna-docusate 8.6-50 MG per tablet    SENOKOT-S;PERICOLACE    30 tablet    Take 2 tablets by mouth 2 times daily as needed for constipation    Acute post-operative pain

## 2018-02-21 NOTE — LETTER
2018       RE: Olimpia Childress  [address]   [facility] [address]     Dear Colleague,    Thank you for referring your patient, Olimpia Childress, to the RADIATION ONCOLOGY CLINIC. Please see a copy of my visit note below.    [facility] PHYSICIANS  SPECIALIZING IN BREAKTHROUGHS  Radiation Oncology      Treatment Visit Note - GYN       Olimpia Childress      Date: 2018   MRN: [id number]   : 1995  Diagnosis: sarcoma      Reason for Visit:  On Radiation Treatment Visit     Treatment Summary to Date  Treatment Site: L chest wall Current Dose: 1800/3000cGy cGy Fractions: 6/10fx      Subjective:  Doing well.   Some fatigue.  .  Eating well  Nausea well controlled  No  Mild  Severe skin irritation   Pain well controlled    Objective:   Wt 58.5 kg (128 lb 14.4 oz)  BMI 22.67 kg/m2  Gen: Appears well, in no acute distress  Skin: No erythema    Labs:  CBC RESULTS:   Recent Labs   Lab Test  18   1322   WBC  4.7   RBC  3.47*   HGB  12.2   HCT  35.9   MCV  104*   MCH  35.2*   MCHC  34.0   RDW  12.8   PLT  276     ELECTROLYTES:  Recent Labs   Lab Test  18   1322   NA  135   POTASSIUM  3.6   CHLORIDE  99   JANA  9.1   CO2  31   BUN  6*   CR  0.59   GLC  104*       Giana NICHOLE Magda  [phone] pager      CC  Patient Care Team:  Coretta Wood MD as PCP - General (Pediatric Hematology/Oncology)  Lambert Abdullahi MD as MD (Pediatrics)  Omar Trammell APRN CNP as Nurse Practitioner (Pediatrics)  Byron Pierre MD as MD (Pediatric Surgery)  Sangita Pastrana MD as Referring Physician (Oncology)  Sidra Corona MD as MD (Plastic Surgery)  Cheri Issa APRN CNP as Nurse Practitioner  CORETTA WOOD    Nursing ROS:      Skin  Skin Reaction: 0 - No changes      Pain Assessment  Pain Note: has stopped oxycontin, using dilaudid    Patient is using dilator  Yes  NO    Assessment:    Tolerating radiation therapy well.  All questions and  concerns addressed.    Plan:   1. Continue current therapy.   2. She will be done this Saturday.   3. Return to see me in approx 1 month.        Mosaiq chart and setup information reviewed  Port images reviewed         Educational Topic Discussed  Education Instructions: reviewed      Giana Man   [phone] [facility]  [phone]

## 2018-02-21 NOTE — PROGRESS NOTES
[facility] PHYSICIANS  SPECIALIZING IN BREAKTHROUGHS  Radiation Oncology      Treatment Visit Note - GYN       Olimpia Childress      Date: 2018   MRN: [id number]   : 1995  Diagnosis: sarcoma      Reason for Visit:  On Radiation Treatment Visit     Treatment Summary to Date  Treatment Site: L chest wall Current Dose: 1800/3000cGy cGy Fractions: 6/10fx      Subjective:  Doing well.   Some fatigue.  .  Eating well  Nausea well controlled  No  Mild  Severe skin irritation   Pain well controlled    Objective:   Wt 58.5 kg (128 lb 14.4 oz)  BMI 22.67 kg/m2  Gen: Appears well, in no acute distress  Skin: No erythema    Labs:  CBC RESULTS:   Recent Labs   Lab Test  18   1322   WBC  4.7   RBC  3.47*   HGB  12.2   HCT  35.9   MCV  104*   MCH  35.2*   MCHC  34.0   RDW  12.8   PLT  276     ELECTROLYTES:  Recent Labs   Lab Test  18   1322   NA  135   POTASSIUM  3.6   CHLORIDE  99   JANA  9.1   CO2  31   BUN  6*   CR  0.59   GLC  104*       Giana Man  [phone] pager      CC  Patient Care Team:  Coretta Wood MD as PCP - General (Pediatric Hematology/Oncology)  Lambert Abdullahi MD as MD (Pediatrics)  Omar Trammell APRN CNP as Nurse Practitioner (Pediatrics)  Byron Pierre MD as MD (Pediatric Surgery)  Sangita Pastrana MD as Referring Physician (Oncology)  Sidra Corona MD as MD (Plastic Surgery)  Cheri Issa APRN CNP as Nurse Practitioner  CORETTA WOOD    Nursing ROS:      Skin  Skin Reaction: 0 - No changes      Pain Assessment  Pain Note: has stopped oxycontin, using dilaudid    Patient is using dilator  Yes  NO    Assessment:    Tolerating radiation therapy well.  All questions and concerns addressed.    Plan:   1. Continue current therapy.   2. She will be done this Saturday.   3. Return to see me in approx 1 month.        Mosaiq chart and setup information reviewed  Port images reviewed         Educational Topic  Discussed  Education Instructions: reviewed      Giana Man   [phone] [facility]  [phone]

## 2018-02-21 NOTE — MR AVS SNAPSHOT
After Visit Summary   2/21/2018    Olimpia Childress    MRN: [id number]           Patient Information     Date Of Birth          1995        Visit Information        Provider Department      2/21/2018 11:00 AM Giana Man MD; LANGUAGE [facility]        Today's Diagnoses     Synovial sarcoma (H)    -  1       Follow-ups after your visit        Your next 10 appointments already scheduled     Feb 22, 2018 11:00 AM CST   EXTERNAL RADIATION TREATMENT with P RAD ONC ELEKTA   Radiation Oncology Clinic ([facility])    [facility]  1st Floor  [address] [address]   [phone]            Feb 22, 2018 12:00 PM CST   Return Visit with JULIO C Pittman CNP   Peds Hematology Oncology ([facility])    [facility]  9th Floor  [address] [place] [address]   [phone]            Feb 23, 2018 11:00 AM CST   EXTERNAL RADIATION TREATMENT with [facility] RAD ONC ELEKTA   Radiation Oncology Clinic ([facility])    [facility]  1st Floor  [address] [address]   [phone]            Feb 26, 2018 11:15 AM CST   EXTERNAL RADIATION TREATMENT with [facility] RAD ONC ELEKTA   Radiation Oncology Clinic ([facility])    [facility]  1st Floor  [address] [address]   [phone]            Feb 26, 2018 12:00 PM CST   Return Visit with Coretta Yanez MD   Peds Hematology Oncology ([facility])    [facility]  9th Floor  [address] [place] [address]   [phone]            Feb 27, 2018 11:15 AM CST   EXTERNAL RADIATION TREATMENT with [facility] RAD ONC ELEKTA   Radiation Oncology Clinic ([facility])    [facility]  1st Floor  [address] [address]   [phone]            Feb 27, 2018 11:30 AM CST   ON  TREATMENT VISIT with Giana Man MD   Radiation Oncology Clinic ([facility])    [facility]  1st Floor  [address] [address] [phone]              Who to contact     Please call your clinic at [phone] to:    Ask questions about your health    Make or cancel appointments    Discuss your medicines    Learn about your test results    Speak to your doctor            Additional Information About Your Visit        MyChart Information     [employer] is an electronic gateway that provides easy, online access to your medical records. With [employer], you can request a clinic appointment, read your test results, renew a prescription or communicate with your care team.     To sign up for [employer] visit the website at [email or web address]   You will be asked to enter the access code listed below, as well as some personal information. Please follow the directions to create your username and password.     Your access code is: [email or web address]  Expires: 3/22/2018 11:14 AM     Your access code will  in 90 days. If you need help or a new code, please contact your [facility] or call [phone] for assistance.        Care EveryWhere ID     This is your Care EveryWhere ID. This could be used by other organizations to access your [place] medical records  [id number]        Your Vitals Were     BMI (Body Mass Index)                   22.67 kg/m2            Blood Pressure from Last 3 Encounters:   18 105/79   18 103/70   18 112/76    Weight from Last 3 Encounters:   18 58.5 kg (128 lb 14.4 oz)   18 59 kg (130 lb 1.1 oz)   18 62.7 kg (138 lb 3.7 oz)              Today, you had the following     No orders found for display       Primary Care Provider Office Phone # Fax #    Coretta Yanez [phone] [address] [facility] [address]        Equal Access  to Services     RONIT BAEZ : Ruap Cheek, warichardda luqadaha, qaybta kaalmalaney xie, wilner singleton. So [facility] [phone].    ATENCIÓN: Si selene jacob, tiene a ordoñez disposición servicios gratuitos de asistencia lingüística. Llame al [phone].    We comply with applicable federal civil rights laws and [place] laws. We do not discriminate on the basis of race, color, national origin, age, disability, sex, sexual orientation, or gender identity.            Thank you!     Thank you for choosing RADIATION ONCOLOGY CLINIC  for your care. Our goal is always to provide you with excellent care. Hearing back from our patients is one way we can continue to improve our services. Please take a few minutes to complete the written survey that you may receive in the mail after your visit with us. Thank you!             Your Updated Medication List - Protect others around you: Learn how to safely use, store and throw away your medicines at www.disposemymeds.org.          This list is accurate as of 2/21/18 12:34 PM.  Always use your most recent med list.                   Brand Name Dispense Instructions for use Diagnosis    acetaminophen 325 MG tablet    TYLENOL    100 tablet    Take 2 tablets (650 mg) by mouth every 4 hours as needed for mild pain    Sarcoma of vulva (H)       diphenhydrAMINE 25 MG tablet    BENADRYL    60 tablet    Take 1-2 tablets (25-50 mg) by mouth every 6 hours as needed for other (Nausea or vomiting)    Chemotherapy induced nausea and vomiting       HYDROmorphone 2 MG tablet    DILAUDID    30 tablet    Take 0.5-1 tablets (1-2 mg) by mouth every 2 hours as needed for moderate to severe pain    Pain, Malignant neoplasm of chest wall (H)       ibuprofen 600 MG tablet    ADVIL/MOTRIN    60 tablet    Take 1 tablet (600 mg) by mouth every 6 hours as needed for moderate pain    Sarcoma of vulva (H)       OLANZapine 5 MG tablet    zyPREXA    30 tablet    Take 1  tablet (5 mg) by mouth At Bedtime    Nausea       ondansetron 8 MG ODT tab    ZOFRAN-ODT    120 tablet    Take 1 tablet (8 mg) by mouth every 6 hours    Non-intractable vomiting with nausea, unspecified vomiting type       ondansetron 8 MG tablet    ZOFRAN    30 tablet    Take 1 tablet (8 mg) by mouth every 6 hours as needed for nausea    Encounter for antineoplastic chemotherapy       oxyCODONE 20 MG 12 hr tablet    OxyCONTIN    28 tablet    Take 1 tablet (20 mg) by mouth every 12 hours    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  Remove old patch and change every 3 days (72 hours).    Non-intractable vomiting with nausea, unspecified vomiting type       senna-docusate 8.6-50 MG per tablet    SENOKOT-S;PERICOLACE    30 tablet    Take 2 tablets by mouth 2 times daily as needed for constipation    Acute post-operative pain

## 2018-02-26 NOTE — NURSING NOTE
Chief Complaint   Patient presents with     RECHECK     Patient here today for follow up with rhabdoid tumor     /79 (BP Location: Right arm, Patient Position: Fowlers, Cuff Size: Adult Regular)  Pulse 94  Temp 97.7  F (36.5  C) (Oral)  Resp 28  Wt 55.8 kg (123 lb 0.3 oz)  SpO2 95%  BMI 21.63 kg/m2  Tasia Narvaez CMA  February 26, 2018

## 2018-02-26 NOTE — MR AVS SNAPSHOT
After Visit Summary   2/26/2018    Olimpia Childress    MRN: [id number]           Patient Information     Date Of Birth          1995        Visit Information        Provider Department      2/26/2018 11:45 AM Yue Nichols Emily Gustava, MD Peds Hematology Oncology        Today's Diagnoses     Synovial sarcoma (H)    -  1    Rhabdoid tumor (H)        Pain              [facility] 9th [facility]  [address] [place] [address]  Phone: [phone]  Clinic Hours:   Monday-Friday:   7 am to 5:00 pm   closed weekends and major  holidays     If your fever is 100.5  or greater,   call the clinic during business hours.   After hours call [phone] and ask for the pediatric hematology / oncology physician to be paged for you.               Follow-ups after your visit        Follow-up notes from your care team     Return in about 8 days (around 3/6/2018) for appt with Shayla .      Your next 10 appointments already scheduled     Feb 27, 2018 11:30 AM CST   ON TREATMENT VISIT with Giana Man MD   Radiation Oncology Clinic ([facility])    [facility]  1st Floor  [address] [id number] [phone]            Feb 27, 2018  2:45 PM CST   EXTERNAL RADIATION TREATMENT with [facility] RAD ONC ELEKTA   Radiation Oncology Clinic ([facility])    [facility]  1st Floor  [address] [id number] [phone]              Who to contact     Please call your clinic at [phone] to:    Ask questions about your health    Make or cancel appointments    Discuss your medicines    Learn about your test results    Speak to your doctor            Additional Information About Your Visit        [employer] Information     [employer] is an electronic gateway that provides easy, online access to your medical records. With [employer], you can request a clinic  appointment, read your test results, renew a prescription or communicate with your care team.     To sign up for [employer] visit the website at [email or web address]   You will be asked to enter the access code listed below, as well as some personal information. Please follow the directions to create your username and password.     Your access code is: [email or web address]  Expires: 3/22/2018 11:14 AM     Your access code will  in 90 days. If you need help or a new code, please contact your [facility] or call [phone] for assistance.        Care EveryWhere ID     This is your Care EveryWhere ID. This could be used by other organizations to access your [place] medical records  [id number]        Your Vitals Were     Pulse Temperature Respirations Pulse Oximetry BMI (Body Mass Index)       94 97.7  F (36.5  C) (Oral) 28 95% 21.63 kg/m2        Blood Pressure from Last 3 Encounters:   18 101/79   18 105/79   18 103/70    Weight from Last 3 Encounters:   18 55.8 kg (123 lb 0.3 oz)   18 58.5 kg (128 lb 14.4 oz)   18 59 kg (130 lb 1.1 oz)              We Performed the Following     CBC with platelets differential     Comprehensive metabolic panel     Routine UA with microscopic - No culture          Today's Medication Changes          These changes are accurate as of 18  2:57 PM.  If you have any questions, ask your nurse or doctor.               Start taking these medicines.        Dose/Directions    granisetron 1 MG tablet   Commonly known as:  KYTRIL   Used for:  Synovial sarcoma (H)   Started by:  Coretta Yanez MD        Dose:  1 mg   Take 1 tablet (1 mg) by mouth every 12 hours as needed for nausea   Quantity:  60 tablet   Refills:  0            Where to get your medicines      These medications were sent to [facility]/pharmacy #[id number] [place], [place] - [address], [place] [address]     Phone:   [phone]     granisetron 1 MG tablet         Some of these will need a paper prescription and others can be bought over the counter.  Ask your nurse if you have questions.     Bring a paper prescription for each of these medications     HYDROmorphone 2 MG tablet                Primary Care Provider Office Phone # Fax #    Coretta Yanez [phone] [address] [facility] [address]        Equal Access to Services     RONIT BAEZ : Hadii aad ku hadasho Soomaali, waaxda luqadaha, qaybta kaalmada adeegyada, waxay idiin hayaan adeeg mariola singleton. So [facility] [phone].    ATENCIÓN: Si selene jacob, tiene a ordoñez disposición servicios gratuitos de asistencia lingüística. [facility] [phone].    We comply with applicable federal civil rights laws and [place] laws. We do not discriminate on the basis of race, color, national origin, age, disability, sex, sexual orientation, or gender identity.            Thank you!     [facility] your [facility]. Our goal is always to provide you with excellent care. Hearing back from our patients is one way we can continue to improve our services. Please take a few minutes to complete the written survey that you may receive in the mail after your visit with us. Thank you!             Your Updated Medication List - Protect others around you: Learn how to safely use, store and throw away your medicines at www.disposemymeds.org.          This list is accurate as of 2/26/18  2:57 PM.  Always use your most recent med list.                   Brand Name Dispense Instructions for use Diagnosis    acetaminophen 325 MG tablet    TYLENOL    100 tablet    Take 2 tablets (650 mg) by mouth every 4 hours as needed for mild pain    Sarcoma of vulva (H)       diphenhydrAMINE 25 MG tablet    BENADRYL    60 tablet    Take 1-2 tablets (25-50 mg) by mouth every 6 hours as needed for other (Nausea or vomiting)    Chemotherapy  induced nausea and vomiting       granisetron 1 MG tablet    KYTRIL    60 tablet    Take 1 tablet (1 mg) by mouth every 12 hours as needed for nausea    Synovial sarcoma (H)       HYDROmorphone 2 MG tablet    DILAUDID    30 tablet    Take 0.5-1 tablets (1-2 mg) by mouth every 2 hours as needed for moderate to severe pain    Pain       ibuprofen 600 MG tablet    ADVIL/MOTRIN    60 tablet    Take 1 tablet (600 mg) by mouth every 6 hours as needed for moderate pain    Sarcoma of vulva (H)       OLANZapine 5 MG tablet    zyPREXA    30 tablet    Take 1 tablet (5 mg) by mouth At Bedtime    Nausea       ondansetron 8 MG ODT tab    ZOFRAN-ODT    120 tablet    Take 1 tablet (8 mg) by mouth every 6 hours    Non-intractable vomiting with nausea, unspecified vomiting type       ondansetron 8 MG tablet    ZOFRAN    30 tablet    Take 1 tablet (8 mg) by mouth every 6 hours as needed for nausea    Encounter for antineoplastic chemotherapy       oxyCODONE 20 MG 12 hr tablet    OxyCONTIN    28 tablet    Take 1 tablet (20 mg) by mouth every 12 hours    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  Remove old patch and change every 3 days (72 hours).    Non-intractable vomiting with nausea, unspecified vomiting type       senna-docusate 8.6-50 MG per tablet    SENOKOT-S;PERICOLACE    30 tablet    Take 2 tablets by mouth 2 times daily as needed for constipation    Acute post-operative pain

## 2018-02-26 NOTE — PROGRESS NOTES
Pediatric Hematology/Oncology Clinic Note    History- Olimpia initially presented with a growth on her right labia in 2013 when she was living in [place]. 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 She discontinued further treatment b/c her physicians were unsure of the exact diagnosis and she felt uncomfortable proceeding. Olimpia subsequently immigrated to the  in August of 2015 and in October she first noticed a recurrence of the mass in the area of previous excision. She was in [place] at that time, seen by oncology and had a port placed but then moved to [place] where she was seen by Dr. Mkceon at [facility] in November. There she underwent an MRI that showed a solid and cystic subcutaneous mass in the right labia measuring 1.7 x 1.6 x 1cm with question of nodular extension vs a second nodule measuring 0.7 cm. There was no invasion into the vagina. On November 16th 2015, Olimpia had a biopsy of the mass by a gynecologist, Dr. Terry, at [facility]. 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 By immunohistochemistry, the cells shows a complete loss of SMARCb1. Wide spectrum cytokeratin, CD34, WT1, Desmin and CD99 were all negative. RT PCR for Wooten Sarcoma associated fusion transcripts were negative as well. Olimpia went on to have a PET/CT as well which showed multiple pulmonary lesions and biopsy confirmed a lesion to be metastatic disease. Olimpia was seen by Tomás White at [facility] who reviewed the diagnosis and potential treatment plans with her, however she prefered to be treated here at [facility]. 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    Olimpia underwent left sided thoracotomy and resection of two pulmonary nodules on 7/20/16. Pathology revealed that one lesion was benign lymphoid tissue and the other was consistent with synovial sarcoma.  This was confirmed with FISH  With 91% of cell examined having a signal pattern indictivate of a rearrangement of the SS18 locus.  Olimpia has now completed 3 cycles of chemotherapy per [id number] and started her radiation on 9/13/16 and completed on 10/17/16.  She then went on to have a resection of her labial mass on 11/16/16 by Jannet Pastrana and Chikis with reconstruction, including skin flaps by Dr. Corona from plastics.  Pathology showed no residual tumor.  She had a f/u chest CT today on 12/5/16 that showed persistance of her pulmonary nodules as well as few additonal pulmonary nodules.  She saw Dr. Pierre who was in agreement with surgical resection, however Olimpia wanted to wait until March to have more time to recover from her last surgery. She underwent left sided thoracotomy on 3/8/17.    In May, Olimpia's follow up PET/CT demonstrated significant progression of her pulmonary metastatic disease, with an anterior mediastinal mass compression her right ventricle and potentially right outflow tract.  Subsequent echocardiogram did not show altered cardiac function, Dr Man felt radiation therapy was not in her best interest at this time but would consider if she developed cardiac dysfunction.  Olimpia was started on oral Pazopanib May 2017 for palliative therapy, with doses held due to palmar plantar dysesthesia and stomatitis, and restarted 50% dosing on 6/19/17.  Olimpia has imaging in August 2017 that showed a positive response to therapy.  However on most recent imaging last week she was found to have progression.    HPI:  Olimpia reports that he pain is under very good control and she stopped taking the Oxycontin.  She is using Dilaudad twice daily and feels that is working very  well.  Her biggest complaint today continues to be nausea.  She only took Zyprexa one night and was so tired the next day she couldn't get out of bed, so she is no longer taking it.  Zofran has not been effective for her.  She is not able to eat due to the nausea and is drinking just enough to stay hydrated.  Stooling well, No diarrhea.  No cough, SOB.     ROS  See HPI. Complete ROS otherwise negative.      Allergies as of 02/26/2018 - Sheldon as Reviewed 02/26/2018   Allergen Reaction Noted     Heparin flush Other (See Comments) 01/27/2016     Pork derived products  02/09/2016     Tegaderm transparent dressing (informational only) Other (See Comments) and Rash 04/20/2016     Medications:  Miralax daily to BID  OxyContin twice a day in the morning and before bed (Not taking currently).   Dilaudid: twice a day, mid-afternoon and before bed.   Benadryl three times per day in morning, afternoon and before bed.     Past Medical History:   Diagnosis Date     Anemia 6/3/2016     Constipation      Extrarenal rhabdoid neoplasm (H)      Febrile neutropenia (H) 4/17/2016     History of blood transfusion      Latent tuberculosis      Lung disease      On antineoplastic chemotherapy      Sarcoma of vulva (H)      Social History:  Olimpia lives with her cousin.  \  Family History   Problem Relation Age of Onset     Other Cancer No family hx of          Physical Exam   Temp:  [97.7  F (36.5  C)] 97.7  F (36.5  C)  Pulse:  [94] 94  Resp:  [28] 28  BP: (101)/(79) 101/79  SpO2:  [95 %] 95 %  Wt Readings from Last 4 Encounters:   02/26/18 55.8 kg (123 lb 0.3 oz)   02/21/18 58.5 kg (128 lb 14.4 oz)   02/20/18 59 kg (130 lb 1.1 oz)   02/16/18 62.7 kg (138 lb 3.7 oz)     GENERAL: Alert, cooperative, Appears tired.  SKIN: warm, dry, intact. Skin turgor normal.   HEAD: Normocephalic, atraumatic.     EYES: Sclera clear, slightly anicteric. PERRLA, EOMI.   EARS: TMs pearly gray, opaque, landmarks visualized bilaterally.   NOSE: Nares patent,  mucosa pink and moist, no nasal drainage noted.  No facial pain.  MOUTH/THROAT: Oropharynx is clear. Tongue without sores. Normal dentition for age, no mucosal lesions.  NECK: Supple, full range of motion, thyroid non-palpable.   LYMPH NODES: No cervical, supraclavicular or axillary lymphadenopathy.  LUNGS: No increased WOB.  Lungs clear to auscultation throughout; diminished in LLL.  No crackles or wheezes.  HEART: HRR. S1 and S2 are normal. No murmurs, rubs, gallops. The radial pulses are 2+ bilaterally. Cap refill <2 sec  ABDOMEN: Normoactive bowel sounds.   NEUROLOGIC: Grossly intact, no focal neurologic deficits.    :  Deferred today.    Labs:  Results for orders placed or performed in visit on 02/26/18 (from the past 24 hour(s))   CBC with platelets differential   Result Value Ref Range    WBC 3.8 (L) 4.0 - 11.0 10e9/L    RBC Count 3.87 3.8 - 5.2 10e12/L    Hemoglobin 13.5 11.7 - 15.7 g/dL    Hematocrit 40.1 35.0 - 47.0 %     (H) 78 - 100 fl    MCH 34.9 (H) 26.5 - 33.0 pg    MCHC 33.7 31.5 - 36.5 g/dL    RDW 12.5 10.0 - 15.0 %    Platelet Count 230 150 - 450 10e9/L    Diff Method Automated Method     % Neutrophils 75.2 %    % Lymphocytes 6.9 %    % Monocytes 15.5 %    % Eosinophils 1.6 %    % Basophils 0.5 %    % Immature Granulocytes 0.3 %    Nucleated RBCs 0 0 /100    Absolute Neutrophil 2.8 1.6 - 8.3 10e9/L    Absolute Lymphocytes 0.3 (L) 0.8 - 5.3 10e9/L    Absolute Monocytes 0.6 0.0 - 1.3 10e9/L    Absolute Eosinophils 0.1 0.0 - 0.7 10e9/L    Absolute Basophils 0.0 0.0 - 0.2 10e9/L    Abs Immature Granulocytes 0.0 0 - 0.4 10e9/L    Absolute Nucleated RBC 0.0    Comprehensive metabolic panel   Result Value Ref Range    Sodium 135 133 - 144 mmol/L    Potassium 3.7 3.4 - 5.3 mmol/L    Chloride 101 94 - 109 mmol/L    Carbon Dioxide 29 20 - 32 mmol/L    Anion Gap 5 3 - 14 mmol/L    Glucose 115 (H) 70 - 99 mg/dL    Urea Nitrogen 11 7 - 30 mg/dL    Creatinine 0.60 0.52 - 1.04 mg/dL    GFR Estimate >90  >60 mL/min/1.7m2    GFR Estimate If Black >90 >60 mL/min/1.7m2    Calcium 9.1 8.5 - 10.1 mg/dL    Bilirubin Total 0.8 0.2 - 1.3 mg/dL    Albumin 3.1 (L) 3.4 - 5.0 g/dL    Protein Total 8.4 6.8 - 8.8 g/dL    Alkaline Phosphatase 81 40 - 150 U/L    ALT 40 0 - 50 U/L    AST 46 (H) 0 - 45 U/L   Routine UA with microscopic - No culture   Result Value Ref Range    Color Urine Light Yellow     Appearance Urine Slightly Cloudy     Glucose Urine Negative NEG^Negative mg/dL    Bilirubin Urine Negative NEG^Negative    Ketones Urine Negative NEG^Negative mg/dL    Specific Gravity Urine 1.004 1.003 - 1.035    Blood Urine Negative NEG^Negative    pH Urine 7.0 5.0 - 7.0 pH    Protein Albumin Urine 30 (A) NEG^Negative mg/dL    Urobilinogen mg/dL Normal 0.0 - 2.0 mg/dL    Nitrite Urine Negative NEG^Negative    Leukocyte Esterase Urine Moderate (A) NEG^Negative    Source Midstream Urine     WBC Urine 11 (H) 0 - 2 /HPF    RBC Urine <1 0 - 2 /HPF    Squamous Epithelial /HPF Urine 5 (H) 0 - 1 /HPF    Transitional Epi <1 0 - 1 /HPF    Mucous Urine Present (A) NEG^Negative /LPF       Assessment:   Olimpia is a 23-year-old female with 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  She has restarted her Pazopanib (had been on hold just prior to the time of her progression). Receiving daily palliative radiation through 2/27/18. Pain has improved but continues to have intractable nausea;  Zofran not effective;  Zyprexa was too sedating.      Plan:  1) Will try Kytril 1 mg PO BID given that Zofran did not help and trying to avoid medications that are sedating.  2) Continue Dilaudad PRN;  Refill given.  3) Continue Pazaponib;  Tolerating current dosing  4) RTC next week to see Shayla

## 2018-02-26 NOTE — LETTER
2/26/2018      RE: Olimpia Chidlress  [address]   [facility] [id number]       Pediatric Hematology/Oncology Clinic Note    History- Olimpia initially presented with a growth on her right labia in 2013 when she was living in [place]. 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 She discontinued further treatment b/c her physicians were unsure of the exact diagnosis and she felt uncomfortable proceeding. Olimpia subsequently immigrated to the  in August of 2015 and in October she first noticed a recurrence of the mass in the area of previous excision. She was in [place] at that time, seen by oncology and had a port placed but then moved to [place] where she was seen by Dr. Mckeon at [facility] in November. There she underwent an MRI that showed a solid and cystic subcutaneous mass in the right labia measuring 1.7 x 1.6 x 1cm with question of nodular extension vs a second nodule measuring 0.7 cm. There was no invasion into the vagina. On November 16th 2015, Olimpia had a biopsy of the mass by a gynecologist, Dr. Terry, at [facility]. Cytology was reviewed at [facility] and read as a SMARCB1-deficient genital sarcoma, likely falling within the overall spectrum of malignant rhabdoid tumor. By immunohistochemistry, the cells shows a complete loss of SMARCb1. Wide spectrum cytokeratin, CD34, WT1, Desmin and CD99 were all negative. RT PCR for Wooten Sarcoma associated fusion transcripts were negative as well. Olimpia went on to have a PET/CT as well which showed multiple pulmonary lesions and biopsy confirmed a lesion to be metastatic disease. Olimpia was seen by Tomás White at [place] who reviewed the diagnosis and potential treatment plans with her, however she prefered to be treated here at [facility]. 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    Olimpia underwent left sided thoracotomy and resection of two pulmonary nodules on 7/20/16. Pathology revealed that one lesion was benign lymphoid tissue and the other was consistent with synovial sarcoma.  This was confirmed with FISH  With 91% of cell examined having a signal pattern indictivate of a rearrangement of the SS18 locus.  Olimpia has now completed 3 cycles of chemotherapy per [id number] and started her radiation on 9/13/16 and completed on 10/17/16.  She then went on to have a resection of her labial mass on 11/16/16 by Jannet Pastrana and Chikis with reconstruction, including skin flaps by Dr. Corona from plastics.  Pathology showed no residual tumor.  She had a f/u chest CT today on 12/5/16 that showed persistance of her pulmonary nodules as well as few additonal pulmonary nodules.  She saw Dr. Pierre who was in agreement with surgical resection, however Olimpia wanted to wait until March to have more time to recover from her last surgery. She underwent left sided thoracotomy on 3/8/17.    In May, Olimpia's follow up PET/CT demonstrated significant progression of her pulmonary metastatic disease, with an anterior mediastinal mass compression her right ventricle and potentially right outflow tract.  Subsequent echocardiogram did not show altered cardiac function, Dr Man felt radiation therapy was not in her best interest at this time but would consider if she developed cardiac dysfunction.  Olimpia was started on oral Pazopanib May 2017 for palliative therapy, with doses held due to palmar plantar dysesthesia and stomatitis, and restarted 50% dosing on 6/19/17.  Olimpia has imaging in August 2017 that showed a positive response to therapy.  However on most recent imaging last week she was found to have progression.    HPI:  Olimpia reports that he pain is under very good control and she  stopped taking the Oxycontin.  She is using Dilaudad twice daily and feels that is working very well.  Her biggest complaint today continues to be nausea.  She only took Zyprexa one night and was so tired the next day she couldn't get out of bed, so she is no longer taking it.  Zofran has not been effective for her.  She is not able to eat due to the nausea and is drinking just enough to stay hydrated.  Stooling well, No diarrhea.  No cough, SOB.     ROS  See HPI. Complete ROS otherwise negative.      Allergies as of 02/26/2018 - Sheldon as Reviewed 02/26/2018   Allergen Reaction Noted     Heparin flush Other (See Comments) 01/27/2016     Pork derived products  02/09/2016     Tegaderm transparent dressing (informational only) Other (See Comments) and Rash 04/20/2016     Medications:  Miralax daily to BID  OxyContin twice a day in the morning and before bed (Not taking currently).   Dilaudid: twice a day, mid-afternoon and before bed.   Benadryl three times per day in morning, afternoon and before bed.     Past Medical History:   Diagnosis Date     Anemia 6/3/2016     Constipation      Extrarenal rhabdoid neoplasm (H)      Febrile neutropenia (H) 4/17/2016     History of blood transfusion      Latent tuberculosis      Lung disease      On antineoplastic chemotherapy      Sarcoma of vulva (H)      Social History:  Olimpia lives with her cousin.  \  Family History   Problem Relation Age of Onset     Other Cancer No family hx of          Physical Exam   Temp:  [97.7  F (36.5  C)] 97.7  F (36.5  C)  Pulse:  [94] 94  Resp:  [28] 28  BP: (101)/(79) 101/79  SpO2:  [95 %] 95 %  Wt Readings from Last 4 Encounters:   02/26/18 55.8 kg (123 lb 0.3 oz)   02/21/18 58.5 kg (128 lb 14.4 oz)   02/20/18 59 kg (130 lb 1.1 oz)   02/16/18 62.7 kg (138 lb 3.7 oz)     GENERAL: Alert, cooperative, Appears tired.  SKIN: warm, dry, intact. Skin turgor normal.   HEAD: Normocephalic, atraumatic.     EYES: Sclera clear, slightly anicteric. PERRLA,  EOMI.   EARS: TMs pearly gray, opaque, landmarks visualized bilaterally.   NOSE: Nares patent, mucosa pink and moist, no nasal drainage noted.  No facial pain.  MOUTH/THROAT: Oropharynx is clear. Tongue without sores. Normal dentition for age, no mucosal lesions.  NECK: Supple, full range of motion, thyroid non-palpable.   LYMPH NODES: No cervical, supraclavicular or axillary lymphadenopathy.  LUNGS: No increased WOB.  Lungs clear to auscultation throughout; diminished in LLL.  No crackles or wheezes.  HEART: HRR. S1 and S2 are normal. No murmurs, rubs, gallops. The radial pulses are 2+ bilaterally. Cap refill <2 sec  ABDOMEN: Normoactive bowel sounds.   NEUROLOGIC: Grossly intact, no focal neurologic deficits.    :  Deferred today.    Labs:  Results for orders placed or performed in visit on 02/26/18 (from the past 24 hour(s))   CBC with platelets differential   Result Value Ref Range    WBC 3.8 (L) 4.0 - 11.0 10e9/L    RBC Count 3.87 3.8 - 5.2 10e12/L    Hemoglobin 13.5 11.7 - 15.7 g/dL    Hematocrit 40.1 35.0 - 47.0 %     (H) 78 - 100 fl    MCH 34.9 (H) 26.5 - 33.0 pg    MCHC 33.7 31.5 - 36.5 g/dL    RDW 12.5 10.0 - 15.0 %    Platelet Count 230 150 - 450 10e9/L    Diff Method Automated Method     % Neutrophils 75.2 %    % Lymphocytes 6.9 %    % Monocytes 15.5 %    % Eosinophils 1.6 %    % Basophils 0.5 %    % Immature Granulocytes 0.3 %    Nucleated RBCs 0 0 /100    Absolute Neutrophil 2.8 1.6 - 8.3 10e9/L    Absolute Lymphocytes 0.3 (L) 0.8 - 5.3 10e9/L    Absolute Monocytes 0.6 0.0 - 1.3 10e9/L    Absolute Eosinophils 0.1 0.0 - 0.7 10e9/L    Absolute Basophils 0.0 0.0 - 0.2 10e9/L    Abs Immature Granulocytes 0.0 0 - 0.4 10e9/L    Absolute Nucleated RBC 0.0    Comprehensive metabolic panel   Result Value Ref Range    Sodium 135 133 - 144 mmol/L    Potassium 3.7 3.4 - 5.3 mmol/L    Chloride 101 94 - 109 mmol/L    Carbon Dioxide 29 20 - 32 mmol/L    Anion Gap 5 3 - 14 mmol/L    Glucose 115 (H) 70 - 99  mg/dL    Urea Nitrogen 11 7 - 30 mg/dL    Creatinine 0.60 0.52 - 1.04 mg/dL    GFR Estimate >90 >60 mL/min/1.7m2    GFR Estimate If Black >90 >60 mL/min/1.7m2    Calcium 9.1 8.5 - 10.1 mg/dL    Bilirubin Total 0.8 0.2 - 1.3 mg/dL    Albumin 3.1 (L) 3.4 - 5.0 g/dL    Protein Total 8.4 6.8 - 8.8 g/dL    Alkaline Phosphatase 81 40 - 150 U/L    ALT 40 0 - 50 U/L    AST 46 (H) 0 - 45 U/L   Routine UA with microscopic - No culture   Result Value Ref Range    Color Urine Light Yellow     Appearance Urine Slightly Cloudy     Glucose Urine Negative NEG^Negative mg/dL    Bilirubin Urine Negative NEG^Negative    Ketones Urine Negative NEG^Negative mg/dL    Specific Gravity Urine 1.004 1.003 - 1.035    Blood Urine Negative NEG^Negative    pH Urine 7.0 5.0 - 7.0 pH    Protein Albumin Urine 30 (A) NEG^Negative mg/dL    Urobilinogen mg/dL Normal 0.0 - 2.0 mg/dL    Nitrite Urine Negative NEG^Negative    Leukocyte Esterase Urine Moderate (A) NEG^Negative    Source Midstream Urine     WBC Urine 11 (H) 0 - 2 /HPF    RBC Urine <1 0 - 2 /HPF    Squamous Epithelial /HPF Urine 5 (H) 0 - 1 /HPF    Transitional Epi <1 0 - 1 /HPF    Mucous Urine Present (A) NEG^Negative /LPF       Assessment:   Olimpia is a 23-year-old female with 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  She has restarted her Pazopanib (had been on hold just prior to the time of her progression). Receiving daily palliative radiation through 2/27/18. Pain has improved but continues to have intractable nausea;  Zofran not effective;  Zyprexa was too sedating.      Plan:  1) Will try Kytril 1 mg PO BID given that Zofran did not help and trying to avoid medications that are sedating.  2) Continue Dilaudad PRN;  Refill given.  3) Continue Pazaponib;  Tolerating current dosing  4) RTC next week to see Shayla Yanez MD

## 2018-02-27 NOTE — PATIENT INSTRUCTIONS
Managing radiation side effects after treatment has ended    The side effects of radiation therapy should gradually decrease in 2 to 3 weeks after you have finished radiation.  Some effects take longer to resolve.    Fatigue caused by radiation therapy will decrease and your energy will improve.    Skin reactions:  Skin changes (such as redness or irritation) will slowly begin to get better. Some people may have a permanent change in skin color.  Their skin may be more pink or  tan  than the untreated skin. The skin may be thinner or more fragile than before treatment.  Continue to use a gentle moisturizing lotion for several months.  You should always protect the skin in the area that was treated by using sunscreen of SPF30 or higher.      Other skin care instructions:        For concerns or questions call [facility] Radiation Oncology [phone]

## 2018-02-27 NOTE — MR AVS SNAPSHOT
After Visit Summary   2/27/2018    Olimpia Childress    MRN: [id number]           Patient Information     Date Of Birth          1995        Visit Information        Provider Department      2/27/2018 3:00 PM Yue Nichols Kathryn E, MD Radiation Oncology Clinic        Today's Diagnoses     Synovial sarcoma (H)    -  1      Care Instructions          Managing radiation side effects after treatment has ended    The side effects of radiation therapy should gradually decrease in 2 to 3 weeks after you have finished radiation.  Some effects take longer to resolve.    Fatigue caused by radiation therapy will decrease and your energy will improve.    Skin reactions:  Skin changes (such as redness or irritation) will slowly begin to get better. Some people may have a permanent change in skin color.  Their skin may be more pink or  tan  than the untreated skin. The skin may be thinner or more fragile than before treatment.  Continue to use a gentle moisturizing lotion for several months.  You should always protect the skin in the area that was treated by using sunscreen of SPF30 or higher.      Other skin care instructions:        For concerns or questions call [facility] Radiation Oncology [phone]          Follow-ups after your visit        Your next 10 appointments already scheduled     Mar 06, 2018  1:45 PM CST   Return Visit with JULIO C Pittman CNP   Peds Hematology Oncology ([facility])    [facility]  9th Floor  [address] [facility] [address] [phone]              Who to contact     Please call your clinic at [phone] to:    Ask questions about your health    Make or cancel appointments    Discuss your medicines    Learn about your test results    Speak to your doctor            Additional Information About Your Visit        RadioRxhart Information     [employer] is an electronic gateway that provides easy,  online access to your medical records. With [facility], you can request a clinic appointment, read your test results, renew a prescription or communicate with your care team.     To sign up for [facility] visit the website at [email or web address]   You will be asked to enter the access code listed below, as well as some personal information. Please follow the directions to create your username and password.     Your access code is: [email or web address]  Expires: 3/22/2018 11:14 AM     Your access code will  in 90 days. If you need help or a new code, please contact your [facility] or call [phone] for assistance.        Care EveryWhere ID     This is your Care EveryWhere ID. This could be used by other organizations to access your [place] medical records  [id number]         Blood Pressure from Last 3 Encounters:   18 101/79   18 105/79   18 103/70    Weight from Last 3 Encounters:   18 55.8 kg (123 lb 0.3 oz)   18 58.5 kg (128 lb 14.4 oz)   18 59 kg (130 lb 1.1 oz)              Today, you had the following     No orders found for display       Primary Care Provider Office Phone # Fax #    Coretta Yanez [phone] [address] [facility] [address]        Equal Access to Services     TRACI BAEZ AH: Hadii aad ku hadasho Sobenjaali, waaxda luqadaha, qaybta kaalmada adeegyada, wilner blue . So [facility] [phone].    ATENCIÓN: Si habla español, tiene a ordoñez disposición servicios gratuitos de asistencia lingüística. Llame al [phone].    We comply with applicable federal civil rights laws and [place] laws. We do not discriminate on the basis of race, color, national origin, age, disability, sex, sexual orientation, or gender identity.            Thank you!     Thank you for choosing [facility]  for your care. Our goal is always to provide you with excellent care.  Hearing back from our patients is one way we can continue to improve our services. Please take a few minutes to complete the written survey that you may receive in the mail after your visit with us. Thank you!             Your Updated Medication List - Protect others around you: Learn how to safely use, store and throw away your medicines at www.disposemymeds.org.          This list is accurate as of 2/27/18 11:59 PM.  Always use your most recent med list.                   Brand Name Dispense Instructions for use Diagnosis    acetaminophen 325 MG tablet    TYLENOL    100 tablet    Take 2 tablets (650 mg) by mouth every 4 hours as needed for mild pain    Sarcoma of vulva (H)       diphenhydrAMINE 25 MG tablet    BENADRYL    60 tablet    Take 1-2 tablets (25-50 mg) by mouth every 6 hours as needed for other (Nausea or vomiting)    Chemotherapy induced nausea and vomiting       granisetron 1 MG tablet    KYTRIL    60 tablet    Take 1 tablet (1 mg) by mouth every 12 hours as needed for nausea    Synovial sarcoma (H)       HYDROmorphone 2 MG tablet    DILAUDID    30 tablet    Take 0.5-1 tablets (1-2 mg) by mouth every 2 hours as needed for moderate to severe pain    Pain       ibuprofen 600 MG tablet    ADVIL/MOTRIN    60 tablet    Take 1 tablet (600 mg) by mouth every 6 hours as needed for moderate pain    Sarcoma of vulva (H)       OLANZapine 5 MG tablet    zyPREXA    30 tablet    Take 1 tablet (5 mg) by mouth At Bedtime    Nausea       ondansetron 8 MG ODT tab    ZOFRAN-ODT    120 tablet    Take 1 tablet (8 mg) by mouth every 6 hours    Non-intractable vomiting with nausea, unspecified vomiting type       ondansetron 8 MG tablet    ZOFRAN    30 tablet    Take 1 tablet (8 mg) by mouth every 6 hours as needed for nausea    Encounter for antineoplastic chemotherapy       oxyCODONE 20 MG 12 hr tablet    OxyCONTIN    28 tablet    Take 1 tablet (20 mg) by mouth every 12 hours    Malignant neoplasm metastatic to chest wall  with unknown primary site (H), Pain       polyethylene glycol Packet    MIRALAX/GLYCOLAX    7 packet    Take 17 g by mouth daily as needed for constipation    Acute post-operative pain       ranitidine 150 MG tablet    ZANTAC    30 tablet    Take 1 tablet (150 mg) by mouth daily    Gastritis without bleeding, unspecified chronicity, unspecified gastritis type       scopolamine 72 hr patch    TRANSDERM    4 patch    Apply 1 patch to hairless area behind one ear at least 4 hours before travel.  Remove old patch and change every 3 days (72 hours).    Non-intractable vomiting with nausea, unspecified vomiting type       senna-docusate 8.6-50 MG per tablet    SENOKOT-S;PERICOLACE    30 tablet    Take 2 tablets by mouth 2 times daily as needed for constipation    Acute post-operative pain

## 2018-02-27 NOTE — PROGRESS NOTES
[facility]YSICIANS  SPECIALIZING IN BREAKTHROUGHS  Radiation Oncology    On Treatment Visit Note      Olimpia Childress      Date: 2018   MRN: [id number]   : 1995  Diagnosis: sarcoma      Reason for Visit:  On Radiation Treatment Visit     Treatment Summary to Date  Treatment Site: L chest wall  Current Dose: 3000/3000cGy  Fractions: 10/10fx           Subjective:  Overall doing well. She reports some nausea today and decreased appetite. Pain continues to be well controlled after first few fractions although still requiring some dilaudid.     Nursing ROS:   Nutrition Alteration  Diet Type: Patient's Preference  Anorexia NCI: 1 - Loss of appetite  Skin  Skin Reaction: 0 - No changes                    Pain Assessment  Pain Note: dilaudid every 4 hours      Objective:   There were no vitals taken for this visit.  Gen: Appears well, in no acute distress  Skin: No erythema    Labs:  CBC RESULTS:   Recent Labs   Lab Test  18   1246   WBC  3.8*   RBC  3.87   HGB  13.5   HCT  40.1   MCV  104*   MCH  34.9*   MCHC  33.7   RDW  12.5   PLT  230     ELECTROLYTES:  Recent Labs   Lab Test  18   1246   NA  135   POTASSIUM  3.7   CHLORIDE  101   JANA  9.1   CO2  29   BUN  11   CR  0.60   GLC  115*       Assessment:    Tolerating radiation therapy well.  All questions and concerns addressed.    Toxicities:  Nausea: Grade 1: Loss of appetite without alteration in eating habits    Plan:   1. Done with XRT today.  Good pain relief  2. RTC in 1 month      Mosaiq chart and setup information reviewed  MVCT/IGRT images checked    Medication Review  Med list reviewed with patient?: Yes    Educational Topic Discussed  Education Instructions: reviewed     The patient was seen and discussed with staff, Dr. Man.    Jagdeep Greco MD  Resident, PGY-3  Department of Radiation Oncology  [facility]  [phone]      I saw the patient with the resident.  I agree with the resident note and plan  of care.      Giana Man MD

## 2018-02-27 NOTE — LETTER
2018       RE: Olimpia Childress  [address]   [facility] [address]     Dear Colleague,    Thank you for referring your patient, Olimpia Childress, to the RADIATION ONCOLOGY CLINIC. Please see a copy of my visit note below.    [facility] PHYSICIANS  SPECIALIZING IN BREAKTHROUGHS  Radiation Oncology    On Treatment Visit Note      Olimpia Childress      Date: 2018   MRN: [id number]   : 1995  Diagnosis: sarcoma      Reason for Visit:  On Radiation Treatment Visit     Treatment Summary to Date  Treatment Site: L chest wall  Current Dose: 3000/3000cGy  Fractions: 10/10fx           Subjective:  Overall doing well. She reports some nausea today and decreased appetite. Pain continues to be well controlled after first few fractions although still requiring some dilaudid.     Nursing ROS:   Nutrition Alteration  Diet Type: Patient's Preference  Anorexia NCI: 1 - Loss of appetite  Skin  Skin Reaction: 0 - No changes                    Pain Assessment  Pain Note: dilaudid every 4 hours      Objective:   There were no vitals taken for this visit.  Gen: Appears well, in no acute distress  Skin: No erythema    Labs:  CBC RESULTS:   Recent Labs   Lab Test  18   1246   WBC  3.8*   RBC  3.87   HGB  13.5   HCT  40.1   MCV  104*   MCH  34.9*   MCHC  33.7   RDW  12.5   PLT  230     ELECTROLYTES:  Recent Labs   Lab Test  18   1246   NA  135   POTASSIUM  3.7   CHLORIDE  101   JANA  9.1   CO2  29   BUN  11   CR  0.60   GLC  115*       Assessment:    Tolerating radiation therapy well.  All questions and concerns addressed.    Toxicities:  Nausea: Grade 1: Loss of appetite without alteration in eating habits    Plan:   1. Done with XRT today.  Good pain relief  2. RTC in 1 month      Mosaiq chart and setup information reviewed  MVCT/IGRT images checked    Medication Review  Med list reviewed with patient?: Yes    Educational Topic Discussed  Education Instructions: reviewed     The patient was seen and  discussed with staff, Dr. Man.    Jagdeep Greco MD  Resident, PGY-3  Department of Radiation Oncology  [facility]  [phone]      I saw the patient with the resident.  I agree with the resident note and plan of care.      Giana Man MD

## 2018-03-05 NOTE — TELEPHONE ENCOUNTER
Samantha, an RN from [facility] called the triage line looking to extend [facility]'s skilled nursing visits for 2 more weeks. Patient receives one nurse visit per week. Samantha to fax request for extended service to [facility] for provider to sign.

## 2018-03-06 NOTE — MR AVS SNAPSHOT
After Visit Summary   3/6/2018    Olimpia Childress    MRN: [id number]           Patient Information     Date Of Birth          1995        Visit Information        Provider Department      3/6/2018 1:30 PM Omar Trammell APRN CNP; [facility] Peds Hematology Oncology        Today's Diagnoses     Rhabdoid tumor (H)        Gastritis without bleeding, unspecified chronicity, unspecified gastritis type        Nausea        Sarcoma of vulva (H)        Synovial sarcoma (H)              [facility], 9th floor  [address] [place] [address]  Phone: [phone]  Clinic Hours:   Monday-Friday:   7 am to 5:00 pm   closed weekends and major  holidays     If your fever is 100.5  or greater,   call the clinic during business hours.   After hours call [phone] and ask for the pediatric hematology / oncology physician to be paged for you.               Follow-ups after your visit        Who to contact     Please call your clinic at [phone] to:    Ask questions about your health    Make or cancel appointments    Discuss your medicines    Learn about your test results    Speak to your doctor            Additional Information About Your Visit        MyChart Information     [employer] is an electronic gateway that provides easy, online access to your medical records. With [employer], you can request a clinic appointment, read your test results, renew a prescription or communicate with your care team.     To sign up for [employer] visit the website at [email or web address]   You will be asked to enter the access code listed below, as well as some personal information. Please follow the directions to create your username and password.     Your access code is: [email or web address]  Expires: 3/22/2018 11:14 AM     Your access code will  in 90 days. If you need help or a new code, please contact your [facility] or call  [phone] for assistance.        Care EveryWhere ID     This is your Care EveryWhere ID. This could be used by other organizations to access your [place] medical records  [id number]        Your Vitals Were     Pulse Temperature Respirations Height Pulse Oximetry BMI (Body Mass Index)    101 97  F (36.1  C) (Oral) 20 1.608 m (5' 3.31\") 100% 21.43 kg/m2       Blood Pressure from Last 3 Encounters:   03/06/18 118/77   02/26/18 101/79   02/20/18 105/79    Weight from Last 3 Encounters:   03/06/18 55.4 kg (122 lb 2.2 oz)   02/26/18 55.8 kg (123 lb 0.3 oz)   02/21/18 58.5 kg (128 lb 14.4 oz)              We Performed the Following     CBC with platelets differential          Today's Medication Changes          These changes are accurate as of 3/6/18  3:06 PM.  If you have any questions, ask your nurse or doctor.               Start taking these medicines.        Dose/Directions    LORazepam 1 MG tablet   Commonly known as:  ATIVAN   Used for:  Sarcoma of vulva (H)   Started by:  Omar Trammell APRN CNP        Dose:  1 mg   Take 1 tablet (1 mg) by mouth every 6 hours as needed for nausea   Quantity:  30 tablet   Refills:  2         These medicines have changed or have updated prescriptions.        Dose/Directions    OLANZapine 5 MG tablet   Commonly known as:  zyPREXA   This may have changed:    - how much to take  - when to take this   Used for:  Nausea   Changed by:  Omar Trammell APRN CNP        Dose:  2.5 mg   Take 0.5 tablets (2.5 mg) by mouth 2 times daily   Quantity:  30 tablet   Refills:  1       ranitidine 150 MG tablet   Commonly known as:  ZANTAC   This may have changed:  when to take this   Used for:  Gastritis without bleeding, unspecified chronicity, unspecified gastritis type   Changed by:  Omar Trammell APRN CNP        Dose:  150 mg   Take 1 tablet (150 mg) by mouth 2 times daily   Quantity:  60 tablet   Refills:  3         Stop taking these medicines if you haven't already. Please " contact your care team if you have questions.     acetaminophen 325 MG tablet   Commonly known as:  TYLENOL   Stopped by:  Omar Trammell APRN CNP           diphenhydrAMINE 25 MG tablet   Commonly known as:  BENADRYL   Stopped by:  Omar Trammell APRN CNP           granisetron 1 MG tablet   Commonly known as:  KYTRIL   Stopped by:  Omar Trammell APRN CNP           ibuprofen 600 MG tablet   Commonly known as:  ADVIL/MOTRIN   Stopped by:  Omar Trammell APRN CNP           ondansetron 8 MG tablet   Commonly known as:  ZOFRAN   Stopped by:  Omar Trammell APRN CNP           oxyCODONE 20 MG 12 hr tablet   Commonly known as:  OxyCONTIN   Stopped by:  Omar Trammell APRN CNP           scopolamine 72 hr patch   Commonly known as:  TRANSDERM   Stopped by:  Omar Trammell APRN CNP                Where to get your medicines      These medications were sent to [facility]/pharmacy [address] [place], [place] - [address], [place] [address]     Phone:  [phone]     OLANZapine 5 MG tablet         Some of these will need a paper prescription and others can be bought over the counter.  Ask your nurse if you have questions.     Bring a paper prescription for each of these medications     LORazepam 1 MG tablet                Primary Care Provider Office Phone # Fax #    Coretta Yanez [phone] [address] [facility] [address]        Equal Access to Services     [facility] Center[facility] : Hadii leonid finley hadberto Sobenjaali, waaxda luqadaha, qaybta kaalmada adeegyada, waxay oj singleton. So [facility] [phone].    ATENCIÓN: Si habla español, tiene a ordoñez disposición servicios gratuitos de asistencia lingüística. Llame al [phone].    We comply with applicable federal civil rights laws and [place] laws. We do not discriminate on the basis of race, color, national origin, age, disability, sex, sexual orientation, or  gender identity.            Thank you!     Thank you for choosing [facility] HEMATOLOGY ONCOLOGY  for your care. Our goal is always to provide you with excellent care. Hearing back from our patients is one way we can continue to improve our services. Please take a few minutes to complete the written survey that you may receive in the mail after your visit with us. Thank you!             Your Updated Medication List - Protect others around you: Learn how to safely use, store and throw away your medicines at www.disposemymeds.org.          This list is accurate as of 3/6/18  3:06 PM.  Always use your most recent med list.                   Brand Name Dispense Instructions for use Diagnosis    HYDROmorphone 2 MG tablet    DILAUDID    30 tablet    Take 0.5-1 tablets (1-2 mg) by mouth every 2 hours as needed for moderate to severe pain    Pain       LORazepam 1 MG tablet    ATIVAN    30 tablet    Take 1 tablet (1 mg) by mouth every 6 hours as needed for nausea    Sarcoma of vulva (H)       OLANZapine 5 MG tablet    zyPREXA    30 tablet    Take 0.5 tablets (2.5 mg) by mouth 2 times daily    Nausea       ondansetron 8 MG ODT tab    ZOFRAN-ODT    120 tablet    Take 1 tablet (8 mg) by mouth every 6 hours    Non-intractable vomiting with nausea, unspecified vomiting type       pazopanib 200 MG tablet CHEMOTHERAPY    VOTRIENT    160 tablet    Take 4 tablets (800 mg) by mouth daily    Synovial sarcoma (H)       polyethylene glycol Packet    MIRALAX/GLYCOLAX    7 packet    Take 17 g by mouth daily as needed for constipation    Acute post-operative pain       ranitidine 150 MG tablet    ZANTAC    60 tablet    Take 1 tablet (150 mg) by mouth 2 times daily    Gastritis without bleeding, unspecified chronicity, unspecified gastritis type       senna-docusate 8.6-50 MG per tablet    SENOKOT-S;PERICOLACE    30 tablet    Take 2 tablets by mouth 2 times daily as needed for constipation    Acute post-operative pain

## 2018-03-06 NOTE — LETTER
3/6/2018      RE: Olimpia Childress  [address]   [facility] [id number]       Pediatric Hematology/Oncology Clinic Note    History- Olimpia initially presented with a growth on her right labia in 2013 when she was living in [place]. 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 She discontinued further treatment b/c her physicians were unsure of the exact diagnosis and she felt uncomfortable proceeding. Olimpia subsequently immigrated to the  in August of 2015 and in October she first noticed a recurrence of the mass in the area of previous excision. She was in [place] at that time, seen by oncology and had a port placed but then moved to [place] where she was seen by Dr. Mckeon at [facility] in November. There she underwent an MRI that showed a solid and cystic subcutaneous mass in the right labia measuring 1.7 x 1.6 x 1cm with question of nodular extension vs a second nodule measuring 0.7 cm. There was no invasion into the vagina. On November 16th 2015, Olimpia had a biopsy of the mass by a gynecologist, Dr. Terry, at [facility]. Cytology was reviewed at [place] and read as a SMARCB1-deficient genital sarcoma, likely falling within the overall spectrum of malignant rhabdoid tumor. By immunohistochemistry, the cells shows a complete loss of SMARCb1. Wide spectrum cytokeratin, CD34, WT1, Desmin and CD99 were all negative. RT PCR for Wooten Sarcoma associated fusion transcripts were negative as well. Olimpia went on to have a PET/CT as well which showed multiple pulmonary lesions and biopsy confirmed a lesion to be metastatic disease. Olimpia was seen by Tomás White at [place] who reviewed the diagnosis and potential treatment plans with her, however she prefered to be treated here at [facility]. 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    Olimpia underwent left sided thoracotomy and resection of two pulmonary nodules on 7/20/16. Pathology revealed that one lesion was benign lymphoid tissue and the other was consistent with synovial sarcoma.  This was confirmed with FISH  With 91% of cell examined having a signal pattern indictivate of a rearrangement of the SS18 locus.  Olimpia has now completed 3 cycles of chemotherapy per [id number] and started her radiation on 9/13/16 and completed on 10/17/16.  She then went on to have a resection of her labial mass on 11/16/16 by Jannet Pastrana and Chikis with reconstruction, including skin flaps by Dr. Corona from plastics.  Pathology showed no residual tumor.  She had a f/u chest CT today on 12/5/16 that showed persistance of her pulmonary nodules as well as few additonal pulmonary nodules.  She saw Dr. Pierre who was in agreement with surgical resection, however Olimpia wanted to wait until March to have more time to recover from her last surgery. She underwent left sided thoracotomy on 3/8/17.    In May, Olimpia's follow up PET/CT demonstrated significant progression of her pulmonary metastatic disease, with an anterior mediastinal mass compression her right ventricle and potentially right outflow tract.  Subsequent echocardiogram did not show altered cardiac function, Dr Man felt radiation therapy was not in her best interest at this time but would consider if she developed cardiac dysfunction.  Olimpia was started on oral Pazopanib May 2017 for palliative therapy, with doses held due to palmar plantar dysesthesia and stomatitis, and restarted 50% dosing on 6/19/17.  Olimpia has imaging in August 2017 that showed a positive response to therapy.  However on most recent imaging she was found to have progression, she has since completed palliative radiation.    HPI:  Olimpia comes to clinic today  with her cousin and an .  She reports she has been doing OK since her last visit.  Her pain is very well controlled, taking dilaudid 1-2 times per day.  She reports burning in her chest when eating, is taking zantac once per day.  She also has ongoing nausea.  She is taking zyprexa 2.5mg twice daily.  She is not taking any other medications for nausea.  Zofran does not work, kytril and scopolamine were not approved by insurance.  She is eating small amounts, her cousin wishes she would eat more.  Olimpia reports soft stools most days.  She is sleeping well at night but does not do much for activities during the day, is fatigued. No SOB or respiratory symptoms.     ROS  See HPI. Complete ROS otherwise negative.      Allergies as of 03/06/2018 - Sheldon as Reviewed 02/27/2018   Allergen Reaction Noted     Heparin flush Other (See Comments) 01/27/2016     Pork derived products  02/09/2016     Tegaderm transparent dressing (informational only) Other (See Comments) and Rash 04/20/2016     Medications:  Current Outpatient Prescriptions   Medication Sig Dispense Refill     ranitidine (ZANTAC) 150 MG tablet Take 1 tablet (150 mg) by mouth 2 times daily 60 tablet 3     OLANZapine (ZYPREXA) 5 MG tablet Take 0.5 tablets (2.5 mg) by mouth 2 times daily 30 tablet 1     LORazepam (ATIVAN) 1 MG tablet Take 1 tablet (1 mg) by mouth every 6 hours as needed for nausea 30 tablet 2     pazopanib (VOTRIENT) 200 MG tablet CHEMOTHERAPY Take 4 tablets (800 mg) by mouth daily 160 tablet 5     HYDROmorphone (DILAUDID) 2 MG tablet Take 0.5-1 tablets (1-2 mg) by mouth every 2 hours as needed for moderate to severe pain 30 tablet 0     ondansetron (ZOFRAN-ODT) 8 MG ODT tab Take 1 tablet (8 mg) by mouth every 6 hours 120 tablet 1     [DISCONTINUED] OLANZapine (ZYPREXA) 5 MG tablet Take 1 tablet (5 mg) by mouth At Bedtime 30 tablet 1     polyethylene glycol (MIRALAX/GLYCOLAX) Packet Take 17 g by mouth daily as needed for constipation  (Patient not taking: Reported on 2/27/2018) 7 packet 1     senna-docusate (SENOKOT-S;PERICOLACE) 8.6-50 MG per tablet Take 2 tablets by mouth 2 times daily as needed for constipation (Patient not taking: Reported on 3/6/2018) 30 tablet 0       Past Medical History:   Diagnosis Date     Anemia 6/3/2016     Constipation      Extrarenal rhabdoid neoplasm (H)      Febrile neutropenia (H) 4/17/2016     History of blood transfusion      Latent tuberculosis      Lung disease      On antineoplastic chemotherapy      Sarcoma of vulva (H)      Social History:  Olimpia lives with her cousin.    Family History   Problem Relation Age of Onset     Other Cancer No family hx of        Physical Exam   Temp:  [97  F (36.1  C)] 97  F (36.1  C)  Pulse:  [101] 101  Resp:  [20] 20  BP: (118)/(77) 118/77  SpO2:  [100 %] 100 %  Wt Readings from Last 4 Encounters:   03/06/18 55.4 kg (122 lb 2.2 oz)   02/26/18 55.8 kg (123 lb 0.3 oz)   02/21/18 58.5 kg (128 lb 14.4 oz)   02/20/18 59 kg (130 lb 1.1 oz)     GENERAL: Alert, cooperative, Appears tired.  SKIN: warm, dry, intact. Skin turgor normal.   HEAD: Normocephalic, atraumatic.     EYES: Sclera clear, slightly anicteric. PERRLA, EOMI.   EARS: TMs pearly gray, opaque, landmarks visualized bilaterally.   NOSE: Nares patent, mucosa pink and moist, no nasal drainage noted.  No facial pain.  MOUTH/THROAT: Oropharynx is clear. Tongue without sores. Normal dentition for age, no mucosal lesions.  NECK: Supple, full range of motion, thyroid non-palpable.   LYMPH NODES: No cervical, supraclavicular or axillary lymphadenopathy.  LUNGS: No increased WOB.  Lungs clear to auscultation throughout; diminished in LLL.  No crackles or wheezes.  HEART: HRR. S1 and S2 are normal. No murmurs, rubs, gallops. The radial pulses are 2+ bilaterally. Cap refill <2 sec  ABDOMEN: Normoactive bowel sounds.   NEUROLOGIC: Grossly intact, no focal neurologic deficits.    :  Deferred today.    Labs:  Results for orders placed  or performed in visit on 03/06/18 (from the past 24 hour(s))   CBC with platelets differential   Result Value Ref Range    WBC 2.6 (L) 4.0 - 11.0 10e9/L    RBC Count 3.43 (L) 3.8 - 5.2 10e12/L    Hemoglobin 12.0 11.7 - 15.7 g/dL    Hematocrit 35.2 35.0 - 47.0 %     (H) 78 - 100 fl    MCH 35.0 (H) 26.5 - 33.0 pg    MCHC 34.1 31.5 - 36.5 g/dL    RDW 13.0 10.0 - 15.0 %    Platelet Count 164 150 - 450 10e9/L    Diff Method Automated Method     % Neutrophils 61.9 %    % Lymphocytes 12.8 %    % Monocytes 22.2 %    % Eosinophils 2.3 %    % Basophils 0.8 %    % Immature Granulocytes 0.0 %    Nucleated RBCs 0 0 /100    Absolute Neutrophil 1.6 1.6 - 8.3 10e9/L    Absolute Lymphocytes 0.3 (L) 0.8 - 5.3 10e9/L    Absolute Monocytes 0.6 0.0 - 1.3 10e9/L    Absolute Eosinophils 0.1 0.0 - 0.7 10e9/L    Absolute Basophils 0.0 0.0 - 0.2 10e9/L    Abs Immature Granulocytes 0.0 0 - 0.4 10e9/L    Absolute Nucleated RBC 0.0     RBC Morphology Normal     Platelet Estimate Confirming automated cell count      Assessment:   Olimpia is a 23-year-old female with 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  She has restarted her Pazopanib (had been on hold just prior to the time of her progression). Completed palliative radiation. Abdominal pain well controlled.  Nausea persists although has improved with the addition of zyprexa.  She is taking 2.5mg BID (instead of 5mg daily) due to feelings of sedation.  Oral intake is down, weight continues to drift.  WBC down, remainder of counts stable.  Symptoms of reflux that are interfering with intake.    Plan:  1) Continue zyprexa.  Start ativan PRN for breakthrough nausea.  2) Increase zantac to BID.  There is decreased absorption of pazopanib with increased gastric pH, however the reflux symptoms are affecting her QOL.  The benefits of increasing zantac I believe outweigh the risks of decreased absorption of pazopanib for  her.  3) Discussed weight and oral intake.  Recommend using Ensure (she has available at home) and small frequent meals high in calories. Will continue to follow weight.  4) Continue dilaudid PRN.  5) RTC in 1 week (Asma did not want to space visits out at this time) for labs (port due to be accessed) and visit.        JULIO C Pittman CNP

## 2018-03-06 NOTE — NURSING NOTE
"Chief Complaint   Patient presents with     RECHECK     Patient is here today for Rhabdoid tumor follow up     /77 (BP Location: Left arm, Patient Position: Fowlers, Cuff Size: Adult Regular)  Pulse 101  Temp 97  F (36.1  C) (Oral)  Resp 20  Ht 1.608 m (5' 3.31\")  Wt 55.4 kg (122 lb 2.2 oz)  SpO2 100%  BMI 21.43 kg/m2    Radha Mason LPN  March 6, 2018    "

## 2018-03-06 NOTE — PROGRESS NOTES
Pediatric Hematology/Oncology Clinic Note    History- Olimpia initially presented with a growth on her right labia in 2013 when she was living in [place]. 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 She discontinued further treatment b/c her physicians were unsure of the exact diagnosis and she felt uncomfortable proceeding. Olimpia subsequently immigrated to the  in August of 2015 and in October she first noticed a recurrence of the mass in the area of previous excision. She was in [place] at that time, seen by oncology and had a port placed but then moved to [place] where she was seen by Dr. Mckeon at [facility] in November. There she underwent an MRI that showed a solid and cystic subcutaneous mass in the right labia measuring 1.7 x 1.6 x 1cm with question of nodular extension vs a second nodule measuring 0.7 cm. There was no invasion into the vagina. On November 16th 2015, Olimpia had a biopsy of the mass by a gynecologist, Dr. Terry, at [facility]. Cytology was reviewed at [facility] and read as a SMARCB1-deficient genital sarcoma, likely falling within the overall spectrum of malignant rhabdoid tumor. By immunohistochemistry, the cells shows a complete loss of SMARCb1. Wide spectrum cytokeratin, CD34, WT1, Desmin and CD99 were all negative. RT PCR for Wooten Sarcoma associated fusion transcripts were negative as well. Olimpia went on to have a PET/CT as well which showed multiple pulmonary lesions and biopsy confirmed a lesion to be metastatic disease. Olimpia was seen by Tomás White at [place] who reviewed the diagnosis and potential treatment plans with her, however she prefered to be treated here at [facility]. 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    Olimpia underwent left sided thoracotomy and resection of two pulmonary nodules on 7/20/16. Pathology revealed that one lesion was benign lymphoid tissue and the other was consistent with synovial sarcoma.  This was confirmed with FISH  With 91% of cell examined having a signal pattern indictivate of a rearrangement of the SS18 locus.  Olimpia has now completed 3 cycles of chemotherapy per [id number] and started her radiation on 9/13/16 and completed on 10/17/16.  She then went on to have a resection of her labial mass on 11/16/16 by Jannet Pastrana and Chikis with reconstruction, including skin flaps by Dr. Corona from plastics.  Pathology showed no residual tumor.  She had a f/u chest CT today on 12/5/16 that showed persistance of her pulmonary nodules as well as few additonal pulmonary nodules.  She saw Dr. Pierre who was in agreement with surgical resection, however Olimpia wanted to wait until March to have more time to recover from her last surgery. She underwent left sided thoracotomy on 3/8/17.    In May, Olimpia's follow up PET/CT demonstrated significant progression of her pulmonary metastatic disease, with an anterior mediastinal mass compression her right ventricle and potentially right outflow tract.  Subsequent echocardiogram did not show altered cardiac function, Dr Man felt radiation therapy was not in her best interest at this time but would consider if she developed cardiac dysfunction.  Olimpia was started on oral Pazopanib May 2017 for palliative therapy, with doses held due to palmar plantar dysesthesia and stomatitis, and restarted 50% dosing on 6/19/17.  Olimpia has imaging in August 2017 that showed a positive response to therapy.  However on most recent imaging she was found to have progression, she has since completed palliative radiation.    HPI:  Olimpia comes to clinic today with her cousin and an .  She reports she has been doing OK since her last visit.   Her pain is very well controlled, taking dilaudid 1-2 times per day.  She reports burning in her chest when eating, is taking zantac once per day.  She also has ongoing nausea.  She is taking zyprexa 2.5mg twice daily.  She is not taking any other medications for nausea.  Zofran does not work, kytril and scopolamine were not approved by insurance.  She is eating small amounts, her cousin wishes she would eat more.  Olimpia reports soft stools most days.  She is sleeping well at night but does not do much for activities during the day, is fatigued. No SOB or respiratory symptoms.     ROS  See HPI. Complete ROS otherwise negative.      Allergies as of 03/06/2018 - Sheldon as Reviewed 02/27/2018   Allergen Reaction Noted     Heparin flush Other (See Comments) 01/27/2016     Pork derived products  02/09/2016     Tegaderm transparent dressing (informational only) Other (See Comments) and Rash 04/20/2016     Medications:  Current Outpatient Prescriptions   Medication Sig Dispense Refill     ranitidine (ZANTAC) 150 MG tablet Take 1 tablet (150 mg) by mouth 2 times daily 60 tablet 3     OLANZapine (ZYPREXA) 5 MG tablet Take 0.5 tablets (2.5 mg) by mouth 2 times daily 30 tablet 1     LORazepam (ATIVAN) 1 MG tablet Take 1 tablet (1 mg) by mouth every 6 hours as needed for nausea 30 tablet 2     pazopanib (VOTRIENT) 200 MG tablet CHEMOTHERAPY Take 4 tablets (800 mg) by mouth daily 160 tablet 5     HYDROmorphone (DILAUDID) 2 MG tablet Take 0.5-1 tablets (1-2 mg) by mouth every 2 hours as needed for moderate to severe pain 30 tablet 0     ondansetron (ZOFRAN-ODT) 8 MG ODT tab Take 1 tablet (8 mg) by mouth every 6 hours 120 tablet 1     [DISCONTINUED] OLANZapine (ZYPREXA) 5 MG tablet Take 1 tablet (5 mg) by mouth At Bedtime 30 tablet 1     polyethylene glycol (MIRALAX/GLYCOLAX) Packet Take 17 g by mouth daily as needed for constipation (Patient not taking: Reported on 2/27/2018) 7 packet 1     senna-docusate (SENOKOT-S;PERICOLACE)  8.6-50 MG per tablet Take 2 tablets by mouth 2 times daily as needed for constipation (Patient not taking: Reported on 3/6/2018) 30 tablet 0       Past Medical History:   Diagnosis Date     Anemia 6/3/2016     Constipation      Extrarenal rhabdoid neoplasm (H)      Febrile neutropenia (H) 4/17/2016     History of blood transfusion      Latent tuberculosis      Lung disease      On antineoplastic chemotherapy      Sarcoma of vulva (H)      Social History:  Olimpia lives with her cousin.    Family History   Problem Relation Age of Onset     Other Cancer No family hx of        Physical Exam   Temp:  [97  F (36.1  C)] 97  F (36.1  C)  Pulse:  [101] 101  Resp:  [20] 20  BP: (118)/(77) 118/77  SpO2:  [100 %] 100 %  Wt Readings from Last 4 Encounters:   03/06/18 55.4 kg (122 lb 2.2 oz)   02/26/18 55.8 kg (123 lb 0.3 oz)   02/21/18 58.5 kg (128 lb 14.4 oz)   02/20/18 59 kg (130 lb 1.1 oz)     GENERAL: Alert, cooperative, Appears tired.  SKIN: warm, dry, intact. Skin turgor normal.   HEAD: Normocephalic, atraumatic.     EYES: Sclera clear, slightly anicteric. PERRLA, EOMI.   EARS: TMs pearly gray, opaque, landmarks visualized bilaterally.   NOSE: Nares patent, mucosa pink and moist, no nasal drainage noted.  No facial pain.  MOUTH/THROAT: Oropharynx is clear. Tongue without sores. Normal dentition for age, no mucosal lesions.  NECK: Supple, full range of motion, thyroid non-palpable.   LYMPH NODES: No cervical, supraclavicular or axillary lymphadenopathy.  LUNGS: No increased WOB.  Lungs clear to auscultation throughout; diminished in LLL.  No crackles or wheezes.  HEART: HRR. S1 and S2 are normal. No murmurs, rubs, gallops. The radial pulses are 2+ bilaterally. Cap refill <2 sec  ABDOMEN: Normoactive bowel sounds.   NEUROLOGIC: Grossly intact, no focal neurologic deficits.    :  Deferred today.    Labs:  Results for orders placed or performed in visit on 03/06/18 (from the past 24 hour(s))   CBC with platelets differential    Result Value Ref Range    WBC 2.6 (L) 4.0 - 11.0 10e9/L    RBC Count 3.43 (L) 3.8 - 5.2 10e12/L    Hemoglobin 12.0 11.7 - 15.7 g/dL    Hematocrit 35.2 35.0 - 47.0 %     (H) 78 - 100 fl    MCH 35.0 (H) 26.5 - 33.0 pg    MCHC 34.1 31.5 - 36.5 g/dL    RDW 13.0 10.0 - 15.0 %    Platelet Count 164 150 - 450 10e9/L    Diff Method Automated Method     % Neutrophils 61.9 %    % Lymphocytes 12.8 %    % Monocytes 22.2 %    % Eosinophils 2.3 %    % Basophils 0.8 %    % Immature Granulocytes 0.0 %    Nucleated RBCs 0 0 /100    Absolute Neutrophil 1.6 1.6 - 8.3 10e9/L    Absolute Lymphocytes 0.3 (L) 0.8 - 5.3 10e9/L    Absolute Monocytes 0.6 0.0 - 1.3 10e9/L    Absolute Eosinophils 0.1 0.0 - 0.7 10e9/L    Absolute Basophils 0.0 0.0 - 0.2 10e9/L    Abs Immature Granulocytes 0.0 0 - 0.4 10e9/L    Absolute Nucleated RBC 0.0     RBC Morphology Normal     Platelet Estimate Confirming automated cell count      Assessment:   Olimpia is a 23-year-old female with 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  She has restarted her Pazopanib (had been on hold just prior to the time of her progression). Completed palliative radiation. Abdominal pain well controlled.  Nausea persists although has improved with the addition of zyprexa.  She is taking 2.5mg BID (instead of 5mg daily) due to feelings of sedation.  Oral intake is down, weight continues to drift.  WBC down, remainder of counts stable.  Symptoms of reflux that are interfering with intake.    Plan:  1) Continue zyprexa.  Start ativan PRN for breakthrough nausea.  2) Increase zantac to BID.  There is decreased absorption of pazopanib with increased gastric pH, however the reflux symptoms are affecting her QOL.  The benefits of increasing zantac I believe outweigh the risks of decreased absorption of pazopanib for her.  3) Discussed weight and oral intake.  Recommend using Ensure (she has available at home) and  small frequent meals high in calories. Will continue to follow weight.  4) Continue dilaudid PRN.  5) RTC in 1 week (Asma did not want to space visits out at this time) for labs (port due to be accessed) and visit.

## 2018-03-09 NOTE — PROCEDURES
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     Treatment Summary:  Radiation Oncology - Course: 2 Protocol:   Treatment Site Current Dose Modality From To Elapsed Days Fx.  2left lower chest wa  3,000 cGy 18 X  2/16/2018  2018  11 10  Dose per Fraction:  300 cGy      Total Dose:  3000 cGy             COMMENTS:                      Ms. Childress is a 23 year old female with a diagnosis of metastatic synovial sarcoma with lung metastases. She   previously received treatment in our department to the primary mass in her right labia to 4230 cGy in 24   fractions completed 10/17/16.      She subsequently developed metastatic disease and has been on multiple chemotherapeutic agents.     She  was recently admitted to the inpatient service 18 for excruciating abdominal pain and was found to   have progressive metastatic disease extending into her abdominal cavity on CT abdomen.      Olimpia declined to have the effusion associated with her largest met drained by IR and opted for non-surgical   pain control. Her pain improved with protonix, oxycontin, and oxycodone for break through pain.      She again presented on 18 with similar symptoms to those that brought her in on 18. She reports   increasing pain in the left abdomen and chest for the past several weeks.      There are no chemotherapeutic agents with activity left to try.   She therefore completed palliative treatment in   our department over the past 2 weeks.      She tolerated treatment well with only the expected acute  toxicity and no unplanned treatment breaks. She   reported a significant reduction in pain following her first few fractions.      Acute Toxicity Profile by CTC v4.0: Grade 1 nausea        PAIN MANAGEMENT: PO dilaudid 1-2 mg q2h prn                         FOLLOW UP PLAN: Follow up in 1 month                          Resident Physician: Jagdeep Greco M.D.   Staff Physician: Giana Man M.D.  Physicist: Lars Pak, PhD     CC:   MD Gabby Holland, MD Jp Padgett, Omar Robert, APRN Lambert Okeefe MD von Der Marwitz, Cheri Santana, Sidra Brewer CNP, MD Geller, Sangita Santana MD                                     Radiation Oncology:  [facility] [address] [place] [address]

## 2018-03-15 NOTE — PROGRESS NOTES
Pediatric Hematology/Oncology Clinic Note    History- Olimpia initially presented with a growth on her right labia in 2013 when she was living in [place]. 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 She discontinued further treatment b/c her physicians were unsure of the exact diagnosis and she felt uncomfortable proceeding. Olimpia subsequently immigrated to the  in August of 2015 and in October she first noticed a recurrence of the mass in the area of previous excision. She was in [place] at that time, seen by oncology and had a port placed but then moved to [place] where she was seen by Dr. Mckeon at [facility] in November. There she underwent an MRI that showed a solid and cystic subcutaneous mass in the right labia measuring 1.7 x 1.6 x 1cm with question of nodular extension vs a second nodule measuring 0.7 cm. There was no invasion into the vagina. On November 16th 2015, Olimpia had a biopsy of the mass by a gynecologist, Dr. Terry, at [facility]. 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 By immunohistochemistry, the cells shows a complete loss of SMARCb1. Wide spectrum cytokeratin, CD34, WT1, Desmin and CD99 were all negative. RT PCR for Wooten Sarcoma associated fusion transcripts were negative as well. Olimpia went on to have a PET/CT as well which showed multiple pulmonary lesions and biopsy confirmed a lesion to be metastatic disease. Olimpia was seen by Tomás White at [facility] who reviewed the diagnosis and potential treatment plans with her, however she prefered to be treated here at [facility]. 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    Olimpia underwent left sided thoracotomy and resection of two pulmonary nodules on 7/20/16. Pathology revealed that one lesion was benign lymphoid tissue and the other was consistent with synovial sarcoma.  This was confirmed with FISH  With 91% of cell examined having a signal pattern indictivate of a rearrangement of the SS18 locus.  Olimpia has now completed 3 cycles of chemotherapy per [id number] and started her radiation on 9/13/16 and completed on 10/17/16.  She then went on to have a resection of her labial mass on 11/16/16 by Jannet Pastrana and Chikis with reconstruction, including skin flaps by Dr. Corona from plastics.  Pathology showed no residual tumor.  She had a f/u chest CT today on 12/5/16 that showed persistance of her pulmonary nodules as well as few additonal pulmonary nodules.  She saw Dr. Pierre who was in agreement with surgical resection, however Olimpia wanted to wait until March to have more time to recover from her last surgery. She underwent left sided thoracotomy on 3/8/17.    In May, Olimpia's follow up PET/CT demonstrated significant progression of her pulmonary metastatic disease, with an anterior mediastinal mass compression her right ventricle and potentially right outflow tract.  Subsequent echocardiogram did not show altered cardiac function, Dr Man felt radiation therapy was not in her best interest at this time but would consider if she developed cardiac dysfunction.  Olimpia was started on oral Pazopanib May 2017 for palliative therapy, with doses held due to palmar plantar dysesthesia and stomatitis, and restarted 50% dosing on 6/19/17.  Olimpia has imaging in August 2017 that showed a positive response to therapy.  However on most recent imaging she was found to have progression, she has since completed palliative radiation.    Interval History:  Olimpia comes to clinic today with her . She reports this last week was a good week. Has had more  energy and appetite. Nausea has improved, now only having some in the early morning. She has increased the zantac to BID and feels this has helped a lot. Having very minimal abdominal pain/burning. Able to eat more and is feeling hungry right now. She still feels fatigued from her medications and is not doing many activities during the day, but may do something this weekend as the weather is supposed to be nice.     Denies fevers, chills, SOB, cough, URI, dysuria, constipation, or diarrhea.     ROS  See HPI. Complete ROS otherwise negative.      Allergies as of 03/15/2018 - [facility] as Reviewed 02/27/2018   Allergen Reaction Noted     Heparin flush Other (See Comments) 01/27/2016     Pork derived products  02/09/2016     Tegaderm transparent dressing (informational only) Other (See Comments) and Rash 04/20/2016     Medications:  Current Outpatient Prescriptions   Medication Sig Dispense Refill     ranitidine (ZANTAC) 150 MG tablet Take 1 tablet (150 mg) by mouth 2 times daily 60 tablet 3     OLANZapine (ZYPREXA) 5 MG tablet Take 0.5 tablets (2.5 mg) by mouth 2 times daily 30 tablet 1     LORazepam (ATIVAN) 1 MG tablet Take 1 tablet (1 mg) by mouth every 6 hours as needed for nausea 30 tablet 2     pazopanib (VOTRIENT) 200 MG tablet CHEMOTHERAPY Take 4 tablets (800 mg) by mouth daily 160 tablet 5     HYDROmorphone (DILAUDID) 2 MG tablet Take 0.5-1 tablets (1-2 mg) by mouth every 2 hours as needed for moderate to severe pain 30 tablet 0     ondansetron (ZOFRAN-ODT) 8 MG ODT tab Take 1 tablet (8 mg) by mouth every 6 hours 120 tablet 1     polyethylene glycol (MIRALAX/GLYCOLAX) Packet Take 17 g by mouth daily as needed for constipation (Patient not taking: Reported on 2/27/2018) 7 packet 1     senna-docusate (SENOKOT-S;PERICOLACE) 8.6-50 MG per tablet Take 2 tablets by mouth 2 times daily as needed for constipation (Patient not taking: Reported on 3/6/2018) 30 tablet 0       Past Medical History:   Diagnosis Date     Anemia  6/3/2016     Constipation      Extrarenal rhabdoid neoplasm (H)      Febrile neutropenia (H) 4/17/2016     History of blood transfusion      Latent tuberculosis      Lung disease      On antineoplastic chemotherapy      Sarcoma of vulva (H)      Social History:  Olimpia lives with her cousin.    Family History   Problem Relation Age of Onset     Other Cancer No family hx of        Physical Exam      Wt Readings from Last 4 Encounters:   03/06/18 55.4 kg (122 lb 2.2 oz)   02/26/18 55.8 kg (123 lb 0.3 oz)   02/21/18 58.5 kg (128 lb 14.4 oz)   02/20/18 59 kg (130 lb 1.1 oz)     GENERAL: Alert, cooperative, NAD, appears in good spirits.   SKIN: warm, dry, intact. No rashes on limited exam.  HEAD: Normocephalic, atraumatic.     EYES: Sclera clear, slightly anicteric. PERRLA, EOMI.   EARS: External ears wnl bilaterally, no drainage.   NOSE: Nares patent, mucosa pink and moist, no nasal drainage noted.  No facial pain.  MOUTH/THROAT: Oropharynx is clear. Tongue without sores. Normal dentition for age, no mucosal lesions.  NECK: Supple, full range of motion, thyroid non-palpable.   LYMPH NODES: No cervical, supraclavicular or axillary lymphadenopathy.  LUNGS: No increased WOB.  Lungs clear to auscultation throughout.  No crackles or wheezes.   HEART: HRR. S1 and S2 are normal. No murmurs, rubs, gallops.  Cap refill <2 sec  ABDOMEN: Normoactive bowel sounds. Nontender, nondistended.  NEUROLOGIC: Grossly intact, no focal neurologic deficits.    :  Deferred today.    Labs:   Results for orders placed or performed in visit on 03/15/18 (from the past 24 hour(s))   CBC with platelets differential   Result Value Ref Range    WBC 3.5 (L) 4.0 - 11.0 10e9/L    RBC Count 3.44 (L) 3.8 - 5.2 10e12/L    Hemoglobin 12.1 11.7 - 15.7 g/dL    Hematocrit 35.7 35.0 - 47.0 %     (H) 78 - 100 fl    MCH 35.2 (H) 26.5 - 33.0 pg    MCHC 33.9 31.5 - 36.5 g/dL    RDW 14.1 10.0 - 15.0 %    Platelet Count 118 (L) 150 - 450 10e9/L    Diff Method  Automated Method     % Neutrophils 48.9 %    % Lymphocytes 31.5 %    % Monocytes 17.3 %    % Eosinophils 1.7 %    % Basophils 0.3 %    % Immature Granulocytes 0.3 %    Nucleated RBCs 0 0 /100    Absolute Neutrophil 1.7 1.6 - 8.3 10e9/L    Absolute Lymphocytes 1.1 0.8 - 5.3 10e9/L    Absolute Monocytes 0.6 0.0 - 1.3 10e9/L    Absolute Eosinophils 0.1 0.0 - 0.7 10e9/L    Absolute Basophils 0.0 0.0 - 0.2 10e9/L    Abs Immature Granulocytes 0.0 0 - 0.4 10e9/L    Absolute Nucleated RBC 0.0    Comprehensive metabolic panel   Result Value Ref Range    Sodium 135 133 - 144 mmol/L    Potassium 3.8 3.4 - 5.3 mmol/L    Chloride 100 94 - 109 mmol/L    Carbon Dioxide 30 20 - 32 mmol/L    Anion Gap 5 3 - 14 mmol/L    Glucose 96 70 - 99 mg/dL    Urea Nitrogen 13 7 - 30 mg/dL    Creatinine 0.82 0.52 - 1.04 mg/dL    GFR Estimate 85 >60 mL/min/1.7m2    GFR Estimate If Black >90 >60 mL/min/1.7m2    Calcium 8.9 8.5 - 10.1 mg/dL    Bilirubin Total 0.5 0.2 - 1.3 mg/dL    Albumin 3.1 (L) 3.4 - 5.0 g/dL    Protein Total 7.7 6.8 - 8.8 g/dL    Alkaline Phosphatase 72 40 - 150 U/L    ALT 32 0 - 50 U/L    AST 46 (H) 0 - 45 U/L   Routine UA with microscopic - No culture   Result Value Ref Range    Color Urine Yellow     Appearance Urine Slightly Cloudy     Glucose Urine Negative NEG^Negative mg/dL    Bilirubin Urine Negative NEG^Negative    Ketones Urine Negative NEG^Negative mg/dL    Specific Gravity Urine 1.015 1.003 - 1.035    Blood Urine Negative NEG^Negative    pH Urine 7.0 5.0 - 7.0 pH    Protein Albumin Urine 30 (A) NEG^Negative mg/dL    Urobilinogen mg/dL Normal 0.0 - 2.0 mg/dL    Nitrite Urine Negative NEG^Negative    Leukocyte Esterase Urine Large (A) NEG^Negative    Source Midstream Urine     WBC Urine 29 (H) 0 - 5 /HPF    RBC Urine 3 (H) 0 - 2 /HPF    Bacteria Urine Few (A) NEG^Negative /HPF    Squamous Epithelial /HPF Urine 1 0 - 1 /HPF    Mucous Urine Present (A) NEG^Negative /LPF        Assessment:   Olimpia is a 23-year-old female  with 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  She has restarted her Pazopanib (had been on hold just prior to the time of her progression). Completed palliative radiation. Abdominal pain well controlled.  Nausea persists although is only an issue in the early AM currently and has improved with the addition of zyprexa.  She is taking 2.5mg BID (instead of 5mg daily) due to feelings of sedation. Olimpia feels like the last week was a really good week, improved energy and oral intake. WBC slightly down at 3.5, plts slightly low at 118 as well, hgb stable. Symptoms of reflux have much improved since increasing zantac to BID.    Plan:  1) Continue zyprexa.  Ativan PRN for breakthrough nausea.  2) Continue zantac BID.  There is decreased absorption of pazopanib with increased gastric pH, however the reflux symptoms are affecting her QOL.  The benefits of BID zantac I believe outweigh the risks of decreased absorption of pazopanib for her.  3) Discussed weight and oral intake. Continue using Ensure (she has available at home) and small frequent meals high in calories. Will continue to follow weight. Olimpia feels like her oral intake has improved with increasing zantac to BID.  4) Continue dilaudid PRN.  5) RTC in 2 weeks  6) Refill pazopanib, will be ready by tomorrow to     The patient was seen and discussed with Dr. Yanez, pediatric heme/onc attending.    Catalina Chacko MD  [facility] PGY1        Physician Attestation   I, Coretta Yanez MD, saw this patient with the resident and agree with the resident s findings and plan of care as documented in the resident s note.      I personally reviewed vital signs, medications and labs.    Coretta Yanez MD  Date of Service (when I saw the patient): Mar 15, 2018

## 2018-03-15 NOTE — MR AVS SNAPSHOT
After Visit Summary   3/15/2018    Olimpia Childress    MRN: [id number]           Patient Information     Date Of Birth          1995        Visit Information        Provider Department      3/15/2018 3:00 PM Kyrie Anderson; [facility]  Services Department        Today's Diagnoses     Malignant neoplasm of chest wall (H)    -  1       Follow-ups after your visit        Who to contact     Please call your clinic at [phone] to:    Ask questions about your health    Make or cancel appointments    Discuss your medicines    Learn about your test results    Speak to your doctor            Additional Information About Your Visit        MyChart Information     [employer] is an electronic gateway that provides easy, online access to your medical records. With JA[employer], you can request a clinic appointment, read your test results, renew a prescription or communicate with your care team.     To sign up for [employer] visit the website at [email or web address]   You will be asked to enter the access code listed below, as well as some personal information. Please follow the directions to create your username and password.     Your access code is: [email or web address]  Expires: 3/22/2018 12:14 PM     Your access code will  in 90 days. If you need help or a new code, please contact your [facility] or call [phone] for assistance.        Care EveryWhere ID     This is your Care EveryWhere ID. This could be used by other organizations to access your [facility] medical records  [id number]         Blood Pressure from Last 3 Encounters:   18 118/77   18 101/79   18 105/79    Weight from Last 3 Encounters:   18 55.4 kg (122 lb 2.2 oz)   18 55.8 kg (123 lb 0.3 oz)   18 58.5 kg (128 lb 14.4 oz)              Today, you had the following     No orders found for display       Primary Care Provider Office Phone # Fax #    Coretta  Reyes Yanez [phone]       [facility] [address]        Equal Access to Services     RONIT BAEZ : Hadii aad ku hadbertjorge Cheek, tranglaney flores, shubhamapolinar shabazzantonio tressaariellalaney, wilner stewin hayaamaggie bustillosbetsy garnett su singleton. So [facility] [phone].    ATENCIÓN: Si habla español, tiene a ordoñez disposición servicios gratuitos de asistencia lingüística. Llame al [phone].    We comply with applicable federal civil rights laws and [place] laws. We do not discriminate on the basis of race, color, national origin, age, disability, sex, sexual orientation, or gender identity.            Thank you!     Thank you for choosing [facility]  for your care. Our goal is always to provide you with excellent care. Hearing back from our patients is one way we can continue to improve our services. Please take a few minutes to complete the written survey that you may receive in the mail after your visit with us. Thank you!             Your Updated Medication List - Protect others around you: Learn how to safely use, store and throw away your medicines at www.disposemymeds.org.          This list is accurate as of 3/15/18  4:14 PM.  Always use your most recent med list.                   Brand Name Dispense Instructions for use Diagnosis    HYDROmorphone 2 MG tablet    DILAUDID    30 tablet    Take 0.5-1 tablets (1-2 mg) by mouth every 2 hours as needed for moderate to severe pain    Pain       LORazepam 1 MG tablet    ATIVAN    30 tablet    Take 1 tablet (1 mg) by mouth every 6 hours as needed for nausea    Sarcoma of vulva (H)       OLANZapine 5 MG tablet    zyPREXA    30 tablet    Take 0.5 tablets (2.5 mg) by mouth 2 times daily    Nausea       ondansetron 8 MG ODT tab    ZOFRAN-ODT    120 tablet    Take 1 tablet (8 mg) by mouth every 6 hours    Non-intractable vomiting with nausea, unspecified vomiting type       pazopanib 200 MG tablet CHEMOTHERAPY    VOTRIENT    160 tablet    Take 4  tablets (800 mg) by mouth daily    Synovial sarcoma (H)       polyethylene glycol Packet    MIRALAX/GLYCOLAX    7 packet    Take 17 g by mouth daily as needed for constipation    Acute post-operative pain       ranitidine 150 MG tablet    ZANTAC    60 tablet    Take 1 tablet (150 mg) by mouth 2 times daily    Gastritis without bleeding, unspecified chronicity, unspecified gastritis type       senna-docusate 8.6-50 MG per tablet    SENOKOT-S;PERICOLACE    30 tablet    Take 2 tablets by mouth 2 times daily as needed for constipation    Acute post-operative pain

## 2018-03-15 NOTE — MR AVS SNAPSHOT
After Visit Summary   3/15/2018    Olimpia Childress    MRN: [id number]           Patient Information     Date Of Birth          1995        Visit Information        Provider Department      3/15/2018 2:45 PM Kyrie Anderson Emily Gustava, MD Peds Hematology Oncology        Today's Diagnoses     Rhabdoid tumor (H)              [facility], 9th floor  [address] [place] [address]  Phone: [phone]  Clinic Hours:   Monday-Friday:   7 am to 5:00 pm   closed weekends and major  holidays     If your fever is 100.5  or greater,   call the clinic during business hours.   After hours call [phone] and ask for the pediatric hematology / oncology physician to be paged for you.               Follow-ups after your visit        Who to contact     Please call your clinic at [phone] to:    Ask questions about your health    Make or cancel appointments    Discuss your medicines    Learn about your test results    Speak to your doctor            Additional Information About Your Visit        MyChart Information     [employer] is an electronic gateway that provides easy, online access to your medical records. With [employer], you can request a clinic appointment, read your test results, renew a prescription or communicate with your care team.     To sign up for [employer] visit the website at [email or web address]   You will be asked to enter the access code listed below, as well as some personal information. Please follow the directions to create your username and password.     Your access code is: [email or web address]  Expires: 3/22/2018 12:14 PM     Your access code will  in 90 days. If you need help or a new code, please contact your [facility] or call [phone] for assistance.        Care EveryWhere ID     This is your Care EveryWhere ID. This could be used by other organizations to access your [facility]  medical records  [id number]         Blood Pressure from Last 3 Encounters:   03/06/18 118/77   02/26/18 101/79   02/20/18 105/79    Weight from Last 3 Encounters:   03/06/18 55.4 kg (122 lb 2.2 oz)   02/26/18 55.8 kg (123 lb 0.3 oz)   02/21/18 58.5 kg (128 lb 14.4 oz)              We Performed the Following     CBC with platelets differential     Comprehensive metabolic panel     Routine UA with microscopic - No culture        Primary Care Provider Office Phone # Fax #    Coretta Yanez [phone] [address] [facility] [address]        Equal Access to Services     RONIT BAEZ : Hadshaquille Cheek, waclaudine flores, qapilo leyvamalaney xie, wilner singleton. So [facility] [phone].    ATENCIÓN: Si habla español, tiene a ordoñez disposición servicios gratuitos de asistencia lingüística. Llame al [phone].    We comply with applicable federal civil rights laws and [place] laws. We do not discriminate on the basis of race, color, national origin, age, disability, sex, sexual orientation, or gender identity.            Thank you!     Thank you for choosing [facility]AT[facility]  for your care. Our goal is always to provide you with excellent care. Hearing back from our patients is one way we can continue to improve our services. Please take a few minutes to complete the written survey that you may receive in the mail after your visit with us. Thank you!             Your Updated Medication List - Protect others around you: Learn how to safely use, store and throw away your medicines at www.disposemymeds.org.          This list is accurate as of 3/15/18 11:59 PM.  Always use your most recent med list.                   Brand Name Dispense Instructions for use Diagnosis    HYDROmorphone 2 MG tablet    DILAUDID    30 tablet    Take 0.5-1 tablets (1-2 mg) by mouth every 2 hours as needed for moderate to severe pain    Pain       LORazepam 1  MG tablet    ATIVAN    30 tablet    Take 1 tablet (1 mg) by mouth every 6 hours as needed for nausea    Sarcoma of vulva (H)       OLANZapine 5 MG tablet    zyPREXA    30 tablet    Take 0.5 tablets (2.5 mg) by mouth 2 times daily    Nausea       ondansetron 8 MG ODT tab    ZOFRAN-ODT    120 tablet    Take 1 tablet (8 mg) by mouth every 6 hours    Non-intractable vomiting with nausea, unspecified vomiting type       pazopanib 200 MG tablet CHEMOTHERAPY    VOTRIENT    160 tablet    Take 4 tablets (800 mg) by mouth daily    Synovial sarcoma (H)       polyethylene glycol Packet    MIRALAX/GLYCOLAX    7 packet    Take 17 g by mouth daily as needed for constipation    Acute post-operative pain       ranitidine 150 MG tablet    ZANTAC    60 tablet    Take 1 tablet (150 mg) by mouth 2 times daily    Gastritis without bleeding, unspecified chronicity, unspecified gastritis type       senna-docusate 8.6-50 MG per tablet    SENOKOT-S;PERICOLACE    30 tablet    Take 2 tablets by mouth 2 times daily as needed for constipation    Acute post-operative pain

## 2018-03-15 NOTE — LETTER
3/15/2018      RE: Olimpia Childress  [address]   [facility] [id number]       Pediatric Hematology/Oncology Clinic Note    History- Olimpia initially presented with a growth on her right labia in 2013 when she was living in [place]. 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 She discontinued further treatment b/c her physicians were unsure of the exact diagnosis and she felt uncomfortable proceeding. Olimpia subsequently immigrated to the  in August of 2015 and in October she first noticed a recurrence of the mass in the area of previous excision. She was in [place] at that time, seen by oncology and had a port placed but then moved to [place] where she was seen by Dr. Mckeon at [facility] in November. There she underwent an MRI that showed a solid and cystic subcutaneous mass in the right labia measuring 1.7 x 1.6 x 1cm with question of nodular extension vs a second nodule measuring 0.7 cm. There was no invasion into the vagina. On November 16th 2015, Olimpia had a biopsy of the mass by a gynecologist, Dr. Terry, at [facility]. Cytology was reviewed at [facility] and read as a SMARCB1-deficient genital sarcoma, likely falling within the overall spectrum of malignant rhabdoid tumor. By immunohistochemistry, the cells shows a complete loss of SMARCb1. Wide spectrum cytokeratin, CD34, WT1, Desmin and CD99 were all negative. RT PCR for Wooten Sarcoma associated fusion transcripts were negative as well. Olimpia went on to have a PET/CT as well which showed multiple pulmonary lesions and biopsy confirmed a lesion to be metastatic disease. Olimpia was seen by Tomás White at [place] who reviewed the diagnosis and potential treatment plans with her, however she prefered to be treated here at [facility]. 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    Olimpia underwent left sided thoracotomy and resection of two pulmonary nodules on 7/20/16. Pathology revealed that one lesion was benign lymphoid tissue and the other was consistent with synovial sarcoma.  This was confirmed with FISH  With 91% of cell examined having a signal pattern indictivate of a rearrangement of the SS18 locus.  Olimpia has now completed 3 cycles of chemotherapy per [id number] and started her radiation on 9/13/16 and completed on 10/17/16.  She then went on to have a resection of her labial mass on 11/16/16 by Jannet Pastrana and Chikis with reconstruction, including skin flaps by Dr. Corona from plastics.  Pathology showed no residual tumor.  She had a f/u chest CT today on 12/5/16 that showed persistance of her pulmonary nodules as well as few additonal pulmonary nodules.  She saw Dr. Pierre who was in agreement with surgical resection, however Olimpia wanted to wait until March to have more time to recover from her last surgery. She underwent left sided thoracotomy on 3/8/17.    In May, Olimpia's follow up PET/CT demonstrated significant progression of her pulmonary metastatic disease, with an anterior mediastinal mass compression her right ventricle and potentially right outflow tract.  Subsequent echocardiogram did not show altered cardiac function, Dr Man felt radiation therapy was not in her best interest at this time but would consider if she developed cardiac dysfunction.  Olimpia was started on oral Pazopanib May 2017 for palliative therapy, with doses held due to palmar plantar dysesthesia and stomatitis, and restarted 50% dosing on 6/19/17.  Olimpia has imaging in August 2017 that showed a positive response to therapy.  However on most recent imaging she was found to have progression, she has since completed palliative radiation.    Interval History:  Olimpia comes to  clinic today with her . She reports this last week was a good week. Has had more energy and appetite. Nausea has improved, now only having some in the early morning. She has increased the zantac to BID and feels this has helped a lot. Having very minimal abdominal pain/burning. Able to eat more and is feeling hungry right now. She still feels fatigued from her medications and is not doing many activities during the day, but may do something this weekend as the weather is supposed to be nice.     Denies fevers, chills, SOB, cough, URI, dysuria, constipation, or diarrhea.     ROS  See HPI. Complete ROS otherwise negative.      Allergies as of 03/15/2018 - [facility] as Reviewed 02/27/2018   Allergen Reaction Noted     Heparin flush Other (See Comments) 01/27/2016     Pork derived products  02/09/2016     Tegaderm transparent dressing (informational only) Other (See Comments) and Rash 04/20/2016     Medications:  Current Outpatient Prescriptions   Medication Sig Dispense Refill     ranitidine (ZANTAC) 150 MG tablet Take 1 tablet (150 mg) by mouth 2 times daily 60 tablet 3     OLANZapine (ZYPREXA) 5 MG tablet Take 0.5 tablets (2.5 mg) by mouth 2 times daily 30 tablet 1     LORazepam (ATIVAN) 1 MG tablet Take 1 tablet (1 mg) by mouth every 6 hours as needed for nausea 30 tablet 2     pazopanib (VOTRIENT) 200 MG tablet CHEMOTHERAPY Take 4 tablets (800 mg) by mouth daily 160 tablet 5     HYDROmorphone (DILAUDID) 2 MG tablet Take 0.5-1 tablets (1-2 mg) by mouth every 2 hours as needed for moderate to severe pain 30 tablet 0     ondansetron (ZOFRAN-ODT) 8 MG ODT tab Take 1 tablet (8 mg) by mouth every 6 hours 120 tablet 1     polyethylene glycol (MIRALAX/GLYCOLAX) Packet Take 17 g by mouth daily as needed for constipation (Patient not taking: Reported on 2/27/2018) 7 packet 1     senna-docusate (SENOKOT-S;PERICOLACE) 8.6-50 MG per tablet Take 2 tablets by mouth 2 times daily as needed for constipation (Patient not  taking: Reported on 3/6/2018) 30 tablet 0       Past Medical History:   Diagnosis Date     Anemia 6/3/2016     Constipation      Extrarenal rhabdoid neoplasm (H)      Febrile neutropenia (H) 4/17/2016     History of blood transfusion      Latent tuberculosis      Lung disease      On antineoplastic chemotherapy      Sarcoma of vulva (H)      Social History:  Olimpia lives with her cousin.    Family History   Problem Relation Age of Onset     Other Cancer No family hx of        Physical Exam      Wt Readings from Last 4 Encounters:   03/06/18 55.4 kg (122 lb 2.2 oz)   02/26/18 55.8 kg (123 lb 0.3 oz)   02/21/18 58.5 kg (128 lb 14.4 oz)   02/20/18 59 kg (130 lb 1.1 oz)     GENERAL: Alert, cooperative, NAD, appears in good spirits.   SKIN: warm, dry, intact. No rashes on limited exam.  HEAD: Normocephalic, atraumatic.     EYES: Sclera clear, slightly anicteric. PERRLA, EOMI.   EARS: External ears wnl bilaterally, no drainage.   NOSE: Nares patent, mucosa pink and moist, no nasal drainage noted.  No facial pain.  MOUTH/THROAT: Oropharynx is clear. Tongue without sores. Normal dentition for age, no mucosal lesions.  NECK: Supple, full range of motion, thyroid non-palpable.   LYMPH NODES: No cervical, supraclavicular or axillary lymphadenopathy.  LUNGS: No increased WOB.  Lungs clear to auscultation throughout.  No crackles or wheezes.   HEART: HRR. S1 and S2 are normal. No murmurs, rubs, gallops.  Cap refill <2 sec  ABDOMEN: Normoactive bowel sounds. Nontender, nondistended.  NEUROLOGIC: Grossly intact, no focal neurologic deficits.    :  Deferred today.    Labs:   Results for orders placed or performed in visit on 03/15/18 (from the past 24 hour(s))   CBC with platelets differential   Result Value Ref Range    WBC 3.5 (L) 4.0 - 11.0 10e9/L    RBC Count 3.44 (L) 3.8 - 5.2 10e12/L    Hemoglobin 12.1 11.7 - 15.7 g/dL    Hematocrit 35.7 35.0 - 47.0 %     (H) 78 - 100 fl    MCH 35.2 (H) 26.5 - 33.0 pg    MCHC 33.9 31.5  - 36.5 g/dL    RDW 14.1 10.0 - 15.0 %    Platelet Count 118 (L) 150 - 450 10e9/L    Diff Method Automated Method     % Neutrophils 48.9 %    % Lymphocytes 31.5 %    % Monocytes 17.3 %    % Eosinophils 1.7 %    % Basophils 0.3 %    % Immature Granulocytes 0.3 %    Nucleated RBCs 0 0 /100    Absolute Neutrophil 1.7 1.6 - 8.3 10e9/L    Absolute Lymphocytes 1.1 0.8 - 5.3 10e9/L    Absolute Monocytes 0.6 0.0 - 1.3 10e9/L    Absolute Eosinophils 0.1 0.0 - 0.7 10e9/L    Absolute Basophils 0.0 0.0 - 0.2 10e9/L    Abs Immature Granulocytes 0.0 0 - 0.4 10e9/L    Absolute Nucleated RBC 0.0    Comprehensive metabolic panel   Result Value Ref Range    Sodium 135 133 - 144 mmol/L    Potassium 3.8 3.4 - 5.3 mmol/L    Chloride 100 94 - 109 mmol/L    Carbon Dioxide 30 20 - 32 mmol/L    Anion Gap 5 3 - 14 mmol/L    Glucose 96 70 - 99 mg/dL    Urea Nitrogen 13 7 - 30 mg/dL    Creatinine 0.82 0.52 - 1.04 mg/dL    GFR Estimate 85 >60 mL/min/1.7m2    GFR Estimate If Black >90 >60 mL/min/1.7m2    Calcium 8.9 8.5 - 10.1 mg/dL    Bilirubin Total 0.5 0.2 - 1.3 mg/dL    Albumin 3.1 (L) 3.4 - 5.0 g/dL    Protein Total 7.7 6.8 - 8.8 g/dL    Alkaline Phosphatase 72 40 - 150 U/L    ALT 32 0 - 50 U/L    AST 46 (H) 0 - 45 U/L   Routine UA with microscopic - No culture   Result Value Ref Range    Color Urine Yellow     Appearance Urine Slightly Cloudy     Glucose Urine Negative NEG^Negative mg/dL    Bilirubin Urine Negative NEG^Negative    Ketones Urine Negative NEG^Negative mg/dL    Specific Gravity Urine 1.015 1.003 - 1.035    Blood Urine Negative NEG^Negative    pH Urine 7.0 5.0 - 7.0 pH    Protein Albumin Urine 30 (A) NEG^Negative mg/dL    Urobilinogen mg/dL Normal 0.0 - 2.0 mg/dL    Nitrite Urine Negative NEG^Negative    Leukocyte Esterase Urine Large (A) NEG^Negative    Source Midstream Urine     WBC Urine 29 (H) 0 - 5 /HPF    RBC Urine 3 (H) 0 - 2 /HPF    Bacteria Urine Few (A) NEG^Negative /HPF    Squamous Epithelial /HPF Urine 1 0 - 1 /HPF     Mucous Urine Present (A) NEG^Negative /LPF        Assessment:   Olimpia is a 23-year-old female with 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  She has restarted her Pazopanib (had been on hold just prior to the time of her progression). Completed palliative radiation. Abdominal pain well controlled.  Nausea persists although is only an issue in the early AM currently and has improved with the addition of zyprexa.  She is taking 2.5mg BID (instead of 5mg daily) due to feelings of sedation. Olimpia feels like the last week was a really good week, improved energy and oral intake. WBC slightly down at 3.5, plts slightly low at 118 as well, hgb stable. Symptoms of reflux have much improved since increasing zantac to BID.    Plan:  1) Continue zyprexa.  Ativan PRN for breakthrough nausea.  2) Continue zantac BID.  There is decreased absorption of pazopanib with increased gastric pH, however the reflux symptoms are affecting her QOL.  The benefits of BID zantac I believe outweigh the risks of decreased absorption of pazopanib for her.  3) Discussed weight and oral intake. Continue using Ensure (she has available at home) and small frequent meals high in calories. Will continue to follow weight. Olimpia feels like her oral intake has improved with increasing zantac to BID.  4) Continue dilaudid PRN.  5) RTC in 2 weeks  6) Refill pazopanib, will be ready by tomorrow to     The patient was seen and discussed with Dr. Yanez, pediatric heme/onc attending.    Catalina Chacko MD  [facility] PGY1        Physician Attestation   I, Coretta Yanez MD, saw this patient with the resident and agree with the resident s findings and plan of care as documented in the resident s note.      I personally reviewed vital signs, medications and labs.    Corteta Yanez MD  Date of Service (when I saw the patient): Mar 15, 2018

## 2018-03-16 NOTE — PROGRESS NOTES
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     SW met with Olimpia individually.  was not yet present, though she agreed to meet regardless. Olimpia had her PCA assessment two weeks ago and is need of a discharge summary from her December hospitalization in order to complete the assessment. SW provided. She is trying to remain hopeful that she will qualify for several hours per day, though did not appreciate the assessors style and is worried. Olimpia reported that she is feeling much better than she had been several weeks ago. She is not able to attend school at this time, as her medication makes her very tired and she is unable to sit and stay focused for 5+ hours a day that school requires.     INTERVENTION:     Social/Supportive check in. Provided discharge summary from December admission. She denied any other social work needs at this time.     ASSESSMENT:     Olimpia was easily engaged and pleasant to meet with. Appreciative of SW support. She expressed relief that she is physically feeling better.     PLAN:     Social work will continue to assess needs and provide ongoing psychosocial support and access to resources.       BELKIS Salinas, [facility]  Pediatric Hem/Onc   Phone: [phone]  Pager: [phone]

## 2018-03-23 NOTE — TELEPHONE ENCOUNTER
Martine from a Specialty Pharmacy regarding the appeal for Cometric. RN spoke with Dr. Yanez and Shayla URENA and they both said that we can cancel this prescription request as we could not get it approved by insurance.

## 2018-03-29 NOTE — MR AVS SNAPSHOT
After Visit Summary   3/29/2018    Olimpia Childress    MRN: [id number]           Patient Information     Date Of Birth          1995        Visit Information        Provider Department      3/29/2018 2:15 PM Yue Nichols Emily Gustava, MD Peds Hematology Oncology        Today's Diagnoses     Synovial sarcoma (H)    -  1          [facility], 9th floor  [address] [place] [address]  Phone: [phone]  Clinic Hours:   Monday-Friday:   7 am to 5:00 pm   closed weekends and major  holidays     If your fever is 100.5  or greater,   call the clinic during business hours.   After hours call [phone] and ask for the pediatric hematology / oncology physician to be paged for you.               Follow-ups after your visit        Follow-up notes from your care team     Return in about 2 weeks (around 4/12/2018).      Your next 10 appointments already scheduled     Apr 12, 2018  2:30 PM CDT   Return Visit with JULIO C Pittman CNP   Peds Hematology Oncology ([facility])    [facility]  9 Floor  [address] [place] [id number] [phone]              Who to contact     Please call your clinic at [phone] to:    Ask questions about your health    Make or cancel appointments    Discuss your medicines    Learn about your test results    Speak to your doctor            Additional Information About Your Visit        MyChart Information     Polimetrixt is an electronic gateway that provides easy, online access to your medical records. With [email or web address], you can request a clinic appointment, read your test results, renew a prescription or communicate with your care team.     To sign up for Polimetrixt visit the website at [email or web address]   You will be asked to enter the access code listed below, as well as some personal information. Please follow the directions to create your username and  "password.     Your access code is: [email or web address]  Expires: 2018  3:59 PM     Your access code will  in 90 days. If you need help or a new code, please contact your [facility] or call [phone] for assistance.        Care EveryWhere ID     This is your Care EveryWhere ID. This could be used by other organizations to access your [facility] medical records  [id number]        Your Vitals Were     Pulse Temperature Respirations Height Pulse Oximetry BMI (Body Mass Index)    87 96.6  F (35.9  C) (Axillary) 21 1.594 m (5' 2.76\") 97% 21.73 kg/m2       Blood Pressure from Last 3 Encounters:   18 108/77   18 118/77   18 101/79    Weight from Last 3 Encounters:   18 55.2 kg (121 lb 11.1 oz)   18 55.4 kg (122 lb 2.2 oz)   18 55.8 kg (123 lb 0.3 oz)              Today, you had the following     No orders found for display         Today's Medication Changes          These changes are accurate as of 3/29/18 11:59 PM.  If you have any questions, ask your nurse or doctor.               Start taking these medicines.        Dose/Directions    megestrol 40 MG tablet   Commonly known as:  MEGACE   Used for:  Synovial sarcoma (H)   Started by:  Coretta Yanez MD        Dose:  200 mg   Take 5 tablets (200 mg) by mouth 2 times daily   Quantity:  300 tablet   Refills:  3            Where to get your medicines      These medications were sent to [facility]/pharmacy #[id number] [place], [place] - [address], [place] [address]     Phone:  [phone]     megestrol 40 MG tablet                Primary Care Provider Office Phone # Fax #    Coretta Yanez [phone]       [facility] [place] [address]        Equal Access to Services     RONIT BAEZ AH: Rupa Cheek, jenni flores, wilner feldmann ah. So [facility] " [phone].    ATENCIÓN: Si selene jacob, tiene a ordoñez disposición servicios gratuitos de asistencia lingüística. Darius woo [phone].    We comply with applicable federal civil rights laws and [place] laws. We do not discriminate on the basis of race, color, national origin, age, disability, sex, sexual orientation, or gender identity.            Thank you!     Thank you for choosing PEDS HEMATOLOGY ONCOLOGY  for your care. Our goal is always to provide you with excellent care. Hearing back from our patients is one way we can continue to improve our services. Please take a few minutes to complete the written survey that you may receive in the mail after your visit with us. Thank you!             Your Updated Medication List - Protect others around you: Learn how to safely use, store and throw away your medicines at www.disposemymeds.org.          This list is accurate as of 3/29/18 11:59 PM.  Always use your most recent med list.                   Brand Name Dispense Instructions for use Diagnosis    HYDROmorphone 2 MG tablet    DILAUDID    30 tablet    Take 0.5-1 tablets (1-2 mg) by mouth every 2 hours as needed for moderate to severe pain    Pain       LORazepam 1 MG tablet    ATIVAN    30 tablet    Take 1 tablet (1 mg) by mouth every 6 hours as needed for nausea    Sarcoma of vulva (H)       megestrol 40 MG tablet    MEGACE    300 tablet    Take 5 tablets (200 mg) by mouth 2 times daily    Synovial sarcoma (H)       OLANZapine 5 MG tablet    zyPREXA    30 tablet    Take 0.5 tablets (2.5 mg) by mouth 2 times daily    Nausea       ondansetron 8 MG ODT tab    ZOFRAN-ODT    120 tablet    Take 1 tablet (8 mg) by mouth every 6 hours    Non-intractable vomiting with nausea, unspecified vomiting type       pazopanib 200 MG tablet CHEMOTHERAPY    VOTRIENT    160 tablet    Take 4 tablets (800 mg) by mouth daily    Synovial sarcoma (H)       polyethylene glycol Packet    MIRALAX/GLYCOLAX    7 packet    Take 17 g by  mouth daily as needed for constipation    Acute post-operative pain       ranitidine 150 MG tablet    ZANTAC    60 tablet    Take 1 tablet (150 mg) by mouth 2 times daily    Gastritis without bleeding, unspecified chronicity, unspecified gastritis type       senna-docusate 8.6-50 MG per tablet    SENOKOT-S;PERICOLACE    30 tablet    Take 2 tablets by mouth 2 times daily as needed for constipation    Acute post-operative pain

## 2018-03-29 NOTE — NURSING NOTE
"Chief Complaint   Patient presents with     RECHECK     Patient here today for follow up with rhaboid tumor     /77 (BP Location: Right arm, Patient Position: Fowlers, Cuff Size: Adult Regular)  Pulse 87  Temp 96.6  F (35.9  C) (Axillary)  Resp 21  Ht 1.594 m (5' 2.76\")  Wt 55.2 kg (121 lb 11.1 oz)  SpO2 97%  BMI 21.73 kg/m2  Tasia Narvaez, [facility]  March 29, 2018    "

## 2018-03-29 NOTE — LETTER
3/29/2018      RE: Olimpia Childress  [address]   [facility] [id number]       Pediatric Hematology/Oncology Clinic Note    History- Olimpia initially presented with a growth on her right labia in 2013 when she was living in [place]. 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 She discontinued further treatment b/c her physicians were unsure of the exact diagnosis and she felt uncomfortable proceeding. Olimpia subsequently immigrated to the  in August of 2015 and in October she first noticed a recurrence of the mass in the area of previous excision. She was in [place] at that time, seen by oncology and had a port placed but then moved to [place] where she was seen by Dr. Mckeon at [facility] in November. There she underwent an MRI that showed a solid and cystic subcutaneous mass in the right labia measuring 1.7 x 1.6 x 1cm with question of nodular extension vs a second nodule measuring 0.7 cm. There was no invasion into the vagina. On November 16th 2015, Olimpia had a biopsy of the mass by a gynecologist, Dr. Terry, at [facility]. Cytology was reviewed at [facility] and read as a SMARCB1-deficient genital sarcoma, likely falling within the overall spectrum of malignant rhabdoid tumor. By immunohistochemistry, the cells shows a complete loss of SMARCb1. Wide spectrum cytokeratin, CD34, WT1, Desmin and CD99 were all negative. RT PCR for Wooten Sarcoma associated fusion transcripts were negative as well. Olimpia went on to have a PET/CT as well which showed multiple pulmonary lesions and biopsy confirmed a lesion to be metastatic disease. Olimpia was seen by Tomás White at [facility] who reviewed the diagnosis and potential treatment plans with her, however she prefered to be treated here at [facility]. 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    Olimpia underwent left sided thoracotomy and resection of two pulmonary nodules on 7/20/16. Pathology revealed that one lesion was benign lymphoid tissue and the other was consistent with synovial sarcoma.  This was confirmed with FISH  With 91% of cell examined having a signal pattern indictivate of a rearrangement of the SS18 locus.  Olimpia has now completed 3 cycles of chemotherapy per [id number] and started her radiation on 9/13/16 and completed on 10/17/16.  She then went on to have a resection of her labial mass on 11/16/16 by Jannet Pastrana and Chikis with reconstruction, including skin flaps by Dr. Corona from plastics.  Pathology showed no residual tumor.  She had a f/u chest CT today on 12/5/16 that showed persistance of her pulmonary nodules as well as few additonal pulmonary nodules.  She saw Dr. Pierre who was in agreement with surgical resection, however Olimpia wanted to wait until March to have more time to recover from her last surgery. She underwent left sided thoracotomy on 3/8/17.    In May, Olimpia's follow up PET/CT demonstrated significant progression of her pulmonary metastatic disease, with an anterior mediastinal mass compression her right ventricle and potentially right outflow tract.  Subsequent echocardiogram did not show altered cardiac function, Dr Man felt radiation therapy was not in her best interest at this time but would consider if she developed cardiac dysfunction.  Olimpia was started on oral Pazopanib May 2017 for palliative therapy, with doses held due to palmar plantar dysesthesia and stomatitis, and restarted 50% dosing on 6/19/17.  Olimpia has imaging in August 2017 that showed a positive response to therapy.  However on most recent imaging she was found to have progression, she has since completed palliative radiation.    Interval History:  Olimpia comes to  clinic today with her  and her cousin.  Overall she has been doing well.  Her only complaint is decreased appetite;  Food does not sound good to her.  No nausea, vomiting, diarrhea.  No pain.  No rash.  Energy level is ok.  She is trying to figure out a way to meet her mom for vacation.    Denies fevers, chills, SOB, cough, URI, dysuria, constipation, or diarrhea.     ROS  See HPI. Complete ROS otherwise negative.      Allergies as of 03/29/2018 - [facility] as Reviewed 03/29/2018   Allergen Reaction Noted     Heparin flush Other (See Comments) 01/27/2016     Pork derived products  02/09/2016     Tegaderm transparent dressing (informational only) Other (See Comments) and Rash 04/20/2016     Medications:  Current Outpatient Prescriptions   Medication Sig Dispense Refill     megestrol (MEGACE) 40 MG tablet Take 5 tablets (200 mg) by mouth 2 times daily 300 tablet 3     ranitidine (ZANTAC) 150 MG tablet Take 1 tablet (150 mg) by mouth 2 times daily 60 tablet 3     LORazepam (ATIVAN) 1 MG tablet Take 1 tablet (1 mg) by mouth every 6 hours as needed for nausea 30 tablet 2     pazopanib (VOTRIENT) 200 MG tablet CHEMOTHERAPY Take 4 tablets (800 mg) by mouth daily 160 tablet 5     HYDROmorphone (DILAUDID) 2 MG tablet Take 0.5-1 tablets (1-2 mg) by mouth every 2 hours as needed for moderate to severe pain 30 tablet 0     ondansetron (ZOFRAN-ODT) 8 MG ODT tab Take 1 tablet (8 mg) by mouth every 6 hours 120 tablet 1     polyethylene glycol (MIRALAX/GLYCOLAX) Packet Take 17 g by mouth daily as needed for constipation 7 packet 1     senna-docusate (SENOKOT-S;PERICOLACE) 8.6-50 MG per tablet Take 2 tablets by mouth 2 times daily as needed for constipation 30 tablet 0     OLANZapine (ZYPREXA) 5 MG tablet Take 0.5 tablets (2.5 mg) by mouth 2 times daily 30 tablet 1       Past Medical History:   Diagnosis Date     Anemia 6/3/2016     Constipation      Extrarenal rhabdoid neoplasm (H)      Febrile neutropenia (H) 4/17/2016      History of blood transfusion      Latent tuberculosis      Lung disease      On antineoplastic chemotherapy      Sarcoma of vulva (H)      Social History:  Olimpia lives with her cousin.    Family History   Problem Relation Age of Onset     Other Cancer No family hx of        Physical Exam   Temp:  [96.6  F (35.9  C)] 96.6  F (35.9  C)  Pulse:  [87] 87  Resp:  [21] 21  BP: (108)/(77) 108/77  SpO2:  [97 %] 97 %  Wt Readings from Last 4 Encounters:   03/29/18 55.2 kg (121 lb 11.1 oz)   03/06/18 55.4 kg (122 lb 2.2 oz)   02/26/18 55.8 kg (123 lb 0.3 oz)   02/21/18 58.5 kg (128 lb 14.4 oz)     GENERAL: Alert, cooperative, NAD, appears in good spirits.   SKIN: warm, dry, intact. No rashes on limited exam.  HEAD: Normocephalic, atraumatic.     EYES: Sclera clear, slightly anicteric. PERRLA, EOMI.   EARS: External ears wnl bilaterally, no drainage.   NOSE: Nares patent, mucosa pink and moist, no nasal drainage noted.  No facial pain.  MOUTH/THROAT: Oropharynx is clear. Tongue without sores. Normal dentition for age, no mucosal lesions.  NECK: Supple, full range of motion, thyroid non-palpable.   LYMPH NODES: No cervical, supraclavicular or axillary lymphadenopathy.  LUNGS: No increased WOB.  Lungs clear to auscultation throughout.  No crackles or wheezes.   HEART: HRR. S1 and S2 are normal. No murmurs, rubs, gallops.  Cap refill <2 sec  ABDOMEN: Normoactive bowel sounds. Nontender, nondistended.  NEUROLOGIC: Grossly intact, no focal neurologic deficits.    :  Deferred today.    Labs:   Results for orders placed or performed in visit on 03/29/18 (from the past 24 hour(s))   CBC with platelets differential   Result Value Ref Range    WBC 3.3 (L) 4.0 - 11.0 10e9/L    RBC Count 3.69 (L) 3.8 - 5.2 10e12/L    Hemoglobin 13.3 11.7 - 15.7 g/dL    Hematocrit 39.2 35.0 - 47.0 %     (H) 78 - 100 fl    MCH 36.0 (H) 26.5 - 33.0 pg    MCHC 33.9 31.5 - 36.5 g/dL    RDW 16.3 (H) 10.0 - 15.0 %    Platelet Count 114 (L) 150 - 450  10e9/L    Diff Method Automated Method     % Neutrophils 47.0 %    % Lymphocytes 37.1 %    % Monocytes 14.4 %    % Eosinophils 0.9 %    % Basophils 0.3 %    % Immature Granulocytes 0.3 %    Nucleated RBCs 0 0 /100    Absolute Neutrophil 1.5 (L) 1.6 - 8.3 10e9/L    Absolute Lymphocytes 1.2 0.8 - 5.3 10e9/L    Absolute Monocytes 0.5 0.0 - 1.3 10e9/L    Absolute Eosinophils 0.0 0.0 - 0.7 10e9/L    Absolute Basophils 0.0 0.0 - 0.2 10e9/L    Abs Immature Granulocytes 0.0 0 - 0.4 10e9/L    Absolute Nucleated RBC 0.0    Comprehensive metabolic panel   Result Value Ref Range    Sodium 137 133 - 144 mmol/L    Potassium 3.7 3.4 - 5.3 mmol/L    Chloride 101 94 - 109 mmol/L    Carbon Dioxide 29 20 - 32 mmol/L    Anion Gap 7 3 - 14 mmol/L    Glucose 92 70 - 99 mg/dL    Urea Nitrogen 10 7 - 30 mg/dL    Creatinine 0.54 0.52 - 1.04 mg/dL    GFR Estimate >90 >60 mL/min/1.7m2    GFR Estimate If Black >90 >60 mL/min/1.7m2    Calcium 9.4 8.5 - 10.1 mg/dL    Bilirubin Total 0.5 0.2 - 1.3 mg/dL    Albumin 3.4 3.4 - 5.0 g/dL    Protein Total 8.4 6.8 - 8.8 g/dL    Alkaline Phosphatase 71 40 - 150 U/L    ALT 32 0 - 50 U/L    AST 37 0 - 45 U/L   Routine UA with microscopic - No culture   Result Value Ref Range    Color Urine Light Yellow     Appearance Urine Clear     Glucose Urine Negative NEG^Negative mg/dL    Bilirubin Urine Negative NEG^Negative    Ketones Urine Negative NEG^Negative mg/dL    Specific Gravity Urine 1.004 1.003 - 1.035    Blood Urine Negative NEG^Negative    pH Urine 7.0 5.0 - 7.0 pH    Protein Albumin Urine 10 (A) NEG^Negative mg/dL    Urobilinogen mg/dL Normal 0.0 - 2.0 mg/dL    Nitrite Urine Negative NEG^Negative    Leukocyte Esterase Urine Small (A) NEG^Negative    Source Midstream Urine     WBC Urine 5 0 - 5 /HPF    RBC Urine 0 0 - 2 /HPF    Squamous Epithelial /HPF Urine <1 0 - 1 /HPF        Assessment:   Olimpia is a 23-year-old female with 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  She has restarted her Pazopanib (had been on hold just prior to the time of her progression). Completed palliative radiation. She is doing really well with only complaint being decreased appetite.  She is interested in exploring the use of cannibas oil.  Tolerating Pazopanib well.    Plan:  1) Continue Pazpoanib  2) Continue zyprexa and zantac.  Ativan PRN for breakthrough nausea.  3) Will start Megace for appetite stimulant;  Will look into cost of cannibas oil and get back to [facility] on this.  4) Continue dilaudid PRN.  5) RTC in 2 weeks      The patient was seen and discussed with Dr. Yanez, pediatric heme/onc attending.        Coretta Yanez MD

## 2018-03-30 NOTE — PROGRESS NOTES
Pediatric Hematology/Oncology Clinic Note    History- Olimpia initially presented with a growth on her right labia in 2013 when she was living in [place]. 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 She discontinued further treatment b/c her physicians were unsure of the exact diagnosis and she felt uncomfortable proceeding. Olimpia subsequently immigrated to the  in August of 2015 and in October she first noticed a recurrence of the mass in the area of previous excision. She was in [place] at that time, seen by oncology and had a port placed but then moved to [place] where she was seen by Dr. Mckeon at [facility] in November. There she underwent an MRI that showed a solid and cystic subcutaneous mass in the right labia measuring 1.7 x 1.6 x 1cm with question of nodular extension vs a second nodule measuring 0.7 cm. There was no invasion into the vagina. On November 16th 2015, Olimpia had a biopsy of the mass by a gynecologist, Dr. Terry, at [facility]. Cytology was reviewed at [facility] and read as a SMARCB1-deficient genital sarcoma, likely falling within the overall spectrum of malignant rhabdoid tumor. By immunohistochemistry, the cells shows a complete loss of SMARCb1. Wide spectrum cytokeratin, CD34, WT1, Desmin and CD99 were all negative. RT PCR for Wooten Sarcoma associated fusion transcripts were negative as well. Olimpia went on to have a PET/CT as well which showed multiple pulmonary lesions and biopsy confirmed a lesion to be metastatic disease. Olimpia was seen by Tomás White at [facility] who reviewed the diagnosis and potential treatment plans with her, however she prefered to be treated here at [facility]. 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    Olimpia underwent left sided thoracotomy and resection of two pulmonary nodules on 7/20/16. Pathology revealed that one lesion was benign lymphoid tissue and the other was consistent with synovial sarcoma.  This was confirmed with FISH  With 91% of cell examined having a signal pattern indictivate of a rearrangement of the SS18 locus.  Olimpia has now completed 3 cycles of chemotherapy per [id number] and started her radiation on 9/13/16 and completed on 10/17/16.  She then went on to have a resection of her labial mass on 11/16/16 by Jannet Pastrana and Chikis with reconstruction, including skin flaps by Dr. Corona from plastics.  Pathology showed no residual tumor.  She had a f/u chest CT today on 12/5/16 that showed persistance of her pulmonary nodules as well as few additonal pulmonary nodules.  She saw Dr. Pierre who was in agreement with surgical resection, however Olimpia wanted to wait until March to have more time to recover from her last surgery. She underwent left sided thoracotomy on 3/8/17.    In May, Olimpia's follow up PET/CT demonstrated significant progression of her pulmonary metastatic disease, with an anterior mediastinal mass compression her right ventricle and potentially right outflow tract.  Subsequent echocardiogram did not show altered cardiac function, Dr Man felt radiation therapy was not in her best interest at this time but would consider if she developed cardiac dysfunction.  Olimpia was started on oral Pazopanib May 2017 for palliative therapy, with doses held due to palmar plantar dysesthesia and stomatitis, and restarted 50% dosing on 6/19/17.  Olimpia has imaging in August 2017 that showed a positive response to therapy.  However on most recent imaging she was found to have progression, she has since completed palliative radiation.    Interval History:  Olimpia comes to clinic today with her  and her cousin.  Overall she has been doing well.  Her only  complaint is decreased appetite;  Food does not sound good to her.  No nausea, vomiting, diarrhea.  No pain.  No rash.  Energy level is ok.  She is trying to figure out a way to meet her mom for vacation.    Denies fevers, chills, SOB, cough, URI, dysuria, constipation, or diarrhea.     ROS  See HPI. Complete ROS otherwise negative.      Allergies as of 03/29/2018 - [facility] as Reviewed 03/29/2018   Allergen Reaction Noted     Heparin flush Other (See Comments) 01/27/2016     Pork derived products  02/09/2016     Tegaderm transparent dressing (informational only) Other (See Comments) and Rash 04/20/2016     Medications:  Current Outpatient Prescriptions   Medication Sig Dispense Refill     megestrol (MEGACE) 40 MG tablet Take 5 tablets (200 mg) by mouth 2 times daily 300 tablet 3     ranitidine (ZANTAC) 150 MG tablet Take 1 tablet (150 mg) by mouth 2 times daily 60 tablet 3     LORazepam (ATIVAN) 1 MG tablet Take 1 tablet (1 mg) by mouth every 6 hours as needed for nausea 30 tablet 2     pazopanib (VOTRIENT) 200 MG tablet CHEMOTHERAPY Take 4 tablets (800 mg) by mouth daily 160 tablet 5     HYDROmorphone (DILAUDID) 2 MG tablet Take 0.5-1 tablets (1-2 mg) by mouth every 2 hours as needed for moderate to severe pain 30 tablet 0     ondansetron (ZOFRAN-ODT) 8 MG ODT tab Take 1 tablet (8 mg) by mouth every 6 hours 120 tablet 1     polyethylene glycol (MIRALAX/GLYCOLAX) Packet Take 17 g by mouth daily as needed for constipation 7 packet 1     senna-docusate (SENOKOT-S;PERICOLACE) 8.6-50 MG per tablet Take 2 tablets by mouth 2 times daily as needed for constipation 30 tablet 0     OLANZapine (ZYPREXA) 5 MG tablet Take 0.5 tablets (2.5 mg) by mouth 2 times daily 30 tablet 1       Past Medical History:   Diagnosis Date     Anemia 6/3/2016     Constipation      Extrarenal rhabdoid neoplasm (H)      Febrile neutropenia (H) 4/17/2016     History of blood transfusion      Latent tuberculosis      Lung disease      On antineoplastic  chemotherapy      Sarcoma of vulva (H)      Social History:  Olimpia lives with her cousin.    Family History   Problem Relation Age of Onset     Other Cancer No family hx of        Physical Exam   Temp:  [96.6  F (35.9  C)] 96.6  F (35.9  C)  Pulse:  [87] 87  Resp:  [21] 21  BP: (108)/(77) 108/77  SpO2:  [97 %] 97 %  Wt Readings from Last 4 Encounters:   03/29/18 55.2 kg (121 lb 11.1 oz)   03/06/18 55.4 kg (122 lb 2.2 oz)   02/26/18 55.8 kg (123 lb 0.3 oz)   02/21/18 58.5 kg (128 lb 14.4 oz)     GENERAL: Alert, cooperative, NAD, appears in good spirits.   SKIN: warm, dry, intact. No rashes on limited exam.  HEAD: Normocephalic, atraumatic.     EYES: Sclera clear, slightly anicteric. PERRLA, EOMI.   EARS: External ears wnl bilaterally, no drainage.   NOSE: Nares patent, mucosa pink and moist, no nasal drainage noted.  No facial pain.  MOUTH/THROAT: Oropharynx is clear. Tongue without sores. Normal dentition for age, no mucosal lesions.  NECK: Supple, full range of motion, thyroid non-palpable.   LYMPH NODES: No cervical, supraclavicular or axillary lymphadenopathy.  LUNGS: No increased WOB.  Lungs clear to auscultation throughout.  No crackles or wheezes.   HEART: HRR. S1 and S2 are normal. No murmurs, rubs, gallops.  Cap refill <2 sec  ABDOMEN: Normoactive bowel sounds. Nontender, nondistended.  NEUROLOGIC: Grossly intact, no focal neurologic deficits.    :  Deferred today.    Labs:   Results for orders placed or performed in visit on 03/29/18 (from the past 24 hour(s))   CBC with platelets differential   Result Value Ref Range    WBC 3.3 (L) 4.0 - 11.0 10e9/L    RBC Count 3.69 (L) 3.8 - 5.2 10e12/L    Hemoglobin 13.3 11.7 - 15.7 g/dL    Hematocrit 39.2 35.0 - 47.0 %     (H) 78 - 100 fl    MCH 36.0 (H) 26.5 - 33.0 pg    MCHC 33.9 31.5 - 36.5 g/dL    RDW 16.3 (H) 10.0 - 15.0 %    Platelet Count 114 (L) 150 - 450 10e9/L    Diff Method Automated Method     % Neutrophils 47.0 %    % Lymphocytes 37.1 %    %  Monocytes 14.4 %    % Eosinophils 0.9 %    % Basophils 0.3 %    % Immature Granulocytes 0.3 %    Nucleated RBCs 0 0 /100    Absolute Neutrophil 1.5 (L) 1.6 - 8.3 10e9/L    Absolute Lymphocytes 1.2 0.8 - 5.3 10e9/L    Absolute Monocytes 0.5 0.0 - 1.3 10e9/L    Absolute Eosinophils 0.0 0.0 - 0.7 10e9/L    Absolute Basophils 0.0 0.0 - 0.2 10e9/L    Abs Immature Granulocytes 0.0 0 - 0.4 10e9/L    Absolute Nucleated RBC 0.0    Comprehensive metabolic panel   Result Value Ref Range    Sodium 137 133 - 144 mmol/L    Potassium 3.7 3.4 - 5.3 mmol/L    Chloride 101 94 - 109 mmol/L    Carbon Dioxide 29 20 - 32 mmol/L    Anion Gap 7 3 - 14 mmol/L    Glucose 92 70 - 99 mg/dL    Urea Nitrogen 10 7 - 30 mg/dL    Creatinine 0.54 0.52 - 1.04 mg/dL    GFR Estimate >90 >60 mL/min/1.7m2    GFR Estimate If Black >90 >60 mL/min/1.7m2    Calcium 9.4 8.5 - 10.1 mg/dL    Bilirubin Total 0.5 0.2 - 1.3 mg/dL    Albumin 3.4 3.4 - 5.0 g/dL    Protein Total 8.4 6.8 - 8.8 g/dL    Alkaline Phosphatase 71 40 - 150 U/L    ALT 32 0 - 50 U/L    AST 37 0 - 45 U/L   Routine UA with microscopic - No culture   Result Value Ref Range    Color Urine Light Yellow     Appearance Urine Clear     Glucose Urine Negative NEG^Negative mg/dL    Bilirubin Urine Negative NEG^Negative    Ketones Urine Negative NEG^Negative mg/dL    Specific Gravity Urine 1.004 1.003 - 1.035    Blood Urine Negative NEG^Negative    pH Urine 7.0 5.0 - 7.0 pH    Protein Albumin Urine 10 (A) NEG^Negative mg/dL    Urobilinogen mg/dL Normal 0.0 - 2.0 mg/dL    Nitrite Urine Negative NEG^Negative    Leukocyte Esterase Urine Small (A) NEG^Negative    Source Midstream Urine     WBC Urine 5 0 - 5 /HPF    RBC Urine 0 0 - 2 /HPF    Squamous Epithelial /HPF Urine <1 0 - 1 /HPF        Assessment:   Olimpia is a 23-year-old female with 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  She has restarted her Pazopanib (had been on hold just prior  to the time of her progression). Completed palliative radiation. She is doing really well with only complaint being decreased appetite.  She is interested in exploring the use of cannibas oil.  Tolerating Pazopanib well.    Plan:  1) Continue Pazpoanib  2) Continue zyprexa and zantac.  Ativan PRN for breakthrough nausea.  3) Will start Megace for appetite stimulant;  Will look into cost of cannibas oil and get back to [facility] on this.  4) Continue dilaudid PRN.  5) RTC in 2 weeks      The patient was seen and discussed with Dr. Yanez, pediatric heme/onc attending.

## 2018-04-09 NOTE — PROGRESS NOTES
I spoke to Olimpia today.  She is having new left shoulder pain over the past week.  Has been using Dilaudad with some relief.  No cough, SOB.  She cannot come into clinic before he scheduled appt on Thursday.  Will prescribe Lidoderm patches and Olimpia will call back if that does not help or pain worsens.

## 2018-04-10 NOTE — TELEPHONE ENCOUNTER
Pt called and asked for a different pain option other than the lidocaine patch that Shayla Trammell NP prescribed earlier today. Spoke with Shayla Trammell, who stated that she spoke with pt earlier and offered her an appt today, but pt declined. Shayla also stated that she spoke with pt and notified her that the lidocaine patch isn't covered by insurance, so pt could pay for it out of pocket. Shayla also informed pt that there aren't other pain options she can offer pt. Finally, Shayla stated that she will try and coordinate for the PACT team to meet with pt during her appt on Thursday, 04/12.

## 2018-04-12 NOTE — LETTER
4/12/2018      RE: Olimpia Childress  [address]   [facility] [address]       Pediatric Hematology/Oncology Clinic Note    History- Olimpia initially presented with a growth on her right labia in 2013 when she was living in [place]. 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 She discontinued further treatment b/c her physicians were unsure of the exact diagnosis and she felt uncomfortable proceeding. Olimpia subsequently immigrated to the  in August of 2015 and in October she first noticed a recurrence of the mass in the area of previous excision. She was in [place] at that time, seen by oncology and had a port placed but then moved to [place] where she was seen by Dr. Mckeon at [facility] in November. There she underwent an MRI that showed a solid and cystic subcutaneous mass in the right labia measuring 1.7 x 1.6 x 1cm with question of nodular extension vs a second nodule measuring 0.7 cm. There was no invasion into the vagina. On November 16th 2015, Olimpia had a biopsy of the mass by a gynecologist, Dr. Terry, at [facility]. Cytology was reviewed at [facility] and read as a SMARCB1-deficient genital sarcoma, likely falling within the overall spectrum of malignant rhabdoid tumor. By immunohistochemistry, the cells shows a complete loss of SMARCb1. Wide spectrum cytokeratin, CD34, WT1, Desmin and CD99 were all negative. RT PCR for Wooten Sarcoma associated fusion transcripts were negative as well. Olimpia went on to have a PET/CT as well which showed multiple pulmonary lesions and biopsy confirmed a lesion to be metastatic disease. Olimpia was seen by Tomás White at [facility] who reviewed the diagnosis and potential treatment plans with her, however she prefered to be treated here at [facility]. 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    Olimpia underwent left sided thoracotomy and resection of two pulmonary nodules on 7/20/16. Pathology revealed that one lesion was benign lymphoid tissue and the other was consistent with synovial sarcoma.  This was confirmed with FISH  With 91% of cell examined having a signal pattern indictivate of a rearrangement of the SS18 locus.  Olimpia has now completed 3 cycles of chemotherapy per [id number] and started her radiation on 9/13/16 and completed on 10/17/16.  She then went on to have a resection of her labial mass on 11/16/16 by Jannet Pastrana and Chikis with reconstruction, including skin flaps by Dr. Corona from plastics.  Pathology showed no residual tumor.  She had a f/u chest CT today on 12/5/16 that showed persistance of her pulmonary nodules as well as few additonal pulmonary nodules.  She saw Dr. Pierre who was in agreement with surgical resection, however Olimpia wanted to wait until March to have more time to recover from her last surgery. She underwent left sided thoracotomy on 3/8/17.    In May, Olimpia's follow up PET/CT demonstrated significant progression of her pulmonary metastatic disease, with an anterior mediastinal mass compression her right ventricle and potentially right outflow tract.  Subsequent echocardiogram did not show altered cardiac function, Dr Man felt radiation therapy was not in her best interest at this time but would consider if she developed cardiac dysfunction.  Olimpia was started on oral Pazopanib May 2017 for palliative therapy, with doses held due to palmar plantar dysesthesia and stomatitis, and restarted 50% dosing on 6/19/17.  Olimpia has imaging in August 2017 that showed a positive response to therapy.  However on most recent imaging she was found to have progression, she has since completed palliative radiation.    Interval History:  Olimpia comes to  clinic today with an .  She reports onset of left shoulder pain about 2 weeks ago.  She describes the pain as dull and achy, 7/10.  It has been progressive over this period of time.  She feels a firmness/swelling in that area and wonders what that is.  She has been trying oxycodone and dilaudid but has emesis about 5-10 minutes after taking it and she feels it isn't really controlling her pain.  She only has nausea after taking these medications.  She reports poor appetite.  Is drinking 3 Boost per day.  She is only able to tolerate bites of food otherwise and does not have interest in food.  She reports constipation when taking dilaudid/oxycodone, she is using senna but ran out of miralax.  No skin concerns.  No SOB.  No fever.    ROS  See HPI. Complete ROS otherwise negative.      Allergies as of 04/12/2018 - [place] as Reviewed 04/12/2018   Allergen Reaction Noted     Heparin flush Other (See Comments) 01/27/2016     Pork derived products  02/09/2016     Tegaderm transparent dressing (informational only) Other (See Comments) and Rash 04/20/2016     Medications:  Current Outpatient Prescriptions   Medication Sig Dispense Refill     senna-docusate (SENOKOT-S;PERICOLACE) 8.6-50 MG per tablet Take 2 tablets by mouth 2 times daily as needed for constipation 30 tablet 6     polyethylene glycol (MIRALAX/GLYCOLAX) powder Take 17 g (1 capful) by mouth daily 1 Bottle 11     lidocaine (LIDODERM) 5 % Patch Place 1 patch onto the skin every 24 hours Apply to shoulder 30 patch 3     megestrol (MEGACE) 40 MG tablet Take 5 tablets (200 mg) by mouth 2 times daily 300 tablet 3     ranitidine (ZANTAC) 150 MG tablet Take 1 tablet (150 mg) by mouth 2 times daily 60 tablet 3     OLANZapine (ZYPREXA) 5 MG tablet Take 0.5 tablets (2.5 mg) by mouth 2 times daily 30 tablet 1     LORazepam (ATIVAN) 1 MG tablet Take 1 tablet (1 mg) by mouth every 6 hours as needed for nausea 30 tablet 2     pazopanib (VOTRIENT) 200 MG tablet  CHEMOTHERAPY Take 4 tablets (800 mg) by mouth daily 160 tablet 5     HYDROmorphone (DILAUDID) 2 MG tablet Take 0.5-1 tablets (1-2 mg) by mouth every 2 hours as needed for moderate to severe pain 30 tablet 0     ondansetron (ZOFRAN-ODT) 8 MG ODT tab Take 1 tablet (8 mg) by mouth every 6 hours 120 tablet 1     polyethylene glycol (MIRALAX/GLYCOLAX) Packet Take 17 g by mouth daily as needed for constipation 7 packet 1   Is only taking pazopanib regularly and pain medications.    Past Medical History:   Diagnosis Date     Anemia 6/3/2016     Constipation      Extrarenal rhabdoid neoplasm (H)      Febrile neutropenia (H) 4/17/2016     History of blood transfusion      Latent tuberculosis      Lung disease      On antineoplastic chemotherapy      Sarcoma of vulva (H)      Social History:  Olimpia lives with her cousin.    Family History   Problem Relation Age of Onset     Other Cancer No family hx of        Physical Exam   Temp:  [96.2  F (35.7  C)] 96.2  F (35.7  C)  Pulse:  [85] 85  Resp:  [20] 20  BP: (102)/(75) 102/75  SpO2:  [99 %] 99 %  Wt Readings from Last 4 Encounters:   04/12/18 54.4 kg (119 lb 14.9 oz)   03/29/18 55.2 kg (121 lb 11.1 oz)   03/06/18 55.4 kg (122 lb 2.2 oz)   02/26/18 55.8 kg (123 lb 0.3 oz)     GENERAL: Alert, cooperative, NAD, appears tired and cachectic.  SKIN: warm, dry, intact. No rashes or other skin lesions.  HEAD: Normocephalic, atraumatic.     EYES: Sclera clear, slightly anicteric. PERRLA, EOMI.   EARS: External ears wnl bilaterally, no drainage.   NOSE: Nares patent, mucosa pink and moist, no nasal drainage noted.  No facial pain.  MOUTH/THROAT: Oropharynx is clear. Tongue without sores. Normal dentition for age, no mucosal lesions.  NECK: Supple, full range of motion, thyroid non-palpable.   LYMPH NODES: No cervical, supraclavicular or axillary lymphadenopathy.  MSK: she has a palpable mass overlying her left trapezius, is firm, not mobile.  Edges are not discrete.  LUNGS: No increased  WOB.  Lungs with diminished sounds on the left.  No crackles or wheezes.   HEART: HRR. S1 and S2 are normal. No murmurs, rubs, gallops.  Cap refill <2 sec  ABDOMEN: Normoactive bowel sounds. Nontender, nondistended.  NEUROLOGIC: Grossly intact, no focal neurologic deficits.    :  Deferred today.    Labs:   Results for orders placed or performed in visit on 04/12/18 (from the past 24 hour(s))   CBC with platelets differential   Result Value Ref Range    WBC 3.4 (L) 4.0 - 11.0 10e9/L    RBC Count 3.05 (L) 3.8 - 5.2 10e12/L    Hemoglobin 11.0 (L) 11.7 - 15.7 g/dL    Hematocrit 32.6 (L) 35.0 - 47.0 %     (H) 78 - 100 fl    MCH 36.1 (H) 26.5 - 33.0 pg    MCHC 33.7 31.5 - 36.5 g/dL    RDW 17.3 (H) 10.0 - 15.0 %    Platelet Count 134 (L) 150 - 450 10e9/L    Diff Method Automated Method     % Neutrophils 49.4 %    % Lymphocytes 33.6 %    % Monocytes 16.4 %    % Eosinophils 0.3 %    % Basophils 0.0 %    % Immature Granulocytes 0.3 %    Nucleated RBCs 0 0 /100    Absolute Neutrophil 1.7 1.6 - 8.3 10e9/L    Absolute Lymphocytes 1.1 0.8 - 5.3 10e9/L    Absolute Monocytes 0.6 0.0 - 1.3 10e9/L    Absolute Eosinophils 0.0 0.0 - 0.7 10e9/L    Absolute Basophils 0.0 0.0 - 0.2 10e9/L    Abs Immature Granulocytes 0.0 0 - 0.4 10e9/L    Absolute Nucleated RBC 0.0    Comprehensive metabolic panel   Result Value Ref Range    Sodium 139 133 - 144 mmol/L    Potassium 3.6 3.4 - 5.3 mmol/L    Chloride 103 94 - 109 mmol/L    Carbon Dioxide 28 20 - 32 mmol/L    Anion Gap 8 3 - 14 mmol/L    Glucose 107 (H) 70 - 99 mg/dL    Urea Nitrogen 10 7 - 30 mg/dL    Creatinine 0.44 (L) 0.52 - 1.04 mg/dL    GFR Estimate >90 >60 mL/min/1.7m2    GFR Estimate If Black >90 >60 mL/min/1.7m2    Calcium 8.8 8.5 - 10.1 mg/dL    Bilirubin Total 0.6 0.2 - 1.3 mg/dL    Albumin 3.1 (L) 3.4 - 5.0 g/dL    Protein Total 7.7 6.8 - 8.8 g/dL    Alkaline Phosphatase 65 40 - 150 U/L    ALT 17 0 - 50 U/L    AST 28 0 - 45 U/L   EKG 12 lead, complete - pediatric   Result  Value Ref Range    Interpretation ECG Click View Image link to view waveform and result         Assessment:   Olimpia is a 23-year-old female with 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 Has tried oxycodone and dilaudid but no improvement in pain and is causing her to vomit. She is cachectic, tolerating Boost TID but no interest in food.     Plan:  1) Discussed with Olimpia signs of tumor progression.  Despite this, she is interested in continuing Pazopanib at this time.  2) She is experiencing cachexia secondary to progression of her disease.  Continue Boost TID, attempted to have dietician see Olimpia today but they were not available.  Will address nutrition further through home care.  3) Dr Martin saw Olimpia today and plans to start her on dexamethasone and methadone, appreciate her recommendations.  Baseline EKG shows QTc 428, nonspecific T wave abnormality.  4) Discussed follow up care moving forward, Olimpia would like to limit her vistis to clinic as it is difficult for her family and takes a lot of her energy.  Plan for palliative home care with Dr Martin as the point person.  Asked they start out with 3x weekly visits.  If pain does not improve with current regimen could consider imaging with the intent of pursuing palliative radiation for pain control.    5) Per her request, I will not schedule Olimpia back in clinic at this time.  However, she is aware that we are available to see her at anytime if needed.      Omar Trammell, JULIO C CNP

## 2018-04-12 NOTE — NURSING NOTE
"Chief Complaint   Patient presents with     RECHECK     Patient here today for follow up with Rhabdoid tumor (H)     /75 (BP Location: Right arm, Patient Position: Fowlers, Cuff Size: Adult Regular)  Pulse 85  Temp 96.2  F (35.7  C) (Axillary)  Resp 20  Ht 1.6 m (5' 2.99\")  Wt 54.4 kg (119 lb 14.9 oz)  SpO2 99%  BMI 21.25 kg/m2  Tasia Narvaez [facility]  April 12, 2018    "

## 2018-04-12 NOTE — MR AVS SNAPSHOT
After Visit Summary   2018    Olimpia Childress    MRN: [id number]           Patient Information     Date Of Birth          1995        Visit Information        Provider Department      2018 2:45 PM Mariza Berry Naomi Tara, MD [facility]        Today's Diagnoses     Neoplasm related pain    -  1    Cachexia (H)           Follow-ups after your visit        Who to contact     Please call your clinic at [phone] to:    Ask questions about your health    Make or cancel appointments    Discuss your medicines    Learn about your test results    Speak to your doctor            Additional Information About Your Visit        MyChart Information     [employer]illa is an electronic gateway that provides easy, online access to your medical records. With [employer], you can request a clinic appointment, read your test results, renew a prescription or communicate with your care team.     To sign up for [employer] visit the website at [email or web address]   You will be asked to enter the access code listed below, as well as some personal information. Please follow the directions to create your username and password.     Your access code is: [email or web address]  Expires: 2018  3:59 PM     Your access code will  in 90 days. If you need help or a new code, please contact your [facility] or call [phone] for assistance.        Care EveryWhere ID     This is your Care EveryWhere ID. This could be used by other organizations to access your [place] medical records  [id number]         Blood Pressure from Last 3 Encounters:   18 104/76   18 102/75   18 108/77    Weight from Last 3 Encounters:   18 54.4 kg (119 lb 14.9 oz)   18 55.2 kg (121 lb 11.1 oz)   18 55.4 kg (122 lb 2.2 oz)              Today, you had the following     No orders found for display         Today's Medication Changes          These changes are accurate  as of 4/12/18 11:59 PM.  If you have any questions, ask your nurse or doctor.               Start taking these medicines.        Dose/Directions    dexamethasone 4 MG tablet   Commonly known as:  DECADRON   Used for:  Neoplasm related pain, Cachexia (H)   Started by:  Amita Martin MD        Dose:  4 mg   Take 1 tablet (4 mg) by mouth 2 times daily (with meals) for 6 doses   Quantity:  30 tablet   Refills:  0       Diclofenac Sodium 1 % Crea   Used for:  Neoplasm related pain   Started by:  Amita Martin MD        Dose:  2 g   Place 2 g onto the skin 4 times daily as needed   Quantity:  120 g   Refills:  2       methadone 5 MG tablet   Commonly known as:  DOLOPHINE   Used for:  Neoplasm related pain   Started by:  Amita Martin MD        Dose:  2.5 mg   Take 0.5 tablets (2.5 mg) by mouth every 8 hours   Quantity:  90 tablet   Refills:  0         These medicines have changed or have updated prescriptions.        Dose/Directions    * polyethylene glycol Packet   Commonly known as:  MIRALAX/GLYCOLAX   This may have changed:  Another medication with the same name was added. Make sure you understand how and when to take each.   Used for:  Acute post-operative pain   Changed by:  Omar Trammell APRN CNP        Dose:  17 g   Take 17 g by mouth daily as needed for constipation   Quantity:  7 packet   Refills:  1       * polyethylene glycol powder   Commonly known as:  MIRALAX/GLYCOLAX   This may have changed:  You were already taking a medication with the same name, and this prescription was added. Make sure you understand how and when to take each.   Used for:  Slow transit constipation   Changed by:  Omar Trammell APRN CNP        Dose:  1 capful   Take 17 g (1 capful) by mouth daily   Quantity:  1 Bottle   Refills:  11       * Notice:  This list has 2 medication(s) that are the same as other medications prescribed for you. Read the directions carefully, and ask your doctor or other care  provider to review them with you.         Where to get your medicines      These medications were sent to [facility] [place], [place] - [address]  [address], [facility] [address]     Phone:  [phone]     lidocaine 5 % Patch    polyethylene glycol powder    senna-docusate 8.6-50 MG per tablet         Some of these will need a paper prescription and others can be bought over the counter.  Ask your nurse if you have questions.     Bring a paper prescription for each of these medications     dexamethasone 4 MG tablet    Diclofenac Sodium 1 % Crea    methadone 5 MG tablet                Primary Care Provider Office Phone # Fax #    Coretta Yanez [phone] [address] [facility] [address]        Equal Access to Services     RONIT BAEZ : Rupa mccollumo Sobenjaali, waaxda luqadaha, qaybta kaalmada adeegyalaney, wilner singleton. So [facility] [phone].    ATENCIÓN: Si habla español, tiene a ordoñez disposición servicios gratuitos de asistencia lingüística. [facility] [phone].    We comply with applicable federal civil rights laws and [place] laws. We do not discriminate on the basis of race, color, national origin, age, disability, sex, sexual orientation, or gender identity.            Thank you!     Thank you for choosing [facility]  for your care. Our goal is always to provide you with excellent care. Hearing back from our patients is one way we can continue to improve our services. Please take a few minutes to complete the written survey that you may receive in the mail after your visit with us. Thank you!             Your Updated Medication List - Protect others around you: Learn how to safely use, store and throw away your medicines at www.disposemymeds.org.          This list is accurate as of 4/12/18 11:59 PM.  Always use your most recent med list.                   Brand Name Dispense  Instructions for use Diagnosis    dexamethasone 4 MG tablet    DECADRON    30 tablet    Take 1 tablet (4 mg) by mouth 2 times daily (with meals) for 6 doses    Neoplasm related pain, Cachexia (H)       Diclofenac Sodium 1 % Crea     120 g    Place 2 g onto the skin 4 times daily as needed    Neoplasm related pain       HYDROmorphone 2 MG tablet    DILAUDID    30 tablet    Take 0.5-1 tablets (1-2 mg) by mouth every 2 hours as needed for moderate to severe pain    Pain       lidocaine 5 % Patch    LIDODERM    30 patch    Place 1 patch onto the skin every 24 hours Apply to shoulder    Synovial sarcoma (H)       LORazepam 1 MG tablet    ATIVAN    30 tablet    Take 1 tablet (1 mg) by mouth every 6 hours as needed for nausea    Sarcoma of vulva (H)       megestrol 40 MG tablet    MEGACE    300 tablet    Take 5 tablets (200 mg) by mouth 2 times daily    Synovial sarcoma (H)       methadone 5 MG tablet    DOLOPHINE    90 tablet    Take 0.5 tablets (2.5 mg) by mouth every 8 hours    Neoplasm related pain       OLANZapine 5 MG tablet    zyPREXA    30 tablet    Take 0.5 tablets (2.5 mg) by mouth 2 times daily    Nausea       ondansetron 8 MG ODT tab    ZOFRAN-ODT    120 tablet    Take 1 tablet (8 mg) by mouth every 6 hours    Non-intractable vomiting with nausea, unspecified vomiting type       pazopanib 200 MG tablet CHEMOTHERAPY    VOTRIENT    160 tablet    Take 4 tablets (800 mg) by mouth daily    Synovial sarcoma (H)       * polyethylene glycol Packet    MIRALAX/GLYCOLAX    7 packet    Take 17 g by mouth daily as needed for constipation    Acute post-operative pain       * polyethylene glycol powder    MIRALAX/GLYCOLAX    1 Bottle    Take 17 g (1 capful) by mouth daily    Slow transit constipation       ranitidine 150 MG tablet    ZANTAC    60 tablet    Take 1 tablet (150 mg) by mouth 2 times daily    Gastritis without bleeding, unspecified chronicity, unspecified gastritis type       senna-docusate 8.6-50 MG per tablet     SENOKOT-S;PERICOLACE    30 tablet    Take 2 tablets by mouth 2 times daily as needed for constipation    Acute post-operative pain       * Notice:  This list has 2 medication(s) that are the same as other medications prescribed for you. Read the directions carefully, and ask your doctor or other care provider to review them with you.

## 2018-04-12 NOTE — MR AVS SNAPSHOT
After Visit Summary   4/12/2018    Olimpia Childress    MRN: [id number]           Patient Information     Date Of Birth          1995        Visit Information        Provider Department      4/12/2018 2:15 PM Mariza Berry Alexis Rupp, APRN CNP Peds Hematology Oncology        Today's Diagnoses     Slow transit constipation    -  1    Synovial sarcoma (H)        Rhabdoid tumor (H)        Acute post-operative pain        Pain              [facility], 9th floor  [address] [place] [address]  Phone: [phone]  Clinic Hours:   Monday-Friday:   7 am to 5:00 pm   closed weekends and major  holidays     If your fever is 100.5  or greater,   call the clinic during business hours.   After hours call [phone] and ask for the pediatric hematology / oncology physician to be paged for you.               Follow-ups after your visit        Additional Services     HOME CARE NURSING REFERRAL       **Order classes of: [place] [facility], [facility] and [facility] will route to the Home Care and Hospice Referral Pool.  Home Care or Hospice will then contact the patient to schedule their appointment.**    If you do not hear from Home Care and Hospice, or you would like to call to schedule, please call the referring place of service that your provider has listed below.  ______________________________________________________________________    Your provider has referred you to: [facility]: [facility] [phone]   [email or web address]    Extended Emergency Contact Information  Primary Emergency Contact: Raul Childress (Cous[place] [address] [facility]  Home Phone: [phone]  Mobile Phone: [phone]  Relation: Relative  Secondary Emergency Contact: SUSAN PEDERSON (Cousin)   [facility]  Home Phone: [phone]  Mobile Phone: [phone]  Relation: Relative    Patient Anticipated  Discharge Date: end of life   RN, PT, HHA to begin 24 - 48 hours after discharge.  PLEASE EVALUATE AND TREAT (Evaluation timeline is 24 - 48 hrs. Please call if there is need for a variance to this timeline).    REASON FOR REFERRAL: Home Based Palliative Care ([place] - terminal disease still being treated) Diagnosis: metastatic synovial sarcoma.  Requesting 3 times weekly visits with close follow up by phone with Dr Martin.  Please contact Dr Martin after initial home visit.      ADDITIONAL SERVICES NEEDED: palliative nursing 3x weekly for pain assessment and management under the direction of Dr Martin    OTHER PERTINENT INFORMATION: Patient was last seen by provider on 4/12/18 for pain.    Current Outpatient Prescriptions:  senna-docusate (SENOKOT-S;PERICOLACE) 8.6-50 MG per tablet, Take 2 tablets by mouth 2 times daily as needed for constipation, Disp: 30 tablet, Rfl: 6  polyethylene glycol (MIRALAX/GLYCOLAX) powder, Take 17 g (1 capful) by mouth daily, Disp: 1 Bottle, Rfl: 11  lidocaine (LIDODERM) 5 % Patch, Place 1 patch onto the skin every 24 hours Apply to shoulder, Disp: 30 patch, Rfl: 3  megestrol (MEGACE) 40 MG tablet, Take 5 tablets (200 mg) by mouth 2 times daily, Disp: 300 tablet, Rfl: 3  ranitidine (ZANTAC) 150 MG tablet, Take 1 tablet (150 mg) by mouth 2 times daily, Disp: 60 tablet, Rfl: 3  OLANZapine (ZYPREXA) 5 MG tablet, Take 0.5 tablets (2.5 mg) by mouth 2 times daily, Disp: 30 tablet, Rfl: 1  LORazepam (ATIVAN) 1 MG tablet, Take 1 tablet (1 mg) by mouth every 6 hours as needed for nausea, Disp: 30 tablet, Rfl: 2  pazopanib (VOTRIENT) 200 MG tablet CHEMOTHERAPY, Take 4 tablets (800 mg) by mouth daily, Disp: 160 tablet, Rfl: 5  HYDROmorphone (DILAUDID) 2 MG tablet, Take 0.5-1 tablets (1-2 mg) by mouth every 2 hours as needed for moderate to severe pain, Disp: 30 tablet, Rfl: 0  ondansetron (ZOFRAN-ODT) 8 MG ODT tab, Take 1 tablet (8 mg) by mouth every 6 hours, Disp: 120 tablet, Rfl: 1  polyethylene  glycol (MIRALAX/GLYCOLAX) Packet, Take 17 g by mouth daily as needed for constipation, Disp: 7 packet, Rfl: 1      Patient Active Problem List:     Malignant tumor cells (H)     Abdominal pain     Ileus second to Vincristine     Constipation     Pneumothorax on left     Pneumothorax, left     Sarcoma of vulva (H)     Febrile neutropenia (H)     Anemia     Chemotherapy-induced neutropenia (H)     Rhabdoid tumor (H)     Synovial sarcoma (H)     Encounter for antineoplastic chemotherapy     Emesis, persistent     Vulvar cancer (H)     Port-a-cath in place     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    This encounter with the patient was in whole, or in part, for the following medical condition, which is the primary reason for Home Health Care: progressive metastatic synovial sarcoma and pain.    I certify that, based on my findings, the following services are medically necessary Home Health Services: Nursing    My clinical findings support the need for the above services because: Nurse is needed: To assess pain after changes in medications or other medical regimen..    Further, I certify that my clinical findings support that this patient is homebound (i.e. absences from home require considerable and taxing effort and are for medical reasons or Jainism services or infrequently or of short duration) due to end of life status and palliative needs.    Based on the above findings, I certify that this patient is confined to the home and needs intermittent skilled nursing care, physical therapy  "and/or speech therapy.  The patient is under my care, and I have initiated the establishment of the plan of care.  This patient will be followed by a physician who will periodically review the plan of care.    Physician/Provider to provide follow up care: Coretta Yanez    [facility] certified Physician at time of discharge: Mario    Please be aware that coverage of these services is subject to the terms and limitations of your health insurance plan.  Call member services at your health plan with any benefit or coverage questions.                  Who to contact     Please call your clinic at [phone] to:    Ask questions about your health    Make or cancel appointments    Discuss your medicines    Learn about your test results    Speak to your doctor            Additional Information About Your Visit        [employer] Information     [employer] is an electronic gateway that provides easy, online access to your medical records. With Citymaps, you can request a clinic appointment, read your test results, renew a prescription or communicate with your care team.     To sign up for Citymaps visit the website at [email or web address]   You will be asked to enter the access code listed below, as well as some personal information. Please follow the directions to create your username and password.     Your access code is: [email or web address]  Expires: 2018  3:59 PM     Your access code will  in 90 days. If you need help or a new code, please contact your [facility] or call [phone] for assistance.        Care EveryWhere ID     This is your Care EveryWhere ID. This could be used by other organizations to access your [place] medical records  [id number]        Your Vitals Were     Pulse Temperature Respirations Height Pulse Oximetry BMI (Body Mass Index)    85 96.2  F (35.7  C) (Axillary) 20 1.6 m (5' 2.99\") 99% 21.25 kg/m2       Blood Pressure from Last 3 " Encounters:   04/12/18 102/75   03/29/18 108/77   03/06/18 118/77    Weight from Last 3 Encounters:   04/12/18 54.4 kg (119 lb 14.9 oz)   03/29/18 55.2 kg (121 lb 11.1 oz)   03/06/18 55.4 kg (122 lb 2.2 oz)              We Performed the Following     CBC with platelets differential     Comprehensive metabolic panel     EKG 12 lead, complete - pediatric     HOME CARE NURSING REFERRAL     Routine UA with microscopic - No culture          Today's Medication Changes          These changes are accurate as of 4/12/18  5:17 PM.  If you have any questions, ask your nurse or doctor.               Start taking these medicines.        Dose/Directions    dexamethasone 4 MG tablet   Commonly known as:  DECADRON   Used for:  Neoplasm related pain, Cachexia (H)   Started by:  Amita Martin MD        Dose:  4 mg   Take 1 tablet (4 mg) by mouth 2 times daily (with meals) for 6 doses   Quantity:  30 tablet   Refills:  0       Diclofenac Sodium 1 % Crea   Used for:  Neoplasm related pain   Started by:  Amita Martin MD        Dose:  2 g   Place 2 g onto the skin 4 times daily as needed   Quantity:  120 g   Refills:  2       methadone 5 MG tablet   Commonly known as:  DOLOPHINE   Used for:  Neoplasm related pain   Started by:  Amita Martin MD        Dose:  2.5 mg   Take 0.5 tablets (2.5 mg) by mouth every 8 hours   Quantity:  90 tablet   Refills:  0         These medicines have changed or have updated prescriptions.        Dose/Directions    * polyethylene glycol Packet   Commonly known as:  MIRALAX/GLYCOLAX   This may have changed:  Another medication with the same name was added. Make sure you understand how and when to take each.   Used for:  Acute post-operative pain   Changed by:  Omar Trammell APRN CNP        Dose:  17 g   Take 17 g by mouth daily as needed for constipation   Quantity:  7 packet   Refills:  1       * polyethylene glycol powder   Commonly known as:  MIRALAX/GLYCOLAX   This may have  changed:  You were already taking a medication with the same name, and this prescription was added. Make sure you understand how and when to take each.   Used for:  Slow transit constipation   Changed by:  Omar Trammell APRN CNP        Dose:  1 capful   Take 17 g (1 capful) by mouth daily   Quantity:  1 Bottle   Refills:  11       * Notice:  This list has 2 medication(s) that are the same as other medications prescribed for you. Read the directions carefully, and ask your doctor or other care provider to review them with you.         Where to get your medicines      These medications were sent to [facility] [place], [place] - [address]  [address] [place] [address] [address]     Phone:  [phone]     lidocaine 5 % Patch    polyethylene glycol powder    senna-docusate 8.6-50 MG per tablet         Some of these will need a paper prescription and others can be bought over the counter.  Ask your nurse if you have questions.     Bring a paper prescription for each of these medications     dexamethasone 4 MG tablet    Diclofenac Sodium 1 % Crea    methadone 5 MG tablet                Primary Care Provider Office Phone # Fax #    Coretta Yanez [phone] [address] [facility] [address]        Equal Access to Services     RONIT BAEZ AH: Rupa Cheek, waclaudine flores, qalindata kaantonio xie, wilner singleton. So [facility] [phone].    ATENCIÓN: Si habla español, tiene a ordoñez disposición servicios gratuitos de asistencia lingüística. Llame al [phone].    We comply with applicable federal civil rights laws and [place] laws. We do not discriminate on the basis of race, color, national origin, age, disability, sex, sexual orientation, or gender identity.            Thank you!     Thank you for choosing PEDS HEMATOLOGY ONCOLOGY  for your care. Our goal is always to provide you with excellent care.  Hearing back from our patients is one way we can continue to improve our services. Please take a few minutes to complete the written survey that you may receive in the mail after your visit with us. Thank you!             Your Updated Medication List - Protect others around you: Learn how to safely use, store and throw away your medicines at www.disposemymeds.org.          This list is accurate as of 4/12/18  5:17 PM.  Always use your most recent med list.                   Brand Name Dispense Instructions for use Diagnosis    dexamethasone 4 MG tablet    DECADRON    30 tablet    Take 1 tablet (4 mg) by mouth 2 times daily (with meals) for 6 doses    Neoplasm related pain, Cachexia (H)       Diclofenac Sodium 1 % Crea     120 g    Place 2 g onto the skin 4 times daily as needed    Neoplasm related pain       HYDROmorphone 2 MG tablet    DILAUDID    30 tablet    Take 0.5-1 tablets (1-2 mg) by mouth every 2 hours as needed for moderate to severe pain    Pain       lidocaine 5 % Patch    LIDODERM    30 patch    Place 1 patch onto the skin every 24 hours Apply to shoulder    Synovial sarcoma (H)       LORazepam 1 MG tablet    ATIVAN    30 tablet    Take 1 tablet (1 mg) by mouth every 6 hours as needed for nausea    Sarcoma of vulva (H)       megestrol 40 MG tablet    MEGACE    300 tablet    Take 5 tablets (200 mg) by mouth 2 times daily    Synovial sarcoma (H)       methadone 5 MG tablet    DOLOPHINE    90 tablet    Take 0.5 tablets (2.5 mg) by mouth every 8 hours    Neoplasm related pain       OLANZapine 5 MG tablet    zyPREXA    30 tablet    Take 0.5 tablets (2.5 mg) by mouth 2 times daily    Nausea       ondansetron 8 MG ODT tab    ZOFRAN-ODT    120 tablet    Take 1 tablet (8 mg) by mouth every 6 hours    Non-intractable vomiting with nausea, unspecified vomiting type       pazopanib 200 MG tablet CHEMOTHERAPY    VOTRIENT    160 tablet    Take 4 tablets (800 mg) by mouth daily    Synovial sarcoma (H)       *  polyethylene glycol Packet    MIRALAX/GLYCOLAX    7 packet    Take 17 g by mouth daily as needed for constipation    Acute post-operative pain       * polyethylene glycol powder    MIRALAX/GLYCOLAX    1 Bottle    Take 17 g (1 capful) by mouth daily    Slow transit constipation       ranitidine 150 MG tablet    ZANTAC    60 tablet    Take 1 tablet (150 mg) by mouth 2 times daily    Gastritis without bleeding, unspecified chronicity, unspecified gastritis type       senna-docusate 8.6-50 MG per tablet    SENOKOT-S;PERICOLACE    30 tablet    Take 2 tablets by mouth 2 times daily as needed for constipation    Acute post-operative pain       * Notice:  This list has 2 medication(s) that are the same as other medications prescribed for you. Read the directions carefully, and ask your doctor or other care provider to review them with you.

## 2018-04-12 NOTE — PROGRESS NOTES
Olimpia was seen in clinic today for port access and lab draw. Port accessed using sterile technique without issue. Labs drawn as ordered. Port flushed and citrate locked following lab draw. CMA to de-access port following provider appointment.

## 2018-04-12 NOTE — ED AVS SNAPSHOT
[facility]    [address]    [facility] [address]    Phone:  [phone]    Fax:  [phone]                                       Olimpia Childress   MRN: [id number]    Department:  [facility]   Date of Visit:  4/12/2018           After Visit Summary Signature Page     I have received my discharge instructions, and my questions have been answered. I have discussed any challenges I see with this plan with the nurse or doctor.    ..........................................................................................................................................  Patient/Patient Representative Signature      ..........................................................................................................................................  Patient Representative Print Name and Relationship to Patient    ..................................................               ................................................  Date                                            Time    ..........................................................................................................................................  Reviewed by Signature/Title    ...................................................              ..............................................  Date                                                            Time

## 2018-04-12 NOTE — MR AVS SNAPSHOT
After Visit Summary   2018    Olimpia Childress    MRN: [id number]           Patient Information     Date Of Birth          1995        Visit Information        Provider Department      2018 2:30 PM [facility] PEDS INFUSION CHAIR 1 Peds IV Infusion        Today's Diagnoses     Rhabdoid tumor (H)    -  1       Follow-ups after your visit        Who to contact     Please call your clinic at [phone] to:    Ask questions about your health    Make or cancel appointments    Discuss your medicines    Learn about your test results    Speak to your doctor            Additional Information About Your Visit        MyChart Information     [employer] is an electronic gateway that provides easy, online access to your medical records. With [employer], you can request a clinic appointment, read your test results, renew a prescription or communicate with your care team.     To sign up for [employer] visit the website at [email or web address]   You will be asked to enter the access code listed below, as well as some personal information. Please follow the directions to create your username and password.     Your access code is: [email or web address]  Expires: 2018  3:59 PM     Your access code will  in 90 days. If you need help or a new code, please contact your [facility] or call [phone] for assistance.        Care EveryWhere ID     This is your Care EveryWhere ID. This could be used by other organizations to access your [place] medical records  [id number]         Blood Pressure from Last 3 Encounters:   18 102/75   18 108/77   18 118/77    Weight from Last 3 Encounters:   18 54.4 kg (119 lb 14.9 oz)   18 55.2 kg (121 lb 11.1 oz)   18 55.4 kg (122 lb 2.2 oz)              Today, you had the following     No orders found for display         Today's Medication Changes          These changes are accurate as of 18  4:01 PM.  If you have any  questions, ask your nurse or doctor.               These medicines have changed or have updated prescriptions.        Dose/Directions    * polyethylene glycol Packet   Commonly known as:  MIRALAX/GLYCOLAX   This may have changed:  Another medication with the same name was added. Make sure you understand how and when to take each.   Used for:  Acute post-operative pain   Changed by:  Omar Trammell APRN CNP        Dose:  17 g   Take 17 g by mouth daily as needed for constipation   Quantity:  7 packet   Refills:  1       * polyethylene glycol powder   Commonly known as:  MIRALAX/GLYCOLAX   This may have changed:  You were already taking a medication with the same name, and this prescription was added. Make sure you understand how and when to take each.   Used for:  Slow transit constipation   Changed by:  Omar Trammell APRN CNP        Dose:  1 capful   Take 17 g (1 capful) by mouth daily   Quantity:  1 Bottle   Refills:  11       * Notice:  This list has 2 medication(s) that are the same as other medications prescribed for you. Read the directions carefully, and ask your doctor or other care provider to review them with you.         Where to get your medicines      These medications were sent to [facility] [place], [place] - [address]  [address] [address]     Phone:  [phone]     lidocaine 5 % Patch    polyethylene glycol powder    senna-docusate 8.6-50 MG per tablet                Primary Care Provider Office Phone # Fax #    Coretta Yanez [phone] [address] [facility] [address]        Equal Access to Services     [facility] SASHA AH: Hadii leonid finley hadasho Sobenjaali, waaxda luqadaha, qaybta kaalmada adeegyada, wilner singleton. So [facility] [phone].    ATENCIÓN: Si habla español, tiene a ordoñez disposición servicios gratuitos de asistencia lingüística. Llame al [phone].    We comply with  applicable federal civil rights laws and [place] laws. We do not discriminate on the basis of race, color, national origin, age, disability, sex, sexual orientation, or gender identity.            Thank you!     Thank you for choosing PEDS IV INFUSION  for your care. Our goal is always to provide you with excellent care. Hearing back from our patients is one way we can continue to improve our services. Please take a few minutes to complete the written survey that you may receive in the mail after your visit with us. Thank you!             Your Updated Medication List - Protect others around you: Learn how to safely use, store and throw away your medicines at www.disposemymeds.org.          This list is accurate as of 4/12/18  4:01 PM.  Always use your most recent med list.                   Brand Name Dispense Instructions for use Diagnosis    HYDROmorphone 2 MG tablet    DILAUDID    30 tablet    Take 0.5-1 tablets (1-2 mg) by mouth every 2 hours as needed for moderate to severe pain    Pain       lidocaine 5 % Patch    LIDODERM    30 patch    Place 1 patch onto the skin every 24 hours Apply to shoulder    Synovial sarcoma (H)       LORazepam 1 MG tablet    ATIVAN    30 tablet    Take 1 tablet (1 mg) by mouth every 6 hours as needed for nausea    Sarcoma of vulva (H)       megestrol 40 MG tablet    MEGACE    300 tablet    Take 5 tablets (200 mg) by mouth 2 times daily    Synovial sarcoma (H)       OLANZapine 5 MG tablet    zyPREXA    30 tablet    Take 0.5 tablets (2.5 mg) by mouth 2 times daily    Nausea       ondansetron 8 MG ODT tab    ZOFRAN-ODT    120 tablet    Take 1 tablet (8 mg) by mouth every 6 hours    Non-intractable vomiting with nausea, unspecified vomiting type       pazopanib 200 MG tablet CHEMOTHERAPY    VOTRIENT    160 tablet    Take 4 tablets (800 mg) by mouth daily    Synovial sarcoma (H)       * polyethylene glycol Packet    MIRALAX/GLYCOLAX    7 packet    Take 17 g by mouth daily as needed for  constipation    Acute post-operative pain       * polyethylene glycol powder    MIRALAX/GLYCOLAX    1 Bottle    Take 17 g (1 capful) by mouth daily    Slow transit constipation       ranitidine 150 MG tablet    ZANTAC    60 tablet    Take 1 tablet (150 mg) by mouth 2 times daily    Gastritis without bleeding, unspecified chronicity, unspecified gastritis type       senna-docusate 8.6-50 MG per tablet    SENOKOT-S;PERICOLACE    30 tablet    Take 2 tablets by mouth 2 times daily as needed for constipation    Acute post-operative pain       * Notice:  This list has 2 medication(s) that are the same as other medications prescribed for you. Read the directions carefully, and ask your doctor or other care provider to review them with you.

## 2018-04-12 NOTE — PROGRESS NOTES
Pediatric Hematology/Oncology Clinic Note    History- Olimpia initially presented with a growth on her right labia in 2013 when she was living in [place]. 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 She discontinued further treatment b/c her physicians were unsure of the exact diagnosis and she felt uncomfortable proceeding. Olimpia subsequently immigrated to the  in August of 2015 and in October she first noticed a recurrence of the mass in the area of previous excision. She was in [place] at that time, seen by oncology and had a port placed but then moved to [place] where she was seen by Dr. Mckeon at [facility] in November. There she underwent an MRI that showed a solid and cystic subcutaneous mass in the right labia measuring 1.7 x 1.6 x 1cm with question of nodular extension vs a second nodule measuring 0.7 cm. There was no invasion into the vagina. On November 16th 2015, Olimpia had a biopsy of the mass by a gynecologist, Dr. Terry, at [facility]. 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 By immunohistochemistry, the cells shows a complete loss of SMARCb1. Wide spectrum cytokeratin, CD34, WT1, Desmin and CD99 were all negative. RT PCR for Wooten Sarcoma associated fusion transcripts were negative as well. Olimpia went on to have a PET/CT as well which showed multiple pulmonary lesions and biopsy confirmed a lesion to be metastatic disease. Olimpia was seen by Tomás White at [facility] who reviewed the diagnosis and potential treatment plans with her, however she prefered to be treated here at [facility]. 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    Olimpia underwent left sided thoracotomy and resection of two pulmonary nodules on 7/20/16. Pathology revealed that one lesion was benign lymphoid tissue and the other was consistent with synovial sarcoma.  This was confirmed with FISH  With 91% of cell examined having a signal pattern indictivate of a rearrangement of the SS18 locus.  Olimpia has now completed 3 cycles of chemotherapy per [id number] and started her radiation on 9/13/16 and completed on 10/17/16.  She then went on to have a resection of her labial mass on 11/16/16 by Jannet Pastrana and Chikis with reconstruction, including skin flaps by Dr. Corona from plastics.  Pathology showed no residual tumor.  She had a f/u chest CT today on 12/5/16 that showed persistance of her pulmonary nodules as well as few additonal pulmonary nodules.  She saw Dr. Pierre who was in agreement with surgical resection, however Olimpia wanted to wait until March to have more time to recover from her last surgery. She underwent left sided thoracotomy on 3/8/17.    In May, Olimpia's follow up PET/CT demonstrated significant progression of her pulmonary metastatic disease, with an anterior mediastinal mass compression her right ventricle and potentially right outflow tract.  Subsequent echocardiogram did not show altered cardiac function, Dr Man felt radiation therapy was not in her best interest at this time but would consider if she developed cardiac dysfunction.  Olimpia was started on oral Pazopanib May 2017 for palliative therapy, with doses held due to palmar plantar dysesthesia and stomatitis, and restarted 50% dosing on 6/19/17.  Olimpia has imaging in August 2017 that showed a positive response to therapy.  However on most recent imaging she was found to have progression, she has since completed palliative radiation.    Interval History:  Olimpia comes to clinic today with an .  She reports onset of left shoulder pain about 2 weeks ago.   She describes the pain as dull and achy, 7/10.  It has been progressive over this period of time.  She feels a firmness/swelling in that area and wonders what that is.  She has been trying oxycodone and dilaudid but has emesis about 5-10 minutes after taking it and she feels it isn't really controlling her pain.  She only has nausea after taking these medications.  She reports poor appetite.  Is drinking 3 Boost per day.  She is only able to tolerate bites of food otherwise and does not have interest in food.  She reports constipation when taking dilaudid/oxycodone, she is using senna but ran out of miralax.  No skin concerns.  No SOB.  No fever.    ROS  See HPI. Complete ROS otherwise negative.      Allergies as of 04/12/2018 - [facility] as Reviewed 04/12/2018   Allergen Reaction Noted     Heparin flush Other (See Comments) 01/27/2016     Pork derived products  02/09/2016     Tegaderm transparent dressing (informational only) Other (See Comments) and Rash 04/20/2016     Medications:  Current Outpatient Prescriptions   Medication Sig Dispense Refill     senna-docusate (SENOKOT-S;PERICOLACE) 8.6-50 MG per tablet Take 2 tablets by mouth 2 times daily as needed for constipation 30 tablet 6     polyethylene glycol (MIRALAX/GLYCOLAX) powder Take 17 g (1 capful) by mouth daily 1 Bottle 11     lidocaine (LIDODERM) 5 % Patch Place 1 patch onto the skin every 24 hours Apply to shoulder 30 patch 3     megestrol (MEGACE) 40 MG tablet Take 5 tablets (200 mg) by mouth 2 times daily 300 tablet 3     ranitidine (ZANTAC) 150 MG tablet Take 1 tablet (150 mg) by mouth 2 times daily 60 tablet 3     OLANZapine (ZYPREXA) 5 MG tablet Take 0.5 tablets (2.5 mg) by mouth 2 times daily 30 tablet 1     LORazepam (ATIVAN) 1 MG tablet Take 1 tablet (1 mg) by mouth every 6 hours as needed for nausea 30 tablet 2     pazopanib (VOTRIENT) 200 MG tablet CHEMOTHERAPY Take 4 tablets (800 mg) by mouth daily 160 tablet 5     HYDROmorphone (DILAUDID) 2 MG  tablet Take 0.5-1 tablets (1-2 mg) by mouth every 2 hours as needed for moderate to severe pain 30 tablet 0     ondansetron (ZOFRAN-ODT) 8 MG ODT tab Take 1 tablet (8 mg) by mouth every 6 hours 120 tablet 1     polyethylene glycol (MIRALAX/GLYCOLAX) Packet Take 17 g by mouth daily as needed for constipation 7 packet 1   Is only taking pazopanib regularly and pain medications.    Past Medical History:   Diagnosis Date     Anemia 6/3/2016     Constipation      Extrarenal rhabdoid neoplasm (H)      Febrile neutropenia (H) 4/17/2016     History of blood transfusion      Latent tuberculosis      Lung disease      On antineoplastic chemotherapy      Sarcoma of vulva (H)      Social History:  Olimpia lives with her cousin.    Family History   Problem Relation Age of Onset     Other Cancer No family hx of        Physical Exam   Temp:  [96.2  F (35.7  C)] 96.2  F (35.7  C)  Pulse:  [85] 85  Resp:  [20] 20  BP: (102)/(75) 102/75  SpO2:  [99 %] 99 %  Wt Readings from Last 4 Encounters:   04/12/18 54.4 kg (119 lb 14.9 oz)   03/29/18 55.2 kg (121 lb 11.1 oz)   03/06/18 55.4 kg (122 lb 2.2 oz)   02/26/18 55.8 kg (123 lb 0.3 oz)     GENERAL: Alert, cooperative, NAD, appears tired and cachectic.  SKIN: warm, dry, intact. No rashes or other skin lesions.  HEAD: Normocephalic, atraumatic.     EYES: Sclera clear, slightly anicteric. PERRLA, EOMI.   EARS: External ears wnl bilaterally, no drainage.   NOSE: Nares patent, mucosa pink and moist, no nasal drainage noted.  No facial pain.  MOUTH/THROAT: Oropharynx is clear. Tongue without sores. Normal dentition for age, no mucosal lesions.  NECK: Supple, full range of motion, thyroid non-palpable.   LYMPH NODES: No cervical, supraclavicular or axillary lymphadenopathy.  MSK: she has a palpable mass overlying her left trapezius, is firm, not mobile.  Edges are not discrete.  LUNGS: No increased WOB.  Lungs with diminished sounds on the left.  No crackles or wheezes.   HEART: HRR. S1 and S2 are  normal. No murmurs, rubs, gallops.  Cap refill <2 sec  ABDOMEN: Normoactive bowel sounds. Nontender, nondistended.  NEUROLOGIC: Grossly intact, no focal neurologic deficits.    :  Deferred today.    Labs:   Results for orders placed or performed in visit on 04/12/18 (from the past 24 hour(s))   CBC with platelets differential   Result Value Ref Range    WBC 3.4 (L) 4.0 - 11.0 10e9/L    RBC Count 3.05 (L) 3.8 - 5.2 10e12/L    Hemoglobin 11.0 (L) 11.7 - 15.7 g/dL    Hematocrit 32.6 (L) 35.0 - 47.0 %     (H) 78 - 100 fl    MCH 36.1 (H) 26.5 - 33.0 pg    MCHC 33.7 31.5 - 36.5 g/dL    RDW 17.3 (H) 10.0 - 15.0 %    Platelet Count 134 (L) 150 - 450 10e9/L    Diff Method Automated Method     % Neutrophils 49.4 %    % Lymphocytes 33.6 %    % Monocytes 16.4 %    % Eosinophils 0.3 %    % Basophils 0.0 %    % Immature Granulocytes 0.3 %    Nucleated RBCs 0 0 /100    Absolute Neutrophil 1.7 1.6 - 8.3 10e9/L    Absolute Lymphocytes 1.1 0.8 - 5.3 10e9/L    Absolute Monocytes 0.6 0.0 - 1.3 10e9/L    Absolute Eosinophils 0.0 0.0 - 0.7 10e9/L    Absolute Basophils 0.0 0.0 - 0.2 10e9/L    Abs Immature Granulocytes 0.0 0 - 0.4 10e9/L    Absolute Nucleated RBC 0.0    Comprehensive metabolic panel   Result Value Ref Range    Sodium 139 133 - 144 mmol/L    Potassium 3.6 3.4 - 5.3 mmol/L    Chloride 103 94 - 109 mmol/L    Carbon Dioxide 28 20 - 32 mmol/L    Anion Gap 8 3 - 14 mmol/L    Glucose 107 (H) 70 - 99 mg/dL    Urea Nitrogen 10 7 - 30 mg/dL    Creatinine 0.44 (L) 0.52 - 1.04 mg/dL    GFR Estimate >90 >60 mL/min/1.7m2    GFR Estimate If Black >90 >60 mL/min/1.7m2    Calcium 8.8 8.5 - 10.1 mg/dL    Bilirubin Total 0.6 0.2 - 1.3 mg/dL    Albumin 3.1 (L) 3.4 - 5.0 g/dL    Protein Total 7.7 6.8 - 8.8 g/dL    Alkaline Phosphatase 65 40 - 150 U/L    ALT 17 0 - 50 U/L    AST 28 0 - 45 U/L   EKG 12 lead, complete - pediatric   Result Value Ref Range    Interpretation ECG Click View Image link to view waveform and result          Assessment:   Olimpia is a 23-year-old female with 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 Has tried oxycodone and dilaudid but no improvement in pain and is causing her to vomit. She is cachectic, tolerating Boost TID but no interest in food.     Plan:  1) Discussed with Olimpia signs of tumor progression.  Despite this, she is interested in continuing Pazopanib at this time.  2) She is experiencing cachexia secondary to progression of her disease.  Continue Boost TID, attempted to have dietician see Olimpia today but they were not available.  Will address nutrition further through home care.  3) Dr Martin saw Olimpia today and plans to start her on dexamethasone and methadone, appreciate her recommendations.  Baseline EKG shows QTc 428, nonspecific T wave abnormality.  4) Discussed follow up care moving forward, Olimpia would like to limit her vistis to clinic as it is difficult for her family and takes a lot of her energy.  Plan for palliative home care with Dr Martin as the point person.  Asked they start out with 3x weekly visits.  If pain does not improve with current regimen could consider imaging with the intent of pursuing palliative radiation for pain control.    5) Per her request, I will not schedule Olimpia back in clinic at this time.  However, she is aware that we are available to see her at anytime if needed.

## 2018-04-12 NOTE — ED AVS SNAPSHOT
[facility] Emergency Department    [address]    [facility] [address]    Phone:  [phone]    Fax:  [phone]                                       Olimpia Childress   MRN: [id number]    Department:  [facility] Emergency Department   Date of Visit:  4/12/2018           Patient Information     Date Of Birth          1995        Your diagnoses for this visit were:     Encounter for care related to vascular access port        You were seen by Todd Kraus DO.      Follow-up Information     Follow up with Coretta Yanez MD. Call in 2 days.    Specialty:  Pediatric Hematology/Oncology    Why:  As needed    Contact information:    [address]  [facility] [address] [phone]          Follow up with [facility] Emergency Department.    Specialty:  EMERGENCY MEDICINE    Why:  If symptoms worsen    Contact information:    [address]  [facility] [id number] [phone]        Discharge Instructions         Central Line (Central Venous Access Device)     The catheter may have more than 1 lumen (channel). This means that different fluids or medications can be given at the same time.   You need a central line as part of your treatment. It s also called a central venous access device (CVAD) or central venous catheter (CVC). A small, soft tube called a catheter is put in a vein that leads to your heart. When you no longer need the central line, it will be taken out. Your skin will then heal. This sheet describes types of central lines. It also explains how the central line is placed in your body.  What a central line does  A central line is often used instead of a standard IV (intravenous) line when you need treatment for longer than a week or so. The line can deliver medicine or nutrition right into your bloodstream. It can also be used to measure blood flow (hemodynamic monitoring), to draw blood, or for other reasons.  Ask your healthcare provider why you need the central line and which type you ll get.  Types of central lines  The central line will be placed into 1 of the veins as described below. Which vein is used depends on your needs and overall health. The catheter is threaded through the vein until the tip sits in the large vein near the heart (vena cava). Types of central lines include:    Peripherally inserted central catheter (PICC). This line is placed in a large vein in the upper arm, or near the bend of the elbow.    Subclavian line. This line is placed into the vein that runs behind the collarbone.    Internal jugular line. This line is placed into a large vein in the neck.    Femoral line. This line is placed in a large vein in the groin.  Placing the central line  The central line is placed in your body during a brief procedure. This may be done in your hospital room or an operating room. Your healthcare team can tell you what to expect. During central line placement:    You re fully covered with a large sterile sheet. Only the spot where the line will be placed is exposed. The skin is cleaned with antiseptic solution. These steps lower the risk for infection.    Medicine (local anesthetic) is injected near the vein. This numbs the skin so you don t feel pain during the procedure.    After the pain medicine takes effect, the catheter is gently passed into the vein. It s moved forward until the tip of the catheter is in the vena cava, close to the heart.    The other end of the catheter extends a few inches out from your skin. It may be loosely attached to the skin with stitches to hold it in place.    The healthcare provider flushes the catheter with saline solution to clear it. The solution may include heparin, which prevents blood clots.    An X-ray or other imaging test is done. This allows the provider to confirm the catheter s position and check for problems.  Risks and complications  As with any procedure,  having a central line placed has certain risks. These include:    Infection    Bleeding problems    An irregular heartbeat    Injury to the vein or to lymph ducts near the vein    Inflammation of the vein (phlebitis)    Air bubble in the blood (air embolism). An air embolism can travel through the blood vessels and block the flow of blood to the heart, lungs, brain, or other organs.    Blood clot (thrombus) that can block the flow of blood. A blood clot can also travel through the blood vessels and block the flow of blood to the heart, lungs (pulmonary embolism), brain, or other organs.    Collapsed lung (pneumothorax) or buildup of blood between the lungs and the chest wall (hemothorax)    Nerve injury    Accidental insertion into an artery instead of a vein    Catheter not positioned correctly  If you have any problems with your central line, talk to your healthcare provider.  Date Last Reviewed: 7/1/2016 2000-2017 [employer]. [address] [place] [address]. All rights reserved. This information is not intended as a substitute for professional medical care. Always follow your healthcare professional's instructions.          24 Hour Appointment Hotline       To make an appointment at any [facility], call [phone] ([phone]). If you don't have a family doctor or clinic, we will help you find one. [facility] clinics are conveniently located to serve the needs of you and your family.             Review of your medicines      Our records show that you are taking the medicines listed below. If these are incorrect, please call your family doctor or clinic.        Dose / Directions Last dose taken    dexamethasone 4 MG tablet   Commonly known as:  DECADRON   Dose:  4 mg   Quantity:  30 tablet        Take 1 tablet (4 mg) by mouth 2 times daily (with meals) for 6 doses   Refills:  0        Diclofenac Sodium 1 % Crea   Dose:  2 g   Quantity:  120 g        Place 2 g onto the skin 4  times daily as needed   Refills:  2        HYDROmorphone 2 MG tablet   Commonly known as:  DILAUDID   Dose:  1-2 mg   Quantity:  30 tablet        Take 0.5-1 tablets (1-2 mg) by mouth every 2 hours as needed for moderate to severe pain   Refills:  0        lidocaine 5 % Patch   Commonly known as:  LIDODERM   Dose:  1 patch   Quantity:  30 patch        Place 1 patch onto the skin every 24 hours Apply to shoulder   Refills:  3        LORazepam 1 MG tablet   Commonly known as:  ATIVAN   Dose:  1 mg   Quantity:  30 tablet        Take 1 tablet (1 mg) by mouth every 6 hours as needed for nausea   Refills:  2        megestrol 40 MG tablet   Commonly known as:  MEGACE   Dose:  200 mg   Quantity:  300 tablet        Take 5 tablets (200 mg) by mouth 2 times daily   Refills:  3        methadone 5 MG tablet   Commonly known as:  DOLOPHINE   Dose:  2.5 mg   Quantity:  90 tablet        Take 0.5 tablets (2.5 mg) by mouth every 8 hours   Refills:  0        OLANZapine 5 MG tablet   Commonly known as:  zyPREXA   Dose:  2.5 mg   Quantity:  30 tablet        Take 0.5 tablets (2.5 mg) by mouth 2 times daily   Refills:  1        ondansetron 8 MG ODT tab   Commonly known as:  ZOFRAN-ODT   Dose:  8 mg   Quantity:  120 tablet        Take 1 tablet (8 mg) by mouth every 6 hours   Refills:  1        pazopanib 200 MG tablet CHEMOTHERAPY   Commonly known as:  VOTRIENT   Dose:  800 mg   Quantity:  160 tablet        Take 4 tablets (800 mg) by mouth daily   Refills:  5        * polyethylene glycol Packet   Commonly known as:  MIRALAX/GLYCOLAX   Dose:  17 g   Quantity:  7 packet        Take 17 g by mouth daily as needed for constipation   Refills:  1        * polyethylene glycol powder   Commonly known as:  MIRALAX/GLYCOLAX   Dose:  1 capful   Quantity:  1 Bottle        Take 17 g (1 capful) by mouth daily   Refills:  11        ranitidine 150 MG tablet   Commonly known as:  ZANTAC   Dose:  150 mg   Quantity:  60 tablet        Take 1 tablet (150 mg) by  mouth 2 times daily   Refills:  3        senna-docusate 8.6-50 MG per tablet   Commonly known as:  SENOKOT-S;PERICOLACE   Dose:  2 tablet   Quantity:  30 tablet        Take 2 tablets by mouth 2 times daily as needed for constipation   Refills:  6        * Notice:  This list has 2 medication(s) that are the same as other medications prescribed for you. Read the directions carefully, and ask your doctor or other care provider to review them with you.            Orders Needing Specimen Collection     None      Pending Results     Date and Time Order Name Status Description    4/12/2018 1531 EKG 12 lead, complete - pediatric Preliminary             Pending Culture Results     No orders found from 4/10/2018 to 4/13/2018.            Pending Results Instructions     If you had any lab results that were not finalized at the time of your Discharge, you can call the ED Lab Result RN at [phone]. You will be contacted by this team for any positive Lab results or changes in treatment. The nurses are available 7 days a week from 10A to 6:30P.  You can leave a message 24 hours per day and they will return your call.        Test Results From Your Hospital Stay               Clinical Quality Measure: Blood Pressure Screening     Your blood pressure was checked while you were in the emergency department today. The last reading we obtained was  BP: 104/76 . Please read the guidelines below about what these numbers mean and what you should do about them.  If your systolic blood pressure (the top number) is less than 120 and your diastolic blood pressure (the bottom number) is less than 80, then your blood pressure is normal. There is nothing more that you need to do about it.  If your systolic blood pressure (the top number) is 120-139 or your diastolic blood pressure (the bottom number) is 80-89, your blood pressure may be higher than it should be. You should have your blood pressure rechecked within a year by a primary care  "provider.  If your systolic blood pressure (the top number) is 140 or greater or your diastolic blood pressure (the bottom number) is 90 or greater, you may have high blood pressure. High blood pressure is treatable, but if left untreated over time it can put you at risk for heart attack, stroke, or kidney failure. You should have your blood pressure rechecked by a primary care provider within the next 4 weeks.  If your provider in the emergency department today gave you specific instructions to follow-up with your doctor or provider even sooner than that, you should follow that instruction and not wait for up to 4 weeks for your follow-up visit.        Thank you for choosing [facility]       Thank you for choosing [facility] for your care. Our goal is always to provide you with excellent care. Hearing back from our patients is one way we can continue to improve our services. Please take a few minutes to complete the written survey that you may receive in the mail after you visit with us. Thank you!        Status[employer] Information     [employer] lets you send messages to your doctor, view your test results, renew your prescriptions, schedule appointments and more. To sign up, go to [email or web address] . Click on \"Log in\" on the left side of the screen, which will take you to the Welcome page. Then click on \"Sign up Now\" on the right side of the page.     You will be asked to enter the access code listed below, as well as some personal information. Please follow the directions to create your username and password.     Your access code is: [email or web address]  Expires: 2018  3:59 PM     Your access code will  in 90 days. If you need help or a new code, please call your [facility] clinic or [phone].        Care EveryWhere ID     This is your Care EveryWhere ID. This could be used by other organizations to access your [facility] medical records  [id number]        Equal Access to Services     RONIT ZAMBRANO: Rupa finley" naomi Cheek, jenni flores, eva xie, wilner singleton. So [facility] [phone].    ATENCIÓN: Si habla español, tiene a ordoñez disposición servicios gratuitos de asistencia lingüística. Llame al [phone].    We comply with applicable federal civil rights laws and [place] laws. We do not discriminate on the basis of race, color, national origin, age, disability, sex, sexual orientation, or gender identity.            After Visit Summary       This is your record. Keep this with you and show to your community pharmacist(s) and doctor(s) at your next visit.

## 2018-04-12 NOTE — LETTER
4/12/2018      RE: Olimpia Childress  [address]   [facility] [id number]         [facility]  Pain and Advanced/Complex Care Team (PACCT)  Outpatient Clinic Initial Visit Note     Date: 04/12/2018 YOB: 1995   Name: Olimpia Childress MRN: [id number]     Olimpia Childress is a 23 year old female who is presenting to the PACCT clinic today at the request of Coretta Garcia for a consultation secondary to persistent pain.       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 In 5-7 days will consider changing to BID dosing (once achieves steady state)  - Dexamethasone 4 mg BID (am and lunch) x 3 days for pain, appetite and energy, and will reassess  - Trial of diclofenac cream 4x/day to shoulder area  - Will consider methylphenidate in future if dexamethasone does not improve energy    GENERAL RECOMMENDATIONS:  - Homecare referral for home-based palliative care for 3x weekly visits. Olimpia is in bed/lying down most of the day due to fatigue and is homebound. Homecare can help with pain management  - Hospice referral when she stops pazopanib  - EKG today to check QTc since on pazopanib - 432 ms    Follow up as needed, but will aim to work mainly with homecare     Attestation:  TIME SPENT: 45 minutes, with more than 50% counseling and coordinating care regarding homecare, pain, treatment.  From a treatment perspective, I have spent a considerable amount of time today with the patient (and family) discussing past medical and personal history, as well as performing a physical examination and formulating a diagnostic impression.      History of Present Illness and Interim History     Olimpia  Fior 23 year old female is being seen in PACCT clinic today for new and progressive shoulder pain. She reports that it is localized to her left shoulder and does not radiate. She worried that it hurts her because she spends so much time lying down. She wants topical pain meds. She denies pain anywhere else in her body. She also c/o nausea/vomiting about 5-10 mins after taking pain meds (so does not take them). She has no appetite and is tired/fatigued all the time. She can only tolerate liquids (she drinks 3 cans of boost per day). The only meds she is taking now is her pazopanib and pain meds intermittently.      CHIEF COMPLAINT: No chief complaint on file.      Past Medical and Surgical History, Family and Social History     Past Medical History:   Diagnosis Date     Anemia 6/3/2016     Constipation      Extrarenal rhabdoid neoplasm (H)      Febrile neutropenia (H) 4/17/2016     History of blood transfusion      Latent tuberculosis      Lung disease      On antineoplastic chemotherapy      Sarcoma of vulva (H)      Past Surgical History:   Procedure Laterality Date     BIOPSY VULVA       BIOPSY VULVA Right 8/3/2016    Procedure: BIOPSY VULVA;  Surgeon: Sangita Pastrana MD;  [facility]     EXAM UNDER ANESTHESIA PELVIC N/A 11/16/2016    Procedure: EXAM UNDER ANESTHESIA PELVIC;  Surgeon: Sangita Pastrana MD;  [facility]     EXCISE MASS VULVA       EXCISE TUMOR PELVIS ANTERIOR  11/16/2016    Procedure: EXCISE TUMOR PELVIS ANTERIOR;  Surgeon: Pradeep Diop MD;  [facility]     INSERT CHEST TUBE Left 2/24/2016    Procedure: INSERT CHEST TUBE;  Surgeon: Dharmesh Uribe MD;  [facility]     INSERT PORT VASCULAR ACCESS       RECONSTRUCT VULVA WITH MUSCLE FLAP  11/16/2016    Procedure: RECONSTRUCT VULVA WITH MUSCLE FLAP;  Surgeon: Sidra Corona MD;  [facility]     THORACOSCOPIC BIOPSY LUNG       THORACOTOMY Left 7/20/2016    Procedure: THORACOTOMY;  Surgeon: Gabby  Byron Rodrigues MD;  [facility]     THORACOTOMY Left 3/8/2017    Procedure: THORACOTOMY;  Surgeon: Byron Pierre MD;  [facility]     VULVECTOMY RADICAL DISSECT GROIN(S) N/A 2016    Procedure: VULVECTOMY RADICAL DISSECT GROIN(S);  Surgeon: Sangita Pastrana MD;  [facility]     Family History   Problem Relation Age of Onset     Other Cancer No family hx of      Patient's Involvement with Prior History of Serious Illness in Family: Several family members back home in [facility] are  (they wished her the best with her cancer treatment, but she has outlived them. They  unexpectedly or suddenly got a disease)  Social History     Social History     Marital status:      Spouse name: N/A     Number of children: N/A     Years of education: N/A     Social History Main Topics     Smoking status: Never Smoker     Smokeless tobacco: Never Used     Alcohol use No     Drug use: No     Sexual activity: No     Other Topics Concern     Not on file     Social History Narrative     Living Situation: Lives with cousin and her cousin's daughter  Actual/Potential Caregiver(s): Cousin. Also has PCA hours  Support System: Extended family, friends  Occupation: Not working currently. Had been studying  Substance Use/History of misuse: None. Although often has difficulty taking medications as prescribed due to lack of understanding  Financial Concerns: Did not discuss today  Spiritual Background: [employer]  Spiritual Concerns/Needs: Feels spiritually at peace., Wants to feel better and live    Advance Care Planning:  Advance Directive: Not disuscsed    Medications and Allergies     Current Outpatient Prescriptions   Medication Sig Dispense Refill     methadone (DOLOPHINE) 5 MG tablet Take 0.5 tablets (2.5 mg) by mouth every 8 hours 90 tablet 0     dexamethasone (DECADRON) 4 MG tablet Take 1 tablet (4 mg) by mouth 2 times daily (with meals) for 6 doses 30 tablet 0     Diclofenac Sodium 1 % CREA  Place 2 g onto the skin 4 times daily as needed 120 g 2     senna-docusate (SENOKOT-S;PERICOLACE) 8.6-50 MG per tablet Take 2 tablets by mouth 2 times daily as needed for constipation 30 tablet 6     polyethylene glycol (MIRALAX/GLYCOLAX) powder Take 17 g (1 capful) by mouth daily 1 Bottle 11     lidocaine (LIDODERM) 5 % Patch Place 1 patch onto the skin every 24 hours Apply to shoulder 30 patch 3     megestrol (MEGACE) 40 MG tablet Take 5 tablets (200 mg) by mouth 2 times daily 300 tablet 3     ranitidine (ZANTAC) 150 MG tablet Take 1 tablet (150 mg) by mouth 2 times daily 60 tablet 3     OLANZapine (ZYPREXA) 5 MG tablet Take 0.5 tablets (2.5 mg) by mouth 2 times daily 30 tablet 1     LORazepam (ATIVAN) 1 MG tablet Take 1 tablet (1 mg) by mouth every 6 hours as needed for nausea 30 tablet 2     pazopanib (VOTRIENT) 200 MG tablet CHEMOTHERAPY Take 4 tablets (800 mg) by mouth daily 160 tablet 5     HYDROmorphone (DILAUDID) 2 MG tablet Take 0.5-1 tablets (1-2 mg) by mouth every 2 hours as needed for moderate to severe pain 30 tablet 0     ondansetron (ZOFRAN-ODT) 8 MG ODT tab Take 1 tablet (8 mg) by mouth every 6 hours 120 tablet 1     polyethylene glycol (MIRALAX/GLYCOLAX) Packet Take 17 g by mouth daily as needed for constipation 7 packet 1       Allergies   Allergen Reactions     Heparin Flush Other (See Comments)     Due to Catholic reasons     Pork Derived Products      Religion reasons     Tegaderm Transparent Dressing (Informational Only) Other (See Comments) and Rash     PLEASE USE IV 3000 FOR DRESSING(S)       Review of Systems     ROS: 10 point ROS neg other than the symptoms noted above in the HPI     Physical Examination     There were no vitals taken for this visit.  Lying on exam table.  Appears tired. Cachectic.  Respires comfortably on RA  Abdomen soft. Non-tender  Palpable mass overlying left trapezius. No arm swelling. Unable to test ROM due to pain and fatigue    Key Data Reviewed      LABS/IMAGING: None from today., Might consider if medication not helpful for should to consider invasive pain management or radiation for pain management.      Thank you for the opportunity to participate in Jovimoriah Fior's care.    Amita Martin MD  PACCT CLINIC  4/12/2018

## 2018-04-13 NOTE — ED NOTES
Pt's port was accessed at 1530 today in clinic for a blood draw.  Pt states she and her family member were in a hurry to get her niece picked up from school and they accidentally left without having the port de accessed.  Pt denies any other complaints and denies need for a medical screening by a physician.  Sodium citrate flush ordered from pharmacy because pt is allergic to heparin, port will be de accessed by this RN.

## 2018-04-13 NOTE — PROGRESS NOTES
[facility]  Pain and Advanced/Complex Care Team (PACCT)  Outpatient Clinic Initial Visit Note     Date: 04/12/2018 YOB: 1995   Name: Olimpia Childress MRN: [id number]     Olimpia Childress is a 23 year old female who is presenting to the PACCT clinic today at the request of Dr. Yanez, Coretta Chambers for a consultation secondary to persistent pain.       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 In 5-7 days will consider changing to BID dosing (once achieves steady state)  - Dexamethasone 4 mg BID (am and lunch) x 3 days for pain, appetite and energy, and will reassess  - Trial of diclofenac cream 4x/day to shoulder area  - Will consider methylphenidate in future if dexamethasone does not improve energy    GENERAL RECOMMENDATIONS:  - Homecare referral for home-based palliative care for 3x weekly visits. Olimpia is in bed/lying down most of the day due to fatigue and is homebound. Homecare can help with pain management  - Hospice referral when she stops pazopanib  - EKG today to check QTc since on pazopanib - 432 ms    Follow up as needed, but will aim to work mainly with homecare     Attestation:  TIME SPENT: 45 minutes face-to-face, with more than 50% counseling and coordinating care regarding homecare, pain, treatment.  From a treatment perspective, I have spent a considerable amount of time today with the patient (and family) discussing past medical and personal history, as well as performing a physical examination and formulating a diagnostic impression.      History of Present Illness and Interim History     Olimpia Childress 23 year old female is being seen in PACCT clinic today for new and  progressive shoulder pain. She reports that it is localized to her left shoulder and does not radiate. She worried that it hurts her because she spends so much time lying down. She wants topical pain meds. She denies pain anywhere else in her body. She also c/o nausea/vomiting about 5-10 mins after taking pain meds (so does not take them). She has no appetite and is tired/fatigued all the time. She can only tolerate liquids (she drinks 3 cans of boost per day). The only meds she is taking now is her pazopanib and pain meds intermittently.      CHIEF COMPLAINT: No chief complaint on file.      Past Medical and Surgical History, Family and Social History     Past Medical History:   Diagnosis Date     Anemia 6/3/2016     Constipation      Extrarenal rhabdoid neoplasm (H)      Febrile neutropenia (H) 4/17/2016     History of blood transfusion      Latent tuberculosis      Lung disease      On antineoplastic chemotherapy      Sarcoma of vulva (H)      Past Surgical History:   Procedure Laterality Date     BIOPSY VULVA       BIOPSY VULVA Right 8/3/2016    Procedure: BIOPSY VULVA;  Surgeon: Sangita Pastrana MD;  [facility]     EXAM UNDER ANESTHESIA PELVIC N/A 11/16/2016    Procedure: EXAM UNDER ANESTHESIA PELVIC;  Surgeon: Sangita Pastrana MD;  [facility]     EXCISE MASS VULVA       EXCISE TUMOR PELVIS ANTERIOR  11/16/2016    Procedure: EXCISE TUMOR PELVIS ANTERIOR;  Surgeon: Pradeep Diop MD;  [facility]     INSERT CHEST TUBE Left 2/24/2016    Procedure: INSERT CHEST TUBE;  Surgeon: Dharmesh Uribe MD;  [facility]     INSERT PORT VASCULAR ACCESS       RECONSTRUCT VULVA WITH MUSCLE FLAP  11/16/2016    Procedure: RECONSTRUCT VULVA WITH MUSCLE FLAP;  Surgeon: Sidra Corona MD;  [facility]     THORACOSCOPIC BIOPSY LUNG       THORACOTOMY Left 7/20/2016    Procedure: THORACOTOMY;  Surgeon: Byron Pierre MD;  [facility]     THORACOTOMY Left 3/8/2017     Procedure: THORACOTOMY;  Surgeon: Byron Pierre MD;  [facility]     VULVECTOMY RADICAL DISSECT GROIN(S) N/A 2016    Procedure: VULVECTOMY RADICAL DISSECT GROIN(S);  Surgeon: Sangita Pastrana MD;  [facility]     Family History   Problem Relation Age of Onset     Other Cancer No family hx of      Patient's Involvement with Prior History of Serious Illness in Family: Several family members back home in [facility] are  (they wished her the best with her cancer treatment, but she has outlived them. They  unexpectedly or suddenly got a disease)  Social History     Social History     Marital status:      Spouse name: N/A     Number of children: N/A     Years of education: N/A     Social History Main Topics     Smoking status: Never Smoker     Smokeless tobacco: Never Used     Alcohol use No     Drug use: No     Sexual activity: No     Other Topics Concern     Not on file     Social History Narrative     Living Situation: Lives with cousin and her cousin's daughter  Actual/Potential Caregiver(s): Cousin. Also has PCA hours  Support System: Extended family, friends  Occupation: Not working currently. Had been studying  Substance Use/History of misuse: None. Although often has difficulty taking medications as prescribed due to lack of understanding  Financial Concerns: Did not discuss today  Spiritual Background: [employer]  Spiritual Concerns/Needs: Feels spiritually at peace., Wants to feel better and live    Advance Care Planning:  Advance Directive: Not disuscsed    Medications and Allergies     Current Outpatient Prescriptions   Medication Sig Dispense Refill     methadone (DOLOPHINE) 5 MG tablet Take 0.5 tablets (2.5 mg) by mouth every 8 hours 90 tablet 0     dexamethasone (DECADRON) 4 MG tablet Take 1 tablet (4 mg) by mouth 2 times daily (with meals) for 6 doses 30 tablet 0     Diclofenac Sodium 1 % CREA Place 2 g onto the skin 4 times daily as needed 120 g 2      senna-docusate (SENOKOT-S;PERICOLACE) 8.6-50 MG per tablet Take 2 tablets by mouth 2 times daily as needed for constipation 30 tablet 6     polyethylene glycol (MIRALAX/GLYCOLAX) powder Take 17 g (1 capful) by mouth daily 1 Bottle 11     lidocaine (LIDODERM) 5 % Patch Place 1 patch onto the skin every 24 hours Apply to shoulder 30 patch 3     megestrol (MEGACE) 40 MG tablet Take 5 tablets (200 mg) by mouth 2 times daily 300 tablet 3     ranitidine (ZANTAC) 150 MG tablet Take 1 tablet (150 mg) by mouth 2 times daily 60 tablet 3     OLANZapine (ZYPREXA) 5 MG tablet Take 0.5 tablets (2.5 mg) by mouth 2 times daily 30 tablet 1     LORazepam (ATIVAN) 1 MG tablet Take 1 tablet (1 mg) by mouth every 6 hours as needed for nausea 30 tablet 2     pazopanib (VOTRIENT) 200 MG tablet CHEMOTHERAPY Take 4 tablets (800 mg) by mouth daily 160 tablet 5     HYDROmorphone (DILAUDID) 2 MG tablet Take 0.5-1 tablets (1-2 mg) by mouth every 2 hours as needed for moderate to severe pain 30 tablet 0     ondansetron (ZOFRAN-ODT) 8 MG ODT tab Take 1 tablet (8 mg) by mouth every 6 hours 120 tablet 1     polyethylene glycol (MIRALAX/GLYCOLAX) Packet Take 17 g by mouth daily as needed for constipation 7 packet 1       Allergies   Allergen Reactions     Heparin Flush Other (See Comments)     Due to Worship reasons     Pork Derived Products      Oriental orthodox reasons     Tegaderm Transparent Dressing (Informational Only) Other (See Comments) and Rash     PLEASE USE IV 3000 FOR DRESSING(S)       Review of Systems     ROS: 10 point ROS neg other than the symptoms noted above in the HPI     Physical Examination     There were no vitals taken for this visit.  Lying on exam table.  Appears tired. Cachectic.  Respires comfortably on RA  Abdomen soft. Non-tender  Palpable mass overlying left trapezius. No arm swelling. Unable to test ROM due to pain and fatigue    Key Data Reviewed     LABS/IMAGING: None from today., Might consider if medication not helpful  for should to consider invasive pain management or radiation for pain management.      Thank you for the opportunity to participate in Olimpia Childress's care.    Amita Martin MD  PACCT CLINIC  4/12/2018

## 2018-04-13 NOTE — ED PROVIDER NOTES
This 23 year old female patient presents to the ED requesting a mediport needle removal. She otherwise is in no distress. She declined a medical screening examination. Her port was de-accessed and she was then discharged.     Todd Kraus,   04/12/18 [id number]

## 2018-04-13 NOTE — DISCHARGE INSTRUCTIONS
Central Line (Central Venous Access Device)     The catheter may have more than 1 lumen (channel). This means that different fluids or medications can be given at the same time.   You need a central line as part of your treatment. It s also called a central venous access device (CVAD) or central venous catheter (CVC). A small, soft tube called a catheter is put in a vein that leads to your heart. When you no longer need the central line, it will be taken out. Your skin will then heal. This sheet describes types of central lines. It also explains how the central line is placed in your body.  What a central line does  A central line is often used instead of a standard IV (intravenous) line when you need treatment for longer than a week or so. The line can deliver medicine or nutrition right into your bloodstream. It can also be used to measure blood flow (hemodynamic monitoring), to draw blood, or for other reasons. Ask your healthcare provider why you need the central line and which type you ll get.  Types of central lines  The central line will be placed into 1 of the veins as described below. Which vein is used depends on your needs and overall health. The catheter is threaded through the vein until the tip sits in the large vein near the heart (vena cava). Types of central lines include:    Peripherally inserted central catheter (PICC). This line is placed in a large vein in the upper arm, or near the bend of the elbow.    Subclavian line. This line is placed into the vein that runs behind the collarbone.    Internal jugular line. This line is placed into a large vein in the neck.    Femoral line. This line is placed in a large vein in the groin.  Placing the central line  The central line is placed in your body during a brief procedure. This may be done in your hospital room or an operating room. Your healthcare team can tell you what to expect. During central line placement:    You re fully covered with a  large sterile sheet. Only the spot where the line will be placed is exposed. The skin is cleaned with antiseptic solution. These steps lower the risk for infection.    Medicine (local anesthetic) is injected near the vein. This numbs the skin so you don t feel pain during the procedure.    After the pain medicine takes effect, the catheter is gently passed into the vein. It s moved forward until the tip of the catheter is in the vena cava, close to the heart.    The other end of the catheter extends a few inches out from your skin. It may be loosely attached to the skin with stitches to hold it in place.    The healthcare provider flushes the catheter with saline solution to clear it. The solution may include heparin, which prevents blood clots.    An X-ray or other imaging test is done. This allows the provider to confirm the catheter s position and check for problems.  Risks and complications  As with any procedure, having a central line placed has certain risks. These include:    Infection    Bleeding problems    An irregular heartbeat    Injury to the vein or to lymph ducts near the vein    Inflammation of the vein (phlebitis)    Air bubble in the blood (air embolism). An air embolism can travel through the blood vessels and block the flow of blood to the heart, lungs, brain, or other organs.    Blood clot (thrombus) that can block the flow of blood. A blood clot can also travel through the blood vessels and block the flow of blood to the heart, lungs (pulmonary embolism), brain, or other organs.    Collapsed lung (pneumothorax) or buildup of blood between the lungs and the chest wall (hemothorax)    Nerve injury    Accidental insertion into an artery instead of a vein    Catheter not positioned correctly  If you have any problems with your central line, talk to your healthcare provider.  Date Last Reviewed: 7/1/2016 2000-2017 [employer]. [address] [place] [address], [place] [address]. All rights  reserved. This information is not intended as a substitute for professional medical care. Always follow your healthcare professional's instructions.

## 2018-04-14 NOTE — PROGRESS NOTES
Therapy: DEXAMETHASONE   Insurance: JOE ZIEGLER  Ded: $3.15 monthly  Met: $    Co-Insurance:   Max Out of Pocket: $  Met: $    Please contact Intake with any questions, [phone] or In Basket pool,  [facility] ([id number]).  In reference to [facility] RN referral to check DEXAMETHASONE coverage.

## 2018-04-16 NOTE — PROGRESS NOTES
This is a recent snapshot of the patient's [facility] medical record.  For current drug dose and complete information and questions, call [phone] or In Basket pool, [facility] ([id number])  CSN Number:  [id number]

## 2018-04-17 NOTE — PROGRESS NOTES
This is a recent snapshot of the patient's [facility] medical record.  For current drug dose and complete information and questions, call [phone] or In Basket pool, [facility] ([id number])  CSN Number:  [id number]

## 2018-04-25 NOTE — TELEPHONE ENCOUNTER
Roxanne from  [facility] called and asked for a refill of pt's ranitidine prescription to be sent to the [facility] in [place]. Spoke with Sangita Alejandro NP, who refilled the medication. Left VM for Roxanne.

## 2018-04-27 NOTE — LETTER
4/27/2018      RE: Olimpia Childress  [address]   [facility] [id number]     Pediatric Hematology/Oncology Clinic Note    History- Olimpia initially presented with a growth on her right labia in 2013 when she was living in [place]. 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 She discontinued further treatment b/c her physicians were unsure of the exact diagnosis and she felt uncomfortable proceeding. Olimpia subsequently immigrated to the  in August of 2015 and in October she first noticed a recurrence of the mass in the area of previous excision. She was in [place] at that time, seen by oncology and had a port placed but then moved to [place] where she was seen by Dr. Mckeon at [facility] in November. There she underwent an MRI that showed a solid and cystic subcutaneous mass in the right labia measuring 1.7 x 1.6 x 1cm with question of nodular extension vs a second nodule measuring 0.7 cm. There was no invasion into the vagina. On November 16th 2015, Olimpia had a biopsy of the mass by a gynecologist, Dr. Terry, at [facility]. Cytology was reviewed at [facility] and read as a SMARCB1-deficient genital sarcoma, likely falling within the overall spectrum of malignant rhabdoid tumor. By immunohistochemistry, the cells shows a complete loss of SMARCb1. Wide spectrum cytokeratin, CD34, WT1, Desmin and CD99 were all negative. RT PCR for Wooten Sarcoma associated fusion transcripts were negative as well. Olimpia went on to have a PET/CT as well which showed multiple pulmonary lesions and biopsy confirmed a lesion to be metastatic disease. Olimpia was seen by Tomás White at [place] who reviewed the diagnosis and potential treatment plans with her, however she prefered to be treated here at [facility]. 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    Olimpia underwent left sided thoracotomy and resection of two pulmonary nodules on 7/20/16. Pathology revealed that one lesion was benign lymphoid tissue and the other was consistent with synovial sarcoma.  This was confirmed with FISH  With 91% of cell examined having a signal pattern indictivate of a rearrangement of the SS18 locus.  Olimpia has now completed 3 cycles of chemotherapy per [id number] and started her radiation on 9/13/16 and completed on 10/17/16.  She then went on to have a resection of her labial mass on 11/16/16 by Jannet Pastrana and Chikis with reconstruction, including skin flaps by Dr. Corona from plastics.  Pathology showed no residual tumor.  She had a f/u chest CT today on 12/5/16 that showed persistance of her pulmonary nodules as well as few additonal pulmonary nodules.  She saw Dr. Pierre who was in agreement with surgical resection, however Olimpia wanted to wait until March to have more time to recover from her last surgery. She underwent left sided thoracotomy on 3/8/17.    In May, Olimpia's follow up PET/CT demonstrated significant progression of her pulmonary metastatic disease, with an anterior mediastinal mass compression her right ventricle and potentially right outflow tract.  Subsequent echocardiogram did not show altered cardiac function, Dr Man felt radiation therapy was not in her best interest at this time but would consider if she developed cardiac dysfunction.  Olimpia was started on oral Pazopanib May 2017 for palliative therapy, with doses held due to palmar plantar dysesthesia and stomatitis, and restarted 50% dosing on 6/19/17.  Olimpia has imaging in August 2017 that showed a positive response to therapy.  However on most recent imaging she was found to have progression, she has since completed palliative radiation.    Interval History:  Olimpia comes to  "clinic today with her cousin and an .  I was contacted by her home care nurse earlier today, Olimpia has new onset hemoptysis.  Olimpia reports this started earlier this week.  She is coughing up some clots (half dollar at the largest) occasionally with some fresh blood as well.  She has a hard time estimating how many times per day but has been 4 times today thus far.  She denies any SOB, orthopnea or increased WOB. No fever. Her appetite has been better overall, no recent vomiting.  Her pain is much improved, she notes \"full body\" pain but it goes away quickly after taking her methadone.  She doesn't feel that she needs to change her pain regimen.  She is interested in medication to help with her cough.  Last bowel movement was yesterday, taking miralax.  She has been enjoying the nice weather, went on a mile long walk with a family member and enjoyed that.  Olimpia and Shi feel that their home care support is good.     ROS  See HPI. Complete ROS otherwise negative.      Allergies as of 04/27/2018 - [facility] as Reviewed 04/27/2018   Allergen Reaction Noted     Heparin flush Other (See Comments) 01/27/2016     Pork derived products  02/09/2016     Tegaderm transparent dressing (informational only) Other (See Comments) and Rash 04/20/2016     Medications:  Current Outpatient Prescriptions   Medication Sig Dispense Refill     OLANZapine (ZYPREXA) 5 MG tablet Take 0.5 tablets (2.5 mg) by mouth 2 times daily 30 tablet 1     dexamethasone (DECADRON) 4 MG tablet Take 1 tablet (4 mg) by mouth 2 times daily (with meals) for 6 doses 30 tablet 0     Diclofenac Sodium 1 % CREA Place 2 g onto the skin 4 times daily as needed 120 g 2     HYDROmorphone (DILAUDID) 2 MG tablet Take 0.5-1 tablets (1-2 mg) by mouth every 2 hours as needed for moderate to severe pain 30 tablet 0     lidocaine (LIDODERM) 5 % Patch Place 1 patch onto the skin every 24 hours Apply to shoulder 30 patch 3     LORazepam (ATIVAN) 1 MG tablet Take 1 tablet " (1 mg) by mouth every 6 hours as needed for nausea 30 tablet 2     megestrol (MEGACE) 40 MG tablet Take 5 tablets (200 mg) by mouth 2 times daily 300 tablet 3     methadone (DOLOPHINE) 5 MG tablet Take 0.5 tablets (2.5 mg) by mouth every 8 hours 90 tablet 0     ondansetron (ZOFRAN-ODT) 8 MG ODT tab Take 1 tablet (8 mg) by mouth every 6 hours 120 tablet 1     pazopanib (VOTRIENT) 200 MG tablet CHEMOTHERAPY Take 4 tablets (800 mg) by mouth daily 160 tablet 5     polyethylene glycol (MIRALAX/GLYCOLAX) Packet Take 17 g by mouth daily as needed for constipation 7 packet 1     polyethylene glycol (MIRALAX/GLYCOLAX) powder Take 17 g (1 capful) by mouth daily 1 Bottle 11     ranitidine (ZANTAC) 150 MG tablet Take 1 tablet (150 mg) by mouth 2 times daily 60 tablet 3     senna-docusate (SENOKOT-S;PERICOLACE) 8.6-50 MG per tablet Take 2 tablets by mouth 2 times daily as needed for constipation 30 tablet 6     [DISCONTINUED] OLANZapine (ZYPREXA) 5 MG tablet Take 0.5 tablets (2.5 mg) by mouth 2 times daily 30 tablet 1   Is taking pazopanib, zyprexa, miralax and methadone regularly    Past Medical History:   Diagnosis Date     Anemia 6/3/2016     Constipation      Extrarenal rhabdoid neoplasm (H)      Febrile neutropenia (H) 4/17/2016     History of blood transfusion      Latent tuberculosis      Lung disease      On antineoplastic chemotherapy      Sarcoma of vulva (H)      Social History:  Olimpia lives with her cousin.    Family History   Problem Relation Age of Onset     Other Cancer No family hx of        Physical Exam   Temp:  [97.8  F (36.6  C)] 97.8  F (36.6  C)  Pulse:  [100] 100  Resp:  [24] 24  BP: (117)/(89) 117/89  SpO2:  [92 %] 92 %  Wt Readings from Last 4 Encounters:   04/27/18 56.8 kg (125 lb 3.5 oz)   04/12/18 54.4 kg (119 lb 14.9 oz)   03/29/18 55.2 kg (121 lb 11.1 oz)   03/06/18 55.4 kg (122 lb 2.2 oz)     GENERAL: Alert, cooperative, NAD, appears tired, less cachectic than previous visit.  SKIN: warm, dry,  intact. No rashes or other skin lesions.  HEAD: Normocephalic, atraumatic.     EYES: Sclera clear, slightly anicteric. PERRLA, EOMI.   EARS: External ears wnl bilaterally, no drainage.   NOSE: Nares patent, mucosa pink and moist, no nasal drainage noted.  No facial pain.  MOUTH/THROAT: Oropharynx is clear. Tongue without sores. Normal dentition for age, no mucosal lesions.  NECK: Supple, full range of motion.  LYMPH NODES: No cervical, supraclavicular or axillary lymphadenopathy.  MSK: she has a palpable mass overlying her left trapezius, is firm, not mobile.  Edges are not discrete.  LUNGS: No increased WOB at rest.  Lungs with diminished sounds through out, more profound on the left.  Frequent wet cough with some hemoptysis in clinic.  HEART: HRR. S1 and S2 are normal. No murmurs, rubs, gallops.  Cap refill <2 sec  ABDOMEN: Normoactive bowel sounds. Nontender, nondistended.  NEUROLOGIC: Grossly intact, no focal neurologic deficits.    :  Deferred today.    Imaging:  Recent Results (from the past 24 hour(s))   CT Chest w/o contrast    Narrative    EXAMINATION: Chest CT  4/27/2018     CLINICAL HISTORY: Metastatic sarcoma, new hemoptysis.    COMPARISON: CT 1/25/2018. Radiation planning CT 2/16/2018.    TECHNIQUE: CT imaging obtained through the chest without intravenous  contrast. Coronal and axial MIP reformatted images obtained.    FINDINGS:    Supportive devices: Left chest wall Port-A-Cath with the tip  terminating in the right atrium.    Chest: Significant increase of the metastatic burden within the chest  in comparison to prior exams. The masses within the left lung apex  have coalesced and measure up to 7.2 cm in maximal diameter, just  adjacent to this mass is an area of groundglass opacities. Interval  enlargement of the left lower lobe mass measuring up to 4.5 cm in  maximal thickness bulging of the fissure. There are surrounding  groundglass opacities which may represent hemorrhage in the setting  of  hemoptysis. Interval increased size of the right upper lobe mass  measuring approximately 2.4 x 2.7 cm. This show of enlarging mass in  the right upper lobe adjacent to the right mediastinum measures  approximately 2.4 cm in thickness. Additionally, there are multiple  new and enlarging nodules seen bilaterally. Large right loculated  pleural effusion extending up into the minor fissure. Small left  pleural effusion. Small left pneumothorax. Pneumomediastinum.    Heart size is normal. Small pericardial effusion. The ascending aorta  and main pulmonary artery diameters are within normal limits. The  partially visualized thyroid gland is unremarkable. Central  tracheobronchial tree is patent.     Bones and soft tissues: No suspicious bone findings.     Partially imaged upper abdomen: The spleen and pancreas are  unremarkable on this unenhanced exam. No abnormality of the visualized  liver and kidneys. Lesions in the left lateral abdominal and chest  wall along the lower ribs have decreased since 2/16/2018. There is  decreased displacement of the left-sided abdominal organs.      Impression    IMPRESSION:  1. Significant increase in the metastatic disease burden within the  chest with multiple enlarging pulmonary masses. The mass located in  the left lower lung has surrounding groundglass opacities which may  represent hemorrhage in the setting of hemoptysis.  2. Increased large left loculated right pleural effusion with smaller  left pleural effusion.  3. Small left pneumothorax. Pneumomediastinum.    I have personally reviewed the examination and initial interpretation  and I agree with the findings.    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  Sadly, she has massive progression noted on her chest CT.  Fortunately she is not having any dyspnea and remains  comfortable outside her cough.  She continues on methadone with good pain control.  Appetite has improved.  Managing constipation with miralax.    Plan:  1) Discussed imaging results with Olimpia and her cousin.  Explained that at this time I don't believe draining fluid or pursuing palliative radiation would be in her best interest given her widespread and rapid progression.  Olimpia expressed understanding.  They asked good questions about support in the home.  Suggested transition to hospice care.  She has a supply of pazopanib at home, discussed with her that I don't believe the pazopanib is providing benefit given her progression.  She stated she would likely continue on it for now but will not plan to refill.  Spoke with Dr Martin who is in full support of her transition to hospice, will make the referral.  2) Will work with hospice to start Olimpia on tessalon perles for her cough.  3) Continue methadone at current dose, hospice will manage moving forward.  4)  Will not schedule back in clinic at this time but will continue to follow Olimpia's course through hospice.    Addendum: Spoke with Roxanne (Home Care RN through [facility]), she took a verbal order for referral to hospice as well as tessalon perles 100mg TID PRN cough.    Omar Trammell, JULIO C CNP

## 2018-04-27 NOTE — NURSING NOTE
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

## 2018-04-27 NOTE — PROGRESS NOTES
Pediatric Hematology/Oncology Clinic Note    History- Olimpia initially presented with a growth on her right labia in 2013 when she was living in [place]. 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 She discontinued further treatment b/c her physicians were unsure of the exact diagnosis and she felt uncomfortable proceeding. Olimpia subsequently immigrated to the  in August of 2015 and in October she first noticed a recurrence of the mass in the area of previous excision. She was in [place] at that time, seen by oncology and had a port placed but then moved to [place] where she was seen by Dr. Mckeon at [facility] in November. There she underwent an MRI that showed a solid and cystic subcutaneous mass in the right labia measuring 1.7 x 1.6 x 1cm with question of nodular extension vs a second nodule measuring 0.7 cm. There was no invasion into the vagina. On November 16th 2015, Olimpia had a biopsy of the mass by a gynecologist, Dr. Terry, at [facility]. Cytology was reviewed at [place] and read as a SMARCB1-deficient genital sarcoma, likely falling within the overall spectrum of malignant rhabdoid tumor. By immunohistochemistry, the cells shows a complete loss of SMARCb1. Wide spectrum cytokeratin, CD34, WT1, Desmin and CD99 were all negative. RT PCR for Wooten Sarcoma associated fusion transcripts were negative as well. Olimpia went on to have a PET/CT as well which showed multiple pulmonary lesions and biopsy confirmed a lesion to be metastatic disease. Olimpia was seen by Tomás White at [facility] who reviewed the diagnosis and potential treatment plans with her, however she prefered to be treated here at [facility]. 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    Olimpia underwent left sided thoracotomy and resection of two pulmonary nodules on 7/20/16. Pathology revealed that one lesion was benign lymphoid tissue and the other was consistent with synovial sarcoma.  This was confirmed with FISH  With 91% of cell examined having a signal pattern indictivate of a rearrangement of the SS18 locus.  Olimpia has now completed 3 cycles of chemotherapy per [id number] and started her radiation on 9/13/16 and completed on 10/17/16.  She then went on to have a resection of her labial mass on 11/16/16 by Jannet Pastrana and Chikis with reconstruction, including skin flaps by Dr. Corona from plastics.  Pathology showed no residual tumor.  She had a f/u chest CT today on 12/5/16 that showed persistance of her pulmonary nodules as well as few additonal pulmonary nodules.  She saw Dr. Pierre who was in agreement with surgical resection, however Olimpia wanted to wait until March to have more time to recover from her last surgery. She underwent left sided thoracotomy on 3/8/17.    In May, Olimpia's follow up PET/CT demonstrated significant progression of her pulmonary metastatic disease, with an anterior mediastinal mass compression her right ventricle and potentially right outflow tract.  Subsequent echocardiogram did not show altered cardiac function, Dr Man felt radiation therapy was not in her best interest at this time but would consider if she developed cardiac dysfunction.  Olimpia was started on oral Pazopanib May 2017 for palliative therapy, with doses held due to palmar plantar dysesthesia and stomatitis, and restarted 50% dosing on 6/19/17.  Olimpia has imaging in August 2017 that showed a positive response to therapy.  However on most recent imaging she was found to have progression, she has since completed palliative radiation.    Interval History:  Olimpia comes to clinic today with her cousin and an .  I was contacted by her home care nurse  "earlier today, Olimpia has new onset hemoptysis.  Olimpia reports this started earlier this week.  She is coughing up some clots (half dollar at the largest) occasionally with some fresh blood as well.  She has a hard time estimating how many times per day but has been 4 times today thus far.  She denies any SOB, orthopnea or increased WOB. No fever. Her appetite has been better overall, no recent vomiting.  Her pain is much improved, she notes \"full body\" pain but it goes away quickly after taking her methadone.  She doesn't feel that she needs to change her pain regimen.  She is interested in medication to help with her cough.  Last bowel movement was yesterday, taking miralax.  She has been enjoying the nice weather, went on a mile long walk with a family member and enjoyed that.  Olimpia and Shi feel that their home care support is good.     ROS  See HPI. Complete ROS otherwise negative.      Allergies as of 04/27/2018 - [facility] as Reviewed 04/27/2018   Allergen Reaction Noted     Heparin flush Other (See Comments) 01/27/2016     Pork derived products  02/09/2016     Tegaderm transparent dressing (informational only) Other (See Comments) and Rash 04/20/2016     Medications:  Current Outpatient Prescriptions   Medication Sig Dispense Refill     OLANZapine (ZYPREXA) 5 MG tablet Take 0.5 tablets (2.5 mg) by mouth 2 times daily 30 tablet 1     dexamethasone (DECADRON) 4 MG tablet Take 1 tablet (4 mg) by mouth 2 times daily (with meals) for 6 doses 30 tablet 0     Diclofenac Sodium 1 % CREA Place 2 g onto the skin 4 times daily as needed 120 g 2     HYDROmorphone (DILAUDID) 2 MG tablet Take 0.5-1 tablets (1-2 mg) by mouth every 2 hours as needed for moderate to severe pain 30 tablet 0     lidocaine (LIDODERM) 5 % Patch Place 1 patch onto the skin every 24 hours Apply to shoulder 30 patch 3     LORazepam (ATIVAN) 1 MG tablet Take 1 tablet (1 mg) by mouth every 6 hours as needed for nausea 30 tablet 2     megestrol (MEGACE) 40 " MG tablet Take 5 tablets (200 mg) by mouth 2 times daily 300 tablet 3     methadone (DOLOPHINE) 5 MG tablet Take 0.5 tablets (2.5 mg) by mouth every 8 hours 90 tablet 0     ondansetron (ZOFRAN-ODT) 8 MG ODT tab Take 1 tablet (8 mg) by mouth every 6 hours 120 tablet 1     pazopanib (VOTRIENT) 200 MG tablet CHEMOTHERAPY Take 4 tablets (800 mg) by mouth daily 160 tablet 5     polyethylene glycol (MIRALAX/GLYCOLAX) Packet Take 17 g by mouth daily as needed for constipation 7 packet 1     polyethylene glycol (MIRALAX/GLYCOLAX) powder Take 17 g (1 capful) by mouth daily 1 Bottle 11     ranitidine (ZANTAC) 150 MG tablet Take 1 tablet (150 mg) by mouth 2 times daily 60 tablet 3     senna-docusate (SENOKOT-S;PERICOLACE) 8.6-50 MG per tablet Take 2 tablets by mouth 2 times daily as needed for constipation 30 tablet 6     [DISCONTINUED] OLANZapine (ZYPREXA) 5 MG tablet Take 0.5 tablets (2.5 mg) by mouth 2 times daily 30 tablet 1   Is taking pazopanib, zyprexa, miralax and methadone regularly    Past Medical History:   Diagnosis Date     Anemia 6/3/2016     Constipation      Extrarenal rhabdoid neoplasm (H)      Febrile neutropenia (H) 4/17/2016     History of blood transfusion      Latent tuberculosis      Lung disease      On antineoplastic chemotherapy      Sarcoma of vulva (H)      Social History:  Olimpia lives with her cousin.    Family History   Problem Relation Age of Onset     Other Cancer No family hx of        Physical Exam   Temp:  [97.8  F (36.6  C)] 97.8  F (36.6  C)  Pulse:  [100] 100  Resp:  [24] 24  BP: (117)/(89) 117/89  SpO2:  [92 %] 92 %  Wt Readings from Last 4 Encounters:   04/27/18 56.8 kg (125 lb 3.5 oz)   04/12/18 54.4 kg (119 lb 14.9 oz)   03/29/18 55.2 kg (121 lb 11.1 oz)   03/06/18 55.4 kg (122 lb 2.2 oz)     GENERAL: Alert, cooperative, NAD, appears tired, less cachectic than previous visit.  SKIN: warm, dry, intact. No rashes or other skin lesions.  HEAD: Normocephalic, atraumatic.     EYES: Sclera  clear, slightly anicteric. PERRLA, EOMI.   EARS: External ears wnl bilaterally, no drainage.   NOSE: Nares patent, mucosa pink and moist, no nasal drainage noted.  No facial pain.  MOUTH/THROAT: Oropharynx is clear. Tongue without sores. Normal dentition for age, no mucosal lesions.  NECK: Supple, full range of motion.  LYMPH NODES: No cervical, supraclavicular or axillary lymphadenopathy.  MSK: she has a palpable mass overlying her left trapezius, is firm, not mobile.  Edges are not discrete.  LUNGS: No increased WOB at rest.  Lungs with diminished sounds through out, more profound on the left.  Frequent wet cough with some hemoptysis in clinic.  HEART: HRR. S1 and S2 are normal. No murmurs, rubs, gallops.  Cap refill <2 sec  ABDOMEN: Normoactive bowel sounds. Nontender, nondistended.  NEUROLOGIC: Grossly intact, no focal neurologic deficits.    :  Deferred today.    Imaging:  Recent Results (from the past 24 hour(s))   CT Chest w/o contrast    Narrative    EXAMINATION: Chest CT  4/27/2018     CLINICAL HISTORY: Metastatic sarcoma, new hemoptysis.    COMPARISON: CT 1/25/2018. Radiation planning CT 2/16/2018.    TECHNIQUE: CT imaging obtained through the chest without intravenous  contrast. Coronal and axial MIP reformatted images obtained.    FINDINGS:    Supportive devices: Left chest wall Port-A-Cath with the tip  terminating in the right atrium.    Chest: Significant increase of the metastatic burden within the chest  in comparison to prior exams. The masses within the left lung apex  have coalesced and measure up to 7.2 cm in maximal diameter, just  adjacent to this mass is an area of groundglass opacities. Interval  enlargement of the left lower lobe mass measuring up to 4.5 cm in  maximal thickness bulging of the fissure. There are surrounding  groundglass opacities which may represent hemorrhage in the setting of  hemoptysis. Interval increased size of the right upper lobe mass  measuring approximately 2.4  x 2.7 cm. This show of enlarging mass in  the right upper lobe adjacent to the right mediastinum measures  approximately 2.4 cm in thickness. Additionally, there are multiple  new and enlarging nodules seen bilaterally. Large right loculated  pleural effusion extending up into the minor fissure. Small left  pleural effusion. Small left pneumothorax. Pneumomediastinum.    Heart size is normal. Small pericardial effusion. The ascending aorta  and main pulmonary artery diameters are within normal limits. The  partially visualized thyroid gland is unremarkable. Central  tracheobronchial tree is patent.     Bones and soft tissues: No suspicious bone findings.     Partially imaged upper abdomen: The spleen and pancreas are  unremarkable on this unenhanced exam. No abnormality of the visualized  liver and kidneys. Lesions in the left lateral abdominal and chest  wall along the lower ribs have decreased since 2/16/2018. There is  decreased displacement of the left-sided abdominal organs.      Impression    IMPRESSION:  1. Significant increase in the metastatic disease burden within the  chest with multiple enlarging pulmonary masses. The mass located in  the left lower lung has surrounding groundglass opacities which may  represent hemorrhage in the setting of hemoptysis.  2. Increased large left loculated right pleural effusion with smaller  left pleural effusion.  3. Small left pneumothorax. Pneumomediastinum.    I have personally reviewed the examination and initial interpretation  and I agree with the findings.    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  Sadly, she has massive progression noted on her chest CT.  Fortunately she is not having any dyspnea and remains comfortable outside her cough.  She continues on methadone with good pain control.  Appetite has  improved.  Managing constipation with miralax.    Plan:  1) Discussed imaging results with Olimpia and her cousin.  Explained that at this time I don't believe draining fluid or pursuing palliative radiation would be in her best interest given her widespread and rapid progression.  Olimpia expressed understanding.  They asked good questions about support in the home.  Suggested transition to hospice care.  She has a supply of pazopanib at home, discussed with her that I don't believe the pazopanib is providing benefit given her progression.  She stated she would likely continue on it for now but will not plan to refill.  Spoke with Dr Martin who is in full support of her transition to hospice, will make the referral.  2) Will work with hospice to start Olimpia on tessalon perles for her cough.  3) Continue methadone at current dose, hospice will manage moving forward.  4)  Will not schedule back in clinic at this time but will continue to follow Olimpia's course through hospice.    Addendum: Spoke with Roxanne (Home Care RN through [facility]), she took a verbal order for referral to hospice as well as tessalon perles 100mg TID PRN cough.

## 2018-04-27 NOTE — MR AVS SNAPSHOT
After Visit Summary   2018    Olimpia Childress    MRN: [id number]           Patient Information     Date Of Birth          1995        Visit Information        Provider Department      2018 12:15 PM Omar Trammell APRN CNP; [facility] Peds Hematology Oncology        Today's Diagnoses     Nausea              [facility], 9th floor  [address] [place] [address]  Phone: [phone]  Clinic Hours:   Monday-Friday:   7 am to 5:00 pm   closed weekends and major  holidays     If your fever is 100.5  or greater,   call the clinic during business hours.   After hours call [phone] and ask for the pediatric hematology / oncology physician to be paged for you.               Follow-ups after your visit        Future tests that were ordered for you today     Open Future Orders        Priority Expected Expires Ordered    CT Chest w/o contrast Routine 2018            Who to contact     Please call your clinic at [phone] to:    Ask questions about your health    Make or cancel appointments    Discuss your medicines    Learn about your test results    Speak to your doctor            Additional Information About Your Visit        MyChart Information     [employer] is an electronic gateway that provides easy, online access to your medical records. With [employer], you can request a clinic appointment, read your test results, renew a prescription or communicate with your care team.     To sign up for [employer] visit the website at [email or web address]   You will be asked to enter the access code listed below, as well as some personal information. Please follow the directions to create your username and password.     Your access code is: [email or web address]  Expires: 2018  3:59 PM     Your access code will  in 90 days. If you need help or a new code, please contact your [facility]  "or call [phone] for assistance.        Care EveryWhere ID     This is your Care EveryWhere ID. This could be used by other organizations to access your [place] medical records  [id number]        Your Vitals Were     Pulse Temperature Respirations Height Pulse Oximetry BMI (Body Mass Index)    100 97.8  F (36.6  C) (Oral) 24 1.602 m (5' 3.07\") 92% 22.13 kg/m2       Blood Pressure from Last 3 Encounters:   04/27/18 117/89   04/12/18 104/76   04/12/18 102/75    Weight from Last 3 Encounters:   04/27/18 56.8 kg (125 lb 3.5 oz)   04/12/18 54.4 kg (119 lb 14.9 oz)   03/29/18 55.2 kg (121 lb 11.1 oz)              Today, you had the following     No orders found for display         Where to get your medicines      These medications were sent to [facility] [place], [place] - [address]  [address] [address]     Phone:  [phone]     OLANZapine 5 MG tablet          Primary Care Provider Office Phone # Fax #    Coretta Yanez [phone] [address] [facility] [address]        Equal Access to Services     RONIT BAEZ AH: Hadshaquille mccollumo Sodolores, waaxda luqadaha, qaybta kaalmada adeegyada, wilner singleton. So [facility] [phone].    ATENCIÓN: Si habla español, tiene a ordoñez disposición servicios gratuitos de asistencia lingüística. Llame al [phone].    We comply with applicable federal civil rights laws and [place] laws. We do not discriminate on the basis of race, color, national origin, age, disability, sex, sexual orientation, or gender identity.            Thank you!     Thank you for choosing PEDS HEMATOLOGY ONCOLOGY  for your care. Our goal is always to provide you with excellent care. Hearing back from our patients is one way we can continue to improve our services. Please take a few minutes to complete the written survey that you may receive in the mail after your visit with us. Thank you!   "           Your Updated Medication List - Protect others around you: Learn how to safely use, store and throw away your medicines at www.disposemymeds.org.          This list is accurate as of 4/27/18  2:03 PM.  Always use your most recent med list.                   Brand Name Dispense Instructions for use Diagnosis    dexamethasone 4 MG tablet    DECADRON    30 tablet    Take 1 tablet (4 mg) by mouth 2 times daily (with meals) for 6 doses    Neoplasm related pain, Cachexia (H)       Diclofenac Sodium 1 % Crea     120 g    Place 2 g onto the skin 4 times daily as needed    Neoplasm related pain       HYDROmorphone 2 MG tablet    DILAUDID    30 tablet    Take 0.5-1 tablets (1-2 mg) by mouth every 2 hours as needed for moderate to severe pain    Pain       lidocaine 5 % Patch    LIDODERM    30 patch    Place 1 patch onto the skin every 24 hours Apply to shoulder    Synovial sarcoma (H)       LORazepam 1 MG tablet    ATIVAN    30 tablet    Take 1 tablet (1 mg) by mouth every 6 hours as needed for nausea    Sarcoma of vulva (H)       megestrol 40 MG tablet    MEGACE    300 tablet    Take 5 tablets (200 mg) by mouth 2 times daily    Synovial sarcoma (H)       methadone 5 MG tablet    DOLOPHINE    90 tablet    Take 0.5 tablets (2.5 mg) by mouth every 8 hours    Neoplasm related pain       OLANZapine 5 MG tablet    zyPREXA    30 tablet    Take 0.5 tablets (2.5 mg) by mouth 2 times daily    Nausea       ondansetron 8 MG ODT tab    ZOFRAN-ODT    120 tablet    Take 1 tablet (8 mg) by mouth every 6 hours    Non-intractable vomiting with nausea, unspecified vomiting type       pazopanib 200 MG tablet CHEMOTHERAPY    VOTRIENT    160 tablet    Take 4 tablets (800 mg) by mouth daily    Synovial sarcoma (H)       * polyethylene glycol Packet    MIRALAX/GLYCOLAX    7 packet    Take 17 g by mouth daily as needed for constipation    Acute post-operative pain       * polyethylene glycol powder    MIRALAX/GLYCOLAX    1 Bottle    Take 17  g (1 capful) by mouth daily    Slow transit constipation       ranitidine 150 MG tablet    ZANTAC    60 tablet    Take 1 tablet (150 mg) by mouth 2 times daily    Gastritis without bleeding, unspecified chronicity, unspecified gastritis type       senna-docusate 8.6-50 MG per tablet    SENOKOT-S;PERICOLACE    30 tablet    Take 2 tablets by mouth 2 times daily as needed for constipation    Acute post-operative pain       * Notice:  This list has 2 medication(s) that are the same as other medications prescribed for you. Read the directions carefully, and ask your doctor or other care provider to review them with you.

## 2018-05-01 NOTE — TELEPHONE ENCOUNTER
Roxanne from  Home Care called and asked for Shayla Trammell NP to call her to discuss whether or not pt should continue taking her oral chemo. Notified Shayla, who will call Roxanne.

## 2018-05-03 NOTE — LETTER
Date:May 4, 2018      Provider requested that no letter be sent. Do not send.       [facility] Health Information

## 2018-05-03 NOTE — MR AVS SNAPSHOT
After Visit Summary   5/3/2018    Olimpia Childress    MRN: [id number]           Patient Information     Date Of Birth          1995        Visit Information        Provider Department      5/3/2018 4:04 PM Coretta Roman MSW Peds Hematology Oncology        Today's Diagnoses     Encounter for counseling    -  1          [facility], 9th floor  [address] [place] [address]  Phone: [phone]  Clinic Hours:   Monday-Friday:   7 am to 5:00 pm   closed weekends and major  holidays     If your fever is 100.5  or greater,   call the clinic during business hours.   After hours call [phone] and ask for the pediatric hematology / oncology physician to be paged for you.               Follow-ups after your visit        Who to contact     Please call your clinic at [phone] to:    Ask questions about your health    Make or cancel appointments    Discuss your medicines    Learn about your test results    Speak to your doctor            Additional Information About Your Visit        My[employer] Information     [employer] is an electronic gateway that provides easy, online access to your medical records. With [employer], you can request a clinic appointment, read your test results, renew a prescription or communicate with your care team.     To sign up for [employer] visit the website at [email or web address]   You will be asked to enter the access code listed below, as well as some personal information. Please follow the directions to create your username and password.     Your access code is: [email or web address]  Expires: 2018  3:59 PM     Your access code will  in 90 days. If you need help or a new code, please contact your [facility] or call [phone] for assistance.        Care EveryWhere ID     This is your Care EveryWhere ID. This could be used by other organizations to access your [facility]  records  [id number]         Blood Pressure from Last 3 Encounters:   05/03/18 114/74   04/27/18 117/89   04/12/18 104/76    Weight from Last 3 Encounters:   05/03/18 54.7 kg (120 lb 9.5 oz)   04/27/18 56.8 kg (125 lb 3.5 oz)   04/12/18 54.4 kg (119 lb 14.9 oz)              Today, you had the following     No orders found for display       Primary Care Provider Office Phone # Fax #    Coretta Yanez [phone] [address] [facility] [address]        Equal Access to Services     [facility] : Hadii leonid Cheek, waclaudine flores, eva xie, wilner singleton. So [facility] [phone].    ATENCIÓN: Si habla español, tiene a ordoñez disposición servicios gratuitos de asistencia lingüística. Llame al [phone].    We comply with applicable federal civil rights laws and [place] laws. We do not discriminate on the basis of race, color, national origin, age, disability, sex, sexual orientation, or gender identity.            Thank you!     Thank you for choosing [facility]  for your care. Our goal is always to provide you with excellent care. Hearing back from our patients is one way we can continue to improve our services. Please take a few minutes to complete the written survey that you may receive in the mail after your visit with us. Thank you!             Your Updated Medication List - Protect others around you: Learn how to safely use, store and throw away your medicines at www.disposemymeds.org.          This list is accurate as of 5/3/18  4:10 PM.  Always use your most recent med list.                   Brand Name Dispense Instructions for use Diagnosis    dexamethasone 4 MG tablet    DECADRON    30 tablet    Take 1 tablet (4 mg) by mouth 2 times daily (with meals) for 6 doses    Neoplasm related pain, Cachexia (H)       Diclofenac Sodium 1 % Crea     120 g    Place 2 g onto the skin 4 times daily as needed     Neoplasm related pain       HYDROmorphone 2 MG tablet    DILAUDID    30 tablet    Take 0.5-1 tablets (1-2 mg) by mouth every 2 hours as needed for moderate to severe pain    Pain       lidocaine 5 % Patch    LIDODERM    30 patch    Place 1 patch onto the skin every 24 hours Apply to shoulder    Synovial sarcoma (H)       LORazepam 1 MG tablet    ATIVAN    30 tablet    Take 1 tablet (1 mg) by mouth every 6 hours as needed for nausea    Sarcoma of vulva (H)       megestrol 40 MG tablet    MEGACE    300 tablet    Take 5 tablets (200 mg) by mouth 2 times daily    Synovial sarcoma (H)       methadone 5 MG tablet    DOLOPHINE    90 tablet    Take 0.5 tablets (2.5 mg) by mouth every 8 hours    Neoplasm related pain       OLANZapine 5 MG tablet    zyPREXA    30 tablet    Take 0.5 tablets (2.5 mg) by mouth 2 times daily    Nausea       ondansetron 8 MG ODT tab    ZOFRAN-ODT    120 tablet    Take 1 tablet (8 mg) by mouth every 6 hours    Non-intractable vomiting with nausea, unspecified vomiting type       pazopanib 200 MG tablet CHEMOTHERAPY    VOTRIENT    160 tablet    Take 4 tablets (800 mg) by mouth daily    Synovial sarcoma (H)       * polyethylene glycol Packet    MIRALAX/GLYCOLAX    7 packet    Take 17 g by mouth daily as needed for constipation    Acute post-operative pain       * polyethylene glycol powder    MIRALAX/GLYCOLAX    1 Bottle    Take 17 g (1 capful) by mouth daily    Slow transit constipation       ranitidine 150 MG tablet    ZANTAC    60 tablet    Take 1 tablet (150 mg) by mouth 2 times daily    Gastritis without bleeding, unspecified chronicity, unspecified gastritis type       senna-docusate 8.6-50 MG per tablet    SENOKOT-S;PERICOLACE    30 tablet    Take 2 tablets by mouth 2 times daily as needed for constipation    Acute post-operative pain       * Notice:  This list has 2 medication(s) that are the same as other medications prescribed for you. Read the directions carefully, and ask your doctor or  other care provider to review them with you.

## 2018-05-03 NOTE — PROGRESS NOTES
Covering THI received page from Dr. Yanez re: meeting with patient to discuss Health Care Directive. Patient has noted she would like to designate her cousin her health care agent. Covering THI met very briefly with patient, cousin and  to provide [employer] HCD short form in English and Icelandic. Explained that there are two forms, long form (more detailed about medical wishes - CPR, feeding tubes, etc) versus short form which designates a health care agent only. Explained that should her cousin know her wishes that the short form would be sufficient unless they prefer the long form. Asma noted she would prefer the short form. She will complete with her cousin. SW noted that she would inform primary SW, DANA Salinas, [facility] to ask questions, should any arise. Anticipate primary SW will follow-up via phone and at next clinic appointment.     BELKIS Ko, DANA, OSW-C  Clinical    Pediatric Hematology Oncology   [facility]   Monday-Thursday   Phone: [phone]  Pager: [phone]    NO LETTER

## 2018-05-03 NOTE — LETTER
5/3/2018      RE: Olimpia Childress  [address]   [facility] [address]       Pediatric Hematology/Oncology Clinic Note    History- Olimpia initially presented with a growth on her right labia in 2013 when she was living in [place]. 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 She discontinued further treatment b/c her physicians were unsure of the exact diagnosis and she felt uncomfortable proceeding. Olimpia subsequently immigrated to the  in August of 2015 and in October she first noticed a recurrence of the mass in the area of previous excision. She was in [place] at that time, seen by oncology and had a port placed but then moved to [place] where she was seen by Dr. Mckeon at [facility] in November. There she underwent an MRI that showed a solid and cystic subcutaneous mass in the right labia measuring 1.7 x 1.6 x 1cm with question of nodular extension vs a second nodule measuring 0.7 cm. There was no invasion into the vagina. On November 16th 2015, Olimpia had a biopsy of the mass by a gynecologist, Dr. Terry, at [facility]. Cytology was reviewed at [facility] and read as a SMARCB1-deficient genital sarcoma, likely falling within the overall spectrum of malignant rhabdoid tumor. By immunohistochemistry, the cells shows a complete loss of SMARCb1. Wide spectrum cytokeratin, CD34, WT1, Desmin and CD99 were all negative. RT PCR for Wooten Sarcoma associated fusion transcripts were negative as well. Olimpia went on to have a PET/CT as well which showed multiple pulmonary lesions and biopsy confirmed a lesion to be metastatic disease. Olimpia was seen by Tomás White at [place] who reviewed the diagnosis and potential treatment plans with her, however she prefered to be treated here at [facility]. 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    Olimpia underwent left sided thoracotomy and resection of two pulmonary nodules on 7/20/16. Pathology revealed that one lesion was benign lymphoid tissue and the other was consistent with synovial sarcoma.  This was confirmed with FISH  With 91% of cell examined having a signal pattern indictivate of a rearrangement of the SS18 locus.  Olimpia has now completed 3 cycles of chemotherapy per [id number] and started her radiation on 9/13/16 and completed on 10/17/16.  She then went on to have a resection of her labial mass on 11/16/16 by Jannet Pastrana and Chikis with reconstruction, including skin flaps by Dr. Corona from plastics.  Pathology showed no residual tumor.  She had a f/u chest CT today on 12/5/16 that showed persistance of her pulmonary nodules as well as few additonal pulmonary nodules.  She saw Dr. Pierre who was in agreement with surgical resection, however Olimpia wanted to wait until March to have more time to recover from her last surgery. She underwent left sided thoracotomy on 3/8/17.    In May, Olimpia's follow up PET/CT demonstrated significant progression of her pulmonary metastatic disease, with an anterior mediastinal mass compression her right ventricle and potentially right outflow tract.  Subsequent echocardiogram did not show altered cardiac function, Dr Man felt radiation therapy was not in her best interest at this time but would consider if she developed cardiac dysfunction.  Olimpia was started on oral Pazopanib May 2017 for palliative therapy, with doses held due to palmar plantar dysesthesia and stomatitis, and restarted 50% dosing on 6/19/17.  Olimpia has imaging in August 2017 that showed a positive response to therapy.  However on most recent imaging she was found to have progression, she has since completed palliative radiation.    Interval History:  Olimpia comes to  clinic today with her cousin and an .  Olimpia states that she has been feeling well the last few days.  Her cough has been decreasing and she is only coughing up a little bit of blood.  She has no SOB.  She denies any pain and has not taken her methadone in 4 days.  She is also not taking her pazopanib.  She really denies any complaints today.      Olipmia's big concern today is that she is not comfortable with pursuing hospice.  Olimpia understands that we have no further cancer directed therapies to offer and is ok with that however for Restorationism reasons she is not comfortable addressing the issues of death that hospice brought up.        ROS  See HPI. Complete ROS otherwise negative.      Allergies as of 05/03/2018 - [place] as Reviewed 05/03/2018   Allergen Reaction Noted     Heparin flush Other (See Comments) 01/27/2016     Pork derived products  02/09/2016     Tegaderm transparent dressing (informational only) Other (See Comments) and Rash 04/20/2016     Medications:  Current Outpatient Prescriptions   Medication Sig Dispense Refill     Diclofenac Sodium 1 % CREA Place 2 g onto the skin 4 times daily as needed 120 g 2     HYDROmorphone (DILAUDID) 2 MG tablet Take 0.5-1 tablets (1-2 mg) by mouth every 2 hours as needed for moderate to severe pain 30 tablet 0     lidocaine (LIDODERM) 5 % Patch Place 1 patch onto the skin every 24 hours Apply to shoulder 30 patch 3     LORazepam (ATIVAN) 1 MG tablet Take 1 tablet (1 mg) by mouth every 6 hours as needed for nausea 30 tablet 2     methadone (DOLOPHINE) 5 MG tablet Take 0.5 tablets (2.5 mg) by mouth every 8 hours 90 tablet 0     OLANZapine (ZYPREXA) 5 MG tablet Take 0.5 tablets (2.5 mg) by mouth 2 times daily 30 tablet 1     ondansetron (ZOFRAN-ODT) 8 MG ODT tab Take 1 tablet (8 mg) by mouth every 6 hours 120 tablet 1     pazopanib (VOTRIENT) 200 MG tablet CHEMOTHERAPY Take 4 tablets (800 mg) by mouth daily 160 tablet 5     polyethylene glycol (MIRALAX/GLYCOLAX)  Packet Take 17 g by mouth daily as needed for constipation 7 packet 1     polyethylene glycol (MIRALAX/GLYCOLAX) powder Take 17 g (1 capful) by mouth daily 1 Bottle 11     ranitidine (ZANTAC) 150 MG tablet Take 1 tablet (150 mg) by mouth 2 times daily 60 tablet 3     senna-docusate (SENOKOT-S;PERICOLACE) 8.6-50 MG per tablet Take 2 tablets by mouth 2 times daily as needed for constipation 30 tablet 6     dexamethasone (DECADRON) 4 MG tablet Take 1 tablet (4 mg) by mouth 2 times daily (with meals) for 6 doses 30 tablet 0     megestrol (MEGACE) 40 MG tablet Take 5 tablets (200 mg) by mouth 2 times daily 300 tablet 3   Is taking pazopanib, zyprexa, miralax and methadone regularly    Past Medical History:   Diagnosis Date     Anemia 6/3/2016     Constipation      Extrarenal rhabdoid neoplasm (H)      Febrile neutropenia (H) 4/17/2016     History of blood transfusion      Latent tuberculosis      Lung disease      On antineoplastic chemotherapy      Sarcoma of vulva (H)      Social History:  Olimpia lives with her cousin.    Family History   Problem Relation Age of Onset     Other Cancer No family hx of        Physical Exam   Temp:  [98.3  F (36.8  C)] 98.3  F (36.8  C)  Pulse:  [134] 134  Resp:  [24] 24  BP: (114)/(74) 114/74  SpO2:  [97 %] 97 %  Wt Readings from Last 4 Encounters:   05/03/18 54.7 kg (120 lb 9.5 oz)   04/27/18 56.8 kg (125 lb 3.5 oz)   04/12/18 54.4 kg (119 lb 14.9 oz)   03/29/18 55.2 kg (121 lb 11.1 oz)     GENERAL: Alert, cooperative, NAD, appears tired, less cachectic than previous visit.  SKIN: warm, dry, intact. No rashes or other skin lesions.  HEAD: Normocephalic, atraumatic.     EYES: Sclera clear, slightly anicteric. PERRLA, EOMI.   EARS: External ears wnl bilaterally, no drainage.   NOSE: Nares patent, mucosa pink and moist, no nasal drainage noted.  No facial pain.  MOUTH/THROAT: Oropharynx is clear. Tongue without sores. Normal dentition for age, no mucosal lesions.  NECK: Supple, full range of  motion.  LYMPH NODES: No cervical, supraclavicular or axillary lymphadenopathy.  MSK: she has a palpable mass overlying her left trapezius, is firm, not mobile.  Edges are not discrete.  LUNGS: No increased WOB at rest.  Lungs with diminished sounds through out, more profound on the left.  Frequent wet cough with some hemoptysis in clinic.  HEART: HRR. S1 and S2 are normal. No murmurs, rubs, gallops.  Cap refill <2 sec  ABDOMEN: Normoactive bowel sounds. Nontender, nondistended.  NEUROLOGIC: Grossly intact, no focal neurologic deficits.    :  Deferred today.    Imaging:  Recent Results (from the past 24 hour(s))   CT Chest w/o contrast    Narrative    EXAMINATION: Chest CT  4/27/2018     CLINICAL HISTORY: Metastatic sarcoma, new hemoptysis.    COMPARISON: CT 1/25/2018. Radiation planning CT 2/16/2018.    TECHNIQUE: CT imaging obtained through the chest without intravenous  contrast. Coronal and axial MIP reformatted images obtained.    FINDINGS:    Supportive devices: Left chest wall Port-A-Cath with the tip  terminating in the right atrium.    Chest: Significant increase of the metastatic burden within the chest  in comparison to prior exams. The masses within the left lung apex  have coalesced and measure up to 7.2 cm in maximal diameter, just  adjacent to this mass is an area of groundglass opacities. Interval  enlargement of the left lower lobe mass measuring up to 4.5 cm in  maximal thickness bulging of the fissure. There are surrounding  groundglass opacities which may represent hemorrhage in the setting of  hemoptysis. Interval increased size of the right upper lobe mass  measuring approximately 2.4 x 2.7 cm. This show of enlarging mass in  the right upper lobe adjacent to the right mediastinum measures  approximately 2.4 cm in thickness. Additionally, there are multiple  new and enlarging nodules seen bilaterally. Large right loculated  pleural effusion extending up into the minor fissure. Small  left  pleural effusion. Small left pneumothorax. Pneumomediastinum.    Heart size is normal. Small pericardial effusion. The ascending aorta  and main pulmonary artery diameters are within normal limits. The  partially visualized thyroid gland is unremarkable. Central  tracheobronchial tree is patent.     Bones and soft tissues: No suspicious bone findings.     Partially imaged upper abdomen: The spleen and pancreas are  unremarkable on this unenhanced exam. No abnormality of the visualized  liver and kidneys. Lesions in the left lateral abdominal and chest  wall along the lower ribs have decreased since 2/16/2018. There is  decreased displacement of the left-sided abdominal organs.      Impression    IMPRESSION:  1. Significant increase in the metastatic disease burden within the  chest with multiple enlarging pulmonary masses. The mass located in  the left lower lung has surrounding groundglass opacities which may  represent hemorrhage in the setting of hemoptysis.  2. Increased large left loculated right pleural effusion with smaller  left pleural effusion.  3. Small left pneumothorax. Pneumomediastinum.    I have personally reviewed the examination and initial interpretation  and I agree with the findings.    FAIZA JAY MD       Assessment:   Olimpia is a 23-year-old female with recurrent metastatic synovial sarcoma who currently is receiving palliative care.  Olimpia was to enroll on hospice but is no longer interested in pursing this and would like to continue coming to clinic for supportive care.  She is currently off of her methadone and her cough is improving as well.    Plan:  1) RTC in 2 weeks  2) Olimpia to call with any new symptoms;  Will continue to involve Amita Martin from PACCT      I spent approx 40 minutes face to face with Olimpia, >50% was spent on counseling.      Coretta Yanez MD

## 2018-05-03 NOTE — NURSING NOTE
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

## 2018-05-03 NOTE — LETTER
5/3/2018      RE: Olimpia Childress  [address]   [facility] [address]       Covering SW received page from Dr. Yanez re: meeting with patient to discuss Health Care Directive. Patient has noted she would like to designate her cousin her health care agent. Covering SW met very briefly with patient, cousin and  to provide Honoring Choices HCD short form in English and Samoan. Explained that there are two forms, long form (more detailed about medical wishes - CPR, feeding tubes, etc) versus short form which designates a health care agent only. Explained that should her cousin know her wishes that the short form would be sufficient unless they prefer the long form. Olimpia noted she would prefer the short form. She will complete with her cousin. SW noted that she would inform primary SW, DANA Salinas, that Asmaa will call her tomorrow to ask questions, should any arise. Anticipate primary SW will follow-up via phone and at next clinic appointment.     BELKIS Ko, DANA, OSW-C  Clinical    Pediatric Hematology Oncology   [facility]   Monday-Thursday   Phone: [phone]  Pager: [phone]    NO LETTER      BELKIS Middleton

## 2018-05-03 NOTE — MR AVS SNAPSHOT
After Visit Summary   5/3/2018    Olimpia Childress    MRN: [id number]           Patient Information     Date Of Birth          1995        Visit Information        Provider Department      5/3/2018 2:15 PM Kyrie Anderson Emily Gustava, MD Peds Hematology Oncology            [facility], 9th floor  [address] [place] [address]  Phone: [phone]  Clinic Hours:   Monday-Friday:   7 am to 5:00 pm   closed weekends and major  holidays     If your fever is 100.5  or greater,   call the clinic during business hours.   After hours call [phone] and ask for the pediatric hematology / oncology physician to be paged for you.               Follow-ups after your visit        Who to contact     Please call your clinic at [phone] to:    Ask questions about your health    Make or cancel appointments    Discuss your medicines    Learn about your test results    Speak to your doctor            Additional Information About Your Visit        MyChart Information     Contents First is an electronic gateway that provides easy, online access to your medical records. With Contents First, you can request a clinic appointment, read your test results, renew a prescription or communicate with your care team.     To sign up for Contents First visit the website at [email or web address]   You will be asked to enter the access code listed below, as well as some personal information. Please follow the directions to create your username and password.     Your access code is: [email or web address]  Expires: 2018  3:59 PM     Your access code will  in 90 days. If you need help or a new code, please contact your [facility] or call [phone] for assistance.        Care EveryWhere ID     This is your Care EveryWhere ID. This could be used by other organizations to access your [facility] medical records  [id number]        Your Vitals Were      Pulse Temperature Respirations Pulse Oximetry BMI (Body Mass Index)       134 98.3  F (36.8  C) (Oral) 24 97% 21.31 kg/m2        Blood Pressure from Last 3 Encounters:   05/03/18 114/74   04/27/18 117/89   04/12/18 104/76    Weight from Last 3 Encounters:   05/03/18 120 lb 9.5 oz (54.7 kg)   04/27/18 125 lb 3.5 oz (56.8 kg)   04/12/18 119 lb 14.9 oz (54.4 kg)              Today, you had the following     No orders found for display       Primary Care Provider Office Phone # Fax #    Coretta Ynaez [phone] [address] [facility] [address]        Equal Access to Services     RONIT BAEZ : Rupa salgado Sodolores, waclaudine luqadaha, qaybta kaalmada adebetsyyalaney, wilner blue . So [facility] [phone].    ATENCIÓN: Si habla español, tiene a ordoñez disposición servicios gratuitos de asistencia lingüística. Llame al [phone].    We comply with applicable federal civil rights laws and [place] laws. We do not discriminate on the basis of race, color, national origin, age, disability, sex, sexual orientation, or gender identity.            Thank you!     Thank you for choosing [facility] HEMATOLOGY ONCOLOGY  for your care. Our goal is always to provide you with excellent care. Hearing back from our patients is one way we can continue to improve our services. Please take a few minutes to complete the written survey that you may receive in the mail after your visit with us. Thank you!             Your Updated Medication List - Protect others around you: Learn how to safely use, store and throw away your medicines at www.disposemymeds.org.          This list is accurate as of 5/3/18  4:09 PM.  Always use your most recent med list.                   Brand Name Dispense Instructions for use Diagnosis    dexamethasone 4 MG tablet    DECADRON    30 tablet    Take 1 tablet (4 mg) by mouth 2 times daily (with meals) for 6 doses    Neoplasm related pain, Cachexia (H)        Diclofenac Sodium 1 % Crea     120 g    Place 2 g onto the skin 4 times daily as needed    Neoplasm related pain       HYDROmorphone 2 MG tablet    DILAUDID    30 tablet    Take 0.5-1 tablets (1-2 mg) by mouth every 2 hours as needed for moderate to severe pain    Pain       lidocaine 5 % Patch    LIDODERM    30 patch    Place 1 patch onto the skin every 24 hours Apply to shoulder    Synovial sarcoma (H)       LORazepam 1 MG tablet    ATIVAN    30 tablet    Take 1 tablet (1 mg) by mouth every 6 hours as needed for nausea    Sarcoma of vulva (H)       megestrol 40 MG tablet    MEGACE    300 tablet    Take 5 tablets (200 mg) by mouth 2 times daily    Synovial sarcoma (H)       methadone 5 MG tablet    DOLOPHINE    90 tablet    Take 0.5 tablets (2.5 mg) by mouth every 8 hours    Neoplasm related pain       OLANZapine 5 MG tablet    zyPREXA    30 tablet    Take 0.5 tablets (2.5 mg) by mouth 2 times daily    Nausea       ondansetron 8 MG ODT tab    ZOFRAN-ODT    120 tablet    Take 1 tablet (8 mg) by mouth every 6 hours    Non-intractable vomiting with nausea, unspecified vomiting type       pazopanib 200 MG tablet CHEMOTHERAPY    VOTRIENT    160 tablet    Take 4 tablets (800 mg) by mouth daily    Synovial sarcoma (H)       * polyethylene glycol Packet    MIRALAX/GLYCOLAX    7 packet    Take 17 g by mouth daily as needed for constipation    Acute post-operative pain       * polyethylene glycol powder    MIRALAX/GLYCOLAX    1 Bottle    Take 17 g (1 capful) by mouth daily    Slow transit constipation       ranitidine 150 MG tablet    ZANTAC    60 tablet    Take 1 tablet (150 mg) by mouth 2 times daily    Gastritis without bleeding, unspecified chronicity, unspecified gastritis type       senna-docusate 8.6-50 MG per tablet    SENOKOT-S;PERICOLACE    30 tablet    Take 2 tablets by mouth 2 times daily as needed for constipation    Acute post-operative pain       * Notice:  This list has 2 medication(s) that are the same  as other medications prescribed for you. Read the directions carefully, and ask your doctor or other care provider to review them with you.

## 2018-05-04 NOTE — PROGRESS NOTES
Pediatric Hematology/Oncology Clinic Note    History- Olimpia initially presented with a growth on her right labia in 2013 when she was living in [place]. 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 She discontinued further treatment b/c her physicians were unsure of the exact diagnosis and she felt uncomfortable proceeding. Olimpia subsequently immigrated to the  in August of 2015 and in October she first noticed a recurrence of the mass in the area of previous excision. She was in [place] at that time, seen by oncology and had a port placed but then moved to [place] where she was seen by Dr. Mckeon at [facility] in November. There she underwent an MRI that showed a solid and cystic subcutaneous mass in the right labia measuring 1.7 x 1.6 x 1cm with question of nodular extension vs a second nodule measuring 0.7 cm. There was no invasion into the vagina. On November 16th 2015, Olimpia had a biopsy of the mass by a gynecologist, Dr. Terry, at [facility]. Cytology was reviewed at [place] and read as a SMARCB1-deficient genital sarcoma, likely falling within the overall spectrum of malignant rhabdoid tumor. By immunohistochemistry, the cells shows a complete loss of SMARCb1. Wide spectrum cytokeratin, CD34, WT1, Desmin and CD99 were all negative. RT PCR for Wooten Sarcoma associated fusion transcripts were negative as well. Olimpia went on to have a PET/CT as well which showed multiple pulmonary lesions and biopsy confirmed a lesion to be metastatic disease. Olimpia was seen by Tomás White at [facility] who reviewed the diagnosis and potential treatment plans with her, however she prefered to be treated here at [facility]. 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    Olimpia underwent left sided thoracotomy and resection of two pulmonary nodules on 7/20/16. Pathology revealed that one lesion was benign lymphoid tissue and the other was consistent with synovial sarcoma.  This was confirmed with FISH  With 91% of cell examined having a signal pattern indictivate of a rearrangement of the SS18 locus.  Olimpia has now completed 3 cycles of chemotherapy per [id number] and started her radiation on 9/13/16 and completed on 10/17/16.  She then went on to have a resection of her labial mass on 11/16/16 by Jannet Pastrana and Chikis with reconstruction, including skin flaps by Dr. Corona from plastics.  Pathology showed no residual tumor.  She had a f/u chest CT today on 12/5/16 that showed persistance of her pulmonary nodules as well as few additonal pulmonary nodules.  She saw Dr. Pierre who was in agreement with surgical resection, however Olimpia wanted to wait until March to have more time to recover from her last surgery. She underwent left sided thoracotomy on 3/8/17.    In May, Olimpia's follow up PET/CT demonstrated significant progression of her pulmonary metastatic disease, with an anterior mediastinal mass compression her right ventricle and potentially right outflow tract.  Subsequent echocardiogram did not show altered cardiac function, Dr Man felt radiation therapy was not in her best interest at this time but would consider if she developed cardiac dysfunction.  Olimpia was started on oral Pazopanib May 2017 for palliative therapy, with doses held due to palmar plantar dysesthesia and stomatitis, and restarted 50% dosing on 6/19/17.  Olimpia has imaging in August 2017 that showed a positive response to therapy.  However on most recent imaging she was found to have progression, she has since completed palliative radiation.    Interval History:  Olimpia comes to clinic today with her cousin and an .  Olimpia states that she has been feeling well  the last few days.  Her cough has been decreasing and she is only coughing up a little bit of blood.  She has no SOB.  She denies any pain and has not taken her methadone in 4 days.  She is also not taking her pazopanib.  She really denies any complaints today.      Olimpia's big concern today is that she is not comfortable with pursuing hospice.  Olimpia understands that we have no further cancer directed therapies to offer and is ok with that however for [employer] reasons she is not comfortable addressing the issues of death that hospice brought up.        ROS  See HPI. Complete ROS otherwise negative.      Allergies as of 05/03/2018 - [place] as Reviewed 05/03/2018   Allergen Reaction Noted     Heparin flush Other (See Comments) 01/27/2016     Pork derived products  02/09/2016     Tegaderm transparent dressing (informational only) Other (See Comments) and Rash 04/20/2016     Medications:  Current Outpatient Prescriptions   Medication Sig Dispense Refill     Diclofenac Sodium 1 % CREA Place 2 g onto the skin 4 times daily as needed 120 g 2     HYDROmorphone (DILAUDID) 2 MG tablet Take 0.5-1 tablets (1-2 mg) by mouth every 2 hours as needed for moderate to severe pain 30 tablet 0     lidocaine (LIDODERM) 5 % Patch Place 1 patch onto the skin every 24 hours Apply to shoulder 30 patch 3     LORazepam (ATIVAN) 1 MG tablet Take 1 tablet (1 mg) by mouth every 6 hours as needed for nausea 30 tablet 2     methadone (DOLOPHINE) 5 MG tablet Take 0.5 tablets (2.5 mg) by mouth every 8 hours 90 tablet 0     OLANZapine (ZYPREXA) 5 MG tablet Take 0.5 tablets (2.5 mg) by mouth 2 times daily 30 tablet 1     ondansetron (ZOFRAN-ODT) 8 MG ODT tab Take 1 tablet (8 mg) by mouth every 6 hours 120 tablet 1     pazopanib (VOTRIENT) 200 MG tablet CHEMOTHERAPY Take 4 tablets (800 mg) by mouth daily 160 tablet 5     polyethylene glycol (MIRALAX/GLYCOLAX) Packet Take 17 g by mouth daily as needed for constipation 7 packet 1     polyethylene glycol  (MIRALAX/GLYCOLAX) powder Take 17 g (1 capful) by mouth daily 1 Bottle 11     ranitidine (ZANTAC) 150 MG tablet Take 1 tablet (150 mg) by mouth 2 times daily 60 tablet 3     senna-docusate (SENOKOT-S;PERICOLACE) 8.6-50 MG per tablet Take 2 tablets by mouth 2 times daily as needed for constipation 30 tablet 6     dexamethasone (DECADRON) 4 MG tablet Take 1 tablet (4 mg) by mouth 2 times daily (with meals) for 6 doses 30 tablet 0     megestrol (MEGACE) 40 MG tablet Take 5 tablets (200 mg) by mouth 2 times daily 300 tablet 3   Is taking pazopanib, zyprexa, miralax and methadone regularly    Past Medical History:   Diagnosis Date     Anemia 6/3/2016     Constipation      Extrarenal rhabdoid neoplasm (H)      Febrile neutropenia (H) 4/17/2016     History of blood transfusion      Latent tuberculosis      Lung disease      On antineoplastic chemotherapy      Sarcoma of vulva (H)      Social History:  Olimpia lives with her cousin.    Family History   Problem Relation Age of Onset     Other Cancer No family hx of        Physical Exam   Temp:  [98.3  F (36.8  C)] 98.3  F (36.8  C)  Pulse:  [134] 134  Resp:  [24] 24  BP: (114)/(74) 114/74  SpO2:  [97 %] 97 %  Wt Readings from Last 4 Encounters:   05/03/18 54.7 kg (120 lb 9.5 oz)   04/27/18 56.8 kg (125 lb 3.5 oz)   04/12/18 54.4 kg (119 lb 14.9 oz)   03/29/18 55.2 kg (121 lb 11.1 oz)     GENERAL: Alert, cooperative, NAD, appears tired, less cachectic than previous visit.  SKIN: warm, dry, intact. No rashes or other skin lesions.  HEAD: Normocephalic, atraumatic.     EYES: Sclera clear, slightly anicteric. PERRLA, EOMI.   EARS: External ears wnl bilaterally, no drainage.   NOSE: Nares patent, mucosa pink and moist, no nasal drainage noted.  No facial pain.  MOUTH/THROAT: Oropharynx is clear. Tongue without sores. Normal dentition for age, no mucosal lesions.  NECK: Supple, full range of motion.  LYMPH NODES: No cervical, supraclavicular or axillary lymphadenopathy.  MSK: she has  a palpable mass overlying her left trapezius, is firm, not mobile.  Edges are not discrete.  LUNGS: No increased WOB at rest.  Lungs with diminished sounds through out, more profound on the left.  Frequent wet cough with some hemoptysis in clinic.  HEART: HRR. S1 and S2 are normal. No murmurs, rubs, gallops.  Cap refill <2 sec  ABDOMEN: Normoactive bowel sounds. Nontender, nondistended.  NEUROLOGIC: Grossly intact, no focal neurologic deficits.    :  Deferred today.    Imaging:  Recent Results (from the past 24 hour(s))   CT Chest w/o contrast    Narrative    EXAMINATION: Chest CT  4/27/2018     CLINICAL HISTORY: Metastatic sarcoma, new hemoptysis.    COMPARISON: CT 1/25/2018. Radiation planning CT 2/16/2018.    TECHNIQUE: CT imaging obtained through the chest without intravenous  contrast. Coronal and axial MIP reformatted images obtained.    FINDINGS:    Supportive devices: Left chest wall Port-A-Cath with the tip  terminating in the right atrium.    Chest: Significant increase of the metastatic burden within the chest  in comparison to prior exams. The masses within the left lung apex  have coalesced and measure up to 7.2 cm in maximal diameter, just  adjacent to this mass is an area of groundglass opacities. Interval  enlargement of the left lower lobe mass measuring up to 4.5 cm in  maximal thickness bulging of the fissure. There are surrounding  groundglass opacities which may represent hemorrhage in the setting of  hemoptysis. Interval increased size of the right upper lobe mass  measuring approximately 2.4 x 2.7 cm. This show of enlarging mass in  the right upper lobe adjacent to the right mediastinum measures  approximately 2.4 cm in thickness. Additionally, there are multiple  new and enlarging nodules seen bilaterally. Large right loculated  pleural effusion extending up into the minor fissure. Small left  pleural effusion. Small left pneumothorax. Pneumomediastinum.    Heart size is normal. Small  pericardial effusion. The ascending aorta  and main pulmonary artery diameters are within normal limits. The  partially visualized thyroid gland is unremarkable. Central  tracheobronchial tree is patent.     Bones and soft tissues: No suspicious bone findings.     Partially imaged upper abdomen: The spleen and pancreas are  unremarkable on this unenhanced exam. No abnormality of the visualized  liver and kidneys. Lesions in the left lateral abdominal and chest  wall along the lower ribs have decreased since 2/16/2018. There is  decreased displacement of the left-sided abdominal organs.      Impression    IMPRESSION:  1. Significant increase in the metastatic disease burden within the  chest with multiple enlarging pulmonary masses. The mass located in  the left lower lung has surrounding groundglass opacities which may  represent hemorrhage in the setting of hemoptysis.  2. Increased large left loculated right pleural effusion with smaller  left pleural effusion.  3. Small left pneumothorax. Pneumomediastinum.    I have personally reviewed the examination and initial interpretation  and I agree with the findings.    FAIZA JAY MD       Assessment:   Olimpia is a 23-year-old female with recurrent metastatic synovial sarcoma who currently is receiving palliative care.  Olimpia was to enroll on hospice but is no longer interested in pursing this and would like to continue coming to clinic for supportive care.  She is currently off of her methadone and her cough is improving as well.    Plan:  1) RTC in 2 weeks  2) Olimpia to call with any new symptoms;  Will continue to involve Amita Martin from [facility]      I spent approx 40 minutes face to face with Olimpia, >50% was spent on counseling.

## 2018-05-11 NOTE — TELEPHONE ENCOUNTER
I received a call from Shi, Olimpia's cousin.  Olimpia passed away at her home at 9:40am today.  Shi states Olimpia had a good evening yesterday, was happy and laughing with family.  Then today at 9:40am she stopped breathing.  Olimpia had declined hospice care, she was receiving [facility].  Spoke with Roxanne ([facility] RN) to update her.  Called the [facility] Medical examiner (Billy) [phone].  They will send an officer out to confirm her death so her body can be released to the [facility] in [place].  Provided the medical examiner with Olimpia's mother's name who is as I understand it her next of kin.  Her name is Kalie Spencer.  I asked Shi to call me back if they were having any difficulty with logistics today.

## 2018-05-16 NOTE — PROGRESS NOTES
This is a recent snapshot of the patient's [facility] medical record.  For current drug dose and complete information and questions, call [phone] or In [facility] pool, fv home infusion ([id number])  CSN Number:  [id number]

## 2019-01-01 NOTE — NURSING NOTE
"No chief complaint on file.      Initial /65  Pulse 89  Ht 5' 3.19\" (160.5 cm)  Wt 134 lb 11.2 oz (61.1 kg)  BMI 23.72 kg/m2 Estimated body mass index is 23.72 kg/(m^2) as calculated from the following:    Height as of this encounter: 5' 3.19\" (160.5 cm).    Weight as of this encounter: 134 lb 11.2 oz (61.1 kg).  Medication Reconciliation: complete     "
Statement Selected

## 2019-01-17 NOTE — ANESTHESIA PROCEDURE NOTES
Peripheral Nerve Block Procedure Note    Staff:     Anesthesiologist:  FELA CONN    Referred By:  NUVIA STARK  Location: Pre-op  Procedure Start/Stop TImes:      3/8/2017 7:26 AM     3/8/2017 7:39 AM    patient identified, IV checked, site marked, risks and benefits discussed, informed consent, monitors and equipment checked, pre-op evaluation, at physician/surgeon's request and post-op pain management      Correct Patient: Yes      Correct Position: Yes      Correct Site: Yes      Correct Procedure: Yes      Correct Laterality:  Yes    Site Marked:  Yes  Procedure details:     Procedure:  Paravertebral    ASA:  3    Laterality:  Left    Position:  Right Lateral Decubitus    Sterile Prep: chloraprep, patient draped, mask and sterile gloves      Local skin infiltration:  None    Needle:  Jh needle    Needle gauge:  17    Needle length (inches):  3.5    Catheter threaded easily: Yes      Threaded to cm at skin:  10    Ultrasound: Yes      Ultrasound used to identify targeted nerve, plexus, or vascular structure and placed a needle adjacent to it      Permanent Image entered into patiient's record      Abnormal pain on injection: No      Blood Aspirated: No      Paresthesias:  No    Bleeding at site: No      Test dose (mL):  5    Test dose local:   Lidocaine 1.5% w/ 1:200,000 epinephrine    Test dose negative for signs of intravascular injection: Yes      Bolus via:  Catheter    Infusion Method:  Continuous Infusion    Blood aspirated via catheter: No      Secured:  Dermabond and Tegaderm    Complications:  None         "Neurology       Patient Care Team:  Gio Henderson MD as PCP - General (Adolescent Medicine)    Chief complaint weakness      Subjective .     History:  Breathing is easier. Able to eat -- mild pharyngeal dysphagia per speech.   More feeling in legs, and thinks arms may be a little stronger.  Painful cramps in legs.     ROS: no fever, headache    Objective     Vital Signs   Blood pressure 126/73, pulse 75, temperature 98 °F (36.7 °C), temperature source Oral, resp. rate (!) 36, height 170.2 cm (67.01\"), weight 96 kg (211 lb 10.3 oz), SpO2 93 %.    Physical Exam:              Neuro: maww in nad, with high flow NC, speaking in full sentences, alert, fluent, apppropriate  eomi face symm  Motor: 3-4 throughout, UE marginally improved vs 1/16.  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 Answered questions re: prognosis.  2. Leg cramps 2/2 GBS -- encouraged passive mvmt/exercise, will increase Ativan, monitoring respiratory status. GBP already increased. Home atarax restarted.    I discussed the patients findings and my recommendations with patient, family and nursing staff    Tere Galvez MD  01/17/19  9:59 AM    "

## 2019-10-14 NOTE — MR AVS SNAPSHOT
After Visit Summary   8/3/2017    Olimpia Childress    MRN: [id number]           Patient Information     Date Of Birth          1995        Visit Information        Provider Department      8/3/2017 12:45 PM Sofi Childress Alexis Rupp, [facility] Peds Hematology Oncology        Today's Diagnoses     Synovial sarcoma (H)              [facility], 9th floor  [address] [place] [address]  Phone: [phone]  Clinic Hours:   Monday-Friday:   7 am to 5:00 pm   closed weekends and major  holidays     If your fever is 100.5  or greater,   call the clinic during business hours.   After hours call [phone] and ask for the pediatric hematology / oncology physician to be paged for you.               Follow-ups after your visit        Your next 10 appointments already scheduled     Aug 14, 2017  1:00 PM CDT   CT CHEST W/O CONTRAST with URCT1   [facility], [place], Radiology ([facility])    [address] [place] [address] [phone]           Please bring any scans or X-rays taken at other hospitals, if similar tests were done. Also bring a list of your medicines, including vitamins, minerals and over-the-counter drugs. It is safest to leave personal items at home.  Be sure to tell your doctor:   If you have any allergies.   If there s any chance you are pregnant.   If you are breastfeeding.   If you have any special needs.  You do not need to do anything special to prepare.  Please wear loose clothing, such as a sweat suit or jogging clothes. Avoid snaps, zippers and other metal. We may ask you to undress and put on a hospital gown.            Aug 14, 2017  1:30 PM CDT   Return Visit with Coretta Yanez MD   Peds Hematology Oncology ([facility])    [facility]  9th Floor  [address] [facility] [address] [phone]              Who to  "contact     Please call your clinic at [phone] to:    Ask questions about your health    Make or cancel appointments    Discuss your medicines    Learn about your test results    Speak to your doctor   If you have compliments or concerns about an experience at your clinic, or if you wish to file a complaint, please contact [facility] Patient Relations at [phone] or email us at [email or web address]         Additional Information About Your Visit        [employer] Information     InboxFever is an electronic gateway that provides easy, online access to your medical records. With InboxFever, you can request a clinic appointment, read your test results, renew a prescription or communicate with your care team.     To sign up for InboxFever visit the website at [email or web address]   You will be asked to enter the access code listed below, as well as some personal information. Please follow the directions to create your username and password.     Your access code is: [email or web address]  Expires: 2017  2:20 PM     Your access code will  in 90 days. If you need help or a new code, please contact your [facility] Clinic or call [phone] for assistance.        Care EveryWhere ID     This is your Care EveryWhere ID. This could be used by other organizations to access your [place] medical records  [id number]        Your Vitals Were     Pulse Temperature Respirations Height Pulse Oximetry BMI (Body Mass Index)    64 97  F (36.1  C) (Oral) 18 1.61 m (5' 3.39\") 100% 23.73 kg/m2       Blood Pressure from Last 3 Encounters:   17 111/84   17 109/78   17 100/69    Weight from Last 3 Encounters:   17 61.5 kg (135 lb 9.3 oz)   17 61 kg (134 lb 7.7 oz)   17 61.6 kg (135 lb 12.9 oz)              We Performed the Following     CBC with platelets differential     Comprehensive metabolic panel     UA with Microscopic        "   Today's Medication Changes          These changes are accurate as of: 8/3/17  2:46 PM.  If you have any questions, ask your nurse or doctor.               Stop taking these medicines if you haven't already. Please contact your care team if you have questions.     bisacodyl 5 MG EC tablet   Stopped by:  Omar Trammell APRN CNP           leuprolide 11.25 MG Kit kit   Commonly known as:  LUPRON DEPOT-PED   Stopped by:  Omar Trammell APRN CNP           lidocaine visc 2% 2.5mL/5mL & maalox/mylanta w/ simeth 2.5mL/5mL & diphenhydrAMINE 5mg/5mL Susp suspension   Commonly known as:  [facility]   Stopped by:  Omar Trammell APRN CNP           pantoprazole 40 MG EC tablet   Commonly known as:  PROTONIX   Stopped by:  Omar Trammell APRN CNP                    Primary Care Provider Office Phone # Fax #    Coretta Yanez [phone]       [address] [address]        Equal Access to Services     [facility]: Hadii leonid finley haddarline Sodolores, waaxda luqdavid, qaybta kaalyou xie, wilner blue . So [facility] [phone].    ATENCIÓN: Si habla español, tiene a ordoñez disposición servicios gratuitos de asistencia lingüística. [facility] [phone].    We comply with applicable federal civil rights laws and [place] laws. We do not discriminate on the basis of race, color, national origin, age, disability sex, sexual orientation or gender identity.            Thank you!     Thank you for choosing PEDS HEMATOLOGY ONCOLOGY  for your care. Our goal is always to provide you with excellent care. Hearing back from our patients is one way we can continue to improve our services. Please take a few minutes to complete the written survey that you may receive in the mail after your visit with us. Thank you!             Your Updated Medication List - Protect others around you: Learn how to safely use, store and throw  away your medicines at www.disposemymeds.org.          This list is accurate as of: 8/3/17  2:46 PM.  Always use your most recent med list.                   Brand Name Dispense Instructions for use Diagnosis    acetaminophen 325 MG tablet    TYLENOL    100 tablet    Take 2 tablets (650 mg) by mouth every 4 hours as needed for mild pain    Sarcoma of vulva (H)       diltiazem 2% in PLO cream (FV COMPOUNDED) 2% Gel     60 g    To anal opening three times daily.  Use a pea-sized amount.  Store at room temperature.    Anal fissure       diphenhydrAMINE 25 MG tablet    BENADRYL    60 tablet    Take 1-2 tablets (25-50 mg) by mouth every 6 hours as needed for other (Nausea or vomiting)    Chemotherapy induced nausea and vomiting       ibuprofen 600 MG tablet    ADVIL/MOTRIN    60 tablet    Take 1 tablet (600 mg) by mouth every 6 hours as needed for moderate pain    Sarcoma of vulva (H)       lidocaine-prilocaine cream    EMLA    30 g    Apply to port 30 minutes prior to access    Malignant tumor cells (H)       ondansetron 8 MG tablet    ZOFRAN    30 tablet    Take 1 tablet (8 mg) by mouth every 6 hours as needed for nausea    Encounter for antineoplastic chemotherapy       oxyCODONE 5 MG IR tablet    ROXICODONE    20 tablet    Take 1-2 tablets (5-10 mg) by mouth every 4 hours as needed for moderate to severe pain    Acute post-operative pain       pazopanib 200 MG tablet CHEMOTHERAPY    VOTRIENT    120 tablet    Take 4 tablets (800 mg) by mouth daily    Synovial sarcoma (H)       polyethylene glycol Packet    MIRALAX/GLYCOLAX    7 packet    Take 17 g by mouth daily    Acute post-operative pain       senna-docusate 8.6-50 MG per tablet    SENOKOT-S;PERICOLACE    30 tablet    Take 2 tablets by mouth 2 times daily as needed for constipation    Acute post-operative pain          no...

## 2024-05-14 NOTE — LETTER
3/15/2018      RE: Olimpia Childress  [address]   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     SW met with Olimpia individually.  was not yet present, though she agreed to meet regardless. Olimpia had her PCA assessment two weeks ago and is need of a discharge summary from her December hospitalization in order to complete the assessment. SW provided. She is trying to remain hopeful that she will qualify for several hours per day, though did not appreciate the assessors style and is worried. Olimpia reported that she is feeling much better than she had been several weeks ago. She is not able to attend school at this time, as her medication makes her very tired and she is unable to sit and stay focused for 5+ hours a day that school requires.     INTERVENTION:     Social/Supportive check in. Provided discharge summary from December admission. She denied any other social work needs at this time.     ASSESSMENT:     Olimpia was easily engaged and pleasant to meet with. Appreciative of SW support. She expressed relief that she is physically feeling better.     PLAN:     Social work will continue to assess needs and provide ongoing psychosocial support and access to resources.       BELKIS Salinas, [facility]  Pediatric Hem/Onc   Phone: [phone]  Pager: [phone]                  BELKIS Salinas   Please follow up with your primary care physician in 1 week for repeat BMP

## 2024-12-16 NOTE — LETTER
3/23/2017      RE: Olimpia Childress  [address]   [facility] [address]       Pediatric Hematology/Oncology Clinic Note    HPI- Olimpia initially presented with a growth on her right labia in 2013 when she was living in [place]. 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 She discontinued further treatment b/c her physicians were unsure of the exact diagnosis and she felt uncomfortable proceeding. Olimpia subsequently immigrated to the  in August of 2015 and in October she first noticed a recurrence of the mass in the area of previous excision. She was in [place] at that time, seen by oncology and had a port placed but then moved to [place] where she was seen by Dr. Mckeon at [facility] in November. There she underwent an MRI that showed a solid and cystic subcutaneous mass in the right labia measuring 1.7 x 1.6 x 1cm with question of nodular extension vs a second nodule measuring 0.7 cm. There was no invasion into the vagina. On November 16th 2015, Olimpia had a biopsy of the mass by a gynecologist, Dr. Terry, at [facility]. Cytology was reviewed at [facility] and read as a SMARCB1-deficient genital sarcoma, likely falling within the overall spectrum of malignant rhabdoid tumor. By immunohistochemistry, the cells shows a complete loss of SMARCb1. Wide spectrum cytokeratin, CD34, WT1, Desmin and CD99 were all negative. RT PCR for Wooten Sarcoma associated fusion transcripts were negative as well. Olimpia went on to have a PET/CT as well which showed multiple pulmonary lesions and biopsy confirmed a lesion to be metastatic disease. Olimpia was seen by Tomás White at [facility] who reviewed the diagnosis and potential treatment plans with her, however she prefered to be treated here at [facility]. Olimpia received therapy for metastatic extrarenal rhabdoid tumor per [id number] regimen I until the  Likely 2/2 YOUNG, resolved  Without EKG changes. Findings not concerning for hyperkalemic emergency    PLAN:    Monitor for arrhythmias with EKG and/or continuous telemetry.   Lokelma prn   Encourage adequate fluid intake   diagnosis was questioned at the time of resection of her pulmonary mets and was determined to be synovial sarcoma.    Olimpia underwent left sided thoracotomy and resection of two pulmonary nodules on 7/20/16. Pathology revealed that one lesion was benign lymphoid tissue and the other was consistent with synovial sarcoma.  This was confirmed with FISH  With 91% of cell examined having a signal pattern indictivate of a rearrangement of the SS18 locus.  Olimpia has now completed 3 cycles of chemotherapy per [id number] and started her radiation on 9/13/16 and completed on 10/17/16.  She then went on to have a resection of her labial mass on 11/16/16 by Jannet Pastrana and Chikis with reconstruction, including skin flaps by Dr. Corona from plastics.  Pathology showed no residual tumor.  She had a f/u chest CT today on 12/5/16 that showed persistance of her pulmonary nodules as well as few additonal pulmonary nodules.  She saw Dr. Pierre who was in agreement with surgical resection, however Olimpia wanted to wait until March to have more time to recover from her last surgery. She underwent left sided thoracotomy on 3/8/17.    Olimpia comes to clinic today for follow up.  She report this surgery has been much more difficult to recover from compared to her previous lung surgeries.  Her pain is finally starting to improve but she continues to need oxycodone.  She reports feeling of a racing heart when she gets up or is active, also has significant fatigue.  She had some difficulty breathing especially when she lays down at night.  Olimpia is eating OK, denies constipation.    Olimpia wonders what her pathology showed, she does not wish to receive any more chemotherapy, she states she believes God will take care of her and can accept her fate.    History was obtained from Olimpia via professional [employer] .     Allergies as of 03/23/2017 - [place] as Reviewed 03/23/2017   Allergen Reaction Noted     Heparin flush Other (See Comments)  01/27/2016     Pork derived products  02/09/2016     Tegaderm transparent dressing (informational only) Other (See Comments) and Rash 04/20/2016       Current Outpatient Prescriptions   Medication Sig Dispense Refill     oxyCODONE (ROXICODONE) 5 MG IR tablet Take 1-2 tablets (5-10 mg) by mouth every 4 hours as needed for moderate to severe pain 20 tablet 0     polyethylene glycol (MIRALAX/GLYCOLAX) Packet Take 17 g by mouth daily 7 packet 1     senna-docusate (SENOKOT-S;PERICOLACE) 8.6-50 MG per tablet Take 2 tablets by mouth 2 times daily as needed for constipation 30 tablet 1     acetaminophen (TYLENOL) 325 MG tablet Take 2 tablets (650 mg) by mouth every 4 hours as needed for mild pain 100 tablet 1     ibuprofen (ADVIL/MOTRIN) 600 MG tablet Take 1 tablet (600 mg) by mouth every 6 hours as needed for moderate pain 60 tablet 1     bisacodyl (DULCOLAX) 5 MG EC tablet Take 1 tablet (5 mg) by mouth 2 times daily 30 tablet 2     polyethylene glycol (MIRALAX/GLYCOLAX) Packet Take 17 g by mouth 2 times daily 510 g 3     senna-docusate (SENOKOT-S;PERICOLACE) 8.6-50 MG per tablet Take 2 tablets by mouth 2 times daily 100 tablet 1     diltiazem 2% in PLO cream, FV COMPOUNDED, 2% GEL To anal opening three times daily.  Use a pea-sized amount.  Store at room temperature. 60 g 0     ondansetron (ZOFRAN) 8 MG tablet Take 1 tablet (8 mg) by mouth every 6 hours as needed for nausea 30 tablet 6     diphenhydrAMINE (BENADRYL) 25 MG tablet Take 1-2 tablets (25-50 mg) by mouth every 6 hours as needed for other (Nausea or vomiting) 60 tablet 3     lidocaine-prilocaine (EMLA) cream Apply to port 30 minutes prior to access 30 g 5     pantoprazole (PROTONIX) 40 MG enteric coated tablet Take 1 tablet (40 mg) by mouth daily 30 tablet 3     leuprolide (LUPRON DEPOT-PED) 11.25 MG KIT Inject 11.25 mg into the muscle every 3 months 1 each 1       Past Medical History:   Diagnosis Date     Anemia 6/3/2016     Constipation      Extrarenal  "rhabdoid neoplasm (H)      Febrile neutropenia (H) 4/17/2016     History of blood transfusion      Latent tuberculosis      Lung disease      On antineoplastic chemotherapy      Sarcoma of vulva (H)        Social History     Social History     Marital status:      Spouse name: N/A     Number of children: N/A     Years of education: N/A     Occupational History     Not on file.     Social History Main Topics     Smoking status: Never Smoker     Smokeless tobacco: Never Used     Alcohol use No     Drug use: No     Sexual activity: No     Other Topics Concern     Not on file     Social History Narrative       Family History   Problem Relation Age of Onset     Other Cancer No family hx of      ROS  See HPI. Complete ROS otherwise negative.    Physical Exam   VS: /72 (BP Location: Right arm, Patient Position: Fowlers, Cuff Size: Adult Regular)  Pulse 95  Temp 97.7  F (36.5  C) (Oral)  Resp 22  Ht 1.607 m (5' 3.27\")  Wt 59.3 kg (130 lb 11.7 oz)  SpO2 99%  BMI 22.96 kg/m2    GENERAL: Alert, well appearing young woman in no acute distress.  SKIN: No rashes, lesions, or abnormal pigmentation.  HEAD: Normocephalic, atraumatic     EYES: Normal lids, conjunctivae/cornea normal, mild icterus. PERRL. EOMI.  EARS: Pinna normal b/l, no pain on palpation. External ears normal appearing.   NOSE: Clear, no discharge or congestion  MOUTH/THROAT: Oropharynx is clear. Normal dentition for age, no mucosal lesions.  NECK: Supple, full range of motion, no masses  LYMPH NODES: No cervical lymphadenopathy.  LUNGS: No increased WOB.  Lungs clear to auscultation through out however air movement diminished in LLL.  No crackles or wheezes.  Left chest with large incision, healing well.  Old chest tube site is C/D/I.  HEART: HR mildly tachycardic. S1 and S2 are normal. No murmurs, rubs, gallops. The radial pulses are 2+ bilaterally. Cap refill <3 sec in upper extremities.  ABDOMEN: Normal bowel sounds. Soft, non-tender, " "non-distended, no masses or hepatosplenomegaly.  NEUROLOGIC: Grossly intact, no focal neurologic deficits.    :  Deferred today.    Results for orders placed or performed in visit on 03/23/17   CBC with platelets differential   Result Value Ref Range    WBC 4.5 4.0 - 11.0 10e9/L    RBC Count 3.49 (L) 3.8 - 5.2 10e12/L    Hemoglobin 11.6 (L) 11.7 - 15.7 g/dL    Hematocrit 32.8 (L) 35.0 - 47.0 %    MCV 94 78 - 100 fl    MCH 33.2 (H) 26.5 - 33.0 pg    MCHC 35.4 31.5 - 36.5 g/dL    RDW 12.2 10.0 - 15.0 %    Platelet Count 252 150 - 450 10e9/L    Diff Method Automated Method     % Neutrophils 65.2 %    % Lymphocytes 19.0 %    % Monocytes 12.6 %    % Eosinophils 1.5 %    % Basophils 0.4 %    % Immature Granulocytes 1.3 %    Nucleated RBCs 0 0 /100    Absolute Neutrophil 3.0 1.6 - 8.3 10e9/L    Absolute Lymphocytes 0.9 0.8 - 5.3 10e9/L    Absolute Monocytes 0.6 0.0 - 1.3 10e9/L    Absolute Eosinophils 0.1 0.0 - 0.7 10e9/L    Absolute Basophils 0.0 0.0 - 0.2 10e9/L    Abs Immature Granulocytes 0.1 0 - 0.4 10e9/L    Absolute Nucleated RBC 0.0      SPECIMEN(S):   A: Tissue, left chest wall   B: Pericardium mass   C: Left lower lobe wedge resection   D: Tissue, left chest wall #2   E: Left lower lobe #2 wedge     FINAL DIAGNOSIS:        A.     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     Soft tissue and lung, \"left chest wall #2,\" excision:        - metastatic synovial sarcoma with treatment effects and   approximately 20% tumor necrosis        - no tumor identified at inked margins     E.     Lung, \"left lower " "lobe #2,\" wedge resection:        - metastatic viable synovial sarcoma with treatment effects        - no tumor identified at inked margins   All cultures are negative with the exception of AFB and fungal studies which are negative but still preliminary.  Recent Results (from the past 24 hour(s))   X-ray Chest 2 vws*   Result Value    Radiologist flags Pneumothorax (Urgent)    Narrative    XR CHEST 2 VW  3/23/2017 12:32 PM      HISTORY: post thoracotomy, Malignant neoplasm of connective and soft  tissue, unspecified    COMPARISON: 3/8/2017    FINDINGS: PA and lateral views of the chest. Central venous catheter  tip projects over the high right atrium. The cardiac silhouette size  is normal. There is a stable small left pleural effusion. Slight  increase in opacities at the left lung base. There is a small right  pneumothorax, new from the comparison examination. There is a small  amount of pleural fluid on the right. There are no new focal pulmonary  opacities on the right.      Impression    IMPRESSION:   1. New small right pneumothorax.  2. Stable small left pleural effusion with slightly increased left  basilar opacities.    [Urgent Result: Pneumothorax]    Finding was identified on 3/23/2017 1:05 PM.     Omar Trammell CNP was contacted by Dr. Gamez at 3/23/2017 1:10 PM and  verbalized understanding of the urgent finding.      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  Biopsy of labial mass reveals synovial sarcoma as well. Her recent PET CT does not show any new sites of disease (areas in buttocks correspond to " Depo-Lupron injection sites).  She is s/p 3 cycles of chemotherapy per [id number]  (2nd and 3rd cycle 75% dosing), radiation and resection of the labial mass with reconstruction.  Pathology showed no residual tumor.  She delayed her thoracotomy and unfortunately had an increased tumor burden at time of her lung surgery, she had numerous masses removed from her left chest, all consistent with synovial sarcoma, once with positive margins.  She has had a difficult recovery.  Pain is now improving.  She reports feelings of a racing heart with any activity and some difficulty breathing when lying flat.  She states she is not interested in any additional therapy.    Plan:  1) Discussed pathology at length with Olimpia.  With positive margins her cancer will likely not be cured.  However, they are options for palliative therapy that could potentially provide some benefit while minimizing toxicity and side effects.  Discussed oral targeted agents, specifically Pazopanib, and provided written information for Olimpia.  She stated she may be open to this but wanted to read about it.  She wants to remain hopeful and feels it is in God's hands.  2) Refilled oxycodone, would expect her pain will continue to improve.    3) Follow up with Dr Pierre 4/4.  4) Chest xray obtained to rule out any complications contributing to her current symptoms. New right pneumothorax.  Discussed with Dr Robbie Hauser and he reviewed with Dr Elena and came to see Olimpia.  They initially recommended admission but Olimpia declined, she is aware to seek care emergently with any increase in symptoms.  She will follow up in our clinic Monday for a repeat chest xray and visit with Aliyah Mckeon NP.  Pediatric surgery would like us to call the on call resident with results to discuss plan.  5) Will then RTC on 4/6 for follow up visit with Dr Yanez to discuss potential treatment options.      JULIO C Pittman CNP

## (undated) DEVICE — SU MONOCRYL 4-0 PS-2 18" UND Y496G

## (undated) DEVICE — DRAPE POUCH INSTRUMENT 1018

## (undated) DEVICE — SURGICEL HEMOSTAT 4X8" 1952

## (undated) DEVICE — LIGHT HANDLE X2

## (undated) DEVICE — SPECIMEN CONTAINER 5OZ STERILE 2600SA

## (undated) DEVICE — CLIP HORIZON MED BLUE 002200

## (undated) DEVICE — DRSG TELFA ISLAND 4X8" 7541

## (undated) DEVICE — SYR EAR BULB 3OZ 0035830

## (undated) DEVICE — DRAPE U SPLIT 74X120" 29440

## (undated) DEVICE — CATH TRAY FOLEY SURESTEP 16FR W/URINE MTR STATLK LF A303416A

## (undated) DEVICE — SU ETHIBOND 3-0 SH 30" X832H

## (undated) DEVICE — STPL ENDO HANDLE GIA ULTRA UNIVERSAL STD EGIAUSTND

## (undated) DEVICE — SU PDS II 3-0 SH 27" Z316H

## (undated) DEVICE — DRAPE TIBURON TOP SHEET 100X60" 29352

## (undated) DEVICE — SPONGE LAP 18X18" X8435

## (undated) DEVICE — SU PDS II 2-0 CT-1 27" Z339H

## (undated) DEVICE — LINEN TOWEL PACK X30 5481

## (undated) DEVICE — STRAP KNEE/BODY 31143004

## (undated) DEVICE — GLOVE PROTEXIS BLUE W/NEU-THERA 7.5  2D73EB75

## (undated) DEVICE — DRAIN CHEST TUBE 24FR STR 8024

## (undated) DEVICE — TUBING SUCTION MEDI-VAC SOFT 3/16"X20' N520A

## (undated) DEVICE — SYR BULB IRRIG 50ML LATEX FREE 0035280

## (undated) DEVICE — DRSG TELFA 3X8" 1238

## (undated) DEVICE — SU PDS II 1 CTX 36" Z371T

## (undated) DEVICE — SUCTION TIP YANKAUER STR K87

## (undated) DEVICE — SUCTION PLEURAVAC UNIT CHEST 2002-000

## (undated) DEVICE — DRSG DRAIN 4X4" 7086

## (undated) DEVICE — GLOVE PROTEXIS MICRO 7.0  2D73PM70

## (undated) DEVICE — BASIN SET MAJOR

## (undated) DEVICE — SUCTION MANIFOLD DORNOCH ULTRA CART UL-CL500

## (undated) DEVICE — BLADE KNIFE SURG 15 371115

## (undated) DEVICE — DRSG ABDOMINAL 07 1/2X8" 7197D

## (undated) DEVICE — SU VICRYL 3-0 SH 27" J316H

## (undated) DEVICE — SOL NACL 0.9% IRRIG 1000ML BOTTLE 2F7124

## (undated) DEVICE — ESU ELEC BLADE 6" COATED E1450-6

## (undated) DEVICE — SPONGE RAY-TEC 4X8" 7318

## (undated) DEVICE — SU DERMABOND ADVANCED .7ML DNX12

## (undated) DEVICE — STPL ENDO RELOAD 60MM VASCULAR MEDIUM TAN EGIA60AVM

## (undated) DEVICE — DRSG PRIMAPORE 03 1/8X6" 66000318

## (undated) DEVICE — SU VICRYL 2-0 SH 27" UND J417H

## (undated) DEVICE — Device

## (undated) DEVICE — SOL WATER IRRIG 1000ML BOTTLE 2F7114

## (undated) RX ORDER — FENTANYL CITRATE 50 UG/ML
INJECTION, SOLUTION INTRAMUSCULAR; INTRAVENOUS
Status: DISPENSED
Start: 2017-03-08

## (undated) RX ORDER — LIDOCAINE 40 MG/G
CREAM TOPICAL
Status: DISPENSED
Start: 2017-03-08